# Patient Record
Sex: FEMALE | Race: WHITE | NOT HISPANIC OR LATINO | Employment: FULL TIME | URBAN - METROPOLITAN AREA
[De-identification: names, ages, dates, MRNs, and addresses within clinical notes are randomized per-mention and may not be internally consistent; named-entity substitution may affect disease eponyms.]

---
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
## 2020-06-16 ENCOUNTER — HOSPITAL ENCOUNTER (EMERGENCY)
Facility: HOSPITAL | Age: 20
Discharge: HOME | End: 2020-06-16
Attending: EMERGENCY MEDICINE | Admitting: EMERGENCY MEDICINE
Payer: COMMERCIAL

## 2020-06-16 VITALS
DIASTOLIC BLOOD PRESSURE: 85 MMHG | WEIGHT: 119.27 LBS | HEART RATE: 78 BPM | TEMPERATURE: 98.1 F | SYSTOLIC BLOOD PRESSURE: 131 MMHG | OXYGEN SATURATION: 98 % | RESPIRATION RATE: 18 BRPM

## 2020-06-16 DIAGNOSIS — N39.0 UTI (URINARY TRACT INFECTION): Primary | ICD-10-CM

## 2020-06-16 LAB
BACTERIA UR QL AUTO: ABNORMAL /HPF
BILIRUB UR QL STRIP: NEGATIVE
CLARITY UR: CLEAR
COLOR UR: YELLOW
EXT PREG TEST URINE: NEGATIVE
EXT. CONTROL ED NAV: NORMAL
GLUCOSE UR STRIP-MCNC: NEGATIVE MG/DL
HGB UR QL STRIP.AUTO: ABNORMAL
KETONES UR STRIP-MCNC: NEGATIVE MG/DL
LEUKOCYTE ESTERASE UR QL STRIP: ABNORMAL
NITRITE UR QL STRIP: POSITIVE
NON-SQ EPI CELLS URNS QL MICRO: ABNORMAL /HPF
PH UR STRIP.AUTO: 7 [PH]
PROT UR STRIP-MCNC: ABNORMAL MG/DL
RBC #/AREA URNS AUTO: ABNORMAL /HPF
SP GR UR STRIP.AUTO: <=1.005 (ref 1–1.03)
UROBILINOGEN UR QL STRIP.AUTO: 0.2 E.U./DL
WBC #/AREA URNS AUTO: ABNORMAL /HPF

## 2020-06-16 PROCEDURE — 99284 EMERGENCY DEPT VISIT MOD MDM: CPT | Performed by: EMERGENCY MEDICINE

## 2020-06-16 PROCEDURE — 99283 EMERGENCY DEPT VISIT LOW MDM: CPT

## 2020-06-16 PROCEDURE — 87086 URINE CULTURE/COLONY COUNT: CPT | Performed by: EMERGENCY MEDICINE

## 2020-06-16 PROCEDURE — 81025 URINE PREGNANCY TEST: CPT | Performed by: EMERGENCY MEDICINE

## 2020-06-16 PROCEDURE — 81001 URINALYSIS AUTO W/SCOPE: CPT | Performed by: EMERGENCY MEDICINE

## 2020-06-16 PROCEDURE — 87491 CHLMYD TRACH DNA AMP PROBE: CPT | Performed by: EMERGENCY MEDICINE

## 2020-06-16 PROCEDURE — 87591 N.GONORRHOEAE DNA AMP PROB: CPT | Performed by: EMERGENCY MEDICINE

## 2020-06-16 RX ORDER — CEPHALEXIN 250 MG/1
500 CAPSULE ORAL ONCE
Status: COMPLETED | OUTPATIENT
Start: 2020-06-16 | End: 2020-06-16

## 2020-06-16 RX ORDER — CEPHALEXIN 500 MG/1
500 CAPSULE ORAL EVERY 12 HOURS SCHEDULED
Qty: 14 CAPSULE | Refills: 0 | Status: SHIPPED | OUTPATIENT
Start: 2020-06-16 | End: 2020-06-23

## 2020-06-16 RX ORDER — PHENAZOPYRIDINE HYDROCHLORIDE 200 MG/1
200 TABLET, FILM COATED ORAL 3 TIMES DAILY PRN
Qty: 6 TABLET | Refills: 0 | Status: SHIPPED | OUTPATIENT
Start: 2020-06-16 | End: 2020-06-30 | Stop reason: ALTCHOICE

## 2020-06-16 RX ADMIN — CEPHALEXIN 500 MG: 250 CAPSULE ORAL at 19:57

## 2020-06-17 LAB — BACTERIA UR CULT: NORMAL

## 2020-06-18 LAB
C TRACH DNA SPEC QL NAA+PROBE: NEGATIVE
N GONORRHOEA DNA SPEC QL NAA+PROBE: NEGATIVE

## 2020-06-30 ENCOUNTER — OFFICE VISIT (OUTPATIENT)
Dept: FAMILY MEDICINE CLINIC | Facility: CLINIC | Age: 20
End: 2020-06-30
Payer: COMMERCIAL

## 2020-06-30 VITALS
OXYGEN SATURATION: 98 % | HEIGHT: 62 IN | HEART RATE: 92 BPM | TEMPERATURE: 97.2 F | WEIGHT: 115 LBS | BODY MASS INDEX: 21.16 KG/M2 | DIASTOLIC BLOOD PRESSURE: 74 MMHG | SYSTOLIC BLOOD PRESSURE: 110 MMHG

## 2020-06-30 DIAGNOSIS — Z30.011 ENCOUNTER FOR INITIAL PRESCRIPTION OF CONTRACEPTIVE PILLS: ICD-10-CM

## 2020-06-30 DIAGNOSIS — Z76.89 ENCOUNTER TO ESTABLISH CARE: Primary | ICD-10-CM

## 2020-06-30 DIAGNOSIS — F41.9 ANXIETY: ICD-10-CM

## 2020-06-30 PROCEDURE — 1036F TOBACCO NON-USER: CPT | Performed by: NURSE PRACTITIONER

## 2020-06-30 PROCEDURE — 3008F BODY MASS INDEX DOCD: CPT | Performed by: NURSE PRACTITIONER

## 2020-06-30 PROCEDURE — 99203 OFFICE O/P NEW LOW 30 MIN: CPT | Performed by: NURSE PRACTITIONER

## 2020-06-30 RX ORDER — NORGESTIMATE AND ETHINYL ESTRADIOL 0.25-0.035
1 KIT ORAL DAILY
Qty: 28 TABLET | Refills: 5 | Status: SHIPPED | OUTPATIENT
Start: 2020-06-30 | End: 2020-07-23 | Stop reason: SDUPTHER

## 2020-07-23 DIAGNOSIS — Z30.011 ENCOUNTER FOR INITIAL PRESCRIPTION OF CONTRACEPTIVE PILLS: ICD-10-CM

## 2020-07-24 RX ORDER — NORGESTIMATE AND ETHINYL ESTRADIOL 0.25-0.035
1 KIT ORAL DAILY
Qty: 28 TABLET | Refills: 0 | Status: SHIPPED | OUTPATIENT
Start: 2020-07-24 | End: 2020-08-12 | Stop reason: SDUPTHER

## 2020-08-12 DIAGNOSIS — Z30.011 ENCOUNTER FOR INITIAL PRESCRIPTION OF CONTRACEPTIVE PILLS: ICD-10-CM

## 2020-08-12 RX ORDER — NORGESTIMATE AND ETHINYL ESTRADIOL 0.25-0.035
1 KIT ORAL DAILY
Qty: 28 TABLET | Refills: 4 | Status: SHIPPED | OUTPATIENT
Start: 2020-08-12 | End: 2020-09-03 | Stop reason: SDUPTHER

## 2020-09-03 DIAGNOSIS — Z30.011 ENCOUNTER FOR INITIAL PRESCRIPTION OF CONTRACEPTIVE PILLS: ICD-10-CM

## 2020-09-03 RX ORDER — NORGESTIMATE AND ETHINYL ESTRADIOL 0.25-0.035
1 KIT ORAL DAILY
Qty: 28 TABLET | Refills: 0 | Status: SHIPPED | OUTPATIENT
Start: 2020-09-03 | End: 2020-09-23 | Stop reason: SDUPTHER

## 2020-09-23 DIAGNOSIS — Z30.011 ENCOUNTER FOR INITIAL PRESCRIPTION OF CONTRACEPTIVE PILLS: ICD-10-CM

## 2020-09-24 RX ORDER — NORGESTIMATE AND ETHINYL ESTRADIOL 0.25-0.035
1 KIT ORAL DAILY
Qty: 28 TABLET | Refills: 0 | Status: SHIPPED | OUTPATIENT
Start: 2020-09-24 | End: 2020-10-22 | Stop reason: SDUPTHER

## 2020-10-22 DIAGNOSIS — Z30.011 ENCOUNTER FOR INITIAL PRESCRIPTION OF CONTRACEPTIVE PILLS: ICD-10-CM

## 2020-10-22 RX ORDER — NORGESTIMATE AND ETHINYL ESTRADIOL 0.25-0.035
1 KIT ORAL DAILY
Qty: 28 TABLET | Refills: 0 | Status: SHIPPED | OUTPATIENT
Start: 2020-10-22 | End: 2020-11-16 | Stop reason: SDUPTHER

## 2020-11-16 DIAGNOSIS — Z30.011 ENCOUNTER FOR INITIAL PRESCRIPTION OF CONTRACEPTIVE PILLS: ICD-10-CM

## 2020-11-16 RX ORDER — NORGESTIMATE AND ETHINYL ESTRADIOL 0.25-0.035
1 KIT ORAL DAILY
Qty: 28 TABLET | Refills: 0 | Status: SHIPPED | OUTPATIENT
Start: 2020-11-16 | End: 2020-12-09 | Stop reason: SDUPTHER

## 2020-11-25 ENCOUNTER — APPOINTMENT (EMERGENCY)
Dept: CT IMAGING | Facility: HOSPITAL | Age: 20
End: 2020-11-25
Payer: COMMERCIAL

## 2020-11-25 ENCOUNTER — HOSPITAL ENCOUNTER (EMERGENCY)
Facility: HOSPITAL | Age: 20
Discharge: HOME | End: 2020-11-25
Attending: EMERGENCY MEDICINE | Admitting: EMERGENCY MEDICINE
Payer: COMMERCIAL

## 2020-11-25 VITALS
BODY MASS INDEX: 22.88 KG/M2 | SYSTOLIC BLOOD PRESSURE: 112 MMHG | OXYGEN SATURATION: 100 % | WEIGHT: 124.34 LBS | HEART RATE: 92 BPM | TEMPERATURE: 98.4 F | DIASTOLIC BLOOD PRESSURE: 66 MMHG | RESPIRATION RATE: 20 BRPM | HEIGHT: 62 IN

## 2020-11-25 DIAGNOSIS — R10.9 ABDOMINAL PAIN: Primary | ICD-10-CM

## 2020-11-25 DIAGNOSIS — K27.9 PEPTIC ULCER DISEASE: ICD-10-CM

## 2020-11-25 LAB
ALBUMIN SERPL BCP-MCNC: 3.9 G/DL (ref 3.5–5)
ALP SERPL-CCNC: 63 U/L (ref 46–116)
ALT SERPL W P-5'-P-CCNC: 22 U/L (ref 12–78)
ANION GAP SERPL CALCULATED.3IONS-SCNC: 11 MMOL/L (ref 4–13)
AST SERPL W P-5'-P-CCNC: 18 U/L (ref 5–45)
ATRIAL RATE: 83 BPM
BASOPHILS # BLD AUTO: 0.04 THOUSANDS/ΜL (ref 0–0.1)
BASOPHILS NFR BLD AUTO: 0 % (ref 0–1)
BILIRUB SERPL-MCNC: 0.7 MG/DL (ref 0.2–1)
BUN SERPL-MCNC: 12 MG/DL (ref 5–25)
CALCIUM SERPL-MCNC: 9.1 MG/DL (ref 8.3–10.1)
CHLORIDE SERPL-SCNC: 102 MMOL/L (ref 100–108)
CO2 SERPL-SCNC: 26 MMOL/L (ref 21–32)
CREAT SERPL-MCNC: 0.77 MG/DL (ref 0.6–1.3)
EOSINOPHIL # BLD AUTO: 0.04 THOUSAND/ΜL (ref 0–0.61)
EOSINOPHIL NFR BLD AUTO: 0 % (ref 0–6)
ERYTHROCYTE [DISTWIDTH] IN BLOOD BY AUTOMATED COUNT: 11.3 % (ref 11.6–15.1)
FLUAV RNA RESP QL NAA+PROBE: NEGATIVE
FLUBV RNA RESP QL NAA+PROBE: NEGATIVE
GFR SERPL CREATININE-BSD FRML MDRD: 112 ML/MIN/1.73SQ M
GLUCOSE SERPL-MCNC: 123 MG/DL (ref 65–140)
HCG SERPL QL: NEGATIVE
HCT VFR BLD AUTO: 42.5 % (ref 34.8–46.1)
HGB BLD-MCNC: 15 G/DL (ref 11.5–15.4)
IMM GRANULOCYTES # BLD AUTO: 0.04 THOUSAND/UL (ref 0–0.2)
IMM GRANULOCYTES NFR BLD AUTO: 0 % (ref 0–2)
LYMPHOCYTES # BLD AUTO: 1.91 THOUSANDS/ΜL (ref 0.6–4.47)
LYMPHOCYTES NFR BLD AUTO: 16 % (ref 14–44)
MCH RBC QN AUTO: 31 PG (ref 26.8–34.3)
MCHC RBC AUTO-ENTMCNC: 35.3 G/DL (ref 31.4–37.4)
MCV RBC AUTO: 88 FL (ref 82–98)
MONOCYTES # BLD AUTO: 0.75 THOUSAND/ΜL (ref 0.17–1.22)
MONOCYTES NFR BLD AUTO: 6 % (ref 4–12)
NEUTROPHILS # BLD AUTO: 9.09 THOUSANDS/ΜL (ref 1.85–7.62)
NEUTS SEG NFR BLD AUTO: 78 % (ref 43–75)
NRBC BLD AUTO-RTO: 0 /100 WBCS
P AXIS: 57 DEGREES
PLATELET # BLD AUTO: 266 THOUSANDS/UL (ref 149–390)
PMV BLD AUTO: 10 FL (ref 8.9–12.7)
POTASSIUM SERPL-SCNC: 3.6 MMOL/L (ref 3.5–5.3)
PR INTERVAL: 130 MS
PROT SERPL-MCNC: 7.7 G/DL (ref 6.4–8.2)
QRS AXIS: 82 DEGREES
QRSD INTERVAL: 88 MS
QT INTERVAL: 378 MS
QTC INTERVAL: 444 MS
RBC # BLD AUTO: 4.84 MILLION/UL (ref 3.81–5.12)
RSV RNA RESP QL NAA+PROBE: NEGATIVE
S PYO DNA THROAT QL NAA+PROBE: NORMAL
SARS-COV-2 RNA RESP QL NAA+PROBE: NEGATIVE
SODIUM SERPL-SCNC: 139 MMOL/L (ref 136–145)
T WAVE AXIS: 56 DEGREES
VENTRICULAR RATE: 83 BPM
WBC # BLD AUTO: 11.87 THOUSAND/UL (ref 4.31–10.16)

## 2020-11-25 PROCEDURE — 99284 EMERGENCY DEPT VISIT MOD MDM: CPT

## 2020-11-25 PROCEDURE — 74177 CT ABD & PELVIS W/CONTRAST: CPT

## 2020-11-25 PROCEDURE — 87651 STREP A DNA AMP PROBE: CPT | Performed by: PHYSICIAN ASSISTANT

## 2020-11-25 PROCEDURE — 0241U HB NFCT DS VIR RESP RNA 4 TRGT: CPT | Performed by: PHYSICIAN ASSISTANT

## 2020-11-25 PROCEDURE — G1004 CDSM NDSC: HCPCS

## 2020-11-25 PROCEDURE — 36415 COLL VENOUS BLD VENIPUNCTURE: CPT | Performed by: PHYSICIAN ASSISTANT

## 2020-11-25 PROCEDURE — 93010 ELECTROCARDIOGRAM REPORT: CPT | Performed by: INTERNAL MEDICINE

## 2020-11-25 PROCEDURE — 84703 CHORIONIC GONADOTROPIN ASSAY: CPT | Performed by: PHYSICIAN ASSISTANT

## 2020-11-25 PROCEDURE — 96361 HYDRATE IV INFUSION ADD-ON: CPT

## 2020-11-25 PROCEDURE — 99284 EMERGENCY DEPT VISIT MOD MDM: CPT | Performed by: PHYSICIAN ASSISTANT

## 2020-11-25 PROCEDURE — 85025 COMPLETE CBC W/AUTO DIFF WBC: CPT | Performed by: PHYSICIAN ASSISTANT

## 2020-11-25 PROCEDURE — 80053 COMPREHEN METABOLIC PANEL: CPT | Performed by: PHYSICIAN ASSISTANT

## 2020-11-25 PROCEDURE — 93005 ELECTROCARDIOGRAM TRACING: CPT

## 2020-11-25 PROCEDURE — 96360 HYDRATION IV INFUSION INIT: CPT

## 2020-11-25 RX ADMIN — SODIUM CHLORIDE 1000 ML: 0.9 INJECTION, SOLUTION INTRAVENOUS at 06:23

## 2020-11-25 RX ADMIN — IOHEXOL 85 ML: 350 INJECTION, SOLUTION INTRAVENOUS at 06:43

## 2020-12-09 ENCOUNTER — TELEPHONE (OUTPATIENT)
Dept: FAMILY MEDICINE CLINIC | Facility: CLINIC | Age: 20
End: 2020-12-09

## 2020-12-09 DIAGNOSIS — Z30.011 ENCOUNTER FOR INITIAL PRESCRIPTION OF CONTRACEPTIVE PILLS: ICD-10-CM

## 2020-12-09 RX ORDER — NORGESTIMATE AND ETHINYL ESTRADIOL 0.25-0.035
1 KIT ORAL DAILY
Qty: 28 TABLET | Refills: 6 | Status: SHIPPED | OUTPATIENT
Start: 2020-12-09 | End: 2021-01-09 | Stop reason: SDUPTHER

## 2020-12-09 RX ORDER — NORGESTIMATE AND ETHINYL ESTRADIOL 0.25-0.035
1 KIT ORAL DAILY
Qty: 28 TABLET | Refills: 6 | Status: SHIPPED | OUTPATIENT
Start: 2020-12-09 | End: 2020-12-09 | Stop reason: SDUPTHER

## 2020-12-09 RX ORDER — NORGESTIMATE AND ETHINYL ESTRADIOL 0.25-0.035
1 KIT ORAL DAILY
Qty: 28 TABLET | Refills: 0 | Status: CANCELLED | OUTPATIENT
Start: 2020-12-09

## 2021-01-09 DIAGNOSIS — Z30.011 ENCOUNTER FOR INITIAL PRESCRIPTION OF CONTRACEPTIVE PILLS: ICD-10-CM

## 2021-01-11 DIAGNOSIS — Z30.011 ENCOUNTER FOR INITIAL PRESCRIPTION OF CONTRACEPTIVE PILLS: ICD-10-CM

## 2021-01-12 RX ORDER — NORGESTIMATE AND ETHINYL ESTRADIOL 0.25-0.035
1 KIT ORAL DAILY
Qty: 28 TABLET | Refills: 0 | Status: SHIPPED | OUTPATIENT
Start: 2021-01-12 | End: 2021-03-09 | Stop reason: ALTCHOICE

## 2021-01-12 RX ORDER — NORGESTIMATE AND ETHINYL ESTRADIOL 0.25-0.035
1 KIT ORAL DAILY
Qty: 28 TABLET | Refills: 0 | Status: SHIPPED | OUTPATIENT
Start: 2021-01-12 | End: 2021-03-02 | Stop reason: SDUPTHER

## 2021-03-02 DIAGNOSIS — Z30.011 ENCOUNTER FOR INITIAL PRESCRIPTION OF CONTRACEPTIVE PILLS: ICD-10-CM

## 2021-03-02 RX ORDER — NORGESTIMATE AND ETHINYL ESTRADIOL 0.25-0.035
1 KIT ORAL DAILY
Qty: 28 TABLET | Refills: 0 | Status: SHIPPED | OUTPATIENT
Start: 2021-03-02 | End: 2021-03-31 | Stop reason: SDUPTHER

## 2021-03-03 ENCOUNTER — TELEPHONE (OUTPATIENT)
Dept: OTHER | Facility: OTHER | Age: 21
End: 2021-03-03

## 2021-03-08 ENCOUNTER — TELEPHONE (OUTPATIENT)
Dept: FAMILY MEDICINE CLINIC | Facility: CLINIC | Age: 21
End: 2021-03-08

## 2021-03-08 NOTE — TELEPHONE ENCOUNTER
----- Message from Rosa Spivey sent at 3/8/2021 12:23 PM EST -----  Regarding: FW: Prescription Question  Contact: [phone]  Angela Ramirez can you call patient please and make an apt with her  Thank You   ----- Message -----  From: Juan Davies  Sent: 3/8/2021  11:59 AM EST  To: , #  Subject: Prescription Question                            I had someone call me about setting up an appointment for an evaluation for anxiety medication  They said they'd call me back but no one ever did

## 2021-03-09 ENCOUNTER — TELEMEDICINE (OUTPATIENT)
Dept: FAMILY MEDICINE CLINIC | Facility: CLINIC | Age: 21
End: 2021-03-09
Payer: COMMERCIAL

## 2021-03-09 DIAGNOSIS — F32.A ANXIETY AND DEPRESSION: Primary | ICD-10-CM

## 2021-03-09 DIAGNOSIS — F41.9 ANXIETY AND DEPRESSION: Primary | ICD-10-CM

## 2021-03-09 DIAGNOSIS — R45.88 NON-SUICIDAL SELF HARM: ICD-10-CM

## 2021-03-09 PROBLEM — R10.9 ABDOMINAL PAIN: Status: RESOLVED | Noted: 2020-11-25 | Resolved: 2021-03-09

## 2021-03-09 PROCEDURE — 99214 OFFICE O/P EST MOD 30 MIN: CPT | Performed by: NURSE PRACTITIONER

## 2021-03-09 RX ORDER — SERTRALINE HYDROCHLORIDE 25 MG/1
25 TABLET, FILM COATED ORAL DAILY
Qty: 30 TABLET | Refills: 0 | Status: SHIPPED | OUTPATIENT
Start: 2021-03-09 | End: 2021-03-23 | Stop reason: ALTCHOICE

## 2021-03-09 NOTE — PROGRESS NOTES
Virtual Regular Visit      Assessment/Plan:    Problem List Items Addressed This Visit        Other    Anxiety and depression - Primary       Patient given number for suicide hotline  Patient denies active suicidal thoughts or plan  Advised patient to reach out to hotline with any thoughts  Continue counseling and encouraged to reach out to support system  Will start patient on Zoloft 25 mg daily and follow-up in 4 weeks  Relevant Medications    sertraline (ZOLOFT) 25 mg tablet    Non-suicidal self harm       Patient admits to superficial cuts on her wrist 2 weeks ago  She denies current thoughts  Patient did verbally contract for safety  Reason for visit is   Chief Complaint   Patient presents with    Virtual Regular Visit        Encounter provider ANGELA Fernandez    Provider located at [facility] [address] [facility]  [address] [facility] [address] [phone]      Recent Visits  Date Type Provider Dept   03/08/21 Telephone Seema Wilhelm, [address] recent visits within past 7 days and meeting all other requirements     Today's Visits  Date Type Provider Dept   03/09/21 Telemedicine ANGELA Arthur [address] [facility]   Showing today's visits and meeting all other requirements     Future Appointments  No visits were found meeting these conditions  Showing future appointments within next 150 days and meeting all other requirements        The patient was identified by name and date of birth  Mellissa Weaver was informed that this is a telemedicine visit and that the visit is being conducted through [address] [place] [address] and patient was informed that this is not a secure, HIPAA-compliant platform  She agrees to proceed     My office door was closed  No one else was in the room  She acknowledged consent and understanding of privacy and security of the video platform  The patient has agreed to participate and understands they can discontinue the visit at any time  Patient is aware this is a billable service  Subjective  Ernst Bedolla is a 21 y o  female  Presenting via face time for anxiety and depression  Patient states she is currently going through a break-up for the past 2 weeks  Patient states anxiety has been increasing over the last several months  Patient is currently seeking counseling on campus  She is going weekly to her counselor  Patient states anxiety is 8/10 currently and depression 5/10  She has had passive suicidal ideation and has talk to her counselor about this  Patient does not have a suicidal plan nor active suicidal ideation  Patient did cut herself 2 weeks ago on her wrist, very superficially  She did reach out to her counselor about this as well  Patient does believe it is time for her to start on medications  Patient does live with several roommates who she talks too  Patient states she does have a good support system with her friends  Patient is currently out at school at [employer]     Past Medical History:   Diagnosis Date    Concussion        Past Surgical History:   Procedure Laterality Date    WISDOM TOOTH EXTRACTION         Current Outpatient Medications   Medication Sig Dispense Refill    norgestimate-ethinyl estradiol (ORTHO-CYCLEN) 0 25-35 MG-MCG per tablet Take 1 tablet by mouth daily 28 tablet 0    sertraline (ZOLOFT) 25 mg tablet Take 1 tablet (25 mg total) by mouth daily 30 tablet 0     No current facility-administered medications for this visit  No Known Allergies    Review of Systems   Constitutional: Negative for chills and fever  HENT: Negative for ear pain and sore throat  Eyes: Negative for pain and visual disturbance  Respiratory: Negative for cough and shortness of breath  Cardiovascular: Negative for chest pain and palpitations     Gastrointestinal: Negative for abdominal pain and vomiting  Genitourinary: Negative for dysuria and hematuria  Musculoskeletal: Negative for arthralgias and back pain  Skin: Negative for color change and rash  Neurological: Negative for seizures and syncope  Psychiatric/Behavioral: Positive for dysphoric mood, self-injury (2 weeks ago) and suicidal ideas (no active plan)  The patient is nervous/anxious  All other systems reviewed and are negative  Video Exam    There were no vitals filed for this visit  Physical Exam  Constitutional:       General: She is not in acute distress  Pulmonary:      Effort: Pulmonary effort is normal    Neurological:      Mental Status: She is alert and oriented to person, place, and time  I spent 30 minutes directly with the patient during this visit      VIRTUAL VISIT DISCLAIMER    Andree Kern acknowledges that she has consented to an online visit or consultation  She understands that the online visit is based solely on information provided by her, and that, in the absence of a face-to-face physical evaluation by the physician, the diagnosis she receives is both limited and provisional in terms of accuracy and completeness  This is not intended to replace a full medical face-to-face evaluation by the physician  Andree Kern understands and accepts these terms

## 2021-03-09 NOTE — ASSESSMENT & PLAN NOTE
Patient admits to superficial cuts on her wrist 2 weeks ago  She denies current thoughts  Patient did verbally contract for safety

## 2021-03-09 NOTE — ASSESSMENT & PLAN NOTE
Patient given number for suicide hotline  Patient denies active suicidal thoughts or plan  Advised patient to reach out to hotline with any thoughts  Continue counseling and encouraged to reach out to support system  Will start patient on Zoloft 25 mg daily and follow-up in 4 weeks

## 2021-03-15 ENCOUNTER — TELEMEDICINE (OUTPATIENT)
Dept: FAMILY MEDICINE CLINIC | Facility: CLINIC | Age: 21
End: 2021-03-15
Payer: COMMERCIAL

## 2021-03-15 DIAGNOSIS — F41.9 ANXIETY AND DEPRESSION: Primary | ICD-10-CM

## 2021-03-15 DIAGNOSIS — F32.A ANXIETY AND DEPRESSION: Primary | ICD-10-CM

## 2021-03-15 PROCEDURE — 99213 OFFICE O/P EST LOW 20 MIN: CPT | Performed by: NURSE PRACTITIONER

## 2021-03-15 NOTE — ASSESSMENT & PLAN NOTE
Recommended patient be seen in ER today  Patient agreed to plan  She plans to call her mother then proceed to ER with friend  She did contract for safety and denies active suicidal thoughts  Continue sertraline--- discussed this was just started 5 days ago and we do not know if it is an effective medication for her yet

## 2021-03-15 NOTE — PROGRESS NOTES
Virtual Regular Visit      Assessment/Plan:    Problem List Items Addressed This Visit        Other    Anxiety and depression - Primary     Recommended patient be seen in ER today  Patient agreed to plan  She plans to call her mother then proceed to ER with friend  She did contract for safety and denies active suicidal thoughts  Continue sertraline--- discussed this was just started 5 days ago and we do not know if it is an effective medication for her yet  Reason for visit is   Chief Complaint   Patient presents with    Virtual Regular Visit        Encounter provider ANGELA Baeza    Provider located at [facility] [address]  [address] [facility]  [phone]      Recent Visits  Date Type Provider Dept   03/09/21 Telemedicine [place] ANGELA Gutierrez P[facility] [place] [address]   03/08/21 Telephone ANGELA Arthur [address] recent visits within past 7 days and meeting all other requirements     Today's Visits  Date Type Provider Dept   03/15/21 Telemedicine ANGELA Arthur [address] [facility]   Showing today's visits and meeting all other requirements     Future Appointments  No visits were found meeting these conditions  Showing future appointments within next 150 days and meeting all other requirements        The patient was identified by name and date of birth  Deniajaime Dionisio was informed that this is a telemedicine visit and that the visit is being conducted through [facility] and patient was informed that this is not a secure, HIPAA-compliant platform  She agrees to proceed     My office door was closed  No one else was in the room  She acknowledged consent and understanding of privacy and security of the video platform  The patient has agreed to participate and understands they can discontinue the visit at any time      Patient is aware this is a billable service  Subjective  Juliana Null is a 21 y o  female   Presenting via face for concerns of anxiety and depression  Patient was seen via telemedicine on 03/09 and started on sertraline  Patient states that she has been extremely anxious and depressed over the past few days  Patient does admit to suicidal thoughts over the weekend and did called suicide hotline  Patient does feel that she may need psychiatric admission  We did discuss at length partial hospitalization verses inpatient admission  Patient did agree at end of conversation to go to ER for evaluation  Patient states that she is going to call her mother and then proceed to the ER  Patient states that friend is going to take her to the ER  She did contract for safety and states she does not active suicidal thoughts  Patient is at school currently at [employer]  Patient has been losing weight for last several weeks as she has been unable to eat  HPI     Past Medical History:   Diagnosis Date    Concussion        Past Surgical History:   Procedure Laterality Date    WISDOM TOOTH EXTRACTION         Current Outpatient Medications   Medication Sig Dispense Refill    norgestimate-ethinyl estradiol (ORTHO-CYCLEN) 0 25-35 MG-MCG per tablet Take 1 tablet by mouth daily 28 tablet 0    sertraline (ZOLOFT) 25 mg tablet Take 1 tablet (25 mg total) by mouth daily 30 tablet 0     No current facility-administered medications for this visit  No Known Allergies    Review of Systems   Constitutional: Negative for chills and fever  HENT: Negative for ear pain and sore throat  Eyes: Negative for pain and visual disturbance  Respiratory: Negative for cough and shortness of breath  Cardiovascular: Negative for chest pain and palpitations  Gastrointestinal: Negative for abdominal pain and vomiting  Genitourinary: Negative for dysuria and hematuria  Musculoskeletal: Negative for arthralgias and back pain  Skin: Negative for color change and rash  Neurological: Negative for seizures and syncope  Psychiatric/Behavioral: Positive for dysphoric mood and suicidal ideas (thoughts, no active plan)  Negative for self-injury  The patient is nervous/anxious  All other systems reviewed and are negative  Video Exam    There were no vitals filed for this visit  Physical Exam  Constitutional:       General: She is not in acute distress  Pulmonary:      Effort: Pulmonary effort is normal    Neurological:      Mental Status: She is alert and oriented to person, place, and time  Psychiatric:         Mood and Affect: Mood is depressed  Affect is tearful  I spent 15 minutes directly with the patient during this visit      VIRTUAL VISIT DISCLAIMER    Samantha Manuel acknowledges that she has consented to an online visit or consultation  She understands that the online visit is based solely on information provided by her, and that, in the absence of a face-to-face physical evaluation by the physician, the diagnosis she receives is both limited and provisional in terms of accuracy and completeness  This is not intended to replace a full medical face-to-face evaluation by the physician  Samantha Manuel understands and accepts these terms

## 2021-03-23 ENCOUNTER — TELEMEDICINE (OUTPATIENT)
Dept: FAMILY MEDICINE CLINIC | Facility: CLINIC | Age: 21
End: 2021-03-23
Payer: COMMERCIAL

## 2021-03-23 DIAGNOSIS — F32.A ANXIETY AND DEPRESSION: Primary | ICD-10-CM

## 2021-03-23 DIAGNOSIS — F41.9 ANXIETY AND DEPRESSION: Primary | ICD-10-CM

## 2021-03-23 DIAGNOSIS — F51.01 PRIMARY INSOMNIA: ICD-10-CM

## 2021-03-23 PROCEDURE — 99214 OFFICE O/P EST MOD 30 MIN: CPT | Performed by: NURSE PRACTITIONER

## 2021-03-23 RX ORDER — TRAZODONE HYDROCHLORIDE 50 MG/1
50 TABLET ORAL
Start: 2021-03-23 | End: 2021-04-20 | Stop reason: SDUPTHER

## 2021-03-23 RX ORDER — ESCITALOPRAM OXALATE 10 MG/1
10 TABLET ORAL DAILY
Qty: 30 TABLET | Refills: 0 | Status: SHIPPED | OUTPATIENT
Start: 2021-03-23 | End: 2021-04-05 | Stop reason: SDUPTHER

## 2021-03-23 NOTE — ASSESSMENT & PLAN NOTE
Continue Lexapro daily  Continue therapy  Will check back in in 3 weeks and adjust Lexapro as needed

## 2021-03-23 NOTE — PROGRESS NOTES
Virtual Regular Visit      Assessment/Plan:    Problem List Items Addressed This Visit        Other    Anxiety and depression - Primary       Continue Lexapro daily  Continue therapy  Will check back in in 3 weeks and adjust Lexapro as needed  Relevant Medications    escitalopram (LEXAPRO) 10 mg tablet    traZODone (DESYREL) 50 mg tablet    Primary insomnia       Much improved with trazodone  Relevant Medications    traZODone (DESYREL) 50 mg tablet               Reason for visit is   Chief Complaint   Patient presents with    Virtual Regular Visit        Encounter provider ANGELA Chacko    Provider located at [facility] [address] [facility]  [address] [place] [id number] [phone]      Recent Visits  No visits were found meeting these conditions  Showing recent visits within past 7 days and meeting all other requirements     Today's Visits  Date Type Provider Dept   03/23/21 Telemedicine ANGELA Arthur Pg Iv[address] today's visits and meeting all other requirements     Future Appointments  No visits were found meeting these conditions  Showing future appointments within next 150 days and meeting all other requirements        The patient was identified by name and date of birth  Yareli Mata was informed that this is a telemedicine visit and that the visit is being conducted through [address] [place] [address] and patient was informed that this is not a secure, HIPAA-compliant platform  She agrees to proceed     My office door was closed  No one else was in the room  She acknowledged consent and understanding of privacy and security of the video platform  The patient has agreed to participate and understands they can discontinue the visit at any time  Patient is aware this is a billable service  Subjective  Yareli Mata is a 21 y o  female Presenting via [facility] for hospital follow-up  Patient was admitted to [facility] inpatient psychiatric care for 2 days  For suicidal ideation  Patient was taken off of sertraline mood started on Lexapro  She was also started on trazodone for sleep  Overall, patient is feeling great  She states her anxiety is much improved, she rates it 5/10 currently  She rates her depression a 3/10  She denies suicidal thoughts currently or self-harm  She does a therapist at school who she is talking to regularly  She is reaching out to her family and friends about her depression and anxiety as well  HPI     Past Medical History:   Diagnosis Date    Concussion        Past Surgical History:   Procedure Laterality Date    WISDOM TOOTH EXTRACTION         Current Outpatient Medications   Medication Sig Dispense Refill    escitalopram (LEXAPRO) 10 mg tablet Take 1 tablet (10 mg total) by mouth daily 30 tablet 0    norgestimate-ethinyl estradiol (ORTHO-CYCLEN) 0 25-35 MG-MCG per tablet Take 1 tablet by mouth daily 28 tablet 0    traZODone (DESYREL) 50 mg tablet Take 1 tablet (50 mg total) by mouth daily at bedtime       No current facility-administered medications for this visit  No Known Allergies    Review of Systems   Constitutional: Negative for chills and fever  HENT: Negative for ear pain and sore throat  Eyes: Negative for pain and visual disturbance  Respiratory: Negative for cough and shortness of breath  Cardiovascular: Negative for chest pain and palpitations  Gastrointestinal: Negative for abdominal pain and vomiting  Genitourinary: Negative for dysuria and hematuria  Musculoskeletal: Negative for arthralgias and back pain  Skin: Negative for color change and rash  Neurological: Negative for seizures and syncope  Psychiatric/Behavioral: Positive for dysphoric mood and sleep disturbance  Negative for self-injury and suicidal ideas  The patient is nervous/anxious  All other systems reviewed and are negative        Video Exam    There were no vitals filed for this visit  Physical Exam  Constitutional:       General: She is not in acute distress  Pulmonary:      Effort: Pulmonary effort is normal    Neurological:      Mental Status: She is alert and oriented to person, place, and time  VIRTUAL VISIT DISCLAIMER    Nicki Celiozach acknowledges that she has consented to an online visit or consultation  She understands that the online visit is based solely on information provided by her, and that, in the absence of a face-to-face physical evaluation by the physician, the diagnosis she receives is both limited and provisional in terms of accuracy and completeness  This is not intended to replace a full medical face-to-face evaluation by the physician  Nicki Malave understands and accepts these terms

## 2021-03-29 ENCOUNTER — TELEMEDICINE (OUTPATIENT)
Dept: FAMILY MEDICINE CLINIC | Facility: CLINIC | Age: 21
End: 2021-03-29
Payer: COMMERCIAL

## 2021-03-29 DIAGNOSIS — F41.9 ANXIETY AND DEPRESSION: Primary | ICD-10-CM

## 2021-03-29 DIAGNOSIS — F32.A ANXIETY AND DEPRESSION: Primary | ICD-10-CM

## 2021-03-29 PROCEDURE — 1036F TOBACCO NON-USER: CPT | Performed by: NURSE PRACTITIONER

## 2021-03-29 PROCEDURE — 99214 OFFICE O/P EST MOD 30 MIN: CPT | Performed by: NURSE PRACTITIONER

## 2021-03-29 RX ORDER — HYDROXYZINE PAMOATE 50 MG/1
50 CAPSULE ORAL 3 TIMES DAILY PRN
Qty: 30 CAPSULE | Refills: 0 | Status: SHIPPED | OUTPATIENT
Start: 2021-03-29 | End: 2021-04-21 | Stop reason: SDUPTHER

## 2021-03-29 NOTE — PROGRESS NOTES
Virtual Regular Visit      Assessment/Plan:    Problem List Items Addressed This Visit        Other    Anxiety and depression - Primary       Patient was just started Lexapro and felt she was doing well  Will add on Vistaril to take as needed  Advised to continue therapy  Relevant Medications    hydrOXYzine pamoate (VISTARIL) 50 mg capsule               Reason for visit is   Chief Complaint   Patient presents with    Virtual Regular Visit        Encounter provider ANGELA Chavarria    Provider located at [facility] [address] [place] [id number] [phone]      Recent Visits  Date Type Provider Dept   03/23/21 Telemedicine Seema Wilhelm, [address] recent visits within past 7 days and meeting all other requirements     Today's Visits  Date Type Provider Dept   03/29/21 Telemedicine ANGELA Arthur Pg [facility] [facility]   Showing today's visits and meeting all other requirements     Future Appointments  No visits were found meeting these conditions  Showing future appointments within next 150 days and meeting all other requirements        The patient was identified by name and date of birth  Amy Grimes was informed that this is a telemedicine visit and that the visit is being conducted through [address] [place] [address] and patient was informed that this is not a secure, HIPAA-compliant platform  She agrees to proceed     My office door was closed  No one else was in the room  She acknowledged consent and understanding of privacy and security of the video platform  The patient has agreed to participate and understands they can discontinue the visit at any time  Patient is aware this is a billable service  Subjective  Amy Grimes is a 21 y o  female   Presenting via face time for continued anxiety and depression    Patient states she had a panic attack over the weekend  She continues with therapy  She denies suicidal thoughts currently  She states she is having a difficult time controlling her emotions and is very agitated  Lei Luiskelly BENITEZ     Past Medical History:   Diagnosis Date    Concussion        Past Surgical History:   Procedure Laterality Date    WISDOM TOOTH EXTRACTION         Current Outpatient Medications   Medication Sig Dispense Refill    escitalopram (LEXAPRO) 10 mg tablet Take 1 tablet (10 mg total) by mouth daily 30 tablet 0    hydrOXYzine pamoate (VISTARIL) 50 mg capsule Take 1 capsule (50 mg total) by mouth 3 (three) times a day as needed for itching 30 capsule 0    norgestimate-ethinyl estradiol (ORTHO-CYCLEN) 0 25-35 MG-MCG per tablet Take 1 tablet by mouth daily 28 tablet 0    traZODone (DESYREL) 50 mg tablet Take 1 tablet (50 mg total) by mouth daily at bedtime       No current facility-administered medications for this visit  No Known Allergies    Review of Systems   Constitutional: Negative for chills and fever  HENT: Negative for ear pain and sore throat  Eyes: Negative for pain and visual disturbance  Respiratory: Negative for cough and shortness of breath  Cardiovascular: Negative for chest pain and palpitations  Gastrointestinal: Negative for abdominal pain and vomiting  Genitourinary: Negative for dysuria and hematuria  Musculoskeletal: Negative for arthralgias and back pain  Skin: Negative for color change and rash  Neurological: Negative for seizures and syncope  Psychiatric/Behavioral: Positive for dysphoric mood and sleep disturbance  Negative for self-injury and suicidal ideas  The patient is nervous/anxious  All other systems reviewed and are negative  Video Exam    There were no vitals filed for this visit  Physical Exam  Constitutional:       General: She is not in acute distress    Pulmonary:      Effort: Pulmonary effort is normal    Neurological:      Mental Status: She is alert and oriented to person, place, and time  I spent 15 minutes directly with the patient during this visit      VIRTUAL VISIT DISCLAIMER    Pavithramunira Valero acknowledges that she has consented to an online visit or consultation  She understands that the online visit is based solely on information provided by her, and that, in the absence of a face-to-face physical evaluation by the physician, the diagnosis she receives is both limited and provisional in terms of accuracy and completeness  This is not intended to replace a full medical face-to-face evaluation by the physician  Pavithra Valero understands and accepts these terms

## 2021-03-29 NOTE — ASSESSMENT & PLAN NOTE
Patient was just started Lexapro and felt she was doing well  Will add on Vistaril to take as needed  Advised to continue therapy

## 2021-03-31 DIAGNOSIS — Z30.011 ENCOUNTER FOR INITIAL PRESCRIPTION OF CONTRACEPTIVE PILLS: ICD-10-CM

## 2021-04-01 RX ORDER — NORGESTIMATE AND ETHINYL ESTRADIOL 0.25-0.035
1 KIT ORAL DAILY
Qty: 28 TABLET | Refills: 0 | Status: SHIPPED | OUTPATIENT
Start: 2021-04-01 | End: 2021-05-02 | Stop reason: SDUPTHER

## 2021-04-05 ENCOUNTER — TELEMEDICINE (OUTPATIENT)
Dept: FAMILY MEDICINE CLINIC | Facility: CLINIC | Age: 21
End: 2021-04-05
Payer: COMMERCIAL

## 2021-04-05 DIAGNOSIS — F41.9 ANXIETY AND DEPRESSION: ICD-10-CM

## 2021-04-05 DIAGNOSIS — F32.A ANXIETY AND DEPRESSION: ICD-10-CM

## 2021-04-05 PROCEDURE — 99213 OFFICE O/P EST LOW 20 MIN: CPT | Performed by: NURSE PRACTITIONER

## 2021-04-05 RX ORDER — ESCITALOPRAM OXALATE 20 MG/1
20 TABLET ORAL DAILY
Qty: 30 TABLET | Refills: 0 | Status: SHIPPED | OUTPATIENT
Start: 2021-04-05 | End: 2021-05-02 | Stop reason: SDUPTHER

## 2021-04-05 NOTE — PROGRESS NOTES
Virtual Regular Visit      Assessment/Plan:    Problem List Items Addressed This Visit        Other    Anxiety and depression       Will increase Lexapro to 20 mg daily  Advised to continue therapy  Recommend seeing psychiatrist   Patient agreed to plan  Patient denies any current suicidal thoughts or plan  Relevant Medications    escitalopram (LEXAPRO) 20 mg tablet    Other Relevant Orders    Ambulatory referral to Psychiatry               Reason for visit is   Chief Complaint   Patient presents with    Virtual Regular Visit        Encounter provider ANGELA Hurley    Provider located at [facility] [address] [place] [id number] [phone]      Recent Visits  Date Type Provider Dept   03/29/21 Telemedicine Seema Wilhelm, [address] recent visits within past 7 days and meeting all other requirements     Today's Visits  Date Type Provider Dept   04/05/21 Telemedicine ANGELA Arthur P[facility] [address] [facility]   Showing today's visits and meeting all other requirements     Future Appointments  No visits were found meeting these conditions  Showing future appointments within next 150 days and meeting all other requirements        The patient was identified by name and date of birth  Vania Grewal was informed that this is a telemedicine visit and that the visit is being conducted through [address] [place] [address] and patient was informed that this is not a secure, HIPAA-compliant platform  She agrees to proceed     My office door was closed  No one else was in the room  She acknowledged consent and understanding of privacy and security of the video platform  The patient has agreed to participate and understands they can discontinue the visit at any time  Patient is aware this is a billable service       Subjective  Vania Grewal is a 21 y o  female   Presenting via face time for follow-up on anxiety and depression  Patient was recently admitted to inpatient [address] [place] [address] for suicidal thoughts  Patient has not had any active suicidal thoughts since leaving the hospital, but has had passive suicidal thoughts  She is seeing her therapist regularly and feels her therapist is helping  She has had thoughts about death, but no suicidal plan  No thoughts of self-harm  Overall, depression is 5/10 and anxiety 5/10  Patient is thinking about intensive outpatient therapy once her school is done  She is still out at [employer] for school and seeing therapist out there  She is concerned about bipolar disorder as both her mother and father are bipolar  HPI     Past Medical History:   Diagnosis Date    Concussion        Past Surgical History:   Procedure Laterality Date    WISDOM TOOTH EXTRACTION         Current Outpatient Medications   Medication Sig Dispense Refill    escitalopram (LEXAPRO) 20 mg tablet Take 1 tablet (20 mg total) by mouth daily 30 tablet 0    hydrOXYzine pamoate (VISTARIL) 50 mg capsule Take 1 capsule (50 mg total) by mouth 3 (three) times a day as needed for itching 30 capsule 0    norgestimate-ethinyl estradiol (ORTHO-CYCLEN) 0 25-35 MG-MCG per tablet Take 1 tablet by mouth daily 28 tablet 0    traZODone (DESYREL) 50 mg tablet Take 1 tablet (50 mg total) by mouth daily at bedtime       No current facility-administered medications for this visit  No Known Allergies    Review of Systems   Constitutional: Negative for chills and fever  HENT: Negative for ear pain and sore throat  Eyes: Negative for pain and visual disturbance  Respiratory: Negative for cough and shortness of breath  Cardiovascular: Negative for chest pain and palpitations  Gastrointestinal: Negative for abdominal pain and vomiting  Genitourinary: Negative for dysuria and hematuria  Musculoskeletal: Negative for arthralgias and back pain     Skin: Negative for color change and rash  Neurological: Negative for seizures and syncope  Psychiatric/Behavioral: Positive for dysphoric mood and sleep disturbance  Negative for self-injury and suicidal ideas  The patient is nervous/anxious  All other systems reviewed and are negative  Video Exam    There were no vitals filed for this visit  Physical Exam  Constitutional:       General: She is not in acute distress  Pulmonary:      Effort: Pulmonary effort is normal    Neurological:      Mental Status: She is alert and oriented to person, place, and time  I spent 15 minutes directly with the patient during this visit      VIRTUAL VISIT DISCLAIMER    Fransico Stanford acknowledges that she has consented to an online visit or consultation  She understands that the online visit is based solely on information provided by her, and that, in the absence of a face-to-face physical evaluation by the physician, the diagnosis she receives is both limited and provisional in terms of accuracy and completeness  This is not intended to replace a full medical face-to-face evaluation by the physician  rFansico Stanford understands and accepts these terms

## 2021-04-05 NOTE — ASSESSMENT & PLAN NOTE
Will increase Lexapro to 20 mg daily  Advised to continue therapy  Recommend seeing psychiatrist   Patient agreed to plan  Patient denies any current suicidal thoughts or plan

## 2021-04-20 DIAGNOSIS — F51.01 PRIMARY INSOMNIA: ICD-10-CM

## 2021-04-21 ENCOUNTER — TELEPHONE (OUTPATIENT)
Dept: FAMILY MEDICINE CLINIC | Facility: CLINIC | Age: 21
End: 2021-04-21

## 2021-04-21 DIAGNOSIS — F41.9 ANXIETY AND DEPRESSION: ICD-10-CM

## 2021-04-21 DIAGNOSIS — F32.A ANXIETY AND DEPRESSION: ICD-10-CM

## 2021-04-21 DIAGNOSIS — F51.01 PRIMARY INSOMNIA: ICD-10-CM

## 2021-04-21 RX ORDER — TRAZODONE HYDROCHLORIDE 50 MG/1
50 TABLET ORAL
Qty: 30 TABLET | Refills: 0 | OUTPATIENT
Start: 2021-04-21

## 2021-04-21 RX ORDER — HYDROXYZINE PAMOATE 50 MG/1
50 CAPSULE ORAL 3 TIMES DAILY PRN
Qty: 30 CAPSULE | Refills: 0 | Status: SHIPPED | OUTPATIENT
Start: 2021-04-21 | End: 2021-05-05 | Stop reason: SDUPTHER

## 2021-04-21 RX ORDER — TRAZODONE HYDROCHLORIDE 50 MG/1
50 TABLET ORAL
Qty: 30 TABLET | Refills: 1 | Status: SHIPPED | OUTPATIENT
Start: 2021-04-21 | End: 2021-06-01 | Stop reason: SDUPTHER

## 2021-04-26 ENCOUNTER — TELEMEDICINE (OUTPATIENT)
Dept: FAMILY MEDICINE CLINIC | Facility: CLINIC | Age: 21
End: 2021-04-26
Payer: COMMERCIAL

## 2021-04-26 DIAGNOSIS — F41.9 ANXIETY AND DEPRESSION: ICD-10-CM

## 2021-04-26 DIAGNOSIS — F51.01 PRIMARY INSOMNIA: Primary | ICD-10-CM

## 2021-04-26 DIAGNOSIS — F32.A ANXIETY AND DEPRESSION: ICD-10-CM

## 2021-04-26 PROCEDURE — 99214 OFFICE O/P EST MOD 30 MIN: CPT | Performed by: NURSE PRACTITIONER

## 2021-04-26 PROCEDURE — 1036F TOBACCO NON-USER: CPT | Performed by: NURSE PRACTITIONER

## 2021-04-26 NOTE — ASSESSMENT & PLAN NOTE
Continue Lexapro at 20 mg daily  Continue Vistaril as needed  Advised to follow-up in office in 4 weeks  Continue therapy with school

## 2021-04-26 NOTE — PROGRESS NOTES
Virtual Regular Visit      Assessment/Plan:    Problem List Items Addressed This Visit        Other    Anxiety and depression       Continue Lexapro at 20 mg daily  Continue Vistaril as needed  Advised to follow-up in office in 4 weeks  Continue therapy with school  Primary insomnia - Primary       Continue trazodone at bedtime  Reason for visit is   Chief Complaint   Patient presents with    Virtual Regular Visit        Encounter provider ANGELA Diaz    Provider located at [facility] [address]   [address]  [address]  [phone]      Recent Visits  Date Type Provider Dept   04/21/21 Telephone JONES Keys P[facility] [address] [place] [address] Avenue recent visits within past 7 days and meeting all other requirements     Today's Visits  Date Type Provider Dept   04/26/21 Telemedicine ANGELA Arthur Pg [address] [facility]   Showing today's visits and meeting all other requirements     Future Appointments  No visits were found meeting these conditions  Showing future appointments within next 150 days and meeting all other requirements        The patient was identified by name and date of birth  Ryan Lopez was informed that this is a telemedicine visit and that the visit is being conducted through [address] [place] [address] and patient was informed that this is not a secure, HIPAA-compliant platform  She agrees to proceed     My office door was closed  No one else was in the room  She acknowledged consent and understanding of privacy and security of the video platform  The patient has agreed to participate and understands they can discontinue the visit at any time  Patient is aware this is a billable service  Subjective  Ryan Lopez is a 21 y o  female Presenting via face time for follow-up on medications  Patient recently increased Lexapro to 20 mg daily    She has been feeling slightly shaky with the increase  She rates her depression still 5/10 and anxiety 7/10  She has been using Vistaril which has been helping with her anxiety  I did recommend her seeing psychiatry last time that we spoke  She did receive a call from Psychiatry and will call them back for an appointment tomorrow  She did have some suicidal ideation over the weekend, but no plan  She did reach out to her friends which seemed to help with her thoughts  She has been very tired throughout the day, but seems to be sleeping well with trazodone  Vi BENITEZ     Past Medical History:   Diagnosis Date    Concussion        Past Surgical History:   Procedure Laterality Date    WISDOM TOOTH EXTRACTION         Current Outpatient Medications   Medication Sig Dispense Refill    escitalopram (LEXAPRO) 20 mg tablet Take 1 tablet (20 mg total) by mouth daily 30 tablet 0    hydrOXYzine pamoate (VISTARIL) 50 mg capsule Take 1 capsule (50 mg total) by mouth 3 (three) times a day as needed for itching 30 capsule 0    norgestimate-ethinyl estradiol (ORTHO-CYCLEN) 0 25-35 MG-MCG per tablet Take 1 tablet by mouth daily 28 tablet 0    traZODone (DESYREL) 50 mg tablet Take 1 tablet (50 mg total) by mouth daily at bedtime 30 tablet 1     No current facility-administered medications for this visit  No Known Allergies    Review of Systems   Constitutional: Negative for chills and fever  HENT: Negative for ear pain and sore throat  Eyes: Negative for pain and visual disturbance  Respiratory: Negative for cough and shortness of breath  Cardiovascular: Negative for chest pain and palpitations  Gastrointestinal: Negative for abdominal pain and vomiting  Genitourinary: Negative for dysuria and hematuria  Musculoskeletal: Negative for arthralgias and back pain  Skin: Negative for color change and rash  Neurological: Negative for seizures and syncope     Psychiatric/Behavioral: Positive for dysphoric mood, sleep disturbance and suicidal ideas (without active plan)  Negative for self-injury  The patient is nervous/anxious  All other systems reviewed and are negative  Video Exam    There were no vitals filed for this visit  Physical Exam  Constitutional:       General: She is not in acute distress  Pulmonary:      Effort: Pulmonary effort is normal    Neurological:      Mental Status: She is alert and oriented to person, place, and time  I spent 15 minutes directly with the patient during this visit      VIRTUAL VISIT DISCLAIMER    Dane Hayes acknowledges that she has consented to an online visit or consultation  She understands that the online visit is based solely on information provided by her, and that, in the absence of a face-to-face physical evaluation by the physician, the diagnosis she receives is both limited and provisional in terms of accuracy and completeness  This is not intended to replace a full medical face-to-face evaluation by the physician  Dane Hayes understands and accepts these terms

## 2021-05-02 DIAGNOSIS — Z30.011 ENCOUNTER FOR INITIAL PRESCRIPTION OF CONTRACEPTIVE PILLS: ICD-10-CM

## 2021-05-02 DIAGNOSIS — F41.9 ANXIETY AND DEPRESSION: ICD-10-CM

## 2021-05-02 DIAGNOSIS — F32.A ANXIETY AND DEPRESSION: ICD-10-CM

## 2021-05-03 ENCOUNTER — TELEPHONE (OUTPATIENT)
Dept: PSYCHIATRY | Facility: CLINIC | Age: 21
End: 2021-05-03

## 2021-05-03 RX ORDER — ESCITALOPRAM OXALATE 20 MG/1
20 TABLET ORAL DAILY
Qty: 30 TABLET | Refills: 0 | Status: SHIPPED | OUTPATIENT
Start: 2021-05-03 | End: 2021-05-19 | Stop reason: ALTCHOICE

## 2021-05-03 RX ORDER — NORGESTIMATE AND ETHINYL ESTRADIOL 0.25-0.035
1 KIT ORAL DAILY
Qty: 28 TABLET | Refills: 0 | Status: SHIPPED | OUTPATIENT
Start: 2021-05-03 | End: 2021-06-01

## 2021-05-05 DIAGNOSIS — F32.A ANXIETY AND DEPRESSION: ICD-10-CM

## 2021-05-05 DIAGNOSIS — F41.9 ANXIETY AND DEPRESSION: ICD-10-CM

## 2021-05-06 RX ORDER — HYDROXYZINE PAMOATE 50 MG/1
50 CAPSULE ORAL 3 TIMES DAILY PRN
Qty: 30 CAPSULE | Refills: 0 | Status: SHIPPED | OUTPATIENT
Start: 2021-05-06 | End: 2021-06-01 | Stop reason: SDUPTHER

## 2021-05-13 ENCOUNTER — TELEPHONE (OUTPATIENT)
Dept: PSYCHIATRY | Facility: CLINIC | Age: 21
End: 2021-05-13

## 2021-05-13 NOTE — TELEPHONE ENCOUNTER
Dominique Dalton is looking to schedule an appointment for a Psychiatrist and Therapist   Patient has [employer] and can be reached anytime at [phone]  Patient does have a referral in the system

## 2021-05-14 ENCOUNTER — IMMUNIZATIONS (OUTPATIENT)
Dept: FAMILY MEDICINE CLINIC | Facility: HOSPITAL | Age: 21
End: 2021-05-14

## 2021-05-14 DIAGNOSIS — Z23 ENCOUNTER FOR IMMUNIZATION: Primary | ICD-10-CM

## 2021-05-14 PROCEDURE — 91300 SARS-COV-2 / COVID-19 MRNA VACCINE (PFIZER-BIONTECH) 30 MCG: CPT

## 2021-05-14 PROCEDURE — 0001A SARS-COV-2 / COVID-19 MRNA VACCINE (PFIZER-BIONTECH) 30 MCG: CPT

## 2021-05-19 ENCOUNTER — OFFICE VISIT (OUTPATIENT)
Dept: FAMILY MEDICINE CLINIC | Facility: CLINIC | Age: 21
End: 2021-05-19
Payer: COMMERCIAL

## 2021-05-19 VITALS
WEIGHT: 119.2 LBS | OXYGEN SATURATION: 95 % | HEART RATE: 96 BPM | HEIGHT: 62 IN | DIASTOLIC BLOOD PRESSURE: 80 MMHG | TEMPERATURE: 96 F | BODY MASS INDEX: 21.94 KG/M2 | SYSTOLIC BLOOD PRESSURE: 120 MMHG

## 2021-05-19 DIAGNOSIS — F41.9 ANXIETY AND DEPRESSION: Primary | ICD-10-CM

## 2021-05-19 DIAGNOSIS — F32.A ANXIETY AND DEPRESSION: Primary | ICD-10-CM

## 2021-05-19 PROCEDURE — 3725F SCREEN DEPRESSION PERFORMED: CPT | Performed by: NURSE PRACTITIONER

## 2021-05-19 PROCEDURE — 99213 OFFICE O/P EST LOW 20 MIN: CPT | Performed by: NURSE PRACTITIONER

## 2021-05-19 RX ORDER — CEPHALEXIN 500 MG/1
CAPSULE ORAL
COMMUNITY
Start: 2021-05-11 | End: 2021-06-09

## 2021-05-19 RX ORDER — BUPROPION HYDROCHLORIDE 150 MG/1
150 TABLET ORAL DAILY
Qty: 30 TABLET | Refills: 0 | Status: SHIPPED | OUTPATIENT
Start: 2021-05-19 | End: 2021-06-09 | Stop reason: SDUPTHER

## 2021-05-19 RX ORDER — HYDROXYZINE HYDROCHLORIDE 10 MG/1
10 TABLET, FILM COATED ORAL
COMMUNITY
End: 2021-05-19 | Stop reason: ALTCHOICE

## 2021-05-19 RX ORDER — ESCITALOPRAM OXALATE 10 MG/1
10 TABLET ORAL
COMMUNITY
Start: 2021-03-18 | End: 2021-05-19 | Stop reason: DRUGHIGH

## 2021-05-19 NOTE — PATIENT INSTRUCTIONS
Depression   AMBULATORY CARE:   Depression  is a medical condition that causes feelings of sadness or hopelessness that do not go away  Depression may cause you to lose interest in things you used to enjoy  These feelings may interfere with your daily life  Common symptoms include the following:   · Appetite changes, or weight gain or loss    · Trouble going to sleep or staying asleep, or sleeping too much    · Fatigue or lack of energy    · Feeling restless, irritable, or withdrawn    · Feeling worthless, hopeless, discouraged, or guilty    · Trouble concentrating, remembering things, doing daily tasks, or making decisions    · Thoughts about hurting or killing yourself    Call your local emergency number (911 in the [address] [facility]) if:   · You think about harming yourself or someone else  · You have done something on purpose to hurt yourself  Call your therapist or doctor if:   · Your symptoms do not improve  · You cannot make it to your next appointment  · You have new symptoms  · You have questions or concerns about your condition or care  The following resources are available at any time to help you, if needed:   · [facility]: 8-773.361.3278 (3-491-530-JHVJ)     · Suicide Hotline: 0-415.108.4212 (9-772-IUWIHMQ)     · For a list of international numbers: https://save org/find-help/international-resources/    Treatment for depression  may include medicine to relieve depression  Medicine is often used together with therapy  Therapy is a way for you to talk about your feelings and anything that may be causing depression  Therapy can be done alone or in a group  It may also be done with family members or a significant other  Self-care:   · Get regular physical activity  Try to be active for 30 minutes, 3 to 5 days a week  Physical activity can help relieve depression  Work with your healthcare provider to develop a plan that you enjoy   It may help to ask someone to be active with you     · Create a regular sleep schedule  A routine can help you relax before bed  Listen to music, read, or do yoga  Try to go to bed and wake up at the same time every day  Sleep is important for emotional health  · Eat a variety of healthy foods  Healthy foods include fruits, vegetables, whole-grain breads, low-fat dairy products, lean meats, fish, and cooked beans  A healthy meal plan is low in fat, salt, and added sugar  · Do not drink alcohol or use drugs  Alcohol and drugs can make depression worse  Talk to your therapist or doctor if you need help quitting  Follow up with your healthcare provider as directed: Your healthcare provider will monitor your progress at follow-up visits  He or she will also monitor your medicine if you take antidepressants  Your healthcare provider will ask if the medicine is helping  Tell him or her about any side effects or problems you may have with your medicine  The type or amount of medicine may need to be changed  Write down your questions so you remember to ask them during your visits  © Copyright [address] Information is for End User's use only and may not be sold, redistributed or otherwise used for commercial purposes  All illustrations and images included in CareNotes® are the copyrighted property of A D A M , Inc  or [address] [place], [place] [address]noel   The above information is an  only  It is not intended as medical advice for individual conditions or treatments  Talk to your doctor, nurse or pharmacist before following any medical regimen to see if it is safe and effective for you

## 2021-05-19 NOTE — PROGRESS NOTES
Assessment/Plan:    No problem-specific Assessment & Plan notes found for this encounter  Problem List Items Addressed This Visit        Other    Anxiety and depression - Primary    Relevant Medications    buPROPion (WELLBUTRIN XL) 150 mg 24 hr tablet            Subjective:      Patient ID: Mason Dickey is a 21 y o  female  Patient presents to office for medication management  Patient has been on Lexapro 20 mg for over 4 weeks  Had been on Lexapro 10 mg prior without effect and Zoloft prior to that  Patient states she awakens with anxiety and has had fluctuations in mood  Has had vague thoughts of suicide, but denies active, specific plan  Patient feels that her depression and anxiety are not getting better  Reports increased "shakiness" and episodes of nausea and vomiting during episodes of anxiety  Has increased hydroxyzine to 100 mg prn for anxiety  Trazodone has been effective, denies issues with medication  Has been attempting to get a visit with psychiatry, but has not been able to make contact with office  The following portions of the patient's history were reviewed and updated as appropriate: allergies, current medications, past medical history, past social history and problem list     Review of Systems   Constitutional: Negative for activity change, appetite change and fatigue  HENT: Negative for sore throat and trouble swallowing  Respiratory: Negative for cough, chest tightness and shortness of breath  Cardiovascular: Negative for chest pain and palpitations  Gastrointestinal: Positive for nausea and vomiting  Negative for abdominal pain, constipation and diarrhea  Musculoskeletal: Negative for arthralgias and myalgias  Neurological: Negative for dizziness, syncope, weakness, numbness and headaches  Psychiatric/Behavioral: Positive for dysphoric mood, sleep disturbance and suicidal ideas (vague thoughts, but no active plan)  The patient is nervous/anxious  Objective:    PHQ-9 Depression Screening    PHQ-9:   Frequency of the following problems over the past two weeks:      Little interest or pleasure in doing things: 2 - more than half the days  Feeling down, depressed, or hopeless: 1 - several days  Trouble falling or staying asleep, or sleeping too much: 2 - more than half the days  Feeling tired or having little energy: 2 - more than half the days  Poor appetite or overeatin - several days  Feeling bad about yourself - or that you are a failure or have let yourself or your family down: 3 - nearly every day  Trouble concentrating on things, such as reading the newspaper or watching television: 3 - nearly every day  Moving or speaking so slowly that other people could have noticed  Or the opposite - being so fidgety or restless that you have been moving around a lot more than usual: 3 - nearly every day  Thoughts that you would be better off dead, or of hurting yourself in some way: 1 - several days  PHQ-2 Score: 3  PHQ-9 Score: 18         /80 (BP Location: Left arm, Patient Position: Sitting)   Pulse 96   Temp (!) 96 °F (35 6 °C) (Tympanic)   Ht 5' 2" (1 575 m)   Wt 54 1 kg (119 lb 3 2 oz)   SpO2 95%   BMI 21 80 kg/m²          Physical Exam  Vitals signs reviewed  Constitutional:       General: She is not in acute distress  Appearance: Normal appearance  She is not ill-appearing  HENT:      Head: Normocephalic and atraumatic  Eyes:      General: No scleral icterus  Conjunctiva/sclera: Conjunctivae normal       Pupils: Pupils are equal, round, and reactive to light  Neck:      Musculoskeletal: Normal range of motion  No muscular tenderness  Cardiovascular:      Rate and Rhythm: Normal rate and regular rhythm  Pulses: Normal pulses  Heart sounds: Normal heart sounds  No murmur  Pulmonary:      Effort: Pulmonary effort is normal  No respiratory distress  Breath sounds: Normal breath sounds     Abdominal: General: Abdomen is flat  Bowel sounds are normal  There is no distension  Palpations: Abdomen is soft  There is no mass  Tenderness: There is no abdominal tenderness  Skin:     General: Skin is warm and moist    Neurological:      Mental Status: She is alert     Psychiatric:         Mood and Affect: Mood normal          Behavior: Behavior normal

## 2021-05-26 ENCOUNTER — NURSE TRIAGE (OUTPATIENT)
Dept: OTHER | Facility: OTHER | Age: 21
End: 2021-05-26

## 2021-05-27 ENCOUNTER — APPOINTMENT (EMERGENCY)
Dept: CT IMAGING | Facility: HOSPITAL | Age: 21
End: 2021-05-27
Payer: COMMERCIAL

## 2021-05-27 ENCOUNTER — HOSPITAL ENCOUNTER (EMERGENCY)
Facility: HOSPITAL | Age: 21
Discharge: HOME | End: 2021-05-27
Attending: EMERGENCY MEDICINE
Payer: COMMERCIAL

## 2021-05-27 VITALS
OXYGEN SATURATION: 100 % | SYSTOLIC BLOOD PRESSURE: 96 MMHG | DIASTOLIC BLOOD PRESSURE: 59 MMHG | RESPIRATION RATE: 18 BRPM | HEART RATE: 93 BPM | BODY MASS INDEX: 21.9 KG/M2 | WEIGHT: 119 LBS | TEMPERATURE: 98.3 F | HEIGHT: 62 IN

## 2021-05-27 DIAGNOSIS — R51.9 ACUTE NONINTRACTABLE HEADACHE: Primary | ICD-10-CM

## 2021-05-27 LAB
ALBUMIN SERPL BCP-MCNC: 3.9 G/DL (ref 3.5–5)
ALP SERPL-CCNC: 51 U/L (ref 46–116)
ALT SERPL W P-5'-P-CCNC: 24 U/L (ref 12–78)
ANION GAP SERPL CALCULATED.3IONS-SCNC: 10 MMOL/L (ref 4–13)
AST SERPL W P-5'-P-CCNC: 23 U/L (ref 5–45)
BASOPHILS # BLD AUTO: 0.04 THOUSANDS/ΜL (ref 0–0.1)
BASOPHILS NFR BLD AUTO: 1 % (ref 0–1)
BILIRUB SERPL-MCNC: 0.43 MG/DL (ref 0.2–1)
BILIRUB UR QL STRIP: NEGATIVE
BUN SERPL-MCNC: 14 MG/DL (ref 5–25)
CALCIUM SERPL-MCNC: 9.2 MG/DL (ref 8.3–10.1)
CHLORIDE SERPL-SCNC: 102 MMOL/L (ref 100–108)
CLARITY UR: CLEAR
CO2 SERPL-SCNC: 26 MMOL/L (ref 21–32)
COLOR UR: YELLOW
CREAT SERPL-MCNC: 0.97 MG/DL (ref 0.6–1.3)
CRP SERPL HS-MCNC: 1.63 MG/L
EOSINOPHIL # BLD AUTO: 0.04 THOUSAND/ΜL (ref 0–0.61)
EOSINOPHIL NFR BLD AUTO: 1 % (ref 0–6)
ERYTHROCYTE [DISTWIDTH] IN BLOOD BY AUTOMATED COUNT: 12 % (ref 11.6–15.1)
EXT PREG TEST URINE: NEGATIVE
EXT. CONTROL ED NAV: NORMAL
GFR SERPL CREATININE-BSD FRML MDRD: 84 ML/MIN/1.73SQ M
GLUCOSE SERPL-MCNC: 91 MG/DL (ref 65–140)
GLUCOSE UR STRIP-MCNC: NEGATIVE MG/DL
HCT VFR BLD AUTO: 41.6 % (ref 34.8–46.1)
HGB BLD-MCNC: 14 G/DL (ref 11.5–15.4)
HGB UR QL STRIP.AUTO: NEGATIVE
IMM GRANULOCYTES # BLD AUTO: 0.01 THOUSAND/UL (ref 0–0.2)
IMM GRANULOCYTES NFR BLD AUTO: 0 % (ref 0–2)
KETONES UR STRIP-MCNC: NEGATIVE MG/DL
LEUKOCYTE ESTERASE UR QL STRIP: NEGATIVE
LYMPHOCYTES # BLD AUTO: 2.11 THOUSANDS/ΜL (ref 0.6–4.47)
LYMPHOCYTES NFR BLD AUTO: 29 % (ref 14–44)
MCH RBC QN AUTO: 30.2 PG (ref 26.8–34.3)
MCHC RBC AUTO-ENTMCNC: 33.7 G/DL (ref 31.4–37.4)
MCV RBC AUTO: 90 FL (ref 82–98)
MONOCYTES # BLD AUTO: 0.58 THOUSAND/ΜL (ref 0.17–1.22)
MONOCYTES NFR BLD AUTO: 8 % (ref 4–12)
NEUTROPHILS # BLD AUTO: 4.61 THOUSANDS/ΜL (ref 1.85–7.62)
NEUTS SEG NFR BLD AUTO: 61 % (ref 43–75)
NITRITE UR QL STRIP: NEGATIVE
NRBC BLD AUTO-RTO: 0 /100 WBCS
PH UR STRIP.AUTO: 6 [PH]
PLATELET # BLD AUTO: 303 THOUSANDS/UL (ref 149–390)
PMV BLD AUTO: 10 FL (ref 8.9–12.7)
POTASSIUM SERPL-SCNC: 3.6 MMOL/L (ref 3.5–5.3)
PROT SERPL-MCNC: 7.9 G/DL (ref 6.4–8.2)
PROT UR STRIP-MCNC: NEGATIVE MG/DL
RBC # BLD AUTO: 4.64 MILLION/UL (ref 3.81–5.12)
SARS-COV-2 RNA RESP QL NAA+PROBE: NEGATIVE
SODIUM SERPL-SCNC: 138 MMOL/L (ref 136–145)
SP GR UR STRIP.AUTO: 1.02 (ref 1–1.03)
UROBILINOGEN UR QL STRIP.AUTO: 0.2 E.U./DL
WBC # BLD AUTO: 7.39 THOUSAND/UL (ref 4.31–10.16)

## 2021-05-27 PROCEDURE — 99284 EMERGENCY DEPT VISIT MOD MDM: CPT

## 2021-05-27 PROCEDURE — U0003 INFECTIOUS AGENT DETECTION BY NUCLEIC ACID (DNA OR RNA); SEVERE ACUTE RESPIRATORY SYNDROME CORONAVIRUS 2 (SARS-COV-2) (CORONAVIRUS DISEASE [COVID-19]), AMPLIFIED PROBE TECHNIQUE, MAKING USE OF HIGH THROUGHPUT TECHNOLOGIES AS DESCRIBED BY CMS-2020-01-R: HCPCS | Performed by: PHYSICIAN ASSISTANT

## 2021-05-27 PROCEDURE — U0005 INFEC AGEN DETEC AMPLI PROBE: HCPCS | Performed by: PHYSICIAN ASSISTANT

## 2021-05-27 PROCEDURE — 86141 C-REACTIVE PROTEIN HS: CPT | Performed by: PHYSICIAN ASSISTANT

## 2021-05-27 PROCEDURE — 70450 CT HEAD/BRAIN W/O DYE: CPT

## 2021-05-27 PROCEDURE — 99284 EMERGENCY DEPT VISIT MOD MDM: CPT | Performed by: PHYSICIAN ASSISTANT

## 2021-05-27 PROCEDURE — 36415 COLL VENOUS BLD VENIPUNCTURE: CPT | Performed by: PHYSICIAN ASSISTANT

## 2021-05-27 PROCEDURE — 81003 URINALYSIS AUTO W/O SCOPE: CPT | Performed by: PHYSICIAN ASSISTANT

## 2021-05-27 PROCEDURE — 80053 COMPREHEN METABOLIC PANEL: CPT | Performed by: PHYSICIAN ASSISTANT

## 2021-05-27 PROCEDURE — 96361 HYDRATE IV INFUSION ADD-ON: CPT

## 2021-05-27 PROCEDURE — 96374 THER/PROPH/DIAG INJ IV PUSH: CPT

## 2021-05-27 PROCEDURE — 96375 TX/PRO/DX INJ NEW DRUG ADDON: CPT

## 2021-05-27 PROCEDURE — 81025 URINE PREGNANCY TEST: CPT | Performed by: PHYSICIAN ASSISTANT

## 2021-05-27 PROCEDURE — 85025 COMPLETE CBC W/AUTO DIFF WBC: CPT | Performed by: PHYSICIAN ASSISTANT

## 2021-05-27 RX ORDER — DIPHENHYDRAMINE HYDROCHLORIDE 50 MG/ML
50 INJECTION INTRAMUSCULAR; INTRAVENOUS ONCE
Status: COMPLETED | OUTPATIENT
Start: 2021-05-27 | End: 2021-05-27

## 2021-05-27 RX ORDER — METOCLOPRAMIDE 10 MG/1
10 TABLET ORAL 3 TIMES DAILY PRN
Qty: 15 TABLET | Refills: 0 | Status: SHIPPED | OUTPATIENT
Start: 2021-05-27

## 2021-05-27 RX ORDER — METOCLOPRAMIDE 10 MG/1
10 TABLET ORAL 3 TIMES DAILY PRN
Qty: 15 TABLET | Refills: 0 | Status: SHIPPED | OUTPATIENT
Start: 2021-05-27 | End: 2021-05-27 | Stop reason: SDUPTHER

## 2021-05-27 RX ORDER — METOCLOPRAMIDE HYDROCHLORIDE 5 MG/ML
10 INJECTION INTRAMUSCULAR; INTRAVENOUS ONCE
Status: COMPLETED | OUTPATIENT
Start: 2021-05-27 | End: 2021-05-27

## 2021-05-27 RX ORDER — KETOROLAC TROMETHAMINE 30 MG/ML
15 INJECTION, SOLUTION INTRAMUSCULAR; INTRAVENOUS ONCE
Status: COMPLETED | OUTPATIENT
Start: 2021-05-27 | End: 2021-05-27

## 2021-05-27 RX ADMIN — METOCLOPRAMIDE HYDROCHLORIDE 10 MG: 5 INJECTION INTRAMUSCULAR; INTRAVENOUS at 15:32

## 2021-05-27 RX ADMIN — KETOROLAC TROMETHAMINE 15 MG: 30 INJECTION, SOLUTION INTRAMUSCULAR at 15:32

## 2021-05-27 RX ADMIN — DIPHENHYDRAMINE HYDROCHLORIDE 50 MG: 50 INJECTION, SOLUTION INTRAMUSCULAR; INTRAVENOUS at 15:32

## 2021-05-27 RX ADMIN — SODIUM CHLORIDE 1000 ML: 0.9 INJECTION, SOLUTION INTRAVENOUS at 15:31

## 2021-05-27 NOTE — TELEPHONE ENCOUNTER
Regarding: Medication Reaction  ----- Message from Phoebe Trevino sent at 5/26/2021  8:45 PM EDT -----  "I started taking wellbutrin   I'm having vision problems, nausea, a migraine, and achey in the face from the migraine "

## 2021-05-27 NOTE — ED PROVIDER NOTES
History  Chief Complaint   Patient presents with    Neck Stiffness     Patient c/o stiff neck, headache and blurry vision for the last few days  Patient has had 2-3 episodes of vomiting in the last week   Headache    Blurred Vision     20 yo with headache  Onset 3-4 days ago  Generalized aching headache  Associated with blurred vision and neck stiffness  In last week has had 2-3 episodes of non-bloody and non-bilious vomiting  No fever  No chills  No sick contacts  Recently started wellbutrin  No trauma or injuries to have caused this  otehrwise healthy and UTD on vaccinations  Denies chest pain and sob  History provided by:  Patient   used: No    Headache  Pain location:  Generalized  Quality:  Dull  Radiates to:  Does not radiate  Severity currently:  7/10  Severity at highest:  8/10  Onset quality:  Gradual  Duration:  4 days  Timing:  Intermittent  Progression:  Waxing and waning  Chronicity:  New  Similar to prior headaches: no    Context: not activity, not exposure to bright light, not caffeine, not coughing, not defecating, not eating, not stress, not exposure to cold air, not intercourse, not loud noise and not straining    Relieved by:  Nothing  Worsened by:  Nothing  Ineffective treatments:  None tried  Associated symptoms: myalgias, neck pain and neck stiffness    Associated symptoms: no abdominal pain, no back pain, no cough, no ear pain, no eye pain, no fever, no seizures, no sore throat and no vomiting        Prior to Admission Medications   Prescriptions Last Dose Informant Patient Reported? Taking?    buPROPion (WELLBUTRIN XL) 150 mg 24 hr tablet   No No   Sig: Take 1 tablet (150 mg total) by mouth daily   cephalexin (KEFLEX) 500 mg capsule   Yes No   hydrOXYzine pamoate (VISTARIL) 50 mg capsule   No No   Sig: Take 1 capsule (50 mg total) by mouth 3 (three) times a day as needed for itching   norgestimate-ethinyl estradiol (ORTHO-CYCLEN) 0 25-35 MG-MCG per tablet   No No   Sig: Take 1 tablet by mouth daily   traZODone (DESYREL) 50 mg tablet   No No   Sig: Take 1 tablet (50 mg total) by mouth daily at bedtime      Facility-Administered Medications: None       Past Medical History:   Diagnosis Date    Concussion        Past Surgical History:   Procedure Laterality Date    WISDOM TOOTH EXTRACTION         Family History   Problem Relation Age of Onset    Hypertension Mother     Mental illness Mother     Depression Mother     Mental illness Sister     Coronary artery disease Maternal Grandmother     Throat cancer Paternal Grandfather      I have reviewed and agree with the history as documented  E-Cigarette/Vaping    E-Cigarette Use Never User      E-Cigarette/Vaping Substances    Nicotine No     THC No     CBD No     Flavoring No     Other No     Unknown No      Social History     Tobacco Use    Smoking status: Never Smoker    Smokeless tobacco: Never Used   Substance Use Topics    Alcohol use: Yes     Frequency: Monthly or less     Comment: socailly     Drug use: No       Review of Systems   Constitutional: Negative for chills and fever  HENT: Negative for ear pain and sore throat  Eyes: Positive for visual disturbance  Negative for pain  Respiratory: Negative for cough and shortness of breath  Cardiovascular: Negative for chest pain and palpitations  Gastrointestinal: Negative for abdominal pain and vomiting  Genitourinary: Negative for dysuria and hematuria  Musculoskeletal: Positive for myalgias, neck pain and neck stiffness  Negative for arthralgias, back pain, gait problem and joint swelling  Skin: Negative for color change and rash  Neurological: Positive for headaches  Negative for seizures and syncope  All other systems reviewed and are negative  Physical Exam  Physical Exam  Vitals signs and nursing note reviewed  Constitutional:       General: She is not in acute distress  Appearance: Normal appearance   She is well-developed  She is not ill-appearing, toxic-appearing or diaphoretic  HENT:      Head: Normocephalic and atraumatic  Eyes:      General: Lids are normal       Pupils: Pupils are equal, round, and reactive to light  Neck:      Musculoskeletal: Normal range of motion and neck supple  Comments: FROM  No nuchal rigidity  Cardiovascular:      Rate and Rhythm: Normal rate and regular rhythm  Pulses: Normal pulses  No decreased pulses  Radial pulses are 2+ on the right side and 2+ on the left side  Heart sounds: Normal heart sounds, S1 normal and S2 normal  Heart sounds not distant  No murmur  No friction rub  No gallop  Pulmonary:      Effort: Pulmonary effort is normal  No tachypnea, bradypnea, accessory muscle usage or respiratory distress  Breath sounds: Normal breath sounds  No decreased breath sounds, wheezing, rhonchi or rales  Abdominal:      General: There is no distension  Palpations: Abdomen is soft  Abdomen is not rigid  Tenderness: There is no abdominal tenderness  There is no guarding or rebound  Musculoskeletal: Normal range of motion  General: No tenderness or deformity  Skin:     General: Skin is warm and dry  Coloration: Skin is not pale  Findings: No erythema or rash  Neurological:      Mental Status: She is alert and oriented to person, place, and time  GCS: GCS eye subscore is 4  GCS verbal subscore is 5  GCS motor subscore is 6  Cranial Nerves: No cranial nerve deficit  Comments: GCS 15  AAOx3  No focal neuro deficits  CN II-XII grossly intact  Speech normal, no aphasia or dysarthria  No pronator drift  Cerebellar function normal  Finger to nose normal  PERRL  EOMI  Peripheral vision intact  No nystagmus  Upper and lower extremity strength 5/5 through   strength 5/5 b/l  Gross sensation intact b/l        Psychiatric:         Speech: Speech normal          Vital Signs  ED Triage Vitals   Temperature Pulse Respirations Blood Pressure SpO2   05/27/21 1450 05/27/21 1452 05/27/21 1452 05/27/21 1452 05/27/21 1452   98 3 °F (36 8 °C) 94 16 133/89 100 %      Temp Source Heart Rate Source Patient Position - Orthostatic VS BP Location FiO2 (%)   05/27/21 1450 05/27/21 1452 05/27/21 1452 05/27/21 1452 --   Oral Monitor Sitting Left arm       Pain Score       05/27/21 1532       7           Vitals:    05/27/21 1452 05/27/21 1500 05/27/21 1600 05/27/21 1700   BP: 133/89 142/91 113/70 96/59   Pulse: 94 93 (!) 106 93   Patient Position - Orthostatic VS: Sitting Lying Lying Lying         Visual Acuity  Visual Acuity      Most Recent Value   L Pupil Size (mm)  3   R Pupil Size (mm)  3          ED Medications  Medications   sodium chloride 0 9 % bolus 1,000 mL (0 mL Intravenous Stopped 5/27/21 1610)   metoclopramide (REGLAN) injection 10 mg (10 mg Intravenous Given 5/27/21 1532)   diphenhydrAMINE (BENADRYL) injection 50 mg (50 mg Intravenous Given 5/27/21 1532)   ketorolac (TORADOL) injection 15 mg (15 mg Intravenous Given 5/27/21 1532)       Diagnostic Studies  Results Reviewed     Procedure Component Value Units Date/Time    Novel Coronavirus (Covid-19),PCR [facility] - 2 Hour Stat [[id number]]  (Normal) Collected: 05/27/21 1526    Lab Status: Final result Specimen: Nares from Nose Updated: 05/27/21 1636     SARS-CoV-2 Negative    Narrative: The specimen collection materials, transport medium, and/or testing methodology utilized in the production of these test results have been proven to be reliable in a limited validation with an abbreviated program under the Emergency Utilization Authorization provided by the FDA  Testing reported as "Presumptive positive" will be confirmed with secondary testing to ensure result accuracy  Clinical caution and judgement should be used with the interpretation of these results with consideration of the clinical impression and other laboratory testing    Testing reported as "Positive" or "Negative" has been proven to be accurate according to standard laboratory validation requirements  All testing is performed with control materials showing appropriate reactivity at standard intervals        UA w Reflex to Microscopic w Reflex to Culture [[id number]] Collected: 05/27/21 1624    Lab Status: Final result Specimen: Urine, Other Updated: 05/27/21 1631     Color, UA Yellow     Clarity, UA Clear     Specific Gravity, UA 1 025     pH, UA 6 0     Leukocytes, UA Negative     Nitrite, UA Negative     Protein, UA Negative mg/dl      Glucose, UA Negative mg/dl      Ketones, UA Negative mg/dl      Urobilinogen, UA 0 2 E U /dl      Bilirubin, UA Negative     Blood, UA Negative    POCT pregnancy, urine [[id number]]  (Normal) Resulted: 05/27/21 1629    Lab Status: Final result Updated: 05/27/21 1629     EXT PREG TEST UR (Ref: Negative) Negative     Control Valid    High sensitivity CRP [[id number]] Collected: 05/27/21 1526    Lab Status: Final result Specimen: Blood from Arm, Left Updated: 05/27/21 1557     CRP, High Sensitivity 1 63 mg/L     Narrative:            HsCRP Level       Relative Risk           <1 0 mg/L          Low           1 0 to 3 0 mg/L    Average           >3 0 mg/L          High        Comprehensive metabolic panel [[id number]] Collected: 05/27/21 1526    Lab Status: Final result Specimen: Blood from Arm, Left Updated: 05/27/21 1546     Sodium 138 mmol/L      Potassium 3 6 mmol/L      Chloride 102 mmol/L      CO2 26 mmol/L      ANION GAP 10 mmol/L      BUN 14 mg/dL      Creatinine 0 97 mg/dL      Glucose 91 mg/dL      Calcium 9 2 mg/dL      AST 23 U/L      ALT 24 U/L      Alkaline Phosphatase 51 U/L      Total Protein 7 9 g/dL      Albumin 3 9 g/dL      Total Bilirubin 0 43 mg/dL      eGFR 84 ml/min/1 73sq m     Narrative:      [place] guidelines for Chronic Kidney Disease (CKD):     Stage 1 with normal or high GFR (GFR > 90 mL/min/1 73 square meters)    Stage 2 Mild CKD (GFR = 60-89 mL/min/1 73 square meters)    Stage 3A Moderate CKD (GFR = 45-59 mL/min/1 73 square meters)    Stage 3B Moderate CKD (GFR = 30-44 mL/min/1 73 square meters)    Stage 4 Severe CKD (GFR = 15-29 mL/min/1 73 square meters)    Stage 5 End Stage CKD (GFR <15 mL/min/1 73 square meters)  Note: GFR calculation is accurate only with a steady state creatinine    CBC and differential [[id number]] Collected: 05/27/21 1526    Lab Status: Final result Specimen: Blood from Arm, Left Updated: 05/27/21 1539     WBC 7 39 Thousand/uL      RBC 4 64 Million/uL      Hemoglobin 14 0 g/dL      Hematocrit 41 6 %      MCV 90 fL      MCH 30 2 pg      MCHC 33 7 g/dL      RDW 12 0 %      MPV 10 0 fL      Platelets 968 Thousands/uL      nRBC 0 /100 WBCs      Neutrophils Relative 61 %      Immat GRANS % 0 %      Lymphocytes Relative 29 %      Monocytes Relative 8 %      Eosinophils Relative 1 %      Basophils Relative 1 %      Neutrophils Absolute 4 61 Thousands/µL      Immature Grans Absolute 0 01 Thousand/uL      Lymphocytes Absolute 2 11 Thousands/µL      Monocytes Absolute 0 58 Thousand/µL      Eosinophils Absolute 0 04 Thousand/µL      Basophils Absolute 0 04 Thousands/µL                  CT head without contrast   Final Result by Fernando Lai MD (05/27 1602)      No acute intracranial abnormality  Workstation performed: [id number]                    Procedures  Procedures         ED Course                                           MDM  Number of Diagnoses or Management Options  Acute nonintractable headache: new and requires workup  Diagnosis management comments: DDx including but not limited to: tension headache, cluster headache, migraine, ICH, SAH, tumor, meningitis, temporal arteritis, carbon monoxide poisoning, zoster, sinusitis  Plan: normal neuro exam  No nuchal rigidity  Afebrile  Low suspicion for meningitis  Doubt bacterial meningitis  Will give migraine cocktail  Check labs  CT head  dispo pending   Offered LP  Pt declined       Amount and/or Complexity of Data Reviewed  Clinical lab tests: ordered and reviewed  Tests in the radiology section of CPT®: ordered and reviewed  Independent visualization of images, tracings, or specimens: yes    Risk of Complications, Morbidity, and/or Mortality  Presenting problems: moderate  Management options: low  General comments: 22 yo with headache and neck stiffness  Afebrile  Unremarkable labs  Headache resolved with migraine cocktail  CT head negative  Has normal neuro exam  We discussed overall low suspicion of bacterial meningitis as well as viral meningitis  Discussed risks/benefits/alternatives of performing LP to confirm diagnosis but pt declined  States she feels better and would like to go home  Return parameters provided  Pt understands and agrees with plan  Patient Progress  Patient progress: stable      Disposition  Final diagnoses:   Acute nonintractable headache     Time reflects when diagnosis was documented in both MDM as applicable and the Disposition within this note     Time User Action Codes Description Comment    5/27/2021  4:48 PM Abisai Espinoza Add [R51 9] Acute nonintractable headache       ED Disposition     ED Disposition Condition Date/Time Comment    Discharge Stable Thu May 27, 2021  4:48 PM Kristie Miller discharge to home/self care              Follow-up Information     Follow up With Specialties Details Why [address], [address] [facility] Call   [address] [place] [address]  [phone]            Discharge Medication List as of 5/27/2021  4:49 PM      START taking these medications    Details   metoclopramide (Reglan) 10 mg tablet Take 1 tablet (10 mg total) by mouth 3 (three) times a day as needed (headache), Starting Thu 5/27/2021, Print         CONTINUE these medications which have NOT CHANGED    Details   buPROPion (WELLBUTRIN XL) 150 mg 24 hr tablet Take 1 tablet (150 mg total) by mouth daily, Starting Wed 5/19/2021, Normal      cephalexin (KEFLEX) 500 mg capsule Starting Tue 5/11/2021, Historical Med      hydrOXYzine pamoate (VISTARIL) 50 mg capsule Take 1 capsule (50 mg total) by mouth 3 (three) times a day as needed for itching, Starting Thu 5/6/2021, Normal      norgestimate-ethinyl estradiol (ORTHO-CYCLEN) 0 25-35 MG-MCG per tablet Take 1 tablet by mouth daily, Starting Mon 5/3/2021, Normal      traZODone (DESYREL) 50 mg tablet Take 1 tablet (50 mg total) by mouth daily at bedtime, Starting Wed 4/21/2021, Normal           No discharge procedures on file      PDMP Review     None          ED Provider  Electronically Signed by           Apollo Bang PA-C  05/31/21 8582

## 2021-05-27 NOTE — TELEPHONE ENCOUNTER
In addition to care advice for migraine care, advised patient to call PCP in the morning before taking Wellbutrin dose for guidance  Patient agreeable with plan    Reason for Disposition   Similar to previously diagnosed migraine headaches    Answer Assessment - Initial Assessment Questions  1  LOCATION: "Where does it hurt?"       All over  2  ONSET: "When did the headache start?" (Minutes, hours or days)       Started 5/21  3  PATTERN: "Does the pain come and go, or has it been constant since it started?"      Constant  4  SEVERITY: "How bad is the pain?" and "What does it keep you from doing?"  (e g , Scale 1-10; mild, moderate, or severe)    - MILD (1-3): doesn't interfere with normal activities     - MODERATE (4-7): interferes with normal activities or awakens from sleep     - SEVERE (8-10): excruciating pain, unable to do any normal activities         10  5  RECURRENT SYMPTOM: "Have you ever had headaches before?" If so, ask: "When was the last time?" and "What happened that time?"       In the past but this worse; took tylenol  6  CAUSE: "What do you think is causing the headache?"      Recently started Wellbutrin (5/19)  7  MIGRAINE: "Have you been diagnosed with migraine headaches?" If so, ask: "Is this headache similar?"       Yes, but this is worse  8  HEAD INJURY: "Has there been any recent injury to the head?"       Denies  9  OTHER SYMPTOMS: "Do you have any other symptoms?" (fever, stiff neck, eye pain, sore throat, cold symptoms)      Blurry vision, nausea  10   PREGNANCY: "Is there any chance you are pregnant?" "When was your last menstrual period?"        Denies; LMP: three weeks ago, on birth control    Protocols used: HEADACHE-ADULT-

## 2021-05-27 NOTE — ED NOTES
Pt made aware to this RN that IV was bothering her and giving her pain  Assessed for signs of infiltration, no signs of infiltration or occlusion   Removed IV per pt request  500ml of NS was delivered, pt received IV medications, CT contrast at this time     Layne Reed, RN  05/27/21 9539

## 2021-05-27 NOTE — ED NOTES
Pt reports starting Wellbutrin last week and drinking socially, reports drink was "one vodka drink"     Tamiko Starkey, ERIK  05/27/21 5044

## 2021-05-31 DIAGNOSIS — Z30.011 ENCOUNTER FOR INITIAL PRESCRIPTION OF CONTRACEPTIVE PILLS: ICD-10-CM

## 2021-06-01 DIAGNOSIS — F41.9 ANXIETY AND DEPRESSION: ICD-10-CM

## 2021-06-01 DIAGNOSIS — Z30.011 ENCOUNTER FOR INITIAL PRESCRIPTION OF CONTRACEPTIVE PILLS: ICD-10-CM

## 2021-06-01 DIAGNOSIS — F32.A ANXIETY AND DEPRESSION: ICD-10-CM

## 2021-06-01 DIAGNOSIS — F51.01 PRIMARY INSOMNIA: ICD-10-CM

## 2021-06-01 RX ORDER — NORGESTIMATE AND ETHINYL ESTRADIOL 0.25-0.035
1 KIT ORAL DAILY
Qty: 28 TABLET | Refills: 0 | Status: SHIPPED | OUTPATIENT
Start: 2021-06-01 | End: 2021-06-09 | Stop reason: SDUPTHER

## 2021-06-01 RX ORDER — NORGESTIMATE AND ETHINYL ESTRADIOL 0.25-0.035
KIT ORAL
Qty: 28 TABLET | Refills: 0 | Status: SHIPPED | OUTPATIENT
Start: 2021-06-01 | End: 2021-06-01 | Stop reason: SDUPTHER

## 2021-06-01 RX ORDER — HYDROXYZINE PAMOATE 50 MG/1
50 CAPSULE ORAL 3 TIMES DAILY PRN
Qty: 30 CAPSULE | Refills: 0 | Status: SHIPPED | OUTPATIENT
Start: 2021-06-01 | End: 2021-08-23 | Stop reason: SDUPTHER

## 2021-06-01 RX ORDER — TRAZODONE HYDROCHLORIDE 50 MG/1
50 TABLET ORAL
Qty: 30 TABLET | Refills: 0 | Status: SHIPPED | OUTPATIENT
Start: 2021-06-01 | End: 2022-01-26 | Stop reason: SDUPTHER

## 2021-06-01 NOTE — TELEPHONE ENCOUNTER
Initial Post-HIP gluteus medius tendon repair Home Instruction Sheet                                                                                         Activity:  • Rest today.  • Do not drive a motor vehicle, operate machinery or appliances, make important decisions, sign legal documents, or drink alcohol for at least the next 24 hours.  Continue these restrictions while you are taking pain medications.  • Wear your hip brace (if you have one) as it is set when you are up on your feet outside of a \"safe environment\".  Do not need to wear in your home/protected surroundings  • Weight bearing status:  [x] Toe touch weight bearing for the first 6 weeks post operatively.  This means you can put 20-25 lbs through the affected extremity but should not bear more weight than that.  Can put affect extremity on floor for balance when in standing position.  [x] Crutches or walker when up on feet and also for transfers     • HIP/Leg Exercises:  [x]  Start your exercises the first day after surgery (see attached sheets). Do three times each day as tolerated (stay below tolerable pain threshold)    [] Home CPM unit: Begin tomorrow (if you have it), and work up to using this for two hours of every six while awake (goal 4-6 hours per day) from 10-40 degrees as pre-set.  Do not increase range of motion until further instructed.       [x] You should avoid moving the affected leg across the midline of your body  (adduction).  You should avoid moving the affected leg out to the side of  your body (abduction).  You should also avoid pushing the leg back  behind your body (extension).  Be careful when getting off of the toilet as  people have a tendency to take a side step before they stand up.  [x] Keep a pillow under the affected thigh when lying down to help avoid full extension of the surgical hip.  • Use ice/GameReady as much as possible for the first 48 hours, in general 60-90 minutes every 3-4 hours while awake.   Requested medication(s) are due for refill today: Yes  Patient has already received a courtesy refill: No  Other reason request has been forwarded to provider:Requirements not met Osito Treviño MD             [facility]   Office Address:   [address] [place] [address]   [phone]  or  [phone]           [address] [place] [address]   [phone]          [id number] times, at least 2-3x each day, or as instructed below.    Ankle Pumps  · Point, then flex, both feet.With leg as straight as possible, actively pump foot up and down.  · Doing this 10-30 times each hour helps prevent blood clots in your legs.        Quadriceps Sets  · Lie in bed with your legs straight. Tighten the front thigh muscle of your operated leg while pressing the back of your knee down into the bed.     Perform 3-5 sets per day, 15 repetitions each time.     Hold for 5 seconds, then relax the leg.  **(Avoid Straight Leg Raises (raising your surgical leg up against gravity) as this may aggravate your hip flexor muscle)**        Gluteal Sets  · Squeeze your buttocks together tightly. Your hips will rise slightly off the bed.  · Hold for 5 seconds, then release.  .          Heel Slide:    Grasp lower leg with both hands and pull heel toward buttocks, flexing the knee.  Hold for 5 seconds.  Straighten leg by sliding heel downward.  Use hands to put pressure on leg above the knee cap (patella), trying to make the knee as straight as possible.  Hold 5 seconds.    Perform 2 sets, 20 repetitions.      © 0930-8684 [facility], [address] [place] [address], [place] [address]. All rights reserved. This information is not intended as a substitute for professional medical care. Always follow your healthcare professional's instructions

## 2021-06-07 ENCOUNTER — IMMUNIZATIONS (OUTPATIENT)
Dept: FAMILY MEDICINE CLINIC | Facility: HOSPITAL | Age: 21
End: 2021-06-07

## 2021-06-07 DIAGNOSIS — Z23 ENCOUNTER FOR IMMUNIZATION: Primary | ICD-10-CM

## 2021-06-07 PROCEDURE — 91300 SARS-COV-2 / COVID-19 MRNA VACCINE (PFIZER-BIONTECH) 30 MCG: CPT

## 2021-06-07 PROCEDURE — 0002A SARS-COV-2 / COVID-19 MRNA VACCINE (PFIZER-BIONTECH) 30 MCG: CPT

## 2021-06-09 ENCOUNTER — OFFICE VISIT (OUTPATIENT)
Dept: FAMILY MEDICINE CLINIC | Facility: CLINIC | Age: 21
End: 2021-06-09
Payer: COMMERCIAL

## 2021-06-09 VITALS
HEIGHT: 62 IN | OXYGEN SATURATION: 97 % | WEIGHT: 120 LBS | DIASTOLIC BLOOD PRESSURE: 64 MMHG | TEMPERATURE: 97.1 F | SYSTOLIC BLOOD PRESSURE: 106 MMHG | HEART RATE: 101 BPM | BODY MASS INDEX: 22.08 KG/M2

## 2021-06-09 DIAGNOSIS — F32.A ANXIETY AND DEPRESSION: ICD-10-CM

## 2021-06-09 DIAGNOSIS — Z30.011 ENCOUNTER FOR INITIAL PRESCRIPTION OF CONTRACEPTIVE PILLS: ICD-10-CM

## 2021-06-09 DIAGNOSIS — F41.9 ANXIETY AND DEPRESSION: ICD-10-CM

## 2021-06-09 DIAGNOSIS — Z00.00 ANNUAL PHYSICAL EXAM: Primary | ICD-10-CM

## 2021-06-09 PROCEDURE — 1036F TOBACCO NON-USER: CPT | Performed by: NURSE PRACTITIONER

## 2021-06-09 PROCEDURE — 3008F BODY MASS INDEX DOCD: CPT | Performed by: NURSE PRACTITIONER

## 2021-06-09 PROCEDURE — 99395 PREV VISIT EST AGE 18-39: CPT | Performed by: NURSE PRACTITIONER

## 2021-06-09 RX ORDER — BUPROPION HYDROCHLORIDE 150 MG/1
150 TABLET ORAL DAILY
Qty: 90 TABLET | Refills: 1 | Status: SHIPPED | OUTPATIENT
Start: 2021-06-09 | End: 2021-06-30 | Stop reason: DRUGHIGH

## 2021-06-09 RX ORDER — NORGESTIMATE AND ETHINYL ESTRADIOL 0.25-0.035
1 KIT ORAL DAILY
Qty: 28 TABLET | Refills: 3 | Status: SHIPPED | OUTPATIENT
Start: 2021-06-09 | End: 2021-07-27 | Stop reason: SDUPTHER

## 2021-06-09 NOTE — PATIENT INSTRUCTIONS

## 2021-06-09 NOTE — ASSESSMENT & PLAN NOTE
Patient was removed from Lexapro 20 mg and initiated on Wellbutrin  mg daily  Started on 5/20  Notes improvement in moods and decreased anxiety  Tolerating medication and dose at this time

## 2021-06-30 ENCOUNTER — OFFICE VISIT (OUTPATIENT)
Dept: FAMILY MEDICINE CLINIC | Facility: CLINIC | Age: 21
End: 2021-06-30
Payer: COMMERCIAL

## 2021-06-30 VITALS
OXYGEN SATURATION: 99 % | TEMPERATURE: 97 F | SYSTOLIC BLOOD PRESSURE: 100 MMHG | HEART RATE: 94 BPM | RESPIRATION RATE: 16 BRPM | HEIGHT: 62 IN | BODY MASS INDEX: 21.57 KG/M2 | WEIGHT: 117.2 LBS | DIASTOLIC BLOOD PRESSURE: 70 MMHG

## 2021-06-30 DIAGNOSIS — Z11.3 SCREENING FOR STDS (SEXUALLY TRANSMITTED DISEASES): Primary | ICD-10-CM

## 2021-06-30 DIAGNOSIS — F41.9 ANXIETY AND DEPRESSION: ICD-10-CM

## 2021-06-30 DIAGNOSIS — F32.A ANXIETY AND DEPRESSION: ICD-10-CM

## 2021-06-30 PROCEDURE — 87591 N.GONORRHOEAE DNA AMP PROB: CPT | Performed by: NURSE PRACTITIONER

## 2021-06-30 PROCEDURE — 3008F BODY MASS INDEX DOCD: CPT | Performed by: NURSE PRACTITIONER

## 2021-06-30 PROCEDURE — 99213 OFFICE O/P EST LOW 20 MIN: CPT | Performed by: NURSE PRACTITIONER

## 2021-06-30 PROCEDURE — 87491 CHLMYD TRACH DNA AMP PROBE: CPT | Performed by: NURSE PRACTITIONER

## 2021-06-30 PROCEDURE — 81025 URINE PREGNANCY TEST: CPT | Performed by: NURSE PRACTITIONER

## 2021-06-30 RX ORDER — BUPROPION HYDROCHLORIDE 300 MG/1
300 TABLET ORAL DAILY
Qty: 90 TABLET | Refills: 2 | Status: SHIPPED | OUTPATIENT
Start: 2021-06-30 | End: 2021-11-07 | Stop reason: SDUPTHER

## 2021-06-30 NOTE — PROGRESS NOTES
Assessment/Plan:    No problem-specific Assessment & Plan notes found for this encounter  Diagnoses and all orders for this visit:    Screening for STDs (sexually transmitted diseases)  -     POCT urine HCG (negative in office)  -     Chlamydia/GC amplified DNA by PCR; Future    Anxiety and depression  -     buPROPion (WELLBUTRIN XL) 300 mg 24 hr tablet; Take 1 tablet (300 mg total) by mouth daily  -     Ambulatory referral to Day[facility] Sanchez; Future        Subjective:      Patient ID: Lyndsey De Leon is a 21 y o  female  Patient presents to the office for medication management  Patient has been on Wellbutrin XL for anxiety and depression since May  Has been tolerating medication well, until 2 weeks ago when symptoms of anxiety began to increase  Patient states over the past 2 weeks, has been having episodes of anxiety every other day  Has hyperventilation and SOB with episodes  States she has fluctuations in moods where she is in a normal mood, then begins to cry  Patient denies worsening depression symptoms  Denies SI/HI, auditory or visual hallucinations  Patient is currently on a wait list [facility] at [facility]  Would like referral to behavioral health while she awaits to get into psychiatry  Patient also inquiring about testing for STDs after unprotected sexual encounter  Denies vaginal discharge, pelvic/vaginal pain, urinary symptoms  The following portions of the patient's history were reviewed and updated as appropriate: allergies, current medications, past medical history, past social history and problem list     Review of Systems   Constitutional: Negative for chills, fatigue and fever  Eyes: Negative for pain and visual disturbance  Respiratory: Positive for shortness of breath (during episodes of anxiety)  Negative for cough, choking, chest tightness and wheezing  Cardiovascular: Negative for chest pain, palpitations and leg swelling     Gastrointestinal: Negative for abdominal pain, diarrhea, nausea and vomiting  Genitourinary: Negative for decreased urine volume, difficulty urinating, flank pain, frequency, hematuria, pelvic pain, urgency, vaginal bleeding, vaginal discharge and vaginal pain  Musculoskeletal: Negative for arthralgias, back pain and myalgias  Skin: Negative for color change and rash  Neurological: Negative for dizziness, weakness, light-headedness, numbness and headaches  Psychiatric/Behavioral: Positive for dysphoric mood  Negative for agitation, self-injury, sleep disturbance and suicidal ideas  The patient is nervous/anxious  Objective:      /70   Pulse 94   Temp (!) 97 °F (36 1 °C)   Resp 16   Ht 5' 2" (1 575 m)   Wt 53 2 kg (117 lb 3 2 oz)   LMP 06/08/2021   SpO2 99%   BMI 21 44 kg/m²          Physical Exam  Vitals reviewed  Constitutional:       General: She is not in acute distress  Appearance: Normal appearance  She is not ill-appearing  HENT:      Head: Normocephalic and atraumatic  Right Ear: External ear normal       Left Ear: External ear normal       Mouth/Throat:      Mouth: Mucous membranes are moist       Pharynx: Oropharynx is clear  No oropharyngeal exudate or posterior oropharyngeal erythema  Eyes:      Extraocular Movements: Extraocular movements intact  Conjunctiva/sclera: Conjunctivae normal       Pupils: Pupils are equal, round, and reactive to light  Cardiovascular:      Rate and Rhythm: Normal rate and regular rhythm  Pulses: Normal pulses  Heart sounds: Normal heart sounds  No murmur heard  Pulmonary:      Effort: Pulmonary effort is normal  No respiratory distress  Breath sounds: Normal breath sounds  Abdominal:      General: Abdomen is flat  Bowel sounds are normal       Palpations: Abdomen is soft  Tenderness: There is no abdominal tenderness  There is no right CVA tenderness or left CVA tenderness     Musculoskeletal:         General: Normal range of motion  Right lower leg: No edema  Left lower leg: No edema  Skin:     General: Skin is warm and moist    Neurological:      Mental Status: She is alert and oriented to person, place, and time  Psychiatric:         Attention and Perception: Attention and perception normal          Mood and Affect: Affect is flat  Speech: Speech normal          Behavior: Behavior normal  Behavior is cooperative  Thought Content: Thought content does not include homicidal or suicidal ideation           Judgment: Judgment normal

## 2021-06-30 NOTE — PATIENT INSTRUCTIONS
Anxiety, Ambulatory Care   GENERAL INFORMATION:   Anxiety  is a condition that causes you to feel excessive worry, uneasiness, or fear  Family or work stress, smoking, caffeine, and alcohol can increase your risk for anxiety  Certain medicines or health conditions can also increase your risk  Anxiety may begin gradually and can become a long-term condition if it is not managed or treated  Common symptoms include the following:   · Fatigue or muscle tightness     · Shaking, restlessness, or irritability     · Problems focusing     · Trouble sleeping     · Feeling jumpy, easily startled, or dizzy     · Rapid heartbeat or shortness of breath  Seek immediate care for the following symptoms:   · Chest pain, tightness, or heaviness that may spread to your shoulders, arms, jaw, neck, or back    · Feeling like hurting yourself or someone else    · Dizziness or feeling lightheaded or faint  Treatment for anxiety  may include medicines to help you feel calm and relaxed, and decrease your symptoms  Healthcare providers will treat any medical conditions that may be causing your symptoms  Manage anxiety:   · Go to counseling as directed  Cognitive behavioral therapy can help you understand and change how you react to events that trigger your symptoms  · Find ways to manage your symptoms  Activities such as exercise, meditation, or listening to music can help you relax  · Practice deep breathing  Breathing can change how your body reacts to stress  Focus on taking slow, deep breaths several times a day, or during an anxiety attack  Breathe in through your nose, and out through your mouth  · Avoid caffeine  Caffeine can make your symptoms worse  Avoid foods or drinks that are meant to increase your energy level  · Limit or avoid alcohol  Ask your healthcare provider if alcohol is safe for you  You may not be able to drink alcohol if you take certain anxiety or depression medicines   Limit alcohol to 1 drink per day if you are a woman  Limit alcohol to 2 drinks per day if you are a man  A drink of alcohol is 12 ounces of beer, 5 ounces of wine, or 1½ ounces of liquor  Follow up with your healthcare provider as directed:  Write down your questions so you remember to ask them during your visits  CARE AGREEMENT:   You have the right to help plan your care  Learn about your health condition and how it may be treated  Discuss treatment options with your caregivers to decide what care you want to receive  You always have the right to refuse treatment  The above information is an  only  It is not intended as medical advice for individual conditions or treatments  Talk to your doctor, nurse or pharmacist before following any medical regimen to see if it is safe and effective for you  © 2014 [address] is for End User's use only and may not be sold, redistributed or otherwise used for commercial purposes  All illustrations and images included in CareNotes® are the copyrighted property of [employer]  or Vlad Bundy

## 2021-07-02 LAB
C TRACH DNA SPEC QL NAA+PROBE: POSITIVE
N GONORRHOEA DNA SPEC QL NAA+PROBE: NEGATIVE

## 2021-07-14 ENCOUNTER — OFFICE VISIT (OUTPATIENT)
Dept: FAMILY MEDICINE CLINIC | Facility: CLINIC | Age: 21
End: 2021-07-14
Payer: COMMERCIAL

## 2021-07-14 ENCOUNTER — TELEPHONE (OUTPATIENT)
Dept: PSYCHIATRY | Facility: CLINIC | Age: 21
End: 2021-07-14

## 2021-07-14 VITALS
SYSTOLIC BLOOD PRESSURE: 110 MMHG | BODY MASS INDEX: 21.44 KG/M2 | HEART RATE: 127 BPM | HEIGHT: 62 IN | DIASTOLIC BLOOD PRESSURE: 70 MMHG | OXYGEN SATURATION: 98 %

## 2021-07-14 DIAGNOSIS — F41.9 ANXIETY AND DEPRESSION: Primary | ICD-10-CM

## 2021-07-14 DIAGNOSIS — F32.A ANXIETY AND DEPRESSION: Primary | ICD-10-CM

## 2021-07-14 PROCEDURE — 1036F TOBACCO NON-USER: CPT | Performed by: NURSE PRACTITIONER

## 2021-07-14 PROCEDURE — 99213 OFFICE O/P EST LOW 20 MIN: CPT | Performed by: NURSE PRACTITIONER

## 2021-07-14 NOTE — PROGRESS NOTES
Assessment/Plan:      Diagnoses and all orders for this visit:    Anxiety and depression      Patient's Wellbutrin XL increased to 300 mg during last visit (6/30)  Patient states symptoms of depression have not gotten worse, but recent trigger of seeing ex-boyfriend prompted increase in symptoms  Denies SI/HI, auditory or visual hallucinations  Patient is seeking therapy  Is having difficult time getting an appointment  Resources provided to patient upon discharge    Subjective:     Patient ID: Ruthy Primrose is a 21 y o  female  HPI    Review of Systems   Constitutional: Positive for appetite change  Negative for activity change, chills, fatigue, fever and unexpected weight change  HENT: Negative for ear pain and sore throat  Eyes: Negative for pain and visual disturbance  Respiratory: Negative for cough and shortness of breath  Cardiovascular: Negative for chest pain and palpitations  Gastrointestinal: Negative for abdominal pain, nausea and vomiting  Genitourinary: Negative for dysuria and hematuria  Musculoskeletal: Negative for arthralgias and back pain  Skin: Negative for color change and rash  Neurological: Negative for dizziness, seizures, syncope, weakness, light-headedness and headaches  Psychiatric/Behavioral: Positive for dysphoric mood  Negative for self-injury, sleep disturbance and suicidal ideas  The patient is nervous/anxious  Objective:     Physical Exam  Vitals reviewed  Constitutional:       Appearance: Normal appearance  She is not toxic-appearing or diaphoretic  HENT:      Head: Normocephalic and atraumatic  Right Ear: Tympanic membrane, ear canal and external ear normal  There is no impacted cerumen  Left Ear: Tympanic membrane, ear canal and external ear normal  There is no impacted cerumen  Nose: Nose normal       Mouth/Throat:      Mouth: Mucous membranes are moist       Pharynx: Oropharynx is clear   No oropharyngeal exudate or posterior oropharyngeal erythema  Eyes:      Conjunctiva/sclera: Conjunctivae normal       Pupils: Pupils are equal, round, and reactive to light  Cardiovascular:      Rate and Rhythm: Regular rhythm  Tachycardia present  Pulses: Normal pulses  Heart sounds: Normal heart sounds  No murmur heard  Pulmonary:      Effort: Pulmonary effort is normal  No respiratory distress  Breath sounds: Normal breath sounds  No wheezing  Abdominal:      General: Abdomen is flat  Bowel sounds are normal       Tenderness: There is no abdominal tenderness  There is no right CVA tenderness or left CVA tenderness  Musculoskeletal:         General: No tenderness  Normal range of motion  Cervical back: Normal range of motion  No rigidity or tenderness  Right lower leg: No edema  Left lower leg: No edema  Skin:     General: Skin is warm and moist    Neurological:      Mental Status: She is alert and oriented to person, place, and time  Psychiatric:         Attention and Perception: Attention normal          Mood and Affect: Affect is flat  Speech: Speech normal          Behavior: Behavior normal  Behavior is cooperative  Thought Content: Thought content normal  Thought content is not paranoid or delusional  Thought content does not include homicidal or suicidal ideation  Thought content does not include homicidal or suicidal plan

## 2021-07-14 NOTE — PATIENT INSTRUCTIONS
Depression   AMBULATORY CARE:   Depression  is a medical condition that causes feelings of sadness or hopelessness that do not go away  Depression may cause you to lose interest in things you used to enjoy  These feelings may interfere with your daily life  Common symptoms include the following:   · Appetite changes, or weight gain or loss    · Trouble going to sleep or staying asleep, or sleeping too much    · Fatigue or lack of energy    · Feeling restless, irritable, or withdrawn    · Feeling worthless, hopeless, discouraged, or guilty    · Trouble concentrating, remembering things, doing daily tasks, or making decisions    · Thoughts about hurting or killing yourself    Call your local emergency number (911 in the [address]) if:   · You think about harming yourself or someone else  · You have done something on purpose to hurt yourself  Call your therapist or doctor if:   · Your symptoms do not improve  · You cannot make it to your next appointment  · You have new symptoms  · You have questions or concerns about your condition or care  The following resources are available at any time to help you, if needed:   · [address] [place] [address]: [phone] (5-598-233-JKJR)     · Suicide Hotline: 2-734.421.3408 (7-670-WHCNMON)     · For a list of international numbers: https://save org/find-help/international-resources/    Treatment for depression  may include medicine to relieve depression  Medicine is often used together with therapy  Therapy is a way for you to talk about your feelings and anything that may be causing depression  Therapy can be done alone or in a group  It may also be done with family members or a significant other  Self-care:   · Get regular physical activity  Try to be active for 30 minutes, 3 to 5 days a week  Physical activity can help relieve depression  Work with your healthcare provider to develop a plan that you enjoy   It may help to ask someone to be active with you     · Create a regular sleep schedule  A routine can help you relax before bed  Listen to music, read, or do yoga  Try to go to bed and wake up at the same time every day  Sleep is important for emotional health  · Eat a variety of healthy foods  Healthy foods include fruits, vegetables, whole-grain breads, low-fat dairy products, lean meats, fish, and cooked beans  A healthy meal plan is low in fat, salt, and added sugar  · Do not drink alcohol or use drugs  Alcohol and drugs can make depression worse  Talk to your therapist or doctor if you need help quitting  Follow up with your healthcare provider as directed: Your healthcare provider will monitor your progress at follow-up visits  He or she will also monitor your medicine if you take antidepressants  Your healthcare provider will ask if the medicine is helping  Tell him or her about any side effects or problems you may have with your medicine  The type or amount of medicine may need to be changed  Write down your questions so you remember to ask them during your visits  © Copyright [address] Information is for End User's use only and may not be sold, redistributed or otherwise used for commercial purposes  All illustrations and images included in CareNotes® are the copyrighted property of A D A M , Inc  or [address] [place], [place] [address]noel   The above information is an  only  It is not intended as medical advice for individual conditions or treatments  Talk to your doctor, nurse or pharmacist before following any medical regimen to see if it is safe and effective for you

## 2021-07-14 NOTE — ASSESSMENT & PLAN NOTE
Patient's Wellbutrin XL increased to 300 mg during last visit (6/30)  Patient states symptoms of depression have not gotten worse, but recent trigger of seeing ex-boyfriend prompted increase in symptoms  Denies SI/HI, auditory or visual hallucinations  Patient is seeking therapy  Is having difficult time getting an appointment    Resources provided to patient upon discharge

## 2021-07-16 ENCOUNTER — TELEPHONE (OUTPATIENT)
Dept: FAMILY MEDICINE CLINIC | Facility: CLINIC | Age: 21
End: 2021-07-16

## 2021-07-16 NOTE — TELEPHONE ENCOUNTER
Please advise her it is a one time dose of Azithromycin and that is the recommended treatment for chlamydia

## 2021-07-16 NOTE — TELEPHONE ENCOUNTER
She just wants to know how does she know the zpack is going to work for her  She has heard from some people it didn't work for them

## 2021-07-16 NOTE — TELEPHONE ENCOUNTER
Please verify what "full panel" that patient is referring to  The only blood work she has outstanding is to test for HIV and syphilis which was added when she tested positive for chlamydia  It isn't mandatory, but recommended by CDC

## 2021-07-27 DIAGNOSIS — Z30.011 ENCOUNTER FOR INITIAL PRESCRIPTION OF CONTRACEPTIVE PILLS: ICD-10-CM

## 2021-07-27 RX ORDER — NORGESTIMATE AND ETHINYL ESTRADIOL 0.25-0.035
1 KIT ORAL DAILY
Qty: 28 TABLET | Refills: 0 | Status: SHIPPED | OUTPATIENT
Start: 2021-07-27 | End: 2021-08-23 | Stop reason: SDUPTHER

## 2021-08-13 ENCOUNTER — TELEPHONE (OUTPATIENT)
Dept: FAMILY MEDICINE CLINIC | Facility: CLINIC | Age: 21
End: 2021-08-13

## 2021-08-13 NOTE — TELEPHONE ENCOUNTER
----- Message from Linn Lawson Casia  sent at 8/13/2021  9:06 AM EDT -----  Regarding: FW: Test Results Question  Contact: [phone]  Please let patient know [facility] thank you  ----- Message -----  From: Guadlupe Duane, MA  Sent: 8/11/2021   2:37 PM EDT  To: ANGELA Lawson  Subject: FW: Test Results Question                          ----- Message -----  From: Jean Back  Sent: 8/11/2021   2:30 PM EDT  To: , #  Subject: Test Results Question                            Hi Alyssa Everett put in an order for bloodwork for a full panel  I'm getting a full panel next tuesday, is there anything I need to bring to the office I'm going to? It's the one on [facility]

## 2021-08-23 DIAGNOSIS — Z30.011 ENCOUNTER FOR INITIAL PRESCRIPTION OF CONTRACEPTIVE PILLS: ICD-10-CM

## 2021-08-23 RX ORDER — NORGESTIMATE AND ETHINYL ESTRADIOL 0.25-0.035
1 KIT ORAL DAILY
Qty: 28 TABLET | Refills: 5 | Status: SHIPPED | OUTPATIENT
Start: 2021-08-23 | End: 2021-09-15 | Stop reason: SDUPTHER

## 2021-08-28 DIAGNOSIS — F41.9 ANXIETY AND DEPRESSION: ICD-10-CM

## 2021-08-28 DIAGNOSIS — F32.A ANXIETY AND DEPRESSION: ICD-10-CM

## 2021-08-30 RX ORDER — HYDROXYZINE PAMOATE 50 MG/1
50 CAPSULE ORAL 3 TIMES DAILY PRN
Qty: 30 CAPSULE | Refills: 0 | Status: SHIPPED | OUTPATIENT
Start: 2021-08-30 | End: 2022-01-26 | Stop reason: SDUPTHER

## 2021-09-08 ENCOUNTER — VBI (OUTPATIENT)
Dept: ADMINISTRATIVE | Facility: OTHER | Age: 21
End: 2021-09-08

## 2021-09-13 ENCOUNTER — TELEPHONE (OUTPATIENT)
Dept: FAMILY MEDICINE CLINIC | Facility: CLINIC | Age: 21
End: 2021-09-13

## 2021-09-13 NOTE — TELEPHONE ENCOUNTER
----- Message from ANGELA Knox sent at 9/13/2021 10:21 AM EDT -----  Regarding: FW: Non-Urgent Medical Question  Contact: [phone]  Needs   ----- Message -----  From: Huber Wilson MA  Sent: 9/13/2021   7:10 AM EDT  To: ANGELA Kwong  Subject: FW: Non-Urgent Medical Question                    ----- Message -----  From: Mary Ann Chowdhury  Sent: 9/13/2021   6:58 AM EDT  To: , #  Subject: Non-Urgent Medical Question                      Hi Seema,     I have a UTI and I was wondering if you could prescribe me an antibiotic and send it to the [facility] in [place]?

## 2021-09-15 DIAGNOSIS — Z30.011 ENCOUNTER FOR INITIAL PRESCRIPTION OF CONTRACEPTIVE PILLS: ICD-10-CM

## 2021-09-16 RX ORDER — NORGESTIMATE AND ETHINYL ESTRADIOL 0.25-0.035
1 KIT ORAL DAILY
Qty: 28 TABLET | Refills: 0 | Status: SHIPPED | OUTPATIENT
Start: 2021-09-16 | End: 2021-09-17 | Stop reason: SDUPTHER

## 2021-09-17 DIAGNOSIS — Z30.011 ENCOUNTER FOR INITIAL PRESCRIPTION OF CONTRACEPTIVE PILLS: ICD-10-CM

## 2021-09-17 RX ORDER — NORGESTIMATE AND ETHINYL ESTRADIOL 0.25-0.035
1 KIT ORAL DAILY
Qty: 28 TABLET | Refills: 0 | Status: SHIPPED | OUTPATIENT
Start: 2021-09-17 | End: 2021-10-14 | Stop reason: SDUPTHER

## 2021-09-27 ENCOUNTER — OFFICE VISIT (OUTPATIENT)
Dept: PSYCHIATRY | Facility: CLINIC | Age: 21
End: 2021-09-27
Payer: COMMERCIAL

## 2021-09-27 ENCOUNTER — TELEPHONE (OUTPATIENT)
Dept: PSYCHIATRY | Facility: CLINIC | Age: 21
End: 2021-09-27

## 2021-09-27 DIAGNOSIS — F32.A ANXIETY AND DEPRESSION: ICD-10-CM

## 2021-09-27 DIAGNOSIS — F41.1 GAD (GENERALIZED ANXIETY DISORDER): Primary | ICD-10-CM

## 2021-09-27 DIAGNOSIS — F41.9 ANXIETY AND DEPRESSION: ICD-10-CM

## 2021-09-27 PROCEDURE — 90791 PSYCH DIAGNOSTIC EVALUATION: CPT | Performed by: NURSE PRACTITIONER

## 2021-09-27 RX ORDER — BUSPIRONE HYDROCHLORIDE 5 MG/1
5 TABLET ORAL 2 TIMES DAILY
Qty: 60 TABLET | Refills: 0 | Status: SHIPPED | OUTPATIENT
Start: 2021-09-27 | End: 2021-10-13 | Stop reason: DRUGHIGH
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
## 2021-09-27 NOTE — BH TREATMENT PLAN
Treatment plan not enacted at this time due to the time constraints of the appointment, will complete at next appointment

## 2021-10-13 ENCOUNTER — OFFICE VISIT (OUTPATIENT)
Dept: PSYCHIATRY | Facility: CLINIC | Age: 21
End: 2021-10-13
Payer: COMMERCIAL

## 2021-10-13 DIAGNOSIS — F31.81 BIPOLAR II DISORDER (HCC): ICD-10-CM

## 2021-10-13 DIAGNOSIS — F41.1 GAD (GENERALIZED ANXIETY DISORDER): Primary | ICD-10-CM

## 2021-10-13 PROCEDURE — 99213 OFFICE O/P EST LOW 20 MIN: CPT | Performed by: NURSE PRACTITIONER

## 2021-10-13 RX ORDER — ARIPIPRAZOLE 2 MG/1
2 TABLET ORAL DAILY
Qty: 30 TABLET | Refills: 1 | Status: SHIPPED | OUTPATIENT
Start: 2021-10-13 | End: 2021-10-29 | Stop reason: DRUGHIGH

## 2021-10-13 RX ORDER — BUSPIRONE HYDROCHLORIDE 5 MG/1
10 TABLET ORAL 2 TIMES DAILY
Qty: 60 TABLET | Refills: 1 | Status: SHIPPED | OUTPATIENT
Start: 2021-10-13 | End: 2021-11-17 | Stop reason: SDUPTHER

## 2021-10-14 DIAGNOSIS — Z30.011 ENCOUNTER FOR INITIAL PRESCRIPTION OF CONTRACEPTIVE PILLS: ICD-10-CM

## 2021-10-15 RX ORDER — NORGESTIMATE AND ETHINYL ESTRADIOL 0.25-0.035
1 KIT ORAL DAILY
Qty: 28 TABLET | Refills: 0 | Status: SHIPPED | OUTPATIENT
Start: 2021-10-15 | End: 2021-11-07 | Stop reason: SDUPTHER

## 2021-10-29 ENCOUNTER — TELEMEDICINE (OUTPATIENT)
Dept: PSYCHIATRY | Facility: CLINIC | Age: 21
End: 2021-10-29
Payer: COMMERCIAL

## 2021-10-29 DIAGNOSIS — F31.81 BIPOLAR II DISORDER (HCC): Primary | ICD-10-CM

## 2021-10-29 PROCEDURE — 99213 OFFICE O/P EST LOW 20 MIN: CPT | Performed by: NURSE PRACTITIONER

## 2021-10-29 RX ORDER — ARIPIPRAZOLE 5 MG/1
5 TABLET ORAL DAILY
Qty: 30 TABLET | Refills: 1 | Status: SHIPPED | OUTPATIENT
Start: 2021-10-29 | End: 2021-11-17 | Stop reason: DRUGHIGH

## 2021-11-07 DIAGNOSIS — F32.A ANXIETY AND DEPRESSION: ICD-10-CM

## 2021-11-07 DIAGNOSIS — Z30.011 ENCOUNTER FOR INITIAL PRESCRIPTION OF CONTRACEPTIVE PILLS: ICD-10-CM

## 2021-11-07 DIAGNOSIS — F41.9 ANXIETY AND DEPRESSION: ICD-10-CM

## 2021-11-08 RX ORDER — BUPROPION HYDROCHLORIDE 300 MG/1
300 TABLET ORAL DAILY
Qty: 90 TABLET | Refills: 0 | Status: SHIPPED | OUTPATIENT
Start: 2021-11-08 | End: 2022-01-26 | Stop reason: SDUPTHER

## 2021-11-08 RX ORDER — NORGESTIMATE AND ETHINYL ESTRADIOL 0.25-0.035
1 KIT ORAL DAILY
Qty: 28 TABLET | Refills: 0 | Status: SHIPPED | OUTPATIENT
Start: 2021-11-08 | End: 2021-12-04

## 2021-11-17 ENCOUNTER — TELEMEDICINE (OUTPATIENT)
Dept: PSYCHIATRY | Facility: CLINIC | Age: 21
End: 2021-11-17
Payer: COMMERCIAL

## 2021-11-17 DIAGNOSIS — F41.1 GAD (GENERALIZED ANXIETY DISORDER): ICD-10-CM

## 2021-11-17 DIAGNOSIS — F31.81 BIPOLAR II DISORDER (HCC): Primary | ICD-10-CM

## 2021-11-17 PROCEDURE — 99213 OFFICE O/P EST LOW 20 MIN: CPT | Performed by: NURSE PRACTITIONER

## 2021-11-17 RX ORDER — BUSPIRONE HYDROCHLORIDE 10 MG/1
10 TABLET ORAL 2 TIMES DAILY
Qty: 60 TABLET | Refills: 2 | Status: SHIPPED | OUTPATIENT
Start: 2021-11-17 | End: 2021-12-08 | Stop reason: DRUGHIGH

## 2021-11-17 RX ORDER — ARIPIPRAZOLE 5 MG/1
2.5 TABLET ORAL DAILY
Qty: 15 TABLET | Refills: 2 | Status: SHIPPED | OUTPATIENT
Start: 2021-11-17 | End: 2022-01-26 | Stop reason: ALTCHOICE

## 2021-12-02 ENCOUNTER — TELEPHONE (OUTPATIENT)
Dept: PSYCHIATRY | Facility: CLINIC | Age: 21
End: 2021-12-02

## 2021-12-04 DIAGNOSIS — Z30.011 ENCOUNTER FOR INITIAL PRESCRIPTION OF CONTRACEPTIVE PILLS: ICD-10-CM

## 2021-12-04 RX ORDER — NORGESTIMATE AND ETHINYL ESTRADIOL 0.25-0.035
KIT ORAL
Qty: 28 TABLET | Refills: 0 | Status: SHIPPED | OUTPATIENT
Start: 2021-12-04 | End: 2021-12-07 | Stop reason: SDUPTHER

## 2021-12-07 DIAGNOSIS — Z30.011 ENCOUNTER FOR INITIAL PRESCRIPTION OF CONTRACEPTIVE PILLS: ICD-10-CM

## 2021-12-08 ENCOUNTER — TELEMEDICINE (OUTPATIENT)
Dept: PSYCHIATRY | Facility: CLINIC | Age: 21
End: 2021-12-08
Payer: COMMERCIAL

## 2021-12-08 DIAGNOSIS — F41.1 GAD (GENERALIZED ANXIETY DISORDER): ICD-10-CM

## 2021-12-08 DIAGNOSIS — F31.81 BIPOLAR II DISORDER (HCC): Primary | ICD-10-CM

## 2021-12-08 PROCEDURE — 99213 OFFICE O/P EST LOW 20 MIN: CPT | Performed by: NURSE PRACTITIONER

## 2021-12-08 RX ORDER — NORGESTIMATE AND ETHINYL ESTRADIOL 0.25-0.035
1 KIT ORAL DAILY
Qty: 28 TABLET | Refills: 0 | Status: SHIPPED | OUTPATIENT
Start: 2021-12-08 | End: 2021-12-17 | Stop reason: SDUPTHER

## 2021-12-08 RX ORDER — BUSPIRONE HYDROCHLORIDE 10 MG/1
10 TABLET ORAL 3 TIMES DAILY
Qty: 90 TABLET | Refills: 1 | Status: SHIPPED | OUTPATIENT
Start: 2021-12-08 | End: 2022-01-26 | Stop reason: ALTCHOICE

## 2021-12-17 DIAGNOSIS — Z30.011 ENCOUNTER FOR INITIAL PRESCRIPTION OF CONTRACEPTIVE PILLS: ICD-10-CM

## 2021-12-20 RX ORDER — NORGESTIMATE AND ETHINYL ESTRADIOL 0.25-0.035
1 KIT ORAL DAILY
Qty: 28 TABLET | Refills: 0 | Status: SHIPPED | OUTPATIENT
Start: 2021-12-20 | End: 2022-01-10 | Stop reason: SDUPTHER

## 2021-12-22 ENCOUNTER — TELEMEDICINE (OUTPATIENT)
Dept: PSYCHIATRY | Facility: CLINIC | Age: 21
End: 2021-12-22
Payer: COMMERCIAL

## 2021-12-22 DIAGNOSIS — F31.81 BIPOLAR II DISORDER (HCC): Primary | ICD-10-CM

## 2021-12-22 PROCEDURE — 99213 OFFICE O/P EST LOW 20 MIN: CPT | Performed by: NURSE PRACTITIONER

## 2022-01-07 ENCOUNTER — TELEPHONE (OUTPATIENT)
Dept: FAMILY MEDICINE CLINIC | Facility: CLINIC | Age: 22
End: 2022-01-07

## 2022-01-07 NOTE — TELEPHONE ENCOUNTER
----- Message from Linn Brady sent at 1/3/2022 10:17 AM EST -----  Regarding: FW: Referral   Please call--- she will need to look through insurance to see coverage  I would recommend Domingo Avilez with OB GYN in the front if covered  ----- Message -----  From: Gerardo Vigil MA  Sent: 1/3/2022   9:52 AM EST  To: ANGELA Cadet  Subject: FW: Referral                                       ----- Message -----  From: Kelsey Gonzalez  Sent: 1/3/2022   8:55 AM EST  To: , #  Subject: Referral                                         Jim Stephenson, I was wondering if you could refer me to gynecology that accepts my insurance, I am having a hard time finding one

## 2022-01-10 DIAGNOSIS — Z30.011 ENCOUNTER FOR INITIAL PRESCRIPTION OF CONTRACEPTIVE PILLS: ICD-10-CM

## 2022-01-10 RX ORDER — NORGESTIMATE AND ETHINYL ESTRADIOL 0.25-0.035
1 KIT ORAL DAILY
Qty: 28 TABLET | Refills: 0 | Status: SHIPPED | OUTPATIENT
Start: 2022-01-10 | End: 2022-01-14 | Stop reason: SDUPTHER

## 2022-01-14 DIAGNOSIS — Z30.011 ENCOUNTER FOR INITIAL PRESCRIPTION OF CONTRACEPTIVE PILLS: ICD-10-CM

## 2022-01-17 RX ORDER — NORGESTIMATE AND ETHINYL ESTRADIOL 0.25-0.035
1 KIT ORAL DAILY
Qty: 28 TABLET | Refills: 0 | Status: SHIPPED | OUTPATIENT
Start: 2022-01-17 | End: 2022-02-13 | Stop reason: SDUPTHER

## 2022-01-26 ENCOUNTER — TELEMEDICINE (OUTPATIENT)
Dept: PSYCHIATRY | Facility: CLINIC | Age: 22
End: 2022-01-26
Payer: COMMERCIAL

## 2022-01-26 DIAGNOSIS — F41.9 ANXIETY AND DEPRESSION: ICD-10-CM

## 2022-01-26 DIAGNOSIS — F51.01 PRIMARY INSOMNIA: ICD-10-CM

## 2022-01-26 DIAGNOSIS — F31.81 BIPOLAR II DISORDER (HCC): Primary | ICD-10-CM

## 2022-01-26 DIAGNOSIS — F32.A ANXIETY AND DEPRESSION: ICD-10-CM

## 2022-01-26 PROCEDURE — 99213 OFFICE O/P EST LOW 20 MIN: CPT | Performed by: NURSE PRACTITIONER

## 2022-01-26 RX ORDER — BUPROPION HYDROCHLORIDE 300 MG/1
300 TABLET ORAL DAILY
Qty: 90 TABLET | Refills: 0 | Status: SHIPPED | OUTPATIENT
Start: 2022-01-26 | End: 2022-04-27 | Stop reason: SDUPTHER

## 2022-01-26 RX ORDER — HYDROXYZINE PAMOATE 50 MG/1
50 CAPSULE ORAL 3 TIMES DAILY PRN
Qty: 30 CAPSULE | Refills: 0 | Status: SHIPPED | OUTPATIENT
Start: 2022-01-26 | End: 2022-02-16 | Stop reason: SDUPTHER

## 2022-01-26 RX ORDER — TRAZODONE HYDROCHLORIDE 50 MG/1
50 TABLET ORAL
Qty: 30 TABLET | Refills: 0 | Status: SHIPPED | OUTPATIENT
Start: 2022-01-26

## 2022-01-26 RX ORDER — GABAPENTIN 100 MG/1
100 CAPSULE ORAL 2 TIMES DAILY
Qty: 60 CAPSULE | Refills: 1 | Status: SHIPPED | OUTPATIENT
Start: 2022-01-26 | End: 2022-02-02 | Stop reason: DRUGHIGH
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
## 2022-02-02 ENCOUNTER — TELEMEDICINE (OUTPATIENT)
Dept: PSYCHIATRY | Facility: CLINIC | Age: 22
End: 2022-02-02
Payer: COMMERCIAL

## 2022-02-02 DIAGNOSIS — F31.81 BIPOLAR II DISORDER (HCC): Primary | ICD-10-CM

## 2022-02-02 PROCEDURE — 99214 OFFICE O/P EST MOD 30 MIN: CPT | Performed by: NURSE PRACTITIONER

## 2022-02-02 RX ORDER — GABAPENTIN 100 MG/1
200 CAPSULE ORAL 2 TIMES DAILY
Qty: 120 CAPSULE | Refills: 0 | Status: SHIPPED | OUTPATIENT
Start: 2022-02-02 | End: 2022-02-16 | Stop reason: DRUGHIGH
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
## 2022-02-02 NOTE — BH TREATMENT PLAN
TREATMENT PLAN (Medication Management Only)        [address]    Name and Date of Birth:  Charlotte Trejo 24 y o  2000  Date of Treatment Plan: February 2, 2022  Diagnosis/Diagnoses:    1  Bipolar II disorder [facility])      Strengths/Personal Resources for Self-Care: taking medications as prescribed, motivation for treatment  Area/Areas of need (in own words): anxiety symptoms, depressive symptoms  1  Long Term Goal: continue improvement in acceptable anxiety level  Target Date:6 months - 8/2/2022  Person/Persons responsible for completion of goal: Luis Zambrano  2  Short Term Objective (s) - How will we reach this goal?:   A  Provider new recommended medication/dosage changes and/or continue medication(s): Medication changes: I have discontinued Janell Solorzano's gabapentin  I am also having her start on gabapentin  Additionally, I am having her maintain her metoclopramide, norgestimate-ethinyl estradiol, traZODone, hydrOXYzine pamoate, and buPROPion   B  Attend psychotherapy regularly  C  Eat a healthy diet   Target Date:6 months - 8/2/2022  Person/Persons Responsible for Completion of Goal: Luis Zambrano  Progress Towards Goals: continuing treatment  Treatment Modality: medication management every 1 week, continue psychotherapy with own therapist, referral for individual psychotherapy, medication education at every visit, referral for partial will be made if patient is agreeable  Review due 180 days from date of this plan: 6 months - 8/2/2022  Expected length of service: ongoing treatment     Treatment Plan done but not signed at time of office visit due to:  Plan reviewed by phone or in person  and verbal consent given due to [address] distancing    My Physician/PA/NP and I have developed this plan together and I agree to work on the goals and objectives  I understand the treatment goals that were developed for my treatment

## 2022-02-04 ENCOUNTER — TELEPHONE (OUTPATIENT)
Dept: FAMILY MEDICINE CLINIC | Facility: CLINIC | Age: 22
End: 2022-02-04

## 2022-02-04 NOTE — TELEPHONE ENCOUNTER
----- Message from Palomo Revels, [address] [place] sent at 2/3/2022 10:07 AM EST -----  Regarding: FW: Release Form    ----- Message -----  From: ANGELA Weir  Sent: 2/3/2022   7:15 AM EST  To: Dewey Blanco MA  Subject: FW: Release Form                                   ----- Message -----  From: Azalea Miller MA  Sent: 2/2/2022   1:41 PM EST  To: ANGELA Jordan  Subject: FW: Release Form                                   ----- Message -----  From: Angela Osborn  Sent: 2/2/2022  12:40 PM EST  To: , #  Subject: Release Form                                     Hi Claude Galen sent me a release form and I can't seem to send it back to her  I was wondering if you could sent it to their office for me  Thank you

## 2022-02-09 ENCOUNTER — TELEPHONE (OUTPATIENT)
Dept: PSYCHIATRY | Facility: CLINIC | Age: 22
End: 2022-02-09

## 2022-02-09 ENCOUNTER — TELEMEDICINE (OUTPATIENT)
Dept: PSYCHIATRY | Facility: CLINIC | Age: 22
End: 2022-02-09
Payer: COMMERCIAL

## 2022-02-09 DIAGNOSIS — F31.81 BIPOLAR II DISORDER (HCC): Primary | ICD-10-CM

## 2022-02-09 PROCEDURE — 99213 OFFICE O/P EST LOW 20 MIN: CPT | Performed by: NURSE PRACTITIONER

## 2022-02-09 NOTE — TELEPHONE ENCOUNTER
Spoke with Trey Pak counselor  She will be meeting with [facility] tomorrow and will offer her resources available on campus to assist with the counseling that is available  These resources include group therapy and drug and alcohol programs    She will be able to see her bi-weekly for 16 sessions through the school program
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
## 2022-02-16 ENCOUNTER — TELEMEDICINE (OUTPATIENT)
Dept: PSYCHIATRY | Facility: CLINIC | Age: 22
End: 2022-02-16
Payer: COMMERCIAL

## 2022-02-16 DIAGNOSIS — F41.9 ANXIETY AND DEPRESSION: ICD-10-CM

## 2022-02-16 DIAGNOSIS — F31.81 BIPOLAR II DISORDER (HCC): Primary | ICD-10-CM

## 2022-02-16 DIAGNOSIS — F32.A ANXIETY AND DEPRESSION: ICD-10-CM

## 2022-02-16 PROCEDURE — 99213 OFFICE O/P EST LOW 20 MIN: CPT | Performed by: NURSE PRACTITIONER

## 2022-02-16 RX ORDER — OLANZAPINE 2.5 MG/1
2.5 TABLET ORAL
Qty: 14 TABLET | Refills: 0 | Status: SHIPPED | OUTPATIENT
Start: 2022-02-16 | End: 2022-03-09 | Stop reason: SDUPTHER

## 2022-02-16 RX ORDER — HYDROXYZINE PAMOATE 50 MG/1
50 CAPSULE ORAL 3 TIMES DAILY PRN
Qty: 30 CAPSULE | Refills: 0 | Status: SHIPPED | OUTPATIENT
Start: 2022-02-16

## 2022-02-16 RX ORDER — GABAPENTIN 100 MG/1
100 CAPSULE ORAL 2 TIMES DAILY
COMMUNITY
Start: 2022-02-16 | End: 2022-03-09 | Stop reason: SDUPTHER
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
## 2022-02-17 DIAGNOSIS — Z30.011 ENCOUNTER FOR INITIAL PRESCRIPTION OF CONTRACEPTIVE PILLS: ICD-10-CM

## 2022-02-17 RX ORDER — NORGESTIMATE AND ETHINYL ESTRADIOL 0.25-0.035
1 KIT ORAL DAILY
Qty: 28 TABLET | Refills: 0 | Status: SHIPPED | OUTPATIENT
Start: 2022-02-17 | End: 2022-03-07 | Stop reason: SDUPTHER

## 2022-02-23 ENCOUNTER — TELEMEDICINE (OUTPATIENT)
Dept: PSYCHIATRY | Facility: CLINIC | Age: 22
End: 2022-02-23
Payer: COMMERCIAL

## 2022-02-23 DIAGNOSIS — F31.81 BIPOLAR II DISORDER (HCC): Primary | ICD-10-CM

## 2022-02-23 PROCEDURE — 99213 OFFICE O/P EST LOW 20 MIN: CPT | Performed by: NURSE PRACTITIONER
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
## 2022-03-07 DIAGNOSIS — Z30.011 ENCOUNTER FOR INITIAL PRESCRIPTION OF CONTRACEPTIVE PILLS: ICD-10-CM

## 2022-03-07 RX ORDER — NORGESTIMATE AND ETHINYL ESTRADIOL 0.25-0.035
1 KIT ORAL DAILY
Qty: 28 TABLET | Refills: 0 | Status: SHIPPED | OUTPATIENT
Start: 2022-03-07 | End: 2022-04-10 | Stop reason: SDUPTHER

## 2022-03-09 ENCOUNTER — TELEMEDICINE (OUTPATIENT)
Dept: PSYCHIATRY | Facility: CLINIC | Age: 22
End: 2022-03-09
Payer: COMMERCIAL

## 2022-03-09 DIAGNOSIS — F31.81 BIPOLAR II DISORDER (HCC): ICD-10-CM

## 2022-03-09 PROCEDURE — 99214 OFFICE O/P EST MOD 30 MIN: CPT | Performed by: NURSE PRACTITIONER

## 2022-03-09 RX ORDER — OLANZAPINE 2.5 MG/1
2.5 TABLET ORAL
Qty: 30 TABLET | Refills: 1 | Status: SHIPPED | OUTPATIENT
Start: 2022-03-09 | End: 2022-04-27 | Stop reason: ALTCHOICE

## 2022-03-09 RX ORDER — GABAPENTIN 100 MG/1
100 CAPSULE ORAL 2 TIMES DAILY
Qty: 60 CAPSULE | Refills: 2 | Status: SHIPPED | OUTPATIENT
Start: 2022-03-09 | End: 2022-06-20 | Stop reason: ALTCHOICE

## 2022-03-09 NOTE — PSYCH
Virtual Regular Visit    Verification of patient location:    Patient is located in the following state in which I hold an active license [place]    Problem List Items Addressed This Visit     None             Encounter provider Alvarez Roman, [address]    Provider [address]   [address] [place] [address] [phone]    Recent Visits  No visits were found meeting these conditions  Showing recent visits within past 7 days and meeting all other requirements  Future Appointments  No visits were found meeting these conditions  Showing future appointments within next 150 days and meeting all other requirements         The patient was identified by name and date of birth  Dominique Dalton was informed that this is a telemedicine visit and that the visit is being conducted through[employer] and patient was informed this is a secure, HIPAA-complaint platform  She agrees to proceed     My office door was closed  No one else was in the room  She acknowledged consent and understanding of privacy and security of the video platform  The patient has agreed to participate and understands they can discontinue the visit at any time  Patient is aware this is a billable service       HPI     Current Outpatient Medications   Medication Sig Dispense Refill    buPROPion (WELLBUTRIN XL) 300 mg 24 hr tablet Take 1 tablet (300 mg total) by mouth daily 90 tablet 0    gabapentin (Neurontin) 100 mg capsule Take 1 capsule (100 mg total) by mouth 2 (two) times a day      hydrOXYzine pamoate (VISTARIL) 50 mg capsule Take 1 capsule (50 mg total) by mouth 3 (three) times a day as needed for anxiety 30 capsule 0    metoclopramide (Reglan) 10 mg tablet Take 1 tablet (10 mg total) by mouth 3 (three) times a day as needed (headache) 15 tablet 0    norgestimate-ethinyl estradiol (Alisha) 0 25-35 MG-MCG per tablet Take 1 tablet by mouth daily 28 tablet 0    OLANZapine (ZyPREXA) 2 5 mg tablet Take 1 tablet (2 5 mg total) by mouth daily at bedtime 14 tablet 0    traZODone (DESYREL) 50 mg tablet Take 1 tablet (50 mg total) by mouth daily at bedtime 30 tablet 0     No current facility-administered medications for this visit  Review of Systems  Video Exam    There were no vitals filed for this visit  Physical Exam   As a result of this visit, I have referred the patient for further respiratory evaluation  No    I spent 20 minutes directly with the patient during this visit  VIRTUAL VISIT DISCLAIMER    Beny Saini acknowledges that she has consented to an online visit or consultation  She understands that the online visit is based solely on information provided by her, and that, in the absence of a face-to-face physical evaluation by the physician, the diagnosis she receives is both limited and provisional in terms of accuracy and completeness  This is not intended to replace a full medical face-to-face evaluation by the physician  Beny Ashantiyancy understands and accepts these terms  MEDICATION MANAGEMENT NOTE        [facility]    Name and Date of Birth:  Beny Saini 24 y o  2000 MRN: [id number]    Date of Visit: March 9, 2022    No Known Allergies  SUBJECTIVE:    Coralie Spatz is seen today for a follow up for Bipolar Disorder type II  She continues to improve slowly since the last visit  Coralie Spatz last seen by this provider on 2/23/22  She is seen virtually today  She is calm and appropriate in conversation  She appears more relaxed than last appointment  She reports that things have "gotten better"  She states she has been taking the Zyprexa, which she feels has been helping  She has not been feeling bad" and rates her current depression at a 2/10, anxiety 3/10  She states that school has been going okay, and she is working  She reports that her foot is improving and healing well    She reports that she stop drinking as much and even if she goes out with friends she does not drink  She reports good sleep, good appetite  She states that she is getting along with her mom better  She denies SI, denies any self-harm thoughts  Denies homicidal ideation, denies any auditory or visual hallucinations  She denies any side effects from the Zyprexa  Reports normal appetite and states she eats "when she's hungry"  Energy is good, and concentration and focus are normal   Will continue medications as ordered and follow up in one month  She reports that she has a therapy appointment scheduled with somebody off campus and is looking forward to having someone to talk to  She will call sooner with concerns or issues if they arise prior to scheduled appointment  She denies any side effects from current psychiatric medications  PLAN:  Continue meds as ordered  Wellbutrin  mg p o  daily  Neurontin 100 mg p o  b i d  Zyprexa 2 5 mg p o  hs  Hydroxyzine 50 mg p o  t i d  for anxiety  Trazodone 50 mg p o  hs  Begin therapy with individual therapist  Follow up in 1 month  She will call sooner with concerns or issues if they arise prior to scheduled appointment  Aware of 24 hour and weekend coverage for urgent situations accessed by calling [facility] [address] main practice number  Medication management every 1 month  Will start individual therapy with own therapist  Aware of need to follow up with family physician for medical issues    There are no diagnoses linked to this encounter      Current Outpatient Medications on File Prior to Visit   Medication Sig Dispense Refill    buPROPion (WELLBUTRIN XL) 300 mg 24 hr tablet Take 1 tablet (300 mg total) by mouth daily 90 tablet 0    gabapentin (Neurontin) 100 mg capsule Take 1 capsule (100 mg total) by mouth 2 (two) times a day      hydrOXYzine pamoate (VISTARIL) 50 mg capsule Take 1 capsule (50 mg total) by mouth 3 (three) times a day as needed for anxiety 30 capsule 0    metoclopramide (Reglan) 10 mg tablet Take 1 tablet (10 mg total) by mouth 3 (three) times a day as needed (headache) 15 tablet 0    norgestimate-ethinyl estradiol (Alisha) 0 25-35 MG-MCG per tablet Take 1 tablet by mouth daily 28 tablet 0    OLANZapine (ZyPREXA) 2 5 mg tablet Take 1 tablet (2 5 mg total) by mouth daily at bedtime 14 tablet 0    traZODone (DESYREL) 50 mg tablet Take 1 tablet (50 mg total) by mouth daily at bedtime 30 tablet 0     No current facility-administered medications on file prior to visit  Psychotherapy Provided:     Individual psychotherapy provided: Yes  Counseling was provided during the session today for 16 minutes  Supportive counseling provided  Medication changes discussed with Su Rene  Medication education provided to Su Rene  Recent stressor including school stress and occasional anxiety discussed with Su Rene  Coping strategies reviewed with Su Rene  Importance of medication and treatment compliance reviewed with Su Rene  Importance of follow up with family physician for medical issues reviewed with Su Rene  Reassurance and supportive therapy provided  Crisis/safety plan discussed with Su Rene  Will utilize crisis as needed  HPI ROS Appetite Changes and Sleep:     She reports normal sleep, normal appetite, normal energy level   Denies homicidal ideation, denies suicidal ideation    Review Of Systems:  HPI ROS:               Medication Side Effects:  denies     Depression (10 worst): 2/10 (Was 6/10)   Anxiety (10 worst): 3/10 (Was high)   Safety concerns (SI, HI, etc): denies (Was denies)   Sleep: good (Was good)   Energy: good (Was low)   Appetite: good (Was adequate)     General normal    Personality no change in personality   Constitutional negative   ENT negative   Cardiovascular negative   Respiratory negative   Gastrointestinal negative   Genitourinary negative   Musculoskeletal negative   Integumentary negative   Neurological negative Endocrine negative   Other Symptoms none, all other systems are negative     Mental Status Evaluation:    Appearance Adequate hygiene and grooming and Good eye contact   Behavior calm and cooperative   Mood euthymic  Depression Scale - 2 of 10 (0 = No depression)  Anxiety Scale - 3 of 10 (0 = No anxiety)   Speech Normal rate and volume   Affect appropriate and mood-congruent   Thought Processes Goal directed and coherent   Thought Content Does not verbalize delusional material   Associations Tightly connected   Perceptual Disturbances Denies hallucinations and does not appear to be responding to internal stimuli   Risk Potential Suicidal/Homicidal Ideation - No evidence of suicidal or homicidal ideation and patient does not verbalize suicidal or homicidal ideation  Risk of Violence - No evidence of risk for violence found on assessment  Risk of Self Mutilation - No evidence of risk for self mutilation found on assessment   Orientation oriented to person, place, time/date and situation   Memory recent and remote memory grossly intact   Consciousness alert and awake   Attention/Concentration attention span and concentration are age appropriate   Insight partial   Judgement partial   Muscle Strength and Gait normal muscle strength and normal muscle tone, normal gait/station and normal balance   Motor Activity unable to assess today due to virtual visit   Language no difficulty naming common objects, no difficulty repeating a phrase, no difficulty writing a sentence   Fund of Knowledge adequate knowledge of current events  adequate fund of knowledge regarding past history  adequate fund of knowledge regarding vocabulary      Past Psychiatric History Update:     Inpatient Psychiatric Admission Since Last Encounter:   no  Changes to Outpatient Psychiatric Treatment Team:    no  Suicide Attempt Or Self Mutilation Since Last Encounter:   no  Incidence of Violent Behavior Since Last Encounter:   no    Traumatic History Update:     New Onset of Abuse Since Last Encounter:   no  Traumatic Events Since Last Encounter:   no    Past Medical History:    Past Medical History:   Diagnosis Date    Anxiety     Concussion     Depression     Head injury     concussions in high school and in college    Self-injurious behavior      Past Medical History Pertinent Negatives:   Diagnosis Date Noted    Seizures ([facility] ) 09/27/2021    Suicide attempt ([facility] ) 09/27/2021     Past Surgical History:   Procedure Laterality Date    WISDOM TOOTH EXTRACTION       No Known Allergies  Substance Abuse History:    Social History     Substance and Sexual Activity   Alcohol Use Yes    Alcohol/week: 3 0 - 4 0 standard drinks    Types: 3 - 4 Shots of liquor per week    Comment: 3-4 drinks, 2 times per week     Social History     Substance and Sexual Activity   Drug Use No     Social History:    Social History     Socioeconomic History    Marital status: Single     Spouse name: Not on file    Number of children: 0    Years of education: senior in college currently   [employer] education level: High school graduate   Occupational History    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   Tobacco Use    Smoking status: Never Smoker    Smokeless tobacco: Never Used   Vaping Use    Vaping Use: Never used   Substance and Sexual Activity    Alcohol use:  Yes     Alcohol/week: 3 0 - 4 0 standard drinks     Types: 3 - 4 Shots of liquor per week     Comment: 3-4 drinks, 2 times per week    Drug use: No    Sexual activity: Yes     Partners: Male     Birth control/protection: Condom Male   Other Topics Concern    Not on file   Social History Narrative    Not on file     Social Determinants of Health     Financial Resource Strain: Not on file   Food Insecurity: Not on file   Transportation Needs: Not on file   Physical Activity: Not on file   Stress: Not on file   Social Connections: Not on file   Intimate Partner Violence: Not on file   Housing Stability: Not on file     Family Psychiatric History:     Family History   Problem Relation Age of Onset    Hypertension Mother     Mental illness Mother     Depression Mother     Mental illness Sister     Coronary artery disease Maternal Grandmother     Throat cancer Paternal Grandfather      History Review: The following portions of the patient's history were reviewed and updated as appropriate: allergies, current medications, past family history, past medical history, past social history, past surgical history and problem list     OBJECTIVE:     Vital signs in last 24 hours: There were no vitals filed for this visit  Laboratory Results: I have personally reviewed all pertinent laboratory/tests results  Suicide/Homicide Risk Assessment:    Risk of Harm to Self:  The following ratings are based on assessment at the time of the interview  Recent Specific Risk Factors include: current depressive symptoms, current anxiety symptoms  Demographic risk factors include: , age: young adult (15-24)  Historical Risk Factors include: chronic depression, chronic anxiety symptoms, history of self-abusive behavior, alcohol use, history of traumatic experiences  Protective Factors: no current suicidal ideation, access to mental health treatment, compliant with medications, compliant with mental health treatment, having a desire to be alive, stable living environment, stable job, sense of determination, supportive family  Based on today's assessment, Yamilet Burton presents the following risk of harm to self: low    Risk of Harm to Others:   The following ratings are based on assessment at the time of the interview  Protective Factors: no current homicidal ideation  Based on today's assessment, Yamilet Burton presents the following risk of harm to others: none    The following interventions are recommended: contracts for safety at present - agrees to go to ED if feeling unsafe, referral for psychotherapy, return in 1 month for reassessment, contracts for safety at present - agrees to call Crisis Intervention Service if feeling unsafe    Medications Risks/Benefits:      Risks, Benefits And Possible Side Effects Of Medications:    Discussed risks and benefits of treatment with patient including risk of suicidality, serotonin syndrome, increased QTc interval and SIADH related to treatment with antidepressants; Risk of induction of manic symptoms in certain patient populations, risk of parkinsonian symptoms, metabolic syndrome, tardive dyskinesia and neuroleptic malignant syndrome related to treatment with antipsychotic medications and Risk of sedation, dizziness and CNS depression during treatment with Gabapentin     Controlled Medication Discussion:     Not applicable    Treatment Plan:    Due for update/Updated:   no  Last treatment plan done 2/2/22 by ANGELA Finnegan  Treatment Plan due on 8/2/22  ANGELA Polanco 03/09/22    This note was shared with patient

## 2022-04-10 DIAGNOSIS — Z30.011 ENCOUNTER FOR INITIAL PRESCRIPTION OF CONTRACEPTIVE PILLS: ICD-10-CM

## 2022-04-11 RX ORDER — NORGESTIMATE AND ETHINYL ESTRADIOL 0.25-0.035
1 KIT ORAL DAILY
Qty: 28 TABLET | Refills: 0 | Status: SHIPPED | OUTPATIENT
Start: 2022-04-11 | End: 2022-05-16 | Stop reason: SDUPTHER

## 2022-04-14 DIAGNOSIS — F31.81 BIPOLAR II DISORDER (HCC): ICD-10-CM

## 2022-04-14 RX ORDER — GABAPENTIN 100 MG/1
100 CAPSULE ORAL 2 TIMES DAILY
Qty: 60 CAPSULE | Refills: 2 | OUTPATIENT
Start: 2022-04-14

## 2022-04-14 RX ORDER — OLANZAPINE 2.5 MG/1
2.5 TABLET ORAL
Qty: 30 TABLET | Refills: 1 | OUTPATIENT
Start: 2022-04-14

## 2022-04-27 ENCOUNTER — TELEMEDICINE (OUTPATIENT)
Dept: PSYCHIATRY | Facility: CLINIC | Age: 22
End: 2022-04-27
Payer: COMMERCIAL

## 2022-04-27 DIAGNOSIS — F41.9 ANXIETY AND DEPRESSION: ICD-10-CM

## 2022-04-27 DIAGNOSIS — F31.81 BIPOLAR II DISORDER (HCC): Primary | ICD-10-CM

## 2022-04-27 DIAGNOSIS — F32.A ANXIETY AND DEPRESSION: ICD-10-CM

## 2022-04-27 PROCEDURE — 99213 OFFICE O/P EST LOW 20 MIN: CPT | Performed by: NURSE PRACTITIONER

## 2022-04-27 RX ORDER — BUPROPION HYDROCHLORIDE 300 MG/1
300 TABLET ORAL DAILY
Qty: 90 TABLET | Refills: 0 | Status: SHIPPED | OUTPATIENT
Start: 2022-04-27 | End: 2022-07-20 | Stop reason: SDUPTHER

## 2022-05-02 ENCOUNTER — TELEMEDICINE (OUTPATIENT)
Dept: FAMILY MEDICINE CLINIC | Facility: CLINIC | Age: 22
End: 2022-05-02
Payer: COMMERCIAL

## 2022-05-02 ENCOUNTER — TELEPHONE (OUTPATIENT)
Dept: FAMILY MEDICINE CLINIC | Facility: CLINIC | Age: 22
End: 2022-05-02

## 2022-05-02 DIAGNOSIS — R21 FACIAL RASH: Primary | ICD-10-CM

## 2022-05-02 PROCEDURE — 99213 OFFICE O/P EST LOW 20 MIN: CPT | Performed by: NURSE PRACTITIONER

## 2022-05-02 RX ORDER — CLINDAMYCIN AND BENZOYL PEROXIDE 10; 50 MG/G; MG/G
GEL TOPICAL 2 TIMES DAILY
Qty: 25 G | Refills: 0 | Status: SHIPPED | OUTPATIENT
Start: 2022-05-02 | End: 2022-05-03

## 2022-05-02 NOTE — TELEPHONE ENCOUNTER
----- Message from Drea Pelletier, Linn Jag Castillo sent at 5/2/2022  7:52 AM EDT -----  Regarding: FW: Online Appointment - Skin  Please schedule-- ok for virtual since she is not here  ----- Message -----  From: Gennaro Alvarenga MA  Sent: 5/2/2022   7:43 AM EDT  To: ANGELA Horn  Subject: FW: Online Appointment - Skin                      ----- Message -----  From: Aleksandar Escobar  Sent: 4/30/2022   7:47 AM EDT  To: , #  Subject: Online Appointment - Skin                        Jim Stephenson, is it possible for me to set up a zoom appointment with you? I have been having skin issues and I'm getting these random red itchy bumps on my skin and I don't know what they're from  I'm not home from school until May 15th and I don't think I can wait that long      Tyree Alfred

## 2022-05-02 NOTE — PROGRESS NOTES
Virtual Regular Visit    Verification of patient location:    Patient is located in the following state in which I hold an active license [place]      Assessment/Plan:    Problem List Items Addressed This Visit     None      Visit Diagnoses     Facial rash    -  Primary    Advised to use cerave or cetaphil otc, not to apply make up until better  Use benzaclin to face at night  Relevant Medications    clindamycin-benzoyl peroxide (BENZACLIN) gel               Reason for visit is   Chief Complaint   Patient presents with    Virtual Regular Visit        Encounter provider ANGELA Alba    Provider located at [facility] [address] [place]  [address] [place] [address] [phone]      Recent Visits  No visits were found meeting these conditions  Showing recent visits within past 7 days and meeting all other requirements  Today's Visits  Date Type Provider Dept   05/02/22 Telemedicine Dolores Raza [facility], [address] [place] [address]   05/02/22 Telephone [address] today's visits and meeting all other requirements  Future Appointments  No visits were found meeting these conditions  Showing future appointments within next 150 days and meeting all other requirements       The patient was identified by name and date of birth  Americo Josette was informed that this is a telemedicine visit and that the visit is being conducted through [facility] [employer] and patient was informed this is a secure, HIPAA-complaint platform  She agrees to proceed     My office door was closed  No one else was in the room  She acknowledged consent and understanding of privacy and security of the video platform  The patient has agreed to participate and understands they can discontinue the visit at any time  Patient is aware this is a billable service       Subjective  Americo Josette is a 24 y o  female presenting via virtual visit for concerns of rash  This started a few months ago on her face  She feels the rash worsened  Denies any new hygiene products  She did see an Urgent Care and was told she has ringworm  The rash is itchy, but has not spread  [place] ELI     Past Medical History:   Diagnosis Date    Anxiety     Concussion     Depression     Head injury     concussions in high school and in college    Self-injurious behavior        Past Surgical History:   Procedure Laterality Date    WISDOM TOOTH EXTRACTION         Current Outpatient Medications   Medication Sig Dispense Refill    buPROPion (WELLBUTRIN XL) 300 mg 24 hr tablet Take 1 tablet (300 mg total) by mouth daily 90 tablet 0    clindamycin-benzoyl peroxide (BENZACLIN) gel Apply topically 2 (two) times a day 25 g 0    gabapentin (Neurontin) 100 mg capsule Take 1 capsule (100 mg total) by mouth 2 (two) times a day 60 capsule 2    hydrOXYzine pamoate (VISTARIL) 50 mg capsule Take 1 capsule (50 mg total) by mouth 3 (three) times a day as needed for anxiety 30 capsule 0    metoclopramide (Reglan) 10 mg tablet Take 1 tablet (10 mg total) by mouth 3 (three) times a day as needed (headache) 15 tablet 0    norgestimate-ethinyl estradiol (Alisha) 0 25-35 MG-MCG per tablet Take 1 tablet by mouth daily 28 tablet 0    traZODone (DESYREL) 50 mg tablet Take 1 tablet (50 mg total) by mouth daily at bedtime 30 tablet 0     No current facility-administered medications for this visit  No Known Allergies    Review of Systems   Constitutional: Negative for chills, diaphoresis and fever  Respiratory: Negative  Cardiovascular: Negative  Skin: Positive for rash  Allergic/Immunologic: Negative  Video Exam    There were no vitals filed for this visit  Physical Exam  Constitutional:       Appearance: Normal appearance  Pulmonary:      Effort: Pulmonary effort is normal    Skin:     Findings: Rash present  Rash is urticarial (face)     Neurological: Mental Status: She is alert and oriented to person, place, and time  I spent 15 minutes directly with the patient during this visit    VIRTUAL VISIT DISCLAIMER      Samantha Manuel verbally agrees to participate in [employer]  Pt is aware that Virtual Care Services could be limited without vital signs or the ability to perform a full hands-on physical exam  Janell Solorzano understands she or the provider may request at any time to terminate the video visit and request the patient to seek care or treatment in person

## 2022-05-10 ENCOUNTER — TELEPHONE (OUTPATIENT)
Dept: FAMILY MEDICINE CLINIC | Facility: CLINIC | Age: 22
End: 2022-05-10

## 2022-05-10 NOTE — TELEPHONE ENCOUNTER
Samira Alegria from [facility] called in   Letting us know the prior authorization was denied for   CLINDAMYCIN   They will be faxing over denial letter along with alternatives

## 2022-05-16 DIAGNOSIS — Z30.011 ENCOUNTER FOR INITIAL PRESCRIPTION OF CONTRACEPTIVE PILLS: ICD-10-CM

## 2022-05-16 RX ORDER — NORGESTIMATE AND ETHINYL ESTRADIOL 0.25-0.035
1 KIT ORAL DAILY
Qty: 28 TABLET | Refills: 0 | Status: SHIPPED | OUTPATIENT
Start: 2022-05-16 | End: 2022-06-06 | Stop reason: SDUPTHER

## 2022-06-06 DIAGNOSIS — Z30.011 ENCOUNTER FOR INITIAL PRESCRIPTION OF CONTRACEPTIVE PILLS: ICD-10-CM

## 2022-06-06 RX ORDER — NORGESTIMATE AND ETHINYL ESTRADIOL 0.25-0.035
1 KIT ORAL DAILY
Qty: 28 TABLET | Refills: 0 | Status: SHIPPED | OUTPATIENT
Start: 2022-06-06 | End: 2022-07-02 | Stop reason: SDUPTHER

## 2022-06-20 ENCOUNTER — TELEMEDICINE (OUTPATIENT)
Dept: PSYCHIATRY | Facility: CLINIC | Age: 22
End: 2022-06-20
Payer: COMMERCIAL

## 2022-06-20 DIAGNOSIS — F31.81 BIPOLAR II DISORDER (HCC): Primary | ICD-10-CM

## 2022-06-20 PROCEDURE — 99213 OFFICE O/P EST LOW 20 MIN: CPT | Performed by: NURSE PRACTITIONER

## 2022-06-20 NOTE — PSYCH
Virtual Regular Visit    Verification of patient location:    Patient is located in the following state in which I hold an active license [place]    Problem List Items Addressed This Visit    None            Encounter provider Linn Hood    Provider located at   [address] [address] [phone]    Recent Visits  No visits were found meeting these conditions  Showing recent visits within past 7 days and meeting all other requirements  Today's Visits  Date Type Provider Dept   06/20/22 Telemedicine Daiana Hood today's visits and meeting all other requirements  Future Appointments  No visits were found meeting these conditions  Showing future appointments within next 150 days and meeting all other requirements         The patient was identified by name and date of birth  Amy Grimes was informed that this is a telemedicine visit and that the visit is being conducted throughEpic Embedded and patient was informed this is a secure, HIPAA-complaint platform  She agrees to proceed     My office door was closed  The patient was notified the following individuals were present in the room Alize Arenas, [address]  She acknowledged consent and understanding of privacy and security of the video platform  The patient has agreed to participate and understands they can discontinue the visit at any time  Patient is aware this is a billable service       HPI     Current Outpatient Medications   Medication Sig Dispense Refill    Adapalene-Benzoyl Peroxide 0 1-2 5 % gel Apply 1 application topically daily at bedtime 45 g 0    buPROPion (WELLBUTRIN XL) 300 mg 24 hr tablet Take 1 tablet (300 mg total) by mouth daily 90 tablet 0    clindamycin (CLINDAGEL) 1 % gel Apply topically 2 (two) times a day 30 g 0    hydrOXYzine pamoate (VISTARIL) 50 mg capsule Take 1 capsule (50 mg total) by mouth 3 (three) times a day as needed for anxiety 30 capsule 0    metoclopramide (Reglan) 10 mg tablet Take 1 tablet (10 mg total) by mouth 3 (three) times a day as needed (headache) 15 tablet 0    norgestimate-ethinyl estradiol (Alisha) 0 25-35 MG-MCG per tablet Take 1 tablet by mouth daily 28 tablet 0    traZODone (DESYREL) 50 mg tablet Take 1 tablet (50 mg total) by mouth daily at bedtime 30 tablet 0     No current facility-administered medications for this visit  Review of Systems  Video Exam    There were no vitals filed for this visit  Physical Exam   As a result of this visit, I have referred the patient for further respiratory evaluation  No    I spent 10 minutes directly with the patient during this visit  VIRTUAL VISIT DISCLAIMER    Sophia Berry acknowledges that she has consented to an online visit or consultation  She understands that the online visit is based solely on information provided by her, and that, in the absence of a face-to-face physical evaluation by the physician, the diagnosis she receives is both limited and provisional in terms of accuracy and completeness  This is not intended to replace a full medical face-to-face evaluation by the physician  Sophia Berry understands and accepts these terms  MEDICATION MANAGEMENT NOTE        [address]    Name and Date of Birth:  Sophia Berry 24 y o  2000 MRN: [id number]    Date of Visit: June 20, 2022    No Known Allergies  SUBJECTIVE:    Caterina Hughes is seen today for a follow up for Bipolar Disorder type II  She continues to decompensate gradually since the last visit  Caterina Hughes is a 19-year-old female with a psychiatric history significant for bipolar disorder, type II, who was seen virtually for a medication management follow-up appointment  She was last seen on 04/27/2022 and has been doing relatively well  She reports that not much has been going on lately   She graduated from college and still plans to go back to school in the fall at this time  She reports that she has just been working and sleeping most days  She goes out with work friends once every 2 weeks, which is decreased for her  She sometimes makes plans and cancels them last minute  She is unsure if it is due to feeling lazy or her depression  As far as her depression, she states, I havent thought of anything to make myself depressed and rates it as a 5/10  She rates her anxiety as a 5/10 and feels that she hasnt had much anxiety either  She has upcoming plans to go to [place] with family and go to [place] with friends in August  She denies self-injurious behaviors  She ran out of her gabapentin and forgot to refill it  She states she has been gaining weight from the gabapentin and denies a need to take it right now  We will discontinue it at this time  Sleep has been good, and she has not needed the PRN trazodone  She denies needing the PRN Vistaril recently as well, but has it in case she does need it  Her appetite has been good, and she has been eating healthier  We talked about following up next month to determine if her depression worsens and she was agreeable  Patient agreeable and understanding to the current plan of care  She was encouraged to contact the office sooner if needed  She denies current suicidal thoughts, homicidal thoughts, auditory hallucinations, visual hallucinations, delusions, feelings of saul, and thoughts to self-harm  She denies any side effects from current psychiatric medications      PLAN:  Continue Wellbutrin XL 300mg PO daily  Hydroxyzine 50 mg PO TID PRN for anxiety   Trazodone 50 mg PO HS PRN for sleep   Follow up with this provider in 4 weeks  She will call sooner with concerns or issues if they arise prior to scheduled appointment    Aware of 24 hour and weekend coverage for urgent situations accessed by calling [facility] main practice number  Medication management every 4 weeks  Aware of need to follow up with family physician for medical issues    Diagnoses and all orders for this visit:    Bipolar II disorder ([facility] [address] )      Current Outpatient Medications on File Prior to Visit   Medication Sig Dispense Refill    Adapalene-Benzoyl Peroxide 0 1-2 5 % gel Apply 1 application topically daily at bedtime 45 g 0    buPROPion (WELLBUTRIN XL) 300 mg 24 hr tablet Take 1 tablet (300 mg total) by mouth daily 90 tablet 0    clindamycin (CLINDAGEL) 1 % gel Apply topically 2 (two) times a day 30 g 0    hydrOXYzine pamoate (VISTARIL) 50 mg capsule Take 1 capsule (50 mg total) by mouth 3 (three) times a day as needed for anxiety 30 capsule 0    metoclopramide (Reglan) 10 mg tablet Take 1 tablet (10 mg total) by mouth 3 (three) times a day as needed (headache) 15 tablet 0    norgestimate-ethinyl estradiol (Alisha) 0 25-35 MG-MCG per tablet Take 1 tablet by mouth daily 28 tablet 0    traZODone (DESYREL) 50 mg tablet Take 1 tablet (50 mg total) by mouth daily at bedtime 30 tablet 0    [DISCONTINUED] gabapentin (Neurontin) 100 mg capsule Take 1 capsule (100 mg total) by mouth 2 (two) times a day 60 capsule 2     No current facility-administered medications on file prior to visit  Psychotherapy Provided:     Individual psychotherapy provided: Yes  Counseling was provided during the session today for 16 minutes  Supportive counseling provided  Medication changes discussed with Kg Alexander  Medication education provided to Kg Alexander  Coping strategies reviewed with Kg Alexander  Importance of medication and treatment compliance reviewed with Kg Alexander  Importance of follow up with family physician for medical issues reviewed with Kg Alexander  Reassurance and supportive therapy provided  Crisis/safety plan discussed with Kg Alexander  Will utilize crisis as needed  HPI ROS Appetite Changes and Sleep:     She reports normal sleep, normal appetite, decreased energy   Denies homicidal ideation, denies suicidal ideation    Review Of Systems:  HPI ROS:               Medication Side Effects:  denies     Depression (10 worst): 5/10 (Was low)   Anxiety (10 worst): 5/10 (Was low)   Safety concerns (SI, HI, etc): denies (Was denies)   Sleep: good (Was good)   Energy: decreased (Was good)   Appetite: good (Was good)     General decreased functioning   Personality no change in personality   Constitutional negative   ENT negative   Cardiovascular negative   Respiratory negative   Gastrointestinal negative   Genitourinary negative   Musculoskeletal negative   Integumentary negative   Neurological negative   Endocrine negative   Other Symptoms none, all other systems are negative     Mental Status Evaluation:    Appearance Adequate hygiene and grooming and Good eye contact   Behavior calm and cooperative   Mood anxious and depressed  Depression Scale - 5 of 10 (0 = No depression)  Anxiety Scale - 5 of 10 (0 = No anxiety)   Speech Normal rate and volume   Affect mood-congruent   Thought Processes Goal directed and coherent   Thought Content Does not verbalize delusional material   Associations Tightly connected   Perceptual Disturbances Denies hallucinations and does not appear to be responding to internal stimuli   Risk Potential Suicidal/Homicidal Ideation - No evidence of suicidal or homicidal ideation and patient does not verbalize suicidal or homicidal ideation  Risk of Violence - No evidence of risk for violence found on assessment  Risk of Self Mutilation - No evidence of risk for self mutilation found on assessment   Orientation oriented to person, place, time/date and situation   Memory recent and remote memory grossly intact   Consciousness alert and awake   Attention/Concentration attention span and concentration are age appropriate   Insight fair   Judgement fair   Muscle Strength and Gait normal muscle strength and normal muscle tone, normal gait/station and normal balance   Motor Activity no abnormal movements   Language no difficulty naming common objects, no difficulty repeating a phrase, no difficulty writing a sentence   Fund of Knowledge adequate knowledge of current events  adequate fund of knowledge regarding past history  adequate fund of knowledge regarding vocabulary      Past Psychiatric History  Previous diagnoses include anxiety and depression   Prior outpatient psychiatric treatment: Therapy as a child  [facility] therapy: as a child, unable to remember the place or the person  [facility] inpatient psychiatric treatment: Yes, [facility] in March 2021  [facility] suicide attempts: denies   Prior self harm: yes, cutting in March 2021  [facility] violence or aggression: denies       Past Psychiatric History Update:     Inpatient Psychiatric Admission Since Last Encounter:   no  Changes to Outpatient Psychiatric Treatment Team:    no  Suicide Attempt Or Self Mutilation Since Last Encounter:   no  Incidence of Violent Behavior Since Last Encounter:   no    Traumatic History Update:     New Onset of Abuse Since Last Encounter:   no  Traumatic Events Since Last Encounter:   no    Past Medical History:    Past Medical History:   Diagnosis Date    Anxiety     Concussion     Depression     Head injury     concussions in high school and in college    Self-injurious behavior         Past Surgical History:   Procedure Laterality Date    WISDOM TOOTH EXTRACTION       No Known Allergies  Substance Abuse History:    Social History     Substance and Sexual Activity   Alcohol Use Yes    Alcohol/week: 3 0 - 4 0 standard drinks    Types: 3 - 4 Shots of liquor per week    Comment: 3-4 drinks, 2 times per week     Social History     Substance and Sexual Activity   Drug Use No     Social History:    Social History     Socioeconomic History    Marital status: Single     Spouse name: Not on file    Number of children: 0    Years of education: senior in college currently   IntY level: High school graduate   Occupational History    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   Tobacco Use    Smoking status: Never Smoker    Smokeless tobacco: Never Used   Vaping Use    Vaping Use: Never used   Substance and Sexual Activity    Alcohol use: Yes     Alcohol/week: 3 0 - 4 0 standard drinks     Types: 3 - 4 Shots of liquor per week     Comment: 3-4 drinks, 2 times per week    Drug use: No    Sexual activity: Yes     Partners: Male     Birth control/protection: Condom Male   Other Topics Concern    Not on file   Social History Narrative    Not on file     Social Determinants of Health     Financial Resource Strain: Not on file   Food Insecurity: Not on file   Transportation Needs: Not on file   Physical Activity: Not on file   Stress: Not on file   Social Connections: Not on file   Intimate Partner Violence: Not on file   Housing Stability: Not on file     Family Psychiatric History:     Family History   Problem Relation Age of Onset    Hypertension Mother     Mental illness Mother     Depression Mother     Mental illness Sister     Coronary artery disease Maternal Grandmother     Throat cancer Paternal Grandfather      History Review: The following portions of the patient's history were reviewed and updated as appropriate: allergies, current medications, past family history, past medical history, past social history, past surgical history and problem list     OBJECTIVE:     Vital signs in last 24 hours: There were no vitals filed for this visit  Laboratory Results:   Recent Labs (last 2 months):   No visits with results within 2 Month(s) from this visit  Latest known visit with results is:   Office Visit on 06/30/2021   Component Date Value    URINE HCG 06/30/2021      N gonorrhoeae, DNA Probe 06/30/2021 Negative     Chlamydia trachomatis, D* 06/30/2021 Positive (A)     I have personally reviewed all pertinent laboratory/tests results      Suicide/Homicide Risk Assessment:    Risk of Harm to Self:  The following ratings are based on assessment at the time of the interview  Recent Specific Risk Factors include: current depressive symptoms, current anxiety symptoms, social isolation  Demographic risk factors include: , age: young adult (15-24)  Historical Risk Factors include: chronic depression, chronic anxiety symptoms, history of self-abusive behavior, history of impulsive behaviors, history of traumatic experiences  Protective Factors: no current suicidal ideation, access to mental health treatment, compliant with mental health treatment, having a desire to be alive, stable living environment, stable job, sense of determination, sense of importance of health and wellness, supportive family, supportive friends  Based on today's assessment, Jacque Rolon presents the following risk of harm to self: low    Risk of Harm to Others: The following ratings are based on assessment at the time of the interview  Protective Factors: no current homicidal ideation  Based on today's assessment, Jacque Rolon presents the following risk of harm to others: none    The following interventions are recommended: no intervention changes needed    Medications Risks/Benefits:      Risks, Benefits And Possible Side Effects Of Medications:    Discussed risks and benefits of treatment with patient including risk of suicidality, serotonin syndrome, increased QTc interval and SIADH related to treatment with antidepressants; Risk of induction of manic symptoms in certain patient populations     Controlled Medication Discussion:     Not applicable    Treatment Plan:    Due for update/Updated:   no  Last treatment plan done 2/2/22 by ANGELA Giang  Treatment Plan due on 8/2/22  ANGELA Dooley 06/20/22    This note was shared with patient

## 2022-07-02 DIAGNOSIS — Z30.011 ENCOUNTER FOR INITIAL PRESCRIPTION OF CONTRACEPTIVE PILLS: ICD-10-CM

## 2022-07-05 RX ORDER — NORGESTIMATE AND ETHINYL ESTRADIOL 0.25-0.035
1 KIT ORAL DAILY
Qty: 28 TABLET | Refills: 0 | Status: SHIPPED | OUTPATIENT
Start: 2022-07-05 | End: 2022-07-11 | Stop reason: SDUPTHER

## 2022-07-11 DIAGNOSIS — Z30.011 ENCOUNTER FOR INITIAL PRESCRIPTION OF CONTRACEPTIVE PILLS: ICD-10-CM

## 2022-07-11 RX ORDER — NORGESTIMATE AND ETHINYL ESTRADIOL 0.25-0.035
1 KIT ORAL DAILY
Qty: 28 TABLET | Refills: 5 | Status: SHIPPED | OUTPATIENT
Start: 2022-07-11 | End: 2022-07-11 | Stop reason: SDUPTHER

## 2022-07-11 RX ORDER — NORGESTIMATE AND ETHINYL ESTRADIOL 0.25-0.035
1 KIT ORAL DAILY
Qty: 28 TABLET | Refills: 5 | Status: SHIPPED | OUTPATIENT
Start: 2022-07-11 | End: 2022-07-29 | Stop reason: SDUPTHER

## 2022-07-20 ENCOUNTER — TELEMEDICINE (OUTPATIENT)
Dept: PSYCHIATRY | Facility: CLINIC | Age: 22
End: 2022-07-20
Payer: COMMERCIAL

## 2022-07-20 DIAGNOSIS — F32.A ANXIETY AND DEPRESSION: ICD-10-CM

## 2022-07-20 DIAGNOSIS — F41.9 ANXIETY AND DEPRESSION: ICD-10-CM

## 2022-07-20 DIAGNOSIS — F31.81 BIPOLAR II DISORDER (HCC): Primary | ICD-10-CM

## 2022-07-20 PROCEDURE — 99213 OFFICE O/P EST LOW 20 MIN: CPT | Performed by: NURSE PRACTITIONER

## 2022-07-20 RX ORDER — OLANZAPINE 2.5 MG/1
2.5 TABLET ORAL
Qty: 30 TABLET | Refills: 0 | Status: SHIPPED | OUTPATIENT
Start: 2022-07-20 | End: 2022-08-19 | Stop reason: SDUPTHER

## 2022-07-20 RX ORDER — BUPROPION HYDROCHLORIDE 300 MG/1
300 TABLET ORAL DAILY
Qty: 90 TABLET | Refills: 0 | Status: SHIPPED | OUTPATIENT
Start: 2022-07-20 | End: 2022-10-17 | Stop reason: DRUGHIGH

## 2022-07-20 NOTE — PSYCH
Virtual Regular Visit    Verification of patient location:    Patient is located in the following state in which I hold an active license [place]    Problem List Items Addressed This Visit        Other    Anxiety and depression    Relevant Medications    OLANZapine (ZyPREXA) 2 5 mg tablet    buPROPion (WELLBUTRIN XL) 300 mg 24 hr tablet    Bipolar II disorder (HCC) - Primary    Relevant Medications    OLANZapine (ZyPREXA) 2 5 mg tablet    buPROPion (WELLBUTRIN XL) 300 mg 24 hr tablet             Encounter provider ANGELA Barragan    Provider located at    [address] [place] [address] [phone]    Recent Visits  No visits were found meeting these conditions  Showing recent visits within past 7 days and meeting all other requirements  Today's Visits  Date Type Provider Dept   07/20/22 Telemedicine Ingrid Whyte, [facility] today's visits and meeting all other requirements  Future Appointments  No visits were found meeting these conditions  Showing future appointments within next 150 days and meeting all other requirements         The patient was identified by name and date of birth  Greg Peters was informed that this is a telemedicine visit and that the visit is being conducted throughic Embedded and patient was informed this is a secure, HIPAA-complaint platform  She agrees to proceed     My office door was closed  No one else was in the room  She acknowledged consent and understanding of privacy and security of the video platform  The patient has agreed to participate and understands they can discontinue the visit at any time  Patient is aware this is a billable service       HPI     Current Outpatient Medications   Medication Sig Dispense Refill    buPROPion (WELLBUTRIN XL) 300 mg 24 hr tablet Take 1 tablet (300 mg total) by mouth daily 90 tablet 0    OLANZapine (ZyPREXA) 2 5 mg tablet Take 1 tablet (2 5 mg total) by mouth daily at bedtime 30 tablet 0    Adapalene-Benzoyl Peroxide 0 1-2 5 % gel Apply 1 application topically daily at bedtime 45 g 0    clindamycin (CLINDAGEL) 1 % gel Apply topically 2 (two) times a day 30 g 0    hydrOXYzine pamoate (VISTARIL) 50 mg capsule Take 1 capsule (50 mg total) by mouth 3 (three) times a day as needed for anxiety 30 capsule 0    metoclopramide (Reglan) 10 mg tablet Take 1 tablet (10 mg total) by mouth 3 (three) times a day as needed (headache) 15 tablet 0    norgestimate-ethinyl estradiol (Alisha) 0 25-35 MG-MCG per tablet Take 1 tablet by mouth daily 28 tablet 5    traZODone (DESYREL) 50 mg tablet Take 1 tablet (50 mg total) by mouth daily at bedtime 30 tablet 0     No current facility-administered medications for this visit  Review of Systems  Video Exam    There were no vitals filed for this visit  Physical Exam   As a result of this visit, I have referred the patient for further respiratory evaluation  No    I spent 20 minutes directly with the patient during this visit  VIRTUAL VISIT DISCLAIMER    Luis Marquis acknowledges that she has consented to an online visit or consultation  She understands that the online visit is based solely on information provided by her, and that, in the absence of a face-to-face physical evaluation by the physician, the diagnosis she receives is both limited and provisional in terms of accuracy and completeness  This is not intended to replace a full medical face-to-face evaluation by the physician  Reyesshira Benitez understands and accepts these terms  MEDICATION MANAGEMENT NOTE        [facility]    Name and Date of Birth:  Luis Marquis 24 y o  2000 MRN: [id number]    Date of Visit: July 20, 2022    No Known Allergies  SUBJECTIVE:    Oskar Rober is seen today for a follow up for Bipolar Disorder type II   She continues to decompensate slowly since the last visit  Rock Holland is a 24year old female with a history of Bipolar II disorder  She is seen virtually today for medication management follow up  She was last seen by this provider on 6/20/22  She reports that she has been isolating herself in cancelling plans with friends  She states that she would rather be alone  She continues to work at [employer] and states that she only goes out probably once per week  She has symptoms of anhedonia, sleeps approximately 8-9 hours per night, and only eats when she is at work  Otherwise she spends most of her time in bed at home  She rates her depression a 5/10, and states that her anxiety is more frequent  She rates her anxiety a 5/10  Symptoms are rather anxiety include her chest hurting, the inability to think straight, and fogginess  She states that it will last approximately 20 minutes per day and it will go away if she waits long enough  She states that in the fall she plans to go back to school for grad school and she is looking forward to moving back to campus  She denies any thoughts of cutting has not had any urges, and has no SI or HI  She has no auditory or visual hallucinations  She is not endorsing symptoms of saul or psychosis at this time  She states that she has not needed to take any trazodone because she sleeps fine without it at this time  She has been taking Vistaril, but it makes her sleepy  She agrees that she felt better when she was taking the Zyprexa, which will restart at this time  We will follow up on 8/17  She denies any side effects from current psychiatric medications  PLAN:  Continue medications as ordered  Wellbutrin  mg p o  daily  Trazodone 50 mg p o  HS PRN  Hydroxyzine 50 mg p o  t i d  p r n  Re-initiate Zyprexa 2 5 mg p o   HS PRN  Continue to encourage therapy  Follow up in one month  She will call sooner with concerns or issues if they arise prior to scheduled appointment    Aware of 24 hour and weekend coverage for urgent situations accessed by calling [facility] main practice number  Referral for individual psychotherapy  Medication management every 4 weeks  Aware of need to follow up with family physician for medical issues    Diagnoses and all orders for this visit:    Bipolar II disorder ([address] )  -     OLANZapine (ZyPREXA) 2 5 mg tablet; Take 1 tablet (2 5 mg total) by mouth daily at bedtime    Anxiety and depression  -     buPROPion (WELLBUTRIN XL) 300 mg 24 hr tablet; Take 1 tablet (300 mg total) by mouth daily        Current Outpatient Medications on File Prior to Visit   Medication Sig Dispense Refill    Adapalene-Benzoyl Peroxide 0 1-2 5 % gel Apply 1 application topically daily at bedtime 45 g 0    clindamycin (CLINDAGEL) 1 % gel Apply topically 2 (two) times a day 30 g 0    hydrOXYzine pamoate (VISTARIL) 50 mg capsule Take 1 capsule (50 mg total) by mouth 3 (three) times a day as needed for anxiety 30 capsule 0    metoclopramide (Reglan) 10 mg tablet Take 1 tablet (10 mg total) by mouth 3 (three) times a day as needed (headache) 15 tablet 0    norgestimate-ethinyl estradiol (Alisha) 0 25-35 MG-MCG per tablet Take 1 tablet by mouth daily 28 tablet 5    traZODone (DESYREL) 50 mg tablet Take 1 tablet (50 mg total) by mouth daily at bedtime 30 tablet 0    [DISCONTINUED] buPROPion (WELLBUTRIN XL) 300 mg 24 hr tablet Take 1 tablet (300 mg total) by mouth daily 90 tablet 0     No current facility-administered medications on file prior to visit  Psychotherapy Provided:     Individual psychotherapy provided: Yes  Counseling was provided during the session today for 16 minutes  Supportive counseling provided  Medication changes discussed with Yareli Santos  Medication education provided to Yareli Santos  Coping strategies reviewed with Yareli Santos  Importance of medication and treatment compliance reviewed with Yareli Santos    Importance of follow up with family physician for medical issues reviewed with Griffin Green  Reassurance and supportive therapy provided  Crisis/safety plan discussed with Griffin Green  She will use crisis as needed    HPI ROS Appetite Changes and Sleep:     She reports adequate number of sleep hours (8-9 hours), decreased appetite, low energy   Denies homicidal ideation, denies suicidal ideation    Review Of Systems:  HPI ROS:               Medication Side Effects:  denies     Depression (10 worst): 5/10 (Was 5/10)   Anxiety (10 worst): 5/10 (Was 5/10)   Safety concerns (SI, HI, etc): Denies (Was denies)   Sleep: 8-9 hours per night (Was good)   Energy: Low (Was decreased)   Appetite: Decreased (Was good)     General appetite disturbances   Personality no change in personality   Constitutional negative   ENT negative   Cardiovascular negative   Respiratory negative   Gastrointestinal negative   Genitourinary negative   Musculoskeletal negative   Integumentary negative   Neurological negative   Endocrine negative   Other Symptoms none, all other systems are negative     Mental Status Evaluation:    Appearance Adequate hygiene and grooming and Good eye contact   Behavior calm and cooperative   Mood anxious and depressed  Depression Scale - 5 of 10 (0 = No depression)  Anxiety Scale - 5 of 10 (0 = No anxiety)   Speech Normal rate and volume   Affect mood-congruent   Thought Processes Goal directed and coherent   Thought Content Does not verbalize delusional material   Associations Tightly connected   Perceptual Disturbances Denies hallucinations and does not appear to be responding to internal stimuli   Risk Potential Suicidal/Homicidal Ideation - No evidence of suicidal or homicidal ideation and patient does not verbalize suicidal or homicidal ideation  Risk of Violence - No evidence of risk for violence found on assessment  Risk of Self Mutilation - No evidence of risk for self mutilation found on assessment   Orientation oriented to person, place, time/date and situation   Memory recent and remote memory grossly intact   Consciousness alert and awake   Attention/Concentration attention span and concentration are age appropriate   Insight partial   Judgement partial   Muscle Strength and Gait normal muscle strength and normal muscle tone, normal gait/station and normal balance   Motor Activity no abnormal movements   Language no difficulty naming common objects, no difficulty repeating a phrase, no difficulty writing a sentence   Fund of Knowledge adequate knowledge of current events  adequate fund of knowledge regarding past history  adequate fund of knowledge regarding vocabulary      Past Psychiatric History  Previous diagnoses include anxiety and depression   Prior outpatient psychiatric treatment: Therapy as a child  Will English therapy: as a child, unable to remember the place or the person  Will English inpatient psychiatric treatment: Yes, Timothy Kehr in March 2021  Will English suicide attempts: denies   Prior self harm: yes, cutting in March 2021  Will English violence or aggression: denies    Past Psychiatric History Update:     Inpatient Psychiatric Admission Since Last Encounter:   no  Changes to Outpatient Psychiatric Treatment Team:    no  Suicide Attempt Or Self Mutilation Since Last Encounter:   no  Incidence of Violent Behavior Since Last Encounter:   no    Traumatic History Update:     New Onset of Abuse Since Last Encounter:   no  Traumatic Events Since Last Encounter:   no    Past Medical History:    Past Medical History:   Diagnosis Date    Anxiety     Concussion     Depression     Head injury     concussions in high school and in college    Self-injurious behavior         Past Surgical History:   Procedure Laterality Date    WISDOM TOOTH EXTRACTION       No Known Allergies  Substance Abuse History:    Social History     Substance and Sexual Activity   Alcohol Use Yes    Alcohol/week: 3 0 - 4 0 standard drinks    Types: 3 - 4 Shots of liquor per week    Comment: 3-4 drinks, 2 times per week Social History     Substance and Sexual Activity   Drug Use No     Social History:    Social History     Socioeconomic History    Marital status: Single     Spouse name: Not on file    Number of children: 0    Years of education: senior in college currently   [place] education level: High school graduate   Occupational History    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   Tobacco Use    Smoking status: Never Smoker    Smokeless tobacco: Never Used   Vaping Use    Vaping Use: Never used   Substance and Sexual Activity    Alcohol use: Yes     Alcohol/week: 3 0 - 4 0 standard drinks     Types: 3 - 4 Shots of liquor per week     Comment: 3-4 drinks, 2 times per week    Drug use: No    Sexual activity: Yes     Partners: Male     Birth control/protection: Condom Male   Other Topics Concern    Not on file   Social History Narrative    Not on file     Social Determinants of Health     Financial Resource Strain: Not on file   Food Insecurity: Not on file   Transportation Needs: Not on file   Physical Activity: Not on file   Stress: Not on file   Social Connections: Not on file   Intimate Partner Violence: Not on file   Housing Stability: Not on file     Family Psychiatric History:     Family History   Problem Relation Age of Onset    Hypertension Mother     Mental illness Mother     Depression Mother     Mental illness Sister     Coronary artery disease Maternal Grandmother     Throat cancer Paternal Grandfather      History Review: The following portions of the patient's history were reviewed and updated as appropriate: allergies, current medications, past family history, past medical history, past social history, past surgical history and problem list     OBJECTIVE:     Vital signs in last 24 hours: There were no vitals filed for this visit  Laboratory Results:   Recent Labs (last 2 months):   No visits with results within 2 Month(s) from this visit     Latest known visit with results is: Office Visit on 06/30/2021   Component Date Value    URINE HCG 06/30/2021      N gonorrhoeae, DNA Probe 06/30/2021 Negative     Chlamydia trachomatis, D* 06/30/2021 Positive (A)     I have personally reviewed all pertinent laboratory/tests results  Suicide/Homicide Risk Assessment:    Risk of Harm to Self:  The following ratings are based on assessment at the time of the interview  Recent Specific Risk Factors include: current depressive symptoms, current anxiety symptoms, social isolation  Demographic risk factors include: , age: young adult (15-24)  Historical Risk Factors include: chronic depression, chronic anxiety symptoms, chronic mood disorder, history of self-abusive behavior, alcohol use, history of impulsive behaviors, history of traumatic experiences  Protective Factors: no current suicidal ideation, access to mental health treatment, compliant with medications, compliant with mental health treatment, good self-esteem, having a sense of purpose or meaning in life, stable living environment, sense of determination, supportive family  Based on today's assessment, Hawk Cali presents the following risk of harm to self: low    Risk of Harm to Others: The following ratings are based on assessment at the time of the interview  Protective Factors: no current homicidal ideation  Based on today's assessment, Hawk Cali presents the following risk of harm to others: none    The following interventions are recommended: no intervention changes needed    Medications Risks/Benefits:      Risks, Benefits And Possible Side Effects Of Medications:    Discussed risks and benefits of treatment with patient including risk of suicidality, serotonin syndrome, increased QTc interval and SIADH related to treatment with antidepressants;  Risk of induction of manic symptoms in certain patient populations and risk of parkinsonian symptoms, metabolic syndrome, tardive dyskinesia and neuroleptic malignant syndrome related to treatment with antipsychotic medications     Controlled Medication Discussion:     Not applicable    Treatment Plan:    Due for update/Updated:   yes  Last treatment plan done 7/20/22ANGELA Wilson  Treatment Plan due on 1/20/23  ANGELA Hardy 07/20/22    This note was shared with patient

## 2022-07-22 NOTE — BH TREATMENT PLAN
TREATMENT PLAN (Medication Management Only)        [facility]    Name and Date of Birth:  Ryan Lopez 24 y o  2000  Date of Treatment Plan: July 20, 2022  Diagnosis/Diagnoses:    1  Bipolar II disorder ([address] )    2  Anxiety and depression      Strengths/Personal Resources for Self-Care: ability to communicate needs  Area/Areas of need (in own words): anxiety symptoms, depressive symptoms  1  Long Term Goal: improve depression  Target Date:6 months - 1/22/2023  Person/Persons responsible for completion of goal: Lorraine Cohen  2  Short Term Objective (s) - How will we reach this goal?:   A  Provider new recommended medication/dosage changes and/or continue medication(s): Medication changes: I am having Ryan Lopez start on OLANZapine  I am also having her maintain her traZODone, hydrOXYzine pamoate, and buPROPion   B  Exercise regularly   C  Increase socialization with peers  Target Date:6 months - 1/22/2023  Person/Persons Responsible for Completion of Goal: Lorraine Cohen  Progress Towards Goals: continuing treatment, minimal progress  Treatment Modality: medication management every 4 weeks, referral for individual psychotherapy, medication education at every visit  Review due 180 days from date of this plan: 6 months - 1/22/2023  Expected length of service: ongoing treatment  My Physician/PA/NP and I have developed this plan together and I agree to work on the goals and objectives  I understand the treatment goals that were developed for my treatment  Treatment Plan done but not signed at time of office visit due to:  Plan reviewed by phone or in person  and verbal consent given due to telehealth visit

## 2022-07-29 DIAGNOSIS — Z30.011 ENCOUNTER FOR INITIAL PRESCRIPTION OF CONTRACEPTIVE PILLS: ICD-10-CM

## 2022-07-29 RX ORDER — NORGESTIMATE AND ETHINYL ESTRADIOL 0.25-0.035
1 KIT ORAL DAILY
Qty: 28 TABLET | Refills: 0 | Status: SHIPPED | OUTPATIENT
Start: 2022-07-29 | End: 2022-08-02 | Stop reason: SDUPTHER

## 2022-08-02 DIAGNOSIS — Z30.011 ENCOUNTER FOR INITIAL PRESCRIPTION OF CONTRACEPTIVE PILLS: ICD-10-CM

## 2022-08-02 RX ORDER — NORGESTIMATE AND ETHINYL ESTRADIOL 0.25-0.035
1 KIT ORAL DAILY
Qty: 28 TABLET | Refills: 0 | Status: SHIPPED | OUTPATIENT
Start: 2022-08-02 | End: 2022-08-27 | Stop reason: SDUPTHER

## 2022-08-18 DIAGNOSIS — Z01.00 ENCOUNTER FOR VISION SCREENING: Primary | ICD-10-CM

## 2022-08-19 ENCOUNTER — TELEPHONE (OUTPATIENT)
Dept: PSYCHIATRY | Facility: CLINIC | Age: 22
End: 2022-08-19

## 2022-08-19 ENCOUNTER — TELEMEDICINE (OUTPATIENT)
Dept: PSYCHIATRY | Facility: CLINIC | Age: 22
End: 2022-08-19
Payer: COMMERCIAL

## 2022-08-19 DIAGNOSIS — F41.9 ANXIETY AND DEPRESSION: ICD-10-CM

## 2022-08-19 DIAGNOSIS — F31.81 BIPOLAR II DISORDER (HCC): ICD-10-CM

## 2022-08-19 DIAGNOSIS — F32.A ANXIETY AND DEPRESSION: ICD-10-CM

## 2022-08-19 PROCEDURE — 99214 OFFICE O/P EST MOD 30 MIN: CPT | Performed by: NURSE PRACTITIONER

## 2022-08-19 RX ORDER — HYDROXYZINE PAMOATE 50 MG/1
50 CAPSULE ORAL 3 TIMES DAILY PRN
Qty: 30 CAPSULE | Refills: 0 | Status: SHIPPED | OUTPATIENT
Start: 2022-08-19 | End: 2022-10-17 | Stop reason: SDUPTHER

## 2022-08-19 RX ORDER — OLANZAPINE 2.5 MG/1
2.5 TABLET ORAL
Qty: 30 TABLET | Refills: 0 | Status: SHIPPED | OUTPATIENT
Start: 2022-08-19 | End: 2022-09-16 | Stop reason: SDUPTHER

## 2022-08-19 NOTE — TELEPHONE ENCOUNTER
Behavorial Health Outpatient Intake Questions    Referred by: Magda Appiah   Please advised interviewee that they need to answer all questions truthfully to allow for best care and any misrepresentations of information may affect their ability to be seen at this clinic   => Was this discussed? Yes     Behavorial Health Outpatient Intake History -     Presenting Problem (in patient's words): Anxiety depression     Are there any developmental disabilities? ? If yes, can they speak to you on the phone? If they are too limited to speak to you on phone, refer out No    Are you taking any psychiatric medications? Yes    => If yes, who prescribes? If yes, are they injectable medications? Zprexa, vistaril, wellbutrin  Trazodone + Magda Appiah     Does the patient have a language barrier or hearing impairment? No    Have you been treated at [facility] by a therapist or a doctor in the past? If yes, who? Yes  Magda Appiah   Has the patient been hospitalized for mental health? Yes   If yes, how long ago was last hospitalization and where was it? March 2021    Do you actively use alcohol or marijuana or illegal substances? If yes, what and how much - refer out to Drug and alcohol treatment if use is excessive or daily use of illegal substances No concerns of substance abuse are reported  Do you have a community treatment team or ? No    Legal History-     Does the patient have any history of arrests, retirement/MCC time, or DUIs? No  If Yes-  1) What types of charges? 2) When were they last incarcerated? 3) Are they currently on parole or probation? Minor Child-    Who has custody of the child? Is there a custody agreement? If there is a custody agreement remind parent that they must bring a copy to the first appt or they will not be seen       Intake Team, please check with provider before scheduling if flags come up such as:  - complex case  - legal history (other than DUI)  - communication barrier concerns are present  - if, in your judgment, this needs further review    ACCEPTED as a patient Yes  => Appointment Date: 09/21/2022 @ 10 w/ Michela Mahajan     Referred Elsewhere? No    Name of Insurance Co; [address] [place] [address]#[id number]  Insurance Phone #  If ins is primary or secondary  Ciara Olivas  ID# [id number]  If patient is a minor, parents information such as Name, D  O B of guarantor

## 2022-08-19 NOTE — PSYCH
Virtual Regular Visit    Verification of patient location:    Patient is located in the following state in which I hold an active license [place]    Problem List Items Addressed This Visit        Other    Anxiety and depression    Relevant Medications    hydrOXYzine pamoate (VISTARIL) 50 mg capsule    OLANZapine (ZyPREXA) 2 5 mg tablet    Bipolar II disorder (HCC)    Relevant Medications    hydrOXYzine pamoate (VISTARIL) 50 mg capsule    OLANZapine (ZyPREXA) 2 5 mg tablet             Encounter provider ANGELA Fields    Provider located at    [address] [place] [address] [phone]    Recent Visits  No visits were found meeting these conditions  Showing recent visits within past 7 days and meeting all other requirements  Today's Visits  Date Type Provider Dept   08/19/22 Telemedicine Thom Thompson, [facility] today's visits and meeting all other requirements  Future Appointments  No visits were found meeting these conditions  Showing future appointments within next 150 days and meeting all other requirements         The patient was identified by name and date of birth  Lei Chaudhary was informed that this is a telemedicine visit and that [facility] and patient was informed this is a secure, HIPAA-complaint platform  She agrees to proceed     My office door was closed  No one else was in the room  She acknowledged consent and understanding of privacy and security of the video platform  The patient has agreed to participate and understands they can discontinue the visit at any time  Patient is aware this is a billable service       HPI     Current Outpatient Medications   Medication Sig Dispense Refill    hydrOXYzine pamoate (VISTARIL) 50 mg capsule Take 1 capsule (50 mg total) by mouth 3 (three) times a day as needed for anxiety 30 capsule 0    norgestimate-ethinyl estradiol (Alisha) 0 25-35 MG-MCG per tablet Take 1 tablet by mouth daily 28 tablet 0    OLANZapine (ZyPREXA) 2 5 mg tablet Take 1 tablet (2 5 mg total) by mouth daily at bedtime 30 tablet 0    Adapalene-Benzoyl Peroxide 0 1-2 5 % gel Apply 1 application topically daily at bedtime 45 g 0    buPROPion (WELLBUTRIN XL) 300 mg 24 hr tablet Take 1 tablet (300 mg total) by mouth daily 90 tablet 0    clindamycin (CLINDAGEL) 1 % gel Apply topically 2 (two) times a day 30 g 0    metoclopramide (Reglan) 10 mg tablet Take 1 tablet (10 mg total) by mouth 3 (three) times a day as needed (headache) 15 tablet 0    traZODone (DESYREL) 50 mg tablet Take 1 tablet (50 mg total) by mouth daily at bedtime 30 tablet 0     No current facility-administered medications for this visit  Review of Systems  Video Exam    There were no vitals filed for this visit  Physical Exam   As a result of this visit, I have referred the patient for further respiratory evaluation  No    I spent 25 minutes directly with the patient during this visit  VIRTUAL VISIT DISCLAIMER    Sveta Gold acknowledges that she has consented to an online visit or consultation  She understands that the online visit is based solely on information provided by her, and that, in the absence of a face-to-face physical evaluation by the physician, the diagnosis she receives is both limited and provisional in terms of accuracy and completeness  This is not intended to replace a full medical face-to-face evaluation by the physician  Sveta Gold understands and accepts these terms  MEDICATION MANAGEMENT NOTE        [facility]    Name and Date of Birth:  Sveta Gold 24 y o  2000 MRN: [id number]    Date of Visit: August 19, 2022    No Known Allergies  SUBJECTIVE:    Sabra Barclay is seen today for a follow up for Bipolar Disorder type II   She continues to experience on and off depressive symptoms since the last visit  Thuy Beard is a 68-year-old female with a history of bipolar 2 disorder  She is seen virtually today for medication management follow-up  She was last seen by this provider on 07/20/2022  Today she reports that she feels the same as she did last month"  She states that she went to [place] with her best friend, but did not want to go out  She stayed in the hotel for most of the trip  She reports that she is not drinking, and states that she has been very tired  She does sleep 6-7 hours per night  She is functioning in going to work appropriately  Her appetite fluctuates  Depression is rated 7/10, anxiety is rated 9/10  She states that she is anxious about life  She reports her symptoms are tachycardia, palpitations, and she shakes  She does states that her graduate classes start on Monday and she is moving back to [place] on 08/28 and is looking forward to moving back  She states that her mom is moving back in to the how she is currently in with her grandparents and she is not looking forward to that  She denies any suicidal ideation, denies any urges to cut at this time, denies any HI or auditory or visual hallucinations  Discussed again with Thuy Beard the need to incorporate therapy in to her treatment  She agreed and stated she called her therapist she was seeing at school, but she does not have any openings at this time  We will make a referral at this time  We also discussed the possibility that the situation she is in being at home and working all the time is causing her to have these feelings of anhedonia and depression symptoms  She is in agreement, we will wait to consider any medication changes until after she has moved back to [place], which she is looking forward to  She is in agreement with this treatment plan  She denies any side effects from current psychiatric medications      PLAN:  Continue medication as ordered:  Vistaril 50mg PO TID PRN for anxiety  Zyprexa 2 5mg PO HS  Wellbutrin XL 300mg PO daily  Trazodone 50mg HS PRN  Therapy referral made  She will follow up in one month  She will call sooner with concerns or issues if they arise prior to scheduled appointment  Aware of 24 hour and weekend coverage for urgent situations accessed by calling [facility] main practice number  Referral for individual psychotherapy  Medication management every 1 month  Aware of need to follow up with family physician for medical issues    Diagnoses and all orders for this visit:    Anxiety and depression  -     hydrOXYzine pamoate (VISTARIL) 50 mg capsule; Take 1 capsule (50 mg total) by mouth 3 (three) times a day as needed for anxiety    Bipolar II disorder (HCC)  -     OLANZapine (ZyPREXA) 2 5 mg tablet; Take 1 tablet (2 5 mg total) by mouth daily at bedtime        Current Outpatient Medications on File Prior to Visit   Medication Sig Dispense Refill    norgestimate-ethinyl estradiol (Alisha) 0 25-35 MG-MCG per tablet Take 1 tablet by mouth daily 28 tablet 0    Adapalene-Benzoyl Peroxide 0 1-2 5 % gel Apply 1 application topically daily at bedtime 45 g 0    buPROPion (WELLBUTRIN XL) 300 mg 24 hr tablet Take 1 tablet (300 mg total) by mouth daily 90 tablet 0    clindamycin (CLINDAGEL) 1 % gel Apply topically 2 (two) times a day 30 g 0    metoclopramide (Reglan) 10 mg tablet Take 1 tablet (10 mg total) by mouth 3 (three) times a day as needed (headache) 15 tablet 0    traZODone (DESYREL) 50 mg tablet Take 1 tablet (50 mg total) by mouth daily at bedtime 30 tablet 0    [DISCONTINUED] hydrOXYzine pamoate (VISTARIL) 50 mg capsule Take 1 capsule (50 mg total) by mouth 3 (three) times a day as needed for anxiety 30 capsule 0    [DISCONTINUED] OLANZapine (ZyPREXA) 2 5 mg tablet Take 1 tablet (2 5 mg total) by mouth daily at bedtime 30 tablet 0     No current facility-administered medications on file prior to visit         Psychotherapy Provided: Individual psychotherapy provided: Yes  Counseling was provided during the session today for 16 minutes  Supportive counseling provided  Medication changes discussed with Nell Gosselin  Medication education provided to Nell Gosselin  Coping strategies reviewed with Nell Gosselin  Importance of medication and treatment compliance reviewed with Nell Gosselin  Importance of follow up with family physician for medical issues reviewed with Nell Gosselin  Reassurance and supportive therapy provided  Crisis/safety plan discussed with Nell Gosselin  Will utilize crisis as needed  HPI ROS Appetite Changes and Sleep:     She reports adequate number of sleep hours (6-7 hours), normal appetite, normal energy level   Denies homicidal ideation, denies suicidal ideation    Review Of Systems:    HPI ROS:               Medication Side Effects:  denies     Depression (10 worst): 7/10 (Was 5/10)   Anxiety (10 worst): 9/10 (Was 5/10)   Safety concerns (SI, HI, etc): denies (Was denies)   Sleep: 6-7 hours/night (Was 8-9 hour/night)   Energy: fair (Was low)   Appetite: fluctuates (Was decreased)     General decreased functioning   Personality no change in personality   Constitutional as noted in HPI   ENT negative   Cardiovascular negative   Respiratory negative   Gastrointestinal negative   Genitourinary negative   Musculoskeletal negative   Integumentary negative   Neurological negative   Endocrine negative   Other Symptoms none, all other systems are negative     Mental Status Evaluation:    Appearance Adequate hygiene and grooming and Good eye contact   Behavior calm and cooperative   Mood anxious and depressed  Depression Scale - 7 of 10 (0 = No depression)  Anxiety Scale - 9 of 10 (0 = No anxiety)   Speech Normal rate and volume   Affect appropriate and mood-congruent   Thought Processes Goal directed and coherent   Thought Content Does not verbalize delusional material   Associations Tightly connected   Perceptual Disturbances Denies hallucinations and does not appear to be responding to internal stimuli   Risk Potential Suicidal/Homicidal Ideation - No evidence of suicidal or homicidal ideation and patient does not verbalize suicidal or homicidal ideation  Risk of Violence - No evidence of risk for violence found on assessment  Risk of Self Mutilation - No evidence of risk for self mutilation found on assessment   Orientation oriented to person, place, time/date and situation   Memory recent and remote memory grossly intact   Consciousness alert and awake   Attention/Concentration attention span and concentration are age appropriate   Insight partial   Judgement partial   Muscle Strength and Gait normal muscle strength and normal muscle tone, normal gait/station and normal balance   Motor Activity no abnormal movements   Language no difficulty naming common objects, no difficulty repeating a phrase, no difficulty writing a sentence   Fund of Knowledge adequate knowledge of current events  adequate fund of knowledge regarding past history  adequate fund of knowledge regarding vocabulary      Past Psychiatric History  Previous diagnoses include anxiety and depression   Prior outpatient psychiatric treatment: Therapy as a child  Alpheus Born therapy: as a child, unable to remember the place or the person   Prior inpatient psychiatric treatment: Yes, [facility] in March 2021  Alpheus Born suicide attempts: denies   Prior self harm: yes, cutting in March 2021  Alpheus Born violence or aggression: denies    Past Psychiatric History Update:     Inpatient Psychiatric Admission Since Last Encounter:   no  Changes to Outpatient Psychiatric Treatment Team:    no  Suicide Attempt Or Self Mutilation Since Last Encounter:   no  Incidence of Violent Behavior Since Last Encounter:   no    Traumatic History Update:     New Onset of Abuse Since Last Encounter:   no  Traumatic Events Since Last Encounter:   no    Past Medical History:    Past Medical History:   Diagnosis Date    Anxiety  Concussion     Depression     Head injury     concussions in high school and in college    Self-injurious behavior         Past Surgical History:   Procedure Laterality Date    WISDOM TOOTH EXTRACTION       No Known Allergies  Substance Abuse History:    Social History     Substance and Sexual Activity   Alcohol Use Yes    Alcohol/week: 3 0 - 4 0 standard drinks    Types: 3 - 4 Shots of liquor per week    Comment: 3-4 drinks, 2 times per week     Social History     Substance and Sexual Activity   Drug Use No     Social History:    Social History     Socioeconomic History    Marital status: Single     Spouse name: Not on file    Number of children: 0    Years of education: senior in college currently   [employer] education level: High school graduate   Occupational History    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   Tobacco Use    Smoking status: Never Smoker    Smokeless tobacco: Never Used   Vaping Use    Vaping Use: Never used   Substance and Sexual Activity    Alcohol use: Yes     Alcohol/week: 3 0 - 4 0 standard drinks     Types: 3 - 4 Shots of liquor per week     Comment: 3-4 drinks, 2 times per week    Drug use: No    Sexual activity: Yes     Partners: Male     Birth control/protection: Condom Male   Other Topics Concern    Not on file   Social History Narrative    Not on file     Social Determinants of Health     Financial Resource Strain: Not on file   Food Insecurity: Not on file   Transportation Needs: Not on file   Physical Activity: Not on file   Stress: Not on file   Social Connections: Not on file   Intimate Partner Violence: Not on file   Housing Stability: Not on file     Family Psychiatric History:     Family History   Problem Relation Age of Onset    Hypertension Mother     Mental illness Mother     Depression Mother     Mental illness Sister     Coronary artery disease Maternal Grandmother     Throat cancer Paternal Grandfather      History Review: The following portions of the patient's history were reviewed and updated as appropriate: allergies, current medications, past family history, past medical history, past social history, past surgical history and problem list     OBJECTIVE:     Vital signs in last 24 hours: There were no vitals filed for this visit  Laboratory Results: I have personally reviewed all pertinent laboratory/tests results  Suicide/Homicide Risk Assessment:    Risk of Harm to Self:  The following ratings are based on assessment at the time of the interview  Recent Specific Risk Factors include: current depressive symptoms, current anxiety symptoms, social isolation  Demographic risk factors include: , never , age: young adult (15-24)  Historical Risk Factors include: chronic depression, chronic anxiety symptoms, chronic mood disorder, history of suicidal behaviors, history of self-abusive behavior, history of substance use, history of impulsive behaviors, history of traumatic experiences  Protective Factors: no current suicidal ideation, access to mental health treatment, compliant with medications, compliant with mental health treatment, stable living environment, stable job, sense of determination, supportive family  Based on today's assessment, Mae Luiskarla presents the following risk of harm to self: low    Risk of Harm to Others:   The following ratings are based on assessment at the time of the interview  Protective Factors: no current homicidal ideation  Based on today's assessment, Mae Luiskarla presents the following risk of harm to others: none    The following interventions are recommended: contracts for safety at present - agrees to go to ED if feeling unsafe, referral for psychotherapy, return in 1 month for reassessment, contracts for safety at present - agrees to call Crisis Intervention Service if feeling unsafe    Medications Risks/Benefits:      Risks, Benefits And Possible Side Effects Of Medications:    Discussed risks and benefits of treatment with patient including risk of suicidality, serotonin syndrome, increased QTc interval and SIADH related to treatment with antidepressants; Risk of induction of manic symptoms in certain patient populations, risk of parkinsonian symptoms, metabolic syndrome, tardive dyskinesia and neuroleptic malignant syndrome related to treatment with antipsychotic medications and Reduction in seizure threshold related to treatment with bupropion      Controlled Medication Discussion:     Not applicable    Treatment Plan:    Due for update/Updated:   no  Last treatment plan done 7/20/22 by ANGELA Villalta  Treatment Plan due on 1/20/23  ANGELA Alcaraz 08/19/22    This note was shared with patient

## 2022-09-16 ENCOUNTER — TELEMEDICINE (OUTPATIENT)
Dept: PSYCHIATRY | Facility: CLINIC | Age: 22
End: 2022-09-16
Payer: COMMERCIAL

## 2022-09-16 DIAGNOSIS — F31.81 BIPOLAR II DISORDER (HCC): ICD-10-CM

## 2022-09-16 PROCEDURE — 99214 OFFICE O/P EST MOD 30 MIN: CPT | Performed by: NURSE PRACTITIONER

## 2022-09-16 RX ORDER — OLANZAPINE 2.5 MG/1
2.5 TABLET ORAL
Qty: 30 TABLET | Refills: 0 | Status: SHIPPED | OUTPATIENT
Start: 2022-09-16 | End: 2022-10-17 | Stop reason: SDUPTHER

## 2022-09-16 NOTE — PSYCH
Virtual Regular Visit    Verification of patient location:    Patient is located in the following state in which I hold an active license [place]    Problem List Items Addressed This Visit    None            Encounter provider Linn Pillai    Provider located at    [address] [place] [id number] [phone]    Recent Visits  No visits were found meeting these conditions  Showing recent visits within past 7 days and meeting all other requirements  Future Appointments  No visits were found meeting these conditions  Showing future appointments within next 150 days and meeting all other requirements         The patient was identified by name and date of birth  Hema Stoner was informed that this is a telemedicine visit and that the visit is being conducted through[employer]ape[employer] Embedded and patient was informed this is a secure, HIPAA-complaint platform  She agrees to proceed     My office door was closed  No one else was in the room  She acknowledged consent and understanding of privacy and security of the video platform  The patient has agreed to participate and understands they can discontinue the visit at any time  Patient is aware this is a billable service       HPI     Current Outpatient Medications   Medication Sig Dispense Refill    buPROPion (WELLBUTRIN XL) 300 mg 24 hr tablet Take 1 tablet (300 mg total) by mouth daily 90 tablet 0    hydrOXYzine pamoate (VISTARIL) 50 mg capsule Take 1 capsule (50 mg total) by mouth 3 (three) times a day as needed for anxiety 30 capsule 0    norgestimate-ethinyl estradiol (Alisha) 0 25-35 MG-MCG per tablet Take 1 tablet by mouth daily 28 tablet 5    OLANZapine (ZyPREXA) 2 5 mg tablet Take 1 tablet (2 5 mg total) by mouth daily at bedtime 30 tablet 0    traZODone (DESYREL) 50 mg tablet Take 1 tablet (50 mg total) by mouth daily at bedtime 30 tablet 0    Adapalene-Benzoyl Peroxide 0 1-2 5 % gel Apply 1 application topically daily at bedtime (Patient not taking: Reported on 9/16/2022) 45 g 0    clindamycin (CLINDAGEL) 1 % gel Apply topically 2 (two) times a day (Patient not taking: Reported on 9/16/2022) 30 g 0    metoclopramide (Reglan) 10 mg tablet Take 1 tablet (10 mg total) by mouth 3 (three) times a day as needed (headache) 15 tablet 0     No current facility-administered medications for this visit  Review of Systems  Video Exam    There were no vitals filed for this visit  Physical Exam   As a result of this visit, I have referred the patient for further respiratory evaluation  No    I spent 25 minutes directly with the patient during this visit  VIRTUAL VISIT DISCLAIMER    Padmini Hodge acknowledges that she has consented to an online visit or consultation  She understands that the online visit is based solely on information provided by her, and that, in the absence of a face-to-face physical evaluation by the physician, the diagnosis she receives is both limited and provisional in terms of accuracy and completeness  This is not intended to replace a full medical face-to-face evaluation by the physician  Padmini Hodge understands and accepts these terms  MEDICATION MANAGEMENT NOTE        [facility]    Name and Date of Birth:  Padmini Hodge 25 y o  2000 MRN: [id number]    Date of Visit: September 16, 2022    No Known Allergies  SUBJECTIVE:    Madai Hollins is seen today for a follow up for Bipolar Disorder type II  She continues to experience ongoing symptoms since the last visit  Madai Hollins is a 25year old female with a history of bipolar 2 disorder  Madai Hollins was seen virtually today for medication management follow up, and last seen by this provider on 8/19/22  She reports today that she is back at college  She states that since she has returned, she has been drinking more going to bars more often    She does report that she has been completing her assignments for school  She states that she is depressed  , and some days are more productive than others  She reports some days she just lays in bed until she has to go to work at the [employer]  She does report going out with her roommate and people that she goes to the bar with  She states that she sleeps well but is still exhausted when she wakes up in has no motivation and has a lot of procrastination  She reports lower anxiety and states she has more depression than anxiety at this time  She rates her depression a 9/10, anxiety 5/10  Appetite is "too good" and states she is eating too much at this time  She denies any suicidal or homicidal ideation  She denies any urges to cut at this time  She denies auditory and visual hallucinations and does not endorse symptoms of saul or psychosis  She reports that she has an appointment with a therapist coming up and is looking forward to starting therapy  We discussed medication change options, and she was educated on the importance of compliance of medications  Provided Education on the depressive affects of alcohol  She verbalized understanding  We will continue her medications as ordered at this time and follow-up again after she has met with her therapist   We will follow-up in 1 month  She is agreeable at this time  She denies any side effects from current psychiatric medications      PLAN:  Continue Wellbutrin  mg p o  daily  Continue hydroxyzine 50 mg p o  t i d  p r n  for anxiety  Continue Zyprexa 2 5 mg p o  hs  Continue trazodone 50 mg p o  hs  Follow-up in 1 month  Begin therapy with new therapist  She will call sooner with concerns or issues if they arise prior to scheduled appointment  Aware of 24 hour and weekend coverage for urgent situations accessed by calling [facility] main practice number  Medication management every 1 month  Will start individual therapy with own therapist  Aware of need to follow up with family physician for medical issues    There are no diagnoses linked to this encounter  Current Outpatient Medications on File Prior to Visit   Medication Sig Dispense Refill    buPROPion (WELLBUTRIN XL) 300 mg 24 hr tablet Take 1 tablet (300 mg total) by mouth daily 90 tablet 0    hydrOXYzine pamoate (VISTARIL) 50 mg capsule Take 1 capsule (50 mg total) by mouth 3 (three) times a day as needed for anxiety 30 capsule 0    norgestimate-ethinyl estradiol (Alisha) 0 25-35 MG-MCG per tablet Take 1 tablet by mouth daily 28 tablet 5    OLANZapine (ZyPREXA) 2 5 mg tablet Take 1 tablet (2 5 mg total) by mouth daily at bedtime 30 tablet 0    traZODone (DESYREL) 50 mg tablet Take 1 tablet (50 mg total) by mouth daily at bedtime 30 tablet 0    Adapalene-Benzoyl Peroxide 0 1-2 5 % gel Apply 1 application topically daily at bedtime (Patient not taking: Reported on 9/16/2022) 45 g 0    clindamycin (CLINDAGEL) 1 % gel Apply topically 2 (two) times a day (Patient not taking: Reported on 9/16/2022) 30 g 0    metoclopramide (Reglan) 10 mg tablet Take 1 tablet (10 mg total) by mouth 3 (three) times a day as needed (headache) 15 tablet 0     No current facility-administered medications on file prior to visit  Psychotherapy Provided:     Individual psychotherapy provided: Yes  Counseling was provided during the session today for 16 minutes  Supportive counseling provided  Medication changes discussed with aYsir Ortiz  Medication education provided to Yasir Ortiz  Discussed with Yasir Ortiz coping with chronic mental illness  Coping strategies reviewed with Yasir Ortiz  Importance of medication and treatment compliance reviewed with Yasir Ortiz  Importance of follow up with family physician for medical issues reviewed with Yasir Ortiz  Reassurance and supportive therapy provided  Crisis/safety plan discussed with Yasir Ortiz  Will utilize crisis as needed      HPI ROS Appetite Changes and Sleep:     She reports normal sleep, tired even after she has slept, increased appetite, low energy   Denies homicidal ideation, denies suicidal ideation    Review Of Systems:    HPI ROS:               Medication Side Effects:  denies     Depression (10 worst): 9/10 (Was 7/10)   Anxiety (10 worst): 5/10 (Was 9/10)   Safety concerns (SI, HI, etc): denies (Was denies)   Sleep: Good, but still fatigued after a full night's sleep (Was 6-7 hours/night)   Energy: low (Was fair)   Appetite: good (Was fluctuates)     General decreased functioning   Personality no change in personality   Constitutional as noted in HPI   ENT negative   Cardiovascular negative   Respiratory negative   Gastrointestinal negative   Genitourinary negative   Musculoskeletal negative   Integumentary negative   Neurological negative   Endocrine negative   Other Symptoms none, all other systems are negative     Mental Status Evaluation:    Appearance Adequate hygiene and grooming and Good eye contact   Behavior Superficial   Mood anxious and depressed  Depression Scale - 9 of 10 (0 = No depression)  Anxiety Scale - 5 of 10 (0 = No anxiety)   Speech Normal rate and volume   Affect constricted   Thought Processes Goal directed and coherent   Thought Content Does not verbalize delusional material   Associations Tightly connected   Perceptual Disturbances Denies hallucinations and does not appear to be responding to internal stimuli   Risk Potential Suicidal/Homicidal Ideation - No evidence of suicidal or homicidal ideation and patient does not verbalize suicidal or homicidal ideation  Risk of Violence - No evidence of risk for violence found on assessment  Risk of Self Mutilation - No evidence of risk for self mutilation found on assessment   Orientation oriented to person, place, time/date and situation   Memory recent and remote memory grossly intact   Consciousness alert and awake   Attention/Concentration attention span and concentration are age appropriate   Insight partial   Judgement partial   Muscle Strength and Gait normal muscle strength and normal muscle tone, normal gait/station and normal balance   Motor Activity no abnormal movements   Language no difficulty naming common objects, no difficulty repeating a phrase, no difficulty writing a sentence   Fund of Knowledge adequate knowledge of current events  adequate fund of knowledge regarding past history  adequate fund of knowledge regarding vocabulary      Past Psychiatric History  Previous diagnoses include anxiety and depression   Prior outpatient psychiatric treatment: Therapy as a child  [facility] Cart[facility] therapy: as a child, unable to remember the place or the person  Drea Cartwright inpatient psychiatric treatment: Yes, [facility]ed in March 2021  Drea Cartwright suicide attempts: denies   Prior self harm: yes, cutting in March 2021  Drea Cartwright violence or aggression: denies    Past Psychiatric History Update:     Inpatient Psychiatric Admission Since Last Encounter:   no  Changes to Outpatient Psychiatric Treatment Team:    no  Suicide Attempt Or Self Mutilation Since Last Encounter:   no  Incidence of Violent Behavior Since Last Encounter:   no    Traumatic History Update:     New Onset of Abuse Since Last Encounter:   no  Traumatic Events Since Last Encounter:   no    Past Medical History:    Past Medical History:   Diagnosis Date    Anxiety     Concussion     Depression     Head injury     concussions in high school and in college    Self-injurious behavior         Past Surgical History:   Procedure Laterality Date    WISDOM TOOTH EXTRACTION       No Known Allergies  Substance Abuse History:    Social History     Substance and Sexual Activity   Alcohol Use Yes    Alcohol/week: 3 0 - 4 0 standard drinks    Types: 3 - 4 Shots of liquor per week    Comment: 3-4 drinks, 2 times per week     Social History     Substance and Sexual Activity   Drug Use No     Social History:    Social History     Socioeconomic History    Marital status: Single Spouse name: Not on file    Number of children: 0    Years of education: senior in college currently   [employer] education level: High school graduate   Occupational History    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   Tobacco Use    Smoking status: Never Smoker    Smokeless tobacco: Never Used   Vaping Use    Vaping Use: Never used   Substance and Sexual Activity    Alcohol use: Yes     Alcohol/week: 3 0 - 4 0 standard drinks     Types: 3 - 4 Shots of liquor per week     Comment: 3-4 drinks, 2 times per week    Drug use: No    Sexual activity: Yes     Partners: Male     Birth control/protection: Condom Male   Other Topics Concern    Not on file   Social History Narrative    Not on file     Social Determinants of Health     Financial Resource Strain: Not on file   Food Insecurity: Not on file   Transportation Needs: Not on file   Physical Activity: Not on file   Stress: Not on file   Social Connections: Not on file   Intimate Partner Violence: Not on file   Housing Stability: Not on file     Family Psychiatric History:     Family History   Problem Relation Age of Onset    Hypertension Mother     Mental illness Mother     Depression Mother     Mental illness Sister     Coronary artery disease Maternal Grandmother     Throat cancer Paternal Grandfather      History Review: The following portions of the patient's history were reviewed and updated as appropriate: allergies, current medications, past family history, past medical history, past social history, past surgical history and problem list     OBJECTIVE:     Vital signs in last 24 hours: There were no vitals filed for this visit  Laboratory Results:   Recent Labs (last 2 months):   No visits with results within 2 Month(s) from this visit     Latest known visit with results is:   Office Visit on 06/30/2021   Component Date Value    URINE HCG 06/30/2021      N gonorrhoeae, DNA Probe 06/30/2021 Negative     Chlamydia trachomatis, D* 06/30/2021 Positive (A)     I have personally reviewed all pertinent laboratory/tests results  Suicide/Homicide Risk Assessment:    Risk of Harm to Self:  The following ratings are based on assessment at the time of the interview  Recent Specific Risk Factors include: current depressive symptoms, current anxiety symptoms, unstable mood, lack of support, substance use  Demographic risk factors include: , age: young adult (15-24)  Historical Risk Factors include: chronic depressive symptoms, chronic anxiety symptoms, chronic mood disorder, history of suicide attempt, history of self-abusive behavior, substance use, history of impulsive behaviors, history of traumatic experiences  Protective Factors: no current suicidal ideation, access to mental health treatment, compliant with mental health treatment, having a desire to be alive, sense of determination, supportive family, supportive friends  Based on today's assessment, Chioma Ken presents the following risk of harm to self: low    Risk of Harm to Others: The following ratings are based on assessment at the time of the interview  Protective Factors: no current homicidal ideation  Based on today's assessment, Chioma Ken presents the following risk of harm to others: none    The following interventions are recommended: contracts for safety at present - agrees to go to ED if feeling unsafe, return in 1 month for reassessment, therapy appointment in 5 days, contracts for safety at present - agrees to call Crisis Intervention Service if feeling unsafe    Medications Risks/Benefits:      Risks, Benefits And Possible Side Effects Of Medications:    Discussed risks and benefits of treatment with patient including risk of suicidality, serotonin syndrome, increased QTc interval and SIADH related to treatment with antidepressants;  Risk of induction of manic symptoms in certain patient populations, risk of parkinsonian symptoms, metabolic syndrome, tardive dyskinesia and neuroleptic malignant syndrome related to treatment with antipsychotic medications and Reduction in seizure threshold related to treatment with bupropion      Controlled Medication Discussion:     Not applicable    Treatment Plan:    Due for update/Updated:   no  Last treatment plan done 7/20/22 by ANGELA Hill  Treatment Plan due on 1/20/23  ANGELA Art 09/16/22    This note was shared with patient

## 2022-09-18 DIAGNOSIS — Z30.011 ENCOUNTER FOR INITIAL PRESCRIPTION OF CONTRACEPTIVE PILLS: ICD-10-CM

## 2022-09-19 RX ORDER — NORGESTIMATE AND ETHINYL ESTRADIOL 0.25-0.035
1 KIT ORAL DAILY
Qty: 28 TABLET | Refills: 0 | Status: SHIPPED | OUTPATIENT
Start: 2022-09-19 | End: 2022-10-17 | Stop reason: SDUPTHER

## 2022-09-21 ENCOUNTER — SOCIAL WORK (OUTPATIENT)
Dept: BEHAVIORAL/MENTAL HEALTH CLINIC | Facility: CLINIC | Age: 22
End: 2022-09-21
Payer: COMMERCIAL

## 2022-09-21 DIAGNOSIS — F32.A ANXIETY AND DEPRESSION: Primary | ICD-10-CM

## 2022-09-21 DIAGNOSIS — F31.81 BIPOLAR II DISORDER (HCC): ICD-10-CM

## 2022-09-21 DIAGNOSIS — F41.9 ANXIETY AND DEPRESSION: Primary | ICD-10-CM

## 2022-09-21 PROCEDURE — 90791 PSYCH DIAGNOSTIC EVALUATION: CPT
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
## 2022-09-29 NOTE — PSYCH
Nursing Progress Note



Problem: Per 5150 pt. is here for SA by OD on street drugs after command hallucinations 
telling him to do so. Pt. states that he took "Fentanyl, black china, meth, 8 ball" in 
suicide attempt. Pt. states he feels hopeless. Pt. is also delusional, stating, "they won't 
give me my billion dollars". Pt. states, "I tried killing myself because Im a free renetta. 
They dont love me but I love them. Yes I do."



Interventions: 1:1 assessment, medication administration/education/monitoring, therapeutic 
conversation, active listening, ensured contract for safety, provided distraction, 
redirection, and positive reinforcement.



Response: Patient is pleasant and cooperative; compliant with medication. He denies SI, HI, 
A/VH; explained feeling depressed and continued, "this place is making me crazy." No 
aggressive behaviors observed this shift. He remained in his room the majority of this 
shift; observed sleeping and does not appear to be having difficulty.    



 

Plan: Crisis interruption and stabilization in a safe and therapeutic environment. '[facility] is considering conservatorship. Frequent rounding for safety. Virtual Regular Visit    Verification of patient location:    Patient is located in the following state in which I hold an active license [place]    Problem List Items Addressed This Visit        Other    Anxiety and depression    Relevant Medications    buPROPion (WELLBUTRIN XL) 300 mg 24 hr tablet             Encounter provider ANGELA Baca    Provider located at   [address] [address] [phone]    Recent Visits  No visits were found meeting these conditions  Showing recent visits within past 7 days and meeting all other requirements  Today's Visits  Date Type Provider Dept   04/27/22 Telemedicine Daiana Baca today's visits and meeting all other requirements  Future Appointments  No visits were found meeting these conditions  Showing future appointments within next 150 days and meeting all other requirements         The patient was identified by name and date of birth  Cheryl Wily was informed that this is a telemedicine visit and that the visit is being conducted throughEpic Embedded and patient was informed this is a secure, HIPAA-complaint platform  She agrees to proceed     My office door was closed  The patient was notified the following individuals were present in the room Earlene Guzman, [address]  She acknowledged consent and understanding of privacy and security of the video platform  The patient has agreed to participate and understands they can discontinue the visit at any time  Patient is aware this is a billable service       HPI     Current Outpatient Medications   Medication Sig Dispense Refill    buPROPion (WELLBUTRIN XL) 300 mg 24 hr tablet Take 1 tablet (300 mg total) by mouth daily 90 tablet 0    gabapentin (Neurontin) 100 mg capsule Take 1 capsule (100 mg total) by mouth 2 (two) times a day 60 capsule 2    hydrOXYzine pamoate (VISTARIL) 50 mg capsule Take 1 capsule (50 mg total) by mouth 3 (three) times a day as needed for anxiety 30 capsule 0    metoclopramide (Reglan) 10 mg tablet Take 1 tablet (10 mg total) by mouth 3 (three) times a day as needed (headache) 15 tablet 0    norgestimate-ethinyl estradiol (Alisha) 0 25-35 MG-MCG per tablet Take 1 tablet by mouth daily 28 tablet 0    traZODone (DESYREL) 50 mg tablet Take 1 tablet (50 mg total) by mouth daily at bedtime 30 tablet 0     No current facility-administered medications for this visit  Review of Systems  Video Exam    There were no vitals filed for this visit  Physical Exam   As a result of this visit, I have referred the patient for further respiratory evaluation  No    I spent 20 minutes directly with the patient during this visit  VIRTUAL VISIT DISCLAIMER    Nicki Malave acknowledges that she has consented to an online visit or consultation  She understands that the online visit is based solely on information provided by her, and that, in the absence of a face-to-face physical evaluation by the physician, the diagnosis she receives is both limited and provisional in terms of accuracy and completeness  This is not intended to replace a full medical face-to-face evaluation by the physician  Nicki Malave understands and accepts these terms  MEDICATION MANAGEMENT NOTE        [facility]    Name and Date of Birth:  Nicki Malave 24 y o  2000 MRN: [id number]    Date of Visit: April 27, 2022    No Known Allergies  SUBJECTIVE:    Brittany Cordon is seen today for a follow up for Bipolar Disorder type II  She continues to do fairly well since the last visit  Brittany Cordon was last seen by this provider on 3/9/22  She is seen virtually today for medication management follow-up  She is calm, appropriate in conversation  She states that she feels good today, and has been feeling better    She relates that she believes she has been making better choices  She will be graduating in the next couple months and is planning on returning for rule out of school in the fall  She is going to be moving back home for the summer which will be difficult but she believes will be okay  She states that she has been drinking less, and has been only going out 1-2 times per week  She drinks 3-4 drinks each time she goes out  She reports she has been sleeping well, appetite is good  She has returned to the gym and has been working out  She reports low depression and anxiety at this time  She reports good relationships with her friends, she is currently not in a relationship with any boys at this time  She does state that she stop taking the Zyprexa because her face was breaking out she believes from the medication, she does not endorse any symptoms of saul at this time  She has no indiscretions, no elevated mood, no decreased need for sleep  No self abusive behavior was reported  We agreed to continue medications as ordered, with the exception of the zyprexa, which we will discontinue  She denies any suicidal or homicidal ideation, denies any auditory or visual hallucinations  She states that she has only seen her therapist once, as she does not have any further openings at this time  She will see her as soon as an opening does become available  She denies any side effects from current psychiatric medications  PLAN:  Continue medications as ordered  Wellbutrin  mg p o  daily  Neurontin 100 mg p o  b i d  Hydroxyzine 50 mg p o  t i d  p r n  Trazodone 50 mg p o  HS p r n    Follow up with outside therapist  She will follow up with this provider in 2 months  She will call sooner with concerns or issues if they arise prior to scheduled appointment  Aware of 24 hour and weekend coverage for urgent situations accessed by calling [facility] main practice number  Continue psychotherapy with therapist  Medication management every 2 months  Aware of need to follow up with family physician for medical issues    Diagnoses and all orders for this visit:    Anxiety and depression  -     buPROPion (WELLBUTRIN XL) 300 mg 24 hr tablet; Take 1 tablet (300 mg total) by mouth daily        Current Outpatient Medications on File Prior to Visit   Medication Sig Dispense Refill    gabapentin (Neurontin) 100 mg capsule Take 1 capsule (100 mg total) by mouth 2 (two) times a day 60 capsule 2    hydrOXYzine pamoate (VISTARIL) 50 mg capsule Take 1 capsule (50 mg total) by mouth 3 (three) times a day as needed for anxiety 30 capsule 0    metoclopramide (Reglan) 10 mg tablet Take 1 tablet (10 mg total) by mouth 3 (three) times a day as needed (headache) 15 tablet 0    norgestimate-ethinyl estradiol (Alisha) 0 25-35 MG-MCG per tablet Take 1 tablet by mouth daily 28 tablet 0    traZODone (DESYREL) 50 mg tablet Take 1 tablet (50 mg total) by mouth daily at bedtime 30 tablet 0    [DISCONTINUED] buPROPion (WELLBUTRIN XL) 300 mg 24 hr tablet Take 1 tablet (300 mg total) by mouth daily 90 tablet 0    [DISCONTINUED] OLANZapine (ZyPREXA) 2 5 mg tablet Take 1 tablet (2 5 mg total) by mouth daily at bedtime 30 tablet 1     No current facility-administered medications on file prior to visit  Psychotherapy Provided:     Individual psychotherapy provided: Yes  Counseling was provided during the session today for 16 minutes  Supportive counseling provided  Medication changes discussed with Corrina Salinas  Medication education provided to Corrina Salinas  Recent stressors discussed with Corrina Salinas including family conflict, relationship problems, school stress, everyday stressors and occasional anxiety  Coping skills reviewed with Corrina Salinas  Importance of medication and treatment compliance reviewed with Corrina Salinas  Importance of follow up with family physician for medical issues reviewed with Corrina Salinas  Reassurance and supportive therapy provided     Crisis/safety plan discussed with Corrina Salinas  Will utilize crisis as needed  HPI ROS Appetite Changes and Sleep:     She reports normal sleep, normal appetite, normal energy level   Denies homicidal ideation, denies suicidal ideation    Review Of Systems:    HPI ROS:               Medication Side Effects:  denies     Depression (10 worst): low (Was 2/10)   Anxiety (10 worst): low (Was 3/10)   Safety concerns (SI, HI, etc): denies (Was denies)   Sleep: good (Was good)   Energy: good (Was good)   Appetite: Good (Was good)     General normal    Personality no change in personality   Constitutional negative   ENT negative   Cardiovascular negative   Respiratory negative   Gastrointestinal negative   Genitourinary negative   Musculoskeletal negative   Integumentary negative   Neurological negative   Endocrine negative   Other Symptoms none, all other systems are negative     Mental Status Evaluation:    Appearance Adequate hygiene and grooming   Behavior calm and cooperative   Mood euthymic  Depression Scale - low of 10 (0 = No depression)  Anxiety Scale - low of 10 (0 = No anxiety)   Speech Normal rate and volume   Affect appropriate and mood-congruent   Thought Processes Goal directed and coherent   Thought Content Does not verbalize delusional material   Associations Tightly connected   Perceptual Disturbances Denies hallucinations and does not appear to be responding to internal stimuli   Risk Potential Suicidal/Homicidal Ideation - No evidence of suicidal or homicidal ideation and patient does not verbalize suicidal or homicidal ideation, no self-abusive behavior reported  Risk of Violence - No evidence of risk for violence found on assessment  Risk of Self Mutilation - No evidence of risk for self mutilation found on assessment   Orientation oriented to person, place, time/date and situation   Memory recent and remote memory grossly intact   Consciousness alert and awake   Attention/Concentration attention span and concentration are age appropriate   Insight partial   Judgement intact   Muscle Strength and Gait normal muscle strength and normal muscle tone, normal gait/station and normal balance   Motor Activity no abnormal movements   Language no difficulty naming common objects, no difficulty repeating a phrase, no difficulty writing a sentence   Fund of Knowledge adequate knowledge of current events  adequate fund of knowledge regarding past history  adequate fund of knowledge regarding vocabulary      Past Psychiatric History  Previous diagnoses include anxiety and depression   Prior outpatient psychiatric treatment: Therapy as a child   Prior therapy: as a child, unable to remember the place or the person  [address] inpatient psychiatric treatment: Yes, Arnulfo Kat in March 2021  [address] suicide attempts: denies   Prior self harm: yes, cutting in March 2021  [address] violence or aggression: denies    Past Psychiatric History Update:     Inpatient Psychiatric Admission Since Last Encounter:   no  Changes to Outpatient Psychiatric Treatment Team:    no  Suicide Attempt Or Self Mutilation Since Last Encounter:   no  Incidence of Violent Behavior Since Last Encounter:   no    Traumatic History Update:     New Onset of Abuse Since Last Encounter:   no  Traumatic Events Since Last Encounter:   no    Past Medical History:    Past Medical History:   Diagnosis Date    Anxiety     Concussion     Depression     Head injury     concussions in high school and in college    Self-injurious behavior      Past Medical History Pertinent Negatives:   Diagnosis Date Noted    Seizures ([facility] ) 09/27/2021    [facility]     Past Surgical History:   Procedure Laterality Date    WISDOM TOOTH EXTRACTION       No Known Allergies  Substance Abuse History:    Social History     Substance and Sexual Activity   Alcohol Use Yes    Alcohol/week: 3 0 - 4 0 standard drinks    Types: 3 - 4 Shots of liquor per week    Comment: 3-4 drinks, 2 times per week     Social History     Substance and Sexual Activity   Drug Use No     Social History:    Social History     Socioeconomic History    Marital status: Single     Spouse name: Not on file    Number of children: 0    Years of education: senior in college currently   [employer] education level: High school graduate   Occupational History    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   Tobacco Use    Smoking status: Never Smoker    Smokeless tobacco: Never Used   Vaping Use    Vaping Use: Never used   Substance and Sexual Activity    Alcohol use: Yes     Alcohol/week: 3 0 - 4 0 standard drinks     Types: 3 - 4 Shots of liquor per week     Comment: 3-4 drinks, 2 times per week    Drug use: No    Sexual activity: Yes     Partners: Male     Birth control/protection: Condom Male   Other Topics Concern    Not on file   Social History Narrative    Not on file     Social Determinants of Health     Financial Resource Strain: Not on file   Food Insecurity: Not on file   Transportation Needs: Not on file   Physical Activity: Not on file   Stress: Not on file   Social Connections: Not on file   Intimate Partner Violence: Not on file   Housing Stability: Not on file     Family Psychiatric History:     Family History   Problem Relation Age of Onset    Hypertension Mother     Mental illness Mother     Depression Mother     Mental illness Sister     Coronary artery disease Maternal Grandmother     Throat cancer Paternal Grandfather      History Review: The following portions of the patient's history were reviewed and updated as appropriate: allergies, current medications, past family history, past medical history, past social history, past surgical history and problem list     OBJECTIVE:     Vital signs in last 24 hours: There were no vitals filed for this visit  Laboratory Results:   Recent Labs (last 2 months):   No visits with results within 2 Month(s) from this visit     Latest known visit with results is:   Office Visit on 06/30/2021   Component Date Value    URINE HCG 06/30/2021      N gonorrhoeae, DNA Probe 06/30/2021 Negative     Chlamydia trachomatis, D* 06/30/2021 Positive*     I have personally reviewed all pertinent laboratory/tests results  Suicide/Homicide Risk Assessment:    Risk of Harm to Self:  The following ratings are based on assessment at the time of the interview  Recent Specific Risk Factors include: none  Demographic risk factors include: , age: young adult (15-24)  Historical Risk Factors include: chronic mood disorder, history of self-abusive behavior, history of substance use, history of impulsive behaviors, history of traumatic experiences  Protective Factors: no current suicidal ideation, access to mental health treatment, compliant with mental health treatment, effective coping skills, good health, good self-esteem, having a desire to be alive, having a sense of purpose or meaning in life, stable living environment, stable job, sense of determination, supportive family  Based on today's assessment, Oral Flake presents the following risk of harm to self: low    Risk of Harm to Others: The following ratings are based on assessment at the time of the interview  Protective Factors: no current homicidal ideation  Based on today's assessment, Oral Flake presents the following risk of harm to others: none    The following interventions are recommended: no intervention changes needed    Medications Risks/Benefits:      Risks, Benefits And Possible Side Effects Of Medications:    Discussed risks and benefits of treatment with patient including risk of suicidality, serotonin syndrome, increased QTc interval and SIADH related to treatment with antidepressants;  Risk of induction of manic symptoms in certain patient populations     Controlled Medication Discussion:     Not applicable    Treatment Plan:    Due for update/Updated:   no  Last treatment plan done 2/2/22 by Andre Marks ANGELA   Treatment Plan due on 8/2/22  Wendy Client, ANGELA 04/27/22    This note was shared with patient

## 2022-10-06 ENCOUNTER — TELEPHONE (OUTPATIENT)
Dept: PSYCHIATRY | Facility: CLINIC | Age: 22
End: 2022-10-06

## 2022-10-06 NOTE — LETTER
10/06/22     Payton Zambrano   : 2000   [address] [address]       It is the policy of [address] [place] [address] [place] to monitor and manage appointments that have been no-showed or cancelled with less than 48-hour notice  This is necessary to ensure that we are able to provide timely access for all patients to our providers  Undue numbers of unutilized appointments delays necessary medical care for all patients  Dear Payton Zambrano     We are sorry that you missed your appointment with Lizbeth Arellano LCSW on 10/5/2022  Your health and follow-up care are important to us  We want to make you aware of your next appointment with Lizbeth Arellano LCSW that is scheduled for Friday,  10/28/2022 at 3 pm     Please be aware that our office policy states that if you 'no show' two Therapy appointments in a row without prior notice of cancellation, or three or more in a calendar year, you may be discharged from Therapy treatment  We want to bring this to your attention now to prevent an interruption of your care  If you have any questions about this policy, please call us at the number above  Thank you in advance for your cooperation and assistance         Sincerely,      [address] [place] [address] E Support Staff

## 2022-10-17 ENCOUNTER — TELEMEDICINE (OUTPATIENT)
Dept: PSYCHIATRY | Facility: CLINIC | Age: 22
End: 2022-10-17
Payer: COMMERCIAL

## 2022-10-17 DIAGNOSIS — F41.9 ANXIETY AND DEPRESSION: ICD-10-CM

## 2022-10-17 DIAGNOSIS — F31.81 BIPOLAR II DISORDER (HCC): ICD-10-CM

## 2022-10-17 DIAGNOSIS — Z30.011 ENCOUNTER FOR INITIAL PRESCRIPTION OF CONTRACEPTIVE PILLS: ICD-10-CM

## 2022-10-17 DIAGNOSIS — F32.A ANXIETY AND DEPRESSION: ICD-10-CM

## 2022-10-17 PROCEDURE — 99214 OFFICE O/P EST MOD 30 MIN: CPT | Performed by: NURSE PRACTITIONER

## 2022-10-17 RX ORDER — HYDROXYZINE PAMOATE 50 MG/1
50 CAPSULE ORAL
Qty: 30 CAPSULE | Refills: 1 | Status: SHIPPED | OUTPATIENT
Start: 2022-10-17

## 2022-10-17 RX ORDER — NORGESTIMATE AND ETHINYL ESTRADIOL 0.25-0.035
1 KIT ORAL DAILY
Qty: 28 TABLET | Refills: 0 | Status: SHIPPED | OUTPATIENT
Start: 2022-10-17

## 2022-10-17 RX ORDER — BUPROPION HYDROCHLORIDE 100 MG/1
200 TABLET, EXTENDED RELEASE ORAL DAILY
Qty: 15 TABLET | Refills: 0 | Status: SHIPPED | OUTPATIENT
Start: 2022-10-17

## 2022-10-17 RX ORDER — OLANZAPINE 2.5 MG/1
2.5 TABLET ORAL
Qty: 30 TABLET | Refills: 1 | Status: SHIPPED | OUTPATIENT
Start: 2022-10-17

## 2022-10-17 NOTE — PSYCH
Virtual Regular Visit    Verification of patient location:    Patient is located in the following state in which I hold an active license [place]    Problem List Items Addressed This Visit        Other    Anxiety and depression    Relevant Medications    buPROPion (Wellbutrin SR) 100 mg 12 hr tablet    OLANZapine (ZyPREXA) 2 5 mg tablet    hydrOXYzine pamoate (VISTARIL) 50 mg capsule    Bipolar II disorder (HCC)    Relevant Medications    buPROPion (Wellbutrin SR) 100 mg 12 hr tablet    OLANZapine (ZyPREXA) 2 5 mg tablet    hydrOXYzine pamoate (VISTARIL) 50 mg capsule             Encounter provider ANGELA Sharif    Provider located at    [address] [facility]  [address] [facility] [address] [phone]    Recent Visits  No visits were found meeting these conditions  Showing recent visits within past 7 days and meeting all other requirements  Today's Visits  Date Type Provider Dept   10/17/22 Telemedicine Luna Reaves, [address] [facility] today's visits and meeting all other requirements  Future Appointments  No visits were found meeting these conditions  Showing future appointments within next 150 days and meeting all other requirements         The patient was identified by name and date of birth  Vania Grewal was informed that this is a telemedicine visit and that [facility] and patient was informed this is a secure, HIPAA-complaint platform  She agrees to proceed     My office door was closed  No one else was in the room  She acknowledged consent and understanding of privacy and security of the video platform  The patient has agreed to participate and understands they can discontinue the visit at any time  Patient is aware this is a billable service       HPI     Current Outpatient Medications   Medication Sig Dispense Refill   • buPROPion (Wellbutrin SR) 100 mg 12 hr tablet Take 2 tablets (200 mg total) by mouth in the morning 15 tablet 0   • hydrOXYzine pamoate (VISTARIL) 50 mg capsule Take 1 capsule (50 mg total) by mouth daily at bedtime as needed for anxiety 30 capsule 1   • OLANZapine (ZyPREXA) 2 5 mg tablet Take 1 tablet (2 5 mg total) by mouth daily at bedtime 30 tablet 1   • Adapalene-Benzoyl Peroxide 0 1-2 5 % gel Apply 1 application topically daily at bedtime (Patient not taking: Reported on 9/16/2022) 45 g 0   • clindamycin (CLINDAGEL) 1 % gel Apply topically 2 (two) times a day (Patient not taking: Reported on 9/16/2022) 30 g 0   • metoclopramide (Reglan) 10 mg tablet Take 1 tablet (10 mg total) by mouth 3 (three) times a day as needed (headache) 15 tablet 0   • norgestimate-ethinyl estradiol (Alisha) 0 25-35 MG-MCG per tablet Take 1 tablet by mouth daily 28 tablet 0   • traZODone (DESYREL) 50 mg tablet Take 1 tablet (50 mg total) by mouth daily at bedtime 30 tablet 0     No current facility-administered medications for this visit  Review of Systems  Video Exam    There were no vitals filed for this visit  Physical Exam   As a result of this visit, I have referred the patient for further respiratory evaluation  No    I spent 25 minutes directly with the patient during this visit  VIRTUAL VISIT DISCLAIMER    Leodan Alas acknowledges that she has consented to an online visit or consultation  She understands that the online visit is based solely on information provided by her, and that, in the absence of a face-to-face physical evaluation by the physician, the diagnosis she receives is both limited and provisional in terms of accuracy and completeness  This is not intended to replace a full medical face-to-face evaluation by the physician  Sarahashley Lailakeith understands and accepts these terms      MEDICATION MANAGEMENT NOTE        [facility]    Name and Date of Birth:  Leodan Alas 25 y o  2000 MRN: [id number]    Date of Visit: October 17, 2022    No Known Allergies  SUBJECTIVE:    Rock Holland is seen today for a follow up for depression, Bipolar Disorder type II and anxiety  She continues to experience on and off symptoms since the last visit  Rock Holland is a 51-year-old female with a history of bipolar 2 disorder and anxiety and depression  She was seen virtually today for medication management follow-up  She was last seen by this provider on 09/16/2022  She did attend her 1st therapy session, but no showed to her 2nd 1  She stated that she was sick last week  She did express some disdain, stating that she had to repeat everything that she has already gone through  This provider did explain in go through with Rock Holland that the therapist is just trying to get to know her, and figure out where the therapy is going to lead, and what kind of therapy they should conduct to give Rock Holland the best benefits  She was understanding of this  She stated that she does not like her school program any longer, she states she is taking up school counseling for grad school  She reports that she would rather get her master's in criminal justice  She plans on finishing up this year in them possibly looking for a new grad program   She reports that she has not been drinking excessively  She reports she when out twice last week and only drink 1 of the nights  She had 4 drinks  She reports that she has not been cutting, has no thoughts to cut or hurt herself  She denies any homicidal ideation or auditory or visual hallucinations  She reports she has been sleeping well, eating well  She reports feeling detached, stating that she does not really feel like she is in the moment  She reports depression and anxiety, however both are decreased from last session  She is mood incongruent, smiling and brighter in conversation  Today we will start a medication taper of her Wellbutrin    This provider has never evaluated Rock Holland off of the Wellbutrin, as she has been on it since she started seeing me  We will reduce her 300 mg Wellbutrin XL to 200 mg Wellbutrin SR at this time  We will follow-up in 3 weeks  She was encouraged strongly to attend her next therapy session  She was in agreement at this time  She will call sooner with concerns or issues if they arise prior to scheduled appointment  She denies any side effects from current psychiatric medications  PLAN:  Decrease Wellbutrin XL 300mg PO to Wellbutrin SR 200mg PO daily  Continue all other meds as ordered  Vistaril 50mg PO daily HS PRN  Zyprexa 2 5 mg p o  HS  Trazodone 50 mg p o  HS  Continue therapy  Follow-up with this provider in 3 weeks  She will call sooner with concerns or issues if they arise prior to scheduled appointment  Aware of 24 hour and weekend coverage for urgent situations accessed by calling [facility] main practice number  Continue psychotherapy with therapist  Medication management every 3 weeks  Aware of need to follow up with family physician for medical issues    Diagnoses and all orders for this visit:    Bipolar II disorder ([facility] [address] )  -     buPROPion (Wellbutrin SR) 100 mg 12 hr tablet; Take 2 tablets (200 mg total) by mouth in the morning  -     OLANZapine (ZyPREXA) 2 5 mg tablet; Take 1 tablet (2 5 mg total) by mouth daily at bedtime    Anxiety and depression  -     hydrOXYzine pamoate (VISTARIL) 50 mg capsule;  Take 1 capsule (50 mg total) by mouth daily at bedtime as needed for anxiety      Current Outpatient Medications on File Prior to Visit   Medication Sig Dispense Refill   • Adapalene-Benzoyl Peroxide 0 1-2 5 % gel Apply 1 application topically daily at bedtime (Patient not taking: Reported on 9/16/2022) 45 g 0   • clindamycin (CLINDAGEL) 1 % gel Apply topically 2 (two) times a day (Patient not taking: Reported on 9/16/2022) 30 g 0   • metoclopramide (Reglan) 10 mg tablet Take 1 tablet (10 mg total) by mouth 3 (three) times a day as needed (headache) 15 tablet 0   • traZODone (DESYREL) 50 mg tablet Take 1 tablet (50 mg total) by mouth daily at bedtime 30 tablet 0   • [DISCONTINUED] buPROPion (WELLBUTRIN XL) 300 mg 24 hr tablet Take 1 tablet (300 mg total) by mouth daily 90 tablet 0   • [DISCONTINUED] hydrOXYzine pamoate (VISTARIL) 50 mg capsule Take 1 capsule (50 mg total) by mouth 3 (three) times a day as needed for anxiety 30 capsule 0   • [DISCONTINUED] norgestimate-ethinyl estradiol (Alisha) 0 25-35 MG-MCG per tablet Take 1 tablet by mouth daily 28 tablet 0   • [DISCONTINUED] OLANZapine (ZyPREXA) 2 5 mg tablet Take 1 tablet (2 5 mg total) by mouth daily at bedtime 30 tablet 0     No current facility-administered medications on file prior to visit  Psychotherapy Provided:     Individual psychotherapy provided: Yes  Counseling was provided during the session today for 16 minutes  Supportive counseling provided  Medication changes discussed with Hawk Cali  Medication education provided to Hawk Cali  Recent stressor including school stress and ongoing anxiety discussed with Hawk Cali  Coping strategies reviewed with Hawk Cali  Importance of medication and treatment compliance reviewed with Hawk Cali  Importance of follow up with family physician for medical issues reviewed with Manford Double  Reassurance and supportive therapy provided  Crisis/safety plan discussed with Hawk Cali  Will utilize crisis as needed  HPI ROS Appetite Changes and Sleep:     She reports normal sleep, normal appetite, normal energy level   Denies homicidal ideation, denies suicidal ideation    Review Of Systems:  HPI ROS:               Medication Side Effects:  denies     Depression (10 worst): "Improved a little" (Was 9/10)   Anxiety (10 worst): "improved a little" (Was 5/10)   Safety concerns (SI, HI, etc): denies (Was denies)   Sleep: good (Was good, still fatigued after full night sleep)   Energy: Good (Was low)   Appetite: Good (Was good)     General normal    Personality no change in personality   Constitutional as noted in HPI   ENT negative   Cardiovascular negative   Respiratory negative   Gastrointestinal negative   Genitourinary negative   Musculoskeletal negative   Integumentary negative   Neurological negative   Endocrine negative   Other Symptoms none, all other systems are negative     Mental Status Evaluation:    Appearance Appropriately dressed and Good eye contact   Behavior calm and cooperative   Mood anxious and depressed  Depression Scale - improved a little of 10 (0 = No depression)  Anxiety Scale - improved a little of 10 (0 = No anxiety)   Speech Normal rate and volume   Affect mood-incongruent and brighter and smiling during conversation   Thought Processes Goal directed and coherent   Thought Content Does not verbalize delusional material   Associations Tightly connected   Perceptual Disturbances Denies hallucinations and does not appear to be responding to internal stimuli   Risk Potential Suicidal/Homicidal Ideation - No evidence of suicidal or homicidal ideation and patient does not verbalize suicidal or homicidal ideation  Risk of Violence - No evidence of risk for violence found on assessment  Risk of Self Mutilation - No evidence of risk for self mutilation found on assessment   Orientation oriented to person, place, time/date and situation   Memory recent and remote memory grossly intact   Consciousness alert and awake   Attention/Concentration attention span and concentration are age appropriate   Insight intact   Judgement intact   Muscle Strength and Gait normal muscle strength and normal muscle tone, normal gait/station and normal balance   Motor Activity no abnormal movements   Language no difficulty naming common objects, no difficulty repeating a phrase, no difficulty writing a sentence   Fund of Knowledge adequate knowledge of current events  adequate fund of knowledge regarding past history  adequate fund of knowledge regarding vocabulary      Past Psychiatric History  Previous diagnoses include anxiety and depression   Prior outpatient psychiatric treatment: Therapy as a child  [place] therapy: as a child, unable to remember the place or the person  [place] inpatient psychiatric treatment: Yes, Bernie Schultz in March 2021  [place] suicide attempts: denies   Prior self harm: yes, cutting in March 2021  [place] violence or aggression: denies    Past Psychiatric History Update:     Inpatient Psychiatric Admission Since Last Encounter:   no  Changes to Outpatient Psychiatric Treatment Team:    no  Suicide Attempt Or Self Mutilation Since Last Encounter:   no  Incidence of Violent Behavior Since Last Encounter:   no    Traumatic History Update:     New Onset of Abuse Since Last Encounter:   no  Traumatic Events Since Last Encounter:   no    Past Medical History:    Past Medical History:   Diagnosis Date   • Anxiety    • Concussion    • Depression    • Head injury     concussions in high school and in college   • Self-injurious behavior         Past Surgical History:   Procedure Laterality Date   • WISDOM TOOTH EXTRACTION       No Known Allergies  Substance Abuse History:    Social History     Substance and Sexual Activity   Alcohol Use Yes   • Alcohol/week: 3 0 - 4 0 standard drinks   • Types: 3 - 4 Shots of liquor per week    Comment: 3-4 drinks, 2 times per week     Social History     Substance and Sexual Activity   Drug Use No     Social History:    Social History     Socioeconomic History   • Marital status: Single     Spouse name: Not on file   • Number of children: 0   • Years of education: senior in college currently   • Highest education level: High school graduate   Occupational History   • 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   Tobacco Use   • Smoking status: Never Smoker   • Smokeless tobacco: Never Used   Vaping Use   • Vaping Use: Never used   Substance and Sexual Activity   • Alcohol use:  Yes     Alcohol/week: 3 0 - 4 0 standard drinks     Types: 3 - 4 Shots of liquor per week     Comment: 3-4 drinks, 2 times per week   • Drug use: No   • Sexual activity: Yes     Partners: Male     Birth control/protection: Condom Male   Other Topics Concern   • Not on file   Social History Narrative   • Not on file     Social Determinants of Health     Financial Resource Strain: Not on file   Food Insecurity: Not on file   Transportation Needs: Not on file   Physical Activity: Not on file   Stress: Not on file   Social Connections: Not on file   Intimate Partner Violence: Not on file   Housing Stability: Not on file     Family Psychiatric History:     Family History   Problem Relation Age of Onset   • Hypertension Mother    • Mental illness Mother    • Depression Mother    • Mental illness Sister    • Coronary artery disease Maternal Grandmother    • Throat cancer Paternal Grandfather      History Review: The following portions of the patient's history were reviewed and updated as appropriate: allergies, current medications, past family history, past medical history, past social history, past surgical history and problem list     OBJECTIVE:     Vital signs in last 24 hours: There were no vitals filed for this visit  Laboratory Results:   Recent Labs (last 2 months):   No visits with results within 2 Month(s) from this visit  Latest known visit with results is:   Office Visit on 06/30/2021   Component Date Value   • URINE HCG 06/30/2021     • N gonorrhoeae, DNA Probe 06/30/2021 Negative    • Chlamydia trachomatis, D* 06/30/2021 Positive (A)     I have personally reviewed all pertinent laboratory/tests results      Suicide/Homicide Risk Assessment:    Risk of Harm to Self:  The following ratings are based on assessment at the time of the interview  Recent Specific Risk Factors include: current depressive symptoms, current anxiety symptoms  Demographic risk factors include: , age: young adult (15-24)  Historical Risk Factors include: chronic depression, chronic anxiety symptoms, chronic mood disorder, substance use, history of impulsive behaviors, history of traumatic experiences  Protective Factors: no current suicidal ideation, access to mental health treatment, compliant with medications, compliant with mental health treatment, having a desire to live, stable living environment, stable job, sense of determination, supportive family  Based on today's assessment, Thuy Beard presents the following risk of harm to self: low    Risk of Harm to Others: The following ratings are based on assessment at the time of the interview  Protective Factors: no current homicidal ideation  Based on today's assessment, Shemarjulien Beard presents the following risk of harm to others: none    The following interventions are recommended: no intervention changes needed    Medications Risks/Benefits:      Risks, Benefits And Possible Side Effects Of Medications:    Discussed risks and benefits of treatment with patient including risk of suicidality, serotonin syndrome, increased QTc interval and SIADH related to treatment with antidepressants; Risk of induction of manic symptoms in certain patient populations and risk of parkinsonian symptoms, metabolic syndrome, tardive dyskinesia and neuroleptic malignant syndrome related to treatment with antipsychotic medications     Controlled Medication Discussion:     Not applicable    Treatment Plan:    Due for update/Updated:   no  Last treatment plan done 7/20/22 by ANGELA Smith  Treatment Plan due on 1/20/23      ANGELA Pillai 10/17/22    This note was not shared with the patient due to reasonable likelihood of causing patient harm

## 2022-10-17 NOTE — TELEPHONE ENCOUNTER
Left a detailed vm for pt that meds were sent  Pt aware of -   Pt due for an appt, please call to schedule   Courtesy refill given

## 2022-10-17 NOTE — TELEPHONE ENCOUNTER
Pt called - she sees her gyn on 10/27/2022  Pt aware of she is overdue for PHYS since 06/09/2022  Pt is completely out of meds  Pt will call to schedule once her schedule allows  Please advise               Alen Castle MA Just now (12:12 PM)     TC    Hello! I have an appointment at [facility] for the 27th of october for a gyno appointment

## 2022-10-28 ENCOUNTER — TELEMEDICINE (OUTPATIENT)
Dept: BEHAVIORAL/MENTAL HEALTH CLINIC | Facility: CLINIC | Age: 22
End: 2022-10-28

## 2022-10-28 DIAGNOSIS — F41.9 ANXIETY AND DEPRESSION: ICD-10-CM

## 2022-10-28 DIAGNOSIS — F32.A ANXIETY AND DEPRESSION: ICD-10-CM

## 2022-10-28 DIAGNOSIS — F31.81 BIPOLAR II DISORDER (HCC): Primary | ICD-10-CM

## 2022-10-28 DIAGNOSIS — R45.88 NON-SUICIDAL SELF-HARM (HCC): ICD-10-CM

## 2022-10-28 NOTE — PSYCH
Visit Time    Visit Start Time: 3:00pm  Visit Stop Time: 3:50pm  Total Visit Duration: 50 minutes     Virtual Regular Visit     Verification of patient location:     Patient is located in the following state in which I hold an active license [place]  She is [facility]  Problem List Items Addressed This Visit        Other    Anxiety and depression    Non-suicidal self harm    Bipolar II disorder ([facility] [address] ) - Primary          This virtual visit started at 3 PM and ended at 3:50 PM     Reason for visit is scheduled virtual regular visit  Encounter provider Koffi Arambula LCSW     Provider located at [address] [place] [id number] [phone]     Recent Visits  No visits were found meeting these conditions  Showing recent visits within past 7 days and meeting all other requirements  Today's Visits  Date Type Provider Dept   10/28/22 Telemedicine Michela Seth, [address] [facility] Therapist [place]   Showing today's visits and meeting all other requirements  Future Appointments  No visits were found meeting these conditions    Showing future appointments within next 150 days and meeting all other requirements      HPI     Past Medical History:   Diagnosis Date   • Anxiety    • Concussion    • Depression    • Head injury     concussions in high school and in college   • Self-injurious behavior        Past Surgical History:   Procedure Laterality Date   • WISDOM TOOTH EXTRACTION         Current Outpatient Medications   Medication Sig Dispense Refill   • Adapalene-Benzoyl Peroxide 0 1-2 5 % gel Apply 1 application topically daily at bedtime (Patient not taking: Reported on 9/16/2022) 45 g 0   • buPROPion (Wellbutrin SR) 100 mg 12 hr tablet Take 2 tablets (200 mg total) by mouth in the morning 15 tablet 0   • clindamycin (CLINDAGEL) 1 % gel Apply topically 2 (two) times a day (Patient not taking: Reported on 9/16/2022) 30 g 0   • hydrOXYzine pamoate (VISTARIL) 50 mg capsule Take 1 capsule (50 mg total) by mouth daily at bedtime as needed for anxiety 30 capsule 1   • metoclopramide (Reglan) 10 mg tablet Take 1 tablet (10 mg total) by mouth 3 (three) times a day as needed (headache) 15 tablet 0   • norgestimate-ethinyl estradiol (Alisha) 0 25-35 MG-MCG per tablet Take 1 tablet by mouth daily 28 tablet 0   • OLANZapine (ZyPREXA) 2 5 mg tablet Take 1 tablet (2 5 mg total) by mouth daily at bedtime 30 tablet 1   • traZODone (DESYREL) 50 mg tablet Take 1 tablet (50 mg total) by mouth daily at bedtime 30 tablet 0     No current facility-administered medications for this visit  No Known Allergies    The patient was identified by name and date of birth  Sejal Dinora was informed that this is a telemedicine visit and that the visit is being conducted through a virtual online media the [employer] platform  She agrees to proceed     My office door was closed  No one else was in the room  She acknowledged consent and understanding of privacy and security of the video platform  The patient has agreed to participate and understands they can discontinue the visit at any time  Patient is aware and confirmed that this virtual visit is a billable service  D: This therapist met with Pop Burkett for a(n) Individual Therapy session via virtual context  She was amenable and interactive in creating a treatment plan  She was responsive to the items discussed and goals and objectives created  In the process, Pop Burkett stated not easily finding positive things about herself  She reiterates having trouble in relationships where she says she doesn't have a filter  She says she can be harsh with people, and wants to work on this  She states having difficulty doing so  She says she is 'afraid of everything' and wants to work on this  She states 'everyone' leaves me so this is why she wants to avoid relationships   She acknowledges that how she engages with people probably contributes to some of the difficulties she experiences  When asked how she felt talking about herself today, she stated thinking today was a good session and believing that she needs to begin to talk about herself  We set additional appointments, next one in person and all to be scheduled in person, but she will request virtual (like she did with today) if needed  A: Fna Poon was oriented x3  She was focused and engaged, and responsive to verbalizing areas of need and goals  She accepted the idea of taking on behavioral objectives, and was able to demonstrate ability to do so (with a sample exercise)  Fan Poon did not present with HI SI or SIB  P: Janell's next session is scheduled for 11/9/22    Psychotherapy Provided: Individual Psychotherapy 50 minutes     Length of time in session: 50 minutes, follow up in 2 week    Goals addressed in session: Goal 1 and Goal 2     Pain:              Current suicide risk : Joy Florence states that she wonders about death, but says she doesn't want to die anytime soon and doesn't want to kill herself  She states wishing her life was better  Behavioral Health Treatment Plan [facility]: Diagnosis and Treatment Plan explained to Nimo Burn relates understanding diagnosis and is agreeable to Treatment Plan  Yes     Video Exam     There were no vitals filed for this visit  VIRTUAL VISIT DISCLAIMER     Jenelle Boss verbally agrees to participate in [employer]  Pt is aware that [employer] could be limited without vital signs or the ability to perform a full hands-on physical exam  Jenelle Boss understands she or the provider may request at any time to terminate the video visit and request the patient to seek care or treatment in person      Mike Lowery

## 2022-10-28 NOTE — BH TREATMENT PLAN
Polo Coreas  2000       Date of Initial Treatment Plan: 10/28/22   Date of Current Treatment Plan: 10/28/22    Treatment Plan Number 1     Strengths/Personal Resources for Self Care: good friend, helpful, good caretaker    Diagnosis:   1  Bipolar II disorder ([facility] [address] )     2  Anxiety and depression     3  Non-suicidal self harm         Area of Needs: improve how to talk to people, difficulty accepting help, easily irritated      Long Term Goal 1: Set better boundaries with others    Target Date: 4/27/23  Completion Date: TBD/ongoing         Short Term Objectives for Goal 1: 1) take mindful walks at least 2 x per week or work out at gym 2) identify a negative, judgmental thought and rephrase it to a more neutral/positive thought or statement each day    Long Term Goal 2: Not allow fear to get in the way of things/relationships    Target Date: 4/27/23  Completion Date: TBD/ongoing    Short Term Objectives for Goal 2: 1) identify all or nothing thinking; 2) work on identifying and decreasing cognitive distortions; 3) address in therapy past hurts that contribute to relationship difficulties and fears           GOAL 1: Modality: Individual 2x per month   Completion Date TBD/ongoing    GOAL 2: Modality: Individual 2x per month   Completion Date TBD/ongoing       [address]: Diagnosis and Treatment Plan explained to dAdi Green relates understanding diagnosis and is agreeable to Treatment Plan  We discussed that the treatment plan will be reviewed in 6 months and/or earlier  Client Comments : Please share your thoughts, feelings, need and/or experiences regarding your treatment plan: no additional Polo Speaker, 2000, actively participated in the creation of this treatment plan during a virtual session, using the [employer] platform     Polo Coreas  provided verbal consent on 10/28/2022 at 3:00 PM  The treatment plan was transcribed into the Electronic Health Record at a later time

## 2022-11-07 ENCOUNTER — TELEMEDICINE (OUTPATIENT)
Dept: PSYCHIATRY | Facility: CLINIC | Age: 22
End: 2022-11-07

## 2022-11-07 DIAGNOSIS — F32.A ANXIETY AND DEPRESSION: ICD-10-CM

## 2022-11-07 DIAGNOSIS — F31.81 BIPOLAR II DISORDER (HCC): Primary | ICD-10-CM

## 2022-11-07 DIAGNOSIS — F41.9 ANXIETY AND DEPRESSION: ICD-10-CM

## 2022-11-07 RX ORDER — BUPROPION HYDROCHLORIDE 100 MG/1
100 TABLET, EXTENDED RELEASE ORAL 2 TIMES DAILY
Qty: 30 TABLET | Refills: 0 | Status: SHIPPED | OUTPATIENT
Start: 2022-11-21 | End: 2022-11-09

## 2022-11-07 RX ORDER — BUPROPION HYDROCHLORIDE 150 MG/1
150 TABLET, EXTENDED RELEASE ORAL 2 TIMES DAILY
Qty: 14 TABLET | Refills: 0 | Status: SHIPPED | OUTPATIENT
Start: 2022-11-07 | End: 2022-11-09

## 2022-11-07 NOTE — PSYCH
Virtual Regular Visit    Verification of patient location:    Patient is located in the following state in which I hold an active license [place]    Problem List Items Addressed This Visit        Other    Anxiety and depression    Relevant Medications    buPROPion (Wellbutrin SR) 150 mg 12 hr tablet    buPROPion (Wellbutrin SR) 100 mg 12 hr tablet (Start on 11/21/2022)    Bipolar II disorder ([facility] [address] ) - Primary    Relevant Medications    buPROPion (Wellbutrin SR) 150 mg 12 hr tablet    buPROPion (Wellbutrin SR) 100 mg 12 hr tablet (Start on 11/21/2022)             Encounter provider Francois Castillo, Linn Castillo    Provider located at    [address] [facility] [address]  [address] [facility] [address] [phone]    Recent Visits  Date Type Provider Dept   11/07/22 Lizzy Frausto [address] [facility], [address] [facility] recent visits within past 7 days and meeting all other requirements  Future Appointments  No visits were found meeting these conditions  Showing future appointments within next 150 days and meeting all other requirements         The patient was identified by name and date of birth  Nicki Malave was informed that this is a telemedicine visit and that [facility]  She agrees to proceed     My office door was closed  No one else was in the room  She acknowledged consent and understanding of privacy and security of the video platform  The patient has agreed to participate and understands they can discontinue the visit at any time  Patient is aware this is a billable service       HPI     Current Outpatient Medications   Medication Sig Dispense Refill   • [START ON 11/21/2022] buPROPion (Wellbutrin SR) 100 mg 12 hr tablet Take 1 tablet (100 mg total) by mouth in the morning Do not start before November 21, 2022  30 tablet 0   • buPROPion (Wellbutrin SR) 150 mg 12 hr tablet Take 1 tablet (150 mg total) by mouth in the morning 14 tablet 0   • hydrOXYzine pamoate (VISTARIL) 50 mg capsule Take 1 capsule (50 mg total) by mouth daily at bedtime as needed for anxiety 30 capsule 1   • norgestimate-ethinyl estradiol (Alisha) 0 25-35 MG-MCG per tablet Take 1 tablet by mouth daily 28 tablet 0   • OLANZapine (ZyPREXA) 2 5 mg tablet Take 1 tablet (2 5 mg total) by mouth daily at bedtime 30 tablet 1   • traZODone (DESYREL) 50 mg tablet Take 1 tablet (50 mg total) by mouth daily at bedtime 30 tablet 0   • Adapalene-Benzoyl Peroxide 0 1-2 5 % gel Apply 1 application topically daily at bedtime (Patient not taking: Reported on 9/16/2022) 45 g 0   • clindamycin (CLINDAGEL) 1 % gel Apply topically 2 (two) times a day (Patient not taking: Reported on 9/16/2022) 30 g 0   • metoclopramide (Reglan) 10 mg tablet Take 1 tablet (10 mg total) by mouth 3 (three) times a day as needed (headache) 15 tablet 0     No current facility-administered medications for this visit  Review of Systems  Video Exam    There were no vitals filed for this visit  Physical Exam   As a result of this visit, I have referred the patient for further respiratory evaluation  No    I spent 25 minutes directly with the patient during this visit  VIRTUAL VISIT DISCLAIMER    Ada Bautista acknowledges that she has consented to an online visit or consultation  She understands that the online visit is based solely on information provided by her, and that, in the absence of a face-to-face physical evaluation by the physician, the diagnosis she receives is both limited and provisional in terms of accuracy and completeness  This is not intended to replace a full medical face-to-face evaluation by the physician  Ada Bautista understands and accepts these terms      MEDICATION MANAGEMENT NOTE        [address]    Name and Date of Birth:  Ada Bautista 25 y o  2000 MRN: [id number]    Date of Visit: November 9, 2022    No Known Allergies    Visit Time    Visit Start Time: 9082   Visit Stop Time: 6149   Total Visit Duration: 25 minutes    SUBJECTIVE:    Angela Ramirez is seen today for a follow up for Major Depressive Disorder  She continues to experience ongoing symptoms since the last visit  Angela Ramirez is a 25year old female with a history of bipolar 2 depression vs  Mood disorder  She is seen virtually today for medication management follow-up  She was last seen by this provider on 10/17/2022  She states that she [employer] school “and has been applying to different programs elsewhere  She is [facility] gets [facility] [employer]  She is irritable in conversation today, reports depression and anxiety  She is having conflict with her roommate at this time  She states that she is going to go home and live with her sister a little while  She reports her only friends are [place], which she defines as people who live in [place] but do not attend school there  She states that all they want to do is go out and drink  She is not interested in doing that at this time  She reports that she is not sleeping throughout the night she is always tired  She denies suicidal ideation, denies any thoughts to self-harm at this time  She has no homicidal ideation, denies any auditory or visual hallucinations  We are continuing to taper her off of the Wellbutrin at this time  She reports that she feels no change from the decrease yet  She states that she will be leaving next week to go stay with her sister  She will be able to  a decreased dose of her Wellbutrin and Nelson in then in 2 weeks she will be the  her next dose of the lower formulation in [address]  We will re-evaluate on 12/09  She is continuing to see a therapist which she states is going well  She is agreeable with this treatment plan at this time      She denies any side effects from current psychiatric medications  PLAN:  Continue decrease of Wellbutrin, take Wellbutrin  mg p o  daily times 14 days, then decrease to Wellbutrin  mg p o  daily  Continue Zyprexa 2 5 mg p o  HS  Continue Vistaril 50 mg p o  HS p r n  Continue trazodone 50 mg p o  HS p r n  Continue therapy with individual therapist  Follow-up with this provider on 12/09  She will call sooner with concerns or issues if they arise prior to scheduled appointment  Aware of 24 hour and weekend coverage for urgent situations accessed by calling [facility] main practice number  Continue psychotherapy with therapist  Medication management every 4 weeks  Aware of need to follow up with family physician for medical issues    Diagnoses and all orders for this visit:    Bipolar II disorder ([facility] [address] )    Anxiety and depression  -     Discontinue: buPROPion (Wellbutrin SR) 150 mg 12 hr tablet; Take 1 tablet (150 mg total) by mouth 2 (two) times a day  -     Discontinue: buPROPion (Wellbutrin SR) 100 mg 12 hr tablet; Take 1 tablet (100 mg total) by mouth 2 (two) times a day Do not start before November 21, 2022   -     buPROPion (Wellbutrin SR) 150 mg 12 hr tablet; Take 1 tablet (150 mg total) by mouth in the morning  -     buPROPion (Wellbutrin SR) 100 mg 12 hr tablet; Take 1 tablet (100 mg total) by mouth in the morning Do not start before November 21, 2022      Current Outpatient Medications on File Prior to Visit   Medication Sig Dispense Refill   • buPROPion (Wellbutrin SR) 150 mg 12 hr tablet Take 1 tablet (150 mg total) by mouth in the morning 14 tablet 0   • hydrOXYzine pamoate (VISTARIL) 50 mg capsule Take 1 capsule (50 mg total) by mouth daily at bedtime as needed for anxiety 30 capsule 1   • norgestimate-ethinyl estradiol (Alisha) 0 25-35 MG-MCG per tablet Take 1 tablet by mouth daily 28 tablet 0   • OLANZapine (ZyPREXA) 2 5 mg tablet Take 1 tablet (2 5 mg total) by mouth daily at bedtime 30 tablet 1   • traZODone (DESYREL) 50 mg tablet Take 1 tablet (50 mg total) by mouth daily at bedtime 30 tablet 0   • Adapalene-Benzoyl Peroxide 0 1-2 5 % gel Apply 1 application topically daily at bedtime (Patient not taking: Reported on 9/16/2022) 45 g 0   • clindamycin (CLINDAGEL) 1 % gel Apply topically 2 (two) times a day (Patient not taking: Reported on 9/16/2022) 30 g 0   • metoclopramide (Reglan) 10 mg tablet Take 1 tablet (10 mg total) by mouth 3 (three) times a day as needed (headache) 15 tablet 0     No current facility-administered medications on file prior to visit  Psychotherapy Provided:     Individual psychotherapy provided: Yes  Counseling was provided during the session today for 16 minutes  Supportive counseling provided  Medication changes discussed with Misa Ingram  Medication education provided to Misa Ingram  Recent stressor including relationship problems, school stress and ongoing anxiety discussed with Misa Ingram  Coping strategies reviewed with Misa Ingram  Importance of medication and treatment compliance reviewed with Misa Ingram  Importance of follow up with family physician for medical issues reviewed with Misa Ingram  Reassurance and supportive therapy provided  Crisis/safety plan discussed with Misa Ingram  She will utilize crisis as needed  She will also reach out to family if feeling suicidal or self-harm thoughts occur  She will also call the office for help or go to the ER if needed  HPI ROS Appetite Changes and Sleep:     She reports difficulty falling asleep, frequent awakenings, adequate appetite, low energy   Denies homicidal ideation, denies suicidal ideation    Review Of Systems:  HPI ROS:               Medication Side Effects:  denies     Depression (10 worst): continues (Was improved a little)   Anxiety (10 worst): continues (Was improved a little)   Safety concerns (SI, HI, etc): denies (Was denies)   Sleep: Decreased, trouble falling asleep, and frequent awakenings (Was good)   Energy: low (Was good)   Appetite: good (Was good)     General sleep disturbances   Personality no change in personality   Constitutional as noted in HPI   ENT negative   Cardiovascular negative   Respiratory negative   Gastrointestinal negative   Genitourinary negative   Musculoskeletal negative   Integumentary negative   Neurological negative   Endocrine negative   Other Symptoms none, all other systems are negative     Mental Status Evaluation:    Appearance Adequate hygiene and grooming and Good eye contact   Behavior calm and cooperative   Mood irritable  Depression Scale - continued of 10 (0 = No depression)  Anxiety Scale - continued of 10 (0 = No anxiety)   Speech Normal rate and volume   Affect labile   Thought Processes Goal directed and coherent   Thought Content Does not verbalize delusional material   Associations Tightly connected   Perceptual Disturbances Denies hallucinations and does not appear to be responding to internal stimuli   Risk Potential Suicidal/Homicidal Ideation - No evidence of suicidal or homicidal ideation and patient does not verbalize suicidal or homicidal ideation  Risk of Violence - No evidence of risk for violence found on assessment  Risk of Self Mutilation - No evidence of risk for self mutilation found on assessment   Orientation oriented to person, place, time/date and situation   Memory recent and remote memory grossly intact   Consciousness alert and awake   Attention/Concentration attention span and concentration appear shorter than expected for age   Insight fair   Judgement fair   Muscle Strength and Gait normal muscle strength and normal muscle tone, normal gait/station and normal balance   Motor Activity no abnormal movements   Language no difficulty naming common objects, no difficulty repeating a phrase, no difficulty writing a sentence   Fund of Knowledge adequate knowledge of current events  adequate fund of knowledge regarding past history  adequate fund of knowledge regarding vocabulary      Past Psychiatric History  Previous diagnoses include anxiety and depression   Prior outpatient psychiatric treatment: Therapy as a child  [facility] therapy: as a child, unable to remember the place or the person  [facility] inpatient psychiatric treatment: Yes, [facility] in March 2021  [facility] suicide attempts: denies   Prior self harm: yes, cutting in March 2021  [facility] violence or aggression: denies    Past Psychiatric History Update:     Inpatient Psychiatric Admission Since Last Encounter:   no  Changes to Outpatient Psychiatric Treatment Team:    no  Suicide Attempt Or Self Mutilation Since Last Encounter:   no  Incidence of Violent Behavior Since Last Encounter:   no    Traumatic History Update:     New Onset of Abuse Since Last Encounter:   no  Traumatic Events Since Last Encounter:   no    Past Medical History:    Past Medical History:   Diagnosis Date   • Anxiety    • Concussion    • Depression    • Head injury     concussions in high school and in college   • Self-injurious behavior         Past Surgical History:   Procedure Laterality Date   • WISDOM TOOTH EXTRACTION       No Known Allergies  Substance Abuse History:    Social History     Substance and Sexual Activity   Alcohol Use Yes   • Alcohol/week: 3 0 - 4 0 standard drinks   • Types: 3 - 4 Shots of liquor per week    Comment: 3-4 drinks, 2 times per week     Social History     Substance and Sexual Activity   Drug Use No     Social History:    Social History     Socioeconomic History   • Marital status: Single     Spouse name: Not on file   • Number of children: 0   • Years of education: senior in college currently   • Highest education level: High school graduate   Occupational History   • 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   Tobacco Use   • Smoking status: Never Smoker   • Smokeless tobacco: Never Used   Vaping Use   • Vaping Use: Never used   Substance and Sexual Activity   • Alcohol use:  Yes     Alcohol/week: 3 0 - 4 0 standard drinks Types: 3 - 4 Shots of liquor per week     Comment: 3-4 drinks, 2 times per week   • Drug use: No   • Sexual activity: Yes     Partners: Male     Birth control/protection: Condom Male   Other Topics Concern   • Not on file   Social History Narrative   • Not on file     Social Determinants of Health     Financial Resource Strain: Not on file   Food Insecurity: Not on file   Transportation Needs: Not on file   Physical Activity: Not on file   Stress: Not on file   Social Connections: Not on file   Intimate Partner Violence: Not on file   Housing Stability: Not on file     Family Psychiatric History:     Family History   Problem Relation Age of Onset   • Hypertension Mother    • Mental illness Mother    • Depression Mother    • Mental illness Sister    • Coronary artery disease Maternal Grandmother    • Throat cancer Paternal Grandfather      History Review: The following portions of the patient's history were reviewed and updated as appropriate: allergies, current medications, past family history, past medical history, past social history, past surgical history and problem list     OBJECTIVE:     Vital signs in last 24 hours: There were no vitals filed for this visit  Laboratory Results:   Recent Labs (last 2 months):   No visits with results within 2 Month(s) from this visit  Latest known visit with results is:   Office Visit on 06/30/2021   Component Date Value   • URINE HCG 06/30/2021     • N gonorrhoeae, DNA Probe 06/30/2021 Negative    • Chlamydia trachomatis, D* 06/30/2021 Positive (A)     I have personally reviewed all pertinent laboratory/tests results      Suicide/Homicide Risk Assessment:    Risk of Harm to Self:  The following ratings are based on assessment at the time of the interview  Recent Specific Risk Factors include: current depressive symptoms, current anxiety symptoms, unstable mood  Demographic risk factors include: , age: young adult (15-24)  Historical Risk Factors include: chronic depressive symptoms, chronic anxiety symptoms, chronic mood disorder, history of suicide attempt, history of self-abusive behavior, history of substance use, history of impulsive behaviors, history of traumatic experiences  Protective Factors: no current suicidal ideation, access to mental health treatment, compliant with medications, compliant with mental health treatment, having a desire to be alive, stable living environment, stable job, sense of determination, supportive family  Based on today's assessment, Ivanna Kowalski presents the following risk of harm to self: low    Risk of Harm to Others: The following ratings are based on assessment at the time of the interview  Protective Factors: no current homicidal ideation  Based on today's assessment, Ivanna Kowalski presents the following risk of harm to others: none    The following interventions are recommended: contracts for safety at present - agrees to go to ED if feeling unsafe, return in 4 weeks for reassessment, therapy appointment in 1 day, contracts for safety at present - agrees to call Crisis Intervention Service if feeling unsafe    Medications Risks/Benefits:      Risks, Benefits And Possible Side Effects Of Medications:    Discussed risks and benefits of treatment with patient including risk of suicidality, serotonin syndrome, increased QTc interval and SIADH related to treatment with antidepressants; Risk of induction of manic symptoms in certain patient populations and risk of parkinsonian symptoms, metabolic syndrome, tardive dyskinesia and neuroleptic malignant syndrome related to treatment with antipsychotic medications     Controlled Medication Discussion:     Not applicable    Treatment Plan:    Due for update/Updated:   no  Last treatment plan done 10/28/22 by Logan Yanes LCSW  Treatment Plan due on 4/28/23  ANGELA Hodges 11/09/22    This note was shared with patient

## 2022-11-08 ENCOUNTER — TELEPHONE (OUTPATIENT)
Dept: PSYCHIATRY | Facility: CLINIC | Age: 22
End: 2022-11-08

## 2022-11-08 NOTE — TELEPHONE ENCOUNTER
Writer left a voice message to see if pt will be in the state of [place] for the visit, if not pt will have to be rescheduled, thank you

## 2022-11-08 NOTE — TELEPHONE ENCOUNTER
Pt called to get her appt for 11/9/2022 at 10:00 a m  switched to a virtual visit due to will be out of town  Writer switched appt      Link should be send to phone # [phone]

## 2022-11-09 ENCOUNTER — TELEMEDICINE (OUTPATIENT)
Dept: BEHAVIORAL/MENTAL HEALTH CLINIC | Facility: CLINIC | Age: 22
End: 2022-11-09

## 2022-11-09 DIAGNOSIS — F41.9 ANXIETY AND DEPRESSION: ICD-10-CM

## 2022-11-09 DIAGNOSIS — F31.81 BIPOLAR II DISORDER (HCC): Primary | ICD-10-CM

## 2022-11-09 DIAGNOSIS — F32.A ANXIETY AND DEPRESSION: ICD-10-CM

## 2022-11-09 RX ORDER — BUPROPION HYDROCHLORIDE 100 MG/1
100 TABLET, EXTENDED RELEASE ORAL DAILY
Qty: 30 TABLET | Refills: 0 | Status: SHIPPED | OUTPATIENT
Start: 2022-11-21

## 2022-11-09 RX ORDER — BUPROPION HYDROCHLORIDE 150 MG/1
150 TABLET, EXTENDED RELEASE ORAL DAILY
Qty: 14 TABLET | Refills: 0 | COMMUNITY
Start: 2022-11-09

## 2022-11-09 NOTE — PSYCH
Virtual Regular Visit     Verification of patient location:     Patient is located in the following state in which I hold an active license [place]    Problem List Items Addressed This Visit        Other    Anxiety and depression    Bipolar II disorder ([facility] [address] ) - Primary          This virtual visit started at 10 AM and ended at 10:52 AM     Reason for visit is scheduled virtual regular visit  Encounter provider Alexander Angeles LCSW     Provider located at [address] [place] [address] Kaila Ramos [facility] [address] [id number] [phone]     Recent Visits  No visits were found meeting these conditions  Showing recent visits within past 7 days and meeting all other requirements  Today's Visits  Date Type Provider Dept   11/09/22 Telemedicine [facility] [address] [facility], [address] [place] [address] [facility] The[facility]ist [place]   Showing today's visits and meeting all other requirements  Future Appointments  No visits were found meeting these conditions    Showing future appointments within next 150 days and meeting all other requirements      HPI     Past Medical History:   Diagnosis Date   • Anxiety    • Concussion    • Depression    • Head injury     concussions in high school and in college   • Self-injurious behavior        Past Surgical History:   Procedure Laterality Date   • WISDOM TOOTH EXTRACTION         Current Outpatient Medications   Medication Sig Dispense Refill   • Adapalene-Benzoyl Peroxide 0 1-2 5 % gel Apply 1 application topically daily at bedtime (Patient not taking: Reported on 9/16/2022) 45 g 0   • [START ON 11/21/2022] buPROPion (Wellbutrin SR) 100 mg 12 hr tablet Take 1 tablet (100 mg total) by mouth 2 (two) times a day Do not start before November 21, 2022  30 tablet 0   • buPROPion (Wellbutrin SR) 150 mg 12 hr tablet Take 1 tablet (150 mg total) by mouth 2 (two) times a day 14 tablet 0   • clindamycin (CLINDAGEL) 1 % gel Apply topically 2 (two) times a day (Patient not taking: Reported on 9/16/2022) 30 g 0   • hydrOXYzine pamoate (VISTARIL) 50 mg capsule Take 1 capsule (50 mg total) by mouth daily at bedtime as needed for anxiety 30 capsule 1   • metoclopramide (Reglan) 10 mg tablet Take 1 tablet (10 mg total) by mouth 3 (three) times a day as needed (headache) 15 tablet 0   • norgestimate-ethinyl estradiol (Alisha) 0 25-35 MG-MCG per tablet Take 1 tablet by mouth daily 28 tablet 0   • OLANZapine (ZyPREXA) 2 5 mg tablet Take 1 tablet (2 5 mg total) by mouth daily at bedtime 30 tablet 1   • traZODone (DESYREL) 50 mg tablet Take 1 tablet (50 mg total) by mouth daily at bedtime 30 tablet 0     No current facility-administered medications for this visit  No Known Allergies    The patient was identified by name and date of birth  Baldwin Closs was informed that this is a telemedicine visit and that the visit is being conducted through a virtual online media the [employer] platform  She agrees to proceed     My office door was closed  No one else was in the room  She acknowledged consent and understanding of privacy and security of the video platform  The patient has agreed to participate and understands they can discontinue the visit at any time  Patient is aware and confirmed that this virtual visit is a billable service  D: This therapist met with Marge Streeter for a(n) Individual Therapy session via virtual format  Marge Streeter talked about being tired all the time and her psychiatrist Dr Abhinav Sue believes she his depressed and is tapering her off wellbutrin and they are discussing working in a different rx  When asked, she hesitantly agrees she is indeed depressed  She stated that she gets into phases where she just comes out of it  She said it lasts about a month  She stated that she doesn't like her classes or the focus on school counseling  She stated considering alternative school [employer] [address] or [place] for criminal justice   She stated that  was her original focus and that she chose [employer] to be with her friends  She stated this school doesn't have a cj program  She stated she will finish the year because she paid for it, but that she wants to get back to cj  We discussed and explored, and with explorative questioning, Sina Vázquez affirmed that this is not an impulsive decision, and that she knows she is interested in the criminal justice route  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, and wants to help people, wants to work with kids  She stated considering probation/parole/juvenile system  Jackie Harding also discussed lack of motivation to go to the gym  She stated she also wants to lose weight  We discussed and explored her schedule, obstacles, goals in these areas  Through discussion, Sina Vázquez stated a goal to go to the gym 3 days per week, specifically planning in the mornings T, Th, Sat, and alternative Mondays if she is called to go to work on Saturday shift  She stated a goal to decrease gluten/carbohydrate intake and eat more steady meals, rather than intermittent snacking  She stated goal to lose 2-3 lbs in 4-6 weeks  We summed up that Sina Vázquez has the mental motivation, but has had difficulty in beginning the execution  We discussed this goal in getting started as part of the momentum for achieving these goals  Sina Vázquez discussed overall plan to get through the semester and take classes next semester that she might like better and/or be able to transfer to another school  She stated believing she will be able to do the school work and pass her classes successfully  Sina Vázquez denied suicidal intent or plan, though endorsing occasional, fleeting thoughts of not wanting to live  She said she countered these thoughts knowing what it would do to her family and her cat  She denied thought of self-harm/cutting, and endorsed better thought process in managing her emotional stress when experienced  Due to scheduling, our next appointment is Dec 29    She discussed intermittent visits home over the remainder of the year  Ladonna Motley stated she will be ok, but was agreeable to reach out to school [employer] services if needed/worsening depression, as well as reaching out to this provider if needed; also acknowledging psychiatrist as a resource  We set additional biweekly appointments into February  A: Ladonna Motley was oriented x3  She was focused and engaged  She played with her hair the entire session  She spoke with limited energy, but did remain focused and interactive throughout the session  She presented as stable  Ladonna Motley did not present with HI SI or SIB  P: Daksha Martin next session is scheduled for 12/29/22    follow up in 1 month, then biweekly thereafter    Goals addressed in session: Goal 1         Current suicide risk : Low     Ladonna Motley is futuristic in thinking, goal-oriented, and motivated for treatment; no SI/HI/SIB    Behavioral Health Treatment Plan [facility]: Diagnosis and Treatment Plan explained to Maxime Tim relates understanding diagnosis and is agreeable to Treatment Plan  Yes     Video Exam     There were no vitals filed for this visit  VIRTUAL VISIT DISCLAIMER     Samantha Manuel verbally agrees to participate in [employer]  Pt is aware that [employer] could be limited without vital signs or the ability to perform a full hands-on physical exam  Samantha Manuel understands she or the provider may request at any time to terminate the video visit and request the patient to seek care or treatment in person      Michela Bates  Visit start and stop times:    11/09/22  Start Time: 1100  Stop Time: 1152  Total Visit Time: 52 minutes

## 2022-11-28 DIAGNOSIS — Z30.011 ENCOUNTER FOR INITIAL PRESCRIPTION OF CONTRACEPTIVE PILLS: ICD-10-CM

## 2022-11-29 RX ORDER — NORGESTIMATE AND ETHINYL ESTRADIOL 0.25-0.035
1 KIT ORAL DAILY
Qty: 28 TABLET | Refills: 0 | Status: SHIPPED | OUTPATIENT
Start: 2022-11-29

## 2022-12-09 ENCOUNTER — TELEMEDICINE (OUTPATIENT)
Dept: PSYCHIATRY | Facility: CLINIC | Age: 22
End: 2022-12-09

## 2022-12-09 DIAGNOSIS — F41.9 ANXIETY AND DEPRESSION: ICD-10-CM

## 2022-12-09 DIAGNOSIS — F41.1 GAD (GENERALIZED ANXIETY DISORDER): ICD-10-CM

## 2022-12-09 DIAGNOSIS — F31.81 BIPOLAR II DISORDER (HCC): Primary | ICD-10-CM

## 2022-12-09 DIAGNOSIS — F32.A ANXIETY AND DEPRESSION: ICD-10-CM

## 2022-12-09 RX ORDER — LAMOTRIGINE 25 MG/1
25 TABLET, CHEWABLE ORAL DAILY
Qty: 14 TABLET | Refills: 0 | Status: SHIPPED | OUTPATIENT
Start: 2022-12-09

## 2022-12-09 RX ORDER — BUPROPION HYDROCHLORIDE 75 MG/1
75 TABLET ORAL DAILY
Qty: 7 TABLET | Refills: 0 | Status: SHIPPED | OUTPATIENT
Start: 2022-12-09

## 2022-12-09 NOTE — PSYCH
Virtual Regular Visit    Verification of patient location:    Patient is located in the following state in which I hold an active license [place]    Problem List Items Addressed This Visit        Other    Anxiety and depression    Relevant Medications    buPROPion (WELLBUTRIN) 75 mg tablet    Bipolar II disorder ([address] ) - Primary    Relevant Medications    buPROPion (WELLBUTRIN) 75 mg tablet    lamoTRIgine (LaMICtal) 25 MG CHEW   Other Visit Diagnoses     JOHANA (generalized anxiety disorder)        Relevant Medications    buPROPion (WELLBUTRIN) 75 mg tablet             Encounter provider ANGELA Diaz    Provider located at    [address] [place] [address]  [address] [facility] [address] [phone]    Recent Visits  No visits were found meeting these conditions  Showing recent visits within past 7 days and meeting all other requirements  Today's Visits  Date Type Provider Dept   12/09/22 Telemedicine Ale Nava, [address] [place] [address] today's visits and meeting all other requirements  Future Appointments  No visits were found meeting these conditions  Showing future appointments within next 150 days and meeting all other requirements         The patient was identified by name and date of birth  Ryan Lopez was informed that this is a telemedicine visit and [facility]  She agrees to proceed     My office door was closed  No one else was in the room  She acknowledged consent and understanding of privacy and security of the video platform  The patient has agreed to participate and understands they can discontinue the visit at any time  Patient is aware this is a billable service       HPI     Current Outpatient Medications   Medication Sig Dispense Refill   • buPROPion (WELLBUTRIN) 75 mg tablet Take 1 tablet (75 mg total) by mouth in the morning 7 tablet 0   • lamoTRIgine (LaMICtal) 25 MG CHEW Chew 1 tablet (25 mg total) daily 14 tablet 0   • Adapalene-Benzoyl Peroxide 0 1-2 5 % gel Apply 1 application topically daily at bedtime (Patient not taking: Reported on 9/16/2022) 45 g 0   • clindamycin (CLINDAGEL) 1 % gel Apply topically 2 (two) times a day (Patient not taking: Reported on 9/16/2022) 30 g 0   • hydrOXYzine pamoate (VISTARIL) 50 mg capsule Take 1 capsule (50 mg total) by mouth daily at bedtime as needed for anxiety 30 capsule 1   • metoclopramide (Reglan) 10 mg tablet Take 1 tablet (10 mg total) by mouth 3 (three) times a day as needed (headache) 15 tablet 0   • norgestimate-ethinyl estradiol (Alisha) 0 25-35 MG-MCG per tablet Take 1 tablet by mouth daily 28 tablet 0   • OLANZapine (ZyPREXA) 2 5 mg tablet Take 1 tablet (2 5 mg total) by mouth daily at bedtime 30 tablet 1   • traZODone (DESYREL) 50 mg tablet Take 1 tablet (50 mg total) by mouth daily at bedtime 30 tablet 0     No current facility-administered medications for this visit  Review of Systems  Video Exam    There were no vitals filed for this visit  Physical Exam   As a result of this visit, I have referred the patient for further respiratory evaluation  No    I spent 25 minutes directly with the patient during this visit  VIRTUAL VISIT DISCLAIMER    Greg Peters acknowledges that she has consented to an online visit or consultation  She understands that the online visit is based solely on information provided by her, and that, in the absence of a face-to-face physical evaluation by the physician, the diagnosis she receives is both limited and provisional in terms of accuracy and completeness  This is not intended to replace a full medical face-to-face evaluation by the physician  Greg Peters understands and accepts these terms      MEDICATION MANAGEMENT NOTE        [facility]    Name and Date of Birth:  Greg Peters 25 y o  2000 MRN: [id number]    Date of Visit: December 9, 2022    No Known Allergies    Visit Time    Visit Start Time: 2085  Visit Stop Time: 6323  Total Visit Duration: 25 minutes    SUBJECTIVE:    Ofelia Coleman is seen today for a follow up for depression, Bipolar Disorder type II and anxiety  She continues to experience on and off symptoms since the last visit  Ofelia Coleman is a 70-year-old female with history of bipolar 2, anxiety and depression  She is seen virtually today for medication management follow-up  She was last seen by this provider on 11/7/2022  Today she reports that she dropped out of all of her classes for the semester, left her apartment at school, and ran into an old ex-boyfriend  She is currently living with her sister and her sister's apartment  She states that this is going fine  She is also hanging out with friends, and being social   She reports that she wants to do a different program at a different school that is more related to criminal justice  She is planning on taking 1 class next semester but she is not going back full-time as originally planned  She is continuing to see her therapist and her appointment is scheduled for after [place]  She states her sleep is off and on, her appetite is good  She reports that she is working at [employer] but only on the weekends so she is not doing anything during the week  She denies any suicidal or self abusive behaviors at this time  She denies any homicidal ideation  She denies auditory and visual hallucinations  We will continue to taper her off of the Wellbutrin, decrease it to 75 for 1 week then discontinue  We will initiate Lamictal 25 mg x 14 days  She is agreeable to this treatment plan at this time  She is calm and appropriate during conversation, friendly with good eye contact  We will follow up in 2 weeks to reassess the effectiveness of the Lamictal   She is agreeable with this treatment plan at this time       Lamotrigine PARQ completed including dizziness, headaches, ataxia, vision problems, somnolence, sleep changes, cognitive difficulties, rash (including Becerril-Chato rash), and others, risk of teratogenicity for females  She denies any side effects from current psychiatric medications  PLAN:  Decrease Wellbutrin to 75 mg x 7 days then discontinue  Initiate Lamictal 25 mg p o  daily x14 days  Continue Zyprexa 2 5 mg p o  at bedtime  Continue trazodone 50 mg p o  at bedtime as needed  Follow-up in 2 weeks  Attending therapy with individual therapist  She will call sooner with concerns or issues if they arise prior to scheduled appointment    Aware of 24 hour and weekend coverage for urgent situations accessed by calling [facility] main practice number  Continue psychotherapy with therapist  Medication management every 2 weeks  Aware of need to follow up with family physician for medical issues    Diagnoses and all orders for this visit:    Bipolar II disorder ([address] )  -     lamoTRIgine (LaMICtal) 25 MG CHEW; Chew 1 tablet (25 mg total) daily    Anxiety and depression    JOHANA (generalized anxiety disorder)  -     buPROPion (WELLBUTRIN) 75 mg tablet;  Take 1 tablet (75 mg total) by mouth in the morning      Current Outpatient Medications on File Prior to Visit   Medication Sig Dispense Refill   • Adapalene-Benzoyl Peroxide 0 1-2 5 % gel Apply 1 application topically daily at bedtime (Patient not taking: Reported on 9/16/2022) 45 g 0   • clindamycin (CLINDAGEL) 1 % gel Apply topically 2 (two) times a day (Patient not taking: Reported on 9/16/2022) 30 g 0   • hydrOXYzine pamoate (VISTARIL) 50 mg capsule Take 1 capsule (50 mg total) by mouth daily at bedtime as needed for anxiety 30 capsule 1   • metoclopramide (Reglan) 10 mg tablet Take 1 tablet (10 mg total) by mouth 3 (three) times a day as needed (headache) 15 tablet 0   • norgestimate-ethinyl estradiol (Alisha) 0 25-35 MG-MCG per tablet Take 1 tablet by mouth daily 28 tablet 0   • OLANZapine (ZyPREXA) 2 5 mg tablet Take 1 tablet (2 5 mg total) by mouth daily at bedtime 30 tablet 1   • traZODone (DESYREL) 50 mg tablet Take 1 tablet (50 mg total) by mouth daily at bedtime 30 tablet 0   • [DISCONTINUED] buPROPion (Wellbutrin SR) 100 mg 12 hr tablet Take 1 tablet (100 mg total) by mouth in the morning Do not start before November 21, 2022  30 tablet 0   • [DISCONTINUED] buPROPion (Wellbutrin SR) 150 mg 12 hr tablet Take 1 tablet (150 mg total) by mouth in the morning 14 tablet 0     No current facility-administered medications on file prior to visit  Psychotherapy Provided:     Individual psychotherapy provided: Yes  Counseling was provided during the session today for 16 minutes  Supportive counseling provided  Medication changes discussed with Mikie Bustamante  Medication education provided to Mikie Bustamante  Recent stressor including ongoing anxiety discussed with Mikie Bustamante  Coping strategies reviewed with Mikie Bustamante  Importance of medication and treatment compliance reviewed with Mikie Bustamante  Importance of follow up with family physician for medical issues reviewed with Mikie Bustamante  Reassurance and supportive therapy provided  Crisis/safety plan discussed with Mikie Bustamante  She will utilize crisis as needed    HPI ROS Appetite Changes and Sleep:     She reports normal sleep, normal appetite, adequate energy level   Denies homicidal ideation, denies suicidal ideation    Review Of Systems:    HPI ROS:               Medication Side Effects:  denies     Depression (10 worst): No change (Was continues)   Anxiety (10 worst): No change (Was continues)   Safety concerns (SI, HI, etc): denies (Was denies)   Sleep: normal (Was decreased, trouble falling asleep and frequent awakenings)   Energy: adequate (Was low)   Appetite: good (Was good)     General normal    Personality no change in personality   Constitutional as noted in HPI   ENT negative   Cardiovascular negative   Respiratory negative   Gastrointestinal negative   Genitourinary negative   Musculoskeletal negative   Integumentary negative   Neurological negative   Endocrine negative   Other Symptoms none, all other systems are negative     Mental Status Evaluation:    Appearance Adequate hygiene and grooming   Behavior calm and cooperative   Mood anxious and depressed  Depression Scale - no change of 10 (0 = No depression)  Anxiety Scale - no change of 10 (0 = No anxiety)   Speech Normal rate and volume   Affect appropriate and mood-congruent   Thought Processes Goal directed and coherent   Thought Content Does not verbalize delusional material   Associations Tightly connected   Perceptual Disturbances Denies hallucinations and does not appear to be responding to internal stimuli   Risk Potential Suicidal/Homicidal Ideation - No evidence of suicidal or homicidal ideation and patient does not verbalize suicidal or homicidal ideation  Risk of Violence - No evidence of risk for violence found on assessment  Risk of Self Mutilation - No evidence of risk for self mutilation found on assessment   Orientation oriented to person, place, time/date and situation   Memory recent and remote memory grossly intact   Consciousness alert and awake   Attention/Concentration attention span and concentration are age appropriate   Insight partial   Judgement partial   Muscle Strength and Gait normal muscle strength and normal muscle tone, normal gait/station and normal balance   Motor Activity no abnormal movements   Language no difficulty naming common objects, no difficulty repeating a phrase, no difficulty writing a sentence   Fund of Knowledge adequate knowledge of current events  adequate fund of knowledge regarding past history  adequate fund of knowledge regarding vocabulary      Past Psychiatric History  Previous diagnoses include anxiety and depression   Prior outpatient psychiatric treatment: Therapy as a child  [facility] therapy: as a child, unable to remember the place or the person   Prior inpatient psychiatric treatment: Yes, Stephany Cintron in March 2021  [address] suicide attempts: denies   Prior self harm: yes, cutting in March 2021  [address] violence or aggression: denies    Past Psychiatric History Update:     Inpatient Psychiatric Admission Since Last Encounter:   no  Changes to Outpatient Psychiatric Treatment Team:    no  Suicide Attempt Or Self Mutilation Since Last Encounter:   no  Incidence of Violent Behavior Since Last Encounter:   no    Traumatic History Update:     New Onset of Abuse Since Last Encounter:   no  Traumatic Events Since Last Encounter:   no    Past Medical History:    Past Medical History:   Diagnosis Date   • Anxiety    • Concussion    • Depression    • Head injury     concussions in high school and in college   • Self-injurious behavior         Past Surgical History:   Procedure Laterality Date   • WISDOM TOOTH EXTRACTION       No Known Allergies  Substance Abuse History:    Social History     Substance and Sexual Activity   Alcohol Use Yes   • Alcohol/week: 3 0 - 4 0 standard drinks   • Types: 3 - 4 Shots of liquor per week    Comment: 3-4 drinks, 2 times per week     Social History     Substance and Sexual Activity   Drug Use No     Social History:    Social History     Socioeconomic History   • Marital status: Single     Spouse name: Not on file   • Number of children: 0   • Years of education: senior in college currently   • Highest education level: High school graduate   Occupational History   • 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   Tobacco Use   • Smoking status: Never   • Smokeless tobacco: Never   Vaping Use   • Vaping Use: Never used   Substance and Sexual Activity   • Alcohol use:  Yes     Alcohol/week: 3 0 - 4 0 standard drinks     Types: 3 - 4 Shots of liquor per week     Comment: 3-4 drinks, 2 times per week   • Drug use: No   • Sexual activity: Yes     Partners: Male     Birth control/protection: Condom Male   Other Topics Concern • Not on file   Social History Narrative   • Not on file     Social Determinants of Health     Financial Resource Strain: Not on file   Food Insecurity: Not on file   Transportation Needs: Not on file   Physical Activity: Not on file   Stress: Not on file   Social Connections: Not on file   Intimate Partner Violence: Not on file   Housing Stability: Not on file     Family Psychiatric History:     Family History   Problem Relation Age of Onset   • Hypertension Mother    • Mental illness Mother    • Depression Mother    • Mental illness Sister    • Coronary artery disease Maternal Grandmother    • Throat cancer Paternal Grandfather      History Review: The following portions of the patient's history were reviewed and updated as appropriate: allergies, current medications, past family history, past medical history, past social history, past surgical history and problem list     OBJECTIVE:     Vital signs in last 24 hours: There were no vitals filed for this visit  Laboratory Results:   Recent Labs (last 2 months):   No visits with results within 2 Month(s) from this visit  Latest known visit with results is:   Office Visit on 06/30/2021   Component Date Value   • URINE HCG 06/30/2021     • N gonorrhoeae, DNA Probe 06/30/2021 Negative    • Chlamydia trachomatis, D* 06/30/2021 Positive (A)      I have personally reviewed all pertinent laboratory/tests results      Suicide/Homicide Risk Assessment:    Risk of Harm to Self:  The following ratings are based on assessment at the time of the interview  Recent Specific Risk Factors include: current depressive symptoms, current anxiety symptoms, left school  Demographic risk factors include: , age: young adult (15-24)  Historical Risk Factors include: chronic depression, chronic anxiety symptoms, history of self-abusive behavior, history of substance use, history of impulsive behaviors, history of traumatic experiences  Protective Factors: no current suicidal ideation, access to mental health treatment, compliant with medications, compliant with mental health treatment, stable living environment, sense of determination, strong relationships, supportive family  Based on today's assessment, Chioma Rogers presents the following risk of harm to self: low    Risk of Harm to Others: The following ratings are based on assessment at the time of the interview  Protective Factors: no current homicidal ideation  Based on today's assessment, Chioma Rogers presents the following risk of harm to others: none    The following interventions are recommended: no intervention changes needed    Medications Risks/Benefits:      Risks, Benefits And Possible Side Effects Of Medications:    Discussed risks and benefits of treatment with patient including risk of suicidality, serotonin syndrome, increased QTc interval and SIADH related to treatment with antidepressants; Risk of induction of manic symptoms in certain patient populations and risk of rash related to treatment with Lamictal     Controlled Medication Discussion:     Not applicable    Treatment Plan:    Due for update/Updated:   no  Last treatment plan done 10/28/22 by Maxime Butler LCSW  Treatment Plan due on 4/28/23  ANGELA Da Silva 12/09/22    This note was shared with patient

## 2022-12-21 ENCOUNTER — TELEMEDICINE (OUTPATIENT)
Dept: PSYCHIATRY | Facility: CLINIC | Age: 22
End: 2022-12-21

## 2022-12-21 DIAGNOSIS — F31.81 BIPOLAR II DISORDER (HCC): Primary | ICD-10-CM

## 2022-12-21 RX ORDER — LAMOTRIGINE 25 MG/1
50 TABLET ORAL DAILY
Qty: 60 TABLET | Refills: 0 | Status: SHIPPED | OUTPATIENT
Start: 2022-12-21

## 2022-12-21 NOTE — PSYCH
Virtual Regular Visit    Verification of patient location:    Patient is located in the following state in which I hold an active license [place]    Problem List Items Addressed This Visit        Other    Bipolar II disorder ([facility] [address] ) - Primary    Relevant Medications    lamoTRIgine (LaMICtal) 25 mg tablet    Other Relevant Orders    CBC and differential    Comprehensive metabolic panel    Lipid Panel with Direct LDL reflex    HEMOGLOBIN A1C W/ EAG ESTIMATION    TSH, 3rd generation with Free T4 reflex          Encounter provider ANGELA Espinal    Provider located at    [address] [place] [id number] [phone]    Recent Visits  No visits were found meeting these conditions  Showing recent visits within past 7 days and meeting all other requirements  Today's Visits  Date Type Provider Dept   12/21/22 Telemedicine Melonie Terry, [facility] today's visits and meeting all other requirements  Future Appointments  No visits were found meeting these conditions  Showing future appointments within next 150 days and meeting all other requirements         The patient was identified by name and date of birth  Charley Childers was informed that this is a telemedicine visit and that [facility]  She agrees to proceed     My office door was closed  No one else was in the room  She acknowledged consent and understanding of privacy and security of the video platform  The patient has agreed to participate and understands they can discontinue the visit at any time  Patient is aware this is a billable service       HPI     Current Outpatient Medications   Medication Sig Dispense Refill   • hydrOXYzine pamoate (VISTARIL) 50 mg capsule Take 1 capsule (50 mg total) by mouth daily at bedtime as needed for anxiety 30 capsule 1   • lamoTRIgine (LaMICtal) 25 mg tablet Take 2 tablets (50 mg total) by mouth daily 60 tablet 0   • norgestimate-ethinyl estradiol (Alisha) 0 25-35 MG-MCG per tablet Take 1 tablet by mouth daily 28 tablet 0   • traZODone (DESYREL) 50 mg tablet Take 1 tablet (50 mg total) by mouth daily at bedtime 30 tablet 0   • Adapalene-Benzoyl Peroxide 0 1-2 5 % gel Apply 1 application topically daily at bedtime (Patient not taking: Reported on 9/16/2022) 45 g 0   • clindamycin (CLINDAGEL) 1 % gel Apply topically 2 (two) times a day (Patient not taking: Reported on 9/16/2022) 30 g 0   • metoclopramide (Reglan) 10 mg tablet Take 1 tablet (10 mg total) by mouth 3 (three) times a day as needed (headache) 15 tablet 0     No current facility-administered medications for this visit  Review of Systems  Video Exam    There were no vitals filed for this visit  Physical Exam   As a result of this visit, I have referred the patient for further respiratory evaluation  No    I spent 20 minutes directly with the patient during this visit  VIRTUAL VISIT DISCLAIMER    Cynthia Guzman acknowledges that she has consented to an online visit or consultation  She understands that the online visit is based solely on information provided by her, and that, in the absence of a face-to-face physical evaluation by the physician, the diagnosis she receives is both limited and provisional in terms of accuracy and completeness  This is not intended to replace a full medical face-to-face evaluation by the physician  Cynthia Guzman understands and accepts these terms  MEDICATION MANAGEMENT NOTE        [address]    Name and Date of Birth:  Cynthia Guzman 25 y o  2000 MRN: [id number]    Date of Visit: December 21, 2022    No Known Allergies    Visit Time    Visit Start Time: 5197  Visit Stop Time: 1550  Total Visit Duration: 20 minutes    SUBJECTIVE:    Jade Ca is seen today for a follow up for Bipolar Disorder type II   She continues to experience on and off symptoms since the last visit  Alean Osgood is a 25year old female with a history of bipolar 2 disorder  She is seen today for medication management follow up  She reports that she still has some depression, and states that she prefers to stay in her room  However, she does go to work on the weekends, and she made a trip to [place] with her sister, a friend, and her friend's son  She states that she feels as though she sleeps a lot and when she isn't working, she is at home sleeping  She states that she is "always tired"  She was encouraged to find some source of enjoyment, states she plans on joining a gym in the new year  She is also registered to start a class in the next semester  She states she is looking forward to starting the class  She also states she was accepted into 2 programs for grad school that she applied to, but remains unsure if she wants to actually go to school in the fall  She is still deciding and states she does not need to make a decision until March  She does admit that she is proud of her accomplishments up to this point  She denies SI/HI, denies auditory and visual hallucinations  Denies any self injurious thoughts or behaviors  She denies any side effects to the lamictal and has successfully discontinued the wellbutrin  She states she has stopped the zyprexa, so we will discontinue that medication at this time as well  She has therapy scheduled for 12/29 and is looking forward to her session  We will increase the lamictal to 50mg at this time  She is agreeable to her treatment and will follow up in one month  She denies any side effects from current psychiatric medications  PLAN:  Continue vistaril 50mg PO daily PRN for anxiety  Increase lamictal to 50mg PO daily  Continue trazodone 50mg PO HS PRN  Continue therapy  Follow up with this provider on 1/20/23    Labs ordered: CBC, CMP, HgbA1C, Lipid, and TSH  She will call sooner with concerns or issues if they arise prior to scheduled appointment  Aware of 24 hour and weekend coverage for urgent situations accessed by calling [facility] main practice number  Continue psychotherapy with therapist  Medication management every 1 month  Aware of need to follow up with family physician for medical issues    Diagnoses and all orders for this visit:    Bipolar II disorder ([facility] [address] )  -     lamoTRIgine (LaMICtal) 25 mg tablet; Take 2 tablets (50 mg total) by mouth daily  -     CBC and differential; Future  -     Comprehensive metabolic panel; Future  -     Lipid Panel with Direct LDL reflex; Future  -     HEMOGLOBIN A1C W/ EAG ESTIMATION; Future  -     TSH, 3rd generation with Free T4 reflex; Future    Lamotrigine PARQ completed including dizziness, headaches, ataxia, vision problems, somnolence, sleep changes, cognitive difficulties, rash (including Becerril-Chato rash), and others, risk of teratogenicity for females  Current Rating Scores:     Current PHQ-9   PHQ-2/9 Depression Screening    Little interest or pleasure in doing things: 2 - more than half the days  Feeling down, depressed, or hopeless: 2 - more than half the days  Trouble falling or staying asleep, or sleeping too much: 3 - nearly every day  Feeling tired or having little energy: 3 - nearly every day  Poor appetite or overeatin - more than half the days  Feeling bad about yourself - or that you are a failure or have let yourself or your family down: 1 - several days  Trouble concentrating on things, such as reading the newspaper or watching television: 3 - nearly every day  Moving or speaking so slowly that other people could have noticed   Or the opposite - being so fidgety or restless that you have been moving around a lot more than usual: 2 - more than half the days  Thoughts that you would be better off dead, or of hurting yourself in some way: 1 - several days  PHQ-9 Score: 19   PHQ-9 Interpretation: Moderately severe depression Current JOHANA-7 is   JOHANA-7 Flowsheet Screening    Flowsheet Row Most Recent Value   Over the last 2 weeks, how often have you been bothered by any of the following problems? Feeling nervous, anxious, or on edge 2   Not being able to stop or control worrying 3   Worrying too much about different things 3   Trouble relaxing 3   Being so restless that it is hard to sit still 2   Becoming easily annoyed or irritable 3   Feeling afraid as if something awful might happen 1   JOHANA-7 Total Score 17        Current Outpatient Medications on File Prior to Visit   Medication Sig Dispense Refill   • hydrOXYzine pamoate (VISTARIL) 50 mg capsule Take 1 capsule (50 mg total) by mouth daily at bedtime as needed for anxiety 30 capsule 1   • norgestimate-ethinyl estradiol (Alisha) 0 25-35 MG-MCG per tablet Take 1 tablet by mouth daily 28 tablet 0   • traZODone (DESYREL) 50 mg tablet Take 1 tablet (50 mg total) by mouth daily at bedtime 30 tablet 0   • [DISCONTINUED] lamoTRIgine (LaMICtal) 25 MG CHEW Chew 1 tablet (25 mg total) daily 14 tablet 0   • [DISCONTINUED] OLANZapine (ZyPREXA) 2 5 mg tablet Take 1 tablet (2 5 mg total) by mouth daily at bedtime 30 tablet 1   • Adapalene-Benzoyl Peroxide 0 1-2 5 % gel Apply 1 application topically daily at bedtime (Patient not taking: Reported on 9/16/2022) 45 g 0   • clindamycin (CLINDAGEL) 1 % gel Apply topically 2 (two) times a day (Patient not taking: Reported on 9/16/2022) 30 g 0   • metoclopramide (Reglan) 10 mg tablet Take 1 tablet (10 mg total) by mouth 3 (three) times a day as needed (headache) 15 tablet 0   • [DISCONTINUED] buPROPion (WELLBUTRIN) 75 mg tablet Take 1 tablet (75 mg total) by mouth in the morning (Patient not taking: Reported on 12/21/2022) 7 tablet 0     No current facility-administered medications on file prior to visit         Psychotherapy Provided:     Individual psychotherapy provided: Yes  Counseling was provided during the session today for 16 minutes  Supportive counseling provided  Medication changes discussed with Boy Dhillon  Medication education provided to Boy Dhillon  Recent stressor including ongoing anxiety discussed with Boy Dhillon  Coping strategies reviewed with Boy Dhillon  Importance of medication and treatment compliance reviewed with Boy Dhillon  Importance of follow up with family physician for medical issues reviewed with Boy Dhillon  Reassurance and supportive therapy provided  Crisis/safety plan discussed with Boy Dhillon  She will utilize crisis as needed  HPI ROS Appetite Changes and Sleep:     She reports Increased sleep, normal appetite, adequate energy level   Denies homicidal ideation, denies suicidal ideation    Review Of Systems:  HPI ROS:               Medication Side Effects:  denies     Depression (10 worst): No change (Was no change)   Anxiety (10 worst): No change (Was no change)   Safety concerns (SI, HI, etc): denies (Was denies)   Sleep: increased (Was normal)   Energy: adequate (Was adequate)   Appetite: good (Was good)     General sleep disturbances   Personality no change in personality   Constitutional as noted in HPI   ENT negative   Cardiovascular negative   Respiratory negative   Gastrointestinal negative   Genitourinary negative   Musculoskeletal negative   Integumentary negative   Neurological negative   Endocrine negative   Other Symptoms none, all other systems are negative     Mental Status Evaluation:    Appearance Appropriately dressed and Good eye contact   Behavior calm and cooperative and friendly   Mood depressed  Depression Scale - no change of 10 (0 = No depression)  Anxiety Scale - no change of 10 (0 = No anxiety)   Speech Normal rate and volume   Affect constricted   Thought Processes Goal directed and coherent   Thought Content Does not verbalize delusional material   Associations Tightly connected   Perceptual Disturbances Denies hallucinations and does not appear to be responding to internal stimuli   Risk Potential Suicidal/Homicidal Ideation - No evidence of suicidal or homicidal ideation and patient does not verbalize suicidal or homicidal ideation  Risk of Violence - No evidence of risk for violence found on assessment  Risk of Self Mutilation - No evidence of risk for self mutilation found on assessment   Orientation oriented to person, place, time/date and situation   Memory recent and remote memory grossly intact   Consciousness alert and awake   Attention/Concentration attention span and concentration are age appropriate   Insight partial   Judgement intact   Muscle Strength and Gait normal muscle strength and normal muscle tone, normal gait/station and normal balance   Motor Activity no abnormal movements   Language no difficulty naming common objects, no difficulty repeating a phrase, no difficulty writing a sentence   Fund of Knowledge adequate knowledge of current events  adequate fund of knowledge regarding past history  adequate fund of knowledge regarding vocabulary      Past Psychiatric History  Previous diagnoses include anxiety and depression   Prior outpatient psychiatric treatment: Therapy as a child  [facility] P[facility] therapy: as a child, unable to remember the place or the person   Prior inpatient psychiatric treatment: Yes, [facility] in March 2021  Lazarus Pinta suicide attempts: denies   Prior self harm: yes, cutting in March 2021  Lazarus Pinta violence or aggression: denies    Past Psychiatric History Update:     Inpatient Psychiatric Admission Since Last Encounter:   no  Changes to Outpatient Psychiatric Treatment Team:    no  Suicide Attempt Or Self Mutilation Since Last Encounter:   no  Incidence of Violent Behavior Since Last Encounter:   no    Traumatic History Update:     New Onset of Abuse Since Last Encounter:   no  Traumatic Events Since Last Encounter:   no    Past Medical History:    Past Medical History:   Diagnosis Date   • Anxiety    • Concussion    • Depression    • Head injury concussions in high school and in college   • Self-injurious behavior         Past Surgical History:   Procedure Laterality Date   • WISDOM TOOTH EXTRACTION       No Known Allergies  Substance Abuse History:    Social History     Substance and Sexual Activity   Alcohol Use Yes   • Alcohol/week: 3 0 - 4 0 standard drinks   • Types: 3 - 4 Shots of liquor per week    Comment: 3-4 drinks, 2 times per week     Social History     Substance and Sexual Activity   Drug Use No     Social History:    Social History     Socioeconomic History   • Marital status: Single     Spouse name: Not on file   • Number of children: 0   • Years of education: senior in college currently   • Highest education level: High school graduate   Occupational History   • 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   Tobacco Use   • Smoking status: Never   • Smokeless tobacco: Never   Vaping Use   • Vaping Use: Never used   Substance and Sexual Activity   • Alcohol use: Yes     Alcohol/week: 3 0 - 4 0 standard drinks     Types: 3 - 4 Shots of liquor per week     Comment: 3-4 drinks, 2 times per week   • Drug use: No   • Sexual activity: Yes     Partners: Male     Birth control/protection: Condom Male   Other Topics Concern   • Not on file   Social History Narrative   • Not on file     Social Determinants of Health     Financial Resource Strain: Not on file   Food Insecurity: Not on file   Transportation Needs: Not on file   Physical Activity: Not on file   Stress: Not on file   Social Connections: Not on file   Intimate Partner Violence: Not on file   Housing Stability: Not on file     Family Psychiatric History:     Family History   Problem Relation Age of Onset   • Hypertension Mother    • Mental illness Mother    • Depression Mother    • Mental illness Sister    • Coronary artery disease Maternal Grandmother    • Throat cancer Paternal Grandfather      History Review: The following portions of the patient's history were reviewed and updated as appropriate: allergies, current medications, past family history, past medical history, past social history, past surgical history and problem list     OBJECTIVE:     Vital signs in last 24 hours: There were no vitals filed for this visit  Laboratory Results:   Recent Labs (last 2 months):   No visits with results within 2 Month(s) from this visit  Latest known visit with results is:   Office Visit on 06/30/2021   Component Date Value   • URINE HCG 06/30/2021     • N gonorrhoeae, DNA Probe 06/30/2021 Negative    • Chlamydia trachomatis, D* 06/30/2021 Positive (A)      I have personally reviewed all pertinent laboratory/tests results  Suicide/Homicide Risk Assessment:    Risk of Harm to Self:  The following ratings are based on assessment at the time of the interview  Recent Specific Risk Factors include: current depressive symptoms, current anxiety symptoms, social isolation  Demographic risk factors include: , age: young adult (15-24)  Historical Risk Factors include: chronic depression, chronic anxiety symptoms, history of suicide attempt, history of self-abusive behavior, history of substance use, history of impulsive behaviors, history of traumatic experiences  Protective Factors: no current suicidal ideation, access to mental health treatment, compliant with medications, compliant with mental health treatment, effective coping skills, having a desire to be alive, stable living environment, stable job, sense of determination, supportive family  Based on today's assessment, Jose Maria Hankins presents the following risk of harm to self: low    Risk of Harm to Others:   The following ratings are based on assessment at the time of the interview  Protective Factors: no current homicidal ideation  Based on today's assessment, Jose Maria Hankins presents the following risk of harm to others: none    The following interventions are recommended: contracts for safety at present - agrees to go to ED if feeling unsafe, return in 1 month for reassessment, therapy appointment in 8 days, contracts for safety at present - agrees to call Crisis Intervention Service if feeling unsafe    Medications Risks/Benefits:      Risks, Benefits And Possible Side Effects Of Medications:    Discussed risks and benefits of treatment with patient including risk of suicidality, serotonin syndrome, increased QTc interval and SIADH related to treatment with antidepressants; Risk of induction of manic symptoms in certain patient populations and risk of rash related to treatment with Lamictal     Controlled Medication Discussion:     Not applicable    Treatment Plan:    Due for update/Updated:   no  Last treatment plan done 10/28/22 by ANGELA Dyer  Treatment Plan due on 4/28/22  ANGELA Hager 12/21/22    This note was shared with patient

## 2022-12-27 ENCOUNTER — TELEPHONE (OUTPATIENT)
Dept: PSYCHIATRY | Facility: CLINIC | Age: 22
End: 2022-12-27

## 2022-12-28 DIAGNOSIS — Z30.011 ENCOUNTER FOR INITIAL PRESCRIPTION OF CONTRACEPTIVE PILLS: ICD-10-CM

## 2022-12-28 RX ORDER — NORGESTIMATE AND ETHINYL ESTRADIOL 0.25-0.035
1 KIT ORAL DAILY
Qty: 28 TABLET | Refills: 0 | Status: SHIPPED | OUTPATIENT
Start: 2022-12-28

## 2022-12-29 ENCOUNTER — TELEMEDICINE (OUTPATIENT)
Dept: BEHAVIORAL/MENTAL HEALTH CLINIC | Facility: CLINIC | Age: 22
End: 2022-12-29

## 2022-12-29 DIAGNOSIS — F32.A ANXIETY AND DEPRESSION: Primary | ICD-10-CM

## 2022-12-29 DIAGNOSIS — F31.81 BIPOLAR II DISORDER (HCC): ICD-10-CM

## 2022-12-29 DIAGNOSIS — F41.9 ANXIETY AND DEPRESSION: Primary | ICD-10-CM
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
## 2023-01-17 ENCOUNTER — TELEPHONE (OUTPATIENT)
Dept: OTHER | Facility: OTHER | Age: 23
End: 2023-01-17

## 2023-01-17 NOTE — TELEPHONE ENCOUNTER
Patient is calling regarding cancelling an appointment      Date/Time: 01/18/2023 @ 10:00 am with Huy Braun LCSW    Patient was rescheduled: YES [] NO [x]    Patient requesting call back to reschedule: YES [x] NO []

## 2023-01-18 ENCOUNTER — TELEPHONE (OUTPATIENT)
Dept: PSYCHIATRY | Facility: CLINIC | Age: 23
End: 2023-01-18

## 2023-01-18 NOTE — TELEPHONE ENCOUNTER
Writer contacted pt in regards to a vm we received requesting a call back to reschedule appt  Writer informed pt that she has 3 upcoming appts but a message will be sent to provider to verify if a fourth one is needed to proceed to schedule      Phone # [phone]

## 2023-01-20 ENCOUNTER — TELEMEDICINE (OUTPATIENT)
Dept: PSYCHIATRY | Facility: CLINIC | Age: 23
End: 2023-01-20

## 2023-01-20 DIAGNOSIS — F31.81 BIPOLAR II DISORDER (HCC): Primary | ICD-10-CM

## 2023-01-20 RX ORDER — LAMOTRIGINE 25 MG/1
75 TABLET ORAL DAILY
Qty: 90 TABLET | Refills: 0 | Status: SHIPPED | OUTPATIENT
Start: 2023-01-20

## 2023-01-20 NOTE — PSYCH
Virtual Regular Visit    Verification of patient location:    Patient is located in the following state in which I hold an active license [place]    Problem List Items Addressed This Visit        Other    Bipolar II disorder ([facility] [address] ) - Primary    Relevant Medications    lamoTRIgine (LaMICtal) 25 mg tablet          Encounter provider ANGELA Carranza    Provider located at    [address] [place] [id number] [phone]    Recent Visits  No visits were found meeting these conditions  Showing recent visits within past 7 days and meeting all other requirements  Today's Visits  Date Type Provider Dept   01/20/23 Telemedicine Evelina Juarez, [facility] today's visits and meeting all other requirements  Future Appointments  No visits were found meeting these conditions  Showing future appointments within next 150 days and meeting all other requirements         The patient was identified by name and date of birth  Osito Akins was informed that this is a telemedicine visit and that the [facility]  She agrees to proceed     My office door was closed  No one else was in the room  She acknowledged consent and understanding of privacy and security of the video platform  The patient has agreed to participate and understands they can discontinue the visit at any time  Patient is aware this is a billable service       HPI     Current Outpatient Medications   Medication Sig Dispense Refill   • hydrOXYzine pamoate (VISTARIL) 50 mg capsule Take 1 capsule (50 mg total) by mouth daily at bedtime as needed for anxiety 30 capsule 1   • lamoTRIgine (LaMICtal) 25 mg tablet Take 3 tablets (75 mg total) by mouth daily 90 tablet 0   • norgestimate-ethinyl estradiol (Alisha) 0 25-35 MG-MCG per tablet Take 1 tablet by mouth daily 28 tablet 0   • traZODone (DESYREL) 50 mg tablet Take 1 tablet (50 mg total) by mouth daily at bedtime 30 tablet 0   • Adapalene-Benzoyl Peroxide 0 1-2 5 % gel Apply 1 application topically daily at bedtime (Patient not taking: Reported on 9/16/2022) 45 g 0   • clindamycin (CLINDAGEL) 1 % gel Apply topically 2 (two) times a day (Patient not taking: Reported on 9/16/2022) 30 g 0   • metoclopramide (Reglan) 10 mg tablet Take 1 tablet (10 mg total) by mouth 3 (three) times a day as needed (headache) 15 tablet 0     No current facility-administered medications for this visit  Review of Systems  Video Exam    There were no vitals filed for this visit  Physical Exam   As a result of this visit, I have referred the patient for further respiratory evaluation  No    I spent 25 minutes directly with the patient during this visit  VIRTUAL VISIT DISCLAIMER    Deshaun Mayers acknowledges that she has consented to an online visit or consultation  She understands that the online visit is based solely on information provided by her, and that, in the absence of a face-to-face physical evaluation by the physician, the diagnosis she receives is both limited and provisional in terms of accuracy and completeness  This is not intended to replace a full medical face-to-face evaluation by the physician  Deshaun Freddyjanette understands and accepts these terms  MEDICATION MANAGEMENT NOTE        [address]    Name and Date of Birth:  Deshaun Mayers 25 y o  2000 MRN: [id number]    Date of Visit: January 20, 2023    No Known Allergies    Visit Time    Visit Start Time: 1000  Visit Stop Time: 8756  Total Visit Duration: 25 minutes    SUBJECTIVE:    Osmin Jackson is seen today for a follow up for Bipolar Disorder type II  She continues to improve slowly since the last visit  Osmin Jackson is a 20-year-old female with a history of bipolar 2 disorder  She is seen virtually today for medication management follow-up    She was last seen by this provider on 12/21/2022  We have been titrating her Lamictal   She reports that she feels some improvement since starting the Lamictal   However she continues to have some depression  She appears brighter today, dressed and has make up on, she states she is leaving for work following the appointment  If she is not working, she does not like to get out of bed  She does work 5 days/week at [employer]  She reports okay appetite, poor concentration  She reports that her drinking has decreased and she does not drink as much as she used to  She states that she went out once this week and only had 1 or 2 drinks  She is reporting some passive suicidal ideation, no intent or plan  She states that she will call crisis or the ER if her suicidal thoughts increase, she also knows she can call this office during the week  She continues to go to therapy, and is finding that helpful  She will begin her online class next week  She denies any homicidal ideation, denies any auditory or visual hallucinations  She has no side effects to the Lamictal at this time  We will increase it to 75 mg and follow up on 2/17/23  She has not been using the trazodone, as she has not felt the need for assistance to fall asleep  She is agreeable with this treatment plan  She denies any side effects from current psychiatric medications      PLAN:  Increase Lamictal to 75 mg p o  daily  Continue Vistaril 50 mg p o  daily as needed for anxiety  Continue therapy with individual therapist  Follow up with this provider in 3 weeks  She will call sooner with concerns or issues if they arise prior to scheduled appointment  Aware of 24 hour and weekend coverage for urgent situations accessed by calling [facility] main practice number  Continue psychotherapy with therapist  Medication management every 3 weeks  Aware of need to follow up with family physician for medical issues    Diagnoses and all orders for this visit:    Bipolar II disorder ([facility] )  -     lamoTRIgine (LaMICtal) 25 mg tablet; Take 3 tablets (75 mg total) by mouth daily      Current Rating Scores:     Current PHQ-9   PHQ-2/9 Depression Screening    Little interest or pleasure in doing things: 3 - nearly every day  Feeling down, depressed, or hopeless: 2 - more than half the days  Trouble falling or staying asleep, or sleeping too much: 3 - nearly every day  Feeling tired or having little energy: 3 - nearly every day  Poor appetite or overeatin - several days  Feeling bad about yourself - or that you are a failure or have let yourself or your family down: 2 - more than half the days  Trouble concentrating on things, such as reading the newspaper or watching television: 3 - nearly every day  Moving or speaking so slowly that other people could have noticed  Or the opposite - being so fidgety or restless that you have been moving around a lot more than usual: 1 - several days  Thoughts that you would be better off dead, or of hurting yourself in some way: 1 - several days  PHQ-9 Score: 19   PHQ-9 Interpretation: Moderately severe depression        Current JOHANA-7 is   JOHANA-7 Flowsheet Screening    Flowsheet Row Most Recent Value   Over the last 2 weeks, how often have you been bothered by any of the following problems? Feeling nervous, anxious, or on edge 2   Not being able to stop or control worrying 3   Worrying too much about different things 3   Trouble relaxing 3   Being so restless that it is hard to sit still 2   Becoming easily annoyed or irritable 3   Feeling afraid as if something awful might happen 2   JOHANA-7 Total Score 18            Current Outpatient Medications on File Prior to Visit   Medication Sig Dispense Refill   • hydrOXYzine pamoate (VISTARIL) 50 mg capsule Take 1 capsule (50 mg total) by mouth daily at bedtime as needed for anxiety 30 capsule 1   • norgestimate-ethinyl estradiol (Alisha) 0 25-35 MG-MCG per tablet Take 1 tablet by mouth daily 28 tablet 0   • traZODone (DESYREL) 50 mg tablet Take 1 tablet (50 mg total) by mouth daily at bedtime 30 tablet 0   • [DISCONTINUED] lamoTRIgine (LaMICtal) 25 mg tablet Take 2 tablets (50 mg total) by mouth daily 60 tablet 0   • Adapalene-Benzoyl Peroxide 0 1-2 5 % gel Apply 1 application topically daily at bedtime (Patient not taking: Reported on 9/16/2022) 45 g 0   • clindamycin (CLINDAGEL) 1 % gel Apply topically 2 (two) times a day (Patient not taking: Reported on 9/16/2022) 30 g 0   • metoclopramide (Reglan) 10 mg tablet Take 1 tablet (10 mg total) by mouth 3 (three) times a day as needed (headache) 15 tablet 0     No current facility-administered medications on file prior to visit  Psychotherapy Provided:     Individual psychotherapy provided: Yes  Counseling was provided during the session today for 16 minutes  Supportive counseling provided  Importance of follow up for substance abuse issues discussed with Kai Vides  Medication changes discussed with Kai Vides  Medication education provided to Kai Vides  Recent stressors discussed with Kai Vides including family conflict and ongoing anxiety  Coping skills reviewed with Kai Vides  Educated on importance of medication and treatment compliance  Importance of follow up with family physician for medical issues reviewed with Kai Vides  Reassurance and supportive therapy provided  Crisis/safety plan discussed with Kai Vides  She will call crisis or go to the ER if suicidal thoughts increase  She also knows she can call this office if needed  HPI ROS Appetite Changes and Sleep:     She reports normal sleep, normal appetite, decreased energy   Denies homicidal ideation, Intermittent passive suicidal ideation without intent or plan    Review Of Systems:    HPI ROS:               Medication Side Effects:  denies     Depression (10 worst): decreased (Was no change)   Anxiety (10 worst): decreased (Was no change)   Safety concerns (SI, HI, etc): denies (Was denies)   Sleep: No change (Was increased)   Energy: low (Was adequate)   Appetite: good (Was good)     General sleep disturbances   Personality no change in personality   Constitutional as noted in HPI   ENT negative   Cardiovascular negative   Respiratory negative   Gastrointestinal negative   Genitourinary negative   Musculoskeletal negative   Integumentary negative   Neurological negative   Endocrine negative   Other Symptoms none, all other systems are negative     Mental Status Evaluation:    Appearance Appropriately dressed and Good eye contact   Behavior calm and cooperative   Mood anxious and depressed  Depression Scale - decreased of 10 (0 = No depression)  Anxiety Scale - decreased of 10 (0 = No anxiety)   Speech Normal rate and volume   Affect mood-congruent   Thought Processes Goal directed and coherent   Thought Content Does not verbalize delusional material   Associations Tightly connected   Perceptual Disturbances Denies hallucinations and does not appear to be responding to internal stimuli   Risk Potential Suicidal/Homicidal Ideation - Passive suicidal ideation without intent or plan  Risk of Violence - No evidence of risk for violence found on assessment  Risk of Self Mutilation - No evidence of risk for self mutilation found on assessment   Orientation oriented to person, place, time/date and situation   Memory recent and remote memory grossly intact   Consciousness alert and awake   Attention/Concentration attention span and concentration appear shorter than expected for age   Insight fair   Judgement intact   Muscle Strength and Gait normal muscle strength and normal muscle tone, normal gait/station and normal balance   Motor Activity no abnormal movements   Language no difficulty naming common objects, no difficulty repeating a phrase, no difficulty writing a sentence   Fund of Knowledge adequate knowledge of current events  adequate fund of knowledge regarding past history  adequate fund of knowledge regarding vocabulary      Past Psychiatric History  Previous diagnoses include anxiety and depression   Prior outpatient psychiatric treatment: Therapy as a child  Jae Silveira therapy: as a child, unable to remember the place or the person  Jae Silveira inpatient psychiatric treatment: Yes, [facility] in March 2021  Jae Silveira suicide attempts: denies   Prior self harm: yes, cutting in March 2021  Jae Silveira violence or aggression: denies    Past Psychiatric History Update:     Inpatient Psychiatric Admission Since Last Encounter:   no  Changes to Outpatient Psychiatric Treatment Team:    no  Suicide Attempt Or Self Mutilation Since Last Encounter:   no  Incidence of Violent Behavior Since Last Encounter:   no    Traumatic History Update:     New Onset of Abuse Since Last Encounter:   no  Traumatic Events Since Last Encounter:   no    Past Medical History:    Past Medical History:   Diagnosis Date   • Anxiety    • Concussion    • Depression    • Head injury     concussions in high school and in college   • Self-injurious behavior         Past Surgical History:   Procedure Laterality Date   • WISDOM TOOTH EXTRACTION       No Known Allergies  Substance Abuse History:    Social History     Substance and Sexual Activity   Alcohol Use Yes   • Alcohol/week: 3 0 - 4 0 standard drinks   • Types: 3 - 4 Shots of liquor per week    Comment: 3-4 drinks, 2 times per week     Social History     Substance and Sexual Activity   Drug Use No     Social History:    Social History     Socioeconomic History   • Marital status: Single     Spouse name: Not on file   • Number of children: 0   • Years of education: senior in college currently   • Highest education level: High school graduate   Occupational History   • 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   Tobacco Use   • Smoking status: Never   • Smokeless tobacco: Never   Vaping Use   • Vaping Use: Never used   Substance and Sexual Activity   • Alcohol use:  Yes     Alcohol/week: 3 0 - 4 0 standard drinks     Types: 3 - 4 Shots of liquor per week     Comment: 3-4 drinks, 2 times per week   • Drug use: No   • Sexual activity: Yes     Partners: Male     Birth control/protection: Condom Male   Other Topics Concern   • Not on file   Social History Narrative   • Not on file     Social Determinants of Health     Financial Resource Strain: Not on file   Food Insecurity: Not on file   Transportation Needs: Not on file   Physical Activity: Not on file   Stress: Not on file   Social Connections: Not on file   Intimate Partner Violence: Not on file   Housing Stability: Not on file     Family Psychiatric History:     Family History   Problem Relation Age of Onset   • Hypertension Mother    • Mental illness Mother    • Depression Mother    • Mental illness Sister    • Coronary artery disease Maternal Grandmother    • Throat cancer Paternal Grandfather      History Review: The following portions of the patient's history were reviewed and updated as appropriate: allergies, current medications, past family history, past medical history, past social history, past surgical history and problem list     OBJECTIVE:     Vital signs in last 24 hours: There were no vitals filed for this visit  Laboratory Results:   Recent Labs (last 2 months):   No visits with results within 2 Month(s) from this visit  Latest known visit with results is:   Office Visit on 06/30/2021   Component Date Value   • URINE HCG 06/30/2021     • N gonorrhoeae, DNA Probe 06/30/2021 Negative    • Chlamydia trachomatis, D* 06/30/2021 Positive (A)      I have personally reviewed all pertinent laboratory/tests results      Suicide/Homicide Risk Assessment:    Risk of Harm to Self:  The following ratings are based on assessment at the time of the interview  Recent Specific Risk Factors include: current depressive symptoms, current anxiety symptoms, unstable mood  Demographic risk factors include: , age: young adult (15-24)  Historical Risk Factors include: chronic depression, chronic anxiety symptoms, history of suicide attempt, history of substance use, history of impulsive behaviors, history of traumatic experiences  Protective Factors: no current suicidal ideation, access to mental health treatment, compliant with medications, compliant with mental health treatment, having a desire to be alive, stable living environment, stable job, sense of determination, supportive family  Based on today's assessment, Мария Singh presents the following risk of harm to self: low    Risk of Harm to Others: The following ratings are based on assessment at the time of the interview  Protective Factors: no current homicidal ideation  Based on today's assessment, Мария Singh presents the following risk of harm to others: none    The following interventions are recommended: contracts for safety at present - agrees to go to ED if feeling unsafe, return in 3 weeks for reassessment, therapy appointment in 3 days, contracts for safety at present - agrees to call Crisis Intervention Service if feeling unsafe    Medications Risks/Benefits:      Risks, Benefits And Possible Side Effects Of Medications:    Discussed risks and benefits of treatment with patient including risk of rash related to treatment with Lamictal     Controlled Medication Discussion:     Not applicable    Treatment Plan:    Due for update/Updated:   no  Last treatment plan done 10/28/22 by Min Rajan LCSW  Treatment Plan due on 4/28/23  ANGELA Ledbetter 01/20/23    This note was shared with patient

## 2023-01-22 DIAGNOSIS — Z30.011 ENCOUNTER FOR INITIAL PRESCRIPTION OF CONTRACEPTIVE PILLS: ICD-10-CM

## 2023-01-23 ENCOUNTER — TELEMEDICINE (OUTPATIENT)
Dept: BEHAVIORAL/MENTAL HEALTH CLINIC | Facility: CLINIC | Age: 23
End: 2023-01-23

## 2023-01-23 DIAGNOSIS — F41.9 ANXIETY AND DEPRESSION: Primary | ICD-10-CM

## 2023-01-23 DIAGNOSIS — F32.A ANXIETY AND DEPRESSION: Primary | ICD-10-CM

## 2023-01-23 DIAGNOSIS — F31.81 BIPOLAR II DISORDER (HCC): ICD-10-CM

## 2023-01-23 RX ORDER — NORGESTIMATE AND ETHINYL ESTRADIOL 0.25-0.035
1 KIT ORAL DAILY
Qty: 28 TABLET | Refills: 0 | Status: SHIPPED | OUTPATIENT
Start: 2023-01-23

## 2023-01-23 NOTE — PSYCH
Visit Time    Visit Start Time: 8am  Visit Stop Time: 8:52am  Total Visit Duration: 52 minutes      Virtual Regular Visit    Verification of patient location:    Patient is located in the following state in which I hold an active license [place]      Assessment/Plan:    Problem List Items Addressed This Visit        Other    Anxiety and depression - Primary    Bipolar II disorder ([facility] [address] )            Reason for visit is No chief complaint on file  Encounter provider Rasheed Conley LCSW    Provider located at [address] [place] [id number] [phone]      Recent Visits  Date Type Provider Dept   01/18/23 Telephone Michela Robison, [facility] recent visits within past 7 days and meeting all other requirements  Today's Visits  Date Type Provider Dept   01/23/23 Telemedicine Michela Robison, [address] [address] [facility]   Showing today's visits and meeting all other requirements  Future Appointments  No visits were found meeting these conditions  Showing future appointments within next 150 days and meeting all other requirements       The patient was identified by name and date of birth  Magdaleno Howard was informed that this is a telemedicine visit and that the visit is being conducted through the [employer] [facility]  She agrees to proceed     My office door was closed  No one else was in the room  She acknowledged consent and understanding of privacy and security of the video platform  The patient has agreed to participate and understands they can discontinue the visit at any time  Patient is aware this is a billable service  Subjective  Magdaleno Howard is a 25 y o  female     Goals addressed in session: Goal 1     DATA: Thea Chambers discussed a boy she was interested in  She discussed how he ended the relationship before it really got started   We addressed her feelings about what happened  We addressed her thoughts about how she chooses men who 'have issues'  We discussed her feelings about relationships and people  She discussed her sister and her sister's drinking and how it bothers her and how her sister treats her  We discussed Janell's own drinking behaviors at this time  We agreed to continue to address her individual goals regarding where she is living and her professional goals  We agreed to further consider obstacles for her in identifying and moving toward these goals  During this session, this clinician used the following therapeutic modalities: Cognitive Behavioral Therapy, Motivational Interviewing and Supportive Psychotherapy    Substance Abuse was addressed during this session  If the client is diagnosed with a co-occurring substance use disorder, please indicate any changes in the frequency or amount of use: Shawna Collins reported limited drinking, 1 or 2 drinks on the weekend  Stage of change for addressing substance use diagnoses: Action    ASSESSMENT:  Carol Hartley presents with a usual forced apathetic, underlying depressed mood  She is able to appreciate humor and engage appropriately  her affect is Normal range and intensity, which is congruent, with her mood and the content of the session  The client has made progress on their goals  Shawna Collins is able to acknowledge her struggle and is able to identify goals  We are working on addressing stumbling blocks to identifying and working toward attaining those goals  Carol Hartley presents with a low risk of suicide, low risk of self-harm, and low risk of harm to others  She verbalizes needing time to figure things out and get over her feelings with the potential boyfriend  No SI/HI/SIB  For any risk assessment that surpasses a "low" rating, a safety plan must be developed      A safety plan was indicated: no  If yes, describe in detail n/a    PLAN: Between sessions, Carol Hartley will begin to define goals and obstacles  At the next session, the therapist will use Cognitive Behavioral Therapy and Supportive Psychotherapy to address goal plans  Behavioral Health Treatment Plan and Discharge Planning: Nicky Prado is aware of and agrees to continue to work on their treatment plan  They have identified and are working toward their discharge goals  yes    Visit start and stop times:    01/23/23  Start Time: 0800  Stop Time: 0852  Total Visit Time: 52 minutes        HPI     Past Medical History:   Diagnosis Date   • Anxiety    • Concussion    • Depression    • Head injury     concussions in high school and in college   • Self-injurious behavior        Past Surgical History:   Procedure Laterality Date   • WISDOM TOOTH EXTRACTION         Current Outpatient Medications   Medication Sig Dispense Refill   • Adapalene-Benzoyl Peroxide 0 1-2 5 % gel Apply 1 application topically daily at bedtime (Patient not taking: Reported on 9/16/2022) 45 g 0   • clindamycin (CLINDAGEL) 1 % gel Apply topically 2 (two) times a day (Patient not taking: Reported on 9/16/2022) 30 g 0   • hydrOXYzine pamoate (VISTARIL) 50 mg capsule Take 1 capsule (50 mg total) by mouth daily at bedtime as needed for anxiety 30 capsule 1   • lamoTRIgine (LaMICtal) 25 mg tablet Take 3 tablets (75 mg total) by mouth daily 90 tablet 0   • metoclopramide (Reglan) 10 mg tablet Take 1 tablet (10 mg total) by mouth 3 (three) times a day as needed (headache) 15 tablet 0   • norgestimate-ethinyl estradiol (Alisha) 0 25-35 MG-MCG per tablet Take 1 tablet by mouth daily 28 tablet 0   • traZODone (DESYREL) 50 mg tablet Take 1 tablet (50 mg total) by mouth daily at bedtime 30 tablet 0     No current facility-administered medications for this visit  No Known Allergies    Review of Systems    Video Exam    There were no vitals filed for this visit      Physical Exam     I spent 52 minutes directly with the patient during this visit

## 2023-01-31 ENCOUNTER — TELEPHONE (OUTPATIENT)
Dept: PSYCHIATRY | Facility: CLINIC | Age: 23
End: 2023-01-31

## 2023-01-31 NOTE — TELEPHONE ENCOUNTER
Pt called to get her appt for 2/1/2023 at 10:00 a m  switched to a virtual visit due to not stated  Writer switched appt       Please send link to phone # [phone]

## 2023-02-01 ENCOUNTER — TELEMEDICINE (OUTPATIENT)
Dept: BEHAVIORAL/MENTAL HEALTH CLINIC | Facility: CLINIC | Age: 23
End: 2023-02-01

## 2023-02-01 DIAGNOSIS — F31.81 BIPOLAR II DISORDER (HCC): ICD-10-CM

## 2023-02-01 DIAGNOSIS — F32.A ANXIETY AND DEPRESSION: Primary | ICD-10-CM

## 2023-02-01 DIAGNOSIS — F41.9 ANXIETY AND DEPRESSION: Primary | ICD-10-CM

## 2023-02-01 NOTE — PSYCH
Behavioral Health Psychotherapy Progress Note      Virtual Regular Visit     Verification of patient location:     Patient is located in the following state in which I hold an active license [place]    Problem List Items Addressed This Visit        Other    Anxiety and depression - Primary    Bipolar II disorder ([facility] [address] )       This virtual visit started at 10 AM and ended at 10:52 AM     Reason for visit is scheduled virtual regular visit  Encounter provider Maci Carlos LCSW     Provider located at [address] [place] [id number]  [phone]     Recent Visits  Date Type Provider Dept   01/31/23 Telephone Michela Gr, [facility] recent visits within past 7 days and meeting all other requirements  Today's Visits  Date Type Provider Dept   02/01/23 Telemedicine Michela Gr, [facility] [place]   Showing today's visits and meeting all other requirements  Future Appointments  No visits were found meeting these conditions    Showing future appointments within next 150 days and meeting all other requirements      HPI     Past Medical History:   Diagnosis Date   • Anxiety    • Concussion    • Depression    • Head injury     concussions in high school and in college   • Self-injurious behavior        Past Surgical History:   Procedure Laterality Date   • WISDOM TOOTH EXTRACTION         Current Outpatient Medications   Medication Sig Dispense Refill   • Adapalene-Benzoyl Peroxide 0 1-2 5 % gel Apply 1 application topically daily at bedtime (Patient not taking: Reported on 9/16/2022) 45 g 0   • clindamycin (CLINDAGEL) 1 % gel Apply topically 2 (two) times a day (Patient not taking: Reported on 9/16/2022) 30 g 0   • hydrOXYzine pamoate (VISTARIL) 50 mg capsule Take 1 capsule (50 mg total) by mouth daily at bedtime as needed for anxiety 30 capsule 1   • lamoTRIgine (LaMICtal) 25 mg tablet Take 3 tablets (75 mg total) by mouth daily 90 tablet 0   • metoclopramide (Reglan) 10 mg tablet Take 1 tablet (10 mg total) by mouth 3 (three) times a day as needed (headache) 15 tablet 0   • norgestimate-ethinyl estradiol (Alisha) 0 25-35 MG-MCG per tablet Take 1 tablet by mouth daily 28 tablet 0   • traZODone (DESYREL) 50 mg tablet Take 1 tablet (50 mg total) by mouth daily at bedtime 30 tablet 0     No current facility-administered medications for this visit  No Known Allergies    The patient was identified by name and date of birth  Virgilio Freeman was informed that this is a telemedicine visit and that the visit is being conducted through a virtual online media the [employer] platform  She agrees to proceed     My office door was closed  No one else was in the room  She acknowledged consent and understanding of privacy and security of the video platform  The patient has agreed to participate and understands they can discontinue the visit at any time  Patient is aware and confirmed that this virtual visit is a billable service  D: This therapist met with Cristhian Angeles for a(n) Individual Therapy session  A: Cristhian Angeles was oriented x3  She was focused and engaged  Cristhian Angeles did not present with HI SI or SIB  P: Logan Bates next session is scheduled for 2/13/23 @9am - virtual    Psychotherapy Provided: Individual Psychotherapy 52 minutes     Length of time in session: 52 minutes, follow up in 2 week    Goals addressed in session: Goal 1       Video Exam     There were no vitals filed for this visit  VIRTUAL VISIT DISCLAIMER     Virgilio Freeman verbally agrees to participate in [employer]  Pt is aware that [employer] could be limited without vital signs or the ability to perform a full hands-on physical exam  Virgilio Freeman understands she or the provider may request at any time to terminate the video visit and request the patient to seek care or treatment in person      [facility]      Psychotherapy Provided: Individual Psychotherapy     1  Anxiety and depression        2  Bipolar II disorder ([address] )            Goals addressed in session: Goal 1     DATA: Today was a virtual session  Corina Akhtar discussed the latest with the 'boy' she likes  She discussed her frustrations and disappointment  We explored her response and assumptions  We reviewed the current treatment plan, and Corina Akhtar noted that she hasn't started her gym/exercise but 'wants to', but couldn't come up with plans to get started  She also noted that the treatment plan focused on her relationships, and this is where she wants to continue to work on things  We discuss her history of her relationships and where she believes there are issues  She talks about how she had to tend to her mother's emotional neediness  When broached, she is able to connect to her beliefs with this boy and her past boyfriend; she acknowledges that she also has a neediness in relationships  We address feelings, though Corina Sayshannan is hard-pressed to clearly identify her feelings, and ways to manage through feelings of loneliness, disappointment, rejection, etc  We also discuss Orquidea Maya own recognition that these relationships were not best for her, and how to incorporate that recognition into how she evaluates and gets through the resulting feelings from the ended relationship  We also talk about her time and use of time, in addition to professional goals  We address professional life as a process  We address her current drinking and how drinking is often a part of her problematic interactions/texts with boys  She confirms drinking less, though denies believing that drinking is a part of the problem  She concludes she needs to 'give her phone to someone else'  We address ways she can otherwise deal with moments of distress and reaching out in an unhealthy way       We changed upcoming appointments to virtual, at Orquidea Oh request, and add an additional appointment into March  During this session, this clinician used the following therapeutic modalities: Cognitive Behavioral Therapy, Motivational Interviewing and Supportive Psychotherapy    Substance Abuse was addressed during this session  If the client is diagnosed with a co-occurring substance use disorder, please indicate any changes in the frequency or amount of use: Мария Singh confirmed decreased drinking, though not identifying drinking behaviors as influencing judgment  Stage of change for addressing substance use diagnoses: Contemplation    ASSESSMENT:  Bhavin Brush presents with a Euthymic/ normal and muted dysthymic mood  her affect is Constricted, which is congruent, with her mood and the content of the session  The client has made progress on their goals  Мария Singh continues to attend therapy  Despite her presentation of avoidance, she works through the session, and makes connections  Bhavin Brush presents with a low risk of suicide, low risk of self-harm, and low risk of harm to others  She presents with continued identification of goals and future plans; and accepts the possibility of positive change and goal-attainment  For any risk assessment that surpasses a "low" rating, a safety plan must be developed  A safety plan was indicated: no  If yes, describe in detail n/a    PLAN: Between sessions, Bhavin Brush will identify a positive, healthy activity for herself  At the next session, the therapist will use Cognitive Behavioral Therapy and Supportive Psychotherapy to address Akhil Brooke motivation for change, connection to emotional stressors, and emotional connection  Behavioral Health Treatment Plan and Discharge Planning: Bhavin Brush is aware of and agrees to continue to work on their treatment plan  They have identified and are working toward their discharge goals   yes    Visit start and stop times:    02/01/23  Start Time: 1000  Stop Time: 1052  Total Visit Time: 52 minutes

## 2023-02-13 ENCOUNTER — TELEMEDICINE (OUTPATIENT)
Dept: BEHAVIORAL/MENTAL HEALTH CLINIC | Facility: CLINIC | Age: 23
End: 2023-02-13

## 2023-02-13 DIAGNOSIS — F41.9 ANXIETY AND DEPRESSION: Primary | ICD-10-CM

## 2023-02-13 DIAGNOSIS — F32.A ANXIETY AND DEPRESSION: Primary | ICD-10-CM

## 2023-02-13 NOTE — PSYCH
Telemedicine consent    Patient: Samson Leonard  Provider: Natali Phillips LCSW  Provider located at [address] [place] [id number] [phone]    The patient was identified by name and date of birth  Samson Leonard was informed that this is a telemedicine visit and that the visit is being conducted through the [address] Now platform  She agrees to proceed     My office door was closed  No one else was in the room  She acknowledged consent and understanding of privacy and security of the video platform  The patient has agreed to participate and understands they can discontinue the visit at any time  Patient is aware this is a billable service  I spent 52 minutes with the patient during this visit  Behavioral Health Psychotherapy Progress Note    Psychotherapy Provided: Individual Psychotherapy     1  Anxiety and depression            Goals addressed in session: Goal 1     DATA: Alean Osgood discussed ongoing feelings of depression and lack of motivation and sleeping alot  She reports an upcoming psychiatric f/up with Kate Leos and says she plans to talk about medication regimen  She is able to go to work and manage her necessary activities, but believes change in meds can help with her overall depressive mood and emotional regulation  She was encouraged to address symptoms and concerns  We discussed her feelings about the recent breakup with 'the boy'  She reports some support from her friends, but staying to herself and 'never' planning to date again  We address her regrets at giving this a chance  This provider challenges this thought and suggests Alean Osgood consider the benefit involved in taking the risk with this relationship  We addressed Janell's 'annoyance' with everyone, her belief that she herself is 'crazy' and her mother's emotional instability   She discussed her mother's dx of schizophrenia and likely drug use, having multiple partners  She discussed seeing her father dead on the couch when he overdosed, and how her mother got involved with another man 5 months later  She states going to one group grief counseling, but how her mother never talked about it  Noé Fajardo shared again her consideration of what others told her that her mother 'killed him'  She has no evidence to suggest this did happen, but she has pondered it over the years  We discuss the inability and instability of her mother and her father and how this translates into her difficulty in relationship, without having healthy, stable role models  We address how Noé Fajardo does compare herself to her mother, and we address the notion of them being separate individuals  We discuss the relevance of patience and time in working on herself and her emotional tolerance and increased ability to manage relationships  We discuss beginning to rate 'annoying' situations 0-10 scale to help measure and manage  We set an additional appointment into April  During this session, this clinician used the following therapeutic modalities: Cognitive Behavioral Therapy, Solution-Focused Therapy and Supportive Psychotherapy    Substance Abuse was addressed during this session  If the client is diagnosed with a co-occurring substance use disorder, please indicate any changes in the frequency or amount of use: drug use in her family  Stage of change for addressing substance use diagnoses: Pre-contemplation    ASSESSMENT:  Pauline Lozada presents with a Dysthymic mood  her affect is Normal range and intensity and Constricted, which is congruent, with her mood and the content of the session  The client has made progress on their goals  Noé Fajardo was able to address difficult experiences and use therapy to talk about her feelings; she remains limited in articulating and connecting to different feelings experienced   Pauline Lozada presents with a low risk of suicide, low risk of self-harm, and low risk of harm to others  Hung Alves is futuristic in thinking and goal-oriented  For any risk assessment that surpasses a "low" rating, a safety plan must be developed  A safety plan was indicated: no  If yes, describe in detail n/a    PLAN: Between sessions, Nora De Leon will begin to do a rating system for 'annoying' experiences; she will consider and process past loss as part of the explanation for her difficulty with relationships; consider how she is different from her mother and has insights that can help her make desired changes   At the next session, the therapist will use Cognitive Behavioral Therapy and Supportive Psychotherapy to address progress in emotional connection and management; taking identifiable steps toward self-care goals  Behavioral Health Treatment Plan and Discharge Planning: Nora De Leon is aware of and agrees to continue to work on their treatment plan  They have identified and are working toward their discharge goals   yes    Visit start and stop times:    02/13/23  Start Time: 0900  Stop Time: 0952  Total Visit Time: 52 minutes

## 2023-02-17 ENCOUNTER — TELEMEDICINE (OUTPATIENT)
Dept: PSYCHIATRY | Facility: CLINIC | Age: 23
End: 2023-02-17

## 2023-02-17 DIAGNOSIS — F31.81 BIPOLAR II DISORDER (HCC): Primary | ICD-10-CM

## 2023-02-17 DIAGNOSIS — F51.01 PRIMARY INSOMNIA: ICD-10-CM

## 2023-02-17 RX ORDER — TRAZODONE HYDROCHLORIDE 50 MG/1
50 TABLET ORAL
Qty: 30 TABLET | Refills: 2 | Status: SHIPPED | OUTPATIENT
Start: 2023-02-17

## 2023-02-17 RX ORDER — LAMOTRIGINE 100 MG/1
100 TABLET ORAL DAILY
Qty: 30 TABLET | Refills: 2 | Status: SHIPPED | OUTPATIENT
Start: 2023-02-17

## 2023-02-17 NOTE — PSYCH
Virtual Regular Visit    Verification of patient location:    Patient is located in the following state in which I hold an active license [place]    Problem List Items Addressed This Visit        Other    Primary insomnia    Relevant Medications    traZODone (DESYREL) 50 mg tablet    Bipolar II disorder ([facility] [address] ) - Primary    Relevant Medications    lamoTRIgine (LaMICtal) 100 mg tablet    traZODone (DESYREL) 50 mg tablet          Encounter provider ANGELA Lopes    Provider located at    [id number] [facility]  [address] [id number] [phone]    Recent Visits  No visits were found meeting these conditions  Showing recent visits within past 7 days and meeting all other requirements  Today's Visits  Date Type Provider Dept   02/17/23 Telemedicine Tara Mclean, [address] [place] [address] today's visits and meeting all other requirements  Future Appointments  No visits were found meeting these conditions  Showing future appointments within next 150 days and meeting all other requirements           The patient was identified by name and date of birth  Lesa Bates was informed that this is a telemedicine visit and [facility] the [facility]  She agrees to proceed     My office door was closed  No one else was in the room  She acknowledged consent and understanding of privacy and security of the video platform  The patient has agreed to participate and understands they can discontinue the visit at any time  Patient is aware this is a billable service       HPI     Current Outpatient Medications   Medication Sig Dispense Refill   • hydrOXYzine pamoate (VISTARIL) 50 mg capsule Take 1 capsule (50 mg total) by mouth daily at bedtime as needed for anxiety 30 capsule 1   • lamoTRIgine (LaMICtal) 100 mg tablet Take 1 tablet (100 mg total) by mouth daily 30 tablet 2   • norgestimate-ethinyl estradiol (Alisha) 0 25-35 MG-MCG per tablet Take 1 tablet by mouth daily 28 tablet 0   • traZODone (DESYREL) 50 mg tablet Take 1 tablet (50 mg total) by mouth daily at bedtime as needed for sleep 30 tablet 2   • Adapalene-Benzoyl Peroxide 0 1-2 5 % gel Apply 1 application topically daily at bedtime (Patient not taking: Reported on 9/16/2022) 45 g 0   • clindamycin (CLINDAGEL) 1 % gel Apply topically 2 (two) times a day (Patient not taking: Reported on 9/16/2022) 30 g 0   • metoclopramide (Reglan) 10 mg tablet Take 1 tablet (10 mg total) by mouth 3 (three) times a day as needed (headache) 15 tablet 0     No current facility-administered medications for this visit  Review of Systems  Video Exam    There were no vitals filed for this visit  Physical Exam   As a result of this visit, I have referred the patient for further respiratory evaluation  No    I spent 25 minutes directly with the patient during this visit  VIRTUAL VISIT DISCLAIMER    Yovany Adamson acknowledges that she has consented to an online visit or consultation  She understands that the online visit is based solely on information provided by her, and that, in the absence of a face-to-face physical evaluation by the physician, the diagnosis she receives is both limited and provisional in terms of accuracy and completeness  This is not intended to replace a full medical face-to-face evaluation by the physician  Yovany Adamson understands and accepts these terms  MEDICATION MANAGEMENT NOTE        [facility]    Name and Date of Birth:  Yovany Adamson 25 y o  2000 MRN: [id number]    Date of Visit: February 17, 2023    No Known Allergies    Visit Time    Visit Start Time: 1000  Visit Stop Time: 7566  Total Visit Duration: 25 minutes    SUBJECTIVE:    Dylon Barragan is seen today for a follow up for Bipolar Disorder type II  She continues to improve slowly since the last visit   Dylon Barragan was seen today for medication management follow up  She was last seen by this sonam on 1/20/23  She reports that she continues to have depression rated 8/10, anxiety rated 7/10  She denies active SI, but has passive SI without plan or intent  She states that she is not taking any online classes as she had planned, and states that she doesn't want to waste her time and energy on school if she does not know what she wants to do yet  She has not yet decided where she is going to school in the fall  She reports that she is irritable "all the time" and "finds diomedes in nothing"  However, she is brighter in conversation, states that she goes out with friends approximately 1-2 times a week and has fun when she goes  She hangs out with her sister and her friend Ryan Calzada  She reports she is not drinking a lot  She states that her sleep has decreased because she is working more at [employer], and because her cat wakes her up very early  She gets approximately 5 hours of sleep per night, but states she can sleep "all the time"  She states that in her alone time she watches television  She denies any self-harm thoughts, denies any homicidal ideation, denies any auditory or visual hallucinations  Her protective factor is her cat, states she cannot leave the cat alone  She reports that therapy is going well and is happy to have somebody to talk to  We would increase Lamictal to 100 mg p o  daily and consider adding an adjunct medication if needed at next appointment  She is agreeable to this treatment plan at this time  She denies any side effects from current psychiatric medications      PLAN:  Increase Lamictal to 100 mg p o  daily  Continue trazodone 50 mg p o  at bedtime as needed  Continue therapy with individual therapist  Follow up with this provider in 1 month  She will call sooner with concerns or issues if they arise prior to scheduled appointment    Aware of 24 hour and weekend coverage for urgent situations accessed by calling [facility] main practice number  Continue psychotherapy with therapist  Medication management every 1 month  Aware of need to follow up with family physician for medical issues    Diagnoses and all orders for this visit:    Bipolar II disorder ([facility] [address] )  -     lamoTRIgine (LaMICtal) 100 mg tablet; Take 1 tablet (100 mg total) by mouth daily    Lamotrigine PARQ completed including dizziness, headaches, ataxia, vision problems, somnolence, sleep changes, cognitive difficulties, rash (including Becerril-Chato rash), and others, risk of teratogenicity for females  Current Outpatient Medications on File Prior to Visit   Medication Sig Dispense Refill   • hydrOXYzine pamoate (VISTARIL) 50 mg capsule Take 1 capsule (50 mg total) by mouth daily at bedtime as needed for anxiety 30 capsule 1   • norgestimate-ethinyl estradiol (Alisha) 0 25-35 MG-MCG per tablet Take 1 tablet by mouth daily 28 tablet 0   • [DISCONTINUED] lamoTRIgine (LaMICtal) 25 mg tablet Take 3 tablets (75 mg total) by mouth daily 90 tablet 0   • [DISCONTINUED] traZODone (DESYREL) 50 mg tablet Take 1 tablet (50 mg total) by mouth daily at bedtime (Patient taking differently: Take 50 mg by mouth daily at bedtime as needed) 30 tablet 0   • Adapalene-Benzoyl Peroxide 0 1-2 5 % gel Apply 1 application topically daily at bedtime (Patient not taking: Reported on 9/16/2022) 45 g 0   • clindamycin (CLINDAGEL) 1 % gel Apply topically 2 (two) times a day (Patient not taking: Reported on 9/16/2022) 30 g 0   • metoclopramide (Reglan) 10 mg tablet Take 1 tablet (10 mg total) by mouth 3 (three) times a day as needed (headache) 15 tablet 0     No current facility-administered medications on file prior to visit  Psychotherapy Provided:     Individual psychotherapy provided: Yes  Counseling was provided during the session today for 16 minutes  Supportive counseling provided  Medication changes discussed with Inis Maxcy    Medication education provided to Yvette  Coping strategies reviewed with Yvette  Importance of medication and treatment compliance reviewed with Yvette  Importance of follow up with family physician for medical issues reviewed with Yvette  Reassurance and supportive therapy provided  Crisis/safety plan discussed with Yvette  She will utilize crisis as needed  HPI ROS Appetite Changes and Sleep:     She reports adequate number of sleep hours (5, sometimes more hours), adequate appetite, adequate energy level   Denies homicidal ideation, Intermittent passive suicidal ideation without intent or plan    Review Of Systems:  HPI ROS:               Medication Side Effects:  denies     Depression (10 worst): 8/10 (Was decreased)   Anxiety (10 worst): 7/10 (Was decreased)   Safety concerns (SI, HI, etc): Passive SI, no plan or intent (Was denies)   Sleep: 5 hours per night, but could sleep more (Was no change)   Energy: adequate (Was low)   Appetite: adequate (Was good)     General normal    Personality no change in personality   Constitutional as noted in HPI   ENT negative   Cardiovascular negative   Respiratory negative   Gastrointestinal negative   Genitourinary negative   Musculoskeletal negative   Integumentary negative   Neurological negative   Endocrine negative   Other Symptoms none, all other systems are negative     Mental Status Evaluation:    Appearance Appropriately dressed and Good eye contact   Behavior calm and cooperative   Mood anxious and depressed  Depression Scale - 8 of 10 (0 = No depression)  Anxiety Scale - 7 of 10 (0 = No anxiety)   Speech Normal rate and volume   Affect mood-incongruent, appears brighter, and more happy in conversation than she states she is   Thought Processes Goal directed and coherent   Thought Content Does not verbalize delusional material   Associations Tightly connected   Perceptual Disturbances Denies hallucinations and does not appear to be responding to internal stimuli Risk Potential Suicidal/Homicidal Ideation - Passive suicidal ideation without intent or plan  Risk of Violence - No evidence of risk for violence found on assessment  Risk of Self Mutilation - No evidence of risk for self mutilation found on assessment   Orientation oriented to person, place, time/date and situation   Memory recent and remote memory grossly intact   Consciousness alert and awake   Attention/Concentration attention span and concentration appear shorter than expected for age   Insight fair   Judgement fair   Muscle Strength and Gait normal muscle strength and normal muscle tone, normal gait/station and normal balance   Motor Activity no abnormal movements   Language no difficulty naming common objects, no difficulty repeating a phrase, no difficulty writing a sentence   Fund of Knowledge adequate knowledge of current events  adequate fund of knowledge regarding past history  adequate fund of knowledge regarding vocabulary      Past Psychiatric History  Previous diagnoses include anxiety and depression   Prior outpatient psychiatric treatment: Therapy as a child  Blanche Granda therapy: as a child, unable to remember the place or the person   Prior inpatient psychiatric treatment: Yes, [facility] Ford in March 2021  Blanche Granda suicide attempts: denies   Prior self harm: yes, cutting in March 2021  Blanche Granda violence or aggression: denies    Past Psychiatric History Update:     Inpatient Psychiatric Admission Since Last Encounter:   no  Changes to Outpatient Psychiatric Treatment Team:    no  Suicide Attempt Or Self Mutilation Since Last Encounter:   no  Incidence of Violent Behavior Since Last Encounter:   no    Traumatic History Update:     New Onset of Abuse Since Last Encounter:   no  Traumatic Events Since Last Encounter:   no    Past Medical History:    Past Medical History:   Diagnosis Date   • Anxiety    • Concussion    • Depression    • Head injury     concussions in high school and in college   • Self-injurious behavior         Past Surgical History:   Procedure Laterality Date   • WISDOM TOOTH EXTRACTION       No Known Allergies  Substance Abuse History:    Social History     Substance and Sexual Activity   Alcohol Use Yes   • Alcohol/week: 3 0 - 4 0 standard drinks   • Types: 3 - 4 Shots of liquor per week    Comment: 3-4 drinks, 2 times per week     Social History     Substance and Sexual Activity   Drug Use No     Social History:    Social History     Socioeconomic History   • Marital status: Single     Spouse name: Not on file   • Number of children: 0   • Years of education: senior in college currently   • Highest education level: High school graduate   Occupational History   • 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   Tobacco Use   • Smoking status: Never   • Smokeless tobacco: Never   Vaping Use   • Vaping Use: Never used   Substance and Sexual Activity   • Alcohol use: Yes     Alcohol/week: 3 0 - 4 0 standard drinks     Types: 3 - 4 Shots of liquor per week     Comment: 3-4 drinks, 2 times per week   • Drug use: No   • Sexual activity: Yes     Partners: Male     Birth control/protection: Condom Male   Other Topics Concern   • Not on file   Social History Narrative   • Not on file     Social Determinants of Health     Financial Resource Strain: Not on file   Food Insecurity: Not on file   Transportation Needs: Not on file   Physical Activity: Not on file   Stress: Not on file   Social Connections: Not on file   Intimate Partner Violence: Not on file   Housing Stability: Not on file     Family Psychiatric History:     Family History   Problem Relation Age of Onset   • Hypertension Mother    • Mental illness Mother    • Depression Mother    • Mental illness Sister    • Coronary artery disease Maternal Grandmother    • Throat cancer Paternal Grandfather      History Review: The following portions of the patient's history were reviewed and updated as appropriate: allergies, current medications, past family history, past medical history, past social history, past surgical history and problem list     OBJECTIVE:     Vital signs in last 24 hours: There were no vitals filed for this visit  Laboratory Results:   Recent Labs (last 2 months):   No visits with results within 2 Month(s) from this visit  Latest known visit with results is:   Office Visit on 06/30/2021   Component Date Value   • URINE HCG 06/30/2021     • N gonorrhoeae, DNA Probe 06/30/2021 Negative    • Chlamydia trachomatis, D* 06/30/2021 Positive (A)      I have personally reviewed all pertinent laboratory/tests results  Suicide/Homicide Risk Assessment:    Risk of Harm to Self:  The following ratings are based on assessment at the time of the interview  Recent Specific Risk Factors include: current depressive symptoms, current anxiety symptoms, social isolation  Demographic risk factors include: , age: young adult (15-24)  Historical Risk Factors include: chronic depression, chronic anxiety symptoms, chronic mood disorder, history of suicide attempt, history of self-abusive behavior, history of substance use, history of abuse, history of impulsive behaviors, history of traumatic experiences  Protective Factors: no current suicidal ideation, access to mental health treatment, compliant with medications, compliant with mental health treatment, stable living environment, stable job, supportive family  Based on today's assessment, Reshma Cruz presents the following risk of harm to self: low    Risk of Harm to Others:   The following ratings are based on assessment at the time of the interview  Protective Factors: no current homicidal ideation  Based on today's assessmentReshma presents the following risk of harm to others: none    The following interventions are recommended: contracts for safety at present - agrees to go to ED if feeling unsafe, return in 1 month for reassessment, therapy appointment in 2 weeks, contracts for safety at present - agrees to call Crisis Intervention Service if feeling unsafe    Medications Risks/Benefits:      Risks, Benefits And Possible Side Effects Of Medications:    Discussed risks and benefits of treatment with patient including risk of rash related to treatment with Lamictal     Controlled Medication Discussion:     Not applicable    Treatment Plan:    Due for update/Updated:   no  Last treatment plan done 10/28/22 by Jeny Leon LCSW  Treatment Plan due on 4/28/23      ANGELA Rojas 02/17/23    This note was not shared with the patient due to reasonable likelihood of causing patient harm

## 2023-02-20 NOTE — PROGRESS NOTES
[facility] [address] [facility]    NAME: Lyndsey De Leon  AGE: 21 y o  SEX: female  : 2000     DATE: 2021     Assessment and Plan:     Problem List Items Addressed This Visit        Other    Encounter for initial prescription of contraceptive pills    Relevant Medications    norgestimate-ethinyl estradiol (Alisha) 0 25-35 MG-MCG per tablet    Anxiety and depression     Patient was removed from Lexapro 20 mg and initiated on Wellbutrin  mg daily  Started on   Notes improvement in moods and decreased anxiety  Tolerating medication and dose at this time  Relevant Medications    buPROPion (WELLBUTRIN XL) 150 mg 24 hr tablet      Other Visit Diagnoses     Annual physical exam    -  Primary          Immunizations and preventive care screenings were discussed with patient today  Appropriate education was printed on patient's after visit summary  Counseling:  Alcohol/drug use: discussed moderation in alcohol intake, the recommendations for healthy alcohol use, and avoidance of illicit drug use  Dental Health: discussed importance of regular tooth brushing, flossing, and dental visits  Injury prevention: discussed safety/seat belts, safety helmets, smoke detectors, carbon dioxide detectors, and smoking near bedding or upholstery  Sexual health: discussed sexually transmitted diseases, partner selection, use of condoms, avoidance of unintended pregnancy, and contraceptive alternatives  · Exercise: the importance of regular exercise/physical activity was discussed  Recommend exercise 3-5 times per week for at least 30 minutes            Return in about 1 year (around 2022) for Annual physical      Chief Complaint:     Chief Complaint   Patient presents with    Physical Exam      History of Present Illness:     Adult Annual Physical   Patient here for a comprehensive physical exam  The patient Spoke with Dr. Ismael James. Keep AVRIL drains in, will remove at follow up appt. reports no problems  Diet and Physical Activity  · Diet/Nutrition: limited junk food and consuming 3-5 servings of fruits/vegetables daily  · Exercise: walking, 5-7 times a week on average and more than 2 hours on average  Depression Screening  PHQ-9 Depression Screening    PHQ-9:   Frequency of the following problems over the past two weeks:           General Health  · Sleep: sleeps well and gets 4-6 hours of sleep on average  · Hearing: decreased - right  · Vision: vision problems: far-sighted, most recent eye exam >1 year ago and wears glasses  · Dental: no dental visits for >1 year, brushes teeth twice daily and flosses teeth occasionally  /GYN Health  · Last menstrual period: 6/1/2021  · Contraceptive method: oral contraceptives  · History of STDs?: no      Review of Systems:     Review of Systems   Constitutional: Negative for activity change, appetite change, chills, fatigue, fever and unexpected weight change  HENT: Negative for ear pain, hearing loss, sinus pain, sore throat and trouble swallowing  Eyes: Negative for pain and visual disturbance  Respiratory: Negative for cough, chest tightness and shortness of breath  Cardiovascular: Negative for chest pain, palpitations and leg swelling  Gastrointestinal: Negative for abdominal pain, constipation, diarrhea, nausea and vomiting  Endocrine: Negative for polydipsia, polyphagia and polyuria  Genitourinary: Negative for difficulty urinating, dysuria, flank pain, frequency, pelvic pain and urgency  Musculoskeletal: Negative for arthralgias, back pain and myalgias  Skin: Negative for color change, rash and wound  Allergic/Immunologic: Negative for environmental allergies and food allergies  Neurological: Negative for dizziness, seizures, facial asymmetry, speech difficulty, weakness, light-headedness, numbness and headaches  Psychiatric/Behavioral: Negative for dysphoric mood, sleep disturbance and suicidal ideas  The patient is not nervous/anxious         Past Medical History:     Past Medical History:   Diagnosis Date    Concussion       Past Surgical History:     Past Surgical History:   Procedure Laterality Date    WISDOM TOOTH EXTRACTION        Social History:     E-Cigarette/Vaping    E-Cigarette Use Never User      E-Cigarette/Vaping Substances    Nicotine No     THC No     CBD No     Flavoring No     Other No     Unknown No      Social History     Socioeconomic History    Marital status: Single     Spouse name: None    Number of children: None    Years of education: None    Highest education level: None   Occupational History    None   Social Needs    Financial resource strain: None    Food insecurity     Worry: None     Inability: None    Transportation needs     Medical: None     Non-medical: None   Tobacco Use    Smoking status: Never Smoker    Smokeless tobacco: Never Used   Substance and Sexual Activity    Alcohol use: Yes     Frequency: Monthly or less     Comment: socailly     Drug use: No    Sexual activity: Yes     Birth control/protection: Condom Male   Lifestyle    Physical activity     Days per week: None     Minutes per session: None    Stress: None   Relationships    Social connections     Talks on phone: None     Gets together: None     Attends Je[employer] witness service: None     Active member of club or organization: None     Attends meetings of clubs or organizations: None     Relationship status: None    Intimate partner violence     Fear of current or ex partner: None     Emotionally abused: None     Physically abused: None     Forced sexual activity: None   Other Topics Concern    None   Social History Narrative    None      Family History:     Family History   Problem Relation Age of Onset    Hypertension Mother     Mental illness Mother     Depression Mother     Mental illness Sister     Coronary artery disease Maternal Grandmother     Throat cancer Paternal Grandfather Current Medications:     Current Outpatient Medications   Medication Sig Dispense Refill    buPROPion (WELLBUTRIN XL) 150 mg 24 hr tablet Take 1 tablet (150 mg total) by mouth daily 90 tablet 1    hydrOXYzine pamoate (VISTARIL) 50 mg capsule Take 1 capsule (50 mg total) by mouth 3 (three) times a day as needed for itching 30 capsule 0    metoclopramide (Reglan) 10 mg tablet Take 1 tablet (10 mg total) by mouth 3 (three) times a day as needed (headache) 15 tablet 0    norgestimate-ethinyl estradiol (Alisha) 0 25-35 MG-MCG per tablet Take 1 tablet by mouth daily 28 tablet 3    traZODone (DESYREL) 50 mg tablet Take 1 tablet (50 mg total) by mouth daily at bedtime 30 tablet 0     No current facility-administered medications for this visit         Allergies:     No Known Allergies   Physical Exam:     /64   Pulse 101   Temp (!) 97 1 °F (36 2 °C)   Ht 5' 2" (1 575 m)   Wt 54 4 kg (120 lb)   SpO2 97%   BMI 21 95 kg/m²       CBC:   Results from last 6 Months   Lab Units 05/27/21  1526   WBC Thousand/uL 7 39   RBC Million/uL 4 64   HEMOGLOBIN g/dL 14 0   HEMATOCRIT % 41 6   MCV fL 90   MCH pg 30 2   MCHC g/dL 33 7   RDW % 12 0   MPV fL 10 0   PLATELETS Thousands/uL 303   NRBC AUTO /100 WBCs 0   NEUTROS PCT % 61   LYMPHS PCT % 29   MONOS PCT % 8   EOS PCT % 1   BASOS PCT % 1   NEUTROS ABS Thousands/µL 4 61   LYMPHS ABS Thousands/µL 2 11   MONOS ABS Thousand/µL 0 58   EOS ABS Thousand/µL 0 04     Chemistry Profile:   Results from last 6 Months   Lab Units 05/27/21  1526   POTASSIUM mmol/L 3 6   CHLORIDE mmol/L 102   CO2 mmol/L 26   BUN mg/dL 14   CREATININE mg/dL 0 97   CALCIUM mg/dL 9 2   AST U/L 23   ALT U/L 24   ALK PHOS U/L 51   EGFR ml/min/1 73sq m 84     Coagulation Studies:     Endocrine Studies: @LABRCNTUrinalysis:   Lab Results   Component Value Date    COLORU Yellow 05/27/2021    CLARITYU Clear 05/27/2021    SPECGRAV 1 025 05/27/2021    PHUR 6 0 05/27/2021    LEUKOCYTESUR Negative 05/27/2021 NITRITE Negative 05/27/2021    GLUCOSEU Negative 05/27/2021    KETONESU Negative 05/27/2021    BILIRUBINUR Negative 05/27/2021    BLOODU Negative 05/27/2021       Physical Exam  Vitals signs reviewed  Constitutional:       General: She is not in acute distress  Appearance: Normal appearance  She is not ill-appearing  HENT:      Head: Normocephalic and atraumatic  Right Ear: Tympanic membrane, ear canal and external ear normal  There is no impacted cerumen  Left Ear: Tympanic membrane, ear canal and external ear normal  There is no impacted cerumen  Nose: Nose normal  No congestion or rhinorrhea  Mouth/Throat:      Mouth: Mucous membranes are moist       Pharynx: Oropharynx is clear  No oropharyngeal exudate or posterior oropharyngeal erythema  Eyes:      Extraocular Movements: Extraocular movements intact  Pupils: Pupils are equal, round, and reactive to light  Neck:      Musculoskeletal: Normal range of motion and neck supple  No muscular tenderness  Cardiovascular:      Rate and Rhythm: Regular rhythm  Tachycardia present  Pulses: Normal pulses  Heart sounds: Normal heart sounds  No murmur  Pulmonary:      Effort: Pulmonary effort is normal  No respiratory distress  Breath sounds: Normal breath sounds  Abdominal:      General: Abdomen is flat  Bowel sounds are normal       Palpations: Abdomen is soft  There is no mass  Tenderness: There is no abdominal tenderness  There is no right CVA tenderness or left CVA tenderness  Hernia: No hernia is present  Musculoskeletal: Normal range of motion  General: No swelling or tenderness  Right lower leg: No edema  Left lower leg: No edema  Lymphadenopathy:      Cervical: No cervical adenopathy  Skin:     General: Skin is warm and moist       Capillary Refill: Capillary refill takes less than 2 seconds     Neurological:      Mental Status: She is alert and oriented to person, place, and time       GCS: GCS eye subscore is 4  GCS verbal subscore is 5  GCS motor subscore is 6  Cranial Nerves: Cranial nerves are intact  Motor: Motor function is intact  Gait: Gait is intact     Psychiatric:         Mood and Affect: Mood normal          Behavior: Behavior normal           Sol Garcia, 1959 [facility] [address] [place] [address]

## 2023-02-22 DIAGNOSIS — Z30.011 ENCOUNTER FOR INITIAL PRESCRIPTION OF CONTRACEPTIVE PILLS: ICD-10-CM

## 2023-02-23 RX ORDER — NORGESTIMATE AND ETHINYL ESTRADIOL 0.25-0.035
1 KIT ORAL DAILY
Qty: 28 TABLET | Refills: 0 | Status: SHIPPED | OUTPATIENT
Start: 2023-02-23

## 2023-02-27 ENCOUNTER — TELEMEDICINE (OUTPATIENT)
Dept: BEHAVIORAL/MENTAL HEALTH CLINIC | Facility: CLINIC | Age: 23
End: 2023-02-27

## 2023-02-27 DIAGNOSIS — F32.A ANXIETY AND DEPRESSION: Primary | ICD-10-CM

## 2023-02-27 DIAGNOSIS — F41.9 ANXIETY AND DEPRESSION: Primary | ICD-10-CM

## 2023-02-27 NOTE — PSYCH
Virtual Regular Visit     Verification of patient location:     Patient is located in the following state in which I hold an active license [place]    Problem List Items Addressed This Visit        Other    Anxiety and depression - Primary       Reason for visit is scheduled virtual regular visit  Encounter provider Edinson Cui LCSW     Provider located at [address] [place] [address] [place]  [place] [address] [address] [id number] [phone]     Recent Visits  No visits were found meeting these conditions  Showing recent visits within past 7 days and meeting all other requirements  Today's Visits  Date Type Provider Dept   02/27/23 Telemedicine Michela Mcdonald, [address] [place] [address] [facility] [place]   Showing today's visits and meeting all other requirements  Future Appointments  No visits were found meeting these conditions    Showing future appointments within next 150 days and meeting all other requirements      HPI     Past Medical History:   Diagnosis Date   • Anxiety    • Concussion    • Depression    • Head injury     concussions in high school and in college   • Self-injurious behavior        Past Surgical History:   Procedure Laterality Date   • WISDOM TOOTH EXTRACTION         Current Outpatient Medications   Medication Sig Dispense Refill   • Adapalene-Benzoyl Peroxide 0 1-2 5 % gel Apply 1 application topically daily at bedtime (Patient not taking: Reported on 9/16/2022) 45 g 0   • clindamycin (CLINDAGEL) 1 % gel Apply topically 2 (two) times a day (Patient not taking: Reported on 9/16/2022) 30 g 0   • hydrOXYzine pamoate (VISTARIL) 50 mg capsule Take 1 capsule (50 mg total) by mouth daily at bedtime as needed for anxiety 30 capsule 1   • lamoTRIgine (LaMICtal) 100 mg tablet Take 1 tablet (100 mg total) by mouth daily 30 tablet 2   • metoclopramide (Reglan) 10 mg tablet Take 1 tablet (10 mg total) by mouth 3 (three) times a day as needed (headache) 15 tablet 0   • norgestimate-ethinyl estradiol (Alisha) 0 25-35 MG-MCG per tablet Take 1 tablet by mouth daily 28 tablet 0   • traZODone (DESYREL) 50 mg tablet Take 1 tablet (50 mg total) by mouth daily at bedtime as needed for sleep 30 tablet 2     No current facility-administered medications for this visit  No Known Allergies    The patient was identified by name and date of birth  Francesca Olivo was informed that this is a telemedicine visit and that the visit is being conducted through a virtual online media the [employer] platform  She agrees to proceed     My office door was closed  No one else was in the room  She acknowledged consent and understanding of privacy and security of the video platform  The patient has agreed to participate and understands they can discontinue the visit at any time  Patient is aware and confirmed that this virtual visit is a billable service  D: This therapist met with [place] for a(n) Individual Therapy session  A: [place] was oriented x3  She was focused and engaged  [place] did not present with HI SI or SIB  P: Meme Beal next session is scheduled for 3/16/23    Video Exam     There were no vitals filed for this visit  VIRTUAL VISIT DISCLAIMER     Francesca Olivo verbally agrees to participate in [employer]  Pt is aware that [employer] could be limited without vital signs or the ability to perform a full hands-on physical exam  Francesca Olivo understands she or the provider may request at any time to terminate the video visit and request the patient to seek care or treatment in person  Weston Brand LCSW    Behavioral Health Psychotherapy Progress Note    Psychotherapy Provided: Individual Psychotherapy     1  Anxiety and depression            Goals addressed in session: Goal 1     DATA: [place] stated still being depressed about the boy she broke up with  She discussed her discontent   We addressed the differences between them and ways she knew it wasn't a good fit  She discussed her feelings about herself and ever being able to find someone  We discussed her mother's depressive tendencies and its impact on her mental health and motivation  We discussed ways she may begin to make changes for herself, while recognizing herself as a different person from her mother  We addressed her possible professional/educational goals  We addressed the value in changing one thing in her life  She stated getting promoted in her job  This provider attempted to help John Mendoza recognize that someone sees something in her to make that decision to promote her  We addressed this promotion as a current change in her life  We discussed John Mendoza paying attention to how this change affects her  We discussed ways John Mendoza can make her room her 'sanctuary' and also ways to be away from the family, which is a source of negativity and lack of support  We addressed the philosophical notion that John Mendoza is at the place she is supposed to be  We confirmed appointments and set new one - virtual      During this session, this clinician used the following therapeutic modalities: Cognitive Behavioral Therapy, Mindfulness-based Strategies, Solution-Focused Therapy and Supportive Psychotherapy    Substance Abuse was not addressed during this session  If the client is diagnosed with a co-occurring substance use disorder, please indicate any changes in the frequency or amount of use: n/a  Stage of change for addressing substance use diagnoses: Contemplation    ASSESSMENT:  Evangelista Bhat presents with a Euthymic/ normal and Dysthymic mood  her affect is Normal range and intensity and Constricted, which is congruent, with her mood and the content of the session  The client has made progress on their goals  John Mendoza is actively using the therapeutic process, despite difficulty in talking about herself and verbalizing her feelings    Evangelista Bhat presents with a low risk of suicide, low risk of self-harm, and low risk of harm to others  Mikey Galeazzi is futuristic in thinking and goal-oriented  For any risk assessment that surpasses a "low" rating, a safety plan must be developed  A safety plan was indicated: no  If yes, describe in detail n/a    PLAN: Between sessions, Severa Adas will identify positive things about herself each day and identify goals for change  She will pay attention to her new job position and the impact on herself  At the next session, the therapist will use Cognitive Behavioral Therapy, Mindfulness-based Strategies, Motivational Interviewing and Supportive Psychotherapy to address mood management, sense of self  Behavioral Health Treatment Plan and Discharge Planning: Severa Adas is aware of and agrees to continue to work on their treatment plan  They have identified and are working toward their discharge goals   yes    Visit start and stop times:    02/27/23  Start Time: 1300  Stop Time: 1352  Total Visit Time: 52 minutes

## 2023-03-16 ENCOUNTER — TELEMEDICINE (OUTPATIENT)
Dept: BEHAVIORAL/MENTAL HEALTH CLINIC | Facility: CLINIC | Age: 23
End: 2023-03-16

## 2023-03-16 ENCOUNTER — OFFICE VISIT (OUTPATIENT)
Dept: FAMILY MEDICINE CLINIC | Facility: CLINIC | Age: 23
End: 2023-03-16

## 2023-03-16 VITALS
HEART RATE: 102 BPM | BODY MASS INDEX: 24.56 KG/M2 | OXYGEN SATURATION: 98 % | DIASTOLIC BLOOD PRESSURE: 80 MMHG | HEIGHT: 63 IN | WEIGHT: 138.6 LBS | TEMPERATURE: 97 F | SYSTOLIC BLOOD PRESSURE: 116 MMHG

## 2023-03-16 DIAGNOSIS — Z01.411 ENCOUNTER FOR GYNECOLOGICAL EXAMINATION WITH ABNORMAL FINDING: ICD-10-CM

## 2023-03-16 DIAGNOSIS — F41.9 ANXIETY AND DEPRESSION: Primary | ICD-10-CM

## 2023-03-16 DIAGNOSIS — N89.8 VAGINAL DISCHARGE: Primary | ICD-10-CM

## 2023-03-16 DIAGNOSIS — F32.A ANXIETY AND DEPRESSION: Primary | ICD-10-CM

## 2023-03-16 DIAGNOSIS — N30.01 ACUTE CYSTITIS WITH HEMATURIA: ICD-10-CM

## 2023-03-16 LAB
SL AMB  POCT GLUCOSE, UA: ABNORMAL
SL AMB LEUKOCYTE ESTERASE,UA: 15
SL AMB POCT BILIRUBIN,UA: 17
SL AMB POCT BLOOD,UA: ABNORMAL
SL AMB POCT CLARITY,UA: ABNORMAL
SL AMB POCT COLOR,UA: YELLOW
SL AMB POCT KETONES,UA: 5
SL AMB POCT NITRITE,UA: ABNORMAL
SL AMB POCT PH,UA: 6
SL AMB POCT SPECIFIC GRAVITY,UA: 1.02
SL AMB POCT URINE PROTEIN: 15
SL AMB POCT UROBILINOGEN: 0.2

## 2023-03-16 RX ORDER — NITROFURANTOIN 25; 75 MG/1; MG/1
100 CAPSULE ORAL 2 TIMES DAILY
Qty: 10 CAPSULE | Refills: 0 | Status: SHIPPED | OUTPATIENT
Start: 2023-03-16 | End: 2023-03-21

## 2023-03-16 NOTE — PROGRESS NOTES
Assessment/Plan:     Chronic Problems:  No problem-specific Assessment & Plan notes found for this encounter  Visit Diagnosis:  Diagnoses and all orders for this visit:    Vaginal discharge  Comments:  Send molecular vaginal panel and chlamydia  Orders:  -     Molecular Vaginal Panel; Future  -     Chlamydia/GC amplified DNA by PCR; Future  -     Chlamydia/GC amplified DNA by PCR  -     Molecular Vaginal Panel    Acute cystitis with hematuria  Comments: We will treat with 5 days of Macrobid  Orders:  -     Urine culture; Future  -     POCT urine dip  -     nitrofurantoin (MACROBID) 100 mg capsule; Take 1 capsule (100 mg total) by mouth 2 (two) times a day for 5 days  -     Urine culture    Encounter for gynecological examination with abnormal finding  -     Liquid-based pap, screening          Subjective:    Patient ID: Leatha Desai is a 25 y o  female  Patient presents for concerns of vaginal discharge  Patient states she did take Azo  Patient denies urinary symptoms  She states she does have vaginal itching as well  Patient states she is sexually active and compliant with oral contraceptive pill, but does not use condoms  Patient is concerned with possible STDs and pregnancy  The following portions of the patient's history were reviewed and updated as appropriate: allergies, current medications, past family history, past medical history, past social history, past surgical history and problem list     Review of Systems   Constitutional: Negative for chills and fever  HENT: Negative for ear pain and sore throat  Eyes: Negative for pain and visual disturbance  Respiratory: Negative for cough and shortness of breath  Cardiovascular: Negative for chest pain and palpitations  Gastrointestinal: Negative for abdominal pain and vomiting  Genitourinary: Positive for vaginal discharge and vaginal pain  Negative for dysuria and hematuria  Skin: Negative for color change and rash     All other systems reviewed and are negative  /80 (BP Location: Left arm, Patient Position: Sitting, Cuff Size: Standard)   Pulse 102   Temp (!) 97 °F (36 1 °C) (Tympanic)   Ht 5' 3" (1 6 m)   Wt 62 9 kg (138 lb 9 6 oz)   SpO2 98%   BMI 24 55 kg/m²   Social History     Socioeconomic History   • Marital status: Single     Spouse name: Not on file   • Number of children: 0   • Years of education: senior in college currently   • Highest education level: High school graduate   Occupational History   • 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   Tobacco Use   • Smoking status: Never   • Smokeless tobacco: Never   Vaping Use   • Vaping Use: Never used   Substance and Sexual Activity   • Alcohol use:  Yes     Alcohol/week: 3 0 - 4 0 standard drinks     Types: 3 - 4 Shots of liquor per week     Comment: 3-4 drinks, 2 times per week   • Drug use: No   • Sexual activity: Yes     Partners: Male     Birth control/protection: Condom Male   Other Topics Concern   • Not on file   Social History Narrative   • Not on file     Social Determinants of Health     Financial Resource Strain: Not on file   Food Insecurity: Not on file   Transportation Needs: Not on file   Physical Activity: Not on file   Stress: Not on file   Social Connections: Not on file   Intimate Partner Violence: Not on file   Housing Stability: Not on file     Past Medical History:   Diagnosis Date   • Anxiety    • Concussion    • Depression    • Head injury     concussions in high school and in college   • Self-injurious behavior      Family History   Problem Relation Age of Onset   • Hypertension Mother    • Mental illness Mother    • Depression Mother    • Mental illness Sister    • Coronary artery disease Maternal Grandmother    • Throat cancer Paternal Grandfather      Past Surgical History:   Procedure Laterality Date   • WISDOM TOOTH EXTRACTION         Current Outpatient Medications:   •  hydrOXYzine pamoate (VISTARIL) 50 mg capsule, Take 1 capsule (50 mg total) by mouth daily at bedtime as needed for anxiety, Disp: 30 capsule, Rfl: 1  •  lamoTRIgine (LaMICtal) 100 mg tablet, Take 1 tablet (100 mg total) by mouth daily, Disp: 30 tablet, Rfl: 2  •  nitrofurantoin (MACROBID) 100 mg capsule, Take 1 capsule (100 mg total) by mouth 2 (two) times a day for 5 days, Disp: 10 capsule, Rfl: 0  •  norgestimate-ethinyl estradiol (Alisha) 0 25-35 MG-MCG per tablet, Take 1 tablet by mouth daily, Disp: 28 tablet, Rfl: 0  •  traZODone (DESYREL) 50 mg tablet, Take 1 tablet (50 mg total) by mouth daily at bedtime as needed for sleep, Disp: 30 tablet, Rfl: 2    No Known Allergies       Lab Review   No visits with results within 2 Month(s) from this visit  Latest known visit with results is:   Office Visit on 06/30/2021   Component Date Value   • URINE HCG 06/30/2021     • N gonorrhoeae, DNA Probe 06/30/2021 Negative    • Chlamydia trachomatis, D* 06/30/2021 Positive (A)         Imaging: No results found  Objective:     Physical Exam  Constitutional:       Appearance: She is well-developed  Cardiovascular:      Rate and Rhythm: Normal rate and regular rhythm  Heart sounds: Normal heart sounds  No murmur heard  Pulmonary:      Effort: Pulmonary effort is normal  No respiratory distress  Breath sounds: Normal breath sounds  Genitourinary:     Vagina: Vaginal discharge present  Skin:     General: Skin is warm and dry  Neurological:      Mental Status: She is alert and oriented to person, place, and time  There are no Patient Instructions on file for this visit  ANGELA Arthur    Portions of the record may have been created with voice recognition software  Occasional wrong word or "sound a like" substitutions may have occurred due to the inherent limitations of voice recognition software  Read the chart carefully and recognize, using context, where substitutions have occurred
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
## 2023-03-17 ENCOUNTER — TELEMEDICINE (OUTPATIENT)
Dept: PSYCHIATRY | Facility: CLINIC | Age: 23
End: 2023-03-17

## 2023-03-17 DIAGNOSIS — F32.A ANXIETY AND DEPRESSION: ICD-10-CM

## 2023-03-17 DIAGNOSIS — B96.89 BV (BACTERIAL VAGINOSIS): Primary | ICD-10-CM

## 2023-03-17 DIAGNOSIS — F31.81 BIPOLAR II DISORDER (HCC): Primary | ICD-10-CM

## 2023-03-17 DIAGNOSIS — F41.9 ANXIETY AND DEPRESSION: ICD-10-CM

## 2023-03-17 DIAGNOSIS — B37.31 YEAST VAGINITIS: ICD-10-CM

## 2023-03-17 DIAGNOSIS — N76.0 BV (BACTERIAL VAGINOSIS): Primary | ICD-10-CM

## 2023-03-17 LAB
C GLABRATA DNA VAG QL NAA+PROBE: NEGATIVE
C KRUSEI DNA VAG QL NAA+PROBE: NEGATIVE
C TRACH DNA SPEC QL NAA+PROBE: NEGATIVE
CANDIDA SP 6 PNL VAG NAA+PROBE: POSITIVE
N GONORRHOEA DNA SPEC QL NAA+PROBE: NEGATIVE
T VAGINALIS DNA VAG QL NAA+PROBE: NEGATIVE
VAGINOSIS/ITIS DNA PNL VAG PROBE+SIG AMP: POSITIVE

## 2023-03-17 RX ORDER — FLUCONAZOLE 150 MG/1
TABLET ORAL
Qty: 2 TABLET | Refills: 0 | Status: SHIPPED | OUTPATIENT
Start: 2023-03-17 | End: 2023-03-20

## 2023-03-17 RX ORDER — HYDROXYZINE PAMOATE 50 MG/1
50 CAPSULE ORAL
Qty: 30 CAPSULE | Refills: 1 | Status: SHIPPED | OUTPATIENT
Start: 2023-03-17

## 2023-03-17 RX ORDER — METRONIDAZOLE 7.5 MG/G
1 GEL VAGINAL DAILY
Qty: 70 G | Refills: 0 | Status: SHIPPED | OUTPATIENT
Start: 2023-03-17 | End: 2023-06-23

## 2023-03-17 NOTE — PSYCH
Virtual Regular Visit    Verification of patient location:     Patient is located in the following state in which I hold an active license [place]    Problem List Items Addressed This Visit        Other    Anxiety and depression    Relevant Medications    cariprazine (Vraylar) 1 5 MG capsule    hydrOXYzine pamoate (VISTARIL) 50 mg capsule    Bipolar II disorder (HCC) - Primary    Relevant Medications    cariprazine (Vraylar) 1 5 MG capsule    hydrOXYzine pamoate (VISTARIL) 50 mg capsule          Encounter provider ANGELA Morrissey    Provider located at    [address]  [address] [facility] [address] [phone]    Recent Visits  Date Type Provider Dept   03/16/23 Office Visit [address] [facility] [address] recent visits within past 7 days and meeting all other requirements  Today's Visits  Date Type Provider Dept   03/17/23 Telemedicine Natalie Sarmiento, [address] [facility] [address] today's visits and meeting all other requirements  Future Appointments  No visits were found meeting these conditions  Showing future appointments within next 150 days and meeting all other requirements           The patient was identified by name and date of birth  Patient was informed that this is a telemedicine visit and that [email or web address]  She agrees to proceed     My office door was closed  No one else was in the room  She acknowledged consent and understanding of privacy and security of the video platform  The patient has agreed to participate and understands they can discontinue the visit at any time  Patient is aware this is a billable service       HPI     Current Outpatient Medications   Medication Sig Dispense Refill   • cariprazine (Vraylar) 1 5 MG capsule Take 1 capsule (1 5 mg total) by mouth daily 30 capsule 1   • hydrOXYzine pamoate (VISTARIL) 50 mg capsule Take 1 capsule (50 mg total) by mouth daily at bedtime as needed for anxiety 30 capsule 1   • lamoTRIgine (LaMICtal) 100 mg tablet Take 1 tablet (100 mg total) by mouth daily 30 tablet 2   • nitrofurantoin (MACROBID) 100 mg capsule Take 1 capsule (100 mg total) by mouth 2 (two) times a day for 5 days 10 capsule 0   • norgestimate-ethinyl estradiol (Alisha) 0 25-35 MG-MCG per tablet Take 1 tablet by mouth daily 28 tablet 0     No current facility-administered medications for this visit  Review of Systems  Video Exam    There were no vitals filed for this visit  Physical Exam   As a result of this visit, I have referred the patient for further respiratory evaluation  No    I spent 30 minutes directly with the patient during this visit  VIRTUAL VISIT DISCLAIMER    Yovany Adamson acknowledges that she has consented to an online visit or consultation  She understands that the online visit is based solely on information provided by her, and that, in the absence of a face-to-face physical evaluation by the physician, the diagnosis she receives is both limited and provisional in terms of accuracy and completeness  This is not intended to replace a full medical face-to-face evaluation by the physician  Yovany Adamson understands and accepts these terms  MEDICATION MANAGEMENT NOTE        [address]    Name and Date of Birth:  Yovany Adamson 25 y o  2000 MRN: [id number]    Date of Visit: March 17, 2023    No Known Allergies    Visit Time    Visit Start Time: 0900  Visit Stop Time: 9189  Total Visit Duration: 30 minutes    SUBJECTIVE:    Dylon Barragan is seen today for a follow up for Bipolar Disorder type II  She continues to experience ongoing symptoms since the last visit  Dylon Barragan is seen virtually today for medication management follow up  She was last seen by this provider on 2/17/23  She reports today that she continues to have depression    She states all she does is go to work, come home, go to bed, and get up and do the same thing over and over  She states that she has Mondays and Tuesdays off, and all she does on those days is stay and bed and sleep  By Tuesday evening she cannot sleep any longer and she is unable to sleep that night  She reports she has passive suicidal ideation, but no intent because she needs to stay alive for her cat  Denies HI  Denies auditory and visual hallucinations  She has not considered anything for school at this time  She states she doesn't want to go to school  She does not want to be around people and is annoyed that her sister has a boyfriend  She has a lot of irritability, decreased focus and concentration  Decreased motivation  Decreased appetite, states she only eats once/day  She reports she has gone out with friends a few times, but cannot drink because alcohol gives her headaches  She is "too lazy" to go to the gym  We will work on her depression at this time  PHQ 9 reports moderately severe depression at this time  She states she is always anxious and at times needs the vistaril for sleep  JOHANA-7 is rated severe anxiety  Medication trials in past for Janell's bipolar depression include:  Abilify, which at 5mg dosage made her dissociate; Zyprexa which caused weight gain  Gabapentin was ineffective  She has been on antidepressants for her depression as well, but due to her bipolar diagnosis are contraindicated at this time  We will initiate Vraylar 1 5mg PO daily adjunct to the lamictal for her depression  She is agreeable at this time  She will continue to utilize the vistaril for her anxiety  Trazodone will be d/c  She is agreeable to this treatment plan at this time  She denies any side effects from current psychiatric medications      PLAN:  Continue Lamictal 100mg PO daily  Continue Vistaril 50mg PO daily HS PRN  Initiate Vraylar 1 5mg PO Daily for adjunct depression  Continue therapy with therapist  Will follow up in one month  She will call sooner with concerns or issues if they arise prior to scheduled appointment     Aware of 24 hour and weekend coverage for urgent situations accessed by calling Health system main practice number  Continue psychotherapy with therapist  Medication management every 1 month  Aware of need to follow up with family physician for medical issues    Diagnoses and all orders for this visit:    Bipolar II disorder ([facility] [address] )  -     cariprazine (Vraylar) 1 5 MG capsule; Take 1 capsule (1 5 mg total) by mouth daily    Anxiety and depression  -     hydrOXYzine pamoate (VISTARIL) 50 mg capsule; Take 1 capsule (50 mg total) by mouth daily at bedtime as needed for anxiety    Education provided on Neuroleptic malignant syndrome  Patient told that NMS is a life-threatening, neurological disorder most often caused by an adverse reaction to neuroleptic or antipsychotic drugs  Symptoms include high fever, sweating, unstable blood pressure, stupor, muscular rigidity, and autonomic dysfunction  In most cases, the disorder develops within the first 2 weeks of treatment with the drug; however, the disorder may develop any time during the therapy period  Patient advised to call the office or go to the ED immediately if these symptoms develop  Patient education completed for Juan Lovett including: NMS, EPS, tardive dyskinesia, dystonia, stroke, TIA, syncope, orthostatic hypotension, seizures, diabetes mellitus, Becerril-Chato syndrome, leukopenia, neutropenia, agranulocytosis, akathisia, insomnia, nausea, somnolence, constipation, and BP incr  , etc     Lamotrigine PARQ completed including dizziness, headaches, ataxia, vision problems, somnolence, sleep changes, cognitive difficulties, rash (including Becerril-Chato rash), and others, risk of teratogenicity for females       Current Rating Scores:     Current PHQ-9   PHQ-2/9 Depression Screening    Little interest or pleasure in doing things: 3 - nearly every day  Feeling down, depressed, or hopeless: 3 - nearly every day  Trouble falling or staying asleep, or sleeping too much: 3 - nearly every day  Feeling tired or having little energy: 3 - nearly every day  Poor appetite or overeatin - several days  Feeling bad about yourself - or that you are a failure or have let yourself or your family down: 2 - more than half the days  Trouble concentrating on things, such as reading the newspaper or watching television: 3 - nearly every day  Moving or speaking so slowly that other people could have noticed  Or the opposite - being so fidgety or restless that you have been moving around a lot more than usual: 0 - not at all  Thoughts that you would be better off dead, or of hurting yourself in some way: 1 - several days  PHQ-9 Score: 19   PHQ-9 Interpretation: Moderately severe depression        Current JOHANA-7 is   JOHANA-7 Flowsheet Screening    Flowsheet Row Most Recent Value   Over the last 2 weeks, how often have you been bothered by any of the following problems? Feeling nervous, anxious, or on edge 2   Not being able to stop or control worrying 3   Worrying too much about different things 3   Trouble relaxing 3   Being so restless that it is hard to sit still 1   Becoming easily annoyed or irritable 3   Feeling afraid as if something awful might happen 1   JOHANA-7 Total Score 16            Current Outpatient Medications on File Prior to Visit   Medication Sig Dispense Refill   • lamoTRIgine (LaMICtal) 100 mg tablet Take 1 tablet (100 mg total) by mouth daily 30 tablet 2   • nitrofurantoin (MACROBID) 100 mg capsule Take 1 capsule (100 mg total) by mouth 2 (two) times a day for 5 days 10 capsule 0   • norgestimate-ethinyl estradiol (Alisha) 0 25-35 MG-MCG per tablet Take 1 tablet by mouth daily 28 tablet 0   • [DISCONTINUED] hydrOXYzine pamoate (VISTARIL) 50 mg capsule Take 1 capsule (50 mg total) by mouth daily at bedtime as needed for anxiety 30 capsule 1   • [DISCONTINUED] traZODone (DESYREL) 50 mg tablet Take 1 tablet (50 mg total) by mouth daily at bedtime as needed for sleep (Patient not taking: Reported on 3/17/2023) 30 tablet 2     No current facility-administered medications on file prior to visit  Psychotherapy Provided:     Individual psychotherapy provided: Yes  Counseling was provided during the session today for 16 minutes  Supportive counseling provided  Medication changes discussed with Katiana Hatch  Medication education provided to Katiana Hatch  Recent stressor including ongoing anxiety discussed with Katiana Hatch  Coping strategies reviewed with Katiana Hatch  Educated on importance of medication and treatment compliance  Importance of follow up with family physician for medical issues reviewed with Katiana Hatch  Reassurance and supportive therapy provided  Crisis/safety plan discussed with Katiana Hatch  Patient will call prior to scheduled appointment if they have any issues or concerns  Patient understands they can access the office by calling the main number at any time if they are in crisis  They also understand they can call their Critical access hospital's crisis number or go to their nearest ED if suicidal ideation increases or if they develop a plan or intent  HPI ROS Appetite Changes and Sleep:     She reports difficulty falling asleep, interrupted sleep, decreased appetite, low energy   Denies homicidal ideation, Intermittent passive suicidal ideation without intent or plan    Review Of Systems:    HPI ROS:               Medication Side Effects:  denies     Depression (10 worst): high (Was 8/10)   Anxiety (10 worst): high (Was 7/10)   Safety concerns (SI, HI, etc): Passive SI, no plan or intent (Was Passive SI, no plan or intent)   Sleep: Sleeps a lot, all day Monday and Tuesday, uses vistaril for sleep at night when she works (Was 5 hours/ngiht, but could sleep more)   Energy: Low motivation, adequate energy (Was adequate)   Appetite: Poor, one meal per day (Was adequate)     General sleep disturbances and appetite disturbances   Personality no change in personality   Constitutional as noted in HPI   ENT negative   Cardiovascular negative   Respiratory negative   Gastrointestinal negative   Genitourinary negative   Musculoskeletal negative   Integumentary negative   Neurological negative   Endocrine negative   Other Symptoms none, all other systems are negative     Mental Status Evaluation:    Appearance Adequate hygiene and grooming and Good eye contact   Behavior calm and cooperative   Mood anxious, depressed and irritable  Depression Scale - high of 10 (0 = No depression)  Anxiety Scale - high of 10 (0 = No anxiety)   Speech Normal rate and volume   Affect constricted   Thought Processes Goal directed and coherent   Thought Content Does not verbalize delusional material   Associations Tightly connected   Perceptual Disturbances Denies hallucinations and does not appear to be responding to internal stimuli   Risk Potential Suicidal/Homicidal Ideation - Passive suicidal ideation without intent or plan  Risk of Violence - No evidence of risk for violence found on assessment  Risk of Self Mutilation - No evidence of risk for self mutilation found on assessment   Orientation oriented to person, place, time/date and situation   Memory recent and remote memory grossly intact   Consciousness alert and awake   Attention/Concentration attention span and concentration appear shorter than expected for age   Insight fair   Judgement fair   Muscle Strength and Gait normal muscle strength and normal muscle tone, normal gait/station and normal balance   Motor Activity no abnormal movements   Language no difficulty naming common objects, no difficulty repeating a phrase, no difficulty writing a sentence   Fund of Knowledge adequate knowledge of current events  adequate fund of knowledge regarding past history  adequate fund of knowledge regarding vocabulary      Past Psychiatric History  Previous diagnoses include anxiety and depression   Prior outpatient psychiatric treatment: Therapy as a child  [facility] therapy: as a child, unable to remember the place or the person  [facility] inpatient psychiatric treatment: Yes, Janeen Stahl in March 2021  [facility] suicide attempts: denies   Prior self harm: yes, cutting in March 2021  [facility] violence or aggression: denies     Past Psychiatric History Update:     Inpatient Psychiatric Admission Since Last Encounter:   no  Changes to Outpatient Psychiatric Treatment Team:    no  Suicide Attempt Or Self Mutilation Since Last Encounter:   no  Incidence of Violent Behavior Since Last Encounter:   no    Traumatic History Update:     New Onset of Abuse Since Last Encounter:   no  Traumatic Events Since Last Encounter:   no    Past Medical History:    Past Medical History:   Diagnosis Date   • Anxiety    • Concussion    • Depression    • Head injury     concussions in high school and in college   • Self-injurious behavior         Past Surgical History:   Procedure Laterality Date   • WISDOM TOOTH EXTRACTION       No Known Allergies  Substance Abuse History:    Social History     Substance and Sexual Activity   Alcohol Use Yes   • Alcohol/week: 3 0 - 4 0 standard drinks   • Types: 3 - 4 Shots of liquor per week    Comment: 3-4 drinks, 2 times per week     Social History     Substance and Sexual Activity   Drug Use No     Social History:    Social History     Socioeconomic History   • Marital status: Single     Spouse name: Not on file   • Number of children: 0   • Years of education: senior in college currently   • Highest education level: High school graduate   Occupational History   • 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   Tobacco Use   • Smoking status: Never   • Smokeless tobacco: Never   Vaping Use   • Vaping Use: Never used   Substance and Sexual Activity   • Alcohol use:  Yes     Alcohol/week: 3 0 - 4 0 standard drinks     Types: 3 - 4 Shots of liquor per week     Comment: 3-4 drinks, 2 times per week   • Drug use: No   • Sexual activity: Yes     Partners: Male     Birth control/protection: Condom Male   Other Topics Concern   • Not on file   Social History Narrative   • Not on file     Social Determinants of Health     Financial Resource Strain: Not on file   Food Insecurity: Not on file   Transportation Needs: Not on file   Physical Activity: Not on file   Stress: Not on file   Social Connections: Not on file   Intimate Partner Violence: Not on file   Housing Stability: Not on file     Family Psychiatric History:     Family History   Problem Relation Age of Onset   • Hypertension Mother    • Mental illness Mother    • Depression Mother    • Mental illness Sister    • Coronary artery disease Maternal Grandmother    • Throat cancer Paternal Grandfather      History Review: The following portions of the patient's history were reviewed and updated as appropriate: allergies, current medications, past family history, past medical history, past social history, past surgical history and problem list     OBJECTIVE:     Vital signs in last 24 hours: There were no vitals filed for this visit  Laboratory Results:   Recent Labs (last 2 months):   Office Visit on 03/16/2023   Component Date Value   • LEUKOCYTE ESTERASE,UA 03/16/2023 15    • NITRITE,UA 03/16/2023 +    • SL AMB POCT UROBILINOGEN 03/16/2023 0 2    • POCT URINE PROTEIN 03/16/2023 15    •  PH,UA 03/16/2023 6 0    • BLOOD,UA 03/16/2023 5-10    • SPECIFIC GRAVITY,UA 03/16/2023 1 020    • KETONES,UA 03/16/2023 5    • BILIRUBIN,UA 03/16/2023 17    • GLUCOSE, UA 03/16/2023 -    •  COLOR,UA 03/16/2023 yellow    • CLARITY,UA 03/16/2023 cloudy      I have personally reviewed all pertinent laboratory/tests results      Suicide/Homicide Risk Assessment:    Risk of Harm to Self:  The following ratings are based on assessment at the time of the interview  Recent Specific Risk Factors include: current depressive symptoms, current anxiety symptoms, unstable mood, passive death wishes, has suicidal ideation without a plan  Demographic risk factors include: , age: young adult (15-24)  Historical Risk Factors include: chronic depression, chronic anxiety symptoms, chronic mood disorder, history of suicide attempt, history of self-abusive behavior, history of impulsive behaviors, history of traumatic experiences  Protective Factors: no current suicidal ideation, access to mental health treatment, compliant with medications, compliant with mental health treatment, having a desire to be alive, having pets, stable living environment, stable job, supportive family  Based on today's assessment, Lianne Christopher presents the following risk of harm to self: low    Risk of Harm to Others: The following ratings are based on assessment at the time of the interview  Protective Factors: no current homicidal ideation  Based on today's assessment, Lianne Christopher presents the following risk of harm to others: none    The following interventions are recommended: contracts for safety at present - agrees to go to ED if feeling unsafe, return in 1 month for reassessment, therapy appointment in 2 weeks, contracts for safety at present - agrees to call Crisis Intervention Service if feeling unsafe    Medications Risks/Benefits:      Risks, Benefits And Possible Side Effects Of Medications:    Discussed risks and benefits of treatment with patient including risk of parkinsonian symptoms, metabolic syndrome, tardive dyskinesia and neuroleptic malignant syndrome related to treatment with antipsychotic medications and risk of rash related to treatment with Lamictal     Controlled Medication Discussion:     Not applicable    Treatment Plan:    Due for update/Updated:   no  Last treatment plan done 10/28/22 by Izzy Dang LCSW  Treatment Plan due on 4/28/23  ANGELA Multani 03/17/23    This note was shared with patient

## 2023-03-18 DIAGNOSIS — Z30.011 ENCOUNTER FOR INITIAL PRESCRIPTION OF CONTRACEPTIVE PILLS: ICD-10-CM

## 2023-03-18 LAB — BACTERIA UR CULT: ABNORMAL

## 2023-03-20 ENCOUNTER — TELEPHONE (OUTPATIENT)
Dept: PSYCHIATRY | Facility: CLINIC | Age: 23
End: 2023-03-20

## 2023-03-20 RX ORDER — NORGESTIMATE AND ETHINYL ESTRADIOL 0.25-0.035
1 KIT ORAL DAILY
Qty: 28 TABLET | Refills: 0 | Status: SHIPPED | OUTPATIENT
Start: 2023-03-20

## 2023-03-22 ENCOUNTER — TELEPHONE (OUTPATIENT)
Dept: PSYCHIATRY | Facility: CLINIC | Age: 23
End: 2023-03-22

## 2023-03-22 NOTE — TELEPHONE ENCOUNTER
Called pharmacy and was informed that Lamictal went through insurance with a 0 co-pay and was already picked up  Jesus Burns is the medication that is requiring a Prior Authorization through [employer]  Prior Authorization for Vraylar 1 5 MG capsule faxed to [employer] via [address] [place] [address]  Decision pending

## 2023-03-22 NOTE — TELEPHONE ENCOUNTER
Neal Marcelino and/or [address] [place] [address]  requested a call back to discuss the prior auth needed for the pts Vraylar  They have called several times they stated in the voiemail,    They can be reached at P#[phone]  Thank you

## 2023-03-24 ENCOUNTER — TELEPHONE (OUTPATIENT)
Dept: PSYCHIATRY | Facility: CLINIC | Age: 23
End: 2023-03-24

## 2023-03-27 ENCOUNTER — TELEPHONE (OUTPATIENT)
Dept: PSYCHIATRY | Facility: CLINIC | Age: 23
End: 2023-03-27

## 2023-03-27 DIAGNOSIS — F31.81 BIPOLAR II DISORDER (HCC): Primary | ICD-10-CM

## 2023-03-27 LAB
LAB AP GYN PRIMARY INTERPRETATION: NORMAL
Lab: NORMAL
PATH INTERP SPEC-IMP: NORMAL

## 2023-03-27 RX ORDER — LURASIDONE HYDROCHLORIDE 20 MG/1
20 TABLET, FILM COATED ORAL
Qty: 30 TABLET | Refills: 1 | Status: SHIPPED | OUTPATIENT
Start: 2023-03-27

## 2023-03-27 NOTE — PROGRESS NOTES
Spoke with Marylu Markham  Kalli Robertson was not approved by insurance  Will initiate Latuda, 20mg PO daily with dinner  Patient understands she must take with at least 300 calories  Education provided on Neuroleptic malignant syndrome  Patient told that NMS is a life-threatening, neurological disorder most often caused by an adverse reaction to neuroleptic or antipsychotic drugs  Symptoms include high fever, sweating, unstable blood pressure, stupor, muscular rigidity, and autonomic dysfunction  In most cases, the disorder develops within the first 2 weeks of treatment with the drug; however, the disorder may develop any time during the therapy period  Patient advised to call the office or go to the ED immediately if these symptoms develop  Patient education completed for [place], risks include:  NMS, leukopenia, neutropenia, tardive dyskinesia, angioedema, suicidality, diabetes, orthostatic hypotension, somnolence or insomnia, GI upset, hyperprolactinemia, akathisia, and possible weight gain (less than other SGA's), etc     Follow up is scheduled for 4/18/23  She will call sooner if concerns or issues arise prior to scheduled appointment

## 2023-03-27 NOTE — TELEPHONE ENCOUNTER
HIRO on Janell's VM that Prior Auth was sent out on Wednesday and it could take 3-5 business days for a response  Second request was faxed this a des

## 2023-03-27 NOTE — TELEPHONE ENCOUNTER
Called Nader Meade to let her know about Vraylar denial   Will recommend [employer] as an alternative at this time  Left Nader Meade a message to return my call

## 2023-03-27 NOTE — TELEPHONE ENCOUNTER
"Fax from [facility] with Prior Authorization denial for [employer]  \"Do not see medical record documentation or therapeutic failure, contraindication, or intolerance to one preferred antipsychotic  Formulary alternatives: Clozapine Tablet, Equetro Capsule, Fluphenazine oral concentrate solution or tablet, Haloperidol tablet or solution, Loxapine capsule, Lurasidone tablet, paliperidone ER, Perphenazine tablet, Quetiapine ER tablet, Quetiapine IR tablet, Risperidone solution or tablet, Trifluoperazine Tablet, Ziprasidone greg  Will refer to Cande Waldron for review    "

## 2023-03-28 ENCOUNTER — TELEPHONE (OUTPATIENT)
Dept: FAMILY MEDICINE CLINIC | Facility: CLINIC | Age: 23
End: 2023-03-28

## 2023-03-28 NOTE — TELEPHONE ENCOUNTER
----- Message from [address] [facility] sent at 3/27/2023  5:10 PM EDT -----  Please let patient know that her Pap is negative  It is consistent with bacterial vaginosis and yeast which we already treated

## 2023-03-29 ENCOUNTER — TELEPHONE (OUTPATIENT)
Dept: PSYCHIATRY | Facility: CLINIC | Age: 23
End: 2023-03-29

## 2023-03-29 NOTE — TELEPHONE ENCOUNTER
Pt called to get her appt for 3/30/23 at 8:00a switched to a virtual visit due to having to go into work   Writer switched appt

## 2023-03-30 ENCOUNTER — TELEMEDICINE (OUTPATIENT)
Dept: BEHAVIORAL/MENTAL HEALTH CLINIC | Facility: CLINIC | Age: 23
End: 2023-03-30

## 2023-03-30 DIAGNOSIS — F41.9 ANXIETY AND DEPRESSION: Primary | ICD-10-CM

## 2023-03-30 DIAGNOSIS — F32.A ANXIETY AND DEPRESSION: Primary | ICD-10-CM
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
## 2023-04-04 ENCOUNTER — TELEPHONE (OUTPATIENT)
Dept: FAMILY MEDICINE CLINIC | Facility: CLINIC | Age: 23
End: 2023-04-04

## 2023-04-04 NOTE — TELEPHONE ENCOUNTER
I called the pt I told her she needs an appointment, she said she will just give us a call with her availability

## 2023-04-11 DIAGNOSIS — L60.0 INGROWN TOENAIL: Primary | ICD-10-CM

## 2023-05-04 ENCOUNTER — TELEMEDICINE (OUTPATIENT)
Dept: BEHAVIORAL/MENTAL HEALTH CLINIC | Facility: CLINIC | Age: 23
End: 2023-05-04

## 2023-05-04 DIAGNOSIS — F32.A ANXIETY AND DEPRESSION: ICD-10-CM

## 2023-05-04 DIAGNOSIS — F31.81 BIPOLAR II DISORDER (HCC): Primary | ICD-10-CM

## 2023-05-04 DIAGNOSIS — F41.9 ANXIETY AND DEPRESSION: ICD-10-CM
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
## 2023-05-08 ENCOUNTER — OFFICE VISIT (OUTPATIENT)
Dept: FAMILY MEDICINE CLINIC | Facility: CLINIC | Age: 23
End: 2023-05-08

## 2023-05-08 VITALS
DIASTOLIC BLOOD PRESSURE: 80 MMHG | OXYGEN SATURATION: 98 % | SYSTOLIC BLOOD PRESSURE: 116 MMHG | HEIGHT: 63 IN | HEART RATE: 106 BPM | WEIGHT: 139.6 LBS | TEMPERATURE: 98.1 F | BODY MASS INDEX: 24.73 KG/M2

## 2023-05-08 DIAGNOSIS — Z11.59 NEED FOR HEPATITIS C SCREENING TEST: ICD-10-CM

## 2023-05-08 DIAGNOSIS — Z11.4 ENCOUNTER FOR SCREENING FOR HIV: ICD-10-CM

## 2023-05-08 DIAGNOSIS — Z00.00 HEALTHCARE MAINTENANCE: ICD-10-CM

## 2023-05-08 DIAGNOSIS — G44.011 INTRACTABLE EPISODIC CLUSTER HEADACHE: Primary | ICD-10-CM

## 2023-05-08 RX ORDER — NAPROXEN 500 MG/1
500 TABLET ORAL 2 TIMES DAILY WITH MEALS
Qty: 30 TABLET | Refills: 0 | Status: SHIPPED | OUTPATIENT
Start: 2023-05-08

## 2023-05-08 NOTE — PROGRESS NOTES
Assessment/Plan:     Chronic Problems:  No problem-specific Assessment & Plan notes found for this encounter  Visit Diagnosis:  Diagnoses and all orders for this visit:    Intractable episodic cluster headache  Comments: Will use naproxen as needed  Will obtain work up to rule out cause  advised hydration and follow up if not improving  Orders:  -     naproxen (Naprosyn) 500 mg tablet; Take 1 tablet (500 mg total) by mouth 2 (two) times a day with meals  -     Vitamin D 25 hydroxy; Future  -     Lyme Disease Serology w/Reflex; Future  -     Iron Panel (Includes Ferritin, Iron Sat%, Iron, and TIBC); Future    Encounter for screening for HIV  -     : HIV 1/2 AB/AG w Reflex [facility] for 2 yr old and above; Future    Need for hepatitis C screening test  -     Hepatitis C antibody; Future    Healthcare maintenance  -     CBC and differential; Future  -     Comprehensive metabolic panel; Future  -     Lipid panel; Future  -     TSH, 3rd generation with Free T4 reflex; Future          Subjective:    Patient ID: [place] is a 25 y o  female  Patient presents with concerns of headaches  This started 3 weeks ago  It wasn't constant, but constant past few days  She did have one episode of blurred vision  Denies nausea, vomiting  The following portions of the patient's history were reviewed and updated as appropriate: allergies, current medications, past family history, past medical history, past social history, past surgical history and problem list     Review of Systems   Constitutional: Negative for chills and fever  HENT: Negative for congestion, ear pain, sinus pressure, sinus pain and sore throat  Eyes: Positive for photophobia and visual disturbance  Negative for pain  Respiratory: Negative for cough, chest tightness, shortness of breath and wheezing  Cardiovascular: Negative for chest pain and palpitations  Gastrointestinal: Negative for abdominal pain, diarrhea, nausea and vomiting  "  Genitourinary: Negative for dysuria and hematuria  Musculoskeletal: Negative for arthralgias and back pain  Skin: Negative for color change and rash  Allergic/Immunologic: Positive for environmental allergies  Neurological: Positive for light-headedness and headaches  Negative for seizures and syncope  All other systems reviewed and are negative  /80 (BP Location: Left arm, Patient Position: Sitting, Cuff Size: Standard)   Pulse (!) 106   Temp 98 1 °F (36 7 °C)   Ht 5' 3\" (1 6 m)   Wt 63 3 kg (139 lb 9 6 oz)   SpO2 98%   BMI 24 73 kg/m²   Social History     Socioeconomic History   • Marital status: Single     Spouse name: Not on file   • Number of children: 0   • Years of education: senior in college currently   • Highest education level: High school graduate   Occupational History   • 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   Tobacco Use   • Smoking status: Never   • Smokeless tobacco: Never   Vaping Use   • Vaping Use: Never used   Substance and Sexual Activity   • Alcohol use:  Yes     Alcohol/week: 3 0 - 4 0 standard drinks     Types: 3 - 4 Shots of liquor per week     Comment: 3-4 drinks, 2 times per week   • Drug use: No   • Sexual activity: Yes     Partners: Male     Birth control/protection: Condom Male   Other Topics Concern   • Not on file   Social History Narrative   • Not on file     Social Determinants of Health     Financial Resource Strain: Not on file   Food Insecurity: Not on file   Transportation Needs: Not on file   Physical Activity: Not on file   Stress: Not on file   Social Connections: Not on file   Intimate Partner Violence: Not on file   Housing Stability: Not on file     Past Medical History:   Diagnosis Date   • Anxiety    • Concussion    • Depression    • Head injury     concussions in high school and in college   • Self-injurious behavior      Family History   Problem Relation Age of Onset   • Hypertension Mother    • Mental illness Mother    • " Depression Mother    • Mental illness Sister    • Coronary artery disease Maternal Grandmother    • Throat cancer Paternal Grandfather      Past Surgical History:   Procedure Laterality Date   • WISDOM TOOTH EXTRACTION         Current Outpatient Medications:   •  gabapentin (Neurontin) 100 mg capsule, Take 1 capsule (100 mg total) by mouth 3 (three) times a day, Disp: 90 capsule, Rfl: 2  •  hydrOXYzine pamoate (VISTARIL) 50 mg capsule, Take 1 capsule (50 mg total) by mouth daily at bedtime as needed for anxiety, Disp: 30 capsule, Rfl: 1  •  lamoTRIgine (LaMICtal) 100 mg tablet, Take 1 tablet (100 mg total) by mouth daily, Disp: 30 tablet, Rfl: 2  •  lamoTRIgine (LaMICtal) 25 mg tablet, Take 1 tablet (25 mg total) by mouth daily, Disp: 30 tablet, Rfl: 2  •  metroNIDAZOLE (METROGEL) 0 75 % vaginal gel, Insert 1 application   into the vagina in the morning, Disp: 70 g, Rfl: 0  •  naproxen (Naprosyn) 500 mg tablet, Take 1 tablet (500 mg total) by mouth 2 (two) times a day with meals, Disp: 30 tablet, Rfl: 0  •  norgestimate-ethinyl estradiol (Alisha) 0 25-35 MG-MCG per tablet, Take 1 tablet by mouth daily, Disp: 84 tablet, Rfl: 3    No Known Allergies       Lab Review   Office Visit on 03/16/2023   Component Date Value   • LEUKOCYTE ESTERASE,UA 03/16/2023 15    • NITRITE,UA 03/16/2023 +    • SL AMB POCT UROBILINOGEN 03/16/2023 0 2    • POCT URINE PROTEIN 03/16/2023 15    •  PH,UA 03/16/2023 6 0    • BLOOD,UA 03/16/2023 5-10    • SPECIFIC GRAVITY,UA 03/16/2023 1 020    • KETONES,UA 03/16/2023 5    • BILIRUBIN,UA 03/16/2023 17    • GLUCOSE, UA 03/16/2023 -    •  COLOR,UA 03/16/2023 yellow    • CLARITY,UA 03/16/2023 cloudy    • Case Report 03/16/2023                      Value:Gynecologic Cytology Report                       Case: [id number]                                  Authorizing Provider:  ANGELA Sykes          Collected:           03/16/2023 1240              Ordering Location:     [address] [place] [address] Received:            03/16/2023 1240                                     Practice [address] [facility]                                                                  First Screen:          Josiepcuba Paci                                                                  Specimen:    LIQUID-BASED PAP, SCREENING, Cervix                                                       • Primary Interpretation 03/16/2023 Negative for intraepithelial lesion or malignancy    • Interpretation 03/16/2023 Fungal organisms morphologically consistent with Candida spp  Shift in betty suggestive of bacterial vaginosis    • Specimen Adequacy 03/16/2023 Satisfactory for evaluation  Absence of endocervical/transformation zone component  • Additional Information 03/16/2023                      Value: This result contains rich text formatting which cannot be displayed here  • N gonorrhoeae, DNA Probe 03/16/2023 Negative    • Chlamydia trachomatis, D* 03/16/2023 Negative    • Bacterial Vaginosis 03/16/2023 Positive (A)    • Candida species 03/16/2023 Positive (A)    • Candida glabrata 03/16/2023 Negative    • Jacqui krusei 03/16/2023 Negative    • Trichomonas vaginalis 03/16/2023 Negative    • Urine Culture 03/16/2023 >100,000 cfu/ml Escherichia coli (A)         Imaging: No results found  Objective:     Physical Exam  Constitutional:       Appearance: She is well-developed  HENT:      Right Ear: A middle ear effusion is present  Left Ear: Tympanic membrane normal       Nose:      Right Sinus: No maxillary sinus tenderness or frontal sinus tenderness  Left Sinus: No maxillary sinus tenderness or frontal sinus tenderness  Comments: Neg temporal pain on palpation  Cardiovascular:      Rate and Rhythm: Normal rate and regular rhythm  Heart sounds: Normal heart sounds  No murmur heard    Pulmonary:      Effort: Pulmonary effort is normal  No "respiratory distress  Breath sounds: Normal breath sounds  Skin:     General: Skin is warm and dry  Neurological:      Mental Status: She is alert and oriented to person, place, and time  There are no Patient Instructions on file for this visit  ANGELA Arthur    Portions of the record may have been created with voice recognition software  Occasional wrong word or \"sound a like\" substitutions may have occurred due to the inherent limitations of voice recognition software  Read the chart carefully and recognize, using context, where substitutions have occurred    "

## 2023-05-09 ENCOUNTER — APPOINTMENT (OUTPATIENT)
Dept: LAB | Facility: HOSPITAL | Age: 23
End: 2023-05-09

## 2023-05-09 DIAGNOSIS — Z00.00 HEALTHCARE MAINTENANCE: ICD-10-CM

## 2023-05-09 DIAGNOSIS — Z11.59 NEED FOR HEPATITIS C SCREENING TEST: ICD-10-CM

## 2023-05-09 DIAGNOSIS — G44.011 INTRACTABLE EPISODIC CLUSTER HEADACHE: ICD-10-CM

## 2023-05-09 DIAGNOSIS — Z11.4 ENCOUNTER FOR SCREENING FOR HIV: ICD-10-CM

## 2023-05-09 LAB
25(OH)D3 SERPL-MCNC: 18.3 NG/ML (ref 30–100)
ALBUMIN SERPL BCP-MCNC: 4.1 G/DL (ref 3.5–5)
ALP SERPL-CCNC: 57 U/L (ref 34–104)
ALT SERPL W P-5'-P-CCNC: 19 U/L (ref 7–52)
ANION GAP SERPL CALCULATED.3IONS-SCNC: 6 MMOL/L (ref 4–13)
AST SERPL W P-5'-P-CCNC: 21 U/L (ref 13–39)
B BURGDOR IGG+IGM SER-ACNC: <0.2 AI
BASOPHILS # BLD AUTO: 0.03 THOUSANDS/ÂΜL (ref 0–0.1)
BASOPHILS NFR BLD AUTO: 0 % (ref 0–1)
BILIRUB SERPL-MCNC: 0.45 MG/DL (ref 0.2–1)
BUN SERPL-MCNC: 15 MG/DL (ref 5–25)
CALCIUM SERPL-MCNC: 9.3 MG/DL (ref 8.4–10.2)
CHLORIDE SERPL-SCNC: 107 MMOL/L (ref 96–108)
CHOLEST SERPL-MCNC: 154 MG/DL
CO2 SERPL-SCNC: 24 MMOL/L (ref 21–32)
CREAT SERPL-MCNC: 0.71 MG/DL (ref 0.6–1.3)
EOSINOPHIL # BLD AUTO: 0.06 THOUSAND/ÂΜL (ref 0–0.61)
EOSINOPHIL NFR BLD AUTO: 1 % (ref 0–6)
ERYTHROCYTE [DISTWIDTH] IN BLOOD BY AUTOMATED COUNT: 12.1 % (ref 11.6–15.1)
FERRITIN SERPL-MCNC: 13 NG/ML (ref 8–388)
GFR SERPL CREATININE-BSD FRML MDRD: 121 ML/MIN/1.73SQ M
GLUCOSE P FAST SERPL-MCNC: 90 MG/DL (ref 65–99)
HCT VFR BLD AUTO: 42.1 % (ref 34.8–46.1)
HCV AB SER QL: NORMAL
HDLC SERPL-MCNC: 62 MG/DL
HGB BLD-MCNC: 14.1 G/DL (ref 11.5–15.4)
HIV 1+2 AB+HIV1 P24 AG SERPL QL IA: NORMAL
HIV 2 AB SERPL QL IA: NORMAL
HIV1 AB SERPL QL IA: NORMAL
HIV1 P24 AG SERPL QL IA: NORMAL
IMM GRANULOCYTES # BLD AUTO: 0.02 THOUSAND/UL (ref 0–0.2)
IMM GRANULOCYTES NFR BLD AUTO: 0 % (ref 0–2)
IRON SATN MFR SERPL: 19 % (ref 15–50)
IRON SERPL-MCNC: 87 UG/DL (ref 50–170)
LDLC SERPL CALC-MCNC: 71 MG/DL (ref 0–100)
LYMPHOCYTES # BLD AUTO: 2.25 THOUSANDS/ÂΜL (ref 0.6–4.47)
LYMPHOCYTES NFR BLD AUTO: 28 % (ref 14–44)
MCH RBC QN AUTO: 29.6 PG (ref 26.8–34.3)
MCHC RBC AUTO-ENTMCNC: 33.5 G/DL (ref 31.4–37.4)
MCV RBC AUTO: 88 FL (ref 82–98)
MONOCYTES # BLD AUTO: 0.51 THOUSAND/ÂΜL (ref 0.17–1.22)
MONOCYTES NFR BLD AUTO: 6 % (ref 4–12)
NEUTROPHILS # BLD AUTO: 5.1 THOUSANDS/ÂΜL (ref 1.85–7.62)
NEUTS SEG NFR BLD AUTO: 65 % (ref 43–75)
NONHDLC SERPL-MCNC: 92 MG/DL
NRBC BLD AUTO-RTO: 0 /100 WBCS
PLATELET # BLD AUTO: 307 THOUSANDS/UL (ref 149–390)
PMV BLD AUTO: 9.9 FL (ref 8.9–12.7)
POTASSIUM SERPL-SCNC: 4 MMOL/L (ref 3.5–5.3)
PROT SERPL-MCNC: 7.2 G/DL (ref 6.4–8.4)
RBC # BLD AUTO: 4.76 MILLION/UL (ref 3.81–5.12)
SODIUM SERPL-SCNC: 137 MMOL/L (ref 135–147)
TIBC SERPL-MCNC: 450 UG/DL (ref 250–450)
TRIGL SERPL-MCNC: 106 MG/DL
TSH SERPL DL<=0.05 MIU/L-ACNC: 1.06 UIU/ML (ref 0.45–4.5)
WBC # BLD AUTO: 7.97 THOUSAND/UL (ref 4.31–10.16)

## 2023-05-18 ENCOUNTER — TELEMEDICINE (OUTPATIENT)
Dept: BEHAVIORAL/MENTAL HEALTH CLINIC | Facility: CLINIC | Age: 23
End: 2023-05-18

## 2023-05-18 DIAGNOSIS — F31.81 BIPOLAR II DISORDER (HCC): Primary | ICD-10-CM

## 2023-05-18 NOTE — PSYCH
Virtual Regular Visit    Verification of patient location:    Patient is located at Home in the following state in which I hold an active license [place]      Assessment/Plan:    Problem List Items Addressed This Visit        Other    Bipolar II disorder ([facility] [address] ) - Primary       Goals addressed in session: Goal 1          Reason for visit is   Chief Complaint   Patient presents with   • Virtual Regular Visit        Encounter provider Boris Harris LCSW    Provider located at [address] [place] [id number] [phone]      Recent Visits  No visits were found meeting these conditions  Showing recent visits within past 7 days and meeting all other requirements  Today's Visits  Date Type Provider Dept   05/18/23 Telemedicine Amy Ples Castleman, [address] [facility] Therapist [facility]   Showing today's visits and meeting all other requirements  Future Appointments  No visits were found meeting these conditions  Showing future appointments within next 150 days and meeting all other requirements       The patient was identified by name and date of birth  Robin Edwards was informed that this is a telemedicine visit and that the [facility]  She agrees to proceed     My office door was closed  No one else was in the room  She acknowledged consent and understanding of privacy and security of the video platform  The patient has agreed to participate and understands they can discontinue the visit at any time  Patient is aware this is a billable service  Subjective  Barbarakelly Edwards is a 25 y o  female   Charles BENITEZ     Past Medical History:   Diagnosis Date   • Anxiety    • Concussion    • Depression    • Head injury     concussions in high school and in college   • Self-injurious behavior        Past Surgical History:   Procedure Laterality Date   • WISDOM TOOTH EXTRACTION Current Outpatient Medications   Medication Sig Dispense Refill   • gabapentin (Neurontin) 100 mg capsule Take 1 capsule (100 mg total) by mouth 3 (three) times a day 90 capsule 2   • hydrOXYzine pamoate (VISTARIL) 50 mg capsule Take 1 capsule (50 mg total) by mouth daily at bedtime as needed for anxiety 30 capsule 1   • lamoTRIgine (LaMICtal) 100 mg tablet Take 1 tablet (100 mg total) by mouth daily 30 tablet 2   • lamoTRIgine (LaMICtal) 25 mg tablet Take 1 tablet (25 mg total) by mouth daily 30 tablet 2   • metroNIDAZOLE (METROGEL) 0 75 % vaginal gel Insert 1 application  into the vagina in the morning 70 g 0   • naproxen (Naprosyn) 500 mg tablet Take 1 tablet (500 mg total) by mouth 2 (two) times a day with meals 30 tablet 0   • norgestimate-ethinyl estradiol (Alisha) 0 25-35 MG-MCG per tablet Take 1 tablet by mouth daily 84 tablet 3     No current facility-administered medications for this visit  No Known Allergies    Review of Systems    Video Exam    There were no vitals filed for this visit  Physical Exam     VBehavioral Health Psychotherapy Progress Note    Psychotherapy Provided: Individual Psychotherapy     1  B[facility] disorder ([facility] )            Goals addressed in session: Goal 1     DATA: 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  She discussed boy that likes her and the discomfort in dealing with someone who is nice to her, and because she is 'mean'  Destiny Jacob discusses recent interactions with her mother and how she 'hates' her and how her mother continues to be selfish and make things all about her  She discussed several scenarios  She also stated how her mother continues to talk about the negative things her father did to her and Destiny Jacob feels she should be done with it because he is dead for years  We explore the thoughts and feelings stirred up with her mother and how it in turn affects her ability to connect with others   Provider attempts to help Pooja Clarke how her anger "connects to other feelings  We address ways she may be similar to her mother, but how she is also in the process of individuating from her mother  We address the notion of independence in steps and goals for future, while determining ways to manage her current living situation to help minimize emotionally hurtful situations  We also address this notion in relation to [facility] not having clear professional and living goals at this time  She was encouraged to continue the process of trying/considering alternatives to help her determine what she likes/doesn't like, etc  She was encouraged also that she has begun that process already in deciphering an area of interest in criminal justice  When asked, [facility] reported ongoing stability with medications and rated herself as a '7' today, which is one point higher than last time  We set additional appointments  During this session, this clinician used the following therapeutic modalities: Cognitive Behavioral Therapy, Mindfulness-based Strategies and Supportive Psychotherapy    Substance Abuse was not addressed during this session  If the client is diagnosed with a co-occurring substance use disorder, please indicate any changes in the frequency or amount of use: alcohol use not specifically discussed today  Stage of change for addressing substance use diagnoses: Contemplation    ASSESSMENT:  Rubens Ogden presents with a Dysthymic mood  her affect is Normal range and intensity, which is congruent, with her mood and the content of the session  The client has made progress on their goals  [facility] is able to share uncomfortable, unpleasant situations and tolerate discussion  She continues to struggle with feeling her sadness and will want to continue to address feeling tolerance and containment  Rubens Ogden presents with a low risk of suicide, low risk of self-harm, and low risk of harm to others      For any risk assessment that surpasses a \"low\" rating, a safety " plan must be developed  A safety plan was indicated: no  If yes, describe in detail n/a    PLAN: Between sessions, Dewey Scott will continue to think about job options/goals  At the next session, the therapist will use Cognitive Behavioral Therapy and Supportive Psychotherapy to address mood management, self-care    Behavioral Health Treatment Plan and Discharge Planning: Dewey Scott is aware of and agrees to continue to work on their treatment plan  They have identified and are working toward their discharge goals   yes    Visit start and stop times:    05/18/23  Start Time: 0900  Stop Time: 0952  Total Visit Time: 52 minutes

## 2023-05-23 ENCOUNTER — TELEMEDICINE (OUTPATIENT)
Dept: PSYCHIATRY | Facility: CLINIC | Age: 23
End: 2023-05-23

## 2023-05-23 DIAGNOSIS — F31.81 BIPOLAR II DISORDER (HCC): ICD-10-CM

## 2023-05-23 RX ORDER — LAMOTRIGINE 100 MG/1
100 TABLET ORAL DAILY
Qty: 30 TABLET | Refills: 2 | Status: SHIPPED | OUTPATIENT
Start: 2023-05-23

## 2023-05-23 RX ORDER — LAMOTRIGINE 25 MG/1
25 TABLET ORAL DAILY
Qty: 30 TABLET | Refills: 2 | Status: SHIPPED | OUTPATIENT
Start: 2023-05-23

## 2023-05-23 RX ORDER — GABAPENTIN 100 MG/1
100 CAPSULE ORAL 3 TIMES DAILY
Qty: 90 CAPSULE | Refills: 2 | Status: SHIPPED | OUTPATIENT
Start: 2023-05-23

## 2023-05-23 NOTE — PSYCH
Virtual Regular Visit    Verification of patient location:    Patient is located in the following state in which I hold an active license [place]    Problem List Items Addressed This Visit        Other    Bipolar II disorder ([facility] [address] )    Relevant Medications    lamoTRIgine (LaMICtal) 100 mg tablet    lamoTRIgine (LaMICtal) 25 mg tablet    gabapentin (Neurontin) 100 mg capsule          Encounter provider ANGELA Schaefer    Provider located at    [address] [place] [id number] [phone]    Recent Visits  Date Type Provider Dept   05/23/23 Lizzy Frausto [address] [facility]   Showing recent visits within past 7 days and meeting all other requirements  Future Appointments  No visits were found meeting these conditions  Showing future appointments within next 150 days and meeting all other requirements           The patient was identified by name and date of birth  Patient was informed that this is a telemedicine visit and that [facility]  She agrees to proceed     My office door was closed  No one else was in the room  She acknowledged consent and understanding of privacy and security of the video platform  The patient has agreed to participate and understands they can discontinue the visit at any time  Patient is aware this is a billable service       HPI     Current Outpatient Medications   Medication Sig Dispense Refill   • gabapentin (Neurontin) 100 mg capsule Take 1 capsule (100 mg total) by mouth 3 (three) times a day 90 capsule 2   • hydrOXYzine pamoate (VISTARIL) 50 mg capsule Take 1 capsule (50 mg total) by mouth daily at bedtime as needed for anxiety 30 capsule 1   • lamoTRIgine (LaMICtal) 100 mg tablet Take 1 tablet (100 mg total) by mouth daily 30 tablet 2   • lamoTRIgine (LaMICtal) 25 mg tablet Take 1 tablet (25 mg total) by mouth daily 30 tablet 2   • metroNIDAZOLE (METROGEL) 0 75 % vaginal gel Insert 1 application  into the vagina in the morning 70 g 0   • naproxen (Naprosyn) 500 mg tablet Take 1 tablet (500 mg total) by mouth 2 (two) times a day with meals 30 tablet 0   • norgestimate-ethinyl estradiol (Alisha) 0 25-35 MG-MCG per tablet Take 1 tablet by mouth daily 84 tablet 3     No current facility-administered medications for this visit  Review of Systems  Video Exam    There were no vitals filed for this visit  Physical Exam   As a result of this visit, I have referred the patient for further respiratory evaluation  No    I spent 30 minutes directly with the patient during this visit  VIRTUAL VISIT DISCLAIMER    Prashant Henley acknowledges that she has consented to an online visit or consultation  She understands that the online visit is based solely on information provided by her, and that, in the absence of a face-to-face physical evaluation by the physician, the diagnosis she receives is both limited and provisional in terms of accuracy and completeness  This is not intended to replace a full medical face-to-face evaluation by the physician  Prashant Henley understands and accepts these terms  MEDICATION MANAGEMENT NOTE        [facility]    Name and Date of Birth:  Prashant Henley 25 y o  2000 MRN: [id number]    Date of Visit: May 25, 2023    No Known Allergies    Visit Time    Visit Start Time: 9412  Visit Stop Time: 6377  Total Visit Duration: 30 minutes    SUBJECTIVE:    Modesto Grover is seen today for a follow up for Bipolar Disorder type II  She continues to do well since the last visit  Modesto Grover seen today virtually for medication management follow-up  She was last seen by this provider on 4/18/2023  She reports today that she quit her job yesterday at the [employer]  She states that her boss had no respect for anyone and she did not want to work for somebody who had no respect    She has no particular plan, states she has to put her resume together in order to get an adult job  She does have contacts with people who can help her get jobs  She currently has a stable living situation  She reports she has been sleeping a lot, but then she will stay up for a whole day straight watching videos on her phone  She has been out with her friends only 1 time in the past 2 weeks  She denies any suicidal thoughts, denies any cutting or thoughts to hurt herself  She states she probably will not go back to school at this time  Appetite has been adequate  She rates her depression as 7 out of 10, anxiety as 6 out of 10  She does state that she feels her medications have helped level her mood and is happy with the medications she is currently on  She does not want any changes at this time  We will continue her medications as ordered and follow up in 1 month  She will continue therapy  She denies any side effects from current psychiatric medications  PLAN:  Continue medications as ordered  Gabapentin 100 mg p o  3 times daily  Vistaril 50 mg p o  at bedtime as needed  Lamictal 125 mg p o  daily  She will continue therapy  She will follow up with this provider in 1 month  She will call sooner with concerns or issues if they arise prior to scheduled appointment    Aware of 24 hour and weekend coverage for urgent situations accessed by calling [facility] main practice number  Continue psychotherapy with therapist  Medication management every 1 month  Aware of need to follow up with family physician for medical issues    Diagnoses and all orders for this visit:    Bipolar II disorder ([facility] [address] )  -     lamoTRIgine (LaMICtal) 100 mg tablet; Take 1 tablet (100 mg total) by mouth daily  -     lamoTRIgine (LaMICtal) 25 mg tablet; Take 1 tablet (25 mg total) by mouth daily  -     gabapentin (Neurontin) 100 mg capsule;  Take 1 capsule (100 mg total) by mouth 3 (three) times a day      Current Outpatient Medications on File Prior to Visit   Medication Sig Dispense Refill   • hydrOXYzine pamoate (VISTARIL) 50 mg capsule Take 1 capsule (50 mg total) by mouth daily at bedtime as needed for anxiety 30 capsule 1   • metroNIDAZOLE (METROGEL) 0 75 % vaginal gel Insert 1 application  into the vagina in the morning 70 g 0   • naproxen (Naprosyn) 500 mg tablet Take 1 tablet (500 mg total) by mouth 2 (two) times a day with meals 30 tablet 0   • norgestimate-ethinyl estradiol (Alisha) 0 25-35 MG-MCG per tablet Take 1 tablet by mouth daily 84 tablet 3     No current facility-administered medications on file prior to visit  Psychotherapy Provided:     Individual psychotherapy provided: Yes  Counseling was provided during the session today for 16 minutes  Supportive counseling provided  Medication changes discussed with Silke Mcadams  Medication education provided to Silke Mcadams  Discussed with Silke Mcadams coping with job stress, social difficulties and everyday stressors  Coping strategies reviewed with Silke Mcadams  Importance of medication and treatment compliance reviewed with Silke Mcadams  Importance of follow up with family physician for medical issues reviewed with Silke Mcadams  Reassurance and supportive therapy provided  Crisis/safety plan discussed with Silke Mcadams  Patient will call prior to scheduled appointment if they have any issues or concerns  Patient understands they can access the office by calling the main number at any time if they are in crisis  They also understand they can call their [facility]'s crisis number or go to their nearest ED if suicidal ideation increases or if they develop a plan or intent  HPI ROS Appetite Changes and Sleep:     She reports fluctuating sleep pattern, adequate appetite, adequate energy level   Denies homicidal ideation, denies suicidal ideation    Review Of Systems:      HPI ROS:               Medication Side Effects:  denies     Depression (10 worst): 7/10 (Was 4-8/10) Anxiety (10 worst): 6/10 (Was high)   Safety concerns (SI, HI, etc): denies (Was denies)   Sleep: Fluctuating as noted in HPI (Was good)   Energy: adequate (Was good)   Appetite: adeuqate (Was good)     General normal    Personality no change in personality   Constitutional as noted in HPI   ENT negative   Cardiovascular negative   Respiratory negative   Gastrointestinal negative   Genitourinary negative   Musculoskeletal negative   Integumentary negative   Neurological negative   Endocrine negative   Other Symptoms none, all other systems are negative     Mental Status Evaluation:    Appearance Appropriately dressed and Good eye contact   Behavior calm and cooperative   Mood anxious and depressed  Depression Scale - 7 of 10 (0 = No depression)  Anxiety Scale - 6 of 10 (0 = No anxiety)   Speech Normal rate and volume   Affect appropriate and mood-congruent   Thought Processes Goal directed and coherent   Thought Content Does not verbalize delusional material   Associations Tightly connected   Perceptual Disturbances Denies hallucinations and does not appear to be responding to internal stimuli   Risk Potential Suicidal/Homicidal Ideation - No evidence of suicidal or homicidal ideation and patient does not verbalize suicidal or homicidal ideation  Risk of Violence - No evidence of risk for violence found on assessment  Risk of Self Mutilation - No evidence of risk for self mutilation found on assessment   Orientation oriented to person, place, time/date and situation   Memory recent and remote memory grossly intact   Consciousness alert and awake   Attention/Concentration attention span and concentration are age appropriate   Insight intact   Judgement fair   Muscle Strength and Gait normal muscle strength and normal muscle tone, normal gait/station and normal balance   Motor Activity no abnormal movements   Language no difficulty naming common objects, no difficulty repeating a phrase, no difficulty writing a sentence   Fund of Knowledge adequate knowledge of current events  adequate fund of knowledge regarding past history  adequate fund of knowledge regarding vocabulary      Past Psychiatric History Update:     Inpatient Psychiatric Admission Since Last Encounter:   no  Changes to Outpatient Psychiatric Treatment Team:    no  Suicide Attempt Or Self Mutilation Since Last Encounter:   no  Incidence of Violent Behavior Since Last Encounter:   no    Traumatic History Update:     New Onset of Abuse Since Last Encounter:   no  Traumatic Events Since Last Encounter:   no    Past Medical History:    Past Medical History:   Diagnosis Date   • Anxiety    • Concussion    • Depression    • Head injury     concussions in high school and in college   • Self-injurious behavior         Past Surgical History:   Procedure Laterality Date   • WISDOM TOOTH EXTRACTION       No Known Allergies  Substance Abuse History:    Social History     Substance and Sexual Activity   Alcohol Use Yes   • Alcohol/week: 3 0 - 4 0 standard drinks of alcohol   • Types: 3 - 4 Shots of liquor per week    Comment: 3-4 drinks, 2 times per week     Social History     Substance and Sexual Activity   Drug Use No     Social History:    Social History     Socioeconomic History   • Marital status: Single     Spouse name: Not on file   • Number of children: 0   • Years of education: senior in college currently   • Highest education level: High school graduate   Occupational History   • 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   Tobacco Use   • Smoking status: Never   • Smokeless tobacco: Never   Vaping Use   • Vaping Use: Never used   Substance and Sexual Activity   • Alcohol use:  Yes     Alcohol/week: 3 0 - 4 0 standard drinks of alcohol     Types: 3 - 4 Shots of liquor per week     Comment: 3-4 drinks, 2 times per week   • Drug use: No   • Sexual activity: Yes     Partners: Male     Birth control/protection: Condom Male   Other Topics Concern   • Not on file Social History Narrative   • Not on file     Social Determinants of Health     Financial Resource Strain: Not on file   Food Insecurity: Not on file   Transportation Needs: Not on file   Physical Activity: Not on file   Stress: Not on file   Social Connections: Not on file   Intimate Partner Violence: Not on file   Housing Stability: Not on file     Family Psychiatric History:     Family History   Problem Relation Age of Onset   • Hypertension Mother    • Mental illness Mother    • Depression Mother    • Mental illness Sister    • Coronary artery disease Maternal Grandmother    • Throat cancer Paternal Grandfather      History Review: The following portions of the patient's history were reviewed and updated as appropriate: allergies, current medications, past family history, past medical history, past social history, past surgical history and problem list     OBJECTIVE:     Vital signs in last 24 hours: There were no vitals filed for this visit  Laboratory Results: I have personally reviewed all pertinent laboratory/tests results      Suicide/Homicide Risk Assessment:    Risk of Harm to Self:  The following ratings are based on assessment at the time of the interview  Recent Specific Risk Factors include: current depressive symptoms, current anxiety symptoms, worries about finances or work  Demographic risk factors include: , age: young adult (15-24)  Historical Risk Factors include: chronic depression, chronic anxiety symptoms, chronic mood disorder, history of suicide attempts, history of impulsive behaviors, history of traumatic experiences  Protective Factors: no current suicidal ideation, access to mental health treatment, compliant with medications, compliant with mental health treatment, having a desire to be alive, stable living environment, sense of determination, supportive family  Based on today's assessment, Shawn Swanson presents the following risk of harm to self: low    Risk of Harm to Others: The following ratings are based on assessment at the time of the interview  Protective Factors: no current homicidal ideation  Based on today's assessment, Catarina Goldmann presents the following risk of harm to others: none    The following interventions are recommended: contracts for safety at present - agrees to go to ED if feeling unsafe, return in 1 month for reassessment, therapy appointment in 2 weeks, contracts for safety at present - agrees to call Crisis Intervention Service if feeling unsafe    Medications Risks/Benefits:      Risks, Benefits And Possible Side Effects Of Medications:    Discussed risks and benefits of treatment with patient including risk of rash related to treatment with Lamictal and Risk of sedation, dizziness and CNS depression during treatment with Gabapentin     Controlled Medication Discussion:     Not applicable    Treatment Plan:    Due for update/Updated:   no  Last treatment plan done 10/28/22 by Ismael Dorantes LCSW  Treatment Plan due on 4/28/23  ANGELA Bermudez 05/25/23    This note was shared with patient

## 2023-06-01 ENCOUNTER — TELEMEDICINE (OUTPATIENT)
Dept: BEHAVIORAL/MENTAL HEALTH CLINIC | Facility: CLINIC | Age: 23
End: 2023-06-01

## 2023-06-01 DIAGNOSIS — F31.81 BIPOLAR II DISORDER (HCC): Primary | ICD-10-CM

## 2023-06-01 NOTE — PSYCH
Virtual Regular Visit    Verification of patient location:    Patient is located at Home in the following state in which I hold an active license [place]      Assessment/Plan:    Problem List Items Addressed This Visit        Other    Bipolar II disorder ([facility] [address] ) - Primary       Goals addressed in session: Goal 1          Reason for visit is   Chief Complaint   Patient presents with   • Virtual Regular Visit        Encounter provider Lilia Maher LCSW    Provider located at [address] [place] [address] [place] Kaila Hernandez [place] [address]  [phone]      Recent Visits  No visits were found meeting these conditions  Showing recent visits within past 7 days and meeting all other requirements  Today's Visits  Date Type Provider Dept   06/01/23 Telemedicine Mihcela Cesar, [address] [address] [facility] Therapist [facility]   Showing today's visits and meeting all other requirements  Future Appointments  No visits were found meeting these conditions  Showing future appointments within next 150 days and meeting all other requirements       The patient was identified by name and date of birth  Toshia Bautista was informed that this is a telemedicine visit and that [facility]  She agrees to proceed     My office door was closed  No one else was in the room  She acknowledged consent and understanding of privacy and security of the video platform  The patient has agreed to participate and understands they can discontinue the visit at any time  Patient is aware this is a billable service  Subjective  Toshia Bautista is a 25 y o  female   Carlo BENITEZ     Past Medical History:   Diagnosis Date   • Anxiety    • Concussion    • Depression    • Head injury     concussions in high school and in college   • Self-injurious behavior        Past Surgical History:   Procedure Laterality Date   • WISDOM TOOTH EXTRACTION Current Outpatient Medications   Medication Sig Dispense Refill   • gabapentin (Neurontin) 100 mg capsule Take 1 capsule (100 mg total) by mouth 3 (three) times a day 90 capsule 2   • hydrOXYzine pamoate (VISTARIL) 50 mg capsule Take 1 capsule (50 mg total) by mouth daily at bedtime as needed for anxiety 30 capsule 1   • lamoTRIgine (LaMICtal) 100 mg tablet Take 1 tablet (100 mg total) by mouth daily 30 tablet 2   • lamoTRIgine (LaMICtal) 25 mg tablet Take 1 tablet (25 mg total) by mouth daily 30 tablet 2   • metroNIDAZOLE (METROGEL) 0 75 % vaginal gel Insert 1 application  into the vagina in the morning 70 g 0   • naproxen (Naprosyn) 500 mg tablet Take 1 tablet (500 mg total) by mouth 2 (two) times a day with meals 30 tablet 0   • norgestimate-ethinyl estradiol (Alisha) 0 25-35 MG-MCG per tablet Take 1 tablet by mouth daily 84 tablet 3     No current facility-administered medications for this visit  No Known Allergies    Review of Systems    Video Exam    There were no vitals filed for this visit  Physical Exam   Behavioral Health Psychotherapy Progress Note    Psychotherapy Provided: Individual Psychotherapy     1  Bipolar II disorder ([facility] [address] )            Goals addressed in session: Goal 1     DATA: 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  She discussed boss expecting her to be on time and not be on her phone but that others were doing the same thing and they weren't being pointed out  She stated already getting another  job but she doesn't like it, and lamented about never wanting to have a job  She discussed interactions with friends and leaving events early due to feeling overwhelmed  We addressed reaction to noise, lots of people, and feeling overwhelmed  We discussed the notion of understanding herself and her limits and managing her interactions accordingly; while also recognizing need to manage such situations at times and finding ways to do so   Barber Franco is able to report ability to be a  and handle it well, even with lots going on because it is work and not a personal investment  She discussed the boy she liked at the camp and how they sort-of started dating but then she broke it off because he was too nice and she couldn't deal with that  We addressed Janell's handling of this different person versus what she grew up with  She also concluded not being ready for a relationship  We addressed the unsettled aspects of her life and how this contributes, and the relevance of this recognition  She discussed job searches, interviewing, connections, and mostly in the criminal justice world  She was able to define this area as her professional interest, and this was noted  We addressed the philosophical aspect and definition of independence and working toward this goal, and how it requires effort from Chuck Roger  She discussed diminishing comments from her mother and grandmother and how this makes her feel badly  We discussed the ongoing process for Chuck Roger, acknowledging her discontent, validating the feelings at this point in her life, and continued efforts toward goal identification and attainment, with ways to increase tolerance along the way  During this session, this clinician used the following therapeutic modalities: Cognitive Behavioral Therapy, Mindfulness-based Strategies and Supportive Psychotherapy    Substance Abuse was not addressed during this session  If the client is diagnosed with a co-occurring substance use disorder, please indicate any changes in the frequency or amount of use: substance use not specifically discussed today  Stage of change for addressing substance use diagnoses: No substance use/Not applicable    ASSESSMENT:  Latisha Pelaez presents with a Euthymic/ normal mood  her affect is Normal range and intensity, which is congruent, with her mood and the content of the session  The client has made progress on their goals      Chuck Roger is able to share emotions and verbalize "triggers to stress, frustration, depression Kianna Morin presents with a low risk of suicide, low risk of self-harm, and low risk of harm to others  For any risk assessment that surpasses a \"low\" rating, a safety plan must be developed  A safety plan was indicated: no  If yes, describe in detail n/a     PLAN: Between sessions, Kianna Morin will focus on one day at a time notion within context of broader, longterm goals  At the next session, the therapist will use Cognitive Behavioral Therapy and Supportive Psychotherapy to address mood management; treatment plan update  Behavioral Health Treatment Plan and Discharge Planning: Kianna Morin is aware of and agrees to continue to work on their treatment plan  They have identified and are working toward their discharge goals   yes    Visit start and stop times:    06/01/23  Start Time: 1300  Stop Time: 1352  Total Visit Time: 52 minutes  "

## 2023-06-15 ENCOUNTER — TELEMEDICINE (OUTPATIENT)
Dept: BEHAVIORAL/MENTAL HEALTH CLINIC | Facility: CLINIC | Age: 23
End: 2023-06-15
Payer: COMMERCIAL

## 2023-06-15 ENCOUNTER — TELEPHONE (OUTPATIENT)
Dept: PSYCHIATRY | Facility: CLINIC | Age: 23
End: 2023-06-15

## 2023-06-15 DIAGNOSIS — F41.9 ANXIETY AND DEPRESSION: Primary | ICD-10-CM

## 2023-06-15 DIAGNOSIS — F32.A ANXIETY AND DEPRESSION: Primary | ICD-10-CM

## 2023-06-15 DIAGNOSIS — F31.81 BIPOLAR II DISORDER (HCC): ICD-10-CM

## 2023-06-15 PROCEDURE — 90834 PSYTX W PT 45 MINUTES: CPT

## 2023-06-15 NOTE — BH TREATMENT PLAN
"[address]  [address]     Date of Initial Psychotherapy Assessment: 9/21/22   Date of Current Treatment Plan: 06/15/23  Treatment Plan Target Date: 12/15/23  Treatment Plan Expiration Date: 12/15/23    Diagnosis:   1  Anxiety and depression        2  Bipolar II disorder ([facility] [address] )          Strengths; good friend, helpful, good caretaker  Area(s) of Need: improve how to talk to people, difficulty accepting help, easily irritated    Long Term Goal 1 (in the client's own words):  \"A new life\"    Stage of Change: Preparation in some aspects; action in terms of therapy and psychiatry participation    Target Date for completion: 12/15/23     Anticipated therapeutic modalities: psychodynamic support and cognitive behavioral strategies     People identified to complete this goal: Portillo Reese      Objective 1: (identify the means of measuring success in meeting the objective): 1) \"move far, far away\" (with sarcasm); 2) increase budgeting skills; 3) follow through on tasks; 4) increase patience      Objective 2: (identify the means of measuring success in meeting the objective): continued work with previously established objectives: 1) identify all or nothing thinking; 2) work on identifying and decreasing cognitive distortions; 3) address in therapy past hurts that contribute to relationship difficulties and fears       I am currently under the care of a [place] [address] psychiatric provider: yes    My [facility] psychiatric provider is: ANGELA Waldron   ; Raina Simeon LCSW therapist    I am currently taking psychiatric medications: Yes, as prescribed     I feel that I will be ready for discharge from mental health care when I reach the following (measurable goal/objective): not answered at this time    For children and adults who have a legal guardian:   Has there been any change to custody orders and/or guardianship status? NA   If yes, attach updated " documentation  I have not yet created my Crisis Plan and have not yet been offered a copy of this plan; to be addressed in future sessions; Lotus Verdin confirmed no active suicidal intent or plan     [address]: Diagnosis and Treatment Plan explained to Gregg acknowledges an understanding of their diagnosis  Kajal Hernandesws agrees to this treatment plan      I have been offered a copy of this Treatment Plan  yes

## 2023-06-15 NOTE — PSYCH
Virtual Regular Visit    Verification of patient location:    Patient is located at Home in the following state in which I hold an active license [place]      Assessment/Plan:    Problem List Items Addressed This Visit    None      Goals addressed in session: Goal 1          Reason for visit is   Chief Complaint   Patient presents with   • Virtual Regular Visit        Encounter provider Lina Saini LCSW    Provider located at [address] [place] [address] [phone]      Recent Visits  No visits were found meeting these conditions  Showing recent visits within past 7 days and meeting all other requirements  Today's Visits  Date Type Provider Dept   06/15/23 Telemedicine Michela Smith, [address] [facility] Therapist Patricia Marin   Showing today's visits and meeting all other requirements  Future Appointments  No visits were found meeting these conditions  Showing future appointments within next 150 days and meeting all other requirements       The patient was identified by name and date of birth  Pat Werner was informed that this is a telemedicine visit and that the [facility]  She agrees to proceed     My office door was closed  No one else was in the room  She acknowledged consent and understanding of privacy and security of the video platform  The patient has agreed to participate and understands they can discontinue the visit at any time  Patient is aware this is a billable service  Subjective  Pat Werner is a 25 y o  female   Marcelle BENITEZ     Past Medical History:   Diagnosis Date   • Anxiety    • Concussion    • Depression    • Head injury     concussions in high school and in college   • Self-injurious behavior        Past Surgical History:   Procedure Laterality Date   • WISDOM TOOTH EXTRACTION         Current Outpatient Medications   Medication Sig Dispense Refill   • gabapentin (Neurontin) 100 mg capsule Take 1 capsule (100 mg total) by mouth 3 (three) times a day 90 capsule 2   • hydrOXYzine pamoate (VISTARIL) 50 mg capsule Take 1 capsule (50 mg total) by mouth daily at bedtime as needed for anxiety 30 capsule 1   • lamoTRIgine (LaMICtal) 100 mg tablet Take 1 tablet (100 mg total) by mouth daily 30 tablet 2   • lamoTRIgine (LaMICtal) 25 mg tablet Take 1 tablet (25 mg total) by mouth daily 30 tablet 2   • metroNIDAZOLE (METROGEL) 0 75 % vaginal gel Insert 1 application  into the vagina in the morning 70 g 0   • naproxen (Naprosyn) 500 mg tablet Take 1 tablet (500 mg total) by mouth 2 (two) times a day with meals 30 tablet 0   • norgestimate-ethinyl estradiol (Alisha) 0 25-35 MG-MCG per tablet Take 1 tablet by mouth daily 84 tablet 3     No current facility-administered medications for this visit  No Known Allergies    Review of Systems    Video Exam    There were no vitals filed for this visit  Physical Exam     Behavioral Health Psychotherapy Progress Note    Psychotherapy Provided: Individual Psychotherapy     No diagnosis found  Goals addressed in session: Goal 1     DATA: Bautista Files discussed recent job searches, missing interviews, discouraged about working and life  We addressed her depressed feelings and ongoing interactions with Joshua Shima  She referenced her mother and criticism, as well as from her grandmother, and lately regarding saving money and getting work  She discussed her discontent with waittressing and we continued to address her interest in criminal justice and getting some type of introductory job  She stated not wanting to continue in school at this time due to money  She was able to acknowledge positives in that she hasn't gone to snf (her other siblings all have) and how she is the only one in her family to go to college   We reviewed prior tx plan and Bautista Files stated she doesn't have the same issue with boundary setting at this "time  We addressed the items discussed into a baseline updated treatment plan  We will review tx plan in full next time  We set an additional virtual appointment  During this session, this clinician used the following therapeutic modalities: Cognitive Behavioral Therapy, Mindfulness-based Strategies, Motivational Interviewing and Supportive Psychotherapy    Substance Abuse was addressed during this session  If the client is diagnosed with a co-occurring substance use disorder, please indicate any changes in the frequency or amount of use: addressed drinking and driving; Jose A Cooper confirmed no driving if drinking  Stage of change for addressing substance use diagnoses: Contemplation    ASSESSMENT:  Axel Orta presents with a Euthymic/ normal, Anxious and Dysthymic mood  her affect is Normal range and intensity, which is congruent, with her mood and the content of the session  The client has made progress on their goals  Jose A Cooper was receptive to discussion and alternative ways to approach her choices  Axel Orta presents with a low risk of suicide, low risk of self-harm, and low risk of harm to others  Jose A Cooper acknowledged at times questioning life, but firmly stated no intent or plan to harm or kill herself  For any risk assessment that surpasses a \"low\" rating, a safety plan must be developed  A safety plan was indicated: no  If yes, describe in detail - crisis plan to be addressed in upcoming sessions and in re: protocol    PLAN: Between sessions, Axel Orta will commit to one thing in next 2 weeks  At the next session, the therapist will use Cognitive Behavioral Therapy, Motivational Interviewing and Supportive Psychotherapy to address mood management, self-esteem and motivation  Behavioral Health Treatment Plan and Discharge Planning: Axel Orta is aware of and agrees to continue to work on their treatment plan  They have identified and are working toward their discharge goals   " yes    Visit start and stop times:    06/15/23  Start Time: 1300  Stop Time: 1352  Total Visit Time: 52 minutes

## 2023-06-15 NOTE — TELEPHONE ENCOUNTER
Patient contacted the office in regards to a medication refill request  Patient stated she needed her Lamotrigine 100 mg tablet refilled  Writer advised patient to contact pharmacy they have two refills on file  Patient verbalized understanding and will be contacting pharmacy to refill medication

## 2023-06-22 ENCOUNTER — OFFICE VISIT (OUTPATIENT)
Dept: FAMILY MEDICINE CLINIC | Facility: CLINIC | Age: 23
End: 2023-06-22

## 2023-06-22 VITALS
OXYGEN SATURATION: 99 % | HEIGHT: 63 IN | HEART RATE: 122 BPM | SYSTOLIC BLOOD PRESSURE: 102 MMHG | WEIGHT: 135 LBS | BODY MASS INDEX: 23.92 KG/M2 | DIASTOLIC BLOOD PRESSURE: 80 MMHG

## 2023-06-22 DIAGNOSIS — R30.0 DYSURIA: ICD-10-CM

## 2023-06-22 DIAGNOSIS — Z11.3 SCREEN FOR STD (SEXUALLY TRANSMITTED DISEASE): Primary | ICD-10-CM

## 2023-06-22 DIAGNOSIS — N89.8 VAGINAL DISCHARGE: ICD-10-CM

## 2023-06-22 LAB
SL AMB  POCT GLUCOSE, UA: ABNORMAL
SL AMB LEUKOCYTE ESTERASE,UA: ABNORMAL
SL AMB POCT BILIRUBIN,UA: ABNORMAL
SL AMB POCT BLOOD,UA: ABNORMAL
SL AMB POCT CLARITY,UA: CLEAR
SL AMB POCT COLOR,UA: ABNORMAL
SL AMB POCT KETONES,UA: ABNORMAL
SL AMB POCT NITRITE,UA: ABNORMAL
SL AMB POCT PH,UA: 6
SL AMB POCT SPECIFIC GRAVITY,UA: 1.03
SL AMB POCT URINE HCG: NEGATIVE
SL AMB POCT URINE PROTEIN: ABNORMAL
SL AMB POCT UROBILINOGEN: 3.5

## 2023-06-22 PROCEDURE — 87186 SC STD MICRODIL/AGAR DIL: CPT | Performed by: NURSE PRACTITIONER

## 2023-06-22 PROCEDURE — 81514 NFCT DS BV&VAGINITIS DNA ALG: CPT | Performed by: NURSE PRACTITIONER

## 2023-06-22 PROCEDURE — 87147 CULTURE TYPE IMMUNOLOGIC: CPT | Performed by: NURSE PRACTITIONER

## 2023-06-22 PROCEDURE — 87591 N.GONORRHOEAE DNA AMP PROB: CPT | Performed by: NURSE PRACTITIONER

## 2023-06-22 PROCEDURE — 87491 CHLMYD TRACH DNA AMP PROBE: CPT | Performed by: NURSE PRACTITIONER

## 2023-06-22 PROCEDURE — 87086 URINE CULTURE/COLONY COUNT: CPT | Performed by: NURSE PRACTITIONER

## 2023-06-22 PROCEDURE — 87077 CULTURE AEROBIC IDENTIFY: CPT | Performed by: NURSE PRACTITIONER

## 2023-06-22 NOTE — PROGRESS NOTES
Assessment/Plan:     Chronic Problems:  No problem-specific Assessment & Plan notes found for this encounter  Visit Diagnosis:  Diagnoses and all orders for this visit:    Screen for STD (sexually transmitted disease)  -     Hepatitis C antibody; Future  -     : HIV 1/2 AB/AG w Reflex [facility] for 2 yr old and above; Future  -     RPR-Syphilis Screening (Total Syphilis IGG/IGM); Future  -     Chlamydia/GC amplified DNA by PCR; Future    Dysuria  -     POCT urine dip  -     Urine culture; Future    Vaginal discharge  -     POCT urine HCG  -     Molecular Vaginal Panel; Future  -     Chlamydia/GC amplified DNA by PCR; Future          Subjective:    Patient ID: Benitez Bashir is a 25 y o  female  Patient presents with concerns of vaginal discharge  She had light pink discharge a few days ago  FDLMP: 6/14/23  She is concerned for STD's as she has a recent new partner  She is very tearful on exam as she broke it off with her bf this morning  The following portions of the patient's history were reviewed and updated as appropriate: allergies, current medications, past family history, past medical history, past social history, past surgical history and problem list     Review of Systems   Constitutional: Negative for chills and fever  HENT: Negative for ear pain and sore throat  Eyes: Negative for pain and visual disturbance  Respiratory: Negative for cough and shortness of breath  Cardiovascular: Negative for chest pain and palpitations  Gastrointestinal: Negative for abdominal pain and vomiting  Genitourinary: Positive for dysuria, frequency, urgency and vaginal discharge  Negative for dyspareunia, hematuria and pelvic pain  Musculoskeletal: Negative for arthralgias and back pain  Skin: Negative for color change and rash  Neurological: Negative for dizziness, light-headedness and headaches  All other systems reviewed and are negative          /80 (BP Location: Left arm, Patient "Position: Sitting, Cuff Size: Standard)   Pulse (!) 122   Ht 5' 3\" (1 6 m)   Wt 61 2 kg (135 lb)   SpO2 99%   BMI 23 91 kg/m²   Social History     Socioeconomic History   • Marital status: Single     Spouse name: Not on file   • Number of children: 0   • Years of education: senior in college currently   • Highest education level: High school graduate   Occupational History   • 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   Tobacco Use   • Smoking status: Never   • Smokeless tobacco: Never   Vaping Use   • Vaping Use: Never used   Substance and Sexual Activity   • Alcohol use:  Yes     Alcohol/week: 3 0 - 4 0 standard drinks of alcohol     Types: 3 - 4 Shots of liquor per week     Comment: 3-4 drinks, 2 times per week   • Drug use: No   • Sexual activity: Yes     Partners: Male     Birth control/protection: Condom Male   Other Topics Concern   • Not on file   Social History Narrative   • Not on file     Social Determinants of Health     Financial Resource Strain: Not on file   Food Insecurity: Not on file   Transportation Needs: Not on file   Physical Activity: Not on file   Stress: Not on file   Social Connections: Not on file   Intimate Partner Violence: Not on file   Housing Stability: Not on file     Past Medical History:   Diagnosis Date   • Anxiety    • Concussion    • Depression    • Head injury     concussions in high school and in college   • Self-injurious behavior      Family History   Problem Relation Age of Onset   • Hypertension Mother    • Mental illness Mother    • Depression Mother    • Mental illness Sister    • Coronary artery disease Maternal Grandmother    • Throat cancer Paternal Grandfather      Past Surgical History:   Procedure Laterality Date   • WISDOM TOOTH EXTRACTION         Current Outpatient Medications:   •  gabapentin (Neurontin) 100 mg capsule, Take 1 capsule (100 mg total) by mouth 3 (three) times a day, Disp: 90 capsule, Rfl: 2  •  hydrOXYzine pamoate (VISTARIL) 50 mg " capsule, Take 1 capsule (50 mg total) by mouth daily at bedtime as needed for anxiety, Disp: 30 capsule, Rfl: 1  •  lamoTRIgine (LaMICtal) 100 mg tablet, Take 1 tablet (100 mg total) by mouth daily, Disp: 30 tablet, Rfl: 2  •  lamoTRIgine (LaMICtal) 25 mg tablet, Take 1 tablet (25 mg total) by mouth daily, Disp: 30 tablet, Rfl: 2  •  metroNIDAZOLE (METROGEL) 0 75 % vaginal gel, Insert 1 application   into the vagina in the morning, Disp: 70 g, Rfl: 0  •  naproxen (Naprosyn) 500 mg tablet, Take 1 tablet (500 mg total) by mouth 2 (two) times a day with meals, Disp: 30 tablet, Rfl: 0  •  norgestimate-ethinyl estradiol (Alisha) 0 25-35 MG-MCG per tablet, Take 1 tablet by mouth daily, Disp: 84 tablet, Rfl: 3    No Known Allergies       Lab Review   Appointment on 05/09/2023   Component Date Value   • WBC 05/09/2023 7 97    • RBC 05/09/2023 4 76    • Hemoglobin 05/09/2023 14 1    • Hematocrit 05/09/2023 42 1    • MCV 05/09/2023 88    • MCH 05/09/2023 29 6    • MCHC 05/09/2023 33 5    • RDW 05/09/2023 12 1    • MPV 05/09/2023 9 9    • Platelets 87/28/7461 307    • nRBC 05/09/2023 0    • Neutrophils Relative 05/09/2023 65    • Immat GRANS % 05/09/2023 0    • Lymphocytes Relative 05/09/2023 28    • Monocytes Relative 05/09/2023 6    • Eosinophils Relative 05/09/2023 1    • Basophils Relative 05/09/2023 0    • Neutrophils Absolute 05/09/2023 5 10    • Immature Grans Absolute 05/09/2023 0 02    • Lymphocytes Absolute 05/09/2023 2 25    • Monocytes Absolute 05/09/2023 0 51    • Eosinophils Absolute 05/09/2023 0 06    • Basophils Absolute 05/09/2023 0 03    • Sodium 05/09/2023 137    • Potassium 05/09/2023 4 0    • Chloride 05/09/2023 107    • CO2 05/09/2023 24    • ANION GAP 05/09/2023 6    • BUN 05/09/2023 15    • Creatinine 05/09/2023 0 71    • Glucose, Fasting 05/09/2023 90    • Calcium 05/09/2023 9 3    • AST 05/09/2023 21    • ALT 05/09/2023 19    • Alkaline Phosphatase 05/09/2023 57    • Total Protein 05/09/2023 7 2    • "Albumin 05/09/2023 4 1    • Total Bilirubin 05/09/2023 0 45    • eGFR 05/09/2023 121    • Cholesterol 05/09/2023 154    • Triglycerides 05/09/2023 106    • HDL, Direct 05/09/2023 62    • LDL Calculated 05/09/2023 71    • Non-HDL-Chol (CHOL-HDL) 05/09/2023 92    • TSH 3RD GENERATON 05/09/2023 1 064    • Vit D, 25-Hydroxy 05/09/2023 18 3 (L)    • Hepatitis C Ab 05/09/2023 Non-reactive    • HIV-1 p24 Antigen 05/09/2023 Non-Reactive    • HIV-1 Antibody 05/09/2023 Non-Reactive    • HIV-2 Antibody 05/09/2023 Non-Reactive    • HIV Ag-Ab 5th Gen 05/09/2023 Non-Reactive    • Lyme Total Antibodies 05/09/2023 <0 2    • Iron Saturation 05/09/2023 19    • TIBC 05/09/2023 450    • Iron 05/09/2023 87    • Ferritin 05/09/2023 13         Imaging: No results found  Objective:     Physical Exam  Constitutional:       Appearance: She is well-developed  Cardiovascular:      Rate and Rhythm: Normal rate and regular rhythm  Heart sounds: Normal heart sounds  No murmur heard  Pulmonary:      Effort: Pulmonary effort is normal  No respiratory distress  Breath sounds: Normal breath sounds  Genitourinary:     Vagina: Vaginal discharge (white, minimal) present  Skin:     General: Skin is warm and dry  Neurological:      Mental Status: She is alert and oriented to person, place, and time  Psychiatric:         Mood and Affect: Mood normal            There are no Patient Instructions on file for this visit  ANGELA Arthur    Portions of the record may have been created with voice recognition software  Occasional wrong word or \"sound a like\" substitutions may have occurred due to the inherent limitations of voice recognition software  Read the chart carefully and recognize, using context, where substitutions have occurred    "

## 2023-06-23 DIAGNOSIS — B96.89 BV (BACTERIAL VAGINOSIS): Primary | ICD-10-CM

## 2023-06-23 DIAGNOSIS — N76.0 BV (BACTERIAL VAGINOSIS): Primary | ICD-10-CM

## 2023-06-23 LAB
C GLABRATA DNA VAG QL NAA+PROBE: NEGATIVE
C KRUSEI DNA VAG QL NAA+PROBE: NEGATIVE
C TRACH DNA SPEC QL NAA+PROBE: NEGATIVE
CANDIDA SP 6 PNL VAG NAA+PROBE: NEGATIVE
N GONORRHOEA DNA SPEC QL NAA+PROBE: NEGATIVE
T VAGINALIS DNA VAG QL NAA+PROBE: NEGATIVE
VAGINOSIS/ITIS DNA PNL VAG PROBE+SIG AMP: POSITIVE

## 2023-06-23 RX ORDER — METRONIDAZOLE 7.5 MG/G
1 GEL VAGINAL DAILY
Qty: 70 G | Refills: 0 | Status: SHIPPED | OUTPATIENT
Start: 2023-06-23 | End: 2023-06-26

## 2023-06-24 LAB
BACTERIA UR CULT: ABNORMAL
BACTERIA UR CULT: ABNORMAL

## 2023-06-26 ENCOUNTER — TELEPHONE (OUTPATIENT)
Dept: PSYCHIATRY | Facility: CLINIC | Age: 23
End: 2023-06-26

## 2023-06-26 DIAGNOSIS — B96.89 BV (BACTERIAL VAGINOSIS): ICD-10-CM

## 2023-06-26 DIAGNOSIS — N76.0 BV (BACTERIAL VAGINOSIS): ICD-10-CM

## 2023-06-26 DIAGNOSIS — N30.00 ACUTE CYSTITIS WITHOUT HEMATURIA: Primary | ICD-10-CM

## 2023-06-26 RX ORDER — METRONIDAZOLE 7.5 MG/G
1 GEL VAGINAL DAILY
Qty: 70 G | Refills: 0 | Status: SHIPPED | OUTPATIENT
Start: 2023-06-26

## 2023-06-26 RX ORDER — AMOXICILLIN AND CLAVULANATE POTASSIUM 875; 125 MG/1; MG/1
1 TABLET, FILM COATED ORAL EVERY 12 HOURS SCHEDULED
Qty: 20 TABLET | Refills: 0 | Status: SHIPPED | OUTPATIENT
Start: 2023-06-26 | End: 2023-07-06

## 2023-06-26 NOTE — TELEPHONE ENCOUNTER
Patient called inquiring about r/s options for her appointment on 6/27/23 at 10:30a  Writer transferred the call to the [place] office for further assistance

## 2023-07-03 ENCOUNTER — TELEMEDICINE (OUTPATIENT)
Dept: PSYCHIATRY | Facility: CLINIC | Age: 23
End: 2023-07-03

## 2023-07-03 DIAGNOSIS — F31.81 BIPOLAR II DISORDER (HCC): Primary | ICD-10-CM

## 2023-07-03 RX ORDER — GABAPENTIN 300 MG/1
300 CAPSULE ORAL DAILY
Qty: 30 CAPSULE | Refills: 2 | Status: SHIPPED | OUTPATIENT
Start: 2023-07-03

## 2023-07-03 RX ORDER — LAMOTRIGINE 150 MG/1
150 TABLET ORAL DAILY
Qty: 30 TABLET | Refills: 2 | Status: SHIPPED | OUTPATIENT
Start: 2023-07-03

## 2023-07-03 NOTE — PSYCH
Virtual Regular Visit    Verification of patient location:    Patient is located in the following state in which I hold an active license [place]    Problem List Items Addressed This Visit        Other    Bipolar II disorder ([address]) - Primary    Relevant Medications    lamoTRIgine (LaMICtal) 150 MG tablet    gabapentin (Neurontin) 300 mg capsule          Encounter provider ANGELA Mendes    Provider located at    [address] [facility]  [address] [facility] [id number] [phone]    Recent Visits  Date Type Provider Dept   06/26/23 Telephone Bryon Mao, [id number] [address] recent visits within past 7 days and meeting all other requirements  Today's Visits  Date Type Provider Dept   07/03/23 Telemedicine Bryon Mao, [id number]  today's visits and meeting all other requirements  Future Appointments  No visits were found meeting these conditions. Showing future appointments within next 150 days and meeting all other requirements           The patient was identified by name and date of birth. Patient was informed that this is a telemedicine visit and [facility] She agrees to proceed. .  My office door was closed. No one else was in the room. She acknowledged consent and understanding of privacy and security of the video platform. The patient has agreed to participate and understands they can discontinue the visit at any time. Patient is aware this is a billable service.      HPI     Current Outpatient Medications   Medication Sig Dispense Refill   • amoxicillin-clavulanate (AUGMENTIN) 875-125 mg per tablet Take 1 tablet by mouth every 12 (twelve) hours for 10 days 20 tablet 0   • gabapentin (Neurontin) 300 mg capsule Take 1 capsule (300 mg total) by mouth daily 30 capsule 2   • hydrOXYzine pamoate (VISTARIL) 50 mg capsule Take 1 capsule (50 mg total) by mouth daily at bedtime as needed for anxiety 30 capsule 1   • lamoTRIgine (LaMICtal) 150 MG tablet Take 1 tablet (150 mg total) by mouth daily 30 tablet 2   • metroNIDAZOLE (METROGEL) 0.75 % vaginal gel Insert 1 application into the vagina in the morning 70 g 0   • naproxen (Naprosyn) 500 mg tablet Take 1 tablet (500 mg total) by mouth 2 (two) times a day with meals 30 tablet 0   • norgestimate-ethinyl estradiol (Alisha) 0.25-35 MG-MCG per tablet Take 1 tablet by mouth daily 84 tablet 3     No current facility-administered medications for this visit. Review of Systems  Video Exam    There were no vitals filed for this visit. Physical Exam   As a result of this visit, I have referred the patient for further respiratory evaluation. No    I spent 30 minutes directly with the patient during this visit  VIRTUAL VISIT DISCLAIMER    Oneyda Zapata acknowledges that she has consented to an online visit or consultation. She understands that the online visit is based solely on information provided by her, and that, in the absence of a face-to-face physical evaluation by the physician, the diagnosis she receives is both limited and provisional in terms of accuracy and completeness. This is not intended to replace a full medical face-to-face evaluation by the physician. Oneyda Zapata understands and accepts these terms. MEDICATION MANAGEMENT NOTE        [facility]    Name and Date of Birth:  Oneyda Zapata 25 y.o. 2000 MRN: [id number]    Date of Visit: July 3, 2023    No Known Allergies    Visit Time    Visit Start Time: 2699  Visit Stop Time: 1100  Total Visit Duration: 30 minutes    SUBJECTIVE:    Gordon Drew is seen today for a follow up for Bipolar Disorder type II. She continues to do well since the last visit. Gordon Drew seen today virtually for medication management follow-up. She was last seen by this provider on 5/23/23.   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 She states she does not know when she will start because she has a lot of paperwork to complete before she can start it. She has low motivation. 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 She states she goes out approximately once per week and otherwise is at home watching television or working. She states her sleep fluctuates and sometimes she sleeps too much, sometimes not enough. Appetite is also up and down. She rates her depression as 7 out of 10, anxiety 10 out of 10. She denies suicidal and homicidal ideation, denies auditory and visual hallucinations. She continues to have negative thinking, which she is working on with her therapist.  At this time, she states that most of her anxiety is in the morning. To help with this we will change her gabapentin to 300 mg p.o. daily. Additionally, we will increase her Lamictal to 150 mg p.o. daily. We will follow up at the end of August.  She will call sooner with concerns or issues if they arise prior to scheduled appointment. She was encouraged to finish the paperwork. She is hopeful that she will start her new job within 1 month.     Current Rating Scores:     Current PHQ-9   PHQ-2/9 Depression Screening    Little interest or pleasure in doing things: 2 - more than half the days  Feeling down, depressed, or hopeless: 2 - more than half the days  Trouble falling or staying asleep, or sleeping too much: 3 - nearly every day  Feeling tired or having little energy: 3 - nearly every day  Poor appetite or overeating: 3 - nearly every day  Feeling bad about yourself - or that you are a failure or have let yourself or your family down: 1 - several days  Trouble concentrating on things, such as reading the newspaper or watching television: 2 - more than half the days  Moving or speaking so slowly that other people could have noticed. Or the opposite - being so fidgety or restless that you have been moving around a lot more than usual: 1 - several days  Thoughts that you would be better off dead, or of hurting yourself in some way: 0 - not at all  PHQ-9 Score: 17   PHQ-9 Interpretation: Moderately severe depression        Current JOHANA-7 is   JOHANA-7 Flowsheet Screening    Flowsheet Row Most Recent Value   Over the last 2 weeks, how often have you been bothered by any of the following problems? Feeling nervous, anxious, or on edge 3   Not being able to stop or control worrying 3   Worrying too much about different things 3   Trouble relaxing 2   Being so restless that it is hard to sit still 1   Becoming easily annoyed or irritable 3   Feeling afraid as if something awful might happen 2   JOHANA-7 Total Score 17      . She denies any side effects from current psychiatric medications. PLAN:  Continue medications as ordered  Change Gabapentin to 300 mg p.o. daily  Vistaril 50 mg p.o. at bedtime as needed  Increase Lamictal 150mg p.o. daily  She will continue therapy  She will follow up with this provider in 2 months  She will call sooner with concerns or issues if they arise prior to scheduled appointment    Aware of 24 hour and weekend coverage for urgent situations accessed by calling [facility] main practice number  Continue psychotherapy with therapist  Medication management every 2 months  Aware of need to follow up with family physician for medical issues    Diagnoses and all orders for this visit:    Bipolar II disorder ([address])  -     lamoTRIgine (LaMICtal) 150 MG tablet; Take 1 tablet (150 mg total) by mouth daily  -     gabapentin (Neurontin) 300 mg capsule;  Take 1 capsule (300 mg total) by mouth daily      Lamotrigine PARQ completed including dizziness, headaches, ataxia, vision problems, somnolence, sleep changes, cognitive difficulties, rash (including Becerril-Chato rash), and others, risk of teratogenicity for females. Current Outpatient Medications on File Prior to Visit   Medication Sig Dispense Refill   • amoxicillin-clavulanate (AUGMENTIN) 875-125 mg per tablet Take 1 tablet by mouth every 12 (twelve) hours for 10 days 20 tablet 0   • hydrOXYzine pamoate (VISTARIL) 50 mg capsule Take 1 capsule (50 mg total) by mouth daily at bedtime as needed for anxiety 30 capsule 1   • metroNIDAZOLE (METROGEL) 0.75 % vaginal gel Insert 1 application into the vagina in the morning 70 g 0   • naproxen (Naprosyn) 500 mg tablet Take 1 tablet (500 mg total) by mouth 2 (two) times a day with meals 30 tablet 0   • norgestimate-ethinyl estradiol (Alisha) 0.25-35 MG-MCG per tablet Take 1 tablet by mouth daily 84 tablet 3   • [DISCONTINUED] gabapentin (Neurontin) 100 mg capsule Take 1 capsule (100 mg total) by mouth 3 (three) times a day 90 capsule 2   • [DISCONTINUED] lamoTRIgine (LaMICtal) 100 mg tablet Take 1 tablet (100 mg total) by mouth daily 30 tablet 2   • [DISCONTINUED] lamoTRIgine (LaMICtal) 25 mg tablet Take 1 tablet (25 mg total) by mouth daily 30 tablet 2     No current facility-administered medications on file prior to visit. Psychotherapy Provided:     Individual psychotherapy provided: Yes  Counseling was provided during the session today for 16 minutes. Supportive counseling provided. Medication changes discussed with Caty Gan. Medication education provided to Caty Gan. Discussed with Caty Gan coping with job stress, social difficulties and everyday stressors. Coping strategies reviewed with Caty Gan. Importance of medication and treatment compliance reviewed with Caty Gan. Importance of follow up with family physician for medical issues reviewed with Caty Gan. Reassurance and supportive therapy provided. Crisis/safety plan discussed with Caty Gan. Patient will call prior to scheduled appointment if they have any issues or concerns.   Patient understands they can access the office by calling the main number at any time if they are in crisis. They also understand they can call their Psychiatric hospital's crisis number or go to their nearest ED if suicidal ideation increases or if they develop a plan or intent. HPI ROS Appetite Changes and Sleep:     She reports fluctuating sleep pattern, adequate appetite, adequate energy level.  Denies homicidal ideation, denies suicidal ideation    Review Of Systems:      HPI ROS:               Medication Side Effects:   denies   Depression (10 worst): 7/10 7/10   Anxiety (10 worst): 10/10 6/10   Safety concerns (SI, HI, etc): denies denies   Sleep: Fluctuating as noted in HPI Fluctuating as noted in HPI   Energy: adequate adequate   Appetite: Adequate, fluctuating adeuqate     General normal    Personality no change in personality   Constitutional as noted in HPI   ENT negative   Cardiovascular negative   Respiratory negative   Gastrointestinal negative   Genitourinary has current UTI   Musculoskeletal negative   Integumentary negative   Neurological negative   Endocrine negative   Other Symptoms none, all other systems are negative     Mental Status Evaluation:    Appearance Appropriately dressed and Good eye contact   Behavior calm and cooperative   Mood anxious and depressed  Depression Scale - 7 of 10 (0 = No depression)  Anxiety Scale - 10 of 10 (0 = No anxiety)   Speech Normal rate and volume   Affect appropriate and mood-congruent   Thought Processes Goal directed and coherent   Thought Content Does not verbalize delusional material   Associations Tightly connected   Perceptual Disturbances Denies hallucinations and does not appear to be responding to internal stimuli   Risk Potential Suicidal/Homicidal Ideation - No evidence of suicidal or homicidal ideation and patient does not verbalize suicidal or homicidal ideation  Risk of Violence - No evidence of risk for violence found on assessment  Risk of Self Mutilation - No evidence of risk for self mutilation found on assessment   Orientation oriented to person, place, time/date and situation   Memory recent and remote memory grossly intact   Consciousness alert and awake   Attention/Concentration attention span and concentration are age appropriate   Insight fair   Judgement fair   Muscle Strength and Gait normal muscle strength and normal muscle tone, normal gait/station and normal balance   Motor Activity no abnormal movements   Language no difficulty naming common objects, no difficulty repeating a phrase, no difficulty writing a sentence   Fund of Knowledge adequate knowledge of current events  adequate fund of knowledge regarding past history  adequate fund of knowledge regarding vocabulary      Past Psychiatric History Update:     Inpatient Psychiatric Admission Since Last Encounter:   no  Changes to Outpatient Psychiatric Treatment Team:    no  Suicide Attempt Or Self Mutilation Since Last Encounter:   no  Incidence of Violent Behavior Since Last Encounter:   no    Traumatic History Update:     New Onset of Abuse Since Last Encounter:   no  Traumatic Events Since Last Encounter:   no    Past Medical History:    Past Medical History:   Diagnosis Date   • Anxiety    • Concussion    • Depression    • Head injury     concussions in high school and in college   • Self-injurious behavior         Past Surgical History:   Procedure Laterality Date   • WISDOM TOOTH EXTRACTION       No Known Allergies  Substance Abuse History:    Social History     Substance and Sexual Activity   Alcohol Use Yes   • Alcohol/week: 3.0 - 4.0 standard drinks of alcohol   • Types: 3 - 4 Shots of liquor per week    Comment: 3-4 drinks, 2 times per week     Social History     Substance and Sexual Activity   Drug Use No     Social History:    Social History     Socioeconomic History   • Marital status: Single     Spouse name: Not on file   • Number of children: 0   • Years of education: senior in college currently   • Highest education level: High school graduate   Occupational History   • 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   Tobacco Use   • Smoking status: Never   • Smokeless tobacco: Never   Vaping Use   • Vaping Use: Never used   Substance and Sexual Activity   • Alcohol use: Yes     Alcohol/week: 3.0 - 4.0 standard drinks of alcohol     Types: 3 - 4 Shots of liquor per week     Comment: 3-4 drinks, 2 times per week   • Drug use: No   • Sexual activity: Yes     Partners: Male     Birth control/protection: Condom Male   Other Topics Concern   • Not on file   Social History Narrative   • Not on file     Social Determinants of Health     Financial Resource Strain: Not on file   Food Insecurity: Not on file   Transportation Needs: Not on file   Physical Activity: Not on file   Stress: Not on file   Social Connections: Not on file   Intimate Partner Violence: Not on file   Housing Stability: Not on file     Family Psychiatric History:     Family History   Problem Relation Age of Onset   • Hypertension Mother    • Mental illness Mother    • Depression Mother    • Mental illness Sister    • Coronary artery disease Maternal Grandmother    • Throat cancer Paternal Grandfather      History Review: The following portions of the patient's history were reviewed and updated as appropriate: allergies, current medications, past family history, past medical history, past social history, past surgical history and problem list     OBJECTIVE:     Vital signs in last 24 hours: There were no vitals filed for this visit. Laboratory Results: I have personally reviewed all pertinent laboratory/tests results.     Suicide/Homicide Risk Assessment:    Risk of Harm to Self:  The following ratings are based on assessment at the time of the interview  Recent Specific Risk Factors include: current depressive symptoms, current anxiety symptoms, worries about finances or work  Demographic risk factors include: , age: young adult (15-24)  Historical Risk Factors include: chronic depression, chronic anxiety symptoms, chronic mood disorder, history of suicide attempts, history of impulsive behaviors, history of traumatic experiences  Protective Factors: no current suicidal ideation, access to mental health treatment, compliant with medications, compliant with mental health treatment, having a desire to be alive, stable living environment, sense of determination, supportive family  Based on today's assessment, Merlinda Melbourne presents the following risk of harm to self: low    Risk of Harm to Others: The following ratings are based on assessment at the time of the interview  Protective Factors: no current homicidal ideation  Based on today's assessment, Merlinda Melbourne presents the following risk of harm to others: none    The following interventions are recommended: contracts for safety at present - agrees to go to ED if feeling unsafe, return in 2 months for reassessment, therapy appointment in 3 days, contracts for safety at present - agrees to call Crisis Intervention Service if feeling unsafe    Medications Risks/Benefits:      Risks, Benefits And Possible Side Effects Of Medications:    Discussed risks and benefits of treatment with patient including risk of rash related to treatment with Lamictal and Risk of sedation, dizziness and CNS depression during treatment with Gabapentin     Controlled Medication Discussion:     Not applicable    Treatment Plan:    Due for update/Updated:   no  Last treatment plan done 6/15/23 by Tani Pena LCSW. Treatment Plan due on 12/15/23. ANGELA Greene 07/03/23    This note was shared with patient.

## 2023-07-06 ENCOUNTER — TELEMEDICINE (OUTPATIENT)
Dept: BEHAVIORAL/MENTAL HEALTH CLINIC | Facility: CLINIC | Age: 23
End: 2023-07-06
Payer: COMMERCIAL

## 2023-07-06 ENCOUNTER — TELEPHONE (OUTPATIENT)
Dept: PSYCHIATRY | Facility: CLINIC | Age: 23
End: 2023-07-06

## 2023-07-06 DIAGNOSIS — F32.A ANXIETY AND DEPRESSION: Primary | ICD-10-CM

## 2023-07-06 DIAGNOSIS — F31.81 BIPOLAR II DISORDER (HCC): ICD-10-CM

## 2023-07-06 DIAGNOSIS — B37.31 YEAST VAGINITIS: Primary | ICD-10-CM

## 2023-07-06 DIAGNOSIS — F41.9 ANXIETY AND DEPRESSION: Primary | ICD-10-CM

## 2023-07-06 PROCEDURE — 90834 PSYTX W PT 45 MINUTES: CPT

## 2023-07-06 RX ORDER — FLUCONAZOLE 150 MG/1
TABLET ORAL
Qty: 2 TABLET | Refills: 0 | Status: SHIPPED | OUTPATIENT
Start: 2023-07-06 | End: 2023-07-09

## 2023-07-06 NOTE — PSYCH
Behavioral Health Psychotherapy Progress Note    Psychotherapy Provided: Individual Psychotherapy     1. Anxiety and depression        2. Bipolar II disorder ([address] [facility])            Goals addressed in session: Goal 1     DATA: The session was abbreviated session due to [facility] reporting another appointment; she said she was in the area, and was going to attend in person, but then got belated and we wound up having a shortened virtual session. We agreed to next session on 7/20, and due to the abbreviated session today, provider encouraged [facility] to reach out in the interim if needed and provider will return call when possible. [facility] talked about a 31yo man she was sort-of dating and her feelings about how things are going. 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 She addressed some of the back and forth communications and the confusion about where the relationship stands. She stated how he talked about going to [place] together, but how he is also telling her he needs a break, can still be friends, and still go to [place]. She voices her questioning of these things and their contradictory nature. Provider pointed out the emotional reaction this appears to be causing [facility]. Provider attempted to help [facility] question if this is what she wants in a relationship and to identify what she likes about this man. We attempted to discuss the notion that although she 'has issues', as she says, that the way this relationship is standing does not appear healthy for [facility]. We discussed the importance of Casey Shepard what she wants and when she is not getting the emotional connection she is looking for, that she needs to consider this is not right for her. She acknowledged the concepts discussed, and was able to say that she knows she will be ok without this person, and knows he is not a good match for her.  Provider acknowledged the healthiness in taking such risks in trying a new relationship, especially when difficult to 'try again', while also recognizing the hurt and disappointment she is experiencing. We addressed her feelings as valid while also reminding herself of these other factors. Gianna Garcia indicated being ok and we addressed some measures to help manage her feelings at this time. During this session, this clinician used the following therapeutic modalities: Bereavement Therapy, Cognitive Behavioral Therapy, Mindfulness-based Strategies, Motivational Interviewing, Solution-Focused Therapy and Supportive Psychotherapy    Substance Abuse was not addressed during this session. If the client is diagnosed with a co-occurring substance use disorder, please indicate any changes in the frequency or amount of use: drinking behaviors not specifically discussed today. Stage of change for addressing substance use diagnoses: Contemplation    ASSESSMENT:  Aline Aponte presents with a Anxious and Dysthymic mood. her affect is Normal range and intensity and Tearful, which is congruent, with her mood and the content of the session. The client has made progress on their goals. Gianna Garcia was able to express her thoughts and emotions and was able to verbalize perspective. Aline Aponte presents with a low risk of suicide, low risk of self-harm, and low risk of harm to others. For any risk assessment that surpasses a "low" rating, a safety plan must be developed. A safety plan was indicated: no  If yes, describe in detail n/a    PLAN: Between sessions, Aline Aponte will continue to identify what she wants in a relationship. At the next session, the therapist will use Cognitive Behavioral Therapy and Supportive Psychotherapy to address mood management and regulation, use of coping strategies. Behavioral Health Treatment Plan and Discharge Planning: Aline Aponte is aware of and agrees to continue to work on their treatment plan.  They have identified and are working toward their discharge goals. yes    Visit start and stop times:    07/06/23  Start Time: [address]  Stop Time: [facility]  Total Visit Time: 38 minutes    Virtual Regular Visit    Verification of patient location:    Patient is located at Other in the following state in which I hold an active license [place]      Assessment/Plan:    Problem List Items Addressed This Visit        Other    Anxiety and depression - Primary    Bipolar II disorder ([address])       Goals addressed in session: Goal 1          Reason for visit is   Chief Complaint   Patient presents with   • Virtual Regular Visit        Encounter provider Manjit Burleson LCSW    Provider located at [address] [place] [address] [address] [facility] [address] [id number] [phone]      Recent Visits  No visits were found meeting these conditions. Showing recent visits within past 7 days and meeting all other requirements  Today's Visits  Date Type Provider Dept   07/06/23 Telemedicine Michela Hudson, [address] [place] [address]   07/06/23 [facility]phone [facility] today's visits and meeting all other requirements  Future Appointments  No visits were found meeting these conditions. Showing future appointments within next 150 days and meeting all other requirements       The patient was identified by name and date of birth. John Blount was informed that this is a telemedicine visit and [facility] She agrees to proceed. .  My office door was closed. No one else was in the room. She acknowledged consent and understanding of privacy and security of the video platform. The patient has agreed to participate and understands they can discontinue the visit at any time. Patient is aware this is a billable service. Subjective  John Blount is a 25 y.o. female . Lottie Brooms       HPI     Past Medical History:   Diagnosis Date • Anxiety    • Concussion    • Depression    • Head injury     concussions in high school and in college   • Self-injurious behavior        Past Surgical History:   Procedure Laterality Date   • WISDOM TOOTH EXTRACTION         Current Outpatient Medications   Medication Sig Dispense Refill   • fluconazole (DIFLUCAN) 150 mg tablet Take 1 dose today and repeat in 3 days if still symptomatic. 2 tablet 0   • amoxicillin-clavulanate (AUGMENTIN) 875-125 mg per tablet Take 1 tablet by mouth every 12 (twelve) hours for 10 days 20 tablet 0   • gabapentin (Neurontin) 300 mg capsule Take 1 capsule (300 mg total) by mouth daily 30 capsule 2   • hydrOXYzine pamoate (VISTARIL) 50 mg capsule Take 1 capsule (50 mg total) by mouth daily at bedtime as needed for anxiety 30 capsule 1   • lamoTRIgine (LaMICtal) 150 MG tablet Take 1 tablet (150 mg total) by mouth daily 30 tablet 2   • metroNIDAZOLE (METROGEL) 0.75 % vaginal gel Insert 1 application into the vagina in the morning 70 g 0   • naproxen (Naprosyn) 500 mg tablet Take 1 tablet (500 mg total) by mouth 2 (two) times a day with meals 30 tablet 0   • norgestimate-ethinyl estradiol (Alisha) 0.25-35 MG-MCG per tablet Take 1 tablet by mouth daily 84 tablet 3     No current facility-administered medications for this visit. No Known Allergies    Review of Systems    Video Exam    There were no vitals filed for this visit.     Physical Exam

## 2023-07-06 NOTE — TELEPHONE ENCOUNTER
Writer rec a call from patient regarding cancelling and rescheduling appt due to conflict with appt, writer rescheduled her . Writer notified provider and then provider messaged back that she talked to patient and patient will still keep virtual appt but it will be a short appt. Writer re added orig appt.     Thank you

## 2023-07-11 DIAGNOSIS — Z30.011 ENCOUNTER FOR INITIAL PRESCRIPTION OF CONTRACEPTIVE PILLS: ICD-10-CM

## 2023-07-11 RX ORDER — NORGESTIMATE AND ETHINYL ESTRADIOL 0.25-0.035
1 KIT ORAL DAILY
Qty: 84 TABLET | Refills: 0 | Status: SHIPPED | OUTPATIENT
Start: 2023-07-11

## 2023-07-13 DIAGNOSIS — R30.0 DYSURIA: Primary | ICD-10-CM

## 2023-07-20 ENCOUNTER — TELEPHONE (OUTPATIENT)
Dept: BEHAVIORAL/MENTAL HEALTH CLINIC | Facility: CLINIC | Age: 23
End: 2023-07-20

## 2023-07-20 ENCOUNTER — TELEPHONE (OUTPATIENT)
Dept: PSYCHIATRY | Facility: CLINIC | Age: 23
End: 2023-07-20

## 2023-07-20 NOTE — TELEPHONE ENCOUNTER
Provider received direct call from Katarina Mauro prior to today's appointment. Ms. 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 She requested a later appointment after 4:30pm, but provider did not have that time available, nor were there such openings in the next couple of weeks. Ms. Jeff Troy stated that her eventual full time schedule will be 3pm -11pm, so she can resume earlier day appointments. We reviewed next appointment date, and she stated being able to keep that appointment and we can schedule further sessions at that time. Ms. Jeff Troy stated being ok to wait until next appointment date, and provider noted that she could call provider if needed in the interim.

## 2023-07-20 NOTE — TELEPHONE ENCOUNTER
Patient is calling regarding cancelling an appointment.     Date/Time: 7/20/2023 11    Reason:     Patient was rescheduled: YES [] NO [x]  If yes, when was Patient reschedule for:     Patient requesting call back to reschedule: YES [] NO [x]

## 2023-07-25 ENCOUNTER — DOCUMENTATION (OUTPATIENT)
Dept: BEHAVIORAL/MENTAL HEALTH CLINIC | Facility: CLINIC | Age: 23
End: 2023-07-25

## 2023-07-26 NOTE — PROGRESS NOTES
Juan Francisco Chua requested to talk to provider. She addressed recent happenings with ex-boyfriends. She addressed thoughts and feelings. Juan Francisco Chua voiced sadness and feelings toward self at the negative interactions and results. We discussed the relationship, coping strategies, and self-talk. Juan Francisco Chua denied thought, intent, or plan for self-harm or suicide. She accepted provider's encouragement to go to ER if she should get to that point.  She reiterated not feeling suicidal.   We confirmed next appointment 8/3 @1pm and set an additional appointment for 8/8 @4pm

## 2023-08-01 DIAGNOSIS — Z30.011 ENCOUNTER FOR INITIAL PRESCRIPTION OF CONTRACEPTIVE PILLS: ICD-10-CM

## 2023-08-01 RX ORDER — NORGESTIMATE AND ETHINYL ESTRADIOL 0.25-0.035
1 KIT ORAL DAILY
Qty: 84 TABLET | Refills: 0 | Status: SHIPPED | OUTPATIENT
Start: 2023-08-01

## 2023-08-03 ENCOUNTER — TELEMEDICINE (OUTPATIENT)
Dept: BEHAVIORAL/MENTAL HEALTH CLINIC | Facility: CLINIC | Age: 23
End: 2023-08-03
Payer: COMMERCIAL

## 2023-08-03 DIAGNOSIS — F41.9 ANXIETY AND DEPRESSION: Primary | ICD-10-CM

## 2023-08-03 DIAGNOSIS — F32.A ANXIETY AND DEPRESSION: Primary | ICD-10-CM

## 2023-08-03 DIAGNOSIS — F31.81 BIPOLAR II DISORDER (HCC): ICD-10-CM

## 2023-08-03 PROCEDURE — 90834 PSYTX W PT 45 MINUTES: CPT

## 2023-08-03 NOTE — PSYCH
Virtual Regular Visit    Verification of patient location:    Patient is located at Home in the following state in which I hold an active license [place]      Assessment/Plan:    Problem List Items Addressed This Visit        Other    Anxiety and depression - Primary    Bipolar II disorder ([address])       Goals addressed in session: Goal 1          Reason for visit is   Chief Complaint   Patient presents with   • Virtual Regular Visit        Encounter provider Ravin Wade LCSW    Provider located at [address] [place] [id number] [phone]      Recent Visits  No visits were found meeting these conditions. Showing recent visits within past 7 days and meeting all other requirements  Today's Visits  Date Type Provider Dept   08/03/23 Telemedicine Ravin Wade, [address] [facility] Therapist [facility]   Showing today's visits and meeting all other requirements  Future Appointments  No visits were found meeting these conditions. Showing future appointments within next 150 days and meeting all other requirements       The patient was identified by name and date of birth. Eladio Rodríguez was informed that this is a telemedicine visit and [facility] She agrees to proceed. .  My office door was closed. No one else was in the room. She acknowledged consent and understanding of privacy and security of the video platform. The patient has agreed to participate and understands they can discontinue the visit at any time. Patient is aware this is a billable service. Subjective  Eladio Rodríguez is a 25 y.o. female . Duong Mellissa       HPI     Past Medical History:   Diagnosis Date   • Anxiety    • Concussion    • Depression    • Head injury     concussions in high school and in college   • Self-injurious behavior        Past Surgical History:   Procedure Laterality Date   • WISDOM TOOTH EXTRACTION         Current Outpatient Medications   Medication Sig Dispense Refill   • gabapentin (Neurontin) 300 mg capsule Take 1 capsule (300 mg total) by mouth daily 30 capsule 2   • hydrOXYzine pamoate (VISTARIL) 50 mg capsule Take 1 capsule (50 mg total) by mouth daily at bedtime as needed for anxiety 30 capsule 1   • lamoTRIgine (LaMICtal) 150 MG tablet Take 1 tablet (150 mg total) by mouth daily 30 tablet 2   • metroNIDAZOLE (METROGEL) 0.75 % vaginal gel Insert 1 application into the vagina in the morning 70 g 0   • naproxen (Naprosyn) 500 mg tablet Take 1 tablet (500 mg total) by mouth 2 (two) times a day with meals 30 tablet 0   • norgestimate-ethinyl estradiol (Alisha) 0.25-35 MG-MCG per tablet Take 1 tablet by mouth daily 84 tablet 0     No current facility-administered medications for this visit. No Known Allergies    Review of Systems    Video Exam    There were no vitals filed for this visit. Physical Exam   Behavioral Health Psychotherapy Progress Note    Psychotherapy Provided: Individual Psychotherapy     1. Anxiety and depression        2. Bipolar II disorder ([address] [facility])            Goals addressed in session: Goal 1     DATA: Sanya Mueller discussed ongoing struggle with recent 'relationship' with '27 year old'. She addressed her thoughts and feelings and recent events in their communications. She stated recognizing she 'becomes obsessive' and comes on too strong. She stated she imagines herself with the person and a future is planned out. Provider encouraged that this is an area for further discussion and an important recognition on her part. Provider attempted to help Sayna Mueller recognize his contribution to the problems and his 'issues' in how the 'relationship' wasn't working. Sanya Mueller was able to state being 'ok' with being rejected by caring about 'how I'm treated'. We addressed ways he wasn't treating her well.  Sanya Mueller referenced her job, and buying a car, and provider encouraged discussion about those things. 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 She noted a lot of paperwork, but was able to share a bit about her experience thus far, which is mostly 'training'. As Yvette also talked about using work money to pay for a car, provider encouraged her progress with the job, and the car. Yvette was able to say she is doing something with her life versus the man, who is 28 and not accomplishing things. Yvette was encouraged to consider the many ways she is stating that this relationship wasn't healthy for her. As she struggled to acknowledge, provider encouraged the importance of distraction, maintain her already d/c'ed contact, and continued support from friends. We agreed to continue discussion at confirmed next appointment, virtual next week. Will schedule further at that time, with Janell's updated availability schedule. During this session, this clinician used the following therapeutic modalities: Engagement Strategies, Cognitive Behavioral Therapy, Mindfulness-based Strategies, Motivational Interviewing, Solution-Focused Therapy and Supportive Psychotherapy    Substance Abuse was addressed during this session. If the client is diagnosed with a co-occurring substance use disorder, please indicate any changes in the frequency or amount of use: drinking behaviors and communication were addressed. Stage of change for addressing substance use diagnoses: Contemplation    ASSESSMENT:  Regina Garcia presents with a Euthymic/ normal, Anxious and Dysthymic mood. her affect is Normal range and intensity, which is congruent, with her mood and the content of the session. The client has made progress on their goals. Yvette was perseverative and had difficulty considering alternative ways to manage her thoughts and feelings.  She demonstrates with some insights, and moments of understanding and rational statements, but struggles with leading to alternatives. This is an area for continued work. Cristiane Kay presents with a low risk of suicide, low risk of self-harm, and low risk of harm to others. For any risk assessment that surpasses a "low" rating, a safety plan must be developed. A safety plan was indicated: no  If yes, describe in detail to be done in sessions/tx plan also needed - virtual    PLAN: Between sessions, Cristiane Kay will focus on accomplishments of work, car, efforts of independence. At the next session, the therapist will use Cognitive Behavioral Therapy and Supportive Psychotherapy to address mood management and overall coping. Behavioral Health Treatment Plan and Discharge Planning: Cristiane Kay is aware of and agrees to continue to work on their treatment plan. They have identified and are working toward their discharge goals.  yes    Visit start and stop times:    08/03/23  Start Time: 1300  Stop Time: 1352  Total Visit Time: 52 minutes

## 2023-08-08 ENCOUNTER — TELEMEDICINE (OUTPATIENT)
Dept: BEHAVIORAL/MENTAL HEALTH CLINIC | Facility: CLINIC | Age: 23
End: 2023-08-08
Payer: COMMERCIAL

## 2023-08-08 DIAGNOSIS — F31.81 BIPOLAR II DISORDER (HCC): ICD-10-CM

## 2023-08-08 DIAGNOSIS — F41.9 ANXIETY AND DEPRESSION: Primary | ICD-10-CM

## 2023-08-08 DIAGNOSIS — F32.A ANXIETY AND DEPRESSION: Primary | ICD-10-CM

## 2023-08-08 PROCEDURE — 90834 PSYTX W PT 45 MINUTES: CPT

## 2023-08-10 ENCOUNTER — HOSPITAL ENCOUNTER (EMERGENCY)
Facility: HOSPITAL | Age: 23
Discharge: HOME | End: 2023-08-11
Attending: EMERGENCY MEDICINE
Payer: COMMERCIAL

## 2023-08-10 DIAGNOSIS — L03.031 PARONYCHIA OF GREAT TOE OF RIGHT FOOT: Primary | ICD-10-CM

## 2023-08-10 PROCEDURE — 99283 EMERGENCY DEPT VISIT LOW MDM: CPT

## 2023-08-10 NOTE — Clinical Note
Regina Garcia was seen and treated in our emergency department on 8/10/2023. Diagnosis:     Derwin Prader  . She may return on this date: 08/14/2023         If you have any questions or concerns, please don't hesitate to call.       Gisel Pimentel, DO    ______________________________           _______________          _______________  Hospital Representative                              Date                                Time

## 2023-08-11 VITALS
DIASTOLIC BLOOD PRESSURE: 85 MMHG | OXYGEN SATURATION: 98 % | WEIGHT: 132.28 LBS | HEIGHT: 63 IN | RESPIRATION RATE: 16 BRPM | TEMPERATURE: 98.2 F | SYSTOLIC BLOOD PRESSURE: 132 MMHG | HEART RATE: 88 BPM | BODY MASS INDEX: 23.44 KG/M2

## 2023-08-11 PROCEDURE — NC001 PR NO CHARGE: Performed by: PHYSICIAN ASSISTANT

## 2023-08-11 PROCEDURE — 99284 EMERGENCY DEPT VISIT MOD MDM: CPT | Performed by: EMERGENCY MEDICINE

## 2023-08-11 PROCEDURE — 10060 I&D ABSCESS SIMPLE/SINGLE: CPT | Performed by: EMERGENCY MEDICINE

## 2023-08-11 RX ORDER — IBUPROFEN 600 MG/1
600 TABLET ORAL EVERY 8 HOURS PRN
Qty: 30 TABLET | Refills: 0 | Status: SHIPPED | OUTPATIENT
Start: 2023-08-11

## 2023-08-11 RX ORDER — CEPHALEXIN 250 MG/1
500 CAPSULE ORAL ONCE
Status: COMPLETED | OUTPATIENT
Start: 2023-08-11 | End: 2023-08-11

## 2023-08-11 RX ORDER — LIDOCAINE HYDROCHLORIDE 10 MG/ML
10 INJECTION, SOLUTION EPIDURAL; INFILTRATION; INTRACAUDAL; PERINEURAL ONCE
Status: COMPLETED | OUTPATIENT
Start: 2023-08-11 | End: 2023-08-11

## 2023-08-11 RX ORDER — CEPHALEXIN 500 MG/1
500 CAPSULE ORAL EVERY 6 HOURS SCHEDULED
Qty: 28 CAPSULE | Refills: 0 | Status: SHIPPED | OUTPATIENT
Start: 2023-08-11 | End: 2023-08-18

## 2023-08-11 RX ORDER — IBUPROFEN 600 MG/1
600 TABLET ORAL ONCE
Status: COMPLETED | OUTPATIENT
Start: 2023-08-11 | End: 2023-08-11

## 2023-08-11 RX ADMIN — CEPHALEXIN 500 MG: 250 CAPSULE ORAL at 00:43

## 2023-08-11 RX ADMIN — LIDOCAINE HYDROCHLORIDE 10 ML: 10 INJECTION, SOLUTION EPIDURAL; INFILTRATION; INTRACAUDAL at 00:43

## 2023-08-11 RX ADMIN — IBUPROFEN 600 MG: 600 TABLET ORAL at 00:44

## 2023-08-11 NOTE — DISCHARGE INSTRUCTIONS
Please keep wound site clean and dry as instructed. Take prescribed antibiotic as instructed. Call for follow-up with the podiatrist within the next available appointment. Return for any new or worsening of symptoms.

## 2023-08-11 NOTE — ED PROVIDER NOTES
History  Chief Complaint   Patient presents with   • Toe Pain     Patient c/o right big toe pain and swelling x 3-4 days. Patient is a 25years old female with a past medical history of anxiety, and depression who presented to the ED for evaluation of a right big toe pain and swelling for the past 4 days. Patient admits history of ingrown nail and admits has been cutting her nail but over the last 4 days, noticed swelling, pain and drainage around the lateral aspect of his right big toe nailbed. Denies any other complaint on review of systems. Prior to Admission Medications   Prescriptions Last Dose Informant Patient Reported?  Taking?   gabapentin (Neurontin) 300 mg capsule   No No   Sig: Take 1 capsule (300 mg total) by mouth daily   hydrOXYzine pamoate (VISTARIL) 50 mg capsule   No No   Sig: Take 1 capsule (50 mg total) by mouth daily at bedtime as needed for anxiety   lamoTRIgine (LaMICtal) 150 MG tablet   No No   Sig: Take 1 tablet (150 mg total) by mouth daily   metroNIDAZOLE (METROGEL) 0.75 % vaginal gel   No No   Sig: Insert 1 application into the vagina in the morning   naproxen (Naprosyn) 500 mg tablet   No No   Sig: Take 1 tablet (500 mg total) by mouth 2 (two) times a day with meals   norgestimate-ethinyl estradiol (Alisha) 0.25-35 MG-MCG per tablet   No No   Sig: Take 1 tablet by mouth daily      Facility-Administered Medications: None       Past Medical History:   Diagnosis Date   • Anxiety    • Concussion    • Depression    • Head injury     concussions in high school and in college   • Self-injurious behavior        Past Surgical History:   Procedure Laterality Date   • WISDOM TOOTH EXTRACTION         Family History   Problem Relation Age of Onset   • Hypertension Mother    • Mental illness Mother    • Depression Mother    • Mental illness Sister    • Coronary artery disease Maternal Grandmother    • Throat cancer Paternal Grandfather      I have reviewed and agree with the history as documented. E-Cigarette/Vaping   • E-Cigarette Use Never User      E-Cigarette/Vaping Substances   • Nicotine No    • THC No    • CBD No    • Flavoring No    • Other No    • Unknown No      Social History     Tobacco Use   • Smoking status: Never   • Smokeless tobacco: Never   Vaping Use   • Vaping Use: Never used   Substance Use Topics   • Alcohol use: Yes     Alcohol/week: 3.0 - 4.0 standard drinks of alcohol     Types: 3 - 4 Shots of liquor per week     Comment: 3-4 drinks, 2 times per week   • Drug use: No       Review of Systems   Constitutional: Negative for chills and fever. HENT: Negative for ear pain and sore throat. Eyes: Negative for pain and visual disturbance. Respiratory: Negative for cough and shortness of breath. Cardiovascular: Negative for chest pain and palpitations. Gastrointestinal: Negative for abdominal pain and vomiting. Genitourinary: Negative for dysuria and hematuria. Musculoskeletal: Negative for arthralgias and back pain. Skin: Positive for wound ( Infected right big toenail bed). Negative for color change and rash. Neurological: Negative for seizures and syncope. All other systems reviewed and are negative. Physical Exam  Physical Exam  Vitals and nursing note reviewed. Constitutional:       General: She is not in acute distress. Appearance: She is well-developed. HENT:      Head: Normocephalic and atraumatic. Eyes:      Conjunctiva/sclera: Conjunctivae normal.   Cardiovascular:      Rate and Rhythm: Normal rate and regular rhythm. Heart sounds: No murmur heard. Pulmonary:      Effort: Pulmonary effort is normal. No respiratory distress. Breath sounds: Normal breath sounds. Abdominal:      Palpations: Abdomen is soft. Tenderness: There is no abdominal tenderness. Musculoskeletal:         General: No swelling. Cervical back: Neck supple.       Comments: Exam positive for swelling, drainage and tenderness on palpation along the lateral cuticle of the right big toe consistent with paronychia. Skin:     General: Skin is warm and dry. Capillary Refill: Capillary refill takes less than 2 seconds. Neurological:      Mental Status: She is alert. Psychiatric:         Mood and Affect: Mood normal.         Vital Signs  ED Triage Vitals [08/11/23 0000]   Temperature Pulse Respirations Blood Pressure SpO2   98.2 °F (36.8 °C) 88 16 137/87 97 %      Temp src Heart Rate Source Patient Position - Orthostatic VS BP Location FiO2 (%)   -- -- -- -- --      Pain Score       --           Vitals:    08/11/23 0000   BP: 137/87   Pulse: 88         Visual Acuity      ED Medications  Medications - No data to display    Diagnostic Studies  Results Reviewed     None                 No orders to display              Procedures  Procedures         ED Course         SBIRT 20yo+    Flowsheet Row Most Recent Value   Initial Alcohol Screen: US AUDIT-C     1. How often do you have a drink containing alcohol? 0 Filed at: 08/11/2023 0001   2. How many drinks containing alcohol do you have on a typical day you are drinking? 0 Filed at: 08/11/2023 0001   3a. Male UNDER 65: How often do you have five or more drinks on one occasion? 0 Filed at: 08/11/2023 0001   3b. FEMALE Any Age, or MALE 65+: How often do you have 4 or more drinks on one occassion? 0 Filed at: 08/11/2023 0001   Audit-C Score 0 Filed at: 08/11/2023 0001   RODRIGO: How many times in the past year have you. .. Used an illegal drug or used a prescription medication for non-medical reasons? Never Filed at: 08/11/2023 0001              Medical Decision Making  Patient's history and exam finding concerning for a right big toe paronychia that is currently draining. After obtaining consent from patient, area was anesthetized with 1% lidocaine without epi and cleaned with copious amount of saline and Betadine. A small incision was made to allow for increased drainage.   Clean dressing was applied and patient was discharged home with a prescription for Keflex with instructions to follow-up with the podiatrist and to return to the ED for any new or worsening of symptoms. Patient stable upon discharge. Risk  Prescription drug management. Disposition  Final diagnoses:   None     ED Disposition     None      Follow-up Information    None         Patient's Medications   Discharge Prescriptions    No medications on file       No discharge procedures on file.     PDMP Review       Value Time User    PDMP Reviewed  Yes 1/26/2022 10:52 AM ANGELA Benitez          ED Provider  Electronically Signed by           Jesse Mitchell PA-C  08/11/23 9550

## 2023-08-22 ENCOUNTER — OFFICE VISIT (OUTPATIENT)
Dept: FAMILY MEDICINE CLINIC | Facility: CLINIC | Age: 23
End: 2023-08-22
Payer: COMMERCIAL

## 2023-08-22 VITALS
HEIGHT: 63 IN | BODY MASS INDEX: 22.04 KG/M2 | WEIGHT: 124.4 LBS | DIASTOLIC BLOOD PRESSURE: 78 MMHG | TEMPERATURE: 96.8 F | SYSTOLIC BLOOD PRESSURE: 110 MMHG | OXYGEN SATURATION: 99 % | HEART RATE: 100 BPM

## 2023-08-22 DIAGNOSIS — L60.0 INGROWN TOENAIL: ICD-10-CM

## 2023-08-22 DIAGNOSIS — E55.9 VITAMIN D DEFICIENCY: ICD-10-CM

## 2023-08-22 DIAGNOSIS — J02.9 SORE THROAT: Primary | ICD-10-CM

## 2023-08-22 LAB — S PYO AG THROAT QL: NEGATIVE

## 2023-08-22 PROCEDURE — 11765 WEDGE EXCISION SKN NAIL FOLD: CPT

## 2023-08-22 PROCEDURE — 11719 TRIM NAIL(S) ANY NUMBER: CPT

## 2023-08-22 PROCEDURE — 87880 STREP A ASSAY W/OPTIC: CPT

## 2023-08-22 PROCEDURE — 99214 OFFICE O/P EST MOD 30 MIN: CPT

## 2023-08-22 RX ORDER — FLUTICASONE PROPIONATE 50 MCG
1 SPRAY, SUSPENSION (ML) NASAL DAILY
Qty: 18.2 ML | Refills: 1 | Status: SHIPPED | OUTPATIENT
Start: 2023-08-22

## 2023-08-22 RX ORDER — DOXYCYCLINE HYCLATE 100 MG/1
100 CAPSULE ORAL EVERY 12 HOURS SCHEDULED
Qty: 20 CAPSULE | Refills: 0 | Status: SHIPPED | OUTPATIENT
Start: 2023-08-22 | End: 2023-09-01

## 2023-08-22 RX ORDER — ERGOCALCIFEROL 1.25 MG/1
50000 CAPSULE ORAL WEEKLY
Qty: 16 CAPSULE | Refills: 0 | Status: SHIPPED | OUTPATIENT
Start: 2023-08-22

## 2023-08-22 NOTE — PROGRESS NOTES
Assessment/Plan:         Problem List Items Addressed This Visit    None  Visit Diagnoses     Sore throat    -  Primary    flonase and salt water gargles, abx as prescribed, follow up if not improved    Relevant Medications    fluticasone (FLONASE) 50 mcg/act nasal spray    Other Relevant Orders    POCT rapid strepA (Completed)    Vitamin D deficiency        Vitamin D ordered, suggest daily vitamin D3 2000 u nightly, will continue to monitor. Relevant Medications    ergocalciferol (VITAMIN D2) 50,000 units    Ingrown toenail        removed today, referal to podiatry, abx as prescribed. Relevant Medications    doxycycline hyclate (VIBRAMYCIN) 100 mg capsule    mupirocin (BACTROBAN) 2 % ointment    Other Relevant Orders    Ambulatory Referral to Podiatry    Nail removal (Completed)            Subjective:      Patient ID: Huber Hsu is a 21 y.o. female. Karyle Smoke is here for a sore throat and an ingrown toe nail. The throat has been bothering her for about 1 week. She is on cephalexin through the ER for the toenail. The toenail is getting worse. The following portions of the patient's history were reviewed and updated as appropriate:   Past Medical History:  She has a past medical history of Anxiety, Concussion, Depression, Head injury, and Self-injurious behavior. ,  _______________________________________________________________________  Medical Problems:  does not have any pertinent problems on file.,  _______________________________________________________________________  Past Surgical History:   has a past surgical history that includes Danese tooth extraction. ,  _______________________________________________________________________  Family History:  family history includes Coronary artery disease in her maternal grandmother; Depression in her mother; Hypertension in her mother; Mental illness in her mother and sister;  Throat cancer in her paternal grandfather.,  _______________________________________________________________________  Social History:   reports that she has never smoked. She has never used smokeless tobacco. She reports current alcohol use of about 3.0 - 4.0 standard drinks of alcohol per week. She reports that she does not use drugs. ,  _______________________________________________________________________  Allergies:  has No Known Allergies. .  _______________________________________________________________________  Current Outpatient Medications   Medication Sig Dispense Refill   • doxycycline hyclate (VIBRAMYCIN) 100 mg capsule Take 1 capsule (100 mg total) by mouth every 12 (twelve) hours for 10 days 20 capsule 0   • ergocalciferol (VITAMIN D2) 50,000 units Take 1 capsule (50,000 Units total) by mouth once a week 16 capsule 0   • fluticasone (FLONASE) 50 mcg/act nasal spray 1 spray into each nostril daily 18.2 mL 1   • gabapentin (Neurontin) 300 mg capsule Take 1 capsule (300 mg total) by mouth daily 30 capsule 2   • hydrOXYzine pamoate (VISTARIL) 50 mg capsule Take 1 capsule (50 mg total) by mouth daily at bedtime as needed for anxiety 30 capsule 1   • ibuprofen (MOTRIN) 600 mg tablet Take 1 tablet (600 mg total) by mouth every 8 (eight) hours as needed for mild pain or moderate pain 30 tablet 0   • lamoTRIgine (LaMICtal) 150 MG tablet Take 1 tablet (150 mg total) by mouth daily 30 tablet 2   • metroNIDAZOLE (METROGEL) 0.75 % vaginal gel Insert 1 application into the vagina in the morning 70 g 0   • mupirocin (BACTROBAN) 2 % ointment Apply topically 3 (three) times a day 30 g 1   • naproxen (Naprosyn) 500 mg tablet Take 1 tablet (500 mg total) by mouth 2 (two) times a day with meals 30 tablet 0   • norgestimate-ethinyl estradiol (Alisha) 0.25-35 MG-MCG per tablet Take 1 tablet by mouth daily (Patient not taking: Reported on 8/22/2023) 84 tablet 0     No current facility-administered medications for this visit. _______________________________________________________________________  Review of Systems   Constitutional: Positive for fatigue. Negative for chills, diaphoresis and fever. HENT: Positive for sore throat. Negative for congestion, ear pain, rhinorrhea, sinus pressure and sinus pain. Eyes: Negative for pain and visual disturbance. Respiratory: Positive for cough. Negative for chest tightness and shortness of breath. Cardiovascular: Negative for chest pain and palpitations. Gastrointestinal: Negative for abdominal pain, constipation, diarrhea, nausea and vomiting. Genitourinary: Negative for dysuria, frequency and hematuria. Musculoskeletal: Positive for arthralgias. Negative for back pain and myalgias. Skin: Negative for color change and rash. Neurological: Negative for dizziness, seizures, syncope, light-headedness and headaches. Psychiatric/Behavioral: Positive for dysphoric mood. Negative for sleep disturbance. The patient is nervous/anxious. All other systems reviewed and are negative. Objective:  Vitals:    08/22/23 0843   BP: 110/78   BP Location: Left arm   Patient Position: Sitting   Cuff Size: Standard   Pulse: 100   Temp: (!) 96.8 °F (36 °C)   SpO2: 99%   Weight: 56.4 kg (124 lb 6.4 oz)   Height: 5' 3" (1.6 m)     Body mass index is 22.04 kg/m². Physical Exam  Vitals and nursing note reviewed. Constitutional:       Appearance: Normal appearance. She is not ill-appearing. HENT:      Head: Normocephalic. Right Ear: Tympanic membrane, ear canal and external ear normal. There is no impacted cerumen. Left Ear: Tympanic membrane, ear canal and external ear normal. There is no impacted cerumen. Nose: Nose normal. No congestion. Mouth/Throat:      Mouth: Mucous membranes are moist.      Pharynx: No posterior oropharyngeal erythema. Eyes:      Extraocular Movements: Extraocular movements intact.       Conjunctiva/sclera: Conjunctivae normal. Pupils: Pupils are equal, round, and reactive to light. Cardiovascular:      Rate and Rhythm: Normal rate and regular rhythm. Heart sounds: Normal heart sounds. No murmur heard. Pulmonary:      Effort: Pulmonary effort is normal.      Breath sounds: Normal breath sounds. No wheezing. Abdominal:      Palpations: Abdomen is soft. Tenderness: There is no abdominal tenderness. Musculoskeletal:         General: Normal range of motion. Cervical back: Normal range of motion. Right lower leg: No edema. Left lower leg: No edema. Comments: Ingrown toenail on the right   Skin:     General: Skin is warm and dry. Neurological:      General: No focal deficit present. Mental Status: She is alert. Psychiatric:         Mood and Affect: Mood normal.         Behavior: Behavior normal.         Nail removal    Date/Time: 8/22/2023 8:40 AM    Performed by: ANGELA Ruiz  Authorized by: ANGELA Ruiz    Patient location:  ClinicUniversal Protocol:  Consent: Verbal consent obtained. Risks and benefits: risks, benefits and alternatives were discussed  Consent given by: patient  Time out: Immediately prior to procedure a "time out" was called to verify the correct patient, procedure, equipment, support staff and site/side marked as required. Timeout called at: 8/22/2023 9:43 AM.  Patient understanding: patient states understanding of the procedure being performed  Patient consent: the patient's understanding of the procedure matches consent given  Required items: required blood products, implants, devices, and special equipment available  Patient identity confirmed: verbally with patient      Location:     Foot:  R big toe  Pre-procedure details:     Skin preparation:  Alcohol and Betadine  Anesthesia (see MAR for exact dosages):      Anesthesia method:  Local infiltration    Local anesthetic:  Lidocaine 1% w/o epi  Nail Removal:     Nail removed:  Partial    Nail side: Lateral    Nail bed sutured: no      Removed nail replaced and anchored: no    Trephination:     Subungual hematoma drained: no    Ingrown nail:     Wedge excision of skin: yes      Nail matrix removed or ablated:  Partial  Nails trimmed:     Number of nails trimmed:  1  Post-procedure details:     Dressing:  Antibiotic ointment, 4x4 sterile gauze and gauze roll    Patient tolerance of procedure:   Tolerated well, no immediate complications

## 2023-08-22 NOTE — LETTER
August 22, 2023     Patient: Sloan Lesches  YOB: 2000  Date of Visit: 8/22/2023      To Whom it May Concern:    Sloan Lesches is under my professional care. Britton Weaver was seen in my office on 8/22/2023. Britton Weaver may return to work on 8/24/23 . If you have any questions or concerns, please don't hesitate to call.          Sincerely,          ANGELA Price        CC: Dena Frankel, DO Sloan Lesches

## 2023-08-24 ENCOUNTER — TELEMEDICINE (OUTPATIENT)
Dept: BEHAVIORAL/MENTAL HEALTH CLINIC | Facility: CLINIC | Age: 23
End: 2023-08-24
Payer: COMMERCIAL

## 2023-08-24 DIAGNOSIS — F41.9 ANXIETY AND DEPRESSION: Primary | ICD-10-CM

## 2023-08-24 DIAGNOSIS — F31.81 BIPOLAR II DISORDER (HCC): ICD-10-CM

## 2023-08-24 DIAGNOSIS — F32.A ANXIETY AND DEPRESSION: Primary | ICD-10-CM

## 2023-08-24 PROCEDURE — 90834 PSYTX W PT 45 MINUTES: CPT

## 2023-08-24 NOTE — PSYCH
Virtual Regular Visit    Verification of patient location:    Patient is located at Home in the following state in which I hold an active license [place]      Assessment/Plan:    Problem List Items Addressed This Visit        Other    Anxiety and depression - Primary    Bipolar II disorder ([address])       Goals addressed in session: Goal 1          Reason for visit is   Chief Complaint   Patient presents with   • Virtual Regular Visit        Encounter provider Brynn Camarillo LCSW    Provider located at [address] [place] [id number] [phone]      Recent Visits  No visits were found meeting these conditions. Showing recent visits within past 7 days and meeting all other requirements  Today's Visits  Date Type Provider Dept   08/24/23 Telemedicine Michela [address], [address] [facility] Therapist [facility]   Showing today's visits and meeting all other requirements  Future Appointments  No visits were found meeting these conditions. Showing future appointments within next 150 days and meeting all other requirements       The patient was identified by name and date of birth. Abhilash Nelson was informed that this is a telemedicine visit and [facility] She agrees to proceed. .  My office door was closed. No one else was in the room. She acknowledged consent and understanding of privacy and security of the video platform. The patient has agreed to participate and understands they can discontinue the visit at any time. Patient is aware this is a billable service. Subjective  Abhilash Nelson is a 21 y.o. female . Amadou BENITEZ     Past Medical History:   Diagnosis Date   • Anxiety    • Concussion    • Depression    • Head injury     concussions in high school and in college   • Self-injurious behavior        Past Surgical History:   Procedure Laterality Date   • WISDOM TOOTH EXTRACTION         Current Outpatient Medications   Medication Sig Dispense Refill   • doxycycline hyclate (VIBRAMYCIN) 100 mg capsule Take 1 capsule (100 mg total) by mouth every 12 (twelve) hours for 10 days 20 capsule 0   • ergocalciferol (VITAMIN D2) 50,000 units Take 1 capsule (50,000 Units total) by mouth once a week 16 capsule 0   • fluticasone (FLONASE) 50 mcg/act nasal spray 1 spray into each nostril daily 18.2 mL 1   • gabapentin (Neurontin) 300 mg capsule Take 1 capsule (300 mg total) by mouth daily 30 capsule 2   • hydrOXYzine pamoate (VISTARIL) 50 mg capsule Take 1 capsule (50 mg total) by mouth daily at bedtime as needed for anxiety 30 capsule 1   • ibuprofen (MOTRIN) 600 mg tablet Take 1 tablet (600 mg total) by mouth every 8 (eight) hours as needed for mild pain or moderate pain 30 tablet 0   • lamoTRIgine (LaMICtal) 150 MG tablet Take 1 tablet (150 mg total) by mouth daily 30 tablet 2   • metroNIDAZOLE (METROGEL) 0.75 % vaginal gel Insert 1 application into the vagina in the morning 70 g 0   • mupirocin (BACTROBAN) 2 % ointment Apply topically 3 (three) times a day 30 g 1   • naproxen (Naprosyn) 500 mg tablet Take 1 tablet (500 mg total) by mouth 2 (two) times a day with meals 30 tablet 0   • norgestimate-ethinyl estradiol (Alisha) 0.25-35 MG-MCG per tablet Take 1 tablet by mouth daily (Patient not taking: Reported on 8/22/2023) 84 tablet 0     No current facility-administered medications for this visit. No Known Allergies    Review of Systems    Video Exam    There were no vitals filed for this visit. Physical Exam     Behavioral Health Psychotherapy Progress Note    Psychotherapy Provided: Individual Psychotherapy     1. Anxiety and depression        2. Bipolar II disorder ([address])            Goals addressed in session: Goal 1     DATA: Gabbie Lagos stated not wanting to complete a treatment plan today and opted for next time.  We addressed the frustration for her in having to complete an updated plan. She nonetheless stated that she has been writing her own thoughts and journaling. She stated it doesn't really help (sarcastically), but provider noted the step in identifying a way to help release stressful feelings, and doing so. Provider also noted her efforts in having the insight to recognize areas of need. She was encouraged to continue these efforts, and that we would encorporate her expressed issues into the updated treatment plan. Luz Marina Armenta wanted provider to securely dx with Borderline PD; we agreed to review the symptoms further (we didn't go through entire dx in a previous session), and we could assess if she wishes; we addressed her feelings if she did meet the criteria to have the BPD dx. We addressed how symptoms of abandonment are particularly difficult for her and we discussed how this plays out in her behaviors in relationships. We addressed how her past traumas connect and influence her feelings about relationships, difficulty trusting, and tendency to self-sabotage to avoid rejection. We discussed how her past was not her fault but how she can take on ways to learn to change behaviors in her relationships to more healthy responses. We addressed dx in general and using it as a tool to help address areas of need, which, it was pointed out, Luz Marina Armenta is already doing via her journaling. The areas of need Luz Marina Armenta discussed are: 1) having difficulty getting over things / relationships mostly; 2) starting arguments to see if they still care; 3) accepting hugs and affections; 4) "Men"; 5) loud noises; 6) constructive criticism/takes things personally. Luz Marina Armenta was acknowledged for her self-awareness and efforts and encouraged at how she is already demonstrating work on self and the difficulty in doing so, but with recognition of the process. We reviewed plan to include self-identified areas in updated tx plan; possible further review of BPD symptoms/criteria.      During this session, this clinician used the following therapeutic modalities: Engagement Strategies, Cognitive Behavioral Therapy, Mindfulness-based Strategies, Motivational Interviewing, Solution-Focused Therapy and Supportive Psychotherapy    Substance Abuse was addressed during this session. If the client is diagnosed with a co-occurring substance use disorder, please indicate any changes in the frequency or amount of use: Luz Marina Armenta reported decreased alcohol use. Stage of change for addressing substance use diagnoses: Contemplation    ASSESSMENT:  Luis Quiñones presents with a Euthymic/ normal and Dysthymic mood. her affect is Normal range and intensity, which is congruent, with her mood and the content of the session. The client has made progress on their goals. Luz Marina Armenta was able to address areas of need and concern. Luis Quiñones presents with a low risk of suicide, low risk of self-harm, and low risk of harm to others. For any risk assessment that surpasses a "low" rating, a safety plan must be developed. A safety plan was indicated: no  If yes, describe in detail n/a to be completed in future sessions    PLAN: Between sessions, Luis Quiñones will continue work and using journaling to help when feeling stressed. At the next session, the therapist will use Engagement Strategies, Cognitive Behavioral Therapy, Mindfulness-based Strategies, Motivational Interviewing, Solution-Focused Therapy and Supportive Psychotherapy to address mood management, discussion of Zain Anderson described areas of need in terms of goals/updated goal plan. Behavioral Health Treatment Plan and Discharge Planning: Luis Quiñones is aware of and agrees to continue to work on their treatment plan. They have identified and are working toward their discharge goals.  yes    Visit start and stop times:    08/24/23  Start Time: 0900  Stop Time: 0952  Total Visit Time: 52 minutes

## 2023-08-28 ENCOUNTER — OFFICE VISIT (OUTPATIENT)
Dept: FAMILY MEDICINE CLINIC | Facility: CLINIC | Age: 23
End: 2023-08-28
Payer: COMMERCIAL

## 2023-08-28 VITALS
TEMPERATURE: 97.5 F | BODY MASS INDEX: 22.04 KG/M2 | DIASTOLIC BLOOD PRESSURE: 80 MMHG | HEART RATE: 84 BPM | HEIGHT: 63 IN | WEIGHT: 124.4 LBS | OXYGEN SATURATION: 99 % | SYSTOLIC BLOOD PRESSURE: 120 MMHG

## 2023-08-28 DIAGNOSIS — F31.81 BIPOLAR II DISORDER (HCC): ICD-10-CM

## 2023-08-28 DIAGNOSIS — Z00.00 ANNUAL PHYSICAL EXAM: Primary | ICD-10-CM

## 2023-08-28 DIAGNOSIS — L60.0 INGROWN TOENAIL: ICD-10-CM

## 2023-08-28 DIAGNOSIS — F51.01 PRIMARY INSOMNIA: ICD-10-CM

## 2023-08-28 DIAGNOSIS — F32.A ANXIETY AND DEPRESSION: ICD-10-CM

## 2023-08-28 DIAGNOSIS — F41.9 ANXIETY AND DEPRESSION: ICD-10-CM

## 2023-08-28 DIAGNOSIS — R61 HYPERHIDROSIS: ICD-10-CM

## 2023-08-28 PROCEDURE — 99395 PREV VISIT EST AGE 18-39: CPT | Performed by: NURSE PRACTITIONER

## 2023-08-28 RX ORDER — DOXYCYCLINE HYCLATE 100 MG/1
100 CAPSULE ORAL EVERY 12 HOURS
Qty: 20 CAPSULE | Refills: 0 | OUTPATIENT
Start: 2023-08-28

## 2023-08-28 NOTE — PROGRESS NOTES
[facility] [address] [place] [address]    NAME: Blanka Knowles  AGE: 21 y.o. SEX: female  : 2000     DATE: 2023     Assessment and Plan:     Problem List Items Addressed This Visit        Other    Anxiety and depression    Primary insomnia    Bipolar II disorder [facility])     Patient does see psychiatry and therapy. Other Visit Diagnoses     Annual physical exam    -  Primary    Hyperhidrosis        Relevant Medications    aluminum chloride (DRYSOL) 20 % external solution    Ingrown toenail        Area is much improved. Patient is finishing up Keflex. Redressed for patient. Relevant Medications    aluminum chloride (DRYSOL) 20 % external solution          Immunizations and preventive care screenings were discussed with patient today. Appropriate education was printed on patient's after visit summary. Counseling:  · Exercise: the importance of regular exercise/physical activity was discussed. Recommend exercise 3-5 times per week for at least 30 minutes. No follow-ups on file. Chief Complaint:     Chief Complaint   Patient presents with   • Physical Exam      History of Present Illness:     Adult Annual Physical   Patient here for a comprehensive physical exam. The patient reports problems - concerned with body odor. Diet and Physical Activity  · Diet/Nutrition: poor diet. · Exercise: no formal exercise.       Depression Screening  PHQ-2/9 Depression Screening    Little interest or pleasure in doing things: 2 - more than half the days  Feeling down, depressed, or hopeless: 1 - several days  Trouble falling or staying asleep, or sleeping too much: 3 - nearly every day  Feeling tired or having little energy: 2 - more than half the days  Poor appetite or overeating: 3 - nearly every day  Feeling bad about yourself - or that you are a failure or have let yourself or your family down: 1 - several days  Trouble concentrating on things, such as reading the newspaper or watching television: 3 - nearly every day  Moving or speaking so slowly that other people could have noticed. Or the opposite - being so fidgety or restless that you have been moving around a lot more than usual: 3 - nearly every day  Thoughts that you would be better off dead, or of hurting yourself in some way: 1 - several days  PHQ-9 Score: 19   PHQ-9 Interpretation: Moderately severe depression        General Health  · Sleep: sleeps poorly. · Hearing: normal - none . · Vision: goes for regular eye exams and wears glasses. · Dental: no dental visits for >1 year. /GYN Health  · Last menstrual period: last week ago  · Contraceptive method: none. · History of STDs?: yes. Review of Systems:     Review of Systems   Constitutional: Positive for fatigue. Negative for chills and fever. HENT: Negative for ear pain and sore throat. Eyes: Negative for pain and visual disturbance. Respiratory: Negative for cough and shortness of breath. Cardiovascular: Negative for chest pain and palpitations. Gastrointestinal: Negative for abdominal pain and vomiting. Genitourinary: Negative for dysuria and hematuria. Musculoskeletal: Negative for arthralgias and back pain. Skin: Negative for color change and rash. Neurological: Negative for dizziness, light-headedness and headaches. Psychiatric/Behavioral: Positive for dysphoric mood and sleep disturbance. The patient is nervous/anxious. All other systems reviewed and are negative.      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   Tobacco Use   • Smoking status: Never   • Smokeless tobacco: Never   Vaping Use   • Vaping Use: Never used   Substance and Sexual Activity   • Alcohol use:  Yes     Alcohol/week: 3.0 - 4.0 standard drinks of alcohol     Types: 3 - 4 Shots of liquor per week     Comment: 3-4 drinks, 2 times per week   • Drug use: No   • Sexual activity: Yes     Partners: Male     Birth control/protection: Condom Male   Other Topics Concern   • None   Social History Narrative   • None     Social Determinants of Health     Financial Resource Strain: Not on file   Food Insecurity: Not on file   Transportation Needs: Not on file   Physical Activity: Not on file   Stress: Not on file   Social Connections: Not on file   Intimate Partner Violence: Not on file   Housing Stability: Not on file      Family History:     Family History   Problem Relation Age of Onset   • Hypertension Mother    • Mental illness Mother    • Depression Mother    • Mental illness Sister    • Coronary artery disease Maternal Grandmother    • Throat cancer Paternal Grandfather       Current Medications:     Current Outpatient Medications   Medication Sig Dispense Refill   • aluminum chloride (DRYSOL) 20 % external solution Apply topically daily at bedtime 35 mL 0   • doxycycline hyclate (VIBRAMYCIN) 100 mg capsule Take 1 capsule (100 mg total) by mouth every 12 (twelve) hours for 10 days 20 capsule 0   • ergocalciferol (VITAMIN D2) 50,000 units Take 1 capsule (50,000 Units total) by mouth once a week 16 capsule 0   • fluticasone (FLONASE) 50 mcg/act nasal spray 1 spray into each nostril daily 18.2 mL 1   • gabapentin (Neurontin) 300 mg capsule Take 1 capsule (300 mg total) by mouth daily 30 capsule 2   • hydrOXYzine pamoate (VISTARIL) 50 mg capsule Take 1 capsule (50 mg total) by mouth daily at bedtime as needed for anxiety 30 capsule 1   • ibuprofen (MOTRIN) 600 mg tablet Take 1 tablet (600 mg total) by mouth every 8 (eight) hours as needed for mild pain or moderate pain 30 tablet 0   • lamoTRIgine (LaMICtal) 150 MG tablet Take 1 tablet (150 mg total) by mouth daily 30 tablet 2   • naproxen (Naprosyn) 500 mg tablet Take 1 tablet (500 mg total) by mouth 2 (two) times a day with meals 30 tablet 0   • norgestimate-ethinyl estradiol (Alisha) 0.25-35 MG-MCG per tablet Take 1 tablet by mouth daily (Patient not taking: Reported on 8/22/2023) 84 tablet 0     No current facility-administered medications for this visit. Allergies:     No Known Allergies   Physical Exam:     /80 (BP Location: Left arm, Patient Position: Sitting, Cuff Size: Standard)   Pulse 84   Temp 97.5 °F (36.4 °C)   Ht 5' 3" (1.6 m)   Wt 56.4 kg (124 lb 6.4 oz)   SpO2 99%   BMI 22.04 kg/m²     Physical Exam  Vitals and nursing note reviewed. Constitutional:       General: She is not in acute distress. Appearance: She is well-developed. HENT:      Head: Normocephalic and atraumatic. Right Ear: Tympanic membrane normal.      Left Ear: Tympanic membrane normal.      Nose: Nose normal.      Mouth/Throat:      Mouth: Mucous membranes are moist.   Eyes:      Conjunctiva/sclera: Conjunctivae normal.   Cardiovascular:      Rate and Rhythm: Normal rate and regular rhythm. Heart sounds: No murmur heard. Pulmonary:      Effort: Pulmonary effort is normal. No respiratory distress. Breath sounds: Normal breath sounds. Abdominal:      Palpations: Abdomen is soft. Tenderness: There is no abdominal tenderness. Musculoskeletal:         General: No swelling. Cervical back: Neck supple. Skin:     General: Skin is warm and dry. Capillary Refill: Capillary refill takes less than 2 seconds.    Neurological:      Mental Status: She is alert and oriented to person, place, and time.    Psychiatric:         Mood and Affect: Mood normal.          Seema Wilhelm, [address] [place] [id number] [facility]

## 2023-08-29 ENCOUNTER — TELEMEDICINE (OUTPATIENT)
Dept: PSYCHIATRY | Facility: CLINIC | Age: 23
End: 2023-08-29
Payer: COMMERCIAL

## 2023-08-29 DIAGNOSIS — F31.81 BIPOLAR II DISORDER (HCC): Primary | ICD-10-CM

## 2023-08-29 PROCEDURE — 99214 OFFICE O/P EST MOD 30 MIN: CPT | Performed by: NURSE PRACTITIONER

## 2023-08-29 RX ORDER — LAMOTRIGINE 25 MG/1
25 TABLET ORAL DAILY
Qty: 30 TABLET | Refills: 2 | Status: SHIPPED | OUTPATIENT
Start: 2023-08-29

## 2023-08-29 NOTE — PSYCH
Virtual Regular Visit    Verification of patient location:    Patient is located in the following state in which I hold an active license [place]    Problem List Items Addressed This Visit        Other    Bipolar II disorder ([address]) - Primary    Relevant Medications    lamoTRIgine (LaMICtal) 25 mg tablet          Encounter provider ANGELA Domínguez    Provider located at    [address] [facility]  [address] [facility] [place] [id number] [phone]    Recent Visits  Date Type Provider Dept   08/31/23 Telephone [facility] [address] [facility]   08/29/23 [facility], [facility]   08/28/23 Office Visit Seema Wilhelm, [address] recent visits within past 7 days and meeting all other requirements  Future Appointments  No visits were found meeting these conditions. Showing future appointments within next 150 days and meeting all other requirements           The patient was identified by name and date of birth. Patient was informed that this is a telemedicine visit and that [facility]. She agrees to proceed. .  My office door was closed. No one else was in the room. She acknowledged consent and understanding of privacy and security of the video platform. The patient has agreed to participate and understands they can discontinue the visit at any time. Patient is aware this is a billable service.      HPI     Current Outpatient Medications   Medication Sig Dispense Refill   • doxycycline hyclate (VIBRAMYCIN) 100 mg capsule Take 1 capsule (100 mg total) by mouth every 12 (twelve) hours for 10 days 20 capsule 0   • ergocalciferol (VITAMIN D2) 50,000 units Take 1 capsule (50,000 Units total) by mouth once a week 16 capsule 0   • fluticasone (FLONASE) 50 mcg/act nasal spray 1 spray into each nostril daily 18.2 mL 1   • hydrOXYzine pamoate (VISTARIL) 50 mg capsule Take 1 capsule (50 mg total) by mouth daily at bedtime as needed for anxiety 30 capsule 1   • lamoTRIgine (LaMICtal) 150 MG tablet Take 1 tablet (150 mg total) by mouth daily 30 tablet 2   • lamoTRIgine (LaMICtal) 25 mg tablet Take 1 tablet (25 mg total) by mouth daily 30 tablet 2   • naproxen (Naprosyn) 500 mg tablet Take 1 tablet (500 mg total) by mouth 2 (two) times a day with meals 30 tablet 0   • norgestimate-ethinyl estradiol (Alisha) 0.25-35 MG-MCG per tablet Take 1 tablet by mouth daily 84 tablet 0   • aluminum chloride (DRYSOL) 20 % external solution Apply topically daily at bedtime 60 mL 0   • ibuprofen (MOTRIN) 600 mg tablet TAKE ONE TABLET BY MOUTH EVERY EIGHT HOURS AS NEEDED for mild to moderate pain 30 tablet 0     No current facility-administered medications for this visit. Review of Systems  Video Exam    There were no vitals filed for this visit. Physical Exam   As a result of this visit, I have referred the patient for further respiratory evaluation. No    I spent 30 minutes directly with the patient during this visit  VIRTUAL VISIT DISCLAIMER    Mandy Slade acknowledges that she has consented to an online visit or consultation. She understands that the online visit is based solely on information provided by her, and that, in the absence of a face-to-face physical evaluation by the physician, the diagnosis she receives is both limited and provisional in terms of accuracy and completeness. This is not intended to replace a full medical face-to-face evaluation by the physician. Mandy Slade understands and accepts these terms.     MEDICATION MANAGEMENT NOTE        [facility]    Name and Date of Birth:  Mandy Slade 21 y.o. 2000 MRN: [id number]    Date of Visit: August 29, 2023    No Known Allergies    Visit Time    Visit Start Time: 6138  Visit Stop Time: 0375  Total Visit Duration: 30 minutes    SUBJECTIVE:    Ruddy Liu is seen today for a follow up for Bipolar Disorder type II. She continues to do well since the last visit. Ruddy Liu seen today virtually for medication management follow-up. She was last seen by this provider on 7/3/23. [place] [employer] [place] the [employer] and reports not being very happy with this job. She states that her hours are long and she has not been sleeping much as a result. She spoke to this provider about a relationship she had with an older man who was a  at a [employer] she was working at and states that because of this relationship she lost friends and he caused her to be suicidal.  She was pressured in speech, elevated in mood, and tangential in conversation. She voiced anger with her friends for not "standing by her through the relationship issues" even though she states that they tried to warn her about him, and told her not to get involved with him. She states that because her friends are no longer speaking to her, she has been going to [employer] to see her friends there and "met the love of her life". She states that she will likely "mess it up". She is self-sabotaging and states that she cannot control her impulsivities. She has been unable to tolerate any of the antipsychotics that were prescribed to her and is refusing to try lithium or depakote at this time. We will increase the lamictal to 175mg PO daily at this time. Max dose for bipolar disorder is 200mg po daily. She will continue the vistaril 50mg PO hs prn for anxiety / insomnia. She did not feel any change with the gabapentin so that was discontinued at this time. She denies any current suicidal ideation, denies homicidal ideation. She denies auditory and visual hallucinations. We will follow up in 1 month. She denies any side effects from current psychiatric medications.     PLAN:  Continue medications as ordered  Vistaril 50 mg p.o. at bedtime as needed  Increase Lamictal 175mg p.o. daily  She will continue therapy  She will follow up with this provider in 1 month  She will call sooner with concerns or issues if they arise prior to scheduled appointment    Aware of 24 hour and weekend coverage for urgent situations accessed by calling [facility] main practice number  Continue psychotherapy with therapist  Medication management every 2 months  Aware of need to follow up with family physician for medical issues    Diagnoses and all orders for this visit:    Bipolar II disorder ([address])  -     lamoTRIgine (LaMICtal) 25 mg tablet; Take 1 tablet (25 mg total) by mouth daily      Lamotrigine PARQ completed including dizziness, headaches, ataxia, vision problems, somnolence, sleep changes, cognitive difficulties, rash (including Becerril-Chato rash), and others, risk of teratogenicity for females. Current Outpatient Medications on File Prior to Visit   Medication Sig Dispense Refill   • doxycycline hyclate (VIBRAMYCIN) 100 mg capsule Take 1 capsule (100 mg total) by mouth every 12 (twelve) hours for 10 days 20 capsule 0   • ergocalciferol (VITAMIN D2) 50,000 units Take 1 capsule (50,000 Units total) by mouth once a week 16 capsule 0   • fluticasone (FLONASE) 50 mcg/act nasal spray 1 spray into each nostril daily 18.2 mL 1   • hydrOXYzine pamoate (VISTARIL) 50 mg capsule Take 1 capsule (50 mg total) by mouth daily at bedtime as needed for anxiety 30 capsule 1   • lamoTRIgine (LaMICtal) 150 MG tablet Take 1 tablet (150 mg total) by mouth daily 30 tablet 2   • naproxen (Naprosyn) 500 mg tablet Take 1 tablet (500 mg total) by mouth 2 (two) times a day with meals 30 tablet 0   • norgestimate-ethinyl estradiol (Alisha) 0.25-35 MG-MCG per tablet Take 1 tablet by mouth daily 84 tablet 0     No current facility-administered medications on file prior to visit.        Psychotherapy Provided:     Individual psychotherapy provided: Yes  Counseling was provided during the session today for 16 minutes. Supportive counseling provided. Medication changes discussed with Rowena Beatty. Medication education provided to Rowena Beatty. Discussed with Rowena Beatty coping with job stress, social difficulties and everyday stressors. Coping strategies reviewed with Rowena Beatty. Importance of medication and treatment compliance reviewed with Rowena Beatty. Importance of follow up with family physician for medical issues reviewed with Rowena Beatty. Reassurance and supportive therapy provided. Crisis/safety plan discussed with Rowena Beatty. Patient will call prior to scheduled appointment if they have any issues or concerns. Patient understands they can access the office by calling the main number at any time if they are in crisis. They also understand they can call their Count includes the [facility]'s crisis number or go to their nearest ED if suicidal ideation increases or if they develop a plan or intent. HPI ROS Appetite Changes and Sleep:     She reports fluctuating sleep pattern, adequate appetite, increased energy.  Denies homicidal ideation, denies suicidal ideation    Review Of Systems:      HPI ROS:               Medication Side Effects: denies    Depression (10 worst): decreased 7/10   Anxiety (10 worst): High 10/10   Safety concerns (SI, HI, etc): denies denies   Sleep: Fluctuating sleep pattern Fluctuating as noted in HPI   Energy: increased adequate   Appetite: adequate Adequate, fluctuating     General normal    Personality no change in personality   Constitutional as noted in HPI   ENT negative   Cardiovascular negative   Respiratory negative   Gastrointestinal negative   Genitourinary negative   Musculoskeletal negative   Integumentary negative   Neurological negative   Endocrine negative   Other Symptoms none, all other systems are negative     Mental Status Evaluation:    Appearance Appropriately dressed and Good eye contact   Behavior cooperative and restless and fidgety   Mood anxious and depressed  Depression Scale - decreased of 10 (0 = No depression)  Anxiety Scale - high of 10 (0 = No anxiety)   Speech Rapid and Pressured   Affect labile   Thought Processes Tangential   Thought Content Does not verbalize delusional material   Associations Loosely connected   Perceptual Disturbances Denies hallucinations and does not appear to be responding to internal stimuli   Risk Potential Suicidal/Homicidal Ideation - No evidence of suicidal or homicidal ideation and patient does not verbalize suicidal or homicidal ideation  Risk of Violence - No evidence of risk for violence found on assessment  Risk of Self Mutilation - No evidence of risk for self mutilation found on assessment   Orientation oriented to person, place, time/date and situation   Memory recent and remote memory grossly intact   Consciousness alert and awake   Attention/Concentration attention span and concentration appear shorter than expected for age   Insight fair   Judgement fair   Muscle Strength and Gait normal muscle strength and normal muscle tone, normal gait/station and normal balance   Motor Activity no abnormal movements   Language no difficulty naming common objects, no difficulty repeating a phrase, no difficulty writing a sentence   Fund of Knowledge adequate knowledge of current events  adequate fund of knowledge regarding past history  adequate fund of knowledge regarding vocabulary      Past Psychiatric History Update:     Inpatient Psychiatric Admission Since Last Encounter:   no  Changes to Outpatient Psychiatric Treatment Team:    no  Suicide Attempt Or Self Mutilation Since Last Encounter:   no  Incidence of Violent Behavior Since Last Encounter:   no    Traumatic History Update:     New Onset of Abuse Since Last Encounter:   no  Traumatic Events Since Last Encounter:   no    Past Medical History:    Past Medical History:   Diagnosis Date   • Anxiety    • Concussion    • Depression    • Head injury     concussions in high school and in college   • Self-injurious behavior         Past Surgical History:   Procedure Laterality Date   • WISDOM TOOTH EXTRACTION       No Known Allergies  Substance Abuse History:    Social History     Substance and Sexual Activity   Alcohol Use Yes   • Alcohol/week: 3.0 - 4.0 standard drinks of alcohol   • Types: 3 - 4 Shots of liquor per week    Comment: 3-4 drinks, 2 times per week     Social History     Substance and Sexual Activity   Drug Use No     Social History:    Social History     Socioeconomic History   • Marital status: Single     Spouse name: Not on file   • Number of children: 0   • Years of education: senior in college currently   • Highest education level: High school graduate   Occupational History   • 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   Tobacco Use   • Smoking status: Never   • Smokeless tobacco: Never   Vaping Use   • Vaping Use: Never used   Substance and Sexual Activity   • Alcohol use: Yes     Alcohol/week: 3.0 - 4.0 standard drinks of alcohol     Types: 3 - 4 Shots of liquor per week     Comment: 3-4 drinks, 2 times per week   • Drug use: No   • Sexual activity: Yes     Partners: Male     Birth control/protection: Condom Male   Other Topics Concern   • Not on file   Social History Narrative   • Not on file     Social Determinants of Health     Financial Resource Strain: Not on file   Food Insecurity: Not on file   Transportation Needs: Not on file   Physical Activity: Not on file   Stress: Not on file   Social Connections: Not on file   Intimate Partner Violence: Not on file   Housing Stability: Not on file     Family Psychiatric History:     Family History   Problem Relation Age of Onset   • Hypertension Mother    • Mental illness Mother    • Depression Mother    • Mental illness Sister    • Coronary artery disease Maternal Grandmother    • Throat cancer Paternal Grandfather      History Review: The following portions of the patient's history were reviewed and updated as appropriate: allergies, current medications, past family history, past medical history, past social history, past surgical history and problem list     OBJECTIVE:     Vital signs in last 24 hours: There were no vitals filed for this visit. Laboratory Results: I have personally reviewed all pertinent laboratory/tests results. Suicide/Homicide Risk Assessment:    Risk of Harm to Self:  The following ratings are based on assessment at the time of the interview  Recent Specific Risk Factors include: current depressive symptoms, current anxiety symptoms, worries about finances or work, recent rejection  Demographic risk factors include: , age: young adult (15-24)  Historical Risk Factors include: chronic depression, chronic anxiety symptoms, chronic mood disorder, history of suicide attempts, history of impulsive behaviors, history of traumatic experiences  Protective Factors: no current suicidal ideation, access to mental health treatment, compliant with medications, compliant with mental health treatment, having a desire to be alive, stable living environment, sense of determination, supportive family  Based on today's assessment, Mychal Ruiz presents the following risk of harm to self: low    Risk of Harm to Others:   The following ratings are based on assessment at the time of the interview  Protective Factors: no current homicidal ideation  Based on today's assessment, Mychal Ruiz presents the following risk of harm to others: none    The following interventions are recommended: contracts for safety at present - agrees to go to ED if feeling unsafe, return in 1 month for reassessment, therapy appointment in 1 week, contracts for safety at present - agrees to call Crisis Intervention Service if feeling unsafe    Medications Risks/Benefits:      Risks, Benefits And Possible Side Effects Of Medications:    Discussed risks and benefits of treatment with patient including risk of rash related to treatment with Lamictal     Controlled Medication Discussion:     Not applicable    Treatment Plan:    Due for update/Updated:   no  Last treatment plan done 6/15/23 by Laura Silva LCSW. Treatment Plan due on 12/15/23.     ANGELA Mccoy 09/01/23    This note was not shared with the patient due to reasonable likelihood of causing patient harm

## 2023-08-30 ENCOUNTER — TELEMEDICINE (OUTPATIENT)
Dept: BEHAVIORAL/MENTAL HEALTH CLINIC | Facility: CLINIC | Age: 23
End: 2023-08-30
Payer: COMMERCIAL

## 2023-08-30 DIAGNOSIS — F31.81 BIPOLAR II DISORDER (HCC): Primary | ICD-10-CM

## 2023-08-30 DIAGNOSIS — L03.031 PARONYCHIA OF GREAT TOE OF RIGHT FOOT: ICD-10-CM

## 2023-08-30 PROCEDURE — 90834 PSYTX W PT 45 MINUTES: CPT

## 2023-08-30 NOTE — PSYCH
Start Time: 0900  Stop Time: 2544  Total Visit Time: 46 minutesVirtual Regular Visit    Verification of patient location:    Patient is located at Home in the following state in which I hold an active license [place]      Assessment/Plan:    Problem List Items Addressed This Visit        Other    Bipolar II disorder ([address]) - Primary       Goals addressed in session: Goal 1          Reason for visit is   Chief Complaint   Patient presents with   • Virtual Regular Visit        Encounter provider Susan Hernandez LCSW    Provider located at [address] [place] [id number] [phone]      Recent Visits  Date Type Provider Dept   08[facility]23 [facility] [place]   08/24/23 Telemedicine Michela Oconnor, [facility] Therapist [facility]   Showing recent visits within past 7 days and meeting all other requirements  Today's Visits  Date Type Provider Dept   08/30/23 Telemedicine Michela Oconnor, [address]   Showing today's visits and meeting all other requirements  Future Appointments  No visits were found meeting these conditions. Showing future appointments within next 150 days and meeting all other requirements       The patient was identified by name and date of birth. Oz Silver was informed that this is a telemedicine visit and [facility] She agrees to proceed. .  My office door was closed. No one else was in the room. She acknowledged consent and understanding of privacy and security of the video platform. The patient has agreed to participate and understands they can discontinue the visit at any time. Patient is aware this is a billable service. Subjective  Oz Silver is a 21 y.o. female . [facility]     Past Medical History:   Diagnosis Date   • Anxiety    • Concussion • Depression    • Head injury     concussions in high school and in college   • Self-injurious behavior        Past Surgical History:   Procedure Laterality Date   • WISDOM TOOTH EXTRACTION         Current Outpatient Medications   Medication Sig Dispense Refill   • aluminum chloride (DRYSOL) 20 % external solution Apply topically daily at bedtime 35 mL 0   • doxycycline hyclate (VIBRAMYCIN) 100 mg capsule Take 1 capsule (100 mg total) by mouth every 12 (twelve) hours for 10 days 20 capsule 0   • ergocalciferol (VITAMIN D2) 50,000 units Take 1 capsule (50,000 Units total) by mouth once a week 16 capsule 0   • fluticasone (FLONASE) 50 mcg/act nasal spray 1 spray into each nostril daily 18.2 mL 1   • hydrOXYzine pamoate (VISTARIL) 50 mg capsule Take 1 capsule (50 mg total) by mouth daily at bedtime as needed for anxiety 30 capsule 1   • ibuprofen (MOTRIN) 600 mg tablet Take 1 tablet (600 mg total) by mouth every 8 (eight) hours as needed for mild pain or moderate pain 30 tablet 0   • lamoTRIgine (LaMICtal) 150 MG tablet Take 1 tablet (150 mg total) by mouth daily 30 tablet 2   • lamoTRIgine (LaMICtal) 25 mg tablet Take 1 tablet (25 mg total) by mouth daily 30 tablet 2   • naproxen (Naprosyn) 500 mg tablet Take 1 tablet (500 mg total) by mouth 2 (two) times a day with meals 30 tablet 0   • norgestimate-ethinyl estradiol (Alisha) 0.25-35 MG-MCG per tablet Take 1 tablet by mouth daily 84 tablet 0     No current facility-administered medications for this visit. No Known Allergies    Review of Systems    Video Exam    There were no vitals filed for this visit. Physical Exam     Behavioral Health Psychotherapy Progress Note    Psychotherapy Provided: Individual Psychotherapy     1. Bipolar II disorder ([address])            Goals addressed in session: Goal 1       DATA: Ruddy Grace stated having a new boyfriend but not wanting to talk about that today.  She stated seeing her psychiatrist yesterday and doing some changes in meds. She stated having stressful counting behaviors, needing things to be even (eg, putting the car volume up to an even number), and doing a lot of checking behaviors (eg, check stove, doors locked), and that she has been doing this for years. We addressed this as an area for continued discussion. She was encouraged to also bring up with psychiatry. Bryon West stated wanting to talk about whether or not she has BPD. As previously discussed, we went through DSM-5 criteria for BPD. Provider listed each item and to each one, Bryon West stated 'yes'. We discussed that this would technically determine she is BPD. Provider attempted to help process what this means for her, while also addressing in terms of having things to work on, notably managing her emotions. We also addressed her 'paranoia' in relation to past trauma and lack of safety in relationships versus psychosis. Provider encouraged Bryon West at her self-awareness, to which she acknowledged, and how this is part of why she feels so f-ed up, because she knows all her f-ed up behaviors. Provider again encouraged that one way Bryon West can begin to work on changes is her all or nothing thinking. We addressed things as gradations and starting to recognize things as such. We addressed different examples of stress and how she responds. We discussed her reaction and jumping to the catastrophic emotional response. We talked through her thoughts, challenging them and reframing them. She stated that provider previously suggested identifying a scale and evaluating situations on that scale, but admits she 'didn't do it'. Provider encouraged that it is her choice if she decides to try it, and if she does, to see how it goes. Provider encouraged that she consider jotting things down in her journal, as this might also help. We also addressed her job and frustrations there. She addressed the work schedule and affecting her sleep. She stated some shifts are optional, but others mandated. 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 She also addressed the concerns about having similar situations and conflict about talking about them. Provider addressed professional strategies in terms of managing such situations, seeking supervisory guidance, recognizing them, managing boundaries and tempering self-reveal as all part of the professional learning process. We addressed her thoughts about wanting to 'impulsively' (her word)  quit. We discussed the importance of seeing options - while she can impulsively quit, to recognize that there are other choices, and giving herself a chance to consider them. 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 We discussed related options she is considering. She stated already looking for another job. Provider encouraged these efforts all as thoughtful approach to deal with her job concerns. We discussed next appointments, to which Ivy Mcneal confirmed wanting weekly visits for now. During this session, this clinician used the following therapeutic modalities: Engagement Strategies, Cognitive Behavioral Therapy, Mindfulness-based Strategies, Motivational Interviewing, Solution-Focused Therapy and Supportive Psychotherapy    Substance Abuse was addressed during this session. If the client is diagnosed with a co-occurring substance use disorder, please indicate any changes in the frequency or amount of use: in context of review of BPD symptoms. Stage of change for addressing substance use diagnoses: Contemplation    ASSESSMENT:  Nicole Martines presents with a Euthymic/ normal, Dysthymic and usual irritable undertone, with sarcasm mood. We addressed the awareness of the sarcasm, and acceptability of it if we are in clear understanding; and the need to agree to address serious issues seriously at times.       her affect is Normal range and intensity, which is congruent, with her mood and the content of the session. The client has made progress on their goals. Elle Gama continues to use therapy to attempt to sort through feelings. She was responsive to considering strategies Major Medel presents with a low risk of suicide, low risk of self-harm, and low risk of harm to others. Denied recent self-harm; denied current or recent suicidality. For any risk assessment that surpasses a "low" rating, a safety plan must be developed. A safety plan was indicated: no  If yes, describe in detail crisis plan needed in future sessions    PLAN: Between sessions, Major Medel will identify ranges of emotional response. At the next session, the therapist will use Cognitive Behavioral Therapy and Supportive Psychotherapy to address ongoing mood management efforts. Behavioral Health Treatment Plan and Discharge Planning: Major Medel is aware of and agrees to continue to work on their treatment plan. They have identified and are working toward their discharge goals.  yes    Visit start and stop times:    08/30/23  Start Time: 0900  Stop Time: 0952  Total Visit Time: 52 minutes

## 2023-08-30 NOTE — BH TREATMENT PLAN
journaling.    The areas of need Raffi Braga discussed are: 1) having difficulty getting over things / relationships mostly; 2) starting arguments to see if they still care; 3) accepting hugs and affections; 4) "Men"; 5) loud noises; 6) constructive criticism/takes things personally./trouble focusing

## 2023-08-31 ENCOUNTER — TELEPHONE (OUTPATIENT)
Dept: FAMILY MEDICINE CLINIC | Facility: CLINIC | Age: 23
End: 2023-08-31

## 2023-08-31 DIAGNOSIS — R61 HYPERHIDROSIS: ICD-10-CM

## 2023-08-31 RX ORDER — IBUPROFEN 600 MG/1
TABLET ORAL
Qty: 30 TABLET | Refills: 0 | Status: SHIPPED | OUTPATIENT
Start: 2023-08-31

## 2023-08-31 NOTE — TELEPHONE ENCOUNTER
Pharmacy called regarding Aluminum chloride 20%. The quantity sent over was for 35 ml but that  Size is on backorder. They can get a 60 ml bottle.    Please advise

## 2023-09-05 ENCOUNTER — OFFICE VISIT (OUTPATIENT)
Dept: FAMILY MEDICINE CLINIC | Facility: CLINIC | Age: 23
End: 2023-09-05
Payer: COMMERCIAL

## 2023-09-05 VITALS
OXYGEN SATURATION: 98 % | DIASTOLIC BLOOD PRESSURE: 80 MMHG | WEIGHT: 124.2 LBS | HEART RATE: 82 BPM | SYSTOLIC BLOOD PRESSURE: 104 MMHG | BODY MASS INDEX: 22.01 KG/M2 | HEIGHT: 63 IN

## 2023-09-05 DIAGNOSIS — R30.0 DYSURIA: ICD-10-CM

## 2023-09-05 DIAGNOSIS — Z20.2 EXPOSURE TO CHLAMYDIA: ICD-10-CM

## 2023-09-05 DIAGNOSIS — N92.6 MISSED MENSES: ICD-10-CM

## 2023-09-05 DIAGNOSIS — L03.031 PARONYCHIA OF GREAT TOE OF RIGHT FOOT: ICD-10-CM

## 2023-09-05 DIAGNOSIS — J02.0 STREP PHARYNGITIS: Primary | ICD-10-CM

## 2023-09-05 DIAGNOSIS — B37.31 YEAST VAGINITIS: ICD-10-CM

## 2023-09-05 LAB
S PYO AG THROAT QL: POSITIVE
SARS-COV-2 AG UPPER RESP QL IA: NEGATIVE
SL AMB  POCT GLUCOSE, UA: ABNORMAL
SL AMB LEUKOCYTE ESTERASE,UA: ABNORMAL
SL AMB POCT BILIRUBIN,UA: ABNORMAL
SL AMB POCT BLOOD,UA: ABNORMAL
SL AMB POCT CLARITY,UA: CLEAR
SL AMB POCT COLOR,UA: YELLOW
SL AMB POCT KETONES,UA: ABNORMAL
SL AMB POCT NITRITE,UA: ABNORMAL
SL AMB POCT PH,UA: 6
SL AMB POCT SPECIFIC GRAVITY,UA: 1.03
SL AMB POCT URINE HCG: NEGATIVE
SL AMB POCT URINE PROTEIN: ABNORMAL
SL AMB POCT UROBILINOGEN: ABNORMAL
VALID CONTROL: NORMAL

## 2023-09-05 PROCEDURE — 87491 CHLMYD TRACH DNA AMP PROBE: CPT | Performed by: NURSE PRACTITIONER

## 2023-09-05 PROCEDURE — 87811 SARS-COV-2 COVID19 W/OPTIC: CPT | Performed by: NURSE PRACTITIONER

## 2023-09-05 PROCEDURE — 99214 OFFICE O/P EST MOD 30 MIN: CPT | Performed by: NURSE PRACTITIONER

## 2023-09-05 PROCEDURE — 87880 STREP A ASSAY W/OPTIC: CPT | Performed by: NURSE PRACTITIONER

## 2023-09-05 PROCEDURE — 87086 URINE CULTURE/COLONY COUNT: CPT | Performed by: NURSE PRACTITIONER

## 2023-09-05 PROCEDURE — 81002 URINALYSIS NONAUTO W/O SCOPE: CPT | Performed by: NURSE PRACTITIONER

## 2023-09-05 PROCEDURE — 81025 URINE PREGNANCY TEST: CPT | Performed by: NURSE PRACTITIONER

## 2023-09-05 PROCEDURE — 87591 N.GONORRHOEAE DNA AMP PROB: CPT | Performed by: NURSE PRACTITIONER

## 2023-09-05 RX ORDER — AMOXICILLIN 875 MG/1
875 TABLET, COATED ORAL 2 TIMES DAILY
Qty: 20 TABLET | Refills: 0 | Status: SHIPPED | OUTPATIENT
Start: 2023-09-05 | End: 2023-09-15

## 2023-09-05 RX ORDER — FLUCONAZOLE 150 MG/1
TABLET ORAL
Qty: 2 TABLET | Refills: 0 | Status: SHIPPED | OUTPATIENT
Start: 2023-09-05 | End: 2023-09-08

## 2023-09-05 RX ORDER — IBUPROFEN 600 MG/1
TABLET ORAL
Qty: 30 TABLET | Refills: 0 | OUTPATIENT
Start: 2023-09-05

## 2023-09-05 NOTE — LETTER
September 5, 2023     Patient: Reji Acuña  YOB: 2000  Date of Visit: 9/5/2023      To Whom it May Concern:    Reji Acuña is under my professional care. Gabbie Lagos was seen in my office on 9/5/2023. Gabbie Lagos may return to work on 9/7/2023 . If you have any questions or concerns, please don't hesitate to call.          Sincerely,          ANGELA Arthur        CC: No Recipients

## 2023-09-05 NOTE — PROGRESS NOTES
Assessment/Plan:     Chronic Problems:  No problem-specific Assessment & Plan notes found for this encounter. Visit Diagnosis:  Diagnoses and all orders for this visit:    Strep pharyngitis  -     POCT Rapid Covid Ag  -     POCT rapid strepA  -     amoxicillin (AMOXIL) 875 mg tablet; Take 1 tablet (875 mg total) by mouth 2 (two) times a day for 10 days    Yeast vaginitis  -     fluconazole (DIFLUCAN) 150 mg tablet; Take 1 dose today and repeat in 3 days if still symptomatic. Exposure to chlamydia  -     Chlamydia/GC amplified DNA by PCR; Future  -     Chlamydia/GC amplified DNA by PCR    Missed menses  -     Cancel: hCG, quantitative; Future  -     POCT urine HCG    Dysuria  -     POCT urine dip  -     Cancel: Urine culture; Future  -     Urine culture          Subjective:    Patient ID: Chepe Kapadia is a 21 y.o. female. Patient presents for concerns of chlamydia exposure. She states she is having body aches, fatigue, cough that started yesterday. The following portions of the patient's history were reviewed and updated as appropriate: allergies, current medications, past family history, past medical history, past social history, past surgical history and problem list.    Review of Systems   Constitutional: Positive for fatigue and fever. Negative for chills and diaphoresis. HENT: Positive for congestion and sore throat. Negative for ear pain, postnasal drip, rhinorrhea, sinus pressure and sinus pain. Eyes: Negative for pain and visual disturbance. Respiratory: Positive for cough. Negative for shortness of breath. Cardiovascular: Negative for chest pain and palpitations. Gastrointestinal: Negative for abdominal pain, diarrhea, nausea and vomiting. Genitourinary: Positive for dysuria, frequency, vaginal discharge and vaginal pain. Negative for difficulty urinating, hematuria, urgency and vaginal bleeding. Fdlmp- 1 month ago     Musculoskeletal: Positive for myalgias. Negative for arthralgias and back pain. Skin: Negative for color change and rash. Neurological: Negative for dizziness, light-headedness and headaches. Psychiatric/Behavioral: Positive for dysphoric mood and sleep disturbance. The patient is nervous/anxious. All other systems reviewed and are negative. 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   Tobacco Use   • Smoking status: Never   • Smokeless tobacco: Never   Vaping Use   • Vaping Use: Never used   Substance and Sexual Activity   • Alcohol use:  Yes     Alcohol/week: 3.0 - 4.0 standard drinks of alcohol     Types: 3 - 4 Shots of liquor per week     Comment: 3-4 drinks, 2 times per week   • Drug use: No   • Sexual activity: Yes     Partners: Male     Birth control/protection: Condom Male   Other Topics Concern   • Not on file   Social History Narrative   • Not on file     Social Determinants of Health     Financial Resource Strain: Not on file   Food Insecurity: Not on file   Transportation Needs: Not on file   Physical Activity: Not on file   Stress: Not on file   Social Connections: Not on file   Intimate Partner Violence: Not on file   Housing Stability: Not on file     Past Medical History:   Diagnosis Date   • Anxiety    • Concussion    • Depression    • Head injury     concussions in high school and in college   • Self-injurious behavior      Family History   Problem Relation Age of Onset   • Hypertension Mother    • Mental illness Mother    • Depression Mother    • Mental illness Sister    • Coronary artery disease Maternal Grandmother    • Throat cancer Paternal Grandfather      Past Surgical History:   Procedure Laterality Date   • WISDOM TOOTH EXTRACTION         Current Outpatient Medications:   •  aluminum chloride (DRYSOL) 20 % external solution, Apply topically daily at bedtime, Disp: 60 mL, Rfl: 0  •  amoxicillin (AMOXIL) 875 mg tablet, Take 1 tablet (875 mg total) by mouth 2 (two) times a day for 10 days, Disp: 20 tablet, Rfl: 0  •  ergocalciferol (VITAMIN D2) 50,000 units, Take 1 capsule (50,000 Units total) by mouth once a week, Disp: 16 capsule, Rfl: 0  •  fluconazole (DIFLUCAN) 150 mg tablet, Take 1 dose today and repeat in 3 days if still symptomatic., Disp: 2 tablet, Rfl: 0  •  fluticasone (FLONASE) 50 mcg/act nasal spray, 1 spray into each nostril daily, Disp: 18.2 mL, Rfl: 1  •  hydrOXYzine pamoate (VISTARIL) 50 mg capsule, Take 1 capsule (50 mg total) by mouth daily at bedtime as needed for anxiety, Disp: 30 capsule, Rfl: 1  •  ibuprofen (MOTRIN) 600 mg tablet, TAKE ONE TABLET BY MOUTH EVERY EIGHT HOURS AS NEEDED for mild to moderate pain, Disp: 30 tablet, Rfl: 0  •  lamoTRIgine (LaMICtal) 150 MG tablet, Take 1 tablet (150 mg total) by mouth daily, Disp: 30 tablet, Rfl: 2  •  lamoTRIgine (LaMICtal) 25 mg tablet, Take 1 tablet (25 mg total) by mouth daily, Disp: 30 tablet, Rfl: 2  •  naproxen (Naprosyn) 500 mg tablet, Take 1 tablet (500 mg total) by mouth 2 (two) times a day with meals, Disp: 30 tablet, Rfl: 0  •  norgestimate-ethinyl estradiol (Alisha) 0.25-35 MG-MCG per tablet, Take 1 tablet by mouth daily, Disp: 84 tablet, Rfl: 0    No Known Allergies       Lab Review   Office Visit on 08/22/2023   Component Date Value   •  RAPID STREP A 08/22/2023 Negative         Imaging: No results found. Objective:     Physical Exam  Constitutional:       Appearance: She is well-developed. HENT:      Right Ear: A middle ear effusion is present. Left Ear: Tympanic membrane normal.      Mouth/Throat:      Pharynx: Posterior oropharyngeal erythema present.       Tonsils: 2+ on the right. 2+ on the left. Cardiovascular:      Rate and Rhythm: Normal rate and regular rhythm. Heart sounds: Normal heart sounds. No murmur heard. Pulmonary:      Effort: Pulmonary effort is normal. No respiratory distress. Breath sounds: Normal breath sounds. Lymphadenopathy:      Cervical: Cervical adenopathy present. Skin:     General: Skin is warm and dry. Neurological:      Mental Status: She is alert and oriented to person, place, and time. There are no Patient Instructions on file for this visit. ANGELA Arthur    Portions of the record may have been created with voice recognition software. Occasional wrong word or "sound a like" substitutions may have occurred due to the inherent limitations of voice recognition software. Read the chart carefully and recognize, using context, where substitutions have occurred.

## 2023-09-06 LAB
BACTERIA UR CULT: NORMAL
C TRACH DNA SPEC QL NAA+PROBE: NEGATIVE
N GONORRHOEA DNA SPEC QL NAA+PROBE: NEGATIVE

## 2023-09-08 ENCOUNTER — TELEMEDICINE (OUTPATIENT)
Dept: BEHAVIORAL/MENTAL HEALTH CLINIC | Facility: CLINIC | Age: 23
End: 2023-09-08
Payer: COMMERCIAL

## 2023-09-08 DIAGNOSIS — F31.81 BIPOLAR II DISORDER (HCC): Primary | ICD-10-CM

## 2023-09-08 PROCEDURE — 90834 PSYTX W PT 45 MINUTES: CPT

## 2023-09-08 NOTE — PSYCH
Virtual Regular Visit    Verification of patient location:    Patient is located at Home in the following state in which I hold an active license [place]      Assessment/Plan:    Problem List Items Addressed This Visit        Other    Bipolar II disorder ([address]) - Primary       Goals addressed in session: Goal 1          Reason for visit is   Chief Complaint   Patient presents with   • Virtual Regular Visit        Encounter provider Gennaro Rodriguez LCSW    Provider located at [address] [place] [id number] [phone]      Recent Visits  Date Type Provider Dept   09/05/23 Office Visit [id number] [facility], [address] recent visits within past 7 days and meeting all other requirements  Today's Visits  Date Type Provider Dept   09/08/23 Telemedicine Michela [address], [address] [facility] [address]   Showing today's visits and meeting all other requirements  Future Appointments  No visits were found meeting these conditions. Showing future appointments within next 150 days and meeting all other requirements       The patient was identified by name and date of birth. Nicole Martines was informed that this is a telemedicine visit and that [facility] [employer]. She agrees to proceed. .  My office door was closed. No one else was in the room. She acknowledged consent and understanding of privacy and security of the video platform. The patient has agreed to participate and understands they can discontinue the visit at any time. Patient is aware this is a billable service. Subjective  Nicole Martines is a 21 y.o. female . [facility]     Past Medical History:   Diagnosis Date   • Anxiety    • Concussion    • Depression    • Head injury     concussions in high school and in college   • Self-injurious behavior        Past Surgical History: Procedure Laterality Date   • WISDOM TOOTH EXTRACTION         Current Outpatient Medications   Medication Sig Dispense Refill   • aluminum chloride (DRYSOL) 20 % external solution Apply topically daily at bedtime 60 mL 0   • amoxicillin (AMOXIL) 875 mg tablet Take 1 tablet (875 mg total) by mouth 2 (two) times a day for 10 days 20 tablet 0   • ergocalciferol (VITAMIN D2) 50,000 units Take 1 capsule (50,000 Units total) by mouth once a week 16 capsule 0   • fluconazole (DIFLUCAN) 150 mg tablet Take 1 dose today and repeat in 3 days if still symptomatic. 2 tablet 0   • fluticasone (FLONASE) 50 mcg/act nasal spray 1 spray into each nostril daily 18.2 mL 1   • hydrOXYzine pamoate (VISTARIL) 50 mg capsule Take 1 capsule (50 mg total) by mouth daily at bedtime as needed for anxiety 30 capsule 1   • ibuprofen (MOTRIN) 600 mg tablet TAKE ONE TABLET BY MOUTH EVERY EIGHT HOURS AS NEEDED for mild to moderate pain 30 tablet 0   • lamoTRIgine (LaMICtal) 150 MG tablet Take 1 tablet (150 mg total) by mouth daily 30 tablet 2   • lamoTRIgine (LaMICtal) 25 mg tablet Take 1 tablet (25 mg total) by mouth daily 30 tablet 2   • naproxen (Naprosyn) 500 mg tablet Take 1 tablet (500 mg total) by mouth 2 (two) times a day with meals 30 tablet 0   • norgestimate-ethinyl estradiol (Alisha) 0.25-35 MG-MCG per tablet Take 1 tablet by mouth daily 84 tablet 0     No current facility-administered medications for this visit. No Known Allergies    Review of Systems    Video Exam    There were no vitals filed for this visit. Physical Exam     Behavioral Health Psychotherapy Progress Note    Psychotherapy Provided: Individual Psychotherapy     1. Bipolar II disorder ([address])            Goals addressed in session: Goal 1     DATA: Kenya Fernandez stated being tired due to taking on multiple shifts, which she stated realizing serves to be a distraction.  We did address how she has options in taking shifts, and to consider need for sleep as she further decides whether or not to take on shifts. Kyra Tran addressed the issues she is trying to distract from. We spent the session addressing her thoughts and feelings surrounding her friendships, friends' responses to her behaviors, her feelings about their responses, and her struggles with getting over the 28year old. We addressed her difficulty with emotional tolerance, mood swings, angry thoughts. She always denied intent to act on any angry thoughts, and we discussed their connection to other feelings. We addressed connection with past coping and 'comfort' in dealing with things this way, abandonment and rejection, and needing validation. She addressed 'paranoid' thoughts, and concerns about her friends' behaviors. She was able to recognize her thoughts are without evidence. She addressed ways she creates happenings in her head. She stated not knowing if they ever 'come true'. She indicated recognition of how this is unhelpful thinking. We addressed ways she recognizes that she contributes with some of her behaviors, and how her apologies go unrecognized, but also ways her friends are selfish. We addressed evaluating relationships, but with discussion of limits and ways her friends have seemed to demonstrate some degree of caring and having Janell's best interest at heart. Kyra Tran noted recognition that her own self-talk gets in her way, and ways she does try to challenge it. She stated believing that the 28year old was a replacement for feelings about her ex-boyfriend; she addressed ways he hurt her. We discussed ways she appropriately uses challenging self-talk and to continue doing so. We addressed ways she does manage her emotions and ways to remind herself of such. We also addressed ongoing emotional regulation struggles and considering further discussion with psychiatrist to help determine medication options.  She was encouraged to consider asking questions and expressing concerns about medication options, to help her and psychiatrist determine viable choices. We confirmed next appointment next week. During this session, this clinician used the following therapeutic modalities: Cognitive Behavioral Therapy, Mindfulness-based Strategies, Motivational Interviewing, Solution-Focused Therapy and Supportive Psychotherapy    Substance Abuse was addressed during this session. If the client is diagnosed with a co-occurring substance use disorder, please indicate any changes in the frequency or amount of use: affects of drinking/drunk behaviors. Stage of change for addressing substance use diagnoses: Contemplation    ASSESSMENT:  Reji Acuña presents with a Euthymic/ normal, Dysthymic and irritable mood. her affect is Normal range and intensity, which is congruent, with her mood and the content of the session. The client has made progress on their goals. Gabbie Lagos shows insight into her behavior and can demonstrate some coping strategies. She struggles with accepting her capabilities and also has some understanding into her 'stuckness'. Reji Acuña presents with a low risk of suicide, low risk of self-harm, and low risk of harm to others. For any risk assessment that surpasses a "low" rating, a safety plan must be developed. A safety plan was indicated: no  If yes, describe in detail n/a    PLAN: Between sessions, Reji Acuña will continue challenging self-talk (what evidence do I have? Kristen Inch .. what can I do about it?) and work on eliminating judgmental self-talk (I'm crazy) and use reframing tools as discussed. At the next session, the therapist will use Cognitive Behavioral Therapy, Mindfulness-based Strategies, Motivational Interviewing and Supportive Psychotherapy to address mood management, continued coping strategies, emotional regulation awareness and tools development.     Behavioral Health Treatment Plan and Discharge Planning: Reji Acuña is aware of and agrees to continue to work on their treatment plan. They have identified and are working toward their discharge goals.  yes    Visit start and stop times:    09/08/23  Start Time: 0830  Stop Time: 7022  Total Visit Time: 52 minutes

## 2023-09-13 ENCOUNTER — VBI (OUTPATIENT)
Dept: ADMINISTRATIVE | Facility: OTHER | Age: 23
End: 2023-09-13

## 2023-09-15 ENCOUNTER — TELEMEDICINE (OUTPATIENT)
Dept: BEHAVIORAL/MENTAL HEALTH CLINIC | Facility: CLINIC | Age: 23
End: 2023-09-15
Payer: COMMERCIAL

## 2023-09-15 DIAGNOSIS — F32.A ANXIETY AND DEPRESSION: Primary | ICD-10-CM

## 2023-09-15 DIAGNOSIS — F41.9 ANXIETY AND DEPRESSION: Primary | ICD-10-CM

## 2023-09-15 DIAGNOSIS — F31.81 BIPOLAR II DISORDER (HCC): ICD-10-CM

## 2023-09-15 PROCEDURE — 90834 PSYTX W PT 45 MINUTES: CPT

## 2023-09-15 NOTE — PSYCH
Virtual Regular Visit    Verification of patient location:    Patient is located at Home in the following state in which I hold an active license [place]      Assessment/Plan:    Problem List Items Addressed This Visit        Other    Anxiety and depression - Primary    Bipolar II disorder ([address])       Goals addressed in session: Goal 1          Reason for visit is   Chief Complaint   Patient presents with   • Virtual Regular Visit        Encounter provider Mekhi Monroy LCSW    Provider located at [address] [place] [id number] [phone]      Recent Visits  Date Type Provider Dept   09/08/23 Telemedicine Mekhi Monroy, [address]   Showing recent visits within past 7 days and meeting all other requirements  Today's Visits  Date Type Provider Dept   09/15/23 Telemedicine Michela Zamora, [address] [place] [address]   Showing today's visits and meeting all other requirements  Future Appointments  No visits were found meeting these conditions. Showing future appointments within next 150 days and meeting all other requirements       The patient was identified by name and date of birth. Radha Almanza was informed that this is a telemedicine visit and [facility]. She agrees to proceed. .  My office door was closed. No one else was in the room. She acknowledged consent and understanding of privacy and security of the video platform. The patient has agreed to participate and understands they can discontinue the visit at any time. Patient is aware this is a billable service. Subjective  Radha Almanza is a 21 y.o. female . [facility]     Past Medical History:   Diagnosis Date   • Anxiety    • Concussion    • Depression    • Head injury     concussions in high school and in college   • Self-injurious behavior Past Surgical History:   Procedure Laterality Date   • WISDOM TOOTH EXTRACTION         Current Outpatient Medications   Medication Sig Dispense Refill   • aluminum chloride (DRYSOL) 20 % external solution Apply topically daily at bedtime 60 mL 0   • amoxicillin (AMOXIL) 875 mg tablet Take 1 tablet (875 mg total) by mouth 2 (two) times a day for 10 days 20 tablet 0   • ergocalciferol (VITAMIN D2) 50,000 units Take 1 capsule (50,000 Units total) by mouth once a week 16 capsule 0   • fluticasone (FLONASE) 50 mcg/act nasal spray 1 spray into each nostril daily 18.2 mL 1   • hydrOXYzine pamoate (VISTARIL) 50 mg capsule Take 1 capsule (50 mg total) by mouth daily at bedtime as needed for anxiety 30 capsule 1   • ibuprofen (MOTRIN) 600 mg tablet TAKE ONE TABLET BY MOUTH EVERY EIGHT HOURS AS NEEDED for mild to moderate pain 30 tablet 0   • lamoTRIgine (LaMICtal) 150 MG tablet Take 1 tablet (150 mg total) by mouth daily 30 tablet 2   • lamoTRIgine (LaMICtal) 25 mg tablet Take 1 tablet (25 mg total) by mouth daily 30 tablet 2   • naproxen (Naprosyn) 500 mg tablet Take 1 tablet (500 mg total) by mouth 2 (two) times a day with meals 30 tablet 0   • norgestimate-ethinyl estradiol (Alisha) 0.25-35 MG-MCG per tablet Take 1 tablet by mouth daily 84 tablet 0     No current facility-administered medications for this visit. No Known Allergies    Review of Systems    Video Exam    There were no vitals filed for this visit. Physical Exam      Behavioral Health Psychotherapy Progress Note    Psychotherapy Provided: Individual Psychotherapy     1. Anxiety and depression        2. Bipolar II disorder ([address] [facility])            Goals addressed in session: Goal 1     DATA: Today we discussed and created a crisis plan. Garima Cabrera stated awareness of its purpose. Garima Cabrera was able to acknowledge ability to use journaling as a coping strategy, and to identify 2 friends that she can call.  She stated understanding of her difficulty dealing with people and situations. She reported starting up a gym membership again and wanting to resume exercising to help manage. 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 We addressed ways Elle Gama does care about herself and value herself through her behaviors. We addressed ongoing struggles with 'people' and beginning ways to interrupt going from 0 to 100 in her feelings. We also discussed the relevance of Elle Gama continuing to discuss with psychiatry regarding needs, symptoms, concerns; we addressed Janell's thoughts and other possible internal blocks to feeling safe to communicate openly. We agreed to ongoing sessions. Elle Gama stated wanting weekly; we scheduled several sessions in October. Next session is next week. During this session, this clinician used the following therapeutic modalities: Cognitive Behavioral Therapy, Mindfulness-based Strategies, Motivational Interviewing, Solution-Focused Therapy and Supportive Psychotherapy    Substance Abuse was addressed during this session. If the client is diagnosed with a co-occurring substance use disorder, please indicate any changes in the frequency or amount of use: not specified at this time. Stage of change for addressing substance use diagnoses: Contemplation    ASSESSMENT:  Major Medel presents with a Euthymic/ normal, Dysthymic and irritable, usual mood. her affect is Normal range and intensity, which is congruent, with her mood and the content of the session. The client has made progress on their goals.     Elle Gama is able to acknowledge times when she can deal with her emotions in a more 'reasonable' way and notes her biggest struggle with 'people' Major Medel presents with a low risk of suicide, low risk of self-harm, and low risk of harm to others. For any risk assessment that surpasses a "low" rating, a safety plan must be developed. A safety plan was indicated: no  If yes, describe in detail but completed as protocol and in general indicated due to emotional regulation issues    PLAN: Between sessions, Warden Walker will continue journaling and starting going to the gym. At the next session, the therapist will use Cognitive Behavioral Therapy and Supportive Psychotherapy to address mood regulation skills. Behavioral Health Treatment Plan and Discharge Planning: Warden Walker is aware of and agrees to continue to work on their treatment plan. They have identified and are working toward their discharge goals.  yes    Visit start and stop times:    09/15/23  Start Time: 0800  Stop Time: 0852  Total Visit Time: 52 minutes

## 2023-09-15 NOTE — BH CRISIS PLAN
Client Name: Marcos Nickerson       Client YOB: 2000  : 2000    Treatment Team (include name and contact information):     Psychotherapist: Donna Mora LCSW    Psychiatrist: Mor Guidry   Release of information completed: no    " n/a   Release of information completed: no    Other (Specify Role): n/a    Release of information completed: no    Other (Specify Role): n/a   Release of information completed: no    Healthcare Provider  David Lane, [address] [facility] [address]   [address] [id number] [phone]    Type of Plan   * Child plans (children 15 yo and younger) must be completed and signed by the child's legal guardian   * Plans for all individuals 15 yo and above must be signed by the client. Plan Type: adolescent/adult (15 and over) Initial      My Personal Strengths are (in the client's own words):  "physical self-control, resilient"  The stressors and triggers that may put me at risk are: "people" and noises (in varied situations)  n/a    Coping skills I can use to keep myself calm and safe: I journal  Take a shower, Listen to music, Journal and Increased contact with professional supports    Coping skills/supports I can use to maintain abstinence from substance use:   keeping myself busy at work    The people that provide me with help and support: (Include name, contact, and how they can help)   Support person #1: Homer Cedillo, friend    * Phone number: in cell phone    * How can they help me? She is reasonable   Support person #2:Kaitlynn, my old roommate    * Phone number: in cell phone    * How can they help me?  Has her own experiences to relay back to me, insight   Support person #3: n/a    * Phone number: n/a    * How can they help me? n/a    In the past, the following has helped me in times of crisis:    Being in a quiet space, Taking medications, Talking to a professional on the telephone, Calling a friend, Taking a walk or exercising, Breathing exercises (or other mindfulness-based activities), Using de-escalation tools that I have learned and Listening to music      If it is an emergency and you need immediate help, call 9-1-1    If there is a possibility of danger to yourself or others, call the following crisis hotline resources:     Adult Crisis Numbers  Suicide Prevention Hotline - Dial 9-8-8  [facility]: [address]: [address]: [address] [facility] [address]: [phone]  [address] [place] [address] Street: [phone]  [facility]: [address]: [address]: [phone] (daytime). [phone] (after hours, weekends, holidays)     Child/Adolescent Crisis Numbers   [facility]: [address]: [phone]   [place] Late: [phone]   [address] [place] [address] Street: [phone]    Please note: Some [facility] do not have a separate number for Child/Adolescent specific crisis. If your county is not listed under Child/Adolescent, please call the adult number for your county     National Talk to Text Line   [employer] - [phone]    In the event your feelings become unmanageable, and you cannot reach your support system, you will call 911 immediately or go to the nearest hospital emergency room.

## 2023-09-19 ENCOUNTER — TELEMEDICINE (OUTPATIENT)
Dept: BEHAVIORAL/MENTAL HEALTH CLINIC | Facility: CLINIC | Age: 23
End: 2023-09-19
Payer: COMMERCIAL

## 2023-09-19 DIAGNOSIS — F31.81 BIPOLAR II DISORDER (HCC): Primary | ICD-10-CM

## 2023-09-19 PROCEDURE — 90834 PSYTX W PT 45 MINUTES: CPT

## 2023-09-19 NOTE — PSYCH
Virtual Regular Visit    Verification of patient location:    Patient is located at Home in the following state in which I hold an active license [place]      Assessment/Plan:    Problem List Items Addressed This Visit        Other    Bipolar II disorder ([facility]) - Primary       Goals addressed in session: Goal 1          Reason for visit is   Chief Complaint   Patient presents with   • Virtual Regular Visit        Encounter provider Rosa Carroll    Provider located at [address] [place] [id number] [phone]      Recent Visits  Date Type Provider Dept   09/15/23 Telemedicine Margarita Alcaraz, [address] [place] [address]   Showing recent visits within past 7 days and meeting all other requirements  Today's Visits  Date Type Provider Dept   09/19/23 Telemedicine Michela [address], [address] [place] [address]   Showing today's visits and meeting all other requirements  Future Appointments  No visits were found meeting these conditions. Showing future appointments within next 150 days and meeting all other requirements       The patient was identified by name and date of birth. Mandy Slade was informed that this is a telemedicine visit and [facility] She agrees to proceed. .  My office door was closed. No one else was in the room. She acknowledged consent and understanding of privacy and security of the video platform. The patient has agreed to participate and understands they can discontinue the visit at any time. Patient is aware this is a billable service. Subjective  Mandy Slade is a 21 y.o. female . Karen BENITEZ     Past Medical History:   Diagnosis Date   • Anxiety    • Concussion    • Depression    • Head injury     concussions in high school and in college   • Self-injurious behavior        Past Surgical History: Procedure Laterality Date   • WISDOM TOOTH EXTRACTION         Current Outpatient Medications   Medication Sig Dispense Refill   • aluminum chloride (DRYSOL) 20 % external solution Apply topically daily at bedtime 60 mL 0   • ergocalciferol (VITAMIN D2) 50,000 units Take 1 capsule (50,000 Units total) by mouth once a week 16 capsule 0   • fluticasone (FLONASE) 50 mcg/act nasal spray 1 spray into each nostril daily 18.2 mL 1   • hydrOXYzine pamoate (VISTARIL) 50 mg capsule Take 1 capsule (50 mg total) by mouth daily at bedtime as needed for anxiety 30 capsule 1   • ibuprofen (MOTRIN) 600 mg tablet TAKE ONE TABLET BY MOUTH EVERY EIGHT HOURS AS NEEDED for mild to moderate pain 30 tablet 0   • lamoTRIgine (LaMICtal) 150 MG tablet Take 1 tablet (150 mg total) by mouth daily 30 tablet 2   • lamoTRIgine (LaMICtal) 25 mg tablet Take 1 tablet (25 mg total) by mouth daily 30 tablet 2   • naproxen (Naprosyn) 500 mg tablet Take 1 tablet (500 mg total) by mouth 2 (two) times a day with meals 30 tablet 0   • norgestimate-ethinyl estradiol (Alisha) 0.25-35 MG-MCG per tablet Take 1 tablet by mouth daily 84 tablet 0     No current facility-administered medications for this visit. No Known Allergies    Review of Systems    Video Exam    There were no vitals filed for this visit. Physical Exam     Behavioral Health Psychotherapy Progress Note    Psychotherapy Provided: Individual Psychotherapy     1. Bipolar II disorder ([address])            Goals addressed in session: Goal 1     DATA: We addressed Janell's experience at work yesterday. In a requested phone call yesterday to provider, she addressed concerns of quitting, and especially secondary to refusing a mandated shift, due to tiredness, and getting reprimanded. We spoke for a few minutes addressing her thoughts and questions prior to her work shift. Today we addressed that she did manage her shift yesterday and it was a lighter day. She stated how her boss was 'nicer' to her. We addressed Janell's phone call and how this was a way she is trying to think through her issues versus making impulsive decisions. Provider encouraged Darryl Boss at this progress. Darryl Boss addressed ongoing questions and thoughts regarding her career, making these decisions, and not being sure what she wants. We addressed the positives from this job, as well as the negatives that she will ensure to avoid in choosing a future job. We addressed her interest in going back to school, and seeking financial aid. We addressed her current living situations and the lack of support or interest from her mother. She stated better with her sister. She addressed relationships, recent interaction with her ex-boyfriend. We addressed a new boy she is interested in. We addressed the differences in personalities from different men, and Janell's fears and doubts in being in a relationship. We addressed alternative ways to consider entering relationships, pacing, and allowing for time not being in a relationship. We addressed broader picture and goals at this time, and focusing on self, career, health. Darryl Boss stated plans to go to the gym. Provider reiterated progress identified for Darryl Boss, and ongoing recognition of how goals are a process. We confirmed next appointment. During this session, this clinician used the following therapeutic modalities: Engagement Strategies, Cognitive Behavioral Therapy, Mindfulness-based Strategies, Motivational Interviewing, Solution-Focused Therapy and Supportive Psychotherapy    Substance Abuse was not addressed during this session. If the client is diagnosed with a co-occurring substance use disorder, please indicate any changes in the frequency or amount of use: not directly discussed today. Stage of change for addressing substance use diagnoses: Contemplation    ASSESSMENT:  Noel Conley presents with a Euthymic/ normal and Dysthymic mood.      her affect is Normal range and intensity, which is congruent, with her mood and the content of the session. The client has made progress on their goals. aGrima Cabrera is able to process difficult discussions with provider. She demonstrated greater patience and thoughtfulness, and her mood was less stressed. Altaf Palafox presents with a low risk of suicide, low risk of self-harm, and low risk of harm to others. For any risk assessment that surpasses a "low" rating, a safety plan must be developed. A safety plan was indicated: no  If yes, describe in detail n/a    PLAN: Between sessions, Altaf Palafox will continue addressing goals for self. At the next session, the therapist will use Engagement Strategies, Cognitive Behavioral Therapy, Mindfulness-based Strategies, Motivational Interviewing, Solution-Focused Therapy and Supportive Psychotherapy to address mood management, continued stability and use of coping strategies. Behavioral Health Treatment Plan and Discharge Planning: Altaf Palafox is aware of and agrees to continue to work on their treatment plan. They have identified and are working toward their discharge goals.  yes    Visit start and stop times:    09/19/23  Start Time: 1100  Stop Time: 1152  Total Visit Time: 52 minutes

## 2023-09-22 ENCOUNTER — HOSPITAL ENCOUNTER (EMERGENCY)
Facility: HOSPITAL | Age: 23
Discharge: HOME | End: 2023-09-22
Attending: EMERGENCY MEDICINE
Payer: COMMERCIAL

## 2023-09-22 VITALS
OXYGEN SATURATION: 98 % | HEART RATE: 90 BPM | RESPIRATION RATE: 20 BRPM | DIASTOLIC BLOOD PRESSURE: 70 MMHG | SYSTOLIC BLOOD PRESSURE: 120 MMHG | TEMPERATURE: 98.3 F

## 2023-09-22 DIAGNOSIS — L08.9 TOE INFECTION: Primary | ICD-10-CM

## 2023-09-22 PROCEDURE — 99284 EMERGENCY DEPT VISIT MOD MDM: CPT | Performed by: EMERGENCY MEDICINE

## 2023-09-22 PROCEDURE — 99282 EMERGENCY DEPT VISIT SF MDM: CPT

## 2023-09-22 RX ORDER — CEPHALEXIN 250 MG/1
500 CAPSULE ORAL EVERY 8 HOURS SCHEDULED
Qty: 42 CAPSULE | Refills: 0 | Status: SHIPPED | OUTPATIENT
Start: 2023-09-22 | End: 2023-09-29

## 2023-09-22 RX ORDER — CEPHALEXIN 250 MG/1
500 CAPSULE ORAL ONCE
Status: COMPLETED | OUTPATIENT
Start: 2023-09-22 | End: 2023-09-22

## 2023-09-22 RX ADMIN — CEPHALEXIN 500 MG: 250 CAPSULE ORAL at 14:17

## 2023-09-22 NOTE — Clinical Note
Margarita Acevedo was seen and treated in our emergency department on 9/22/2023. Diagnosis: Infected toe    Mendy Ordejah  may return to work on return date. She may return on this date: 09/25/2023         If you have any questions or concerns, please don't hesitate to call.       Kings Casey MD    ______________________________           _______________          _______________  Hospital Representative                              Date                                Time

## 2023-09-22 NOTE — ED PROVIDER NOTES
Pt Name: Luis Quiñones  MRN: [id number]  [address]  Age/Sex: 21 y.o. female  Date of evaluation: 9/22/2023  PCP: Erika Werner, [address] [facility]    Chief Complaint   Patient presents with   • Toe Pain     Pt has ingrown toenail on left foot          HPI    21 y.o. female presenting with pain to the left great toe. Patient notes history of prior ingrown toenails, is concerned that she is developing same. The pain is dull, burning, mild to moderate, nonradiating, worse with walking and better at rest.  She denies fever,trauma, other symptoms. HPI      Past Medical and Surgical History    Past Medical History:   Diagnosis Date   • Anxiety    • Concussion    • Depression    • Head injury     concussions in high school and in college   • Self-injurious behavior        Past Surgical History:   Procedure Laterality Date   • WISDOM TOOTH EXTRACTION         Family History   Problem Relation Age of Onset   • Hypertension Mother    • Mental illness Mother    • Depression Mother    • Mental illness Sister    • Coronary artery disease Maternal Grandmother    • Throat cancer Paternal Grandfather        Social History     Tobacco Use   • Smoking status: Never   • Smokeless tobacco: Never   Vaping Use   • Vaping Use: Never used   Substance Use Topics   • Alcohol use: Yes     Alcohol/week: 3.0 - 4.0 standard drinks of alcohol     Types: 3 - 4 Shots of liquor per week     Comment: 3-4 drinks, 2 times per week   • Drug use: No           Allergies    No Known Allergies    Home Medications    Prior to Admission medications    Medication Sig Start Date End Date Taking?  Authorizing Provider   aluminum chloride (DRYSOL) 20 % external solution Apply topically daily at bedtime 8/31/23   ANGELA Arthur   ergocalciferol (VITAMIN D2) 50,000 units Take 1 capsule (50,000 Units total) by mouth once a week 8/22/23   ANGELA Spivey   fluticasone (FLONASE) 50 mcg/act nasal spray 1 spray into each nostril daily 8/22/23   ANGELA Silveira   hydrOXYzine pamoate (VISTARIL) 50 mg capsule Take 1 capsule (50 mg total) by mouth daily at bedtime as needed for anxiety 3/17/23   ANGELA Timmons   ibuprofen (MOTRIN) 600 mg tablet TAKE ONE TABLET BY MOUTH EVERY EIGHT HOURS AS NEEDED for mild to moderate pain 8/31/23   ANGELA Arthur   lamoTRIgine (LaMICtal) 150 MG tablet Take 1 tablet (150 mg total) by mouth daily 7/3/23   ANGELA Timmons   lamoTRIgine (LaMICtal) 25 mg tablet Take 1 tablet (25 mg total) by mouth daily 8/29/23   ANGELA Timmons   naproxen (Naprosyn) 500 mg tablet Take 1 tablet (500 mg total) by mouth 2 (two) times a day with meals 5/8/23   ANGELA Arthur   norgestimate-ethinyl estradiol ([facility]) 0.25-35 MG-MCG per tablet Take 1 tablet by mouth daily 8/1/23   ANGELA Arthur           Review of Systems    Review of Systems   Constitutional: Negative for activity change, chills and fever. HENT: Negative for drooling and facial swelling. Eyes: Negative for pain, discharge and visual disturbance. Respiratory: Negative for apnea, cough, chest tightness, shortness of breath and wheezing. Cardiovascular: Negative for chest pain and leg swelling. Gastrointestinal: Negative for abdominal pain, constipation, diarrhea, nausea and vomiting. Genitourinary: Negative for difficulty urinating, dysuria and urgency. Musculoskeletal: Positive for joint swelling. Negative for arthralgias, back pain and gait problem. Skin: Negative for color change and rash. Neurological: Negative for dizziness, speech difficulty, weakness and headaches. Psychiatric/Behavioral: Negative for agitation, behavioral problems and confusion. All other systems reviewed and negative.     Physical Exam      ED Triage Vitals [09/22/23 1357]   Temperature Pulse Respirations Blood Pressure SpO2   98.3 °F (36.8 °C) 91 20 119/68 98 %      Temp Source Heart Rate Source Patient Position - Orthostatic VS BP Location FiO2 (%)   Temporal Monitor Sitting Left arm --      Pain Score       --               Physical Exam  Vitals and nursing note reviewed. Constitutional:       General: She is not in acute distress. Appearance: She is well-developed. She is not ill-appearing, toxic-appearing or diaphoretic. HENT:      Head: Normocephalic and atraumatic. Right Ear: External ear normal.      Left Ear: External ear normal.      Nose: Nose normal. No congestion or rhinorrhea. Mouth/Throat:      Mouth: Mucous membranes are moist.      Pharynx: Oropharynx is clear. Eyes:      Conjunctiva/sclera: Conjunctivae normal.      Pupils: Pupils are equal, round, and reactive to light. Cardiovascular:      Rate and Rhythm: Normal rate and regular rhythm. Heart sounds: Normal heart sounds. Pulmonary:      Effort: Pulmonary effort is normal. No respiratory distress. Abdominal:      General: There is no distension. Palpations: Abdomen is soft. Musculoskeletal:         General: Tenderness present. No swelling or deformity. Normal range of motion. Cervical back: Normal range of motion and neck supple. Comments: Left great toe with mild erythema and tenderness. No fluctuance, minimal swelling. Nail not notably buried under skin but is trimmed relatively far back. Picture attached   Skin:     General: Skin is warm and dry. Capillary Refill: Capillary refill takes less than 2 seconds. Findings: No erythema or rash. Neurological:      Mental Status: She is alert and oriented to person, place, and time. Psychiatric:         Behavior: Behavior normal.         Thought Content:  Thought content normal.         Judgment: Judgment normal.                  Diagnostic Results      Labs:    Results Reviewed     None          All labs reviewed and utilized in the medical decision making process    Radiology:    No orders to display       All radiology studies independently viewed by me and interpreted by the radiologist.    Procedure    Procedures        ED Course of Care and Re-Assessments      Offered surgical treatment, after discussion of risks and benefits patient opted for treatment with warm soaks and antibiotics, declined surgical treatment at this time in favor of trial of therapy, which seems reasonable based on overall clinical picture. Medications   cephalexin (KEFLEX) capsule 500 mg (has no administration in time range)           FINAL IMPRESSION    Final diagnoses: Toe infection         DISPOSITION/PLAN    Presentation as above with minor infection of toe. No e/o sepsis, NSTI, septic arthritis, unstable fx or dislocation. Mild ingrown toenail vs more likely mild cellulitis 2/2 cutting toenail too far back with infection of wound. Started on abx, declined surgical treatment at this time. D/c w/ srp, f/u podiatry. Time reflects when diagnosis was documented in both MDM as applicable and the Disposition within this note     Time User Action Codes Description Comment    9/22/2023  2:06 PM Deandre  T Add [L08.9] Toe infection       ED Disposition     ED Disposition   Discharge    Condition   Stable    Date/Time   Fri Sep 22, 2023  2:06 PM    [address] discharge to home/self care.                Follow-up Information     Follow up With Specialties Details Why Contact Info Additional [facility] Emergency Department Emergency Medicine Go to  If symptoms worsen [address] [id number] [address] [facility] Emergency Department, [place], [place], [address], [address] [facility] Family Medicine Call  As needed [address]  [phone]       [facility]  Call today To schedule followup for recheck of your toe [address]  [facility] [address] [phone]             PATIENT REFERRED TO:    [facility] Emergency Department  [address] [facility] [address] [phone]  Go to   If symptoms worsen    Austyn Kirby, [address] [facility] [id number]    Call   As needed    [facility]  [address] [place] [address]  [phone]  Call today  To schedule followup for recheck of your toe      DISCHARGE MEDICATIONS:    Patient's Medications   Discharge Prescriptions    CEPHALEXIN (KEFLEX) 250 MG CAPSULE    Take 2 capsules (500 mg total) by mouth every 8 (eight) hours for 7 days       Start Date: 9/22/2023 End Date: 9/29/2023       Order Dose: 500 mg       Quantity: 42 capsule    Refills: 0       No discharge procedures on file. Donnie Pavon MD    Portions of the record may have been created with voice recognition software. Occasional wrong word or "sound alike" substitutions may have occurred due to the inherent limitations of voice recognition software.   Please read the chart carefully and recognize, using context, where substitutions have occurred     Donnie Pavon MD  09/22/23 1016

## 2023-09-30 DIAGNOSIS — F31.81 BIPOLAR II DISORDER (HCC): ICD-10-CM

## 2023-09-30 DIAGNOSIS — R61 HYPERHIDROSIS: ICD-10-CM

## 2023-10-02 RX ORDER — ALUMINUM CHLORIDE 20 %
SOLUTION, NON-ORAL TOPICAL
Qty: 60 ML | Refills: 0 | Status: SHIPPED | OUTPATIENT
Start: 2023-10-02

## 2023-10-02 RX ORDER — LAMOTRIGINE 150 MG/1
150 TABLET ORAL DAILY
Qty: 30 TABLET | Refills: 0 | Status: SHIPPED | OUTPATIENT
Start: 2023-10-02

## 2023-10-03 ENCOUNTER — TELEMEDICINE (OUTPATIENT)
Dept: PSYCHIATRY | Facility: CLINIC | Age: 23
End: 2023-10-03
Payer: COMMERCIAL

## 2023-10-03 DIAGNOSIS — F31.81 BIPOLAR II DISORDER (HCC): Primary | ICD-10-CM

## 2023-10-03 PROCEDURE — 99214 OFFICE O/P EST MOD 30 MIN: CPT | Performed by: NURSE PRACTITIONER

## 2023-10-03 RX ORDER — RISPERIDONE 0.25 MG/1
0.25 TABLET ORAL
Qty: 30 TABLET | Refills: 1 | Status: SHIPPED | OUTPATIENT
Start: 2023-10-03

## 2023-10-05 ENCOUNTER — TELEMEDICINE (OUTPATIENT)
Dept: BEHAVIORAL/MENTAL HEALTH CLINIC | Facility: CLINIC | Age: 23
End: 2023-10-05

## 2023-10-05 ENCOUNTER — DOCUMENTATION (OUTPATIENT)
Dept: PSYCHIATRY | Facility: CLINIC | Age: 23
End: 2023-10-05

## 2023-10-05 DIAGNOSIS — F31.81 BIPOLAR II DISORDER (HCC): Primary | ICD-10-CM

## 2023-10-05 NOTE — PSYCH
No Call. No Show. No Charge    Nisreen Dumas canceled/no showed 10/05/23 10am virtual appointment. Provider had received a call from Ms. Solorzano about 1 1/2 hour prior to session, but she did not leave a message. Provider was in another virtual session at the time and was unable to accept the call. Provider returned the call when able prior to the session, and left a message on the voice mail, advising to call back and/or confirming virtual session time. Provider signed on to virtual session and waited. Provider invited Ms. Solorzano 2 times and called her via TEAMs in the interim. Provider left a message on  regarding the missed appointment, likely to get a no show letter, and confirming next appointment scheduled 10/13 at 11am virtual format. Treatment Plan not due at this session.

## 2023-10-06 ENCOUNTER — TELEPHONE (OUTPATIENT)
Dept: PSYCHIATRY | Facility: CLINIC | Age: 23
End: 2023-10-06

## 2023-10-06 NOTE — TELEPHONE ENCOUNTER
NO-SHOW LETTER MAILED TO Nadira Locke.   ADDRESS: [address] [place] [phone]
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
## 2023-10-06 NOTE — TELEPHONE ENCOUNTER
Spoke with Ms. Solorzano, who indicated working added shift at work, which affected her sleep and being able to attend appointment. Provider alerted that she may get a no-show letter and not certain if there was billing issue regarding appointment. Ms. Obie Henley stated having a lot to tell provider but ok to wait until next session 10/13 to discuss. She denied distress or emergency.

## 2023-10-13 ENCOUNTER — TELEMEDICINE (OUTPATIENT)
Dept: BEHAVIORAL/MENTAL HEALTH CLINIC | Facility: CLINIC | Age: 23
End: 2023-10-13
Payer: COMMERCIAL

## 2023-10-13 DIAGNOSIS — F31.81 BIPOLAR II DISORDER (HCC): ICD-10-CM

## 2023-10-13 DIAGNOSIS — F41.9 ANXIETY AND DEPRESSION: Primary | ICD-10-CM

## 2023-10-13 DIAGNOSIS — F32.A ANXIETY AND DEPRESSION: Primary | ICD-10-CM

## 2023-10-13 PROCEDURE — 90834 PSYTX W PT 45 MINUTES: CPT

## 2023-10-13 NOTE — PSYCH
Virtual Regular Visit    Verification of patient location:    Patient is located at Home in the following state in which I hold an active license [place]      Assessment/Plan:    Problem List Items Addressed This Visit          Other    Anxiety and depression - Primary    Bipolar II disorder ([address])       Goals addressed in session: Goal 1          Reason for visit is   Chief Complaint   Patient presents with    Virtual Regular Visit          Encounter provider Cindy Blue LCSW    Provider located at [address] [place] [id number]  [phone]      Recent Visits  Date Type Provider Dept   10/06/23 Telephone Jeremie Hoffmann recent visits within past 7 days and meeting all other requirements  Today's Visits  Date Type Provider Dept   10/13/23 Telemedicine Michela Marcelino, [address] [place] [address] today's visits and meeting all other requirements  Future Appointments  No visits were found meeting these conditions. Showing future appointments within next 150 days and meeting all other requirements       The patient was identified by name and date of birth. Edward Parmar was informed that this is a telemedicine visit and that the [facility] being [facility]. She agrees to proceed. .  My office door was closed. No one else was in the room. She acknowledged consent and understanding of privacy and security of the video platform. The patient has agreed to participate and understands they can discontinue the visit at any time. Patient is aware this is a billable service. Subjective  Edward Parmar is a 21 y.o. female . [facility]     Past Medical History:   Diagnosis Date    Anxiety     Concussion     Depression     Head injury     concussions in high school and in college    Self-injurious behavior        Past Surgical History:   Procedure Laterality Date    WISDOM TOOTH EXTRACTION         Current Outpatient Medications   Medication Sig Dispense Refill    Drysol 20 % external solution Apply topically to affected area  at bedtime 60 mL 0    ergocalciferol (VITAMIN D2) 50,000 units Take 1 capsule (50,000 Units total) by mouth once a week 16 capsule 0    fluticasone (FLONASE) 50 mcg/act nasal spray 1 spray into each nostril daily 18.2 mL 1    hydrOXYzine pamoate (VISTARIL) 50 mg capsule Take 1 capsule (50 mg total) by mouth daily at bedtime as needed for anxiety 30 capsule 1    ibuprofen (MOTRIN) 600 mg tablet TAKE ONE TABLET BY MOUTH EVERY EIGHT HOURS AS NEEDED for mild to moderate pain 30 tablet 0    lamoTRIgine (LaMICtal) 150 MG tablet TAKE ONE TABLET BY MOUTH EVERY DAY 30 tablet 0    lamoTRIgine (LaMICtal) 25 mg tablet Take 1 tablet (25 mg total) by mouth daily 30 tablet 2    naproxen (Naprosyn) 500 mg tablet Take 1 tablet (500 mg total) by mouth 2 (two) times a day with meals 30 tablet 0    norgestimate-ethinyl estradiol (Alisha) 0.25-35 MG-MCG per tablet Take 1 tablet by mouth daily 84 tablet 0    risperiDONE (RisperDAL) 0.25 mg tablet Take 1 tablet (0.25 mg total) by mouth daily at bedtime 30 tablet 1     No current facility-administered medications for this visit. No Known Allergies    Review of Systems    Video Exam    There were no vitals filed for this visit. Physical Exam     Behavioral Health Psychotherapy Progress Note    Psychotherapy Provided: Individual Psychotherapy     1. Anxiety and depression        2. Bipolar II disorder ([facility])            Goals addressed in session: Goal 1     DATA: Nevin Tripp addressed re-igniting a relationship with 'the 36yr old'. She addressed their interactions and how she was 'validated' in that he reached out to her, telling her that she was 'not the problem'.  She stated also discovering that he is 'still the same', and how she is able to feel more detached and not as anxious about not having contact. With questioning, Mychal Ruiz also addressed how work is going, and ways she is doing better in her day to day, and further how she is better managing several OT mandated shifts. She stated sleep has been ok. She also addressed a negative interaction with her sister's ex-boyfriend. We addressed the dynamics of what happened, how sister's ex made a hurtful statement to her sister (regarding sister's  boyfriend who Od'ed). We addressed Janell's belief that she needs to work on her anger (she stated this). We pieced apart the incident, how she felt, things she thought, and how she handled. We addressed potential alternatives and working toward being able to choose these alternatives at times. 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 We discussed the possibility of Mychal Ruiz to address future situations of anger in a similar fashion, where she asks herself questions and considers options. She was able to state ability to do so, and provided an example of how she does do this at times. At end of session, Mychal Ruiz also pointed out that she cleaned her room, and she started going to the gym again. Provider encouraged Mychal Ruiz in her overall steps and management, and broaching difficult topics today. We set several virtual appointments into November. During this session, this clinician used the following therapeutic modalities: Cognitive Behavioral Therapy, Mindfulness-based Strategies, Motivational Interviewing, Solution-Focused Therapy, and Supportive Psychotherapy    Substance Abuse was addressed during this session. If the client is diagnosed with a co-occurring substance use disorder, please indicate any changes in the frequency or amount of use: discussed drinking in connection with other behaviors, anger.  Stage of change for addressing substance use diagnoses: Contemplation    ASSESSMENT:  Mandy Slade presents with a Euthymic/ normal mood. her affect is Normal range and intensity, which is congruent, with her mood and the content of the session. The client has made progress on their goals. [facility] presented with somewhat improved mood, and with continued ability to process and consider options. She again was able to 'take a risk' in sharing about her behaviors for the purpose of improvement. Major Medel presents with a low risk of suicide, low risk of self-harm, and low risk of harm to others. For any risk assessment that surpasses a "low" rating, a safety plan must be developed. A safety plan was indicated: no  If yes, describe in detail n/a    PLAN: Between sessions, Major Medel will use strategies to better understand her feelings and reactions to anger in specific moments. At the next session, the therapist will use Cognitive Behavioral Therapy, Mindfulness-based Strategies, Motivational Interviewing, Solution-Focused Therapy, and Supportive Psychotherapy to address ongoing mood management and coping strategies. Behavioral Health Treatment Plan and Discharge Planning: Major Medel is aware of and agrees to continue to work on their treatment plan. They have identified and are working toward their discharge goals.  yes    Visit start and stop times:    10/13/23  Start Time: 1100  Stop Time: 1152  Total Visit Time: 52 minutes

## 2023-10-17 ENCOUNTER — OFFICE VISIT (OUTPATIENT)
Dept: FAMILY MEDICINE CLINIC | Facility: CLINIC | Age: 23
End: 2023-10-17
Payer: COMMERCIAL

## 2023-10-17 VITALS
HEIGHT: 63 IN | TEMPERATURE: 97 F | DIASTOLIC BLOOD PRESSURE: 80 MMHG | HEART RATE: 102 BPM | RESPIRATION RATE: 14 BRPM | OXYGEN SATURATION: 98 % | BODY MASS INDEX: 22.08 KG/M2 | WEIGHT: 124.6 LBS | SYSTOLIC BLOOD PRESSURE: 110 MMHG

## 2023-10-17 DIAGNOSIS — J01.00 ACUTE NON-RECURRENT MAXILLARY SINUSITIS: ICD-10-CM

## 2023-10-17 PROCEDURE — 99213 OFFICE O/P EST LOW 20 MIN: CPT | Performed by: NURSE PRACTITIONER

## 2023-10-17 RX ORDER — AMOXICILLIN AND CLAVULANATE POTASSIUM 875; 125 MG/1; MG/1
1 TABLET, FILM COATED ORAL EVERY 12 HOURS SCHEDULED
Qty: 14 TABLET | Refills: 0 | Status: SHIPPED | OUTPATIENT
Start: 2023-10-17 | End: 2023-10-24

## 2023-10-17 NOTE — PROGRESS NOTES
Assessment/Plan:     Chronic Problems:  No problem-specific Assessment & Plan notes found for this encounter. Visit Diagnosis:  Diagnoses and all orders for this visit:    Acute non-recurrent maxillary sinusitis  -     amoxicillin-clavulanate (AUGMENTIN) 875-125 mg per tablet; Take 1 tablet by mouth every 12 (twelve) hours for 7 days          Subjective:    Patient ID: Annette Meadows is a 21 y.o. female. Patient presents for concerns of headache for 1 month and neck pain. She has also had sinus pain and pressure. She continues with an ingrown toenail pain. She states she did contact [facility] and they have not scheduled her yet. The following portions of the patient's history were reviewed and updated as appropriate: allergies, current medications, past family history, past medical history, past social history, past surgical history and problem list.    Review of Systems   Constitutional:  Negative for chills and fever. HENT:  Positive for sinus pressure and sinus pain. Negative for ear pain, postnasal drip, rhinorrhea and sore throat. Eyes:  Negative for pain and visual disturbance. Respiratory:  Negative for cough and shortness of breath. Cardiovascular:  Negative for chest pain and palpitations. Gastrointestinal:  Negative for abdominal pain, constipation, diarrhea and vomiting. Genitourinary:  Negative for dysuria, frequency and hematuria. Musculoskeletal:  Positive for neck pain. Negative for neck stiffness. Neurological:  Positive for dizziness, light-headedness and headaches. Negative for seizures and syncope. All other systems reviewed and are negative.         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   Tobacco Use    Smoking status: Never    Smokeless tobacco: Never   Vaping Use    Vaping Use: Never used   Substance and Sexual Activity    Alcohol use:  Yes     Alcohol/week: 3.0 - 4.0 standard drinks of alcohol     Types: 3 - 4 Shots of liquor per week     Comment: 3-4 drinks, 2 times per week    Drug use: No    Sexual activity: Yes     Partners: Male     Birth control/protection: Condom Male   Other Topics Concern    Not on file   Social History Narrative    Not on file     Social Determinants of Health     Financial Resource Strain: Not on file   Food Insecurity: Not on file   Transportation Needs: Not on file   Physical Activity: Not on file   Stress: Not on file   Social Connections: Not on file   Intimate Partner Violence: Not on file   Housing Stability: Not on file     Past Medical History:   Diagnosis Date    Anxiety     Concussion     Depression     Head injury     concussions in high school and in college    Self-injurious behavior      Family History   Problem Relation Age of Onset    Hypertension Mother     Mental illness Mother     Depression Mother     Mental illness Sister     Coronary artery disease Maternal Grandmother     Throat cancer Paternal Grandfather      Past Surgical History:   Procedure Laterality Date    WISDOM TOOTH EXTRACTION         Current Outpatient Medications:     amoxicillin-clavulanate (AUGMENTIN) 875-125 mg per tablet, Take 1 tablet by mouth every 12 (twelve) hours for 7 days, Disp: 14 tablet, Rfl: 0    Drysol 20 % external solution, Apply topically to affected area  at bedtime, Disp: 60 mL, Rfl: 0    ergocalciferol (VITAMIN D2) 50,000 units, Take 1 capsule (50,000 Units total) by mouth once a week, Disp: 16 capsule, Rfl: 0    fluticasone (FLONASE) 50 mcg/act nasal spray, 1 spray into each nostril daily, Disp: 18.2 mL, Rfl: 1    hydrOXYzine pamoate (VISTARIL) 50 mg capsule, Take 1 capsule (50 mg total) by mouth daily at bedtime as needed for anxiety, Disp: 30 capsule, Rfl: 1    lamoTRIgine (LaMICtal) 150 MG tablet, TAKE ONE TABLET BY MOUTH EVERY DAY, Disp: 30 tablet, Rfl: 0    lamoTRIgine (LaMICtal) 25 mg tablet, Take 1 tablet (25 mg total) by mouth daily, Disp: 30 tablet, Rfl: 2    naproxen (Naprosyn) 500 mg tablet, Take 1 tablet (500 mg total) by mouth 2 (two) times a day with meals, Disp: 30 tablet, Rfl: 0    norgestimate-ethinyl estradiol (Alisha) 0.25-35 MG-MCG per tablet, Take 1 tablet by mouth daily, Disp: 84 tablet, Rfl: 0    risperiDONE (RisperDAL) 0.25 mg tablet, Take 1 tablet (0.25 mg total) by mouth daily at bedtime, Disp: 30 tablet, Rfl: 1    ibuprofen (MOTRIN) 600 mg tablet, TAKE ONE TABLET BY MOUTH EVERY EIGHT HOURS AS NEEDED for mild to moderate pain, Disp: 30 tablet, Rfl: 0    No Known Allergies       Lab Review   Office Visit on 09/05/2023   Component Date Value    LEUKOCYTE ESTERASE,UA 09/05/2023 -     Alfrieda Bias 09/05/2023 -     SL AMB POCT UROBILINOGEN 09/05/2023 -     POCT URINE PROTEIN 09/05/2023 +-      PH,UA 09/05/2023 6.0     BLOOD,UA 09/05/2023 -     SPECIFIC GRAVITY,UA 09/05/2023 1.030     KETONES,UA 09/05/2023 -     BILIRUBIN,UA 09/05/2023 -     GLUCOSE, UA 09/05/2023 -      Minoo Cuevas 09/05/2023 yellow     CLARITY,UA 09/05/2023 clear     POCT SARS-CoV-2 Ag 09/05/2023 Negative     VALID CONTROL 09/05/2023 Valid      RAPID STREP A 09/05/2023 Positive (A)     URINE HCG 09/05/2023 negative     Urine Culture 09/05/2023 No Growth <1000 cfu/mL     N gonorrhoeae, DNA Probe 09/05/2023 Negative     Chlamydia trachomatis, D* 09/05/2023 Negative    Office Visit on 08/22/2023   Component Date Value     RAPID STREP A 08/22/2023 Negative         Imaging: No results found. Objective:     Physical Exam  Constitutional:       Appearance: She is well-developed. HENT:      Right Ear: A middle ear effusion is present.       Left Ear: A middle ear effusion is present. Nose:      Right Sinus: Frontal sinus tenderness present. Left Sinus: Frontal sinus tenderness present. Cardiovascular:      Rate and Rhythm: Normal rate and regular rhythm. Heart sounds: Normal heart sounds. No murmur heard. Pulmonary:      Effort: Pulmonary effort is normal. No respiratory distress. Breath sounds: Normal breath sounds. Skin:     General: Skin is warm and dry. Neurological:      Mental Status: She is alert and oriented to person, place, and time. There are no Patient Instructions on file for this visit. ANGELA Arthur    Portions of the record may have been created with voice recognition software. Occasional wrong word or "sound a like" substitutions may have occurred due to the inherent limitations of voice recognition software. Read the chart carefully and recognize, using context, where substitutions have occurred.

## 2023-10-20 ENCOUNTER — TELEMEDICINE (OUTPATIENT)
Dept: BEHAVIORAL/MENTAL HEALTH CLINIC | Facility: CLINIC | Age: 23
End: 2023-10-20

## 2023-10-20 DIAGNOSIS — F31.81 BIPOLAR II DISORDER (HCC): Primary | ICD-10-CM

## 2023-10-20 NOTE — PSYCH
Virtual Regular Visit    Verification of patient location:    Patient is located at Home in the following state in which I hold an active license [place]      Assessment/Plan:    Problem List Items Addressed This Visit          Other    Bipolar II disorder ([address]) - Primary       Goals addressed in session: Goal 1          Reason for visit is   Chief Complaint   Patient presents with    Virtual Regular Visit          Encounter provider See Menjivar LCSW    Provider located at [address] [place] [id number] [phone]      Recent Visits  Date Type Provider Dept   10/17/23 Office Visit [address] [facility], [facility] [place]   10/13/23 Telemedicine Michela [address], [address] [facility] Therapist [facility]   Showing recent visits within past 7 days and meeting all other requirements  Today's Visits  Date Type Provider Dept   10/20/23 Telemedicine Michela [address], [id number] [place] [address] [place] today's visits and meeting all other requirements  Future Appointments  No visits were found meeting these conditions. Showing future appointments within next 150 days and meeting all other requirements       The patient was identified by name and date of birth. Indio Navarro was informed that this is a telemedicine visit and [facility]. She agrees to proceed. .  My office door was closed. No one else was in the room. She acknowledged consent and understanding of privacy and security of the video platform. The patient has agreed to participate and understands they can discontinue the visit at any time. Patient is aware this is a billable service. Subjective  Indio Navarro is a 21 y.o. female . [facility]     Past Medical History:   Diagnosis Date    Anxiety     Concussion     Depression     Head injury     concussions in high school and in college    Self-injurious behavior        Past Surgical History:   Procedure Laterality Date    WISDOM TOOTH EXTRACTION         Current Outpatient Medications   Medication Sig Dispense Refill    amoxicillin-clavulanate (AUGMENTIN) 875-125 mg per tablet Take 1 tablet by mouth every 12 (twelve) hours for 7 days 14 tablet 0    Drysol 20 % external solution Apply topically to affected area  at bedtime 60 mL 0    ergocalciferol (VITAMIN D2) 50,000 units Take 1 capsule (50,000 Units total) by mouth once a week 16 capsule 0    fluticasone (FLONASE) 50 mcg/act nasal spray 1 spray into each nostril daily 18.2 mL 1    hydrOXYzine pamoate (VISTARIL) 50 mg capsule Take 1 capsule (50 mg total) by mouth daily at bedtime as needed for anxiety 30 capsule 1    ibuprofen (MOTRIN) 600 mg tablet TAKE ONE TABLET BY MOUTH EVERY EIGHT HOURS AS NEEDED for mild to moderate pain 30 tablet 0    lamoTRIgine (LaMICtal) 150 MG tablet TAKE ONE TABLET BY MOUTH EVERY DAY 30 tablet 0    lamoTRIgine (LaMICtal) 25 mg tablet Take 1 tablet (25 mg total) by mouth daily 30 tablet 2    naproxen (Naprosyn) 500 mg tablet Take 1 tablet (500 mg total) by mouth 2 (two) times a day with meals 30 tablet 0    norgestimate-ethinyl estradiol (Alisha) 0.25-35 MG-MCG per tablet Take 1 tablet by mouth daily 84 tablet 0    risperiDONE (RisperDAL) 0.25 mg tablet Take 1 tablet (0.25 mg total) by mouth daily at bedtime 30 tablet 1     No current facility-administered medications for this visit. No Known Allergies    Review of Systems    Video Exam    There were no vitals filed for this visit. Physical Exam   Behavioral Health Psychotherapy Progress Note    Psychotherapy Provided: Individual Psychotherapy     1. Bipolar II disorder ([address])            Goals addressed in session: Goal 1     DATA: Rowena Beatty discussed happenings at work and managing the various situations. She noted being able to talk with coworkers and diffuse together.  She noted overall feeling better about the job and her shiftwork. She noted managing the schedule and working in going to the gym when possible. She did note various other areas of 'annoyance' including her mother, 36yr old jer she is seeing, and a recent event when she was with her sister at a bar. We addressed and provider attempted to help Mallory Forbes identify feelings and how her responses may be impacting relationships. We addressed feelings of anger, disconnection, and 'I don't care'. We discussed ways Mallory Forebs is managing her feelings appropriately, while also considering alternatives at times. Provider encouraged the possibility of looking moreso at the inner turmoil Mallory Forbes feels and ways to work on alleviating that through different responses and management. She acknowledged the possibility. We confirmed and set additional appointments. During this session, this clinician used the following therapeutic modalities: Cognitive Behavioral Therapy, Mindfulness-based Strategies, Motivational Interviewing, Solution-Focused Therapy, and Supportive Psychotherapy    Substance Abuse was addressed during this session. If the client is diagnosed with a co-occurring substance use disorder, please indicate any changes in the frequency or amount of use: Mallory Forbes denied that alcohol affects her feelings or interactions with others. Stage of change for addressing substance use diagnoses: Pre-contemplation    ASSESSMENT:  Edward Parmar presents with a Euthymic/ normal and    mood. her affect is Normal range and intensity, which is congruent, with her mood and the content of the session. The client has made progress on their goals. Mallory Forbes was able to share experiences, and tolerate gentle confrontation. Although she balks in some ways, she also seems to be processing and considering the discussion. She presented as stable and overall managing her daily routine.  Edward Parmar presents with a low risk of suicide, low risk of self-harm, and low risk of harm to others. Kenya Fernandez denied feeling out of control in managing anger, denied intent to harm others. She denied thought or action of self-harm behaviors. For any risk assessment that surpasses a "low" rating, a safety plan must be developed. A safety plan was indicated: no  If yes, describe in detail n/a    PLAN: Between sessions, Sharon Muñoz will continue recognizing options in behaviors. At the next session, the therapist will use Cognitive Behavioral Therapy, Mindfulness-based Strategies, Motivational Interviewing, Solution-Focused Therapy, and Supportive Psychotherapy to address mood management, daily coping strategies. Behavioral Health Treatment Plan and Discharge Planning: Sharon Muñoz is aware of and agrees to continue to work on their treatment plan. They have identified and are working toward their discharge goals.  yes    Visit start and stop times:    10/20/23  Start Time: 1106  Stop Time: 1158  Total Visit Time: 52 minutes

## 2023-10-26 DIAGNOSIS — Z30.011 ENCOUNTER FOR INITIAL PRESCRIPTION OF CONTRACEPTIVE PILLS: ICD-10-CM

## 2023-10-30 ENCOUNTER — OFFICE VISIT (OUTPATIENT)
Dept: FAMILY MEDICINE CLINIC | Facility: CLINIC | Age: 23
End: 2023-10-30
Payer: COMMERCIAL

## 2023-10-30 VITALS
DIASTOLIC BLOOD PRESSURE: 80 MMHG | HEART RATE: 86 BPM | OXYGEN SATURATION: 97 % | HEIGHT: 63 IN | WEIGHT: 127.8 LBS | BODY MASS INDEX: 22.64 KG/M2 | SYSTOLIC BLOOD PRESSURE: 134 MMHG

## 2023-10-30 DIAGNOSIS — N89.8 VAGINAL DISCHARGE: ICD-10-CM

## 2023-10-30 DIAGNOSIS — R30.0 DYSURIA: Primary | ICD-10-CM

## 2023-10-30 DIAGNOSIS — J02.9 SORE THROAT: ICD-10-CM

## 2023-10-30 LAB
S PYO AG THROAT QL: NEGATIVE
SL AMB  POCT GLUCOSE, UA: NORMAL
SL AMB LEUKOCYTE ESTERASE,UA: NORMAL
SL AMB POCT BILIRUBIN,UA: NORMAL
SL AMB POCT BLOOD,UA: NORMAL
SL AMB POCT CLARITY,UA: CLEAR
SL AMB POCT COLOR,UA: YELLOW
SL AMB POCT KETONES,UA: NORMAL
SL AMB POCT NITRITE,UA: NORMAL
SL AMB POCT PH,UA: 6
SL AMB POCT SPECIFIC GRAVITY,UA: 1.01
SL AMB POCT URINE PROTEIN: NORMAL
SL AMB POCT UROBILINOGEN: NORMAL

## 2023-10-30 PROCEDURE — 99214 OFFICE O/P EST MOD 30 MIN: CPT | Performed by: NURSE PRACTITIONER

## 2023-10-30 PROCEDURE — 87880 STREP A ASSAY W/OPTIC: CPT | Performed by: NURSE PRACTITIONER

## 2023-10-30 PROCEDURE — 81002 URINALYSIS NONAUTO W/O SCOPE: CPT | Performed by: NURSE PRACTITIONER

## 2023-10-30 PROCEDURE — 87086 URINE CULTURE/COLONY COUNT: CPT | Performed by: NURSE PRACTITIONER

## 2023-10-30 PROCEDURE — 81514 NFCT DS BV&VAGINITIS DNA ALG: CPT | Performed by: NURSE PRACTITIONER

## 2023-10-30 NOTE — PROGRESS NOTES
Name: Mercy Garcia      : 2000      MRN: [id number]  Encounter Provider: ANGELA Mosley  Encounter Date: 10/30/2023   Encounter department: [address] [place] [address]     1. Vaginal discharge  -     Molecular Vaginal Panel; Future    2. Sore throat  -     POCT rapid strepA    3. Dysuria  -     POCT urine dip           Subjective      Patient presents with concerns of vaginal discharge. She is also concerned with sore throat, congestion and urinary symptoms. Review of Systems   Constitutional:  Negative for chills, diaphoresis and fever. HENT:  Positive for congestion, ear pain and postnasal drip. Negative for sinus pressure and sinus pain. Respiratory: Negative. Cardiovascular: Negative. Gastrointestinal: Negative. Genitourinary:  Positive for dysuria, frequency, urgency and vaginal discharge. Neurological:  Negative for dizziness, light-headedness and headaches.        Current Outpatient Medications on File Prior to Visit   Medication Sig    Drysol 20 % external solution Apply topically to affected area  at bedtime    ergocalciferol (VITAMIN D2) 50,000 units Take 1 capsule (50,000 Units total) by mouth once a week    fluticasone (FLONASE) 50 mcg/act nasal spray 1 spray into each nostril daily    hydrOXYzine pamoate (VISTARIL) 50 mg capsule Take 1 capsule (50 mg total) by mouth daily at bedtime as needed for anxiety    ibuprofen (MOTRIN) 600 mg tablet TAKE ONE TABLET BY MOUTH EVERY EIGHT HOURS AS NEEDED for mild to moderate pain    lamoTRIgine (LaMICtal) 150 MG tablet TAKE ONE TABLET BY MOUTH EVERY DAY    lamoTRIgine (LaMICtal) 25 mg tablet Take 1 tablet (25 mg total) by mouth daily    naproxen (Naprosyn) 500 mg tablet Take 1 tablet (500 mg total) by mouth 2 (two) times a day with meals    risperiDONE (RisperDAL) 0.25 mg tablet Take 1 tablet (0.25 mg total) by mouth daily at bedtime    Sprintec 28 0.25-35 MG-MCG per tablet TAKE ONE TABLET BY MOUTH EVERY DAY       Objective     /80   Pulse 86   Ht 5' 3" (1.6 m)   Wt 58 kg (127 lb 12.8 oz)   SpO2 97%   BMI 22.64 kg/m²     Physical Exam  Constitutional:       Appearance: She is well-developed. HENT:      Right Ear: A middle ear effusion is present. Nose: Congestion present. Mouth/Throat: Tonsils: 2+ on the right. 2+ on the left. Cardiovascular:      Rate and Rhythm: Normal rate and regular rhythm. Heart sounds: Normal heart sounds. No murmur heard. Pulmonary:      Effort: Pulmonary effort is normal. No respiratory distress. Breath sounds: Normal breath sounds. Genitourinary:     Vagina: Vaginal discharge present. Lymphadenopathy:      Cervical: Cervical adenopathy present. Skin:     General: Skin is warm and dry. Neurological:      Mental Status: She is alert and oriented to person, place, and time.        ANGELA Constantino

## 2023-10-31 ENCOUNTER — TELEPHONE (OUTPATIENT)
Dept: PSYCHIATRY | Facility: CLINIC | Age: 23
End: 2023-10-31

## 2023-10-31 DIAGNOSIS — F31.81 BIPOLAR II DISORDER (HCC): ICD-10-CM

## 2023-10-31 LAB
BACTERIA UR CULT: NORMAL
C GLABRATA DNA VAG QL NAA+PROBE: NEGATIVE
C KRUSEI DNA VAG QL NAA+PROBE: NEGATIVE
CANDIDA SP 6 PNL VAG NAA+PROBE: NEGATIVE
T VAGINALIS DNA VAG QL NAA+PROBE: NEGATIVE
VAGINOSIS/ITIS DNA PNL VAG PROBE+SIG AMP: NEGATIVE

## 2023-10-31 RX ORDER — LAMOTRIGINE 150 MG/1
150 TABLET ORAL DAILY
Qty: 30 TABLET | Refills: 0 | Status: SHIPPED | OUTPATIENT
Start: 2023-10-31

## 2023-10-31 NOTE — TELEPHONE ENCOUNTER
Writer JASIEL to inform patient their 10/31 appointment at 11am has been cancelled due to provider being out of office. Office number was given to call back if needing to cancel or reschedule. Please assist upon return call. Thank you.   [email or web address]

## 2023-11-02 ENCOUNTER — TELEPHONE (OUTPATIENT)
Dept: PSYCHIATRY | Facility: CLINIC | Age: 23
End: 2023-11-02

## 2023-11-02 NOTE — TELEPHONE ENCOUNTER
Writer contacted patient and scheduled patients follow up appointments until end of your.  Patient is scheduled for:  12/07@ 11 am  Alanna@Seeder pm  12/22@ 9 am  12/26@ 11 am

## 2023-11-07 ENCOUNTER — TELEMEDICINE (OUTPATIENT)
Dept: PSYCHIATRY | Facility: CLINIC | Age: 23
End: 2023-11-07
Payer: COMMERCIAL

## 2023-11-07 DIAGNOSIS — F31.81 BIPOLAR II DISORDER (HCC): Primary | ICD-10-CM

## 2023-11-07 PROCEDURE — 99214 OFFICE O/P EST MOD 30 MIN: CPT | Performed by: NURSE PRACTITIONER

## 2023-11-07 RX ORDER — LAMOTRIGINE 25 MG/1
25 TABLET ORAL DAILY
Qty: 30 TABLET | Refills: 2 | Status: SHIPPED | OUTPATIENT
Start: 2023-11-07

## 2023-11-07 RX ORDER — RISPERIDONE 0.25 MG/1
0.25 TABLET ORAL
Qty: 30 TABLET | Refills: 2 | Status: SHIPPED | OUTPATIENT
Start: 2023-11-07

## 2023-11-07 RX ORDER — LAMOTRIGINE 150 MG/1
150 TABLET ORAL DAILY
Qty: 30 TABLET | Refills: 2 | Status: SHIPPED | OUTPATIENT
Start: 2023-11-07

## 2023-11-08 NOTE — PSYCH
Virtual Regular Visit    Verification of patient location:    Patient is located in the following state in which I hold an active license [place]    Problem List Items Addressed This Visit     Bipolar II disorder ([address]) - Primary    Relevant Medications    lamoTRIgine (LaMICtal) 25 mg tablet    lamoTRIgine (LaMICtal) 150 MG tablet    risperiDONE (RisperDAL) 0.25 mg tablet          Encounter provider ANGELA Steinberg    Provider located at    [address] [facility]  [address] [facility] [address] [phone]    Recent Visits  Date Type Provider Dept   11/07/23 2[address] recent visits within past 7 days and meeting all other requirements  Future Appointments  No visits were found meeting these conditions. Showing future appointments within next 150 days and meeting all other requirements           The patient was identified by name and date of birth. Patient was informed that this is a telemedicine visit and that the visit is being conducted throughthe [email or web address]. She agrees to proceed. .  My office door was closed. No one else was in the room. She acknowledged consent and understanding of privacy and security of the video platform. The patient has agreed to participate and understands they can discontinue the visit at any time. Patient is aware this is a billable service.      HPI     Current Outpatient Medications   Medication Sig Dispense Refill   • Drysol 20 % external solution Apply topically to affected area  at bedtime 60 mL 0   • ergocalciferol (VITAMIN D2) 50,000 units Take 1 capsule (50,000 Units total) by mouth once a week 16 capsule 0   • fluticasone (FLONASE) 50 mcg/act nasal spray 1 spray into each nostril daily 18.2 mL 1   • hydrOXYzine pamoate (VISTARIL) 50 mg capsule Take 1 capsule (50 mg total) by mouth daily at bedtime as needed for anxiety 30 capsule 1   • ibuprofen (MOTRIN) 600 mg tablet TAKE ONE TABLET BY MOUTH EVERY EIGHT HOURS AS NEEDED for mild to moderate pain 30 tablet 0   • lamoTRIgine (LaMICtal) 150 MG tablet Take 1 tablet (150 mg total) by mouth daily 30 tablet 2   • lamoTRIgine (LaMICtal) 25 mg tablet Take 1 tablet (25 mg total) by mouth daily 30 tablet 2   • naproxen (Naprosyn) 500 mg tablet Take 1 tablet (500 mg total) by mouth 2 (two) times a day with meals 30 tablet 0   • risperiDONE (RisperDAL) 0.25 mg tablet Take 1 tablet (0.25 mg total) by mouth daily at bedtime 30 tablet 2   • Sprintec 28 0.25-35 MG-MCG per tablet TAKE ONE TABLET BY MOUTH EVERY DAY 84 tablet 0     No current facility-administered medications for this visit. Review of Systems  Video Exam    There were no vitals filed for this visit. Physical Exam   As a result of this visit, I have referred the patient for further respiratory evaluation. No    I spent 30 minutes directly with the patient during this visit  VIRTUAL VISIT DISCLAIMER    Claudene Council acknowledges that she has consented to an online visit or consultation. She understands that the online visit is based solely on information provided by her, and that, in the absence of a face-to-face physical evaluation by the physician, the diagnosis she receives is both limited and provisional in terms of accuracy and completeness. This is not intended to replace a full medical face-to-face evaluation by the physician. Claudene Council understands and accepts these terms. MEDICATION MANAGEMENT NOTE        [facility]    Name and Date of Birth:  Claudene Council 21 y.o. 2000 MRN: [id number]    Date of Visit: November 7, 2023    No Known Allergies    Visit Time    Visit Start Time: 3832  Visit Stop Time: 1130  Total Visit Duration:  30 minutes    SUBJECTIVE:    Karon Rondon is seen today for a follow up for Bipolar Disorder type II. She continues to do well since the last visit. Raffi Braga seen today virtually for medication management follow-up. She was last seen by this provider on 10/3/23. Raffi Braga is calm and level during conversation today. She states that work is going better. Reports that the risperdal is making her more calm and that she takes it prior to going to sleep, which is typically in the am because she works night shift at work. She states that occasionally she finds herself zoning out but because she is more calm, the benefits outweigh the risks at this time. She has been social with friends, and has been making sound judgment decisions regarding relationships. She is considering going back to school again. She has been working a lot. She denies any SI/HI. Denies auditory or visual hallucinations. She rates her depression 6/10, anxiety 9/10. We will continue her medications as ordered and follow up in one month. She was provided with the education on risperdal.  PARQ completed including sedation, agitation, akathisia, TD, dystonic reactions, NMS, EPS, GI distress, dizziness, risk of metabolic syndrome, orthostatic hypotension and cardiovascular risks such as QT prolongation, increased prolactin    Lamotrigine PARQ completed including dizziness, headaches, ataxia, vision problems, somnolence, sleep changes, cognitive difficulties, rash (including Becerril-Chato rash), and others, risk of teratogenicity for females. Education provided on Neuroleptic malignant syndrome. Patient told that NMS is a life-threatening, neurological disorder most often caused by an adverse reaction to neuroleptic or antipsychotic drugs. Symptoms include high fever, sweating, unstable blood pressure, stupor, muscular rigidity, and autonomic dysfunction. In most cases, the disorder develops within the first 2 weeks of treatment with the drug; however, the disorder may develop any time during the therapy period.  Patient advised to call the office or go to the ED immediately if these symptoms develop. She denies any side effects from current psychiatric medications. PLAN:  Continue medications as ordered  Initiate risperdal 0.25mg PO prior to sleep  Vistaril 50 mg p.o. at bedtime as needed  Continue Lamictal 175mg p.o. daily  She will continue therapy  She will follow up with this provider in 1 month  She will call sooner with concerns or issues if they arise prior to scheduled appointment    Aware of 24 hour and weekend coverage for urgent situations accessed by calling [facility] main practice number  Continue psychotherapy with therapist  Medication management every 1 month  Aware of need to follow up with family physician for medical issues    Diagnoses and all orders for this visit:    Bipolar II disorder ([address])  -     lamoTRIgine (LaMICtal) 25 mg tablet; Take 1 tablet (25 mg total) by mouth daily  -     lamoTRIgine (LaMICtal) 150 MG tablet; Take 1 tablet (150 mg total) by mouth daily  -     risperiDONE (RisperDAL) 0.25 mg tablet;  Take 1 tablet (0.25 mg total) by mouth daily at bedtime     Other atypical antipsychotics trialed and found to be ineffective:  Zyprexa, Abilify, Vraylar, and Latuda     Current Outpatient Medications on File Prior to Visit   Medication Sig Dispense Refill   • Drysol 20 % external solution Apply topically to affected area  at bedtime 60 mL 0   • ergocalciferol (VITAMIN D2) 50,000 units Take 1 capsule (50,000 Units total) by mouth once a week 16 capsule 0   • fluticasone (FLONASE) 50 mcg/act nasal spray 1 spray into each nostril daily 18.2 mL 1   • hydrOXYzine pamoate (VISTARIL) 50 mg capsule Take 1 capsule (50 mg total) by mouth daily at bedtime as needed for anxiety 30 capsule 1   • ibuprofen (MOTRIN) 600 mg tablet TAKE ONE TABLET BY MOUTH EVERY EIGHT HOURS AS NEEDED for mild to moderate pain 30 tablet 0   • naproxen (Naprosyn) 500 mg tablet Take 1 tablet (500 mg total) by mouth 2 (two) times a day with meals 30 tablet 0   • Sprintec 28 0.25-35 MG-MCG per tablet TAKE ONE TABLET BY MOUTH EVERY DAY 84 tablet 0     No current facility-administered medications on file prior to visit. Psychotherapy Provided:     Individual psychotherapy provided: Yes  Counseling was provided during the session today for 16 minutes. Supportive counseling provided. Medication changes discussed with Kinza Ruth. Medication education provided to Kinza Ruth. Discussed with Kinza Ruth coping with job stress, social difficulties and everyday stressors. Coping strategies reviewed with Kinza Ruth. Importance of medication and treatment compliance reviewed with Kinza Ruth. Importance of follow up with family physician for medical issues reviewed with Kinza Ruth. Reassurance and supportive therapy provided. Crisis/safety plan discussed with Kinza Ruth. Patient will call prior to scheduled appointment if they have any issues or concerns. Patient understands they can access the office by calling the main number at any time if they are in crisis. They also understand they can call their [facility]'s crisis number or go to their nearest ED if suicidal ideation increases or if they develop a plan or intent. HPI ROS Appetite Changes and Sleep:     She reports normal sleep, adequate appetite, normal energy level.  Denies homicidal ideation, denies suicidal ideation    Review Of Systems:      HPI ROS:               Medication Side Effects: denies    Depression (10 worst): 6/10 6/10   Anxiety (10 worst): 9/10 9/10   Safety concerns (SI, HI, etc): denies denies   Sleep: good Fluctuating, sometimes has a hard time falling asleep due to racing thoughts   Energy: adequate increased   Appetite: good good     General normal    Personality no change in personality   Constitutional as noted in HPI   ENT negative   Cardiovascular negative   Respiratory negative   Gastrointestinal negative   Genitourinary negative   Musculoskeletal negative   Integumentary negative   Neurological negative   Endocrine negative   Other Symptoms none, all other systems are negative     Mental Status Evaluation:    Appearance Appropriately dressed and Good eye contact   Behavior calm and cooperative   Mood anxious and depressed  Depression Scale - 6 of 10 (0 = No depression)  Anxiety Scale - 9 of 10 (0 = No anxiety)   Speech Normal rate and volume   Affect appropriate and mood-congruent   Thought Processes Goal directed and coherent   Thought Content Does not verbalize delusional material   Associations Tightly connected   Perceptual Disturbances Denies hallucinations and does not appear to be responding to internal stimuli   Risk Potential Suicidal/Homicidal Ideation - No evidence of suicidal or homicidal ideation and patient does not verbalize suicidal or homicidal ideation  Risk of Violence - No evidence of risk for violence found on assessment  Risk of Self Mutilation - No evidence of risk for self mutilation found on assessment   Orientation oriented to person, place, time/date and situation   Memory recent and remote memory grossly intact   Consciousness alert and awake   Attention/Concentration attention span and concentration appear shorter than expected for age   Insight fair   Judgement fair   Muscle Strength and Gait normal muscle strength and normal muscle tone, normal gait/station and normal balance   Motor Activity no abnormal movements   Language no difficulty naming common objects, no difficulty repeating a phrase, no difficulty writing a sentence   Fund of Knowledge adequate knowledge of current events  adequate fund of knowledge regarding past history  adequate fund of knowledge regarding vocabulary      Past Psychiatric History Update:     Inpatient Psychiatric Admission Since Last Encounter:   no  Changes to Outpatient Psychiatric Treatment Team:    no  Suicide Attempt Or Self Mutilation Since Last Encounter:   no  Incidence of Violent Behavior Since Last Encounter:   no    Traumatic History Update:     New Onset of Abuse Since Last Encounter:   no  Traumatic Events Since Last Encounter:   no    Past Medical History:    Past Medical History:   Diagnosis Date   • Anxiety    • Concussion    • Depression    • Head injury     concussions in high school and in college   • Self-injurious behavior         Past Surgical History:   Procedure Laterality Date   • WISDOM TOOTH EXTRACTION       No Known Allergies  Substance Abuse History:    Social History     Substance and Sexual Activity   Alcohol Use Yes   • Alcohol/week: 3.0 - 4.0 standard drinks of alcohol   • Types: 3 - 4 Shots of liquor per week    Comment: 3-4 drinks, 2 times per week     Social History     Substance and Sexual Activity   Drug Use No     Social History:    Social History     Socioeconomic History   • Marital status: Single     Spouse name: Not on file   • Number of children: 0   • Years of education: senior in college currently   • Highest education level: High school graduate   Occupational History   • 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   Tobacco Use   • Smoking status: Never   • Smokeless tobacco: Never   Vaping Use   • Vaping Use: Never used   Substance and Sexual Activity   • Alcohol use:  Yes     Alcohol/week: 3.0 - 4.0 standard drinks of alcohol     Types: 3 - 4 Shots of liquor per week     Comment: 3-4 drinks, 2 times per week   • Drug use: No   • Sexual activity: Yes     Partners: Male     Birth control/protection: Condom Male   Other Topics Concern   • Not on file   Social History Narrative   • Not on file     Social Determinants of Health     Financial Resource Strain: Not on file   Food Insecurity: Not on file   Transportation Needs: Not on file   Physical Activity: Not on file   Stress: Not on file   Social Connections: Not on file   Intimate Partner Violence: Not on file   Housing Stability: Not on file     Family Psychiatric History:     Family History   Problem Relation Age of Onset   • Hypertension Mother    • Mental illness Mother    • Depression Mother    • Mental illness Sister    • Coronary artery disease Maternal Grandmother    • Throat cancer Paternal Grandfather      History Review: The following portions of the patient's history were reviewed and updated as appropriate: allergies, current medications, past family history, past medical history, past social history, past surgical history and problem list     OBJECTIVE:     Vital signs in last 24 hours: There were no vitals filed for this visit. Laboratory Results: I have personally reviewed all pertinent laboratory/tests results. Suicide/Homicide Risk Assessment:    Risk of Harm to Self:  The following ratings are based on assessment at the time of the interview  Recent Specific Risk Factors include: current depressive symptoms, current anxiety symptoms, unstable mood  Demographic risk factors include: , age: young adult (15-24)  Historical Risk Factors include: chronic depression, chronic anxiety symptoms, chronic mood disorder, history of suicide attempts, history of impulsive behaviors, history of traumatic experiences  Protective Factors: no current suicidal ideation, access to mental health treatment, compliant with medications, compliant with mental health treatment, having a desire to be alive, stable living environment, sense of determination, supportive family  Based on today's assessment, Raemsh Austin presents the following risk of harm to self: low    Risk of Harm to Others:   The following ratings are based on assessment at the time of the interview  Protective Factors: no current homicidal ideation  Based on today's assessmentRamesh presents the following risk of harm to others: none    The following interventions are recommended: contracts for safety at present - agrees to go to ED if feeling unsafe, return in 1 month for reassessment, therapy appointment in 1 month, contracts for safety at present - agrees to call Crisis Intervention Service if feeling unsafe    Medications Risks/Benefits:      Risks, Benefits And Possible Side Effects Of Medications:    Discussed risks and benefits of treatment with patient including risk of parkinsonian symptoms, metabolic syndrome, tardive dyskinesia and neuroleptic malignant syndrome related to treatment with antipsychotic medications and risk of rash related to treatment with Lamictal     Controlled Medication Discussion:     Not applicable    Treatment Plan:    Due for update/Updated:   no  Last treatment plan done 6/15/23 by Serenity Son LCSW. Treatment Plan due on 12/15/23.     ANGELA Umanzor 11/08/23    This note was not shared with the patient due to reasonable likelihood of causing patient harm

## 2023-11-14 ENCOUNTER — TELEMEDICINE (OUTPATIENT)
Dept: BEHAVIORAL/MENTAL HEALTH CLINIC | Facility: CLINIC | Age: 23
End: 2023-11-14
Payer: COMMERCIAL

## 2023-11-14 DIAGNOSIS — F31.81 BIPOLAR II DISORDER (HCC): Primary | ICD-10-CM

## 2023-11-14 DIAGNOSIS — F32.A ANXIETY AND DEPRESSION: ICD-10-CM

## 2023-11-14 DIAGNOSIS — F41.9 ANXIETY AND DEPRESSION: ICD-10-CM

## 2023-11-14 PROCEDURE — 90834 PSYTX W PT 45 MINUTES: CPT

## 2023-11-14 NOTE — PSYCH
Virtual Regular Visit    Verification of patient location:    Patient is located at Home in the following state in which I hold an active license [place]      Assessment/Plan:    Problem List Items Addressed This Visit          Other    Anxiety and depression    Bipolar II disorder ([address]) - Primary       Goals addressed in session: Goal 1          Reason for visit is   Chief Complaint   Patient presents with    Virtual Regular Visit          Encounter provider Balbina Whitmore LCSW    Provider located at [address] [place] [id number] [phone]      Recent Visits  No visits were found meeting these conditions. Showing recent visits within past 7 days and meeting all other requirements  Today's Visits  Date Type Provider Dept   11/14/23 Telemedicine Michela [address], [address] [facility] Therapist [facility]   Showing today's visits and meeting all other requirements  Future Appointments  No visits were found meeting these conditions. Showing future appointments within next 150 days and meeting all other requirements       The patient was identified by name and date of birth. Jaron Crawford was informed that this is a telemedicine visit and that the [facility] is [facility]. She agrees to proceed. .  My office door was closed. No one else was in the room. She acknowledged consent and understanding of privacy and security of the video platform. The patient has agreed to participate and understands they can discontinue the visit at any time. Patient is aware this is a billable service. Subjective  Jaron Crawford is a 21 y.o. female . Say BENITEZ     Past Medical History:   Diagnosis Date    Anxiety     Concussion     Depression     Head injury     concussions in high school and in college    Self-injurious behavior        Past Surgical History:   Procedure Laterality Date    WISDOM TOOTH EXTRACTION         Current Outpatient Medications   Medication Sig Dispense Refill    Drysol 20 % external solution Apply topically to affected area  at bedtime 60 mL 0    ergocalciferol (VITAMIN D2) 50,000 units Take 1 capsule (50,000 Units total) by mouth once a week 16 capsule 0    fluticasone (FLONASE) 50 mcg/act nasal spray 1 spray into each nostril daily 18.2 mL 1    hydrOXYzine pamoate (VISTARIL) 50 mg capsule Take 1 capsule (50 mg total) by mouth daily at bedtime as needed for anxiety 30 capsule 1    ibuprofen (MOTRIN) 600 mg tablet TAKE ONE TABLET BY MOUTH EVERY EIGHT HOURS AS NEEDED for mild to moderate pain 30 tablet 0    lamoTRIgine (LaMICtal) 150 MG tablet Take 1 tablet (150 mg total) by mouth daily 30 tablet 2    lamoTRIgine (LaMICtal) 25 mg tablet Take 1 tablet (25 mg total) by mouth daily 30 tablet 2    naproxen (Naprosyn) 500 mg tablet Take 1 tablet (500 mg total) by mouth 2 (two) times a day with meals 30 tablet 0    risperiDONE (RisperDAL) 0.25 mg tablet Take 1 tablet (0.25 mg total) by mouth daily at bedtime 30 tablet 2    Sprintec 28 0.25-35 MG-MCG per tablet TAKE ONE TABLET BY MOUTH EVERY DAY 84 tablet 0     No current facility-administered medications for this visit. No Known Allergies    Review of Systems    Video Exam    There were no vitals filed for this visit. Physical Exam     Behavioral Health Psychotherapy Progress Note    Psychotherapy Provided: Individual Psychotherapy     1. Bipolar II disorder ([address])        2. Anxiety and depression            Goals addressed in session: Goal 1     DATA: Allan Dickinson provided update on how work is going, and we reviewed her progress and having a more steady management. She discussed still considering going back to school. We addressed her ambivalence and the process of decision-making. She stated going to the gym and overall dealing with things and people more calmly.  Nonetheless, she addressed anger at her mother and identifying having 'anger issues'. We addressed the significance of her mother and parents in her life and the effects of her 'selfishness' and their mutual chaos and how that affected Janell's ability to identify and feel her own emotions. Provider noted how her mother is a trigger and we discussed this in relation to her reactions to other people. Mendy Rosa stated believing she has ADHD due to difficulty remembering and focusing. We addressed how meds are helpful for overall mood management and encouraged ongoing discussion with psychiatrist for continued strategies. Provider offered supplemental behavioral efforts, which Mendy Rosa seemed to dismiss and feel frustrated. We discussed the potential for further exploration in these areas. Mendy Rosa noted overall feeling ok, and feeling like she needs to start up a problem due to being bored and not used to calmness. Provider encouraged her insights, and further encouraged the importance of dealing with this discomfort to help her better manage periods of calm. Again, encouraged that we further address how she manages through. We confirmed and set appointments, next one in a week. During this session, this clinician used the following therapeutic modalities: Cognitive Behavioral Therapy, Mindfulness-based Strategies, Motivational Interviewing, Solution-Focused Therapy, and Supportive Psychotherapy    Substance Abuse was not addressed during this session. If the client is diagnosed with a co-occurring substance use disorder, please indicate any changes in the frequency or amount of use: not discussed today. Stage of change for addressing substance use diagnoses: Pre-contemplation    ASSESSMENT:  Margarita Acevedo presents with a Euthymic/ normal, Anxious, and Dysthymic mood. her affect is Normal range and intensity, which is congruent, with her mood and the content of the session. The client has made progress on their goals.     Mendy Rosa was able to discuss her thoughts and feelings Margarita Acevedo presents with a low risk of suicide, low risk of self-harm, and low risk of harm to others. For any risk assessment that surpasses a "low" rating, a safety plan must be developed. A safety plan was indicated: no  If yes, describe in detail n/a    PLAN: Between sessions, Mercy Garcia will assess how she manages during 'calm' period. At the next session, the therapist will use Cognitive Behavioral Therapy, Mindfulness-based Strategies, Motivational Interviewing, Solution-Focused Therapy, and Supportive Psychotherapy to address mood management and regulation. Behavioral Health Treatment Plan and Discharge Planning: Mercy Garcia is aware of and agrees to continue to work on their treatment plan. They have identified and are working toward their discharge goals.  yes    Visit start and stop times:    11/14/23  Start Time: 1100  Stop Time: 1152  Total Visit Time: 52 minutes

## 2023-11-17 DIAGNOSIS — J02.9 SORE THROAT: ICD-10-CM

## 2023-11-17 RX ORDER — FLUTICASONE PROPIONATE 50 MCG
1 SPRAY, SUSPENSION (ML) NASAL DAILY
Qty: 18.2 ML | Refills: 0 | Status: SHIPPED | OUTPATIENT
Start: 2023-11-17

## 2023-11-21 ENCOUNTER — TELEMEDICINE (OUTPATIENT)
Dept: BEHAVIORAL/MENTAL HEALTH CLINIC | Facility: CLINIC | Age: 23
End: 2023-11-21
Payer: COMMERCIAL

## 2023-11-21 DIAGNOSIS — F31.81 BIPOLAR II DISORDER (HCC): Primary | ICD-10-CM

## 2023-11-21 PROCEDURE — 90834 PSYTX W PT 45 MINUTES: CPT

## 2023-11-21 NOTE — PSYCH
Virtual Regular Visit    Verification of patient location:    Patient is located at Home in the following state in which I hold an active license [place]      Assessment/Plan:    Problem List Items Addressed This Visit          Other    Bipolar II disorder ([address]) - Primary       Goals addressed in session: Goal 1          Reason for visit is   Chief Complaint   Patient presents with    Virtual Regular Visit          Encounter provider Rosa Pearson    Provider located at [address] [place] [address] [id number] [phone]      Recent Visits  Date Type Provider Dept   11/14/23 Telemedicine Presley Pelt, [address] [place] [address] Street   Showing recent visits within past 7 days and meeting all other requirements  Today's Visits  Date Type Provider Dept   11/21/23 Telemedicine Michela [address], [address] [place] [address] Street   Showing today's visits and meeting all other requirements  Future Appointments  No visits were found meeting these conditions. Showing future appointments within next 150 days and meeting all other requirements       The patient was identified by name and date of birth. Sharon Muñoz was informed that this is a telemedicine visit and [facility] She agrees to proceed. .  My office door was closed. No one else was in the room. She acknowledged consent and understanding of privacy and security of the video platform. The patient has agreed to participate and understands they can discontinue the visit at any time. Patient is aware this is a billable service. Subjective  Sharon Muñoz is a 21 y.o. female . Naseem BENITEZ     Past Medical History:   Diagnosis Date    Anxiety     Concussion     Depression     Head injury     concussions in high school and in college    Self-injurious behavior        Past Surgical History: Procedure Laterality Date    WISDOM TOOTH EXTRACTION         Current Outpatient Medications   Medication Sig Dispense Refill    Drysol 20 % external solution Apply topically to affected area  at bedtime 60 mL 0    ergocalciferol (VITAMIN D2) 50,000 units Take 1 capsule (50,000 Units total) by mouth once a week 16 capsule 0    fluticasone (FLONASE) 50 mcg/act nasal spray 1 spray into each nostril daily 18.2 mL 0    hydrOXYzine pamoate (VISTARIL) 50 mg capsule Take 1 capsule (50 mg total) by mouth daily at bedtime as needed for anxiety 30 capsule 1    ibuprofen (MOTRIN) 600 mg tablet TAKE ONE TABLET BY MOUTH EVERY EIGHT HOURS AS NEEDED for mild to moderate pain 30 tablet 0    lamoTRIgine (LaMICtal) 150 MG tablet Take 1 tablet (150 mg total) by mouth daily 30 tablet 2    lamoTRIgine (LaMICtal) 25 mg tablet Take 1 tablet (25 mg total) by mouth daily 30 tablet 2    naproxen (Naprosyn) 500 mg tablet Take 1 tablet (500 mg total) by mouth 2 (two) times a day with meals 30 tablet 0    risperiDONE (RisperDAL) 0.25 mg tablet Take 1 tablet (0.25 mg total) by mouth daily at bedtime 30 tablet 2    Sprintec 28 0.25-35 MG-MCG per tablet TAKE ONE TABLET BY MOUTH EVERY DAY 84 tablet 0     No current facility-administered medications for this visit. No Known Allergies    Review of Systems    Video Exam    There were no vitals filed for this visit. Physical Exam     Behavioral Health Psychotherapy Progress Note    Psychotherapy Provided: Individual Psychotherapy     1. Bipolar II disorder ([address] [facility])            Goals addressed in session: Goal 1     DATA: Britton Weaver discussed ongoing relationship thoughts with 'the 38 yo'. She shared changing views on how she feels about him and how she is looking at past behaviors and realizing that is not the way she wants to act and needs to do things differently. We discussed the progress with this recognition in herself.  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 Michelet Kurtz loosely mentioned that the anniversary of her father's death is approaching. We discussed her thoughts and feelings. Providre attempted to normalize her feelings. Michelet Kurtz showed provider a myesha bear she keeps in her closet that has some of her father's ashes. We acknowledged the bear, and she did not address much further. Provider pointed out how the bear is there whenever she wishes to get it. Michelet Kurtz stating working Thanksgiving holiday and being ok with that. She addressed how her mother moved out and how she is glad about that, but not knowing the details on why, or caring why. We addressed the effects of both of her parents on herself and her siblings. We addressed holiday time in general. Michelet Kurtz discussed again believing she is ADHD. She stated that when she brings it up with psychiatrist Manny Chaudhary, she tends to joke about it and Manny Chaudhary doesn't know when she is serious. Provider encouraged that Michelet Kurtz focus on the symptoms she experiences versus talking about diagnosis per se. When asked, she stated having trouble focusing (I start to read and then can't do it after a few seconds). We addressed irritability and cutting off people (provider pointed out that she does it in session, to which she smiled). As Michelet Kurtz asked provider to tell Manny Chaudhary, provider encouraged the need for Michelet Kurtz to do so, and again, to review the areas she wants help with. We addressed the potential for ongoing work on these areas in therapy. We confirmed next appointment. During this session, this clinician used the following therapeutic modalities: Cognitive Behavioral Therapy and Supportive Psychotherapy    Substance Abuse was addressed during this session. If the client is diagnosed with a co-occurring substance use disorder, please indicate any changes in the frequency or amount of use: discussed how drinking affected Janell's decision-making.  Stage of change for addressing substance use diagnoses: Pre-contemplation    ASSESSMENT:  Major Medel presents with a Euthymic/ normal mood. her affect is Normal range and intensity, which is congruent, with her mood and the content of the session. The client has made progress on their goals. Elle Gama was able to identify behavioral change needed, and recognize areas of improvement and stability Major Medel presents with a low risk of suicide, low risk of self-harm, and low risk of harm to others. For any risk assessment that surpasses a "low" rating, a safety plan must be developed. A safety plan was indicated: no  If yes, describe in detail n/a    PLAN: Between sessions, Major Medel will ongoing mood management and coping. At the next session, the therapist will use Cognitive Behavioral Therapy, Mindfulness-based Strategies, Motivational Interviewing, Solution-Focused Therapy, and Supportive Psychotherapy to address emotional regulation and self-care. Behavioral Health Treatment Plan and Discharge Planning: Major Medel is aware of and agrees to continue to work on their treatment plan. They have identified and are working toward their discharge goals.  yes    Visit start and stop times:    11/21/23  Start Time: 1100  Stop Time: 1152  Total Visit Time: 52 minutes

## 2023-11-28 ENCOUNTER — OFFICE VISIT (OUTPATIENT)
Dept: FAMILY MEDICINE CLINIC | Facility: CLINIC | Age: 23
End: 2023-11-28
Payer: COMMERCIAL

## 2023-11-28 VITALS
OXYGEN SATURATION: 97 % | DIASTOLIC BLOOD PRESSURE: 80 MMHG | SYSTOLIC BLOOD PRESSURE: 120 MMHG | BODY MASS INDEX: 23.18 KG/M2 | HEIGHT: 63 IN | WEIGHT: 130.8 LBS | HEART RATE: 113 BPM

## 2023-11-28 DIAGNOSIS — F41.9 ANXIETY AND DEPRESSION: ICD-10-CM

## 2023-11-28 DIAGNOSIS — F32.A ANXIETY AND DEPRESSION: ICD-10-CM

## 2023-11-28 DIAGNOSIS — R41.840 ATTENTION DEFICIT: Primary | ICD-10-CM

## 2023-11-28 PROCEDURE — 99214 OFFICE O/P EST MOD 30 MIN: CPT | Performed by: NURSE PRACTITIONER

## 2023-11-28 RX ORDER — AMOXICILLIN AND CLAVULANATE POTASSIUM 875; 125 MG/1; MG/1
1 TABLET, FILM COATED ORAL 2 TIMES DAILY
COMMUNITY
Start: 2023-11-27 | End: 2023-11-28

## 2023-11-28 NOTE — PROGRESS NOTES
Name: Sharon Muñoz      : 2000      MRN: [id number]  Encounter Provider: ANGELA Kaur  Encounter Date: 2023   Encounter department: [address] [place] [address]     1. Attention deficit  Comments:  ADHD screening positive. Advised to discuss with Psychiatrist at her next appointment. 2. Anxiety and depression           Subjective      Patient presents with concerns of ADHD. Patient does see a psychiatrist.  She states she has talked to her psychiatrist about this, but she states her psychiatrist does not take her seriously. Patient states she has been previously diagnosed with bipolar disorder, but she does not agree with this assessment. She does have decreased concentration, difficulty finishing tasks, very agitated, hyperactive. Review of Systems   Constitutional:  Negative for chills, diaphoresis and fever. Respiratory: Negative. Cardiovascular: Negative. Psychiatric/Behavioral:  Positive for agitation, decreased concentration, dysphoric mood and sleep disturbance. The patient is nervous/anxious and is hyperactive.         Current Outpatient Medications on File Prior to Visit   Medication Sig    Drysol 20 % external solution Apply topically to affected area  at bedtime    ergocalciferol (VITAMIN D2) 50,000 units Take 1 capsule (50,000 Units total) by mouth once a week    fluticasone (FLONASE) 50 mcg/act nasal spray 1 spray into each nostril daily    hydrOXYzine pamoate (VISTARIL) 50 mg capsule Take 1 capsule (50 mg total) by mouth daily at bedtime as needed for anxiety    ibuprofen (MOTRIN) 600 mg tablet TAKE ONE TABLET BY MOUTH EVERY EIGHT HOURS AS NEEDED for mild to moderate pain    lamoTRIgine (LaMICtal) 150 MG tablet Take 1 tablet (150 mg total) by mouth daily    lamoTRIgine (LaMICtal) 25 mg tablet Take 1 tablet (25 mg total) by mouth daily    naproxen (Naprosyn) 500 mg tablet Take 1 tablet (500 mg total) by mouth 2 (two) times a day with meals    risperiDONE (RisperDAL) 0.25 mg tablet Take 1 tablet (0.25 mg total) by mouth daily at bedtime    Sprintec 28 0.25-35 MG-MCG per tablet TAKE ONE TABLET BY MOUTH EVERY DAY    [DISCONTINUED] amoxicillin-clavulanate (AUGMENTIN) 875-125 mg per tablet Take 1 tablet by mouth 2 (two) times a day (Patient not taking: Reported on 11/28/2023)       Objective     /80   Pulse (!) 113   Ht 5' 3" (1.6 m)   Wt 59.3 kg (130 lb 12.8 oz)   SpO2 97%   BMI 23.17 kg/m²     Physical Exam  Constitutional:       Appearance: She is well-developed. Cardiovascular:      Rate and Rhythm: Normal rate and regular rhythm. Heart sounds: Normal heart sounds. No murmur heard. Pulmonary:      Effort: Pulmonary effort is normal. No respiratory distress. Breath sounds: Normal breath sounds. Skin:     General: Skin is warm and dry. Neurological:      Mental Status: She is alert and oriented to person, place, and time.        ANGELA Constantino

## 2023-12-05 DIAGNOSIS — G44.011 INTRACTABLE EPISODIC CLUSTER HEADACHE: ICD-10-CM

## 2023-12-05 RX ORDER — NAPROXEN 500 MG/1
500 TABLET ORAL 2 TIMES DAILY WITH MEALS
Qty: 30 TABLET | Refills: 0 | Status: SHIPPED | OUTPATIENT
Start: 2023-12-05

## 2023-12-07 ENCOUNTER — OFFICE VISIT (OUTPATIENT)
Dept: FAMILY MEDICINE CLINIC | Facility: CLINIC | Age: 23
End: 2023-12-07
Payer: COMMERCIAL

## 2023-12-07 ENCOUNTER — TELEMEDICINE (OUTPATIENT)
Dept: BEHAVIORAL/MENTAL HEALTH CLINIC | Facility: CLINIC | Age: 23
End: 2023-12-07
Payer: COMMERCIAL

## 2023-12-07 VITALS
DIASTOLIC BLOOD PRESSURE: 80 MMHG | HEART RATE: 106 BPM | WEIGHT: 131.8 LBS | BODY MASS INDEX: 23.35 KG/M2 | HEIGHT: 63 IN | OXYGEN SATURATION: 97 % | SYSTOLIC BLOOD PRESSURE: 110 MMHG

## 2023-12-07 DIAGNOSIS — F31.81 BIPOLAR II DISORDER (HCC): Primary | ICD-10-CM

## 2023-12-07 DIAGNOSIS — F41.9 ANXIETY AND DEPRESSION: ICD-10-CM

## 2023-12-07 DIAGNOSIS — F32.A ANXIETY AND DEPRESSION: ICD-10-CM

## 2023-12-07 DIAGNOSIS — J02.9 SORE THROAT: Primary | ICD-10-CM

## 2023-12-07 LAB — S PYO AG THROAT QL: NEGATIVE

## 2023-12-07 PROCEDURE — 87880 STREP A ASSAY W/OPTIC: CPT | Performed by: NURSE PRACTITIONER

## 2023-12-07 PROCEDURE — 90834 PSYTX W PT 45 MINUTES: CPT

## 2023-12-07 PROCEDURE — 99214 OFFICE O/P EST MOD 30 MIN: CPT | Performed by: NURSE PRACTITIONER

## 2023-12-07 NOTE — PROGRESS NOTES
Name: Sharon Muñoz      : 2000      MRN: [id number]  Encounter Provider: ANGELA Kaur  Encounter Date: 2023   Encounter department: [address] [place] [address]     1. Sore throat  Comments:  strep negative--- advised to switch to xyzal and flonase. 2. Anxiety and depression  Assessment & Plan:  Follows with psych. Subjective      Patient presents for concerns of sore throat. Patient believes that she swallowed a piece of her retainer. She does have some mild congestion, fevers, sweats, chills. Review of Systems   Constitutional:  Positive for chills, diaphoresis, fatigue and fever. HENT:  Positive for congestion, postnasal drip, sinus pressure, sinus pain and sore throat. Respiratory:  Negative for cough and shortness of breath. Cardiovascular:  Negative for chest pain and palpitations. Gastrointestinal: Negative. Psychiatric/Behavioral:  Positive for decreased concentration and dysphoric mood. The patient is nervous/anxious and is hyperactive.         Current Outpatient Medications on File Prior to Visit   Medication Sig    Drysol 20 % external solution Apply topically to affected area  at bedtime    ergocalciferol (VITAMIN D2) 50,000 units Take 1 capsule (50,000 Units total) by mouth once a week    fluticasone (FLONASE) 50 mcg/act nasal spray 1 spray into each nostril daily    hydrOXYzine pamoate (VISTARIL) 50 mg capsule Take 1 capsule (50 mg total) by mouth daily at bedtime as needed for anxiety    ibuprofen (MOTRIN) 600 mg tablet TAKE ONE TABLET BY MOUTH EVERY EIGHT HOURS AS NEEDED for mild to moderate pain    lamoTRIgine (LaMICtal) 150 MG tablet Take 1 tablet (150 mg total) by mouth daily    lamoTRIgine (LaMICtal) 25 mg tablet Take 1 tablet (25 mg total) by mouth daily    naproxen (Naprosyn) 500 mg tablet Take 1 tablet (500 mg total) by mouth 2 (two) times a day with meals    risperiDONE (RisperDAL) 0.25 mg tablet Take 1 tablet (0.25 mg total) by mouth daily at bedtime    Sprintec 28 0.25-35 MG-MCG per tablet TAKE ONE TABLET BY MOUTH EVERY DAY       Objective     /80   Pulse (!) 106   Ht 5' 3" (1.6 m)   Wt 59.8 kg (131 lb 12.8 oz)   SpO2 97%   BMI 23.35 kg/m²     Physical Exam  Constitutional:       Appearance: She is well-developed. HENT:      Right Ear: A middle ear effusion is present. Left Ear: A middle ear effusion is present. Nose: Congestion present. Mouth/Throat:      Pharynx: Pharyngeal swelling and posterior oropharyngeal erythema present. Tonsils: Tonsillar exudate present. 2+ on the right. 2+ on the left. Cardiovascular:      Rate and Rhythm: Normal rate and regular rhythm. Heart sounds: Normal heart sounds. No murmur heard. Pulmonary:      Effort: Pulmonary effort is normal. No respiratory distress. Breath sounds: Normal breath sounds. Lymphadenopathy:      Cervical: Cervical adenopathy present. Skin:     General: Skin is warm and dry. Neurological:      Mental Status: She is alert and oriented to person, place, and time.        [id number] [facility]

## 2023-12-07 NOTE — PSYCH
Virtual Regular Visit    Verification of patient location:    Patient is located at Home in the following state in which I hold an active license [place]      Assessment/Plan:    Problem List Items Addressed This Visit          Other    Anxiety and depression    Bipolar II disorder ([address]) - Primary       Goals addressed in session: Goal 1          Reason for visit is   Chief Complaint   Patient presents with    Virtual Regular Visit          Encounter provider Tara Farnsworth LCSW    Provider located at [address] [place] [address] Road [id number] [phone]      Recent Visits  No visits were found meeting these conditions. Showing recent visits within past 7 days and meeting all other requirements  Today's Visits  Date Type Provider Dept   12/07/23 Telemedicine Tara Farnsworth, [facility] Therapist [facility]   Showing today's visits and meeting all other requirements  Future Appointments  No visits were found meeting these conditions. Showing future appointments within next 150 days and meeting all other requirements       The patient was identified by name and date of birth. Nuvia Haro was informed that this is a telemedicine visit and [facility] She agrees to proceed. .  My office door was closed. No one else was in the room. She acknowledged consent and understanding of privacy and security of the video platform. The patient has agreed to participate and understands they can discontinue the visit at any time. Patient is aware this is a billable service. Subjective  Nuvia Haro is a 21 y.o. female . Zimmerman Redhead       HPI     Past Medical History:   Diagnosis Date    Anxiety     Concussion     Depression     Head injury     concussions in high school and in college    Self-injurious behavior        Past Surgical History:   Procedure Laterality Date    WISDOM TOOTH EXTRACTION         Current Outpatient Medications   Medication Sig Dispense Refill    Drysol 20 % external solution Apply topically to affected area  at bedtime 60 mL 0    ergocalciferol (VITAMIN D2) 50,000 units Take 1 capsule (50,000 Units total) by mouth once a week 16 capsule 0    fluticasone (FLONASE) 50 mcg/act nasal spray 1 spray into each nostril daily 18.2 mL 0    hydrOXYzine pamoate (VISTARIL) 50 mg capsule Take 1 capsule (50 mg total) by mouth daily at bedtime as needed for anxiety 30 capsule 1    ibuprofen (MOTRIN) 600 mg tablet TAKE ONE TABLET BY MOUTH EVERY EIGHT HOURS AS NEEDED for mild to moderate pain 30 tablet 0    lamoTRIgine (LaMICtal) 150 MG tablet Take 1 tablet (150 mg total) by mouth daily 30 tablet 2    lamoTRIgine (LaMICtal) 25 mg tablet Take 1 tablet (25 mg total) by mouth daily 30 tablet 2    naproxen (Naprosyn) 500 mg tablet Take 1 tablet (500 mg total) by mouth 2 (two) times a day with meals 30 tablet 0    risperiDONE (RisperDAL) 0.25 mg tablet Take 1 tablet (0.25 mg total) by mouth daily at bedtime 30 tablet 2    Sprintec 28 0.25-35 MG-MCG per tablet TAKE ONE TABLET BY MOUTH EVERY DAY 84 tablet 0     No current facility-administered medications for this visit. No Known Allergies    Review of Systems    Video Exam    There were no vitals filed for this visit. Physical Exam     Behavioral Health Psychotherapy Progress Note    Psychotherapy Provided: Individual Psychotherapy     1. Bipolar II disorder ([address])        2. Anxiety and depression            Goals addressed in session: Goal 1     DATA: Allan Dickinson addressed ongoing questions of diagnosis and frustration with psychiatric provider regarding her bipolar dx (not ADD, not borderline). Provider encouraged again ways to identify and ask questions. Provider attempted to understand and assess the deeper rationale for her questioning, but with not much gained insight.  Allan Dickinson stated she believes her prescribed medications are helping her irritability and anger. Susan Frazier addressed areas of focus and irritability still a main issue in her day to day. 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 We began to address possible behavioral strategies. Susan Frazier addressed wanting to stay in her job, but wanting to move out and maybe go to [employer] where her friends live. We addressed the potential longer term goal. She switched gears to discuss the '39year old'. Upon her indirectly questioning for answers, and provider asking if she wants to know, provider addressed potential dynamics at work for why she stays with him, and things to potentially consider. We discussed fears of true intimacy, familiarity (and some level of comfort) with chaotic relationships, questions of worthiness for more fulfilling relationship, fending off loneliness. Susan Frzaier was able to acknowledge these possibilities, and was smiling, but quickly tried to get off these topics. Provider encouraged ongoing discussion, while also encouraging refocus onto self, self-care, self-needs. We reviewed and encouraged that she think about questions she wishes to address with psychiatric provider (appointment next week), with consideration of benefits in so doing, while attempting to validate hesitancies. We confirmed and set appointments. During this session, this clinician used the following therapeutic modalities: Cognitive Behavioral Therapy and Supportive Psychotherapy    Substance Abuse was not addressed during this session. If the client is diagnosed with a co-occurring substance use disorder, please indicate any changes in the frequency or amount of use: drinking behaviors not discussed today. Stage of change for addressing substance use diagnoses: Pre-contemplation    ASSESSMENT:  Vannesa Canales presents with a Euthymic/ normal and Anxious mood, changing topics.      her affect is Normal range and intensity, which is congruent, with her mood and the content of the session. The client has made progress on their goals. Mallory Forbes was able to discuss potential goals for job and [employer] presents with a low risk of suicide, low risk of self-harm, and low risk of harm to others. For any risk assessment that surpasses a "low" rating, a safety plan must be developed. A safety plan was indicated: no  If yes, describe in detail n/a    PLAN: Between sessions, Edward Villanuevakelly will address questions with psychiatrist; continue broad goal planning and self-focus. At the next session, the therapist will use Cognitive Behavioral Therapy and Supportive Psychotherapy to address mood stability, use of coping skills. Behavioral Health Treatment Plan and Discharge Planning: Edward Parmar is aware of and agrees to continue to work on their treatment plan. They have identified and are working toward their discharge goals.  yes    Visit start and stop times:    12/07/23  Start Time: 1100  Stop Time: 1152  Total Visit Time: 52 minutes

## 2023-12-09 DIAGNOSIS — E55.9 VITAMIN D DEFICIENCY: ICD-10-CM

## 2023-12-09 RX ORDER — ERGOCALCIFEROL 1.25 MG/1
50000 CAPSULE ORAL WEEKLY
Qty: 16 CAPSULE | Refills: 0 | Status: SHIPPED | OUTPATIENT
Start: 2023-12-09

## 2023-12-12 ENCOUNTER — TELEMEDICINE (OUTPATIENT)
Dept: PSYCHIATRY | Facility: CLINIC | Age: 23
End: 2023-12-12
Payer: COMMERCIAL

## 2023-12-12 DIAGNOSIS — F31.81 BIPOLAR II DISORDER (HCC): Primary | ICD-10-CM

## 2023-12-12 DIAGNOSIS — F90.2 ADHD (ATTENTION DEFICIT HYPERACTIVITY DISORDER), COMBINED TYPE: ICD-10-CM

## 2023-12-12 PROCEDURE — 99214 OFFICE O/P EST MOD 30 MIN: CPT | Performed by: NURSE PRACTITIONER

## 2023-12-12 RX ORDER — RISPERIDONE 0.25 MG/1
0.25 TABLET ORAL 2 TIMES DAILY
Qty: 60 TABLET | Refills: 2 | Status: SHIPPED | OUTPATIENT
Start: 2023-12-12

## 2023-12-12 RX ORDER — ATOMOXETINE 10 MG/1
10 CAPSULE ORAL DAILY
Qty: 30 CAPSULE | Refills: 0 | Status: SHIPPED | OUTPATIENT
Start: 2023-12-12

## 2023-12-13 NOTE — PSYCH
Virtual Regular Visit    Verification of patient location:    Patient is located in the following state in which I hold an active license [place]    Problem List Items Addressed This Visit     Bipolar II disorder ([address]) - Primary    Relevant Medications    atomoxetine (STRATTERA) 10 MG capsule    risperiDONE (RisperDAL) 0.25 mg tablet   Other Visit Diagnoses     ADHD (attention deficit hyperactivity disorder), combined type        Relevant Medications    atomoxetine (STRATTERA) 10 MG capsule    risperiDONE (RisperDAL) 0.25 mg tablet    Other Relevant Orders    ECG 12 lead             Encounter provider ANGELA Steinberg    Provider located at    [address] [facility]  [address] [facility] [address] [phone]    Recent Visits  Date Type Provider Dept   12/12/23 [facility], [address] [place] [address]   12/07/23 Office Visit ANGELA Arthur [address] recent visits within past 7 days and meeting all other requirements  Future Appointments  No visits were found meeting these conditions. Showing future appointments within next 150 days and meeting all other requirements           The patient was identified by name and date of birth. Patient was informed that this is a telemedicine visit and [facility] She agrees to proceed. .  My office door was closed. No one else was in the room. She acknowledged consent and understanding of privacy and security of the video platform. The patient has agreed to participate and understands they can discontinue the visit at any time. Patient is aware this is a billable service.      HPI     Current Outpatient Medications   Medication Sig Dispense Refill   • atomoxetine (STRATTERA) 10 MG capsule Take 1 capsule (10 mg total) by mouth daily 30 capsule 0   • Drysol 20 % external solution Apply topically to affected area  at bedtime 60 mL 0   • ergocalciferol (VITAMIN D2) 50,000 units TAKE ONE CAPSULE BY MOUTH WEEKLY 16 capsule 0   • fluticasone (FLONASE) 50 mcg/act nasal spray 1 spray into each nostril daily 18.2 mL 0   • hydrOXYzine pamoate (VISTARIL) 50 mg capsule Take 1 capsule (50 mg total) by mouth daily at bedtime as needed for anxiety 30 capsule 1   • ibuprofen (MOTRIN) 600 mg tablet TAKE ONE TABLET BY MOUTH EVERY EIGHT HOURS AS NEEDED for mild to moderate pain 30 tablet 0   • lamoTRIgine (LaMICtal) 150 MG tablet Take 1 tablet (150 mg total) by mouth daily 30 tablet 2   • lamoTRIgine (LaMICtal) 25 mg tablet Take 1 tablet (25 mg total) by mouth daily 30 tablet 2   • naproxen (Naprosyn) 500 mg tablet Take 1 tablet (500 mg total) by mouth 2 (two) times a day with meals 30 tablet 0   • risperiDONE (RisperDAL) 0.25 mg tablet Take 1 tablet (0.25 mg total) by mouth 2 (two) times a day 60 tablet 2   • Sprintec 28 0.25-35 MG-MCG per tablet TAKE ONE TABLET BY MOUTH EVERY DAY 84 tablet 0     No current facility-administered medications for this visit. Review of Systems  Video Exam    There were no vitals filed for this visit. Physical Exam   As a result of this visit, I have referred the patient for further respiratory evaluation. No    I spent 30 minutes directly with the patient during this visit  VIRTUAL VISIT DISCLAIMER    Hermelindo Morgan acknowledges that she has consented to an online visit or consultation. She understands that the online visit is based solely on information provided by her, and that, in the absence of a face-to-face physical evaluation by the physician, the diagnosis she receives is both limited and provisional in terms of accuracy and completeness. This is not intended to replace a full medical face-to-face evaluation by the physician. Hermelindo Morgan understands and accepts these terms.     MEDICATION MANAGEMENT NOTE        [facility]    Name and Date of Birth:  Mandy Slade 21 y.o. 2000 MRN: [id number]    Date of Visit: December 12, 2023    No Known Allergies    Visit Time    Visit Start Time: 7475  Visit Stop Time: 1100  Total Visit Duration:  30 minutes    SUBJECTIVE:    Mychal Ruiz is seen today for a follow up for Bipolar Disorder type II. She continues to decompensate since the last visit. Mychal Ruiz seen today virtually for medication management follow-up. She was last seen by this provider on 11/7/23. Mychal Ruiz is irritable and short during conversation with this provider today. She states that she "just woke up" and "feels great" but mood is incongruent with this statement. When asked about her mood, she states "I haven't killed anyone yet". This provider did bring up her recent request for ADHD testing and Mychal Ruiz stated reports that she did not make an appointment because [facility] and the [facility] both do not take her insurance. This provider will put in a referral for our testing at this time. This provider did attempt to discuss symptoms  of ADHD when Mychal Ruiz was young and she becomes irritable with this provider again, stating "I don't fucking remember". She does state that she remembers that her mom wanted to have her tested for ADHD at one time, but never did. She states that her mom is going to start taking Lithium, and we did discuss the benefits of lithium on mood. She states that she does not like needles, and we discussed in detail how often she would need to have testing performed if she did initiate [place]. PLAN:    Janell's symptoms of irritability, impulsivity, indiscretions with men, and her mood swings are borderline in nature, and has been diagnosed previously by this provider with Bipolar 2. She has not responded well to Tameka Abreu. She was on Wellbutrin for a long time with no benefit. However, she did score positive on the ADHD screening tool.  This provider will not prescribe a controlled stimulant to [facility] until a full ADHD evaluation has been completed, but we can trial a low dose of strattera at this time. She was provided education on Strattera and was warned of the possibility of saul exacerbation with the usage of this medication. She will have an EKG completed prior to her taking the medication. She is agreeable to the treatment plan at this time. She will continue to consider the usage of Lithium. We will also increase her risperdal to 0.25mg PO BID at this time to help manage her irritability and anxiety that she has been experiencing. She will follow up in 2 weeks. Continue medications as ordered  Increase risperdal 0.25mg PO BID  Vistaril 50 mg p.o. at bedtime as needed  Continue Lamictal 175mg p.o. daily  Initiate strattera 10mg PO daily  EKG will be completed prior to starting Strattera  She will continue therapy  She will follow up with this provider in 2 weeks  She will call sooner with concerns or issues if they arise prior to scheduled appointment    She was provided with the education on risperdal.  PARQ completed including sedation, agitation, akathisia, TD, dystonic reactions, NMS, EPS, GI distress, dizziness, risk of metabolic syndrome, orthostatic hypotension and cardiovascular risks such as QT prolongation, increased prolactin    Lamotrigine PARQ completed including dizziness, headaches, ataxia, vision problems, somnolence, sleep changes, cognitive difficulties, rash (including Becerril-Chato rash), and others, risk of teratogenicity for females. Education provided on Neuroleptic malignant syndrome. Patient told that NMS is a life-threatening, neurological disorder most often caused by an adverse reaction to neuroleptic or antipsychotic drugs. Symptoms include high fever, sweating, unstable blood pressure, stupor, muscular rigidity, and autonomic dysfunction.  In most cases, the disorder develops within the first 2 weeks of treatment with the drug; however, the disorder may develop any time during the therapy period. Patient advised to call the office or go to the ED immediately if these symptoms develop. She denies any side effects from current psychiatric medications. Aware of 24 hour and weekend coverage for urgent situations accessed by calling [facility] main practice number  Continue psychotherapy with therapist  Medication management every 2 weeks  Aware of need to follow up with family physician for medical issues    Diagnoses and all orders for this visit:    Bipolar II disorder ([address])  -     risperiDONE (RisperDAL) 0.25 mg tablet; Take 1 tablet (0.25 mg total) by mouth 2 (two) times a day    ADHD (attention deficit hyperactivity disorder), combined type  -     atomoxetine (STRATTERA) 10 MG capsule; Take 1 capsule (10 mg total) by mouth daily  -     ECG 12 lead;  Future     Other atypical antipsychotics trialed and found to be ineffective:  Zyprexa, Abilify, Vraylar, and Latuda     Current Outpatient Medications on File Prior to Visit   Medication Sig Dispense Refill   • Drysol 20 % external solution Apply topically to affected area  at bedtime 60 mL 0   • ergocalciferol (VITAMIN D2) 50,000 units TAKE ONE CAPSULE BY MOUTH WEEKLY 16 capsule 0   • fluticasone (FLONASE) 50 mcg/act nasal spray 1 spray into each nostril daily 18.2 mL 0   • hydrOXYzine pamoate (VISTARIL) 50 mg capsule Take 1 capsule (50 mg total) by mouth daily at bedtime as needed for anxiety 30 capsule 1   • ibuprofen (MOTRIN) 600 mg tablet TAKE ONE TABLET BY MOUTH EVERY EIGHT HOURS AS NEEDED for mild to moderate pain 30 tablet 0   • lamoTRIgine (LaMICtal) 150 MG tablet Take 1 tablet (150 mg total) by mouth daily 30 tablet 2   • lamoTRIgine (LaMICtal) 25 mg tablet Take 1 tablet (25 mg total) by mouth daily 30 tablet 2   • naproxen (Naprosyn) 500 mg tablet Take 1 tablet (500 mg total) by mouth 2 (two) times a day with meals 30 tablet 0   • Sprintec 28 0.25-35 MG-MCG per tablet TAKE ONE TABLET BY MOUTH EVERY DAY 84 tablet 0     No current facility-administered medications on file prior to visit. Psychotherapy Provided:     Individual psychotherapy provided: Yes  Counseling was provided during the session today for 16 minutes. Supportive counseling provided. Medication changes discussed with Rani Bazzi. Medication education provided to Rani Bazzi. Discussed with Rani Bazzi coping with job stress, social difficulties and everyday stressors. Coping strategies reviewed with Rani Bazzi. Importance of medication and treatment compliance reviewed with Rani Bazzi. Importance of follow up with family physician for medical issues reviewed with Rani Bazzi. Reassurance and supportive therapy provided. Crisis/safety plan discussed with Rani Bazzi. Patient will call prior to scheduled appointment if they have any issues or concerns. Patient understands they can access the office by calling the main number at any time if they are in crisis. They also understand they can call their [facility]'s crisis number or go to their nearest ED if suicidal ideation increases or if they develop a plan or intent. HPI ROS Appetite Changes and Sleep:     She reports normal sleep, adequate appetite, normal energy level.  Denies homicidal ideation, denies suicidal ideation    Review Of Systems:      HPI ROS:               Medication Side Effects:  denies   Depression (10 worst): 6.5/10 6/10   Anxiety (10 worst): 9/10 9/10   Safety concerns (SI, HI, etc): denies denies   Sleep: good good   Energy: adequate adequate   Appetite: good good     General normal    Personality no change in personality   Constitutional as noted in HPI   ENT negative   Cardiovascular negative   Respiratory negative   Gastrointestinal negative   Genitourinary negative   Musculoskeletal negative   Integumentary negative   Neurological negative   Endocrine negative   Other Symptoms none, all other systems are negative     Mental Status Evaluation:    Appearance Appropriately dressed and Good eye contact   Behavior restless and fidgety and guarded   Mood anxious, irritable, and angry  Depression Scale -  6.5  of 10 (0 = No depression)  Anxiety Scale - 9 of 10 (0 = No anxiety)   Speech Normal rate and volume   Affect labile and mood-incongruent   Thought Processes Goal directed and coherent   Thought Content Does not verbalize delusional material   Associations Tightly connected   Perceptual Disturbances Denies hallucinations and does not appear to be responding to internal stimuli   Risk Potential Suicidal/Homicidal Ideation - No evidence of suicidal or homicidal ideation and patient does not verbalize suicidal or homicidal ideation  Risk of Violence - No evidence of risk for violence found on assessment  Risk of Self Mutilation - No evidence of risk for self mutilation found on assessment   Orientation oriented to person, place, time/date and situation   Memory recent and remote memory grossly intact   Consciousness alert and awake   Attention/Concentration attention span and concentration appear shorter than expected for age   Insight fair   Judgement fair   Muscle Strength and Gait normal muscle strength and normal muscle tone, normal gait/station and normal balance   Motor Activity no abnormal movements   Language no difficulty naming common objects, no difficulty repeating a phrase, no difficulty writing a sentence   Fund of Knowledge adequate knowledge of current events  adequate fund of knowledge regarding past history  adequate fund of knowledge regarding vocabulary      Past Psychiatric History Update:     Inpatient Psychiatric Admission Since Last Encounter:   no  Changes to Outpatient Psychiatric Treatment Team:    no  Suicide Attempt Or Self Mutilation Since Last Encounter:   no  Incidence of Violent Behavior Since Last Encounter:   no    Traumatic History Update:     New Onset of Abuse Since Last Encounter:   no  Traumatic Events Since Last Encounter:   no    Past Medical History:    Past Medical History:   Diagnosis Date   • Anxiety    • Concussion    • Depression    • Head injury     concussions in high school and in college   • Self-injurious behavior         Past Surgical History:   Procedure Laterality Date   • WISDOM TOOTH EXTRACTION       No Known Allergies  Substance Abuse History:    Social History     Substance and Sexual Activity   Alcohol Use Yes   • Alcohol/week: 3.0 - 4.0 standard drinks of alcohol   • Types: 3 - 4 Shots of liquor per week    Comment: 3-4 drinks, 2 times per week     Social History     Substance and Sexual Activity   Drug Use No     Social History:    Social History     Socioeconomic History   • Marital status: Single     Spouse name: Not on file   • Number of children: 0   • Years of education: senior in college currently   • Highest education level: High school graduate   Occupational History   • 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   Tobacco Use   • Smoking status: Never   • Smokeless tobacco: Never   Vaping Use   • Vaping status: Never Used   Substance and Sexual Activity   • Alcohol use:  Yes     Alcohol/week: 3.0 - 4.0 standard drinks of alcohol     Types: 3 - 4 Shots of liquor per week     Comment: 3-4 drinks, 2 times per week   • Drug use: No   • Sexual activity: Yes     Partners: Male     Birth control/protection: Condom Male   Other Topics Concern   • Not on file   Social History Narrative   • Not on file     Social Determinants of Health     Financial Resource Strain: Not on file   Food Insecurity: Not on file   Transportation Needs: Not on file   Physical Activity: Not on file   Stress: Not on file   Social Connections: Not on file   Intimate Partner Violence: Not on file   Housing Stability: Not on file     Family Psychiatric History:     Family History   Problem Relation Age of Onset   • Hypertension Mother    • Mental illness Mother    • Depression Mother    • Mental illness Sister    • Coronary artery disease Maternal Grandmother    • Throat cancer Paternal Grandfather      History Review: The following portions of the patient's history were reviewed and updated as appropriate: allergies, current medications, past family history, past medical history, past social history, past surgical history and problem list     OBJECTIVE:     Vital signs in last 24 hours: There were no vitals filed for this visit. Laboratory Results: I have personally reviewed all pertinent laboratory/tests results. Suicide/Homicide Risk Assessment:    Risk of Harm to Self:  The following ratings are based on assessment at the time of the interview  Recent Specific Risk Factors include: current depressive symptoms, current anxiety symptoms, unstable mood  Demographic risk factors include: , age: young adult (15-24)  Historical Risk Factors include: chronic depression, chronic anxiety symptoms, chronic mood disorder, history of suicide attempts, history of impulsive behaviors, history of traumatic experiences  Protective Factors: no current suicidal ideation, access to mental health treatment, compliant with medications, compliant with mental health treatment, having a desire to be alive, stable living environment, sense of determination, supportive family  Based on today's assessment, Kenya Fernandez presents the following risk of harm to self: low    Risk of Harm to Others:   The following ratings are based on assessment at the time of the interview  Protective Factors: no current homicidal ideation  Based on today's assessment, Kenya Fernandez presents the following risk of harm to others: none    The following interventions are recommended: contracts for safety at present - agrees to go to ED if feeling unsafe, return in 2 weeks for reassessment, therapy appointment in 1 week, contracts for safety at present - agrees to call Crisis Intervention Service if feeling unsafe    Medications Risks/Benefits:      Risks, Benefits And Possible Side Effects Of Medications:    Discussed risks and benefits of treatment with patient including risk of parkinsonian symptoms, metabolic syndrome, tardive dyskinesia and neuroleptic malignant syndrome related to treatment with antipsychotic medications and risk of rash related to treatment with Lamictal     Controlled Medication Discussion:     Not applicable    Treatment Plan:    Due for update/Updated:   no  Last treatment plan done 9/15/23 by Edelmira Hua LCSW. Treatment Plan due on 3/15/24.     ANGELA Cooper 12/13/23    This note was not shared with the patient due to reasonable likelihood of causing patient harm

## 2023-12-14 ENCOUNTER — TELEPHONE (OUTPATIENT)
Dept: PSYCHIATRY | Facility: CLINIC | Age: 23
End: 2023-12-14

## 2023-12-14 ENCOUNTER — CLINICAL SUPPORT (OUTPATIENT)
Dept: FAMILY MEDICINE CLINIC | Facility: CLINIC | Age: 23
End: 2023-12-14
Payer: COMMERCIAL

## 2023-12-14 DIAGNOSIS — Z13.6 ENCOUNTER FOR SCREENING FOR CARDIOVASCULAR DISORDERS: Primary | ICD-10-CM

## 2023-12-14 PROCEDURE — 93000 ELECTROCARDIOGRAM COMPLETE: CPT

## 2023-12-15 NOTE — TELEPHONE ENCOUNTER
Prior Authorization for Atomoxetine 10 MG capsules faxed to [facility] [address]. Decision pending.

## 2023-12-18 NOTE — TELEPHONE ENCOUNTER
Fax from [employer] with [place] approval for Atomoxetine 10 MG capsule effective 12/15/23.    Called pharmacy and updated them with approval.    LM on Janell's VM informing her that medication was approved by her insurance.

## 2023-12-21 ENCOUNTER — APPOINTMENT (OUTPATIENT)
Dept: LAB | Facility: HOSPITAL | Age: 23
End: 2023-12-21
Payer: COMMERCIAL

## 2023-12-21 ENCOUNTER — OFFICE VISIT (OUTPATIENT)
Dept: FAMILY MEDICINE CLINIC | Facility: CLINIC | Age: 23
End: 2023-12-21
Payer: COMMERCIAL

## 2023-12-21 VITALS
DIASTOLIC BLOOD PRESSURE: 76 MMHG | HEART RATE: 105 BPM | HEIGHT: 63 IN | BODY MASS INDEX: 23.53 KG/M2 | OXYGEN SATURATION: 98 % | WEIGHT: 132.8 LBS | SYSTOLIC BLOOD PRESSURE: 112 MMHG | TEMPERATURE: 97.8 F

## 2023-12-21 DIAGNOSIS — Z00.00 HEALTHCARE MAINTENANCE: ICD-10-CM

## 2023-12-21 DIAGNOSIS — R53.83 OTHER FATIGUE: ICD-10-CM

## 2023-12-21 DIAGNOSIS — F32.A ANXIETY AND DEPRESSION: ICD-10-CM

## 2023-12-21 DIAGNOSIS — F41.9 ANXIETY AND DEPRESSION: ICD-10-CM

## 2023-12-21 DIAGNOSIS — R42 LIGHTHEADEDNESS: Primary | ICD-10-CM

## 2023-12-21 LAB
25(OH)D3 SERPL-MCNC: 104.2 NG/ML (ref 30–100)
ALBUMIN SERPL BCP-MCNC: 4.5 G/DL (ref 3.5–5)
ALP SERPL-CCNC: 50 U/L (ref 34–104)
ALT SERPL W P-5'-P-CCNC: 14 U/L (ref 7–52)
ANION GAP SERPL CALCULATED.3IONS-SCNC: 7 MMOL/L
AST SERPL W P-5'-P-CCNC: 18 U/L (ref 13–39)
BASOPHILS # BLD AUTO: 0.03 THOUSANDS/ÂΜL (ref 0–0.1)
BASOPHILS NFR BLD AUTO: 0 % (ref 0–1)
BILIRUB SERPL-MCNC: 0.52 MG/DL (ref 0.2–1)
BUN SERPL-MCNC: 10 MG/DL (ref 5–25)
CALCIUM SERPL-MCNC: 9.5 MG/DL (ref 8.4–10.2)
CHLORIDE SERPL-SCNC: 104 MMOL/L (ref 96–108)
CHOLEST SERPL-MCNC: 153 MG/DL
CO2 SERPL-SCNC: 28 MMOL/L (ref 21–32)
CREAT SERPL-MCNC: 0.89 MG/DL (ref 0.6–1.3)
EOSINOPHIL # BLD AUTO: 0.08 THOUSAND/ÂΜL (ref 0–0.61)
EOSINOPHIL NFR BLD AUTO: 1 % (ref 0–6)
ERYTHROCYTE [DISTWIDTH] IN BLOOD BY AUTOMATED COUNT: 11.9 % (ref 11.6–15.1)
EST. AVERAGE GLUCOSE BLD GHB EST-MCNC: 97 MG/DL
GFR SERPL CREATININE-BSD FRML MDRD: 91 ML/MIN/1.73SQ M
GLUCOSE P FAST SERPL-MCNC: 87 MG/DL (ref 65–99)
HBA1C MFR BLD: 5 %
HCT VFR BLD AUTO: 45 % (ref 34.8–46.1)
HDLC SERPL-MCNC: 75 MG/DL
HGB BLD-MCNC: 15.7 G/DL (ref 11.5–15.4)
IMM GRANULOCYTES # BLD AUTO: 0.02 THOUSAND/UL (ref 0–0.2)
IMM GRANULOCYTES NFR BLD AUTO: 0 % (ref 0–2)
LDLC SERPL CALC-MCNC: 57 MG/DL (ref 0–100)
LYMPHOCYTES # BLD AUTO: 2.87 THOUSANDS/ÂΜL (ref 0.6–4.47)
LYMPHOCYTES NFR BLD AUTO: 42 % (ref 14–44)
MCH RBC QN AUTO: 30.6 PG (ref 26.8–34.3)
MCHC RBC AUTO-ENTMCNC: 34.9 G/DL (ref 31.4–37.4)
MCV RBC AUTO: 88 FL (ref 82–98)
MONOCYTES # BLD AUTO: 0.6 THOUSAND/ÂΜL (ref 0.17–1.22)
MONOCYTES NFR BLD AUTO: 9 % (ref 4–12)
NEUTROPHILS # BLD AUTO: 3.32 THOUSANDS/ÂΜL (ref 1.85–7.62)
NEUTS SEG NFR BLD AUTO: 48 % (ref 43–75)
NONHDLC SERPL-MCNC: 78 MG/DL
NRBC BLD AUTO-RTO: 0 /100 WBCS
PLATELET # BLD AUTO: 287 THOUSANDS/UL (ref 149–390)
PMV BLD AUTO: 10 FL (ref 8.9–12.7)
POTASSIUM SERPL-SCNC: 3.9 MMOL/L (ref 3.5–5.3)
PROT SERPL-MCNC: 7.6 G/DL (ref 6.4–8.4)
RBC # BLD AUTO: 5.13 MILLION/UL (ref 3.81–5.12)
SODIUM SERPL-SCNC: 139 MMOL/L (ref 135–147)
TRIGL SERPL-MCNC: 103 MG/DL
TSH SERPL DL<=0.05 MIU/L-ACNC: 1.98 UIU/ML (ref 0.45–4.5)
VIT B12 SERPL-MCNC: 429 PG/ML (ref 180–914)
WBC # BLD AUTO: 6.92 THOUSAND/UL (ref 4.31–10.16)

## 2023-12-21 PROCEDURE — 80061 LIPID PANEL: CPT

## 2023-12-21 PROCEDURE — 83036 HEMOGLOBIN GLYCOSYLATED A1C: CPT

## 2023-12-21 PROCEDURE — 86618 LYME DISEASE ANTIBODY: CPT

## 2023-12-21 PROCEDURE — 80053 COMPREHEN METABOLIC PANEL: CPT

## 2023-12-21 PROCEDURE — 99214 OFFICE O/P EST MOD 30 MIN: CPT | Performed by: NURSE PRACTITIONER

## 2023-12-21 PROCEDURE — 82306 VITAMIN D 25 HYDROXY: CPT

## 2023-12-21 PROCEDURE — 82607 VITAMIN B-12: CPT

## 2023-12-21 PROCEDURE — 84443 ASSAY THYROID STIM HORMONE: CPT

## 2023-12-21 PROCEDURE — 85025 COMPLETE CBC W/AUTO DIFF WBC: CPT

## 2023-12-21 PROCEDURE — 36415 COLL VENOUS BLD VENIPUNCTURE: CPT

## 2023-12-21 NOTE — PROGRESS NOTES
Name: Janell Solorzano      : 2000      MRN: [id number]  Encounter Provider: ANGELA Arthur  Encounter Date: 2023   Encounter department: [facility] [address] [place] [address]    Assessment & Plan     1. Lightheadedness  Comments:  Will obtain fasting labs.  Advised to increase hydration and better nutrition.    2. Healthcare maintenance  -     CBC and differential; Future  -     Comprehensive metabolic panel; Future  -     Lipid panel; Future  -     TSH, 3rd generation with Free T4 reflex; Future  -     Hemoglobin A1C; Future  -     Vitamin B12; Future  -     Vitamin D 25 hydroxy; Future  -     Lyme Total AB W Reflex to IGM/IGG; Future    3. Other fatigue  -     Vitamin B12; Future  -     Vitamin D 25 hydroxy; Future  -     Lyme Total AB W Reflex to IGM/IGG; Future    4. Anxiety and depression  Assessment & Plan:  Feels she is not doing well currently with her anxiety and depression.  She is following with psychiatry.             Subjective      Patient presents for concerns of lightheadedness and dizziness.  She is concerned for her blood sugar.  She is also having headaches. Grandfather has diabetes.       Review of Systems   Constitutional:  Positive for fatigue. Negative for chills, diaphoresis and fever.   HENT:  Negative for ear pain and sore throat.    Respiratory:  Negative for cough and shortness of breath.    Cardiovascular:  Negative for chest pain and palpitations.   Gastrointestinal:  Positive for nausea. Negative for abdominal pain, diarrhea and vomiting.   Genitourinary:  Negative for dysuria, frequency and hematuria.   Neurological:  Positive for dizziness, light-headedness and headaches.   Psychiatric/Behavioral:  Positive for dysphoric mood. Negative for sleep disturbance. The patient is nervous/anxious.    All other systems reviewed and are negative.      Current Outpatient Medications on File Prior to Visit   Medication Sig    atomoxetine (STRATTERA) 10 MG  "capsule Take 1 capsule (10 mg total) by mouth daily    Drysol 20 % external solution Apply topically to affected area  at bedtime    ergocalciferol (VITAMIN D2) 50,000 units TAKE ONE CAPSULE BY MOUTH WEEKLY    fluticasone (FLONASE) 50 mcg/act nasal spray 1 spray into each nostril daily    hydrOXYzine pamoate (VISTARIL) 50 mg capsule Take 1 capsule (50 mg total) by mouth daily at bedtime as needed for anxiety    ibuprofen (MOTRIN) 600 mg tablet TAKE ONE TABLET BY MOUTH EVERY EIGHT HOURS AS NEEDED for mild to moderate pain    lamoTRIgine (LaMICtal) 150 MG tablet Take 1 tablet (150 mg total) by mouth daily    lamoTRIgine (LaMICtal) 25 mg tablet Take 1 tablet (25 mg total) by mouth daily    naproxen (Naprosyn) 500 mg tablet Take 1 tablet (500 mg total) by mouth 2 (two) times a day with meals    risperiDONE (RisperDAL) 0.25 mg tablet Take 1 tablet (0.25 mg total) by mouth 2 (two) times a day    Sprintec 28 0.25-35 MG-MCG per tablet TAKE ONE TABLET BY MOUTH EVERY DAY       Objective     /76 (BP Location: Left arm, Patient Position: Sitting)   Pulse 105   Temp 97.8 °F (36.6 °C)   Ht 5' 3\" (1.6 m)   Wt 60.2 kg (132 lb 12.8 oz)   SpO2 98%   BMI 23.52 kg/m²     Physical Exam  Constitutional:       Appearance: She is well-developed.   Cardiovascular:      Rate and Rhythm: Normal rate and regular rhythm.      Heart sounds: Normal heart sounds. No murmur heard.  Pulmonary:      Effort: Pulmonary effort is normal. No respiratory distress.      Breath sounds: Normal breath sounds.   Skin:     General: Skin is warm and dry.   Neurological:      Mental Status: She is alert and oriented to person, place, and time.       ANGELA Arthur    "

## 2023-12-21 NOTE — ASSESSMENT & PLAN NOTE
Feels she is not doing well currently with her anxiety and depression.  She is following with psychiatry.

## 2023-12-22 ENCOUNTER — TELEPHONE (OUTPATIENT)
Dept: FAMILY MEDICINE CLINIC | Facility: CLINIC | Age: 23
End: 2023-12-22

## 2023-12-22 ENCOUNTER — TELEMEDICINE (OUTPATIENT)
Dept: BEHAVIORAL/MENTAL HEALTH CLINIC | Facility: CLINIC | Age: 23
End: 2023-12-22
Payer: COMMERCIAL

## 2023-12-22 DIAGNOSIS — F41.9 ANXIETY AND DEPRESSION: ICD-10-CM

## 2023-12-22 DIAGNOSIS — F31.81 BIPOLAR II DISORDER (HCC): Primary | ICD-10-CM

## 2023-12-22 DIAGNOSIS — F32.A ANXIETY AND DEPRESSION: ICD-10-CM

## 2023-12-22 DIAGNOSIS — G44.011 INTRACTABLE EPISODIC CLUSTER HEADACHE: ICD-10-CM

## 2023-12-22 LAB — B BURGDOR IGG+IGM SER QL IA: NEGATIVE

## 2023-12-22 PROCEDURE — 90834 PSYTX W PT 45 MINUTES: CPT

## 2023-12-22 RX ORDER — NAPROXEN 500 MG/1
500 TABLET ORAL 2 TIMES DAILY WITH MEALS
Qty: 30 TABLET | Refills: 0 | Status: SHIPPED | OUTPATIENT
Start: 2023-12-22

## 2023-12-22 NOTE — PSYCH
Virtual Regular Visit    Verification of patient location:    Patient is located at Home in the following state in which I hold an active license [place]      Assessment/Plan:    Problem List Items Addressed This Visit          Other    Anxiety and depression    Bipolar II disorder (HCC) - Primary       Goals addressed in session: Goal 1          Reason for visit is   Chief Complaint   Patient presents with    Virtual Regular Visit          Encounter provider Michela Bates LCSW    [facility]  [facility] [place]  [address] [place] [address] [phone]      Recent Visits  Date Type Provider Dept   12/21/23 Office Visit ANGELA [facility] [address] [facility]   Showing recent visits within past 7 days and meeting all other requirements  Today's Visits  Date Type Provider Dept   12/22/23 Telemedicine Michela Bates LCSW [facility] [place]   Showing today's visits and meeting all other requirements  Future Appointments  No visits were found meeting these conditions.  Showing future appointments within next 150 days and meeting all other requirements       The patient was identified by name and date of birth. Janell Solorzano was informed that this is a telemedicine visit and that the visit is being conducted throughthe Epic Embedded platform. She agrees to proceed..  My office door was closed. The patient was notified the following individuals were present in the room RAUL Flores II was approved by Ms. Solorzano to sit in the session for training and observation purposes.  She acknowledged consent and understanding of privacy and security of the video platform. The patient has agreed to participate and understands they can discontinue the visit at any time.    Patient is aware this is a billable service.     Subjective  aJnell Solorzano is a 23 y.o. female . .      HPI     Past Medical History:    Diagnosis Date    Anxiety     Concussion     Depression     Head injury     concussions in high school and in college    Self-injurious behavior        Past Surgical History:   Procedure Laterality Date    WISDOM TOOTH EXTRACTION         Current Outpatient Medications   Medication Sig Dispense Refill    atomoxetine (STRATTERA) 10 MG capsule Take 1 capsule (10 mg total) by mouth daily 30 capsule 0    Drysol 20 % external solution Apply topically to affected area  at bedtime 60 mL 0    ergocalciferol (VITAMIN D2) 50,000 units TAKE ONE CAPSULE BY MOUTH WEEKLY 16 capsule 0    fluticasone (FLONASE) 50 mcg/act nasal spray 1 spray into each nostril daily 18.2 mL 0    hydrOXYzine pamoate (VISTARIL) 50 mg capsule Take 1 capsule (50 mg total) by mouth daily at bedtime as needed for anxiety 30 capsule 1    ibuprofen (MOTRIN) 600 mg tablet TAKE ONE TABLET BY MOUTH EVERY EIGHT HOURS AS NEEDED for mild to moderate pain 30 tablet 0    lamoTRIgine (LaMICtal) 150 MG tablet Take 1 tablet (150 mg total) by mouth daily 30 tablet 2    lamoTRIgine (LaMICtal) 25 mg tablet Take 1 tablet (25 mg total) by mouth daily 30 tablet 2    naproxen (NAPROSYN) 500 mg tablet Take 1 tablet (500 mg total) by mouth 2 (two) times a day with meals 30 tablet 0    risperiDONE (RisperDAL) 0.25 mg tablet Take 1 tablet (0.25 mg total) by mouth 2 (two) times a day 60 tablet 2    Sprintec 28 0.25-35 MG-MCG per tablet TAKE ONE TABLET BY MOUTH EVERY DAY 84 tablet 0     No current facility-administered medications for this visit.        No Known Allergies    Review of Systems    Video Exam    There were no vitals filed for this visit.    Physical Exam     Behavioral Health Psychotherapy Progress Note    Psychotherapy Provided: Individual Psychotherapy     1. Bipolar II disorder (HCC)        2. Anxiety and depression            Goals addressed in session: Goal 1     DATA: Janell discussed ongoing feelings about the 36-year-old she has been seeing.  She noted that  "he recently blocked her.  We discussed her thoughts and feelings.  We addressed black and white versus gray, and managing a mix of feelings.  We addressed positives in that she may be getting a promotion to a  position though this has not been assured.  She stated plans to go out with her friends during the holidays.  She stated working [employer] day and feeling okay about that as her family is out of town.  We addressed Martha overall management with feelings of rejection, sadness, and abandonment.  Janell stated that she was given another medication through psychiatry, and due to time limits, provider encouraged that we would discuss further at next session.  We confirmed next appointment next week.   During this session, this clinician used the following therapeutic modalities: Cognitive Behavioral Therapy, Mindfulness-based Strategies, Motivational Interviewing, Solution-Focused Therapy, and Supportive Psychotherapy    Substance Abuse was not addressed during this session. If the client is diagnosed with a co-occurring substance use disorder, please indicate any changes in the frequency or amount of use: Janell did not discuss her drinking, though did reference her boyfriend. Stage of change for addressing substance use diagnoses: Pre-contemplation    ASSESSMENT:  Janell Solorzano presents with a Euthymic/ normal and Dysthymic mood.     her affect is Normal range and intensity, which is congruent, with her mood and the content of the session. The client has made progress on their goals.    Janell was able to acknowledge thoughts and feelings; she continues to struggle with moderating. Janell Solorzano presents with a low risk of suicide, low risk of self-harm, and low risk of harm to others.  Janell is futuristic in her thoughts.     For any risk assessment that surpasses a \"low\" rating, a safety plan must be developed.    A safety plan was indicated: no  If yes, describe in detail n/a    PLAN: Between " sessions, Janell Solorzano will continue positive and balancing activities while accepting and recognizing feelings of sadness. At the next session, the therapist will use Cognitive Behavioral Therapy, Mindfulness-based Strategies, Motivational Interviewing, Solution-Focused Therapy, and Supportive Psychotherapy to address ongoing mood management.     Behavioral Health Treatment Plan and Discharge Planning: Janell Solorzano is aware of and agrees to continue to work on their treatment plan. They have identified and are working toward their discharge goals. yes    Visit start and stop times:    12/22/23  Start Time: 0900  Stop Time: 0952  Total Visit Time: 52 minutes

## 2023-12-26 ENCOUNTER — TELEMEDICINE (OUTPATIENT)
Dept: BEHAVIORAL/MENTAL HEALTH CLINIC | Facility: CLINIC | Age: 23
End: 2023-12-26
Payer: COMMERCIAL

## 2023-12-26 ENCOUNTER — TELEMEDICINE (OUTPATIENT)
Dept: PSYCHIATRY | Facility: CLINIC | Age: 23
End: 2023-12-26
Payer: COMMERCIAL

## 2023-12-26 DIAGNOSIS — F31.81 BIPOLAR II DISORDER (HCC): Primary | ICD-10-CM

## 2023-12-26 DIAGNOSIS — F90.2 ADHD (ATTENTION DEFICIT HYPERACTIVITY DISORDER), COMBINED TYPE: ICD-10-CM

## 2023-12-26 PROCEDURE — 99214 OFFICE O/P EST MOD 30 MIN: CPT | Performed by: NURSE PRACTITIONER

## 2023-12-26 PROCEDURE — 90834 PSYTX W PT 45 MINUTES: CPT

## 2023-12-26 RX ORDER — RISPERIDONE 0.5 MG/1
0.5 TABLET ORAL 2 TIMES DAILY
Qty: 60 TABLET | Refills: 1 | Status: SHIPPED | OUTPATIENT
Start: 2023-12-26

## 2023-12-26 RX ORDER — ATOMOXETINE 10 MG/1
10 CAPSULE ORAL DAILY
Qty: 30 CAPSULE | Refills: 0 | Status: SHIPPED | OUTPATIENT
Start: 2023-12-26

## 2023-12-26 NOTE — PSYCH
Virtual Regular Visit    Verification of patient location:    Patient is located at Home in the following state in which I hold an active license [place]      Assessment/Plan:    Problem List Items Addressed This Visit          Other    Bipolar II disorder (HCC) - Primary       Goals addressed in session: Goal 1          Reason for visit is   Chief Complaint   Patient presents with    Virtual Regular Visit          Encounter provider Michela Bates LCSW    Provider located at [facility] [place]  [facility] [place]  [address]  [place] [address] [phone]      Recent Visits  Date Type Provider Dept   12/22/23 Telephone SeemaANGELA [facility] [address] [place] [address]   12/22/23 Telemedicine Michela Bates LCSW [facility] [place]   12/21/23 Office Visit [facility]ANGELA [facility] [address] [place] [address]   Showing recent visits within past 7 days and meeting all other requirements  Today's Visits  Date Type Provider Dept   12/26/23 Telemedicine Michela Bates LCSW [facility] [place]   Showing today's visits and meeting all other requirements  Future Appointments  No visits were found meeting these conditions.  Showing future appointments within next 150 days and meeting all other requirements       The patient was identified by name and date of birth. Janell Solorzano was informed that this is a telemedicine visit and that the visit is being conducted throughthe Epic Embedded platform. She agrees to proceed..  My office door was closed. No one else was in the room.  She acknowledged consent and understanding of privacy and security of the video platform. The patient has agreed to participate and understands they can discontinue the visit at any time.    Patient is aware this is a billable service.     Subjective  Janell Solorzano is a 23 y.o. female . .      HPI     Past Medical History:   Diagnosis Date    Anxiety      Concussion     Depression     Head injury     concussions in high school and in college    Self-injurious behavior        Past Surgical History:   Procedure Laterality Date    WISDOM TOOTH EXTRACTION         Current Outpatient Medications   Medication Sig Dispense Refill    atomoxetine (STRATTERA) 10 MG capsule Take 1 capsule (10 mg total) by mouth daily 30 capsule 0    Drysol 20 % external solution Apply topically to affected area  at bedtime 60 mL 0    ergocalciferol (VITAMIN D2) 50,000 units TAKE ONE CAPSULE BY MOUTH WEEKLY 16 capsule 0    fluticasone (FLONASE) 50 mcg/act nasal spray 1 spray into each nostril daily 18.2 mL 0    ibuprofen (MOTRIN) 600 mg tablet TAKE ONE TABLET BY MOUTH EVERY EIGHT HOURS AS NEEDED for mild to moderate pain 30 tablet 0    lamoTRIgine (LaMICtal) 150 MG tablet Take 1 tablet (150 mg total) by mouth daily 30 tablet 2    lamoTRIgine (LaMICtal) 25 mg tablet Take 1 tablet (25 mg total) by mouth daily 30 tablet 2    naproxen (NAPROSYN) 500 mg tablet Take 1 tablet (500 mg total) by mouth 2 (two) times a day with meals 30 tablet 0    risperiDONE (RisperDAL) 0.5 mg tablet Take 1 tablet (0.5 mg total) by mouth 2 (two) times a day 60 tablet 1    Sprintec 28 0.25-35 MG-MCG per tablet TAKE ONE TABLET BY MOUTH EVERY DAY 84 tablet 0     No current facility-administered medications for this visit.        No Known Allergies    Review of Systems    Video Exam    There were no vitals filed for this visit.    Physical Exam   Behavioral Health Psychotherapy Progress Note    Psychotherapy Provided: Individual Psychotherapy     1. Bipolar II disorder (HCC)            Goals addressed in session: Goal 1     DATA: Janell discussed attending [place] night with her family and after her work that day.  She indicated it was ok to have much to say about it.  We discussed thoughts and feelings secondary to the end of the relationship with the 36-year-old/him blocking her.   We addressed the unhealthiness of the  "relationship, how she determined it was not what she wanted, and ways to look at the relationship that it was therefore not a good fit for her.  We addressed self talk to help manage her feelings at this time.  We addressed the relevance of looking further at behaviors she believes were dysfunctional and unhealthy in her relationship, and that she says she tends to do in relationship, such as pushing the boundaries to see what they will do.  We addressed underlying or connected feelings related to these behavioral choices.  We discussed the virtual encounter verification form.  We confirmed and set appointments.     During this session, this clinician used the following therapeutic modalities: Cognitive Behavioral Therapy and Supportive Psychotherapy    Substance Abuse was not addressed during this session. If the client is diagnosed with a co-occurring substance use disorder, please indicate any changes in the frequency or amount of use: not discussed today. Stage of change for addressing substance use diagnoses: Pre-contemplation    ASSESSMENT:  Janell Solorzano presents with a Euthymic/ normal and Dysthymic mood.     her affect is Normal range and intensity and Constricted, which is congruent, with her mood and the content of the session. The client has made progress on their goals.    Janell was responsive to discussing behaviors that she believes are unhealthy and ways to develop alternatives.  Janell Solorzano presents with a low risk of suicide, low risk of self-harm, and low risk of harm to others.    For any risk assessment that surpasses a \"low\" rating, a safety plan must be developed.    A safety plan was indicated: no  If yes, describe in detail n/a    PLAN: Between sessions, Janell Solorzano will employ healthy self talk (as discussed) regarding the recent end of a relationship. At the next session, the therapist will use Cognitive Behavioral Therapy and Supportive Psychotherapy to address mood stability and use " of coping strategies.    Behavioral Health Treatment Plan and Discharge Planning: Janell Solorzano is aware of and agrees to continue to work on their treatment plan. They have identified and are working toward their discharge goals. yes    Visit start and stop times:    12/26/23  Start Time: 1100  Stop Time: 1152  Total Visit Time: 52 minutes

## 2023-12-27 NOTE — PSYCH
Virtual Regular Visit    Verification of patient location:    Patient is located in the following state in which I hold an active license [place]    Problem List Items Addressed This Visit     Bipolar II disorder (HCC) - Primary    Relevant Medications    risperiDONE (RisperDAL) 0.5 mg tablet    atomoxetine (STRATTERA) 10 MG capsule   Other Visit Diagnoses     ADHD (attention deficit hyperactivity disorder), combined type        Relevant Medications    risperiDONE (RisperDAL) 0.5 mg tablet    atomoxetine (STRATTERA) 10 MG capsule             Encounter provider ANGELA Vaughn    Provider located at    [facility]  [address] [facility] [place] [address] [phone]    Recent Visits  Date Type Provider Dept   12/26/23 Telemedicine ANGELA Vaughn [facility]   12/22/23 Telephone ANGELA Arthur Pg [address] [place] [address]   12/21/23 Office Visit ANGELA Arthur Pg [address] [place] [address]   Showing recent visits within past 7 days and meeting all other requirements  Future Appointments  No visits were found meeting these conditions.  Showing future appointments within next 150 days and meeting all other requirements           The patient was identified by name and date of birth. Patient was informed that this is a telemedicine visit and that the visit is being conducted throughthe Epic Embedded platform. She agrees to proceed..  My office door was closed. No one else was in the room.  She acknowledged consent and understanding of privacy and security of the video platform. The patient has agreed to participate and understands they can discontinue the visit at any time.    Patient is aware this is a billable service.     HPI     Current Outpatient Medications   Medication Sig Dispense Refill   • atomoxetine (STRATTERA) 10 MG capsule Take 1 capsule (10 mg total) by mouth daily 30 capsule 0   • Drysol 20 %  external solution Apply topically to affected area  at bedtime 60 mL 0   • ergocalciferol (VITAMIN D2) 50,000 units TAKE ONE CAPSULE BY MOUTH WEEKLY 16 capsule 0   • fluticasone (FLONASE) 50 mcg/act nasal spray 1 spray into each nostril daily 18.2 mL 0   • ibuprofen (MOTRIN) 600 mg tablet TAKE ONE TABLET BY MOUTH EVERY EIGHT HOURS AS NEEDED for mild to moderate pain 30 tablet 0   • lamoTRIgine (LaMICtal) 150 MG tablet Take 1 tablet (150 mg total) by mouth daily 30 tablet 2   • lamoTRIgine (LaMICtal) 25 mg tablet Take 1 tablet (25 mg total) by mouth daily 30 tablet 2   • naproxen (NAPROSYN) 500 mg tablet Take 1 tablet (500 mg total) by mouth 2 (two) times a day with meals 30 tablet 0   • risperiDONE (RisperDAL) 0.5 mg tablet Take 1 tablet (0.5 mg total) by mouth 2 (two) times a day 60 tablet 1   • Sprintec 28 0.25-35 MG-MCG per tablet TAKE ONE TABLET BY MOUTH EVERY DAY 84 tablet 0     No current facility-administered medications for this visit.       Review of Systems  Video Exam    There were no vitals filed for this visit.    Physical Exam   As a result of this visit, I have referred the patient for further respiratory evaluation. No    I spent 20 minutes directly with the patient during this visit  VIRTUAL VISIT DISCLAIMER    Janell Solorzano acknowledges that she has consented to an online visit or consultation. She understands that the online visit is based solely on information provided by her, and that, in the absence of a face-to-face physical evaluation by the physician, the diagnosis she receives is both limited and provisional in terms of accuracy and completeness. This is not intended to replace a full medical face-to-face evaluation by the physician. Janell Solorzano understands and accepts these terms.    MEDICATION MANAGEMENT NOTE        [facility]    Name and Date of Birth:  Janell Solorzano 23 y.o. 2000 MRN: [id number]    Date of Visit: December 26,  "2023    No Known Allergies    Visit Time    Visit Start Time: 0930  Visit Stop Time: 0950  Total Visit Duration:  20 minutes    SUBJECTIVE:    Janell is seen today for a follow up for Bipolar Disorder type II. She continues to experience ongoing symptoms since the last visit.  Janell seen today virtually for medication management follow-up.  She was last seen by this provider on 12/12/23.  Janell is flat and dysphoric in conversation. She reports that she is sad because the \"36 year old blocked her\".  She states she knows it is for the best, but she is still upset about it.  She has continued to work and is remaining social with her friends.  She states that her concentration and focus seem to be \"better\" with the strattera.  However, she rates her anxiety a 9/10, depression 8/10.  She has had suicidal thoughts, without plan or intent.  Denies HI, denies auditory or visual hallucinations.  Appears flat and dysphoric in affect.  Agrees to call the office or go to the ED if her SI increases.      Continue medications as ordered  Increase risperdal 0.5mg PO BID  D/C vistaril due to ineffectiveness  Continue Lamictal 175mg p.o. daily  Continue strattera 10mg PO daily  EKG will be completed prior to starting Strattera  She will continue therapy  She will follow up with this provider in 2 weeks  She will call sooner with concerns or issues if they arise prior to scheduled appointment    She was provided with the education on risperdal.  PARQ completed including sedation, agitation, akathisia, TD, dystonic reactions, NMS, EPS, GI distress, dizziness, risk of metabolic syndrome, orthostatic hypotension and cardiovascular risks such as QT prolongation, increased prolactin    Lamotrigine PARQ completed including dizziness, headaches, ataxia, vision problems, somnolence, sleep changes, cognitive difficulties, rash (including Becerril-Chato rash), and others, risk of teratogenicity for females.     Education provided on " Neuroleptic malignant syndrome.  Patient told that NMS is a life-threatening, neurological disorder most often caused by an adverse reaction to neuroleptic or antipsychotic drugs. Symptoms include high fever, sweating, unstable blood pressure, stupor, muscular rigidity, and autonomic dysfunction. In most cases, the disorder develops within the first 2 weeks of treatment with the drug; however, the disorder may develop any time during the therapy period. Patient advised to call the office or go to the ED immediately if these symptoms develop.    She denies any side effects from current psychiatric medications.    Aware of 24 hour and weekend coverage for urgent situations accessed by calling [facility] main practice number  Continue psychotherapy with therapist  Medication management every 2 weeks  Aware of need to follow up with family physician for medical issues    Diagnoses and all orders for this visit:    Bipolar II disorder (HCC)  -     risperiDONE (RisperDAL) 0.5 mg tablet; Take 1 tablet (0.5 mg total) by mouth 2 (two) times a day    ADHD (attention deficit hyperactivity disorder), combined type  -     atomoxetine (STRATTERA) 10 MG capsule; Take 1 capsule (10 mg total) by mouth daily     Other atypical antipsychotics trialed and found to be ineffective:  Zyprexa, Abilify, Vraylar, and Latuda     Current Outpatient Medications on File Prior to Visit   Medication Sig Dispense Refill   • Drysol 20 % external solution Apply topically to affected area  at bedtime 60 mL 0   • ergocalciferol (VITAMIN D2) 50,000 units TAKE ONE CAPSULE BY MOUTH WEEKLY 16 capsule 0   • fluticasone (FLONASE) 50 mcg/act nasal spray 1 spray into each nostril daily 18.2 mL 0   • ibuprofen (MOTRIN) 600 mg tablet TAKE ONE TABLET BY MOUTH EVERY EIGHT HOURS AS NEEDED for mild to moderate pain 30 tablet 0   • lamoTRIgine (LaMICtal) 150 MG tablet Take 1 tablet (150 mg total) by mouth daily 30 tablet 2   • lamoTRIgine  (LaMICtal) 25 mg tablet Take 1 tablet (25 mg total) by mouth daily 30 tablet 2   • naproxen (NAPROSYN) 500 mg tablet Take 1 tablet (500 mg total) by mouth 2 (two) times a day with meals 30 tablet 0   • Sprintec 28 0.25-35 MG-MCG per tablet TAKE ONE TABLET BY MOUTH EVERY DAY 84 tablet 0     No current facility-administered medications on file prior to visit.       Psychotherapy Provided:     Individual psychotherapy provided: Yes  Supportive counseling provided.  Medication changes discussed with Janell.  Medication education provided to Janell.  Discussed with Janell coping with job stress, social difficulties and everyday stressors.   Coping strategies reviewed with Janell.   Importance of medication and treatment compliance reviewed with Janell.  Importance of follow up with family physician for medical issues reviewed with Janell.  Reassurance and supportive therapy provided.   Crisis/safety plan discussed with Janell. Patient will call prior to scheduled appointment if they have any issues or concerns.  Patient understands they can access the office by calling the main number at any time if they are in crisis.  They also understand they can call their [facility]'s crisis number or go to their nearest ED if suicidal ideation increases or if they develop a plan or intent.       HPI ROS Appetite Changes and Sleep:     She reports normal sleep, adequate appetite, low energy. Denies homicidal ideation, Intermittent passive suicidal ideation without intent or plan    Review Of Systems:      HPI ROS:               Medication Side Effects: denies    Depression (10 worst): 8/10 6.5/10   Anxiety (10 worst): 9/10 9/10   Safety concerns (SI, HI, etc): Passive SI no plan or intent denies   Sleep: good good   Energy: low adequate   Appetite: good good     General normal    Personality no change in personality   Constitutional as noted in HPI   ENT negative   Cardiovascular negative   Respiratory negative   Gastrointestinal  negative   Genitourinary negative   Musculoskeletal negative   Integumentary negative   Neurological negative   Endocrine negative   Other Symptoms none, all other systems are negative     Mental Status Evaluation:    Appearance Appropriately dressed and Good eye contact   Behavior calm and cooperative   Mood anxious, depressed, and dysphoric  Depression Scale -  6.5  of 10 (0 = No depression)  Anxiety Scale - 9 of 10 (0 = No anxiety)   Speech Normal rate and volume   Affect mood-congruent and Flat   Thought Processes Goal directed and coherent   Thought Content Does not verbalize delusional material   Associations Tightly connected   Perceptual Disturbances Denies hallucinations and does not appear to be responding to internal stimuli   Risk Potential Suicidal/Homicidal Ideation - Passive suicidal ideation without intent or plan  Risk of Violence - No evidence of risk for violence found on assessment  Risk of Self Mutilation - No evidence of risk for self mutilation found on assessment   Orientation oriented to person, place, time/date and situation   Memory recent and remote memory grossly intact   Consciousness alert and awake   Attention/Concentration attention span and concentration appear shorter than expected for age   Insight fair   Judgement fair   Muscle Strength and Gait normal muscle strength and normal muscle tone, normal gait/station and normal balance   Motor Activity no abnormal movements   Language no difficulty naming common objects, no difficulty repeating a phrase, no difficulty writing a sentence   Fund of Knowledge adequate knowledge of current events  adequate fund of knowledge regarding past history  adequate fund of knowledge regarding vocabulary      Past Psychiatric History Update:     Inpatient Psychiatric Admission Since Last Encounter:   no  Changes to Outpatient Psychiatric Treatment Team:    no  Suicide Attempt Or Self Mutilation Since Last Encounter:   no  Incidence of Violent  Behavior Since Last Encounter:   no    Traumatic History Update:     New Onset of Abuse Since Last Encounter:   no  Traumatic Events Since Last Encounter:   no    Past Medical History:    Past Medical History:   Diagnosis Date   • Anxiety    • Concussion    • Depression    • Head injury     concussions in high school and in college   • Self-injurious behavior         Past Surgical History:   Procedure Laterality Date   • WISDOM TOOTH EXTRACTION       No Known Allergies  Substance Abuse History:    Social History     Substance and Sexual Activity   Alcohol Use Yes   • Alcohol/week: 3.0 - 4.0 standard drinks of alcohol   • Types: 3 - 4 Shots of liquor per week    Comment: 3-4 drinks, 2 times per week     Social History     Substance and Sexual Activity   Drug Use No     Social History:    Social History     Socioeconomic History   • Marital status: Single     Spouse name: Not on file   • Number of children: 0   • Years of education: senior in college currently   • Highest education level: High school graduate   Occupational History   • 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   Tobacco Use   • Smoking status: Never   • Smokeless tobacco: Never   Vaping Use   • Vaping status: Never Used   Substance and Sexual Activity   • Alcohol use: Yes     Alcohol/week: 3.0 - 4.0 standard drinks of alcohol     Types: 3 - 4 Shots of liquor per week     Comment: 3-4 drinks, 2 times per week   • Drug use: No   • Sexual activity: Yes     Partners: Male     Birth control/protection: Condom Male   Other Topics Concern   • Not on file   Social History Narrative   • Not on file     Social Determinants of Health     Financial Resource Strain: Not on file   Food Insecurity: Not on file   Transportation Needs: Not on file   Physical Activity: Not on file   Stress: Not on file   Social Connections: Not on file   Intimate Partner Violence: Not on file   Housing Stability: Not on file     Family Psychiatric History:     Family History    Problem Relation Age of Onset   • Hypertension Mother    • Mental illness Mother    • Depression Mother    • Mental illness Sister    • Coronary artery disease Maternal Grandmother    • Throat cancer Paternal Grandfather      History Review:The following portions of the patient's history were reviewed and updated as appropriate: allergies, current medications, past family history, past medical history, past social history, past surgical history and problem list     OBJECTIVE:     Vital signs in last 24 hours:    There were no vitals filed for this visit.  Laboratory Results: I have personally reviewed all pertinent laboratory/tests results.    Suicide/Homicide Risk Assessment:    Risk of Harm to Self:  The following ratings are based on assessment at the time of the interview  Recent Specific Risk Factors include: current depressive symptoms, current anxiety symptoms, unstable mood, passive death wishes  Demographic risk factors include: , age: young adult (15-24)  Historical Risk Factors include: chronic depression, chronic anxiety symptoms, chronic mood disorder, history of suicide attempts, history of impulsive behaviors, history of traumatic experiences  Protective Factors: access to mental health treatment, compliant with medications, compliant with mental health treatment, having a desire to be alive, stable living environment, stable job, sense of determination, supportive family  Based on today's assessment, Janell presents the following risk of harm to self: low    Risk of Harm to Others:  The following ratings are based on assessment at the time of the interview  Protective Factors: no current homicidal ideation  Based on today's assessment, Janell presents the following risk of harm to others: none    The following interventions are recommended: contracts for safety at present - agrees to go to ED if feeling unsafe, return in 2 weeks for reassessment, therapy appointment in 1 day, contracts for  safety at present - agrees to call Crisis Intervention Service if feeling unsafe    Medications Risks/Benefits:      Risks, Benefits And Possible Side Effects Of Medications:    Discussed risks and benefits of treatment with patient including risk of parkinsonian symptoms, metabolic syndrome, tardive dyskinesia and neuroleptic malignant syndrome related to treatment with antipsychotic medications and risk of rash related to treatment with Lamictal     Controlled Medication Discussion:     Not applicable    Treatment Plan:    Due for update/Updated:   no  Last treatment plan done 8/30/23 by Michela Bates LCSW.  Treatment Plan due on 3/1/24.    ANGELA Vaughn 12/27/23    This note was not shared with the patient due to reasonable likelihood of causing patient harm

## 2023-12-30 DIAGNOSIS — R61 HYPERHIDROSIS: ICD-10-CM

## 2024-01-02 RX ORDER — ALUMINUM CHLORIDE 20 %
SOLUTION, NON-ORAL TOPICAL
Qty: 60 ML | Refills: 0 | Status: SHIPPED | OUTPATIENT
Start: 2024-01-02

## 2024-01-04 ENCOUNTER — OFFICE VISIT (OUTPATIENT)
Dept: FAMILY MEDICINE CLINIC | Facility: CLINIC | Age: 24
End: 2024-01-04
Payer: COMMERCIAL

## 2024-01-04 VITALS
HEART RATE: 77 BPM | WEIGHT: 131 LBS | SYSTOLIC BLOOD PRESSURE: 100 MMHG | HEIGHT: 63 IN | OXYGEN SATURATION: 99 % | BODY MASS INDEX: 23.21 KG/M2 | DIASTOLIC BLOOD PRESSURE: 70 MMHG

## 2024-01-04 DIAGNOSIS — R30.0 DYSURIA: ICD-10-CM

## 2024-01-04 DIAGNOSIS — N92.6 MISSED MENSES: ICD-10-CM

## 2024-01-04 DIAGNOSIS — N30.00 ACUTE CYSTITIS WITHOUT HEMATURIA: Primary | ICD-10-CM

## 2024-01-04 LAB
SL AMB  POCT GLUCOSE, UA: NORMAL
SL AMB LEUKOCYTE ESTERASE,UA: NORMAL
SL AMB POCT BILIRUBIN,UA: NORMAL
SL AMB POCT BLOOD,UA: NORMAL
SL AMB POCT CLARITY,UA: CLEAR
SL AMB POCT COLOR,UA: YELLOW
SL AMB POCT KETONES,UA: NORMAL
SL AMB POCT NITRITE,UA: NORMAL
SL AMB POCT PH,UA: 6
SL AMB POCT SPECIFIC GRAVITY,UA: 1.02
SL AMB POCT URINE HCG: NEGATIVE
SL AMB POCT URINE PROTEIN: 15
SL AMB POCT UROBILINOGEN: NORMAL

## 2024-01-04 PROCEDURE — 99214 OFFICE O/P EST MOD 30 MIN: CPT | Performed by: NURSE PRACTITIONER

## 2024-01-04 PROCEDURE — 87086 URINE CULTURE/COLONY COUNT: CPT | Performed by: NURSE PRACTITIONER

## 2024-01-04 PROCEDURE — 81002 URINALYSIS NONAUTO W/O SCOPE: CPT | Performed by: NURSE PRACTITIONER

## 2024-01-04 PROCEDURE — 81025 URINE PREGNANCY TEST: CPT | Performed by: NURSE PRACTITIONER

## 2024-01-04 PROCEDURE — 87491 CHLMYD TRACH DNA AMP PROBE: CPT | Performed by: NURSE PRACTITIONER

## 2024-01-04 PROCEDURE — 87591 N.GONORRHOEAE DNA AMP PROB: CPT | Performed by: NURSE PRACTITIONER

## 2024-01-04 RX ORDER — SULFAMETHOXAZOLE AND TRIMETHOPRIM 800; 160 MG/1; MG/1
1 TABLET ORAL 2 TIMES DAILY
Qty: 6 TABLET | Refills: 0 | Status: SHIPPED | OUTPATIENT
Start: 2024-01-04 | End: 2024-01-07

## 2024-01-04 NOTE — PROGRESS NOTES
Name: Janell Solorzano      : 2000      MRN: [id number]  Encounter Provider: ANGELA Arthur  Encounter Date: 2024   Encounter department: [facility] [address]    Assessment & Plan     1. Acute cystitis without hematuria  Comments:  Will treat with Bactrim for 3 days.  Send urine culture.  Discussed hygiene.  Orders:  -     sulfamethoxazole-trimethoprim (BACTRIM DS) 800-160 mg per tablet; Take 1 tablet by mouth 2 (two) times a day for 3 days    2. Dysuria  -     POCT urine dip  -     Chlamydia/GC amplified DNA by PCR  -     Urine culture    3. Missed menses  -     POCT urine HCG           Subjective      Patient presents for concerns of dysuria.  Patient did have a new sexual partner.  Denies hematuria, flank pain.  States she was due for her period this week, but did not get it yet.      Review of Systems   Constitutional:  Negative for chills and fever.   HENT:  Negative for ear pain and sore throat.    Respiratory:  Negative for cough and shortness of breath.    Cardiovascular:  Negative for chest pain and palpitations.   Gastrointestinal:  Positive for nausea. Negative for abdominal pain and vomiting.   Genitourinary:  Positive for dysuria, frequency, menstrual problem and urgency. Negative for hematuria, vaginal discharge and vaginal pain.   Musculoskeletal:  Negative for arthralgias and back pain.   Skin:  Negative for color change and rash.   Neurological:  Negative for seizures and syncope.   All other systems reviewed and are negative.      Current Outpatient Medications on File Prior to Visit   Medication Sig    atomoxetine (STRATTERA) 10 MG capsule Take 1 capsule (10 mg total) by mouth daily    Drysol 20 % external solution Apply topically to affected area at bedtime.    ergocalciferol (VITAMIN D2) 50,000 units TAKE ONE CAPSULE BY MOUTH WEEKLY    fluticasone (FLONASE) 50 mcg/act nasal spray 1 spray into each nostril daily    ibuprofen (MOTRIN) 600 mg  "tablet TAKE ONE TABLET BY MOUTH EVERY EIGHT HOURS AS NEEDED for mild to moderate pain    lamoTRIgine (LaMICtal) 150 MG tablet Take 1 tablet (150 mg total) by mouth daily    lamoTRIgine (LaMICtal) 25 mg tablet Take 1 tablet (25 mg total) by mouth daily    naproxen (NAPROSYN) 500 mg tablet Take 1 tablet (500 mg total) by mouth 2 (two) times a day with meals    risperiDONE (RisperDAL) 0.5 mg tablet Take 1 tablet (0.5 mg total) by mouth 2 (two) times a day    Sprintec 28 0.25-35 MG-MCG per tablet TAKE ONE TABLET BY MOUTH EVERY DAY       Objective     /70   Pulse 77   Ht 5' 3\" (1.6 m)   Wt 59.4 kg (131 lb)   LMP 12/13/2023 (Approximate)   SpO2 99%   BMI 23.21 kg/m²     Physical Exam  Constitutional:       Appearance: She is well-developed.   Cardiovascular:      Rate and Rhythm: Normal rate and regular rhythm.      Heart sounds: Normal heart sounds. No murmur heard.  Pulmonary:      Effort: Pulmonary effort is normal. No respiratory distress.      Breath sounds: Normal breath sounds.   Abdominal:      Tenderness: There is no right CVA tenderness or left CVA tenderness.   Skin:     General: Skin is warm and dry.   Neurological:      Mental Status: She is alert and oriented to person, place, and time.       ANGELA Arthur    "

## 2024-01-05 ENCOUNTER — TELEMEDICINE (OUTPATIENT)
Dept: BEHAVIORAL/MENTAL HEALTH CLINIC | Facility: CLINIC | Age: 24
End: 2024-01-05
Payer: COMMERCIAL

## 2024-01-05 DIAGNOSIS — F31.81 BIPOLAR II DISORDER (HCC): Primary | ICD-10-CM

## 2024-01-05 DIAGNOSIS — F32.A ANXIETY AND DEPRESSION: ICD-10-CM

## 2024-01-05 DIAGNOSIS — A54.9 GONORRHEA: Primary | ICD-10-CM

## 2024-01-05 DIAGNOSIS — F41.9 ANXIETY AND DEPRESSION: ICD-10-CM

## 2024-01-05 LAB
BACTERIA UR CULT: NORMAL
C TRACH DNA SPEC QL NAA+PROBE: NEGATIVE
N GONORRHOEA DNA SPEC QL NAA+PROBE: POSITIVE

## 2024-01-05 PROCEDURE — 90834 PSYTX W PT 45 MINUTES: CPT

## 2024-01-05 RX ORDER — CEFTRIAXONE SODIUM 250 MG/1
250 INJECTION, POWDER, FOR SOLUTION INTRAMUSCULAR; INTRAVENOUS ONCE
Qty: 1 EACH | Refills: 0 | Status: SHIPPED | OUTPATIENT
Start: 2024-01-05 | End: 2024-01-05

## 2024-01-05 RX ORDER — CEFTRIAXONE 1 G/1
1000 INJECTION, POWDER, FOR SOLUTION INTRAMUSCULAR; INTRAVENOUS ONCE
Qty: 1 EACH | Refills: 0 | Status: SHIPPED | OUTPATIENT
Start: 2024-01-05 | End: 2024-01-05

## 2024-01-05 RX ORDER — AZITHROMYCIN 250 MG/1
TABLET, FILM COATED ORAL
Qty: 4 TABLET | Refills: 0 | Status: SHIPPED | OUTPATIENT
Start: 2024-01-05 | End: 2024-01-08

## 2024-01-05 NOTE — PSYCH
Virtual Regular Visit    Verification of patient location:    Patient is located at Home in the following state in which I hold an active license [place]      Assessment/Plan:    Problem List Items Addressed This Visit          Other    Anxiety and depression    Bipolar II disorder (HCC) - Primary       Goals addressed in session: Goal 1          Reason for visit is   Chief Complaint   Patient presents with    Virtual Regular Visit          Encounter provider Michela Bates LCSW    Provider located at [facility] [place]  [facility] [place]  [address]  [place] [address] [phone]      Recent Visits  Date Type Provider Dept   01/04/24 Office Visit ANGELA [facility] [address] [facility]   Showing recent visits within past 7 days and meeting all other requirements  Today's Visits  Date Type Provider Dept   01/05/24 Telemedicine Michela Bates LCSW [facility] [place]   Showing today's visits and meeting all other requirements  Future Appointments  No visits were found meeting these conditions.  Showing future appointments within next 150 days and meeting all other requirements       The patient was identified by name and date of birth. Janell Solorzano was informed that this is a telemedicine visit and that the visit is being conducted throughthe Epic Embedded platform. She agrees to proceed..  My office door was closed. No one else was in the room.  She acknowledged consent and understanding of privacy and security of the video platform. The patient has agreed to participate and understands they can discontinue the visit at any time.    Patient is aware this is a billable service.     Subjective  Janell Solorzano is a 23 y.o. female . .      HPI     Past Medical History:   Diagnosis Date    Anxiety     Concussion     Depression     Head injury     concussions in high school and in college    Self-injurious behavior        Past  Surgical History:   Procedure Laterality Date    WISDOM TOOTH EXTRACTION         Current Outpatient Medications   Medication Sig Dispense Refill    atomoxetine (STRATTERA) 10 MG capsule Take 1 capsule (10 mg total) by mouth daily 30 capsule 0    azithromycin (ZITHROMAX) 250 mg tablet Take 1g PO once with Ceftriaxone IM. 4 tablet 0    cefTRIAXone (ROCEPHIN) 250 mg injection Inject 250 mg into a muscle once for 1 dose 1 each 0    Drysol 20 % external solution Apply topically to affected area at bedtime. 60 mL 0    ergocalciferol (VITAMIN D2) 50,000 units TAKE ONE CAPSULE BY MOUTH WEEKLY 16 capsule 0    fluticasone (FLONASE) 50 mcg/act nasal spray 1 spray into each nostril daily 18.2 mL 0    ibuprofen (MOTRIN) 600 mg tablet TAKE ONE TABLET BY MOUTH EVERY EIGHT HOURS AS NEEDED for mild to moderate pain 30 tablet 0    lamoTRIgine (LaMICtal) 150 MG tablet Take 1 tablet (150 mg total) by mouth daily 30 tablet 2    lamoTRIgine (LaMICtal) 25 mg tablet Take 1 tablet (25 mg total) by mouth daily 30 tablet 2    naproxen (NAPROSYN) 500 mg tablet Take 1 tablet (500 mg total) by mouth 2 (two) times a day with meals 30 tablet 0    risperiDONE (RisperDAL) 0.5 mg tablet Take 1 tablet (0.5 mg total) by mouth 2 (two) times a day 60 tablet 1    Sprintec 28 0.25-35 MG-MCG per tablet TAKE ONE TABLET BY MOUTH EVERY DAY 84 tablet 0    sulfamethoxazole-trimethoprim (BACTRIM DS) 800-160 mg per tablet Take 1 tablet by mouth 2 (two) times a day for 3 days 6 tablet 0     No current facility-administered medications for this visit.        No Known Allergies    Review of Systems    Video Exam    There were no vitals filed for this visit.    Physical Exam     Behavioral Health Psychotherapy Progress Note    Psychotherapy Provided: Individual Psychotherapy     1. Bipolar II disorder (HCC)        2. Anxiety and depression            Goals addressed in session: Goal 1     DATA: Janell discussed continued feelings about recent break-up with boyfriend.   "We addressed ways she believes she contributed to the relationship problems.  She noted choosing 'bad boys', her tendency to 'pick fights', and repetitive texts and outreach.  We addressed potential underlying feelings for Janell in continuing to choose men that are not right for her.  We explored feelings of abandonment fears of getting close and triggering to past losses and relationship; and noting parents tumultuous relationship.  We discussed healthy ways to get out of her comfort zone and interacting with different types of people.  We addressed how Janell displayed her feelings is still able to work, go out with friends,  and remain functional in other ways.   During this session, this clinician used the following therapeutic modalities: Cognitive Behavioral Therapy, Mindfulness-based Strategies, Motivational Interviewing, Solution-Focused Therapy, and Supportive Psychotherapy    Substance Abuse was not addressed during this session. If the client is diagnosed with a co-occurring substance use disorder, please indicate any changes in the frequency or amount of use: not discussed about self drinking behaviors. Stage of change for addressing substance use diagnoses: Pre-contemplation    ASSESSMENT:  Janell Solorzano presents with a Euthymic/ normal and Dysthymic mood.     her affect is Normal range and intensity and Tearful, lightly/appropriately at times which is congruent, with her mood and the content of the session. The client has made progress on their goals.    Janell is able to recognize areas of need for behavior change Janell Solorzano presents with a low risk of suicide, low risk of self-harm, and low risk of harm to others.    For any risk assessment that surpasses a \"low\" rating, a safety plan must be developed.    A safety plan was indicated: no  If yes, describe in detail n/a    PLAN: Between sessions, Janell Solorzano will continue work and positive activities. At the next session, the therapist will use " Cognitive Behavioral Therapy and Supportive Psychotherapy to address mood management, coping.    Behavioral Health Treatment Plan and Discharge Planning: Janell Solorzano is aware of and agrees to continue to work on their treatment plan. They have identified and are working toward their discharge goals. yes    Visit start and stop times:    01/05/24  Start Time: 1400  Stop Time: 1452  Total Visit Time: 52 minutes

## 2024-01-06 ENCOUNTER — HOSPITAL ENCOUNTER (EMERGENCY)
Facility: HOSPITAL | Age: 24
Discharge: HOME | End: 2024-01-06
Attending: EMERGENCY MEDICINE | Admitting: EMERGENCY MEDICINE
Payer: COMMERCIAL

## 2024-01-06 VITALS
OXYGEN SATURATION: 99 % | TEMPERATURE: 98.8 F | RESPIRATION RATE: 18 BRPM | HEART RATE: 114 BPM | DIASTOLIC BLOOD PRESSURE: 87 MMHG | SYSTOLIC BLOOD PRESSURE: 160 MMHG

## 2024-01-06 DIAGNOSIS — A64 STI (SEXUALLY TRANSMITTED INFECTION): Primary | ICD-10-CM

## 2024-01-06 PROCEDURE — 99284 EMERGENCY DEPT VISIT MOD MDM: CPT | Performed by: EMERGENCY MEDICINE

## 2024-01-06 PROCEDURE — 96372 THER/PROPH/DIAG INJ SC/IM: CPT

## 2024-01-06 PROCEDURE — 99282 EMERGENCY DEPT VISIT SF MDM: CPT

## 2024-01-06 RX ORDER — DOXYCYCLINE HYCLATE 100 MG/1
100 CAPSULE ORAL ONCE
Status: COMPLETED | OUTPATIENT
Start: 2024-01-06 | End: 2024-01-06

## 2024-01-06 RX ORDER — DOXYCYCLINE HYCLATE 100 MG/1
100 CAPSULE ORAL 2 TIMES DAILY
Qty: 14 CAPSULE | Refills: 0 | Status: SHIPPED | OUTPATIENT
Start: 2024-01-06 | End: 2024-01-13

## 2024-01-06 RX ADMIN — DOXYCYCLINE HYCLATE 100 MG: 100 CAPSULE ORAL at 12:39

## 2024-01-06 RX ADMIN — LIDOCAINE HYDROCHLORIDE 500 MG: 10 INJECTION, SOLUTION EPIDURAL; INFILTRATION; INTRACAUDAL; PERINEURAL at 12:39

## 2024-01-06 NOTE — Clinical Note
Janell Solorzano was seen and treated in our emergency department on 1/6/2024.                Diagnosis:     Janell  .    She may return on this date: 01/07/2024         If you have any questions or concerns, please don't hesitate to call.      Corina Landaverde MD    ______________________________           _______________          _______________  Hospital Representative                              Date                                Time

## 2024-01-06 NOTE — ED PROVIDER NOTES
History  Chief Complaint   Patient presents with    Medical Problem     Pt was prescribed medication for possible STD and when she went to the pharmacy to get it they told her they don't have it in stock      HPI  23-year-old female presents for request for treatment for STI.  She tested positive for gonorrhea and could not get medication for pharmacy.  No pelvic pain.  Prior to Admission Medications   Prescriptions Last Dose Informant Patient Reported? Taking?   Drysol 20 % external solution   No No   Sig: Apply topically to affected area at bedtime.   Sprintec 28 0.25-35 MG-MCG per tablet   No No   Sig: TAKE ONE TABLET BY MOUTH EVERY DAY   atomoxetine (STRATTERA) 10 MG capsule   No No   Sig: Take 1 capsule (10 mg total) by mouth daily   azithromycin (ZITHROMAX) 250 mg tablet   No No   Sig: Take 1g PO once with Ceftriaxone IM.   cefTRIAXone (ROCEPHIN) 250 mg injection   No No   Sig: Inject 250 mg into a muscle once for 1 dose   ergocalciferol (VITAMIN D2) 50,000 units   No No   Sig: TAKE ONE CAPSULE BY MOUTH WEEKLY   fluticasone (FLONASE) 50 mcg/act nasal spray   No No   Si spray into each nostril daily   ibuprofen (MOTRIN) 600 mg tablet   No No   Sig: TAKE ONE TABLET BY MOUTH EVERY EIGHT HOURS AS NEEDED for mild to moderate pain   lamoTRIgine (LaMICtal) 150 MG tablet   No No   Sig: Take 1 tablet (150 mg total) by mouth daily   lamoTRIgine (LaMICtal) 25 mg tablet   No No   Sig: Take 1 tablet (25 mg total) by mouth daily   naproxen (NAPROSYN) 500 mg tablet   No No   Sig: Take 1 tablet (500 mg total) by mouth 2 (two) times a day with meals   risperiDONE (RisperDAL) 0.5 mg tablet   No No   Sig: Take 1 tablet (0.5 mg total) by mouth 2 (two) times a day   sulfamethoxazole-trimethoprim (BACTRIM DS) 800-160 mg per tablet   No No   Sig: Take 1 tablet by mouth 2 (two) times a day for 3 days      Facility-Administered Medications: None       Past Medical History:   Diagnosis Date    Anxiety     Concussion     Depression      Head injury     concussions in high school and in college    Self-injurious behavior        Past Surgical History:   Procedure Laterality Date    WISDOM TOOTH EXTRACTION         Family History   Problem Relation Age of Onset    Hypertension Mother     Mental illness Mother     Depression Mother     Mental illness Sister     Coronary artery disease Maternal Grandmother     Throat cancer Paternal Grandfather      I have reviewed and agree with the history as documented.    E-Cigarette/Vaping    E-Cigarette Use Never User      E-Cigarette/Vaping Substances    Nicotine No     THC No     CBD No     Flavoring No     Other No     Unknown No      Social History     Tobacco Use    Smoking status: Never    Smokeless tobacco: Never   Vaping Use    Vaping status: Never Used   Substance Use Topics    Alcohol use: Yes     Alcohol/week: 3.0 - 4.0 standard drinks of alcohol     Types: 3 - 4 Shots of liquor per week     Comment: 3-4 drinks, 2 times per week    Drug use: No       Review of Systems   Constitutional:  Negative for chills and fever.   HENT:  Negative for dental problem and ear pain.    Eyes:  Negative for pain and redness.   Respiratory:  Negative for cough and shortness of breath.    Cardiovascular:  Negative for chest pain and palpitations.   Gastrointestinal:  Negative for abdominal pain and nausea.   Endocrine: Negative for polydipsia and polyphagia.   Genitourinary:  Negative for dysuria and frequency.   Musculoskeletal:  Negative for arthralgias and joint swelling.   Skin:  Negative for color change and rash.   Neurological:  Negative for dizziness and headaches.   Psychiatric/Behavioral:  Negative for behavioral problems and confusion.    All other systems reviewed and are negative.      Physical Exam  Physical Exam  Vitals and nursing note reviewed.   Constitutional:       General: She is not in acute distress.     Appearance: She is well-developed. She is not diaphoretic.   HENT:      Head: Atraumatic.       Right Ear: External ear normal.      Left Ear: External ear normal.      Nose: Nose normal.   Eyes:      Conjunctiva/sclera: Conjunctivae normal.      Pupils: Pupils are equal, round, and reactive to light.   Neck:      Vascular: No JVD.   Cardiovascular:      Rate and Rhythm: Normal rate and regular rhythm.      Heart sounds: Normal heart sounds. No murmur heard.  Pulmonary:      Effort: Pulmonary effort is normal. No respiratory distress.      Breath sounds: Normal breath sounds. No wheezing.   Abdominal:      General: Bowel sounds are normal. There is no distension.      Palpations: Abdomen is soft.      Tenderness: There is no abdominal tenderness.   Musculoskeletal:         General: Normal range of motion.      Cervical back: Normal range of motion and neck supple.   Skin:     General: Skin is warm and dry.      Capillary Refill: Capillary refill takes less than 2 seconds.   Neurological:      Mental Status: She is alert and oriented to person, place, and time.      Cranial Nerves: No cranial nerve deficit.   Psychiatric:         Behavior: Behavior normal.         Vital Signs  ED Triage Vitals [01/06/24 1211]   Temperature Pulse Respirations Blood Pressure SpO2   98.8 °F (37.1 °C) (!) 120 17 (!) 178/105 98 %      Temp Source Heart Rate Source Patient Position - Orthostatic VS BP Location FiO2 (%)   Temporal Monitor Sitting Left arm --      Pain Score       --           Vitals:    01/06/24 1211 01/06/24 1249   BP: (!) 178/105 160/87   Pulse: (!) 120 (!) 114   Patient Position - Orthostatic VS: Sitting          Visual Acuity      ED Medications  Medications   cefTRIAXone (ROCEPHIN) 500 mg in lidocaine (PF) (XYLOCAINE-MPF) 1 % IM only syringe (500 mg Intramuscular Given 1/6/24 1239)   doxycycline hyclate (VIBRAMYCIN) capsule 100 mg (100 mg Oral Given 1/6/24 1239)       Diagnostic Studies  Results Reviewed       None                   No orders to display              Procedures  Procedures         ED Course                                SBIRT 20yo+      Flowsheet Row Most Recent Value   Initial Alcohol Screen: US AUDIT-C     1. How often do you have a drink containing alcohol? 1 Filed at: 01/06/2024 1211   2. How many drinks containing alcohol do you have on a typical day you are drinking?  0 Filed at: 01/06/2024 1211   3b. FEMALE Any Age, or MALE 65+: How often do you have 4 or more drinks on one occassion? 0 Filed at: 01/06/2024 1211   Audit-C Score 1 Filed at: 01/06/2024 1211   RODRIGO: How many times in the past year have you...    Used an illegal drug or used a prescription medication for non-medical reasons? Never Filed at: 01/06/2024 1211                      Medical Decision Making  Risk  Prescription drug management.           Will treat for gonorrhea  Disposition  Final diagnoses:   STI (sexually transmitted infection)     Time reflects when diagnosis was documented in both MDM as applicable and the Disposition within this note       Time User Action Codes Description Comment    1/6/2024 12:31 PM Corina Landaverde Add [A64] STI (sexually transmitted infection)           ED Disposition       ED Disposition   Discharge    Condition   Stable    Date/Time   Sat Jan 6, 2024 1231    Comment   Janell Solorzano discharge to home/self care.                   Follow-up Information    None         Discharge Medication List as of 1/6/2024 12:32 PM        START taking these medications    Details   doxycycline hyclate (VIBRAMYCIN) 100 mg capsule Take 1 capsule (100 mg total) by mouth 2 (two) times a day for 7 days, Starting Sat 1/6/2024, Until Sat 1/13/2024, Normal           CONTINUE these medications which have NOT CHANGED    Details   atomoxetine (STRATTERA) 10 MG capsule Take 1 capsule (10 mg total) by mouth daily, Starting Tue 12/26/2023, Normal      azithromycin (ZITHROMAX) 250 mg tablet Take 1g PO once with Ceftriaxone IM., Normal      Drysol 20 % external solution Apply topically to affected area at bedtime., Normal       ergocalciferol (VITAMIN D2) 50,000 units TAKE ONE CAPSULE BY MOUTH WEEKLY, Starting Sat 12/9/2023, Normal      fluticasone (FLONASE) 50 mcg/act nasal spray 1 spray into each nostril daily, Starting Fri 11/17/2023, Normal      ibuprofen (MOTRIN) 600 mg tablet TAKE ONE TABLET BY MOUTH EVERY EIGHT HOURS AS NEEDED for mild to moderate pain, Normal      !! lamoTRIgine (LaMICtal) 150 MG tablet Take 1 tablet (150 mg total) by mouth daily, Starting Tue 11/7/2023, Normal      !! lamoTRIgine (LaMICtal) 25 mg tablet Take 1 tablet (25 mg total) by mouth daily, Starting Tue 11/7/2023, Normal      naproxen (NAPROSYN) 500 mg tablet Take 1 tablet (500 mg total) by mouth 2 (two) times a day with meals, Starting Fri 12/22/2023, Normal      risperiDONE (RisperDAL) 0.5 mg tablet Take 1 tablet (0.5 mg total) by mouth 2 (two) times a day, Starting Tue 12/26/2023, Normal      Sprintec 28 0.25-35 MG-MCG per tablet TAKE ONE TABLET BY MOUTH EVERY DAY, Starting Thu 10/26/2023, Normal      sulfamethoxazole-trimethoprim (BACTRIM DS) 800-160 mg per tablet Take 1 tablet by mouth 2 (two) times a day for 3 days, Starting Thu 1/4/2024, Until Sun 1/7/2024, Normal       !! - Potential duplicate medications found. Please discuss with provider.        STOP taking these medications       cefTRIAXone (ROCEPHIN) 250 mg injection Comments:   Reason for Stopping:               No discharge procedures on file.    PDMP Review         Value Time User    PDMP Reviewed  Yes 1/26/2022 10:52 AM ANGELA Vaughn            ED Provider  Electronically Signed by             Corina Landaverde MD  01/06/24 [id number]

## 2024-01-08 DIAGNOSIS — G44.011 INTRACTABLE EPISODIC CLUSTER HEADACHE: ICD-10-CM

## 2024-01-08 RX ORDER — NAPROXEN 500 MG/1
500 TABLET ORAL 2 TIMES DAILY WITH MEALS
Qty: 30 TABLET | Refills: 0 | Status: SHIPPED | OUTPATIENT
Start: 2024-01-08

## 2024-01-09 ENCOUNTER — TELEMEDICINE (OUTPATIENT)
Dept: PSYCHIATRY | Facility: CLINIC | Age: 24
End: 2024-01-09
Payer: COMMERCIAL

## 2024-01-09 DIAGNOSIS — F31.81 BIPOLAR II DISORDER (HCC): Primary | ICD-10-CM

## 2024-01-09 PROCEDURE — 99214 OFFICE O/P EST MOD 30 MIN: CPT | Performed by: NURSE PRACTITIONER

## 2024-01-10 NOTE — PSYCH
Virtual Regular Visit    Verification of patient location:    Patient is located in the following state in which I hold an active license [place]    Problem List Items Addressed This Visit     Bipolar II disorder (HCC) - Primary              Encounter provider ANGELA Vaughn    Provider located at    [facility]  [address] [facility] [place] [address] [phone]    Recent Visits  Date Type Provider Dept   01/09/24 Telemedicine ANGELA Vaughn [facility]   01/04/24 Office Visit ANGELA Arthur [facility] [address] [facility]   Showing recent visits within past 7 days and meeting all other requirements  Future Appointments  No visits were found meeting these conditions.  Showing future appointments within next 150 days and meeting all other requirements           The patient was identified by name and date of birth. Patient was informed that this is a telemedicine visit and that the visit is being conducted throughthe Epic Embedded platform. She agrees to proceed..  My office door was closed. No one else was in the room.  She acknowledged consent and understanding of privacy and security of the video platform. The patient has agreed to participate and understands they can discontinue the visit at any time.    Patient is aware this is a billable service.     HPI     Current Outpatient Medications   Medication Sig Dispense Refill   • atomoxetine (STRATTERA) 10 MG capsule Take 1 capsule (10 mg total) by mouth daily 30 capsule 0   • doxycycline hyclate (VIBRAMYCIN) 100 mg capsule Take 1 capsule (100 mg total) by mouth 2 (two) times a day for 7 days 14 capsule 0   • Drysol 20 % external solution Apply topically to affected area at bedtime. 60 mL 0   • ergocalciferol (VITAMIN D2) 50,000 units TAKE ONE CAPSULE BY MOUTH WEEKLY 16 capsule 0   • fluticasone (FLONASE) 50 mcg/act nasal spray 1 spray into each nostril daily 18.2 mL 0    • ibuprofen (MOTRIN) 600 mg tablet TAKE ONE TABLET BY MOUTH EVERY EIGHT HOURS AS NEEDED for mild to moderate pain 30 tablet 0   • lamoTRIgine (LaMICtal) 150 MG tablet Take 1 tablet (150 mg total) by mouth daily 30 tablet 2   • lamoTRIgine (LaMICtal) 25 mg tablet Take 1 tablet (25 mg total) by mouth daily 30 tablet 2   • naproxen (NAPROSYN) 500 mg tablet Take 1 tablet (500 mg total) by mouth 2 (two) times a day with meals 30 tablet 0   • risperiDONE (RisperDAL) 0.5 mg tablet Take 1 tablet (0.5 mg total) by mouth 2 (two) times a day 60 tablet 1   • Sprintec 28 0.25-35 MG-MCG per tablet TAKE ONE TABLET BY MOUTH EVERY DAY 84 tablet 0     No current facility-administered medications for this visit.       Review of Systems  Video Exam    There were no vitals filed for this visit.    Physical Exam   As a result of this visit, I have referred the patient for further respiratory evaluation. No    I spent 30 minutes directly with the patient during this visit  VIRTUAL VISIT DISCLAIMER    Janell Solorzano acknowledges that she has consented to an online visit or consultation. She understands that the online visit is based solely on information provided by her, and that, in the absence of a face-to-face physical evaluation by the physician, the diagnosis she receives is both limited and provisional in terms of accuracy and completeness. This is not intended to replace a full medical face-to-face evaluation by the physician. Janell Solorzano understands and accepts these terms.    MEDICATION MANAGEMENT NOTE        [facility]    Name and Date of Birth:  Janell Solorzano 23 y.o. 2000 MRN: [id number]    Date of Visit: January 9, 2024    No Known Allergies    Visit Time    Visit Start Time: 0930  Visit Stop Time: 1000  Total Visit Duration:  30 minutes    SUBJECTIVE:    Janell is seen today for a follow up for Bipolar Disorder type II. She continues to experience ongoing symptoms  since the last visit.  Janell seen today virtually for medication management follow-up.  She was last seen by this provider on 12/26/23.  Janell is irritable and labile during conversation.  She is upset and states that the 36 year old has changed his phone number and she is mad about this.  She also admits that he is not the right person for her, but still has feelings for him and does not like that he is no longer speaking to her.  She states she is not sleeping well.  She is not happy with her job, and states she is looking for something else but has not been able to find anything.  She rates her anxiety 9/10, depression 100/10.  She has passive SI, no plan or intent.  We did discuss her medications and she has not been taking her risperdal as prescribed. She has only been taking 0.5mg once at night.  We discussed increasing this at this time.  She was agreeable.  She will increase to 1 1/2 tabs (0.75mg) at HS x 1 week, then increase to 2 tabs (1mg) at HS. This will help with her irritability and lability, depression and anxiety.  It will also help with her sleep.  She will follow up with this provider in 2 weeks, and will call sooner with concerns or issues if they arise prior to scheduled appointment.    If the Risperdal is not helpful with her mood, we will discuss Lithium again.    Continue medications as ordered  Increase to 1 1/2 tabs (0.75mg) at HS x 1 week, then increase to 2 tabs (1mg) at HS.   Continue Lamictal 175mg p.o. daily  Continue strattera 10mg PO daily  She will continue therapy  She will follow up with this provider in 2 weeks  She will call sooner with concerns or issues if they arise prior to scheduled appointment    She was provided with the education on risperdal.  PARQ completed including sedation, agitation, akathisia, TD, dystonic reactions, NMS, EPS, GI distress, dizziness, risk of metabolic syndrome, orthostatic hypotension and cardiovascular risks such as QT prolongation, increased  prolactin    Lamotrigine PARQ completed including dizziness, headaches, ataxia, vision problems, somnolence, sleep changes, cognitive difficulties, rash (including Becerril-Chato rash), and others, risk of teratogenicity for females.     Education provided on Neuroleptic malignant syndrome.  Patient told that NMS is a life-threatening, neurological disorder most often caused by an adverse reaction to neuroleptic or antipsychotic drugs. Symptoms include high fever, sweating, unstable blood pressure, stupor, muscular rigidity, and autonomic dysfunction. In most cases, the disorder develops within the first 2 weeks of treatment with the drug; however, the disorder may develop any time during the therapy period. Patient advised to call the office or go to the ED immediately if these symptoms develop.    She denies any side effects from current psychiatric medications.    Aware of 24 hour and weekend coverage for urgent situations accessed by calling [facility] main practice number  Continue psychotherapy with therapist  Medication management every 2 weeks  Aware of need to follow up with family physician for medical issues    Diagnoses and all orders for this visit:    Bipolar II disorder (HCC)     Other atypical antipsychotics trialed and found to be ineffective:  Zyprexa, Abilify, Vraylar, and Latuda     Current Outpatient Medications on File Prior to Visit   Medication Sig Dispense Refill   • atomoxetine (STRATTERA) 10 MG capsule Take 1 capsule (10 mg total) by mouth daily 30 capsule 0   • doxycycline hyclate (VIBRAMYCIN) 100 mg capsule Take 1 capsule (100 mg total) by mouth 2 (two) times a day for 7 days 14 capsule 0   • Drysol 20 % external solution Apply topically to affected area at bedtime. 60 mL 0   • ergocalciferol (VITAMIN D2) 50,000 units TAKE ONE CAPSULE BY MOUTH WEEKLY 16 capsule 0   • fluticasone (FLONASE) 50 mcg/act nasal spray 1 spray into each nostril daily 18.2 mL 0   •  ibuprofen (MOTRIN) 600 mg tablet TAKE ONE TABLET BY MOUTH EVERY EIGHT HOURS AS NEEDED for mild to moderate pain 30 tablet 0   • lamoTRIgine (LaMICtal) 150 MG tablet Take 1 tablet (150 mg total) by mouth daily 30 tablet 2   • lamoTRIgine (LaMICtal) 25 mg tablet Take 1 tablet (25 mg total) by mouth daily 30 tablet 2   • naproxen (NAPROSYN) 500 mg tablet Take 1 tablet (500 mg total) by mouth 2 (two) times a day with meals 30 tablet 0   • risperiDONE (RisperDAL) 0.5 mg tablet Take 1 tablet (0.5 mg total) by mouth 2 (two) times a day 60 tablet 1   • Sprintec 28 0.25-35 MG-MCG per tablet TAKE ONE TABLET BY MOUTH EVERY DAY 84 tablet 0     No current facility-administered medications on file prior to visit.       Psychotherapy Provided:     Individual psychotherapy provided: Yes  Supportive counseling provided.  Medication changes discussed with Janell.  Medication education provided to Janell.  Discussed with Janell coping with job stress, social difficulties and everyday stressors.   Coping strategies reviewed with Janell.   Importance of medication and treatment compliance reviewed with Janell.  Importance of follow up with family physician for medical issues reviewed with Janell.  Reassurance and supportive therapy provided.   Crisis/safety plan discussed with Janell. Patient will call prior to scheduled appointment if they have any issues or concerns.  Patient understands they can access the office by calling the main number at any time if they are in crisis.  They also understand they can call their [facility]'s crisis number or go to their nearest ED if suicidal ideation increases or if they develop a plan or intent.       HPI ROS Appetite Changes and Sleep:     She reports normal sleep, adequate appetite, low energy. Denies homicidal ideation, Intermittent passive suicidal ideation without intent or plan    Review Of Systems:      HPI ROS:               Medication Side Effects:  denies   Depression (10 worst): 100/10  8/10   Anxiety (10 worst): 9/10 9/10   Safety concerns (SI, HI, etc): Passive SI, no plan or intent Passive SI no plan or intent   Sleep: Poor, not sleeping well good   Energy: low low   Appetite: adequate good     General normal    Personality no change in personality   Constitutional as noted in HPI   ENT negative   Cardiovascular negative   Respiratory negative   Gastrointestinal negative   Genitourinary negative   Musculoskeletal negative   Integumentary negative   Neurological negative   Endocrine negative   Other Symptoms none, all other systems are negative     Mental Status Evaluation:    Appearance Appropriately dressed and Good eye contact   Behavior calm and cooperative   Mood anxious, depressed, irritable, and dysphoric  Depression Scale -  100  of 10 (0 = No depression)  Anxiety Scale - 9 of 10 (0 = No anxiety)   Speech Loud and Rapid   Affect mood-congruent and labile   Thought Processes Goal directed and coherent   Thought Content Does not verbalize delusional material   Associations Tightly connected   Perceptual Disturbances Denies hallucinations and does not appear to be responding to internal stimuli   Risk Potential Suicidal/Homicidal Ideation - Passive suicidal ideation without intent or plan  Risk of Violence - No evidence of risk for violence found on assessment  Risk of Self Mutilation - No evidence of risk for self mutilation found on assessment   Orientation oriented to person, place, time/date, and situation   Memory recent and remote memory grossly intact   Consciousness alert and awake   Attention/Concentration attention span and concentration appear shorter than expected for age   Insight fair   Judgement limited   Muscle Strength and Gait normal muscle strength and normal muscle tone, normal gait/station and normal balance   Motor Activity no abnormal movements   Language no difficulty naming common objects, no difficulty repeating a phrase, no difficulty writing a sentence   Fund of  Knowledge adequate knowledge of current events  adequate fund of knowledge regarding past history  adequate fund of knowledge regarding vocabulary      Past Psychiatric History Update:     Inpatient Psychiatric Admission Since Last Encounter:   no  Changes to Outpatient Psychiatric Treatment Team:    no  Suicide Attempt Or Self Mutilation Since Last Encounter:   no  Incidence of Violent Behavior Since Last Encounter:   no    Traumatic History Update:     New Onset of Abuse Since Last Encounter:   no  Traumatic Events Since Last Encounter:   no    Past Medical History:    Past Medical History:   Diagnosis Date   • Anxiety    • Concussion    • Depression    • Head injury     concussions in high school and in college   • Self-injurious behavior         Past Surgical History:   Procedure Laterality Date   • WISDOM TOOTH EXTRACTION       No Known Allergies  Substance Abuse History:    Social History     Substance and Sexual Activity   Alcohol Use Yes   • Alcohol/week: 3.0 - 4.0 standard drinks of alcohol   • Types: 3 - 4 Shots of liquor per week    Comment: 3-4 drinks, 2 times per week     Social History     Substance and Sexual Activity   Drug Use No     Social History:    Social History     Socioeconomic History   • Marital status: Single     Spouse name: Not on file   • Number of children: 0   • Years of education: senior in college currently   • Highest education level: High school graduate   Occupational History   • 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   Tobacco Use   • Smoking status: Never   • Smokeless tobacco: Never   Vaping Use   • Vaping status: Never Used   Substance and Sexual Activity   • Alcohol use: Yes     Alcohol/week: 3.0 - 4.0 standard drinks of alcohol     Types: 3 - 4 Shots of liquor per week     Comment: 3-4 drinks, 2 times per week   • Drug use: No   • Sexual activity: Yes     Partners: Male     Birth control/protection: Condom Male   Other Topics Concern   • Not on file   Social History  Narrative   • Not on file     Social Determinants of Health     Financial Resource Strain: Not on file   Food Insecurity: Not on file   Transportation Needs: Not on file   Physical Activity: Not on file   Stress: Not on file   Social Connections: Not on file   Intimate Partner Violence: Not on file   Housing Stability: Not on file     Family Psychiatric History:     Family History   Problem Relation Age of Onset   • Hypertension Mother    • Mental illness Mother    • Depression Mother    • Mental illness Sister    • Coronary artery disease Maternal Grandmother    • Throat cancer Paternal Grandfather      History Review:The following portions of the patient's history were reviewed and updated as appropriate: allergies, current medications, past family history, past medical history, past social history, past surgical history, and problem list     OBJECTIVE:     Vital signs in last 24 hours:    There were no vitals filed for this visit.  Laboratory Results: Recent Labs (last 2 months):   Office Visit on 01/04/2024   Component Date Value   • URINE HCG 01/04/2024 negative    • LEUKOCYTE ESTERASE,UA 01/04/2024 -    • NITRITE,UA 01/04/2024 -    • SL AMB POCT UROBILINOGEN 01/04/2024 -    • POCT URINE PROTEIN 01/04/2024 15    •  PH,UA 01/04/2024 6.0    • BLOOD,UA 01/04/2024 -    • SPECIFIC GRAVITY,UA 01/04/2024 1.025    • KETONES,UA 01/04/2024 -    • BILIRUBIN,UA 01/04/2024 -    • GLUCOSE, UA 01/04/2024 -    •  COLOR,UA 01/04/2024 yellow    • CLARITY,UA 01/04/2024 clear    • N gonorrhoeae, DNA Probe 01/04/2024 Positive (A)    • Chlamydia trachomatis, D* 01/04/2024 Negative    • Urine Culture 01/04/2024 No Growth <1000 cfu/mL    Appointment on 12/21/2023   Component Date Value   • WBC 12/21/2023 6.92    • RBC 12/21/2023 5.13 (H)    • Hemoglobin 12/21/2023 15.7 (H)    • Hematocrit 12/21/2023 45.0    • MCV 12/21/2023 88    • MCH 12/21/2023 30.6    • MCHC 12/21/2023 34.9    • RDW 12/21/2023 11.9    • MPV 12/21/2023 10.0    •  Platelets 12/21/2023 287    • nRBC 12/21/2023 0    • Neutrophils Relative 12/21/2023 48    • Immat GRANS % 12/21/2023 0    • Lymphocytes Relative 12/21/2023 42    • Monocytes Relative 12/21/2023 9    • Eosinophils Relative 12/21/2023 1    • Basophils Relative 12/21/2023 0    • Neutrophils Absolute 12/21/2023 3.32    • Immature Grans Absolute 12/21/2023 0.02    • Lymphocytes Absolute 12/21/2023 2.87    • Monocytes Absolute 12/21/2023 0.60    • Eosinophils Absolute 12/21/2023 0.08    • Basophils Absolute 12/21/2023 0.03    • Sodium 12/21/2023 139    • Potassium 12/21/2023 3.9    • Chloride 12/21/2023 104    • CO2 12/21/2023 28    • ANION GAP 12/21/2023 7    • BUN 12/21/2023 10    • Creatinine 12/21/2023 0.89    • Glucose, Fasting 12/21/2023 87    • Calcium 12/21/2023 9.5    • AST 12/21/2023 18    • ALT 12/21/2023 14    • Alkaline Phosphatase 12/21/2023 50    • Total Protein 12/21/2023 7.6    • Albumin 12/21/2023 4.5    • Total Bilirubin 12/21/2023 0.52    • eGFR 12/21/2023 91    • Cholesterol 12/21/2023 153    • Triglycerides 12/21/2023 103    • HDL, Direct 12/21/2023 75    • LDL Calculated 12/21/2023 57    • Non-HDL-Chol (CHOL-HDL) 12/21/2023 78    • TSH 3RD GENERATON 12/21/2023 1.975    • Hemoglobin A1C 12/21/2023 5.0    • EAG 12/21/2023 97    • Vitamin B-12 12/21/2023 429    • Vit D, 25-Hydroxy 12/21/2023 104.2 (H)    • Lyme Total Antibodies 12/21/2023 Negative    Office Visit on 12/07/2023   Component Date Value   •  RAPID STREP A 12/07/2023 Negative      I have personally reviewed all pertinent laboratory/tests results.    Suicide/Homicide Risk Assessment:    Risk of Harm to Self:  The following ratings are based on assessment at the time of the interview  Recent Specific Risk Factors include: current depressive symptoms, current anxiety symptoms, unstable mood, passive death wishes, worries about finances or work  Demographic risk factors include: , age: young adult (15-24)  Historical Risk Factors  include: chronic depression, chronic anxiety symptoms, chronic mood disorder, history of suicide attempts, history of impulsive behaviors, history of traumatic experiences  Protective Factors: access to mental health treatment, compliant with medications, compliant with mental health treatment, having a desire to be alive, stable living environment, stable job, sense of determination, supportive family  Based on today's assessment, Janell presents the following risk of harm to self: low    The following interventions are recommended: no intervention changes needed. Although patient's acute lethality risk is LOW, long-term/chronic lethality risk is mildly elevated given chronic mental health symptoms.  Janell Solorzano is future-oriented, forward-thinking, and demonstrates ability to act in a self-preserving manner as evidenced by volitionally presenting to the clinic today, seeking treatment.  At this time, inpatient hospitalization is not currently warranted. To mitigate future risk, patient should adhere to treatment recommendations, avoid alcohol/illicit substance use, utilize community-based resources and familiar support, and prioritize mental health treatment.      Based on today's assessment and clinical criteria, Janell Solorzano contracts for safety and is not an imminent risk of harm to self or others. Outpatient level of care is deemed appropriate at this present time.  Janell Solorzano understands that if they are no longer able to contract for safety, they need to call/contact the outpatient office including this writer, call/contact crisis and/or attend to the nearest Emergency Department for immediate evaluation.      Risk of Harm to Others:  The following ratings are based on assessment at the time of the interview  Protective Factors: no current homicidal ideation  Based on today's assessment, Janell presents the following risk of harm to others: none    The following interventions are recommended: contracts  for safety at present - agrees to go to ED if feeling unsafe, return in 2 weeks for reassessment, therapy appointment in 1 day, contracts for safety at present - agrees to call Crisis Intervention Service if feeling unsafe    Medications Risks/Benefits:      Risks, Benefits And Possible Side Effects Of Medications:    Discussed risks and benefits of treatment with patient including risk of parkinsonian symptoms, metabolic syndrome, tardive dyskinesia and neuroleptic malignant syndrome related to treatment with antipsychotic medications and risk of rash related to treatment with Lamictal     Controlled Medication Discussion:     Not applicable    Treatment Plan:    Due for update/Updated:   no  Last treatment plan done 8/30/23 by Michela Bates LCSW.  Treatment Plan due on 3/1/24.    ANGELA Vaughn 01/10/24    This note was not shared with the patient due to reasonable likelihood of causing patient harm

## 2024-01-11 ENCOUNTER — TELEMEDICINE (OUTPATIENT)
Dept: BEHAVIORAL/MENTAL HEALTH CLINIC | Facility: CLINIC | Age: 24
End: 2024-01-11
Payer: COMMERCIAL

## 2024-01-11 DIAGNOSIS — F31.81 BIPOLAR II DISORDER (HCC): Primary | ICD-10-CM

## 2024-01-11 DIAGNOSIS — F32.A ANXIETY AND DEPRESSION: ICD-10-CM

## 2024-01-11 DIAGNOSIS — F41.9 ANXIETY AND DEPRESSION: ICD-10-CM

## 2024-01-11 PROCEDURE — 90834 PSYTX W PT 45 MINUTES: CPT

## 2024-01-11 NOTE — BH TREATMENT PLAN
Outpatient Behavioral Health Psychotherapy Treatment Plan    Janell Solorzano  2000     Date of Initial Psychotherapy Assessment: 9/21/22   Date of Current Treatment Plan: 01/11/24  Treatment Plan Target Date: 7/11/24    Treatment Plan Expiration Date: 7/11/24    Diagnosis:   1. Bipolar II disorder (HCC)        2. Anxiety and depression          Strengths: good friend, helpful, good caretaker     Area(s) of Need: anger    Long Term Goal 1 (in the client's own words): getting through emotions    Stage of Change: Action    Target Date for completion: 7/11/24     Anticipated therapeutic modalities: psychodynamic support and cognitive behavioral strategies      People identified to complete this goal: Evans       Objective 1: (identify the means of measuring success in meeting the objective): 1) identify feelings contributing to anger; 2) validate feelings without judgement; 3) not text when angry; 4) use breathing when angry; 5) allow for pausing when angry; 6) use challenging/alternative self-talk to help get through moments of anger.           I am currently under the care of a [facility] psychiatric provider: yes    My [facility] psychiatric provider is: Jacque Alejandro MD    I am currently taking psychiatric medications: Yes, as prescribed    I feel that I will be ready for discharge from mental health care when I reach the following (measurable goal/objective): I don't know    For children and adults who have a legal guardian:   Has there been any change to custody orders and/or guardianship status? NA. If yes, attach updated documentation.    I have previously created my Crisis Plan and have been offered a copy of this plan    Behavioral Health Treatment Plan  Luke: Diagnosis and Treatment Plan explained to Janell Solorzano acknowledges an understanding of their diagnosis. Janell Solorzano agrees to this treatment plan.    I have been offered a copy of this Treatment Plan. yes

## 2024-01-11 NOTE — PSYCH
Virtual Regular Visit    Verification of patient location:    Patient is located at Home in the following state in which I hold an active license [place]      Assessment/Plan:    Problem List Items Addressed This Visit          Other    Anxiety and depression    Bipolar II disorder (HCC) - Primary       Goals addressed in session: Goal 1          Reason for visit is   Chief Complaint   Patient presents with    Virtual Regular Visit          Encounter provider Michela Bates LCSW    Provider located at [facility] [place]  [facility] [place]  [address]  [place] [address] [phone]      Recent Visits  Date Type Provider Dept   01/05/24 Telemedicine Michela Bates LCSW [facility] [place]   01/04/24 Office Visit ANGELA [facility] [address] [facility]   Showing recent visits within past 7 days and meeting all other requirements  Today's Visits  Date Type Provider Dept   01/11/24 Telemedicine Michela Bates LCSW [facility] [place]   Showing today's visits and meeting all other requirements  Future Appointments  No visits were found meeting these conditions.  Showing future appointments within next 150 days and meeting all other requirements       The patient was identified by name and date of birth. Janell Solorzano was informed that this is a telemedicine visit and that the visit is being conducted throughthe Epic Embedded platform. She agrees to proceed..  My office door was closed. No one else was in the room.  She acknowledged consent and understanding of privacy and security of the video platform. The patient has agreed to participate and understands they can discontinue the visit at any time.    Patient is aware this is a billable service.     Subjective  Janell Solorzano is a 23 y.o. female . .      HPI     Past Medical History:   Diagnosis Date    Anxiety     Concussion     Depression     Head injury      concussions in high school and in college    Self-injurious behavior        Past Surgical History:   Procedure Laterality Date    WISDOM TOOTH EXTRACTION         Current Outpatient Medications   Medication Sig Dispense Refill    atomoxetine (STRATTERA) 10 MG capsule Take 1 capsule (10 mg total) by mouth daily 30 capsule 0    doxycycline hyclate (VIBRAMYCIN) 100 mg capsule Take 1 capsule (100 mg total) by mouth 2 (two) times a day for 7 days 14 capsule 0    Drysol 20 % external solution Apply topically to affected area at bedtime. 60 mL 0    ergocalciferol (VITAMIN D2) 50,000 units TAKE ONE CAPSULE BY MOUTH WEEKLY 16 capsule 0    fluticasone (FLONASE) 50 mcg/act nasal spray 1 spray into each nostril daily 18.2 mL 0    ibuprofen (MOTRIN) 600 mg tablet TAKE ONE TABLET BY MOUTH EVERY EIGHT HOURS AS NEEDED for mild to moderate pain 30 tablet 0    lamoTRIgine (LaMICtal) 150 MG tablet Take 1 tablet (150 mg total) by mouth daily 30 tablet 2    lamoTRIgine (LaMICtal) 25 mg tablet Take 1 tablet (25 mg total) by mouth daily 30 tablet 2    naproxen (NAPROSYN) 500 mg tablet Take 1 tablet (500 mg total) by mouth 2 (two) times a day with meals 30 tablet 0    risperiDONE (RisperDAL) 0.5 mg tablet Take 1 tablet (0.5 mg total) by mouth 2 (two) times a day 60 tablet 1    Sprintec 28 0.25-35 MG-MCG per tablet TAKE ONE TABLET BY MOUTH EVERY DAY 84 tablet 0     No current facility-administered medications for this visit.        No Known Allergies    Review of Systems    Video Exam    There were no vitals filed for this visit.    Physical Exam     Behavioral Health Psychotherapy Progress Note    Psychotherapy Provided: Individual Psychotherapy     1. Bipolar II disorder (HCC)        2. Anxiety and depression            Goals addressed in session: Goal 1     DATA: Today we updated treatment plan.  We reviewed prior treatment plans and discussed the process.  Provider encouraged the possibility of honing in on goals and more specific  objectives.  Janell was able to acknowledge anger, and stated wanting to control her anger.  We addressed this as a broad goal, and in line with what we were talking about, began to piece apart slow steps. We discussed this as redefining how to approach the overall goal of controlling her anger.  We addressed breathing and pausing, and a smaller but important goal of being able to sit in the moment with her emotions. Provider commented on [place] tolerance of our discussion and demonstrated the act of being able to be in the moment with her emotions.  We agreed that Janell will try the steps we discussed should an appropriate situation come up over the next week.  We also addressed that she saw her psychiatric provider Jacque and that they changed a medication to the evening.   We confirmed next appointment in one week and agreed to schedule additional ones at the next appointment.     During this session, this clinician used the following therapeutic modalities: Cognitive Behavioral Therapy, Mindfulness-based Strategies, Motivational Interviewing, Solution-Focused Therapy, and Supportive Psychotherapy    Substance Abuse was not addressed during this session. If the client is diagnosed with a co-occurring substance use disorder, please indicate any changes in the frequency or amount of use: Not specifically addressed today. Stage of change for addressing substance use diagnoses: Pre-contemplation    ASSESSMENT:  Janell Solorzano presents with a Euthymic/ normal and Dysthymic mood.     her affect is Normal range and intensity, which is congruent, with her mood and the content of the session. The client has made progress on their goals.    Janell presented with patience and tolerance in discussing her thoughts, emotions and behaviors and was invested in today's session.  Janell Solorzano presents with a low risk of suicide, low risk of self-harm, and low risk of harm to others.    For any risk assessment that surpasses a  "\"low\" rating, a safety plan must be developed.    A safety plan was indicated: no  If yes, describe in detail n/a    PLAN: Between sessions, Janell Solorzano will practice techniques as discussed when angry. At the next session, the therapist will use Cognitive Behavioral Therapy, Mindfulness-based Strategies, Motivational Interviewing, Solution-Focused Therapy, and Supportive Psychotherapy to address mood stability and use of coping strategies.    Behavioral Health Treatment Plan and Discharge Planning: Janell Solorzano is aware of and agrees to continue to work on their treatment plan. They have identified and are working toward their discharge goals. yes    Visit start and stop times:    01/11/24  Start Time: 1102  Stop Time: 1154  Total Visit Time: 52 minutes      "

## 2024-01-17 DIAGNOSIS — Z30.011 ENCOUNTER FOR INITIAL PRESCRIPTION OF CONTRACEPTIVE PILLS: ICD-10-CM

## 2024-01-19 ENCOUNTER — TELEMEDICINE (OUTPATIENT)
Dept: BEHAVIORAL/MENTAL HEALTH CLINIC | Facility: CLINIC | Age: 24
End: 2024-01-19
Payer: COMMERCIAL

## 2024-01-19 DIAGNOSIS — F31.81 BIPOLAR II DISORDER (HCC): Primary | ICD-10-CM

## 2024-01-19 PROCEDURE — 90834 PSYTX W PT 45 MINUTES: CPT

## 2024-01-19 NOTE — PSYCH
Virtual Regular Visit    Verification of patient location:    Patient is located at Home in the following state in which I hold an active license [place]      Assessment/Plan:    Problem List Items Addressed This Visit          Other    Bipolar II disorder (HCC) - Primary       Goals addressed in session: Goal 1          Reason for visit is   Chief Complaint   Patient presents with    Virtual Regular Visit          Encounter provider Michela Bates LCSW    Provider located at [facility] [place]  [facility] [place]  [address]  [place] [address] [phone]      Recent Visits  No visits were found meeting these conditions.  Showing recent visits within past 7 days and meeting all other requirements  Today's Visits  Date Type Provider Dept   01/19/24 Telemedicine Michela Bates LCSW [facility] [place]   Showing today's visits and meeting all other requirements  Future Appointments  No visits were found meeting these conditions.  Showing future appointments within next 150 days and meeting all other requirements       The patient was identified by name and date of birth. Janell Solorzano was informed that this is a telemedicine visit and that the visit is being conducted throughthe Epic Embedded platform. She agrees to proceed..  My office door was closed. No one else was in the room.  She acknowledged consent and understanding of privacy and security of the video platform. The patient has agreed to participate and understands they can discontinue the visit at any time.    Patient is aware this is a billable service.     Subjective  Janell Solorzano is a 23 y.o. female . .      HPI     Past Medical History:   Diagnosis Date    Anxiety     Concussion     Depression     Head injury     concussions in high school and in college    Self-injurious behavior        Past Surgical History:   Procedure Laterality Date    WISDOM TOOTH EXTRACTION          Current Outpatient Medications   Medication Sig Dispense Refill    atomoxetine (STRATTERA) 10 MG capsule Take 1 capsule (10 mg total) by mouth daily 30 capsule 0    Drysol 20 % external solution Apply topically to affected area at bedtime. 60 mL 0    ergocalciferol (VITAMIN D2) 50,000 units TAKE ONE CAPSULE BY MOUTH WEEKLY 16 capsule 0    fluticasone (FLONASE) 50 mcg/act nasal spray 1 spray into each nostril daily 18.2 mL 0    ibuprofen (MOTRIN) 600 mg tablet TAKE ONE TABLET BY MOUTH EVERY EIGHT HOURS AS NEEDED for mild to moderate pain 30 tablet 0    lamoTRIgine (LaMICtal) 150 MG tablet Take 1 tablet (150 mg total) by mouth daily 30 tablet 2    lamoTRIgine (LaMICtal) 25 mg tablet Take 1 tablet (25 mg total) by mouth daily 30 tablet 2    naproxen (NAPROSYN) 500 mg tablet Take 1 tablet (500 mg total) by mouth 2 (two) times a day with meals 30 tablet 0    risperiDONE (RisperDAL) 0.5 mg tablet Take 1 tablet (0.5 mg total) by mouth 2 (two) times a day 60 tablet 1    Sprintec 28 0.25-35 MG-MCG per tablet TAKE ONE TABLET BY MOUTH EVERY DAY 84 tablet 0     No current facility-administered medications for this visit.        No Known Allergies    Review of Systems    Video Exam    There were no vitals filed for this visit.    Physical Exam     Behavioral Health Psychotherapy Progress Note    Psychotherapy Provided: Individual Psychotherapy     1. Bipolar II disorder (HCC)            Goals addressed in session: Goal 1     DATA: Janell discussed continued feelings about the ending of her most recent relationship.  We addressed her thoughts and feelings; we addressed specific conversations and the dynamics at work, we addressed [place] beliefs about herself and relationships and explored.  We addressed the multiple factors that contributed to the ending of the relationship, and explored aspects that presented as unhealthy.  We discussed getting through her feelings and the value of so doing.  We addressed potential  "specific coping strategies that she is using and that she can use.  Provider encouraged that she identify one positive healthy coping strategy that she can begin to employ to help distract while she continues to go through her feelings.  Within the conversation provider challenged Janell in some of her assumptions and mindset and ways to consider the broader picture.  We addressed job-related and professional goals that Janell is pondering at this time.  We confirmed next appointment next week and set an additional one.     During this session, this clinician used the following therapeutic modalities: Cognitive Behavioral Therapy and Supportive Psychotherapy    Substance Abuse was not addressed during this session. If the client is diagnosed with a co-occurring substance use disorder, please indicate any changes in the frequency or amount of use: Janell's drinking behaviors not discussed today. Stage of change for addressing substance use diagnoses: Pre-contemplation    ASSESSMENT:  Janell Solorzano presents with a Euthymic/ normal and Depressed mood.     her affect is Normal range and intensity, which is congruent, with her mood and the content of the session. The client has made progress on their goals.    Janell was able to engage in the therapeutic process; she was calm and tolerant of questioning and explorative in processing the discussions Janell Solorzano presents with a low risk of suicide, low risk of self-harm, and low risk of harm to others.    For any risk assessment that surpasses a \"low\" rating, a safety plan must be developed.    A safety plan was indicated: no  If yes, describe in detail n/a    PLAN: Between sessions, Janell Solorzano will continue attention to self-care, healthy distractions, use of coping strategies. At the next session, the therapist will use Cognitive Behavioral Therapy and Supportive Psychotherapy to address mood management, mood stability and overall use of coping " strategies.    Behavioral Health Treatment Plan and Discharge Planning: Janell Solorzano is aware of and agrees to continue to work on their treatment plan. They have identified and are working toward their discharge goals. yes    Visit start and stop times:    01/19/24  Start Time: 1100  Stop Time: 1152  Total Visit Time: 52 minutes

## 2024-01-20 DIAGNOSIS — J02.9 SORE THROAT: ICD-10-CM

## 2024-01-22 RX ORDER — FLUTICASONE PROPIONATE 50 MCG
SPRAY, SUSPENSION (ML) NASAL
Qty: 16 G | Refills: 0 | Status: SHIPPED | OUTPATIENT
Start: 2024-01-22

## 2024-01-23 ENCOUNTER — TELEMEDICINE (OUTPATIENT)
Dept: PSYCHIATRY | Facility: CLINIC | Age: 24
End: 2024-01-23

## 2024-01-23 DIAGNOSIS — F31.81 BIPOLAR II DISORDER (HCC): Primary | ICD-10-CM

## 2024-01-23 DIAGNOSIS — F90.2 ADHD (ATTENTION DEFICIT HYPERACTIVITY DISORDER), COMBINED TYPE: ICD-10-CM

## 2024-01-23 RX ORDER — ATOMOXETINE 18 MG/1
18 CAPSULE ORAL DAILY
Qty: 30 CAPSULE | Refills: 2 | Status: SHIPPED | OUTPATIENT
Start: 2024-01-23

## 2024-01-23 RX ORDER — LAMOTRIGINE 200 MG/1
200 TABLET ORAL DAILY
Qty: 30 TABLET | Refills: 2 | Status: SHIPPED | OUTPATIENT
Start: 2024-01-23

## 2024-01-23 NOTE — PSYCH
Virtual Regular Visit    Verification of patient location:    Patient is located in the following state in which I hold an active license [place]    Problem List Items Addressed This Visit     Bipolar II disorder (HCC) - Primary    Relevant Medications    atoMOXetine (STRATTERA) 18 mg capsule    lamoTRIgine (LaMICtal) 200 MG tablet   Other Visit Diagnoses     ADHD (attention deficit hyperactivity disorder), combined type        Relevant Medications    atoMOXetine (STRATTERA) 18 mg capsule                 Encounter provider ANGELA Vaughn    Provider located at    [facility]  [address] [facility] [place] [address] [phone]    Recent Visits  No visits were found meeting these conditions.  Showing recent visits within past 7 days and meeting all other requirements  Today's Visits  Date Type Provider Dept   01/23/24 Telemedicine ANGELA Vaughn  [facility]   Showing today's visits and meeting all other requirements  Future Appointments  No visits were found meeting these conditions.  Showing future appointments within next 150 days and meeting all other requirements           The patient was identified by name and date of birth. Patient was informed that this is a telemedicine visit and that the visit is being conducted throughthe Epic Embedded platform. She agrees to proceed..  My office door was closed. No one else was in the room.  She acknowledged consent and understanding of privacy and security of the video platform. The patient has agreed to participate and understands they can discontinue the visit at any time.    Patient is aware this is a billable service.     HPI     Current Outpatient Medications   Medication Sig Dispense Refill   • atoMOXetine (STRATTERA) 18 mg capsule Take 1 capsule (18 mg total) by mouth daily 30 capsule 2   • Drysol 20 % external solution Apply topically to affected area at bedtime. 60 mL 0   •  ergocalciferol (VITAMIN D2) 50,000 units TAKE ONE CAPSULE BY MOUTH WEEKLY 16 capsule 0   • fluticasone (FLONASE) 50 mcg/act nasal spray USE ONE SPRAY IN EACH NOSTRIL EVERY DAY 16 g 0   • ibuprofen (MOTRIN) 600 mg tablet TAKE ONE TABLET BY MOUTH EVERY EIGHT HOURS AS NEEDED for mild to moderate pain 30 tablet 0   • lamoTRIgine (LaMICtal) 200 MG tablet Take 1 tablet (200 mg total) by mouth daily 30 tablet 2   • naproxen (NAPROSYN) 500 mg tablet Take 1 tablet (500 mg total) by mouth 2 (two) times a day with meals 30 tablet 0   • risperiDONE (RisperDAL) 0.5 mg tablet Take 1 tablet (0.5 mg total) by mouth 2 (two) times a day 60 tablet 1   • Sprintec 28 0.25-35 MG-MCG per tablet TAKE ONE TABLET BY MOUTH EVERY DAY 84 tablet 0     No current facility-administered medications for this visit.       Review of Systems  Video Exam    There were no vitals filed for this visit.    Physical Exam   As a result of this visit, I have referred the patient for further respiratory evaluation. No    I spent 20 minutes directly with the patient during this visit  VIRTUAL VISIT DISCLAIMER    Janell Solorzano acknowledges that she has consented to an online visit or consultation. She understands that the online visit is based solely on information provided by her, and that, in the absence of a face-to-face physical evaluation by the physician, the diagnosis she receives is both limited and provisional in terms of accuracy and completeness. This is not intended to replace a full medical face-to-face evaluation by the physician. Janell Solorzano understands and accepts these terms.    MEDICATION MANAGEMENT NOTE        [facility]    Name and Date of Birth:  Janell Solorzano 23 y.o. 2000 MRN: [id number]    Date of Visit: January 23, 2024    No Known Allergies    Visit Time    Visit Start Time: 1030  Visit Stop Time: 1020  Total Visit Duration:  20 minutes    SUBJECTIVE:    Janell is seen today for a  "follow up for Bipolar Disorder type II. She continues to experience ongoing symptoms since the last visit.  Janell seen today virtually for medication management follow-up.  She was last seen by this provider on 1/9/24.  Janell continues to be irritable during conversation, stating that \"my life is falling apart\" and is not able to identify anything good that has occurred in the first month of 2024.  She states that she plans on resigning from work on 2/5/24 because she does not like the hours.  We discussed the option of speaking to her boss about changing her hours, but she does not like any of the shift options and does not want to continue to work there. 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  She continues to speak about the \"36 year old\" that continues to have her blocked.  States her depression is 9/10 and anxiety is 6/10.  Denies SI/HI at this time. She did state that she has been social with friends and has gone out a few times.  She states that she is not sleeping, but reports the risperdal is helping with her sleep.  We spoke about Lithium and she is not willing to try it at this time. She states that her focus and concentration have improved since starting the strattera. She does not endorse symptoms of saul at this time.  We will increase her lamictal to 200mg PO Daily to help with her depression and anxiety, mood stabilization.  We will slightly increase her strattera to 18mg PO daily to continue to help with her concentration and focus.  She will alert this provider if she starts to feel any symptoms of saul.  No other medication changes will be made. We discussed the need to make situational changes in her life, such as her job.  She did state she joined a gym, but has not yet gone. We will follow up in 2 weeks to reassess.         Continue medications as ordered  Risperdal 2 tabs (1mg) at HS.   Increase Lamictal to 200p.o. daily  Increase strattera to 18mg " PO daily  She will continue therapy  She will follow up with this provider in 2 weeks  She will call sooner with concerns or issues if they arise prior to scheduled appointment    She was provided with the education on risperdal.  PARQ completed including sedation, agitation, akathisia, TD, dystonic reactions, NMS, EPS, GI distress, dizziness, risk of metabolic syndrome, orthostatic hypotension and cardiovascular risks such as QT prolongation, increased prolactin    Lamotrigine PARQ completed including dizziness, headaches, ataxia, vision problems, somnolence, sleep changes, cognitive difficulties, rash (including Becerril-Chato rash), and others, risk of teratogenicity for females.     Education provided on Neuroleptic malignant syndrome.  Patient told that NMS is a life-threatening, neurological disorder most often caused by an adverse reaction to neuroleptic or antipsychotic drugs. Symptoms include high fever, sweating, unstable blood pressure, stupor, muscular rigidity, and autonomic dysfunction. In most cases, the disorder develops within the first 2 weeks of treatment with the drug; however, the disorder may develop any time during the therapy period. Patient advised to call the office or go to the ED immediately if these symptoms develop.    She denies any side effects from current psychiatric medications.    Aware of 24 hour and weekend coverage for urgent situations accessed by calling [facility] main practice number  Continue psychotherapy with therapist  Medication management every 2 weeks  Aware of need to follow up with family physician for medical issues    Diagnoses and all orders for this visit:    Bipolar II disorder (HCC)  -     lamoTRIgine (LaMICtal) 200 MG tablet; Take 1 tablet (200 mg total) by mouth daily    ADHD (attention deficit hyperactivity disorder), combined type  -     atoMOXetine (STRATTERA) 18 mg capsule; Take 1 capsule (18 mg total) by mouth daily     Other  atypical antipsychotics trialed and found to be ineffective:  Zyprexa, Abilify, Vraylar, and Latuda     Current Outpatient Medications on File Prior to Visit   Medication Sig Dispense Refill   • Drysol 20 % external solution Apply topically to affected area at bedtime. 60 mL 0   • ergocalciferol (VITAMIN D2) 50,000 units TAKE ONE CAPSULE BY MOUTH WEEKLY 16 capsule 0   • fluticasone (FLONASE) 50 mcg/act nasal spray USE ONE SPRAY IN EACH NOSTRIL EVERY DAY 16 g 0   • ibuprofen (MOTRIN) 600 mg tablet TAKE ONE TABLET BY MOUTH EVERY EIGHT HOURS AS NEEDED for mild to moderate pain 30 tablet 0   • naproxen (NAPROSYN) 500 mg tablet Take 1 tablet (500 mg total) by mouth 2 (two) times a day with meals 30 tablet 0   • risperiDONE (RisperDAL) 0.5 mg tablet Take 1 tablet (0.5 mg total) by mouth 2 (two) times a day 60 tablet 1   • Sprintec 28 0.25-35 MG-MCG per tablet TAKE ONE TABLET BY MOUTH EVERY DAY 84 tablet 0   • [DISCONTINUED] atomoxetine (STRATTERA) 10 MG capsule Take 1 capsule (10 mg total) by mouth daily 30 capsule 0   • [DISCONTINUED] lamoTRIgine (LaMICtal) 150 MG tablet Take 1 tablet (150 mg total) by mouth daily 30 tablet 2   • [DISCONTINUED] lamoTRIgine (LaMICtal) 25 mg tablet Take 1 tablet (25 mg total) by mouth daily 30 tablet 2     No current facility-administered medications on file prior to visit.       Psychotherapy Provided:     Individual psychotherapy provided: Yes  Supportive counseling provided.  Medication changes discussed with Janell.  Medication education provided to Janell.  Discussed with Janell coping with job stress, social difficulties and everyday stressors.   Coping strategies reviewed with Janell.   Importance of medication and treatment compliance reviewed with Janell.  Importance of follow up with family physician for medical issues reviewed with Janell.  Reassurance and supportive therapy provided.   Crisis/safety plan discussed with Janell. Patient will call prior to scheduled appointment if  they have any issues or concerns.  Patient understands they can access the office by calling the main number at any time if they are in crisis.  They also understand they can call their county's crisis number or go to their nearest ED if suicidal ideation increases or if they develop a plan or intent.       HPI ROS Appetite Changes and Sleep:     She reports difficulty falling asleep, adequate appetite, low energy. Denies homicidal ideation, denies suicidal ideation    Review Of Systems:      HPI ROS:               Medication Side Effects: denies    Depression (10 worst): 9/10 100/10   Anxiety (10 worst): 6/10 9/10   Safety concerns (SI, HI, etc): denies Passive SI, no plan or intent   Sleep: poor Poor, not sleeping well   Energy: low low   Appetite: adequate adequate     General normal    Personality no change in personality   Constitutional as noted in HPI   ENT negative   Cardiovascular negative   Respiratory negative   Gastrointestinal negative   Genitourinary negative   Musculoskeletal negative   Integumentary negative   Neurological negative   Endocrine negative   Other Symptoms none, all other systems are negative     Mental Status Evaluation:    Appearance Appropriately dressed and Good eye contact   Behavior calm and cooperative   Mood anxious, depressed, and irritable  Depression Scale - 9 of 10 (0 = No depression)  Anxiety Scale - 6 of 10 (0 = No anxiety)   Speech Normal rate and volume   Affect mood-congruent and blunted   Thought Processes Goal directed and coherent   Thought Content Does not verbalize delusional material   Associations Tightly connected   Perceptual Disturbances Denies hallucinations and does not appear to be responding to internal stimuli   Risk Potential Suicidal/Homicidal Ideation - Passive suicidal ideation without intent or plan  Risk of Violence - No evidence of risk for violence found on assessment  Risk of Self Mutilation - No evidence of risk for self mutilation found on  assessment   Orientation oriented to person, place, time/date, and situation   Memory recent and remote memory grossly intact   Consciousness alert and awake   Attention/Concentration attention span and concentration appear shorter than expected for age   Insight fair   Judgement limited   Muscle Strength and Gait normal muscle strength and normal muscle tone, normal gait/station and normal balance   Motor Activity no abnormal movements   Language no difficulty naming common objects, no difficulty repeating a phrase, no difficulty writing a sentence   Fund of Knowledge adequate knowledge of current events  adequate fund of knowledge regarding past history  adequate fund of knowledge regarding vocabulary      Past Psychiatric History Update:     Inpatient Psychiatric Admission Since Last Encounter:   no  Changes to Outpatient Psychiatric Treatment Team:    no  Suicide Attempt Or Self Mutilation Since Last Encounter:   no  Incidence of Violent Behavior Since Last Encounter:   no    Traumatic History Update:     New Onset of Abuse Since Last Encounter:   no  Traumatic Events Since Last Encounter:   no    Past Medical History:    Past Medical History:   Diagnosis Date   • Anxiety    • Concussion    • Depression    • Head injury     concussions in high school and in college   • Self-injurious behavior         Past Surgical History:   Procedure Laterality Date   • WISDOM TOOTH EXTRACTION       No Known Allergies  Substance Abuse History:    Social History     Substance and Sexual Activity   Alcohol Use Yes   • Alcohol/week: 3.0 - 4.0 standard drinks of alcohol   • Types: 3 - 4 Shots of liquor per week    Comment: 3-4 drinks, 2 times per week     Social History     Substance and Sexual Activity   Drug Use No     Social History:    Social History     Socioeconomic History   • Marital status: Single     Spouse name: Not on file   • Number of children: 0   • Years of education: senior in college currently   • Highest  education level: High school graduate   Occupational History   • 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   Tobacco Use   • Smoking status: Never   • Smokeless tobacco: Never   Vaping Use   • Vaping status: Never Used   Substance and Sexual Activity   • Alcohol use: Yes     Alcohol/week: 3.0 - 4.0 standard drinks of alcohol     Types: 3 - 4 Shots of liquor per week     Comment: 3-4 drinks, 2 times per week   • Drug use: No   • Sexual activity: Yes     Partners: Male     Birth control/protection: Condom Male   Other Topics Concern   • Not on file   Social History Narrative   • Not on file     Social Determinants of Health     Financial Resource Strain: Not on file   Food Insecurity: Not on file   Transportation Needs: Not on file   Physical Activity: Not on file   Stress: Not on file   Social Connections: Not on file   Intimate Partner Violence: Not on file   Housing Stability: Not on file     Family Psychiatric History:     Family History   Problem Relation Age of Onset   • Hypertension Mother    • Mental illness Mother    • Depression Mother    • Mental illness Sister    • Coronary artery disease Maternal Grandmother    • Throat cancer Paternal Grandfather      History Review:The following portions of the patient's history were reviewed and updated as appropriate: allergies, current medications, past family history, past medical history, past social history, past surgical history, and problem list     OBJECTIVE:     Vital signs in last 24 hours:    There were no vitals filed for this visit.  Laboratory Results: Recent Labs (last 2 months):   Office Visit on 01/04/2024   Component Date Value   • URINE HCG 01/04/2024 negative    • LEUKOCYTE ESTERASE,UA 01/04/2024 -    • NITRITE,UA 01/04/2024 -    • SL AMB POCT UROBILINOGEN 01/04/2024 -    • POCT URINE PROTEIN 01/04/2024 15    •  PH,UA 01/04/2024 6.0    • BLOOD,UA 01/04/2024 -    • SPECIFIC GRAVITY,UA 01/04/2024 1.025    • KETONES,UA 01/04/2024 -    •  BILIRUBIN,UA 01/04/2024 -    • GLUCOSE, UA 01/04/2024 -    •  COLOR,UA 01/04/2024 yellow    • CLARITY,UA 01/04/2024 clear    • N gonorrhoeae, DNA Probe 01/04/2024 Positive (A)    • Chlamydia trachomatis, D* 01/04/2024 Negative    • Urine Culture 01/04/2024 No Growth <1000 cfu/mL    Appointment on 12/21/2023   Component Date Value   • WBC 12/21/2023 6.92    • RBC 12/21/2023 5.13 (H)    • Hemoglobin 12/21/2023 15.7 (H)    • Hematocrit 12/21/2023 45.0    • MCV 12/21/2023 88    • MCH 12/21/2023 30.6    • MCHC 12/21/2023 34.9    • RDW 12/21/2023 11.9    • MPV 12/21/2023 10.0    • Platelets 12/21/2023 287    • nRBC 12/21/2023 0    • Neutrophils Relative 12/21/2023 48    • Immat GRANS % 12/21/2023 0    • Lymphocytes Relative 12/21/2023 42    • Monocytes Relative 12/21/2023 9    • Eosinophils Relative 12/21/2023 1    • Basophils Relative 12/21/2023 0    • Neutrophils Absolute 12/21/2023 3.32    • Immature Grans Absolute 12/21/2023 0.02    • Lymphocytes Absolute 12/21/2023 2.87    • Monocytes Absolute 12/21/2023 0.60    • Eosinophils Absolute 12/21/2023 0.08    • Basophils Absolute 12/21/2023 0.03    • Sodium 12/21/2023 139    • Potassium 12/21/2023 3.9    • Chloride 12/21/2023 104    • CO2 12/21/2023 28    • ANION GAP 12/21/2023 7    • BUN 12/21/2023 10    • Creatinine 12/21/2023 0.89    • Glucose, Fasting 12/21/2023 87    • Calcium 12/21/2023 9.5    • AST 12/21/2023 18    • ALT 12/21/2023 14    • Alkaline Phosphatase 12/21/2023 50    • Total Protein 12/21/2023 7.6    • Albumin 12/21/2023 4.5    • Total Bilirubin 12/21/2023 0.52    • eGFR 12/21/2023 91    • Cholesterol 12/21/2023 153    • Triglycerides 12/21/2023 103    • HDL, Direct 12/21/2023 75    • LDL Calculated 12/21/2023 57    • Non-HDL-Chol (CHOL-HDL) 12/21/2023 78    • TSH 3RD GENERATON 12/21/2023 1.975    • Hemoglobin A1C 12/21/2023 5.0    • EAG 12/21/2023 97    • Vitamin B-12 12/21/2023 429    • Vit D, 25-Hydroxy 12/21/2023 104.2 (H)    • Lyme Total Antibodies  12/21/2023 Negative    Office Visit on 12/07/2023   Component Date Value   •  RAPID STREP A 12/07/2023 Negative      I have personally reviewed all pertinent laboratory/tests results.    Suicide/Homicide Risk Assessment:    Risk of Harm to Self:  The following ratings are based on assessment at the time of the interview  Recent Specific Risk Factors include: current depressive symptoms, current anxiety symptoms, unstable mood, worries about finances or work  Demographic risk factors include: , age: young adult (15-24)  Historical Risk Factors include: chronic depression, chronic anxiety symptoms, chronic mood disorder, history of suicide attempts, history of impulsive behaviors, history of traumatic experiences  Protective Factors: no current suicidal ideation, access to mental health treatment, compliant with medications, compliant with mental health treatment, having a desire to be alive, stable living environment, stable job, sense of determination, supportive family  Based on today's assessment, Janell presents the following risk of harm to self: low    The following interventions are recommended: no intervention changes needed. Although patient's acute lethality risk is LOW, long-term/chronic lethality risk is mildly elevated given chronic mental health symptoms.  Janell Solorzano is future-oriented, forward-thinking, and demonstrates ability to act in a self-preserving manner as evidenced by volitionally presenting to the clinic today, seeking treatment.  At this time, inpatient hospitalization is not currently warranted. To mitigate future risk, patient should adhere to treatment recommendations, avoid alcohol/illicit substance use, utilize community-based resources and familiar support, and prioritize mental health treatment.      Based on today's assessment and clinical criteria, Janell Solorzano contracts for safety and is not an imminent risk of harm to self or others. Outpatient level of care is  deemed appropriate at this present time.  Janell Solorzano understands that if they are no longer able to contract for safety, they need to call/contact the outpatient office including this writer, call/contact crisis and/or attend to the nearest Emergency Department for immediate evaluation.      Risk of Harm to Others:  The following ratings are based on assessment at the time of the interview  Protective Factors: no current homicidal ideation  Based on today's assessment, Janell presents the following risk of harm to others: none    The following interventions are recommended: contracts for safety at present - agrees to go to ED if feeling unsafe, return in 2 weeks for reassessment, therapy appointment in 2 days, contracts for safety at present - agrees to call Crisis Intervention Service if feeling unsafe    Medications Risks/Benefits:      Risks, Benefits And Possible Side Effects Of Medications:    Discussed risks and benefits of treatment with patient including risk of parkinsonian symptoms, metabolic syndrome, tardive dyskinesia and neuroleptic malignant syndrome related to treatment with antipsychotic medications and risk of rash related to treatment with Lamictal     Controlled Medication Discussion:     Not applicable    Treatment Plan:    Due for update/Updated:   no  Last treatment plan done 1/11/24 by Michela Bates LCSW.  Treatment Plan due on 7/11/24.    ANGELA Vaughn 01/23/24    This note was not shared with the patient due to reasonable likelihood of causing patient harm

## 2024-01-23 NOTE — BH CRISIS PLAN
"Client Name: Janell Solorzano       Client YOB: 2000    Ivon Safety Plan    Creation Date: 1/23/24 Update Date: 1/23/24   Created By: ANGELA Vaughn Last Updated By: ANGELA Vaughn      Step 1: Warning Signs:   Warning Signs   Isolate, stop talking to people            Step 2: Internal Coping Strategies:   Internal Coping Strategies   The gym            Step 3: People and social settings that provide distraction:   Name Contact Information   grandparents' house in phone          Step 4: People whom I can ask for help during a crisis:    Name Contact Information    Yudith, friend in phone      Step 5: Professionals or agencies I can contact during a crisis:    Clinican/Agency Name Phone Emergency Contact    [facility]  ANGELA Yin LCSW in phone     YOLIS Camp in mychart       Crisis Phone Numbers:   Suicide Prevention Lifeline: Call or Text  988 Crisis Text Line: Text HOME to 937-358   Please note: Some [facility] do not have a separate number for Child/Adolescent specific crisis. If your county is not listed under Child/Adolescent, please call the adult number for your county      Adult Crisis Numbers: Child/Adolescent Crisis Numbers   [facility]: [phone] [facility]: [phone]   [facility]: [phone] [facility]: [phone]   [facility]: [phone] [place]: [phone]   [facility]: [phone] [facility]: [phone]   [facility]: [phone]   [facility]: [phone]   [facility]: [phone]   [facility]: [phone] (daytime) [phone] (after hours, weekends, holidays)      Step 6: Making the environment safer (plan for lethal means safety):   Patient did not identify any lethal methods: Yes     Optional: What is most important to me and worth living for?   \"My cat\"     Ivon Safety Plan. Luzma SAHU" Edmund. Used with permission of the authors.

## 2024-01-25 ENCOUNTER — OFFICE VISIT (OUTPATIENT)
Dept: FAMILY MEDICINE CLINIC | Facility: CLINIC | Age: 24
End: 2024-01-25
Payer: COMMERCIAL

## 2024-01-25 ENCOUNTER — TELEMEDICINE (OUTPATIENT)
Dept: BEHAVIORAL/MENTAL HEALTH CLINIC | Facility: CLINIC | Age: 24
End: 2024-01-25
Payer: COMMERCIAL

## 2024-01-25 VITALS
WEIGHT: 131.6 LBS | DIASTOLIC BLOOD PRESSURE: 70 MMHG | HEART RATE: 97 BPM | HEIGHT: 63 IN | SYSTOLIC BLOOD PRESSURE: 110 MMHG | BODY MASS INDEX: 23.32 KG/M2 | OXYGEN SATURATION: 99 %

## 2024-01-25 DIAGNOSIS — A54.9 GONORRHEA: Primary | ICD-10-CM

## 2024-01-25 DIAGNOSIS — F31.81 BIPOLAR II DISORDER (HCC): Primary | ICD-10-CM

## 2024-01-25 DIAGNOSIS — F41.9 ANXIETY AND DEPRESSION: ICD-10-CM

## 2024-01-25 DIAGNOSIS — F32.A ANXIETY AND DEPRESSION: ICD-10-CM

## 2024-01-25 PROBLEM — Z30.011 ENCOUNTER FOR INITIAL PRESCRIPTION OF CONTRACEPTIVE PILLS: Status: RESOLVED | Noted: 2020-06-30 | Resolved: 2024-01-25

## 2024-01-25 PROCEDURE — 87491 CHLMYD TRACH DNA AMP PROBE: CPT | Performed by: NURSE PRACTITIONER

## 2024-01-25 PROCEDURE — 87591 N.GONORRHOEAE DNA AMP PROB: CPT | Performed by: NURSE PRACTITIONER

## 2024-01-25 PROCEDURE — 90834 PSYTX W PT 45 MINUTES: CPT

## 2024-01-25 PROCEDURE — 99214 OFFICE O/P EST MOD 30 MIN: CPT | Performed by: NURSE PRACTITIONER

## 2024-01-25 NOTE — PROGRESS NOTES
Name: Janell Solorzano      : 2000      MRN: [id number]  Encounter Provider: ANGELA Arthur  Encounter Date: 2024   Encounter department: [facility] [address]    Assessment & Plan     1. Gonorrhea  Comments:  Discussed safe sex practices.  Patient was treated previously.  Will check urine.  Orders:  -     Chlamydia/GC amplified DNA by PCR           Subjective      Patient presents for follow-up on gonorrhea infection.  Patient was treated in the ER on .  She states she has not been sexually active since.  She would like to be rechecked.  She states she is asymptomatic.      Review of Systems   Constitutional:  Positive for fatigue. Negative for chills, diaphoresis and fever.   HENT:  Negative for ear pain and sore throat.    Respiratory:  Negative for cough and shortness of breath.    Cardiovascular:  Negative for chest pain and palpitations.   Gastrointestinal:  Negative for abdominal pain and vomiting.   Genitourinary:  Negative for dysuria, frequency, hematuria and urgency.        Fdlmp-- 3 weeks ago   Musculoskeletal:  Negative for arthralgias and back pain.   Skin:  Negative for color change and rash.   Neurological:  Negative for seizures and syncope.   Psychiatric/Behavioral:  Positive for dysphoric mood. Negative for sleep disturbance. The patient is nervous/anxious.    All other systems reviewed and are negative.      Current Outpatient Medications on File Prior to Visit   Medication Sig    atoMOXetine (STRATTERA) 18 mg capsule Take 1 capsule (18 mg total) by mouth daily    Drysol 20 % external solution Apply topically to affected area at bedtime.    ergocalciferol (VITAMIN D2) 50,000 units TAKE ONE CAPSULE BY MOUTH WEEKLY    fluticasone (FLONASE) 50 mcg/act nasal spray USE ONE SPRAY IN EACH NOSTRIL EVERY DAY    ibuprofen (MOTRIN) 600 mg tablet TAKE ONE TABLET BY MOUTH EVERY EIGHT HOURS AS NEEDED for mild to moderate pain    lamoTRIgine  "(LaMICtal) 200 MG tablet Take 1 tablet (200 mg total) by mouth daily    naproxen (NAPROSYN) 500 mg tablet Take 1 tablet (500 mg total) by mouth 2 (two) times a day with meals    risperiDONE (RisperDAL) 0.5 mg tablet Take 1 tablet (0.5 mg total) by mouth 2 (two) times a day    Sprintec 28 0.25-35 MG-MCG per tablet TAKE ONE TABLET BY MOUTH EVERY DAY       Objective     /70   Pulse 97   Ht 5' 3\" (1.6 m)   Wt 59.7 kg (131 lb 9.6 oz)   LMP 12/13/2023 (Approximate)   SpO2 99%   BMI 23.31 kg/m²     Physical Exam  Constitutional:       Appearance: She is well-developed.   Cardiovascular:      Rate and Rhythm: Normal rate and regular rhythm.      Heart sounds: Normal heart sounds. No murmur heard.  Pulmonary:      Effort: Pulmonary effort is normal. No respiratory distress.      Breath sounds: Normal breath sounds.   Skin:     General: Skin is warm and dry.   Neurological:      Mental Status: She is alert and oriented to person, place, and time.       ANGELA Arthur    "

## 2024-01-25 NOTE — PSYCH
Virtual Regular Visit    Verification of patient location:    Patient is located at Home in the following state in which I hold an active license [place]      Assessment/Plan:    Problem List Items Addressed This Visit          Other    Anxiety and depression    Bipolar II disorder (HCC) - Primary       Goals addressed in session: Goal 1          Reason for visit is   Chief Complaint   Patient presents with    Virtual Regular Visit          Encounter provider Michela Bates LCSW    Provider located at [facility] [place]  [facility] [place]  [address] [place] [address] [phone]      Recent Visits  Date Type Provider Dept   01/19/24 Telemedicine Mihcela Bates LCSW [facility] [place]   Showing recent visits within past 7 days and meeting all other requirements  Today's Visits  Date Type Provider Dept   01/25/24 Office Visit ANGELA Arthur [facility] [address] [facility]   01/25/24 Telemedicine Michela Bates LCSW [facility] [place]   Showing today's visits and meeting all other requirements  Future Appointments  No visits were found meeting these conditions.  Showing future appointments within next 150 days and meeting all other requirements       The patient was identified by name and date of birth. Janell Solorzano was informed that this is a telemedicine visit and that the visit is being conducted throughthe Epic Embedded platform. She agrees to proceed..  My office door was closed. No one else was in the room.  She acknowledged consent and understanding of privacy and security of the video platform. The patient has agreed to participate and understands they can discontinue the visit at any time.    Patient is aware this is a billable service.     Subjective  Janell Solorzano is a 23 y.o. female . .      HPI     Past Medical History:   Diagnosis Date    Anxiety     Concussion     Depression     Head injury      concussions in high school and in college    Self-injurious behavior        Past Surgical History:   Procedure Laterality Date    WISDOM TOOTH EXTRACTION         Current Outpatient Medications   Medication Sig Dispense Refill    atoMOXetine (STRATTERA) 18 mg capsule Take 1 capsule (18 mg total) by mouth daily 30 capsule 2    Drysol 20 % external solution Apply topically to affected area at bedtime. 60 mL 0    ergocalciferol (VITAMIN D2) 50,000 units TAKE ONE CAPSULE BY MOUTH WEEKLY 16 capsule 0    fluticasone (FLONASE) 50 mcg/act nasal spray USE ONE SPRAY IN EACH NOSTRIL EVERY DAY 16 g 0    ibuprofen (MOTRIN) 600 mg tablet TAKE ONE TABLET BY MOUTH EVERY EIGHT HOURS AS NEEDED for mild to moderate pain 30 tablet 0    lamoTRIgine (LaMICtal) 200 MG tablet Take 1 tablet (200 mg total) by mouth daily 30 tablet 2    naproxen (NAPROSYN) 500 mg tablet Take 1 tablet (500 mg total) by mouth 2 (two) times a day with meals 30 tablet 0    risperiDONE (RisperDAL) 0.5 mg tablet Take 1 tablet (0.5 mg total) by mouth 2 (two) times a day 60 tablet 1    Sprintec 28 0.25-35 MG-MCG per tablet TAKE ONE TABLET BY MOUTH EVERY DAY 84 tablet 0     No current facility-administered medications for this visit.        No Known Allergies    Review of Systems    Video Exam    There were no vitals filed for this visit.    Physical Exam     Behavioral Health Psychotherapy Progress Note    Psychotherapy Provided: Individual Psychotherapy     1. Bipolar II disorder (HCC)        2. Anxiety and depression            Goals addressed in session: Goal 1     DATA: Janell discussed getting accepted into [employer].  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  We addressed the positive feelings in being accepted and not believing that she was smart enough.  We addressed her thoughts and feelings.  She discussed the overall picture for school over the next couple of years.  We addressed  preliminary strategies to help address anticipated barriers to doing well.  She acknowledged intent to use tutoring and office hours if needed, noting that she did not do that in previous schooling.  We addressed differences at this time and how this may also influence her potential.  We addressed the possibility of making a vision board discussing what it is and the purpose.  We addressed progress over time and how she can use this in managing concerns and fears in embarking in this field.  She addressed ongoing feelings about the 36-year-old she was dating.  We were able to acknowledge she is in somewhat of a better place, though still with some rumination about the relationship.  We discussed the significance of having to focus in other places, including her career path.  We noted the nature of our discussion today and how most of the time she focused on her career and schooling, not as much about 36 year old. We agreed to continue discussion accordingly.  We confirmed next appointment and set an additional appointment.  Virtual continues.       During this session, this clinician used the following therapeutic modalities: Cognitive Behavioral Therapy, Mindfulness-based Strategies, Motivational Interviewing, Solution-Focused Therapy, and Supportive Psychotherapy    Substance Abuse was addressed during this session. If the client is diagnosed with a co-occurring substance use disorder, please indicate any changes in the frequency or amount of use: Janell acknowledged that in the past her drinking affected her success at school. Stage of change for addressing substance use diagnoses: Contemplation    ASSESSMENT:  Janell Solorzano presents with a Euthymic/ normal mood.     her affect is Normal range and intensity, which is congruent, with her mood and the content of the session. The client has made progress on their goals.    Janell presented in a more positive spirit and was able to acknowledge it.  She was  "explorative in session.  Janell Solorzano presents with a low risk of suicide, low risk of self-harm, and low risk of harm to others.    For any risk assessment that surpasses a \"low\" rating, a safety plan must be developed.    A safety plan was indicated: no  If yes, describe in detail n/a    PLAN: Between sessions, Janell Solorzano will continue attention to self-care. At the next session, the therapist will use Cognitive Behavioral Therapy, Mindfulness-based Strategies, Motivational Interviewing, Solution-Focused Therapy, and Supportive Psychotherapy to address mood stability, use of coping strategies, attention to self-care.    Behavioral Health Treatment Plan and Discharge Planning: Janell Solorzano is aware of and agrees to continue to work on their treatment plan. They have identified and are working toward their discharge goals. yes    Visit start and stop times:    01/25/24  Start Time: 1110  Stop Time: 1159  Total Visit Time: 49 minutes      "

## 2024-01-26 LAB
C TRACH DNA SPEC QL NAA+PROBE: NEGATIVE
N GONORRHOEA DNA SPEC QL NAA+PROBE: NEGATIVE

## 2024-01-28 DIAGNOSIS — G44.011 INTRACTABLE EPISODIC CLUSTER HEADACHE: ICD-10-CM

## 2024-01-29 RX ORDER — NAPROXEN 500 MG/1
500 TABLET ORAL 2 TIMES DAILY WITH MEALS
Qty: 30 TABLET | Refills: 0 | Status: SHIPPED | OUTPATIENT
Start: 2024-01-29

## 2024-01-31 DIAGNOSIS — R61 HYPERHIDROSIS: ICD-10-CM

## 2024-02-01 RX ORDER — ALUMINUM CHLORIDE 20 %
SOLUTION, NON-ORAL TOPICAL
Qty: 60 ML | Refills: 0 | Status: SHIPPED | OUTPATIENT
Start: 2024-02-01

## 2024-02-02 ENCOUNTER — TELEMEDICINE (OUTPATIENT)
Dept: BEHAVIORAL/MENTAL HEALTH CLINIC | Facility: CLINIC | Age: 24
End: 2024-02-02
Payer: COMMERCIAL

## 2024-02-02 DIAGNOSIS — F31.81 BIPOLAR II DISORDER (HCC): Primary | ICD-10-CM

## 2024-02-02 PROCEDURE — 90834 PSYTX W PT 45 MINUTES: CPT

## 2024-02-02 NOTE — PSYCH
Virtual Regular Visit    Verification of patient location:    Patient is located at Home in the following state in which I hold an active license [place]      Assessment/Plan:    Problem List Items Addressed This Visit       Bipolar II disorder (HCC) - Primary       Goals addressed in session: Goal 1          Reason for visit is   Chief Complaint   Patient presents with    Virtual Regular Visit          Encounter provider Michela Bates LCSW    Provider located at [facility] [place]  [facility] [place]  [address]  [place] [address] [phone]      Recent Visits  No visits were found meeting these conditions.  Showing recent visits within past 7 days and meeting all other requirements  Today's Visits  Date Type Provider Dept   02/02/24 Telemedicine Michela Bates LCSW [facility] [place]   Showing today's visits and meeting all other requirements  Future Appointments  No visits were found meeting these conditions.  Showing future appointments within next 150 days and meeting all other requirements       The patient was identified by name and date of birth. Janell Solorzano was informed that this is a telemedicine visit and that the visit is being conducted throughthe Epic Embedded platform. She agrees to proceed..  My office door was closed. No one else was in the room.  She acknowledged consent and understanding of privacy and security of the video platform. The patient has agreed to participate and understands they can discontinue the visit at any time.    Patient is aware this is a billable service.     Subjective  Janell Solorzano is a 23 y.o. female . .      HPI     Past Medical History:   Diagnosis Date    Anxiety     Concussion     Depression     Head injury     concussions in high school and in college    Self-injurious behavior        Past Surgical History:   Procedure Laterality Date    WISDOM TOOTH EXTRACTION         Current Outpatient  Medications   Medication Sig Dispense Refill    atoMOXetine (STRATTERA) 18 mg capsule Take 1 capsule (18 mg total) by mouth daily 30 capsule 2    Drysol 20 % external solution Apply topically to affected area at bedtime. 60 mL 0    ergocalciferol (VITAMIN D2) 50,000 units TAKE ONE CAPSULE BY MOUTH WEEKLY 16 capsule 0    fluticasone (FLONASE) 50 mcg/act nasal spray USE ONE SPRAY IN EACH NOSTRIL EVERY DAY 16 g 0    ibuprofen (MOTRIN) 600 mg tablet TAKE ONE TABLET BY MOUTH EVERY EIGHT HOURS AS NEEDED for mild to moderate pain 30 tablet 0    lamoTRIgine (LaMICtal) 200 MG tablet Take 1 tablet (200 mg total) by mouth daily 30 tablet 2    naproxen (NAPROSYN) 500 mg tablet Take 1 tablet (500 mg total) by mouth 2 (two) times a day with meals 30 tablet 0    risperiDONE (RisperDAL) 0.5 mg tablet Take 1 tablet (0.5 mg total) by mouth 2 (two) times a day 60 tablet 1    Sprintec 28 0.25-35 MG-MCG per tablet TAKE ONE TABLET BY MOUTH EVERY DAY 84 tablet 0     No current facility-administered medications for this visit.        No Known Allergies    Review of Systems    Video Exam    There were no vitals filed for this visit.    Physical Exam     Behavioral Health Psychotherapy Progress Note    Psychotherapy Provided: Individual Psychotherapy     1. Bipolar II disorder (HCC)            Goals addressed in session: Goal 1     DATA: Discussed ways Janell is unclear on her goals, and her recognition of such.  Addressed the importance of not judging this but being aware of it, exploring it, and working toward better understanding of it.  We addressed ways she is learning skills in her job that can help her toward other school or career decisions.  She addressed her questioning her own intelligence or being able to accomplish school; she discussed past schooling and 'not caring'.  We addressed these feelings; we addressed the dynamics of not caring or trying as a protection from fear of failure.  Janell addressed her ongoing feelings about  "the latest relationship break-up; Janell identified, and we explored how it relates to her feelings of abandonment.  We discussed ongoing daily efforts towards self-care; we addressed feeling 'blah' and unmotivated versus moving toward depression.  Janell denied worsening in overall symptoms.  We discussed enjoyable projects she has been avoiding; we explored, and provider encouraged doing one thing in this area of enjoyment.  We addressed the role of healthy distraction and how this can help in managing through perseverative thoughts.  We reviewed scheduling, Janell stated believing that she can go back to biweekly appointments, keeping the ones that are already set.  We confirmed and scheduled accordingly.    During this session, this clinician used the following therapeutic modalities: Cognitive Behavioral Therapy, Mindfulness-based Strategies, Motivational Interviewing, Solution-Focused Therapy, and Supportive Psychotherapy    Substance Abuse was not addressed during this session. If the client is diagnosed with a co-occurring substance use disorder, please indicate any changes in the frequency or amount of use: substance use was referenced in terms of ex-boyfriend. Stage of change for addressing substance use diagnoses: Pre-contemplation    ASSESSMENT:  Janell Solorzano presents with a Euthymic/ normal and Dysthymic mood.     her affect is Normal range and intensity, which is congruent, with her mood and the content of the session. The client has made progress on their goals.    Janell continues to demonstrate tolerance and explorative process and insight in session. Janell Solorzano presents with a low risk of suicide, low risk of self-harm, and low risk of harm to others.    For any risk assessment that surpasses a \"low\" rating, a safety plan must be developed.    A safety plan was indicated: no  If yes, describe in detail n/a    PLAN: Between sessions, Janell Solorzano will continue attention to self-care. At the " next session, the therapist will use Cognitive Behavioral Therapy and Supportive Psychotherapy to address .    Behavioral Health Treatment Plan and Discharge Planning: Janell Solorzano is aware of and agrees to continue to work on their treatment plan. They have identified and are working toward their discharge goals. yes    Visit start and stop times:    02/02/24  Start Time: 1102  Stop Time: 1154  Total Visit Time: 52 minutes

## 2024-02-05 ENCOUNTER — TELEPHONE (OUTPATIENT)
Dept: PSYCHIATRY | Facility: CLINIC | Age: 24
End: 2024-02-05

## 2024-02-05 NOTE — TELEPHONE ENCOUNTER
Patient called in to change her earlier appointment 2/6 @3:30 virtual to later in the day. Writer stated that a message would be sent to provider and transferred her to the correct office.

## 2024-02-06 ENCOUNTER — TELEMEDICINE (OUTPATIENT)
Dept: PSYCHIATRY | Facility: CLINIC | Age: 24
End: 2024-02-06

## 2024-02-06 DIAGNOSIS — F31.81 BIPOLAR II DISORDER (HCC): Primary | ICD-10-CM

## 2024-02-06 DIAGNOSIS — F51.01 PRIMARY INSOMNIA: ICD-10-CM

## 2024-02-06 RX ORDER — RISPERIDONE 1 MG/1
1 TABLET ORAL
Qty: 30 TABLET | Refills: 2 | Status: SHIPPED | OUTPATIENT
Start: 2024-02-06

## 2024-02-06 RX ORDER — MIRTAZAPINE 7.5 MG/1
7.5 TABLET, FILM COATED ORAL
Qty: 14 TABLET | Refills: 0 | Status: SHIPPED | OUTPATIENT
Start: 2024-02-06

## 2024-02-07 NOTE — PSYCH
Virtual Regular Visit    Verification of patient location:    Patient is located in the following state in which I hold an active license [place]    Problem List Items Addressed This Visit     Primary insomnia    Bipolar II disorder (HCC) - Primary    Relevant Medications    mirtazapine (REMERON) 7.5 MG tablet    risperiDONE (RisperDAL) 1 mg tablet              Encounter provider ANGELA Vaughn    Provider located at    [facility] [place]  [address] [facility] [address] [phone]    Recent Visits  Date Type Provider Dept   02/06/24 Telemedicine ANGELA Vaughn [facility]   02/05/24 Telephone ANGELA Vaughn [facility]   Showing recent visits within past 7 days and meeting all other requirements  Future Appointments  No visits were found meeting these conditions.  Showing future appointments within next 150 days and meeting all other requirements           The patient was identified by name and date of birth. Patient was informed that this is a telemedicine visit and that the visit is being conducted throughthe Epic Embedded platform. She agrees to proceed..  My office door was closed. No one else was in the room.  She acknowledged consent and understanding of privacy and security of the video platform. The patient has agreed to participate and understands they can discontinue the visit at any time.    Patient is aware this is a billable service.     HPI     Current Outpatient Medications   Medication Sig Dispense Refill   • atoMOXetine (STRATTERA) 18 mg capsule Take 1 capsule (18 mg total) by mouth daily 30 capsule 2   • Drysol 20 % external solution Apply topically to affected area at bedtime. 60 mL 0   • ergocalciferol (VITAMIN D2) 50,000 units TAKE ONE CAPSULE BY MOUTH WEEKLY 16 capsule 0   • fluticasone (FLONASE) 50 mcg/act nasal spray USE ONE SPRAY IN EACH NOSTRIL EVERY DAY 16 g 0   • ibuprofen  (MOTRIN) 600 mg tablet TAKE ONE TABLET BY MOUTH EVERY EIGHT HOURS AS NEEDED for mild to moderate pain 30 tablet 0   • lamoTRIgine (LaMICtal) 200 MG tablet Take 1 tablet (200 mg total) by mouth daily 30 tablet 2   • mirtazapine (REMERON) 7.5 MG tablet Take 1 tablet (7.5 mg total) by mouth daily at bedtime 14 tablet 0   • naproxen (NAPROSYN) 500 mg tablet Take 1 tablet (500 mg total) by mouth 2 (two) times a day with meals 30 tablet 0   • risperiDONE (RisperDAL) 1 mg tablet Take 1 tablet (1 mg total) by mouth daily at bedtime 30 tablet 2   • Sprintec 28 0.25-35 MG-MCG per tablet TAKE ONE TABLET BY MOUTH EVERY DAY 84 tablet 0     No current facility-administered medications for this visit.       Review of Systems  Video Exam    There were no vitals filed for this visit.    Physical Exam   As a result of this visit, I have referred the patient for further respiratory evaluation. No    I spent 20 minutes directly with the patient during this visit  VIRTUAL VISIT DISCLAIMER    Janell Solorzano acknowledges that she has consented to an online visit or consultation. She understands that the online visit is based solely on information provided by her, and that, in the absence of a face-to-face physical evaluation by the physician, the diagnosis she receives is both limited and provisional in terms of accuracy and completeness. This is not intended to replace a full medical face-to-face evaluation by the physician. Janell Solorzano understands and accepts these terms.    MEDICATION MANAGEMENT NOTE        [facility]    Name and Date of Birth:  Janell Solorzano 23 y.o. 2000 MRN: [id number]    Date of Visit: February 6, 2024    No Known Allergies    Visit Time    Visit Start Time: 1400  Visit Stop Time: 1420  Total Visit Duration:  20 minutes    SUBJECTIVE:    Janell is seen today for a follow up for Bipolar Disorder type II. She continues to experience ongoing symptoms since the  last visit.  Janell seen today virtually for medication management follow-up.  She was last seen by this provider on 1/23/24.  Janell is less irritable and more quiet and calm during conversation today. 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  She states that she has also started working out at the gym.  She states that she is no longer considering resigning from her job and is going to continue to work on making money.  She states that she does have a very hard time falling asleep.  She is able to stay asleep once she is asleep.  She has only been getting a few hours per night however, because when she lies down to go to bed, she has racing thoughts and cannot stop the thoughts long enough to fall to sleep.  She rates her depression 8/10, anxiety 7/10. She denies SI and HI. Denies auditory and visual hallucinations. She states that her concentration and focus have been better with the strattera, and feels her mood has started to level with the risperdal at night.  However, she needs help falling asleep. She is unwilling to switch to a mood stabilizer such as depakote or lithium at this time. Med trials for sleep include: trazodone, gabapentin, and hydroxyzine, all of which have been ineffective.  At this time, this provider does not feel it is appropriate to use controled medications for sleep for Janell due to her past alcohol use issues.  However, we have not tried mirtazapine.  Because she is on lamictal and risperdal, this provider feels it will be safe to prescribe mirtazapine to Janell without the risk of saul.  She was educated on potential signs of saul at this time.  We will initiate mirtazapine 7.5mg PO HS for sleep.    Continue medications as ordered  Risperdal 2 tabs (1mg) at HS.   Continue Lamictal to 200p.o. daily  Continue Strattera to 18mg PO daily  Initiate Remeron 7.5mg PO HS  She will  continue therapy  She will follow up with this provider in 2 weeks  She will call sooner with concerns or issues if they arise prior to scheduled appointment    She was provided with the education on risperdal.  PARQ completed including sedation, agitation, akathisia, TD, dystonic reactions, NMS, EPS, GI distress, dizziness, risk of metabolic syndrome, orthostatic hypotension and cardiovascular risks such as QT prolongation, increased prolactin    Lamotrigine PARQ completed including dizziness, headaches, ataxia, vision problems, somnolence, sleep changes, cognitive difficulties, rash (including Becerril-Chato rash), and others, risk of teratogenicity for females.     Education provided on Neuroleptic malignant syndrome.  Patient told that NMS is a life-threatening, neurological disorder most often caused by an adverse reaction to neuroleptic or antipsychotic drugs. Symptoms include high fever, sweating, unstable blood pressure, stupor, muscular rigidity, and autonomic dysfunction. In most cases, the disorder develops within the first 2 weeks of treatment with the drug; however, the disorder may develop any time during the therapy period. Patient advised to call the office or go to the ED immediately if these symptoms develop.    She denies any side effects from current psychiatric medications.    Aware of 24 hour and weekend coverage for urgent situations accessed by calling [facility] main practice number  Continue psychotherapy with therapist  Medication management every 2 weeks  Aware of need to follow up with family physician for medical issues    Diagnoses and all orders for this visit:    Bipolar II disorder (HCC)  -     mirtazapine (REMERON) 7.5 MG tablet; Take 1 tablet (7.5 mg total) by mouth daily at bedtime  -     risperiDONE (RisperDAL) 1 mg tablet; Take 1 tablet (1 mg total) by mouth daily at bedtime    Primary insomnia     Other atypical antipsychotics trialed and found to be  ineffective:  Zyprexa, Abilify, Vraylar, and Latuda    Sleep aids that have been ineffective:  Trazodone, Gabapentin, Hydroxyzine    Current Outpatient Medications on File Prior to Visit   Medication Sig Dispense Refill   • atoMOXetine (STRATTERA) 18 mg capsule Take 1 capsule (18 mg total) by mouth daily 30 capsule 2   • Drysol 20 % external solution Apply topically to affected area at bedtime. 60 mL 0   • ergocalciferol (VITAMIN D2) 50,000 units TAKE ONE CAPSULE BY MOUTH WEEKLY 16 capsule 0   • fluticasone (FLONASE) 50 mcg/act nasal spray USE ONE SPRAY IN EACH NOSTRIL EVERY DAY 16 g 0   • ibuprofen (MOTRIN) 600 mg tablet TAKE ONE TABLET BY MOUTH EVERY EIGHT HOURS AS NEEDED for mild to moderate pain 30 tablet 0   • lamoTRIgine (LaMICtal) 200 MG tablet Take 1 tablet (200 mg total) by mouth daily 30 tablet 2   • naproxen (NAPROSYN) 500 mg tablet Take 1 tablet (500 mg total) by mouth 2 (two) times a day with meals 30 tablet 0   • Sprintec 28 0.25-35 MG-MCG per tablet TAKE ONE TABLET BY MOUTH EVERY DAY 84 tablet 0     No current facility-administered medications on file prior to visit.       Psychotherapy Provided:     Individual psychotherapy provided: Yes  Supportive counseling provided.  Medication changes discussed with Janell.  Medication education provided to Janell.  Discussed with Janell coping with job stress, social difficulties and everyday stressors.   Coping strategies reviewed with Janell.   Importance of medication and treatment compliance reviewed with Janell.  Importance of follow up with family physician for medical issues reviewed with Janell.  Reassurance and supportive therapy provided.   Crisis/safety plan discussed with Janell. Patient will call prior to scheduled appointment if they have any issues or concerns.  Patient understands they can access the office by calling the main number at any time if they are in crisis.  They also understand they can call their [facility]'s crisis number or go to  their nearest ED if suicidal ideation increases or if they develop a plan or intent.       HPI ROS Appetite Changes and Sleep:     She reports difficulty falling asleep, adequate appetite, low energy. Denies homicidal ideation, denies suicidal ideation    Review Of Systems:      HPI ROS:               Medication Side Effects:  denies   Depression (10 worst): 8/10 9/10   Anxiety (10 worst): 7/10 6/10   Safety concerns (SI, HI, etc): denies denies   Sleep: Poor, difficulty falling asleep poor   Energy: low low   Appetite: adequate adequate     General normal    Personality no change in personality   Constitutional as noted in HPI   ENT negative   Cardiovascular negative   Respiratory negative   Gastrointestinal negative   Genitourinary negative   Musculoskeletal negative   Integumentary negative   Neurological negative   Endocrine negative   Other Symptoms none, all other systems are negative     Mental Status Evaluation:    Appearance Appropriately dressed and Good eye contact   Behavior calm and cooperative   Mood anxious, depressed, and improving  Depression Scale - 8 of 10 (0 = No depression)  Anxiety Scale - 7 of 10 (0 = No anxiety)   Speech Normal rate and volume   Affect mood-congruent and Flat   Thought Processes Goal directed and coherent   Thought Content Does not verbalize delusional material   Associations Tightly connected   Perceptual Disturbances Denies hallucinations and does not appear to be responding to internal stimuli   Risk Potential Suicidal/Homicidal Ideation - No evidence of suicidal or homicidal ideation and patient does not verbalize suicidal or homicidal ideation  Risk of Violence - No evidence of risk for violence found on assessment  Risk of Self Mutilation - No evidence of risk for self mutilation found on assessment   Orientation oriented to person, place, time/date, and situation   Memory recent and remote memory grossly intact   Consciousness alert and awake   Attention/Concentration  attention span and concentration appear shorter than expected for age   Insight fair   Judgement fair   Muscle Strength and Gait normal muscle strength and normal muscle tone, normal gait/station and normal balance   Motor Activity no abnormal movements   Language no difficulty naming common objects, no difficulty repeating a phrase, no difficulty writing a sentence   Fund of Knowledge adequate knowledge of current events  adequate fund of knowledge regarding past history  adequate fund of knowledge regarding vocabulary      Past Psychiatric History Update:     Inpatient Psychiatric Admission Since Last Encounter:   no  Changes to Outpatient Psychiatric Treatment Team:    no  Suicide Attempt Or Self Mutilation Since Last Encounter:   no  Incidence of Violent Behavior Since Last Encounter:   no    Traumatic History Update:     New Onset of Abuse Since Last Encounter:   no  Traumatic Events Since Last Encounter:   no    Past Medical History:    Past Medical History:   Diagnosis Date   • Anxiety    • Concussion    • Depression    • Head injury     concussions in high school and in college   • Self-injurious behavior         Past Surgical History:   Procedure Laterality Date   • WISDOM TOOTH EXTRACTION       No Known Allergies  Substance Abuse History:    Social History     Substance and Sexual Activity   Alcohol Use Yes   • Alcohol/week: 3.0 - 4.0 standard drinks of alcohol   • Types: 3 - 4 Shots of liquor per week    Comment: 3-4 drinks, 2 times per week     Social History     Substance and Sexual Activity   Drug Use No     Social History:    Social History     Socioeconomic History   • Marital status: Single     Spouse name: Not on file   • Number of children: 0   • Years of education: senior in college currently   • Highest education level: High school graduate   Occupational History   • 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   Tobacco Use   • Smoking status: Never   • Smokeless tobacco: Never   Vaping Use    • Vaping status: Never Used   Substance and Sexual Activity   • Alcohol use: Yes     Alcohol/week: 3.0 - 4.0 standard drinks of alcohol     Types: 3 - 4 Shots of liquor per week     Comment: 3-4 drinks, 2 times per week   • Drug use: No   • Sexual activity: Yes     Partners: Male     Birth control/protection: Condom Male   Other Topics Concern   • Not on file   Social History Narrative   • Not on file     Social Determinants of Health     Financial Resource Strain: Not on file   Food Insecurity: Not on file   Transportation Needs: Not on file   Physical Activity: Not on file   Stress: Not on file   Social Connections: Not on file   Intimate Partner Violence: Not on file   Housing Stability: Not on file     Family Psychiatric History:     Family History   Problem Relation Age of Onset   • Hypertension Mother    • Mental illness Mother    • Depression Mother    • Mental illness Sister    • Coronary artery disease Maternal Grandmother    • Throat cancer Paternal Grandfather      History Review:The following portions of the patient's history were reviewed and updated as appropriate: allergies, current medications, past family history, past medical history, past social history, past surgical history, and problem list     OBJECTIVE:     Vital signs in last 24 hours:    There were no vitals filed for this visit.  Laboratory Results: Recent Labs (last 2 months):   Office Visit on 01/25/2024   Component Date Value   • N gonorrhoeae, DNA Probe 01/25/2024 Negative    • Chlamydia trachomatis, D* 01/25/2024 Negative    Office Visit on 01/04/2024   Component Date Value   • URINE HCG 01/04/2024 negative    • LEUKOCYTE ESTERASE,UA 01/04/2024 -    • NITRITE,UA 01/04/2024 -    • SL AMB POCT UROBILINOGEN 01/04/2024 -    • POCT URINE PROTEIN 01/04/2024 15    •  PH,UA 01/04/2024 6.0    • BLOOD,UA 01/04/2024 -    • SPECIFIC GRAVITY,UA 01/04/2024 1.025    • KETONES,UA 01/04/2024 -    • BILIRUBIN,UA 01/04/2024 -    • GLUCOSE, UA  01/04/2024 -    •  COLOR,UA 01/04/2024 yellow    • CLARITY,UA 01/04/2024 clear    • N gonorrhoeae, DNA Probe 01/04/2024 Positive (A)    • Chlamydia trachomatis, D* 01/04/2024 Negative    • Urine Culture 01/04/2024 No Growth <1000 cfu/mL    Appointment on 12/21/2023   Component Date Value   • WBC 12/21/2023 6.92    • RBC 12/21/2023 5.13 (H)    • Hemoglobin 12/21/2023 15.7 (H)    • Hematocrit 12/21/2023 45.0    • MCV 12/21/2023 88    • MCH 12/21/2023 30.6    • MCHC 12/21/2023 34.9    • RDW 12/21/2023 11.9    • MPV 12/21/2023 10.0    • Platelets 12/21/2023 287    • nRBC 12/21/2023 0    • Neutrophils Relative 12/21/2023 48    • Immat GRANS % 12/21/2023 0    • Lymphocytes Relative 12/21/2023 42    • Monocytes Relative 12/21/2023 9    • Eosinophils Relative 12/21/2023 1    • Basophils Relative 12/21/2023 0    • Neutrophils Absolute 12/21/2023 3.32    • Immature Grans Absolute 12/21/2023 0.02    • Lymphocytes Absolute 12/21/2023 2.87    • Monocytes Absolute 12/21/2023 0.60    • Eosinophils Absolute 12/21/2023 0.08    • Basophils Absolute 12/21/2023 0.03    • Sodium 12/21/2023 139    • Potassium 12/21/2023 3.9    • Chloride 12/21/2023 104    • CO2 12/21/2023 28    • ANION GAP 12/21/2023 7    • BUN 12/21/2023 10    • Creatinine 12/21/2023 0.89    • Glucose, Fasting 12/21/2023 87    • Calcium 12/21/2023 9.5    • AST 12/21/2023 18    • ALT 12/21/2023 14    • Alkaline Phosphatase 12/21/2023 50    • Total Protein 12/21/2023 7.6    • Albumin 12/21/2023 4.5    • Total Bilirubin 12/21/2023 0.52    • eGFR 12/21/2023 91    • Cholesterol 12/21/2023 153    • Triglycerides 12/21/2023 103    • HDL, Direct 12/21/2023 75    • LDL Calculated 12/21/2023 57    • Non-HDL-Chol (CHOL-HDL) 12/21/2023 78    • TSH 3RD GENERATON 12/21/2023 1.975    • Hemoglobin A1C 12/21/2023 5.0    • EAG 12/21/2023 97    • Vitamin B-12 12/21/2023 429    • Vit D, 25-Hydroxy 12/21/2023 104.2 (H)    • Lyme Total Antibodies 12/21/2023 Negative    Office Visit on  12/07/2023   Component Date Value   •  RAPID STREP A 12/07/2023 Negative      I have personally reviewed all pertinent laboratory/tests results.    Suicide/Homicide Risk Assessment:    Risk of Harm to Self:  The following ratings are based on assessment at the time of the interview  Recent Specific Risk Factors include: current depressive symptoms, current anxiety symptoms, unstable mood, worries about finances or work  Demographic risk factors include: , age: young adult (15-24)  Historical Risk Factors include: chronic depression, chronic anxiety symptoms, chronic mood disorder, history of suicide attempts, history of impulsive behaviors, history of traumatic experiences  Protective Factors: no current suicidal ideation, access to mental health treatment, compliant with medications, compliant with mental health treatment, having a desire to be alive, stable living environment, stable job, sense of determination, supportive family  Based on today's assessment, Janell presents the following risk of harm to self: low    The following interventions are recommended: no intervention changes needed. Although patient's acute lethality risk is LOW, long-term/chronic lethality risk is mildly elevated given chronic mental health symptoms.  Janell Solorzano is future-oriented, forward-thinking, and demonstrates ability to act in a self-preserving manner as evidenced by volitionally presenting to the clinic today, seeking treatment.  At this time, inpatient hospitalization is not currently warranted. To mitigate future risk, patient should adhere to treatment recommendations, avoid alcohol/illicit substance use, utilize community-based resources and familiar support, and prioritize mental health treatment.      Based on today's assessment and clinical criteria, Janell Solorzano contracts for safety and is not an imminent risk of harm to self or others. Outpatient level of care is deemed appropriate at this present time.   Janell Solorzano understands that if they are no longer able to contract for safety, they need to call/contact the outpatient office including this writer, call/contact crisis and/or attend to the nearest Emergency Department for immediate evaluation.      Risk of Harm to Others:  The following ratings are based on assessment at the time of the interview  Protective Factors: no current homicidal ideation  Based on today's assessment, Janell presents the following risk of harm to others: none    The following interventions are recommended: contracts for safety at present - agrees to go to ED if feeling unsafe, return in 2 weeks for reassessment, therapy appointment in 2 weeks, contracts for safety at present - agrees to call Crisis Intervention Service if feeling unsafe    Medications Risks/Benefits:      Risks, Benefits And Possible Side Effects Of Medications:    Discussed risks and benefits of treatment with patient including risk of suicidality, serotonin syndrome, increased QTc interval and SIADH related to treatment with antidepressants; Risk of induction of manic symptoms in certain patient populations, risk of parkinsonian symptoms, metabolic syndrome, tardive dyskinesia and neuroleptic malignant syndrome related to treatment with antipsychotic medications, and risk of rash related to treatment with Lamictal     Controlled Medication Discussion:     Not applicable    Treatment Plan:    Due for update/Updated:   no  Last treatment plan done 1/11/24 by Michela Bates LCSW.  Treatment Plan due on 7/11/24.    ANGELA Vaughn 02/07/24    This note was not shared with the patient due to reasonable likelihood of causing patient harm

## 2024-02-12 ENCOUNTER — TELEPHONE (OUTPATIENT)
Dept: PSYCHIATRY | Facility: CLINIC | Age: 24
End: 2024-02-12

## 2024-02-12 NOTE — TELEPHONE ENCOUNTER
Patient called asking for a note out of work, 2/12-2/16. She had a bad day yesterday and her supervisor told her to get a note from her therapist but she hasn't been able to reach them.

## 2024-02-12 NOTE — TELEPHONE ENCOUNTER
This provider spoke to Janell.  She states she had an incident at work that involved a patient in which caused her to have anxiety and feels overwhelmed.  Supervisors at her job let her know she could have a few days off but needed to have a note from her provider. She states that she is seeing her therapist tomorrow, for a sooner appointment due to her increased anxiety and depression.  She will call this provider sooner if she needs anything prior to her appointment on 2/20/24.  She denies SI and states that she will be ok, and is looking forward to speaking to her therapist tomorrow.

## 2024-02-12 NOTE — LETTER
February 12, 2024     Patient: Janell Solorzano  YOB: 2000  Date of Visit: 2/12/2024      To Whom it May Concern:    Janell Solorzano is under my professional care. Please excuse Janell from work from 2/12/24-2/16/24 for mental health reasons.  She is able to return to work on 2/17/24.      If you have any questions or concerns, please don't hesitate to call.          Sincerely,          ANGELA Vaughn        CC: No Recipients

## 2024-02-13 ENCOUNTER — TELEMEDICINE (OUTPATIENT)
Dept: BEHAVIORAL/MENTAL HEALTH CLINIC | Facility: CLINIC | Age: 24
End: 2024-02-13
Payer: COMMERCIAL

## 2024-02-13 DIAGNOSIS — F32.A ANXIETY AND DEPRESSION: ICD-10-CM

## 2024-02-13 DIAGNOSIS — F31.81 BIPOLAR II DISORDER (HCC): Primary | ICD-10-CM

## 2024-02-13 DIAGNOSIS — F41.9 ANXIETY AND DEPRESSION: ICD-10-CM

## 2024-02-13 PROCEDURE — 90834 PSYTX W PT 45 MINUTES: CPT
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
## 2024-02-15 DIAGNOSIS — B37.9 YEAST INFECTION: Primary | ICD-10-CM

## 2024-02-15 RX ORDER — FLUCONAZOLE 150 MG/1
150 TABLET ORAL ONCE
Qty: 1 TABLET | Refills: 0 | Status: SHIPPED | OUTPATIENT
Start: 2024-02-15 | End: 2024-02-15

## 2024-02-16 DIAGNOSIS — G44.011 INTRACTABLE EPISODIC CLUSTER HEADACHE: ICD-10-CM

## 2024-02-16 RX ORDER — NAPROXEN 500 MG/1
500 TABLET ORAL 2 TIMES DAILY WITH MEALS
Qty: 30 TABLET | Refills: 0 | Status: SHIPPED | OUTPATIENT
Start: 2024-02-16

## 2024-02-18 DIAGNOSIS — J02.9 SORE THROAT: ICD-10-CM

## 2024-02-19 ENCOUNTER — HOSPITAL ENCOUNTER (EMERGENCY)
Facility: HOSPITAL | Age: 24
Discharge: HOME | End: 2024-02-20
Attending: EMERGENCY MEDICINE
Payer: COMMERCIAL

## 2024-02-19 VITALS
OXYGEN SATURATION: 99 % | HEART RATE: 81 BPM | TEMPERATURE: 97.8 F | RESPIRATION RATE: 16 BRPM | DIASTOLIC BLOOD PRESSURE: 74 MMHG | SYSTOLIC BLOOD PRESSURE: 121 MMHG

## 2024-02-19 DIAGNOSIS — N39.0 UTI (URINARY TRACT INFECTION): Primary | ICD-10-CM

## 2024-02-19 PROCEDURE — 99284 EMERGENCY DEPT VISIT MOD MDM: CPT | Performed by: EMERGENCY MEDICINE

## 2024-02-19 PROCEDURE — 99283 EMERGENCY DEPT VISIT LOW MDM: CPT

## 2024-02-19 RX ORDER — DOXYCYCLINE HYCLATE 100 MG/1
100 CAPSULE ORAL ONCE
Status: COMPLETED | OUTPATIENT
Start: 2024-02-20 | End: 2024-02-20

## 2024-02-19 RX ORDER — FLUTICASONE PROPIONATE 50 MCG
SPRAY, SUSPENSION (ML) NASAL
Qty: 16 G | Refills: 0 | Status: SHIPPED | OUTPATIENT
Start: 2024-02-19

## 2024-02-20 ENCOUNTER — TELEMEDICINE (OUTPATIENT)
Dept: PSYCHIATRY | Facility: CLINIC | Age: 24
End: 2024-02-20

## 2024-02-20 DIAGNOSIS — F31.81 BIPOLAR II DISORDER (HCC): ICD-10-CM

## 2024-02-20 LAB
BACTERIA UR QL AUTO: ABNORMAL /HPF
BILIRUB UR QL STRIP: NEGATIVE
C TRACH DNA SPEC QL NAA+PROBE: NEGATIVE
CLARITY UR: CLEAR
COLOR UR: YELLOW
GLUCOSE UR STRIP-MCNC: NEGATIVE MG/DL
HGB UR QL STRIP.AUTO: NEGATIVE
KETONES UR STRIP-MCNC: NEGATIVE MG/DL
LEUKOCYTE ESTERASE UR QL STRIP: ABNORMAL
N GONORRHOEA DNA SPEC QL NAA+PROBE: NEGATIVE
NITRITE UR QL STRIP: POSITIVE
NON-SQ EPI CELLS URNS QL MICRO: ABNORMAL /HPF
PH UR STRIP.AUTO: 6.5 [PH]
PROT UR STRIP-MCNC: ABNORMAL MG/DL
RBC #/AREA URNS AUTO: ABNORMAL /HPF
SP GR UR STRIP.AUTO: 1.03 (ref 1–1.03)
UROBILINOGEN UR STRIP-ACNC: <2 MG/DL
WBC #/AREA URNS AUTO: ABNORMAL /HPF

## 2024-02-20 PROCEDURE — 87591 N.GONORRHOEAE DNA AMP PROB: CPT | Performed by: EMERGENCY MEDICINE

## 2024-02-20 PROCEDURE — 87077 CULTURE AEROBIC IDENTIFY: CPT | Performed by: EMERGENCY MEDICINE

## 2024-02-20 PROCEDURE — 81001 URINALYSIS AUTO W/SCOPE: CPT | Performed by: EMERGENCY MEDICINE

## 2024-02-20 PROCEDURE — 96372 THER/PROPH/DIAG INJ SC/IM: CPT

## 2024-02-20 PROCEDURE — 87086 URINE CULTURE/COLONY COUNT: CPT | Performed by: EMERGENCY MEDICINE

## 2024-02-20 PROCEDURE — 87491 CHLMYD TRACH DNA AMP PROBE: CPT | Performed by: EMERGENCY MEDICINE

## 2024-02-20 PROCEDURE — 87186 SC STD MICRODIL/AGAR DIL: CPT | Performed by: EMERGENCY MEDICINE

## 2024-02-20 RX ORDER — FLUCONAZOLE 150 MG/1
150 TABLET ORAL DAILY PRN
COMMUNITY
Start: 2024-02-15

## 2024-02-20 RX ORDER — MIRTAZAPINE 7.5 MG/1
7.5 TABLET, FILM COATED ORAL
Qty: 30 TABLET | Refills: 2 | Status: SHIPPED | OUTPATIENT
Start: 2024-02-20

## 2024-02-20 RX ORDER — DOXYCYCLINE HYCLATE 100 MG/1
100 CAPSULE ORAL 2 TIMES DAILY
Qty: 14 CAPSULE | Refills: 0 | Status: SHIPPED | OUTPATIENT
Start: 2024-02-20 | End: 2024-02-27

## 2024-02-20 RX ADMIN — DOXYCYCLINE HYCLATE 100 MG: 100 CAPSULE ORAL at 00:02

## 2024-02-20 RX ADMIN — LIDOCAINE HYDROCHLORIDE 500 MG: 10 INJECTION, SOLUTION EPIDURAL; INFILTRATION; INTRACAUDAL; PERINEURAL at 00:02

## 2024-02-20 NOTE — ED PROVIDER NOTES
History  Chief Complaint   Patient presents with    Possible UTI     C/O burning and discomfort with urination that began 1 wk ago      HPI  22 yo F presents with dysuria for the past week. Reports mild suprapubic tenderness. No vaginal discharge. Would like to be treated for STI.   Prior to Admission Medications   Prescriptions Last Dose Informant Patient Reported? Taking?   Drysol 20 % external solution   No No   Sig: Apply topically to affected area at bedtime.   Sprintec 28 0.25-35 MG-MCG per tablet   No No   Sig: TAKE ONE TABLET BY MOUTH EVERY DAY   atoMOXetine (STRATTERA) 18 mg capsule   No No   Sig: Take 1 capsule (18 mg total) by mouth daily   ergocalciferol (VITAMIN D2) 50,000 units   No No   Sig: TAKE ONE CAPSULE BY MOUTH WEEKLY   fluticasone (FLONASE) 50 mcg/act nasal spray   No No   Sig: USE ONE SPRAY IN EACH NOSTRIL EVERY DAY   ibuprofen (MOTRIN) 600 mg tablet   No No   Sig: TAKE ONE TABLET BY MOUTH EVERY EIGHT HOURS AS NEEDED for mild to moderate pain   lamoTRIgine (LaMICtal) 200 MG tablet   No No   Sig: Take 1 tablet (200 mg total) by mouth daily   mirtazapine (REMERON) 7.5 MG tablet   No No   Sig: Take 1 tablet (7.5 mg total) by mouth daily at bedtime   naproxen (NAPROSYN) 500 mg tablet   No No   Sig: Take 1 tablet (500 mg total) by mouth 2 (two) times a day with meals   risperiDONE (RisperDAL) 1 mg tablet   No No   Sig: Take 1 tablet (1 mg total) by mouth daily at bedtime      Facility-Administered Medications: None       Past Medical History:   Diagnosis Date    Anxiety     Concussion     Depression     Head injury     concussions in high school and in college    Self-injurious behavior        Past Surgical History:   Procedure Laterality Date    WISDOM TOOTH EXTRACTION         Family History   Problem Relation Age of Onset    Hypertension Mother     Mental illness Mother     Depression Mother     Mental illness Sister     Coronary artery disease Maternal Grandmother     Throat cancer Paternal  Grandfather      I have reviewed and agree with the history as documented.    E-Cigarette/Vaping    E-Cigarette Use Never User      E-Cigarette/Vaping Substances    Nicotine No     THC No     CBD No     Flavoring No     Other No     Unknown No      Social History     Tobacco Use    Smoking status: Never    Smokeless tobacco: Never   Vaping Use    Vaping status: Never Used   Substance Use Topics    Alcohol use: Yes     Alcohol/week: 3.0 - 4.0 standard drinks of alcohol     Types: 3 - 4 Shots of liquor per week     Comment: 3-4 drinks, 2 times per week    Drug use: No       Review of Systems   Constitutional:  Negative for chills and fever.   HENT:  Negative for dental problem and ear pain.    Eyes:  Negative for pain and redness.   Respiratory:  Negative for cough and shortness of breath.    Cardiovascular:  Negative for chest pain and palpitations.   Gastrointestinal:  Negative for abdominal pain and nausea.   Endocrine: Negative for polydipsia and polyphagia.   Genitourinary:  Positive for dysuria. Negative for frequency.   Musculoskeletal:  Negative for arthralgias and joint swelling.   Skin:  Negative for color change and rash.   Neurological:  Negative for dizziness and headaches.   Psychiatric/Behavioral:  Negative for behavioral problems and confusion.    All other systems reviewed and are negative.      Physical Exam  Physical Exam  Vitals and nursing note reviewed.   Constitutional:       General: She is not in acute distress.     Appearance: She is well-developed. She is not diaphoretic.   HENT:      Head: Atraumatic.      Right Ear: External ear normal.      Left Ear: External ear normal.      Nose: Nose normal.   Eyes:      Conjunctiva/sclera: Conjunctivae normal.      Pupils: Pupils are equal, round, and reactive to light.   Neck:      Vascular: No JVD.   Cardiovascular:      Rate and Rhythm: Normal rate and regular rhythm.      Heart sounds: Normal heart sounds. No murmur heard.  Pulmonary:      Effort:  Pulmonary effort is normal. No respiratory distress.      Breath sounds: Normal breath sounds. No wheezing.   Abdominal:      General: Bowel sounds are normal. There is no distension.      Palpations: Abdomen is soft.      Tenderness: There is no abdominal tenderness.   Musculoskeletal:         General: Normal range of motion.      Cervical back: Normal range of motion and neck supple.   Skin:     General: Skin is warm and dry.      Capillary Refill: Capillary refill takes less than 2 seconds.   Neurological:      Mental Status: She is alert and oriented to person, place, and time.      Cranial Nerves: No cranial nerve deficit.   Psychiatric:         Behavior: Behavior normal.         Vital Signs  ED Triage Vitals [02/19/24 2341]   Temperature Pulse Respirations Blood Pressure SpO2   97.8 °F (36.6 °C) 81 16 121/74 99 %      Temp Source Heart Rate Source Patient Position - Orthostatic VS BP Location FiO2 (%)   Oral -- Sitting Right arm --      Pain Score       --           Vitals:    02/19/24 2341   BP: 121/74   Pulse: 81   Patient Position - Orthostatic VS: Sitting         Visual Acuity      ED Medications  Medications   doxycycline hyclate (VIBRAMYCIN) capsule 100 mg (100 mg Oral Given 2/20/24 0002)   cefTRIAXone (ROCEPHIN) 500 mg in lidocaine (PF) (XYLOCAINE-MPF) 1 % IM only syringe (500 mg Intramuscular Given 2/20/24 0002)       Diagnostic Studies  Results Reviewed       Procedure Component Value Units Date/Time    Urine Microscopic [[id number]]  (Abnormal) Collected: 02/20/24 0001    Lab Status: Final result Specimen: Urine, Clean Catch Updated: 02/20/24 0017     RBC, UA 4-10 /hpf      WBC, UA 20-30 /hpf      Epithelial Cells Occasional /hpf      Bacteria, UA Innumerable /hpf     Urine culture [[id number]] Collected: 02/20/24 0001    Lab Status: In process Specimen: Urine, Clean Catch Updated: 02/20/24 0017    UA w Reflex to Microscopic w Reflex to Culture [[id number]]  (Abnormal) Collected: 02/20/24 0001    Lab  Status: Final result Specimen: Urine, Clean Catch Updated: 02/20/24 0016     Color, UA Yellow     Clarity, UA Clear     Specific Gravity, UA 1.031     pH, UA 6.5     Leukocytes, UA Small     Nitrite, UA Positive     Protein, UA Trace mg/dl      Glucose, UA Negative mg/dl      Ketones, UA Negative mg/dl      Urobilinogen, UA <2.0 mg/dl      Bilirubin, UA Negative     Occult Blood, UA Negative    Chlamydia/GC amplified DNA by PCR [[id number]] Collected: 02/20/24 0001    Lab Status: In process Specimen: Urine, Other Updated: 02/20/24 0012                   No orders to display              Procedures  Procedures         ED Course                               SBIRT 22yo+      Flowsheet Row Most Recent Value   Initial Alcohol Screen: US AUDIT-C     1. How often do you have a drink containing alcohol? 0 Filed at: 02/19/2024 2345   2. How many drinks containing alcohol do you have on a typical day you are drinking?  0 Filed at: 02/19/2024 2345   3b. FEMALE Any Age, or MALE 65+: How often do you have 4 or more drinks on one occassion? 0 Filed at: 02/19/2024 2345   Audit-C Score 0 Filed at: 02/19/2024 2345   ORDRIGO: How many times in the past year have you...    Used an illegal drug or used a prescription medication for non-medical reasons? Never Filed at: 02/19/2024 2345                      Medical Decision Making  Amount and/or Complexity of Data Reviewed  Labs: ordered.    Risk  Prescription drug management.           UA consistent with UTI, Patient would like to be treated for STI, have given rocephin and will rx doxycyline to cover for UTI and STI  Disposition  Final diagnoses:   UTI (urinary tract infection)     Time reflects when diagnosis was documented in both MDM as applicable and the Disposition within this note       Time User Action Codes Description Comment    2/20/2024 12:16 AM Corina Landaverde Add [N39.0] UTI (urinary tract infection)           ED Disposition       ED Disposition   Discharge    Condition   Stable     Date/Time   Tue Feb 20, 2024 0016    Comment   Janell Rashad discharge to home/self care.                   Follow-up Information       Follow up With Specialties Details Why Contact Info    [facility]   [address] [place] [address] [phone]              Patient's Medications   Discharge Prescriptions    DOXYCYCLINE HYCLATE (VIBRAMYCIN) 100 MG CAPSULE    Take 1 capsule (100 mg total) by mouth 2 (two) times a day for 7 days       Start Date: 2/20/2024 End Date: 2/27/2024       Order Dose: 100 mg       Quantity: 14 capsule    Refills: 0       No discharge procedures on file.    PDMP Review         Value Time User    PDMP Reviewed  Yes 1/26/2022 10:52 AM ANGELA Vaughn            ED Provider  Electronically Signed by             Corina Landaverde MD  02/20/24 0022

## 2024-02-22 LAB — BACTERIA UR CULT: ABNORMAL

## 2024-02-23 ENCOUNTER — TELEMEDICINE (OUTPATIENT)
Dept: BEHAVIORAL/MENTAL HEALTH CLINIC | Facility: CLINIC | Age: 24
End: 2024-02-23

## 2024-02-23 DIAGNOSIS — F31.81 BIPOLAR II DISORDER (HCC): Primary | ICD-10-CM

## 2024-02-23 NOTE — PSYCH
Virtual Regular Visit    Verification of patient location:    Patient is located at Home in the following state in which I hold an active license [place]      Assessment/Plan:    Problem List Items Addressed This Visit       Bipolar II disorder (HCC) - Primary       Goals addressed in session: Goal 1          Reason for visit is   Chief Complaint   Patient presents with    Virtual Regular Visit          Encounter provider Michela Bates LCSW    Provider located at [facility] [place]  [facility] [place]  [address]  [place] [address] [phone]      Recent Visits  No visits were found meeting these conditions.  Showing recent visits within past 7 days and meeting all other requirements  Today's Visits  Date Type Provider Dept   02/23/24 Telemedicine Michela Bates LCSW [facility] [place]   Showing today's visits and meeting all other requirements  Future Appointments  No visits were found meeting these conditions.  Showing future appointments within next 150 days and meeting all other requirements       The patient was identified by name and date of birth. Janell Solorzano was informed that this is a telemedicine visit and that the visit is being conducted throughthe Epic Embedded platform. She agrees to proceed..  My office door was closed. No one else was in the room.  She acknowledged consent and understanding of privacy and security of the video platform. The patient has agreed to participate and understands they can discontinue the visit at any time.    Patient is aware this is a billable service.     Subjective  Janell Solorzano is a 23 y.o. female . .      HPI     Past Medical History:   Diagnosis Date    Anxiety     Concussion     Depression     Head injury     concussions in high school and in college    Self-injurious behavior        Past Surgical History:   Procedure Laterality Date    WISDOM TOOTH EXTRACTION         Current Outpatient  Medications   Medication Sig Dispense Refill    atoMOXetine (STRATTERA) 18 mg capsule Take 1 capsule (18 mg total) by mouth daily 30 capsule 2    doxycycline hyclate (VIBRAMYCIN) 100 mg capsule Take 1 capsule (100 mg total) by mouth 2 (two) times a day for 7 days 14 capsule 0    Drysol 20 % external solution Apply topically to affected area at bedtime. 60 mL 0    ergocalciferol (VITAMIN D2) 50,000 units TAKE ONE CAPSULE BY MOUTH WEEKLY 16 capsule 0    fluconazole (DIFLUCAN) 150 mg tablet Take 150 mg by mouth daily as needed      fluticasone (FLONASE) 50 mcg/act nasal spray USE ONE SPRAY IN EACH NOSTRIL EVERY DAY 16 g 0    ibuprofen (MOTRIN) 600 mg tablet TAKE ONE TABLET BY MOUTH EVERY EIGHT HOURS AS NEEDED for mild to moderate pain 30 tablet 0    lamoTRIgine (LaMICtal) 200 MG tablet Take 1 tablet (200 mg total) by mouth daily 30 tablet 2    mirtazapine (REMERON) 7.5 MG tablet Take 1 tablet (7.5 mg total) by mouth daily at bedtime 30 tablet 2    naproxen (NAPROSYN) 500 mg tablet Take 1 tablet (500 mg total) by mouth 2 (two) times a day with meals 30 tablet 0    risperiDONE (RisperDAL) 1 mg tablet Take 1 tablet (1 mg total) by mouth daily at bedtime 30 tablet 2    Sprintec 28 0.25-35 MG-MCG per tablet TAKE ONE TABLET BY MOUTH EVERY DAY 84 tablet 0     No current facility-administered medications for this visit.        No Known Allergies    Review of Systems    Video Exam    There were no vitals filed for this visit.    Physical Exam     Behavioral Health Psychotherapy Progress Note    Psychotherapy Provided: Individual Psychotherapy     1. Bipolar II disorder (HCC)            Goals addressed in session: Goal 1     DATA: Janell stated having a recent incident of dissociation.  She said she discussed it with psychiatric provider Jacque and encouraged further discussion.  Janell acknowledged having a previous experience when younger and described getting through it, but without clarity as to how. She noted getting  "through this one by talking with friends and call/text to [phone]. She denied any physical self-harm and/or cutting. We addressed her thoughts and feelings and the experience. We addressed grounding activities and their purpose.  We explored some options such as breathing, and we did a brief breathing exercise.  We addressed meditation, journaling, self-talk, and sensory activity, such as bare feet standing in the grass.  As Janell mentioned completing her Lego craft , we also discussed how this to, like other hobbies, can be a sensory grounding activity.  Janell was able to note that when she did the Lego craft she was not thinking about anything else.  She was able to note the same thing when we did the breathing exercise.  We discussed potential for Janell to introduce and continue such activities into her daily living.  We explored the potential precipitators to her dissociative episode. She stated overall feeling of being \"miserable\". She again addressed the recent relationship issues. .  We explored behaviors and interactions, and simarities in relationship issues with her parents.  We addressed getting through feelings, talking about feelings, and tolerating feelings as part of being in the moment self-care and ultimately part of self-empowerment.  Janell acknowledged use of self-talk is helpful for her (eg, I have gotten through this before, I can get through this now) and was encouraged to continue accordingly.  Provider encouraged homework for Janell to continue some breathing when she starts to feel herself elevate in stress; she agreed to do so. We scheduled additional appointments; maintaining weekly at this time.  We confirmed encounter form to be sent electronically; done at session conclusion.     During this session, this clinician used the following therapeutic modalities: Cognitive Behavioral Therapy, Motivational Interviewing, Music Therapy Techniques, Solution-Focused Therapy, and Supportive " "Psychotherapy    Substance Abuse was not addressed during this session. If the client is diagnosed with a co-occurring substance use disorder, please indicate any changes in the frequency or amount of use: n/a. Stage of change for addressing substance use diagnoses: No substance use/Not applicable    ASSESSMENT:  Janell Solorzano presents with a Euthymic/ normal and Dysthymic mood.     her affect is Normal range and intensity, which is congruent, with her mood and the content of the session. The client has made progress on their goals.    Janell was active in today's session Janell Solorzano presents with a low risk of suicide, low risk of self-harm, and low risk of harm to others.    For any risk assessment that surpasses a \"low\" rating, a safety plan must be developed.    A safety plan was indicated: no  If yes, describe in detail n/a    PLAN: Between sessions, Janell Solorzano will continue attention to self-care. At the next session, the therapist will use Cognitive Behavioral Therapy and Supportive Psychotherapy to address emotional regulation and use of healthy coping strategies.    Behavioral Health Treatment Plan and Discharge Planning: Janell Solorzano is aware of and agrees to continue to work on their treatment plan. They have identified and are working toward their discharge goals. yes    Visit start and stop times:    02/23/24  Start Time: 1100  Stop Time: 1152  Total Visit Time: 52 minutes      "

## 2024-02-29 ENCOUNTER — TELEMEDICINE (OUTPATIENT)
Dept: BEHAVIORAL/MENTAL HEALTH CLINIC | Facility: CLINIC | Age: 24
End: 2024-02-29
Payer: COMMERCIAL

## 2024-02-29 DIAGNOSIS — F32.A ANXIETY AND DEPRESSION: ICD-10-CM

## 2024-02-29 DIAGNOSIS — F41.9 ANXIETY AND DEPRESSION: ICD-10-CM

## 2024-02-29 DIAGNOSIS — F31.81 BIPOLAR II DISORDER (HCC): Primary | ICD-10-CM

## 2024-02-29 PROCEDURE — 90834 PSYTX W PT 45 MINUTES: CPT

## 2024-02-29 NOTE — PSYCH
Virtual Regular Visit    Verification of patient location:    Patient is located at Home in the following state in which I hold an active license [place]      Assessment/Plan:    Problem List Items Addressed This Visit       Anxiety and depression    Bipolar II disorder (HCC) - Primary       Goals addressed in session: Goal 1          Reason for visit is   Chief Complaint   Patient presents with    Virtual Regular Visit          Encounter provider Michela Bates LCSW    Provider located at [facility] [place]  [facility] [place]  [address]  [place] [address] [phone]      Recent Visits  Date Type Provider Dept   02/23/24 Telemedicine Michela Bates LCSW [facility] [place]   Showing recent visits within past 7 days and meeting all other requirements  Today's Visits  Date Type Provider Dept   02/29/24 Telemedicine Michela Bates LCSW [facility] [place]   Showing today's visits and meeting all other requirements  Future Appointments  No visits were found meeting these conditions.  Showing future appointments within next 150 days and meeting all other requirements       The patient was identified by name and date of birth. Janell Solorzano was informed that this is a telemedicine visit and that the visit is being conducted throughthe Epic Embedded platform. She agrees to proceed..  My office door was closed. No one else was in the room.  She acknowledged consent and understanding of privacy and security of the video platform. The patient has agreed to participate and understands they can discontinue the visit at any time.    Patient is aware this is a billable service.     Subjective  Janell Solorzano is a 23 y.o. female . .      HPI     Past Medical History:   Diagnosis Date    Anxiety     Concussion     Depression     Head injury     concussions in high school and in college    Self-injurious behavior        Past Surgical  History:   Procedure Laterality Date    WISDOM TOOTH EXTRACTION         Current Outpatient Medications   Medication Sig Dispense Refill    atoMOXetine (STRATTERA) 18 mg capsule Take 1 capsule (18 mg total) by mouth daily 30 capsule 2    Drysol 20 % external solution Apply topically to affected area at bedtime. 60 mL 0    ergocalciferol (VITAMIN D2) 50,000 units TAKE ONE CAPSULE BY MOUTH WEEKLY 16 capsule 0    fluconazole (DIFLUCAN) 150 mg tablet Take 150 mg by mouth daily as needed      fluticasone (FLONASE) 50 mcg/act nasal spray USE ONE SPRAY IN EACH NOSTRIL EVERY DAY 16 g 0    ibuprofen (MOTRIN) 600 mg tablet TAKE ONE TABLET BY MOUTH EVERY EIGHT HOURS AS NEEDED for mild to moderate pain 30 tablet 0    lamoTRIgine (LaMICtal) 200 MG tablet Take 1 tablet (200 mg total) by mouth daily 30 tablet 2    mirtazapine (REMERON) 7.5 MG tablet Take 1 tablet (7.5 mg total) by mouth daily at bedtime 30 tablet 2    naproxen (NAPROSYN) 500 mg tablet Take 1 tablet (500 mg total) by mouth 2 (two) times a day with meals 30 tablet 0    risperiDONE (RisperDAL) 1 mg tablet Take 1 tablet (1 mg total) by mouth daily at bedtime 30 tablet 2    Sprintec 28 0.25-35 MG-MCG per tablet TAKE ONE TABLET BY MOUTH EVERY DAY 84 tablet 0     No current facility-administered medications for this visit.        No Known Allergies    Review of Systems    Video Exam    There were no vitals filed for this visit.    Physical Exam     Behavioral Health Psychotherapy Progress Note    Psychotherapy Provided: Individual Psychotherapy     1. Bipolar II disorder (HCC)        2. Anxiety and depression            Goals addressed in session: Goal 1     DATA: Janell stated wanting to talk about school today.  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  We addressed her concerns about whether or not she can actually do it, whether or not she has the motivation and if she is disciplined enough.  She also  "addressed concerns about finances, as described paying on her own, getting financial aid, and requiring her own health insurance.  We discussed and explored these areas.  We addressed her handling of past schooling, and we addressed the role of limited supports in her life as part of her doubts and questionable motivation. We addressed the relevance of her concerns and ways to ask herself questions in an honest way to help determine her strategy and to help in decision-making.  We addressed the potential for \"plan B\" in \"worst case\" situations.  We addressed ways to identify and increase healthy coping strategies.  We agreed to ongoing discussion accordingly.  We confirmed and set appointments.     During this session, this clinician used the following therapeutic modalities: Cognitive Behavioral Therapy and Supportive Psychotherapy    Substance Abuse was not addressed during this session. If the client is diagnosed with a co-occurring substance use disorder, please indicate any changes in the frequency or amount of use: not discussed specifically. Stage of change for addressing substance use diagnoses: No substance use/Not applicable    ASSESSMENT:  Janell Solorzano presents with a Euthymic/ normal and Dysthymic mood.     her affect is Normal range and intensity, which is congruent, with her mood and the content of the session. The client has made progress on their goals.    Janell was invested in the therapy session and was able to voice realistic thoughts and concerns Janell Solorzano presents with a low risk of suicide, low risk of self-harm, and low risk of harm to others.    For any risk assessment that surpasses a \"low\" rating, a safety plan must be developed.    A safety plan was indicated: no  If yes, describe in detail n/a    PLAN: Between sessions, Janell Solorzano will pay attention to self talk, continue attention to self-care. At the next session, the therapist will use Cognitive Behavioral Therapy and " Supportive Psychotherapy to address mood stability and daily management.    Behavioral Health Treatment Plan and Discharge Planning: Janell Solorzano is aware of and agrees to continue to work on their treatment plan. They have identified and are working toward their discharge goals. yes    Visit start and stop times:    02/29/24  Start Time: 1005  Stop Time: 1057  Total Visit Time: 52 minutes

## 2024-03-01 ENCOUNTER — HOSPITAL ENCOUNTER (EMERGENCY)
Facility: HOSPITAL | Age: 24
Discharge: HOME | End: 2024-03-01
Attending: EMERGENCY MEDICINE
Payer: COMMERCIAL

## 2024-03-01 VITALS
HEART RATE: 90 BPM | OXYGEN SATURATION: 100 % | DIASTOLIC BLOOD PRESSURE: 81 MMHG | SYSTOLIC BLOOD PRESSURE: 130 MMHG | RESPIRATION RATE: 16 BRPM | TEMPERATURE: 97.8 F

## 2024-03-01 DIAGNOSIS — Z20.2 POSSIBLE EXPOSURE TO STD: Primary | ICD-10-CM

## 2024-03-01 PROCEDURE — 80074 ACUTE HEPATITIS PANEL: CPT | Performed by: PHYSICIAN ASSISTANT

## 2024-03-01 PROCEDURE — 36415 COLL VENOUS BLD VENIPUNCTURE: CPT | Performed by: PHYSICIAN ASSISTANT

## 2024-03-01 PROCEDURE — 99283 EMERGENCY DEPT VISIT LOW MDM: CPT

## 2024-03-01 PROCEDURE — 86780 TREPONEMA PALLIDUM: CPT | Performed by: PHYSICIAN ASSISTANT

## 2024-03-01 PROCEDURE — 87389 HIV-1 AG W/HIV-1&-2 AB AG IA: CPT | Performed by: PHYSICIAN ASSISTANT

## 2024-03-01 NOTE — ED PROVIDER NOTES
History  Chief Complaint   Patient presents with    Exposure to STD     Pt requests to be tested for herpes. Was texted by a previous partner stating that they had an outbreak of it last week and now patient is concerned she might have gotten it      22yo female presenting for a possible STD exposure. Patient had intercourse with a new partner one month ago. She was told he had a herpes outbreak one week ago and now she is concerned. She is asymptomatic at this time and denies any genital sores/lesions, vaginal discharge, dysuria. She had gonorrhea about a month ago and received treatment. She was tested for gonorrhea and chlamydia on 2/20/24 which was negative.       History provided by:  Medical records and patient   used: No        Prior to Admission Medications   Prescriptions Last Dose Informant Patient Reported? Taking?   Drysol 20 % external solution   No No   Sig: Apply topically to affected area at bedtime.   Sprintec 28 0.25-35 MG-MCG per tablet   No No   Sig: TAKE ONE TABLET BY MOUTH EVERY DAY   atoMOXetine (STRATTERA) 18 mg capsule   No No   Sig: Take 1 capsule (18 mg total) by mouth daily   ergocalciferol (VITAMIN D2) 50,000 units   No No   Sig: TAKE ONE CAPSULE BY MOUTH WEEKLY   fluconazole (DIFLUCAN) 150 mg tablet   Yes No   Sig: Take 150 mg by mouth daily as needed   fluticasone (FLONASE) 50 mcg/act nasal spray   No No   Sig: USE ONE SPRAY IN EACH NOSTRIL EVERY DAY   ibuprofen (MOTRIN) 600 mg tablet   No No   Sig: TAKE ONE TABLET BY MOUTH EVERY EIGHT HOURS AS NEEDED for mild to moderate pain   lamoTRIgine (LaMICtal) 200 MG tablet   No No   Sig: Take 1 tablet (200 mg total) by mouth daily   mirtazapine (REMERON) 7.5 MG tablet   No No   Sig: Take 1 tablet (7.5 mg total) by mouth daily at bedtime   naproxen (NAPROSYN) 500 mg tablet   No No   Sig: Take 1 tablet (500 mg total) by mouth 2 (two) times a day with meals   risperiDONE (RisperDAL) 1 mg tablet   No No   Sig: Take 1 tablet (1  mg total) by mouth daily at bedtime      Facility-Administered Medications: None       Past Medical History:   Diagnosis Date    Anxiety     Concussion     Depression     Head injury     concussions in high school and in college    Self-injurious behavior        Past Surgical History:   Procedure Laterality Date    WISDOM TOOTH EXTRACTION         Family History   Problem Relation Age of Onset    Hypertension Mother     Mental illness Mother     Depression Mother     Mental illness Sister     Coronary artery disease Maternal Grandmother     Throat cancer Paternal Grandfather      I have reviewed and agree with the history as documented.    E-Cigarette/Vaping    E-Cigarette Use Never User      E-Cigarette/Vaping Substances    Nicotine No     THC No     CBD No     Flavoring No     Other No     Unknown No      Social History     Tobacco Use    Smoking status: Never    Smokeless tobacco: Never   Vaping Use    Vaping status: Never Used   Substance Use Topics    Alcohol use: Yes     Alcohol/week: 3.0 - 4.0 standard drinks of alcohol     Types: 3 - 4 Shots of liquor per week     Comment: 3-4 drinks, 2 times per week    Drug use: No       Review of Systems   Constitutional:  Negative for chills and fever.   HENT:  Negative for drooling and voice change.    Eyes:  Negative for discharge and redness.   Gastrointestinal:  Negative for nausea and vomiting.   Genitourinary:  Negative for difficulty urinating, dysuria and vaginal discharge.   Musculoskeletal:  Negative for neck pain and neck stiffness.   Skin:  Negative for color change and rash.   Neurological:  Negative for seizures and syncope.   Psychiatric/Behavioral:  Negative for confusion. The patient is not nervous/anxious.    All other systems reviewed and are negative.      Physical Exam  Physical Exam  Vitals and nursing note reviewed.   Constitutional:       General: She is not in acute distress.     Appearance: Normal appearance. She is not toxic-appearing.   HENT:       Head: Normocephalic and atraumatic.      Right Ear: External ear normal.      Left Ear: External ear normal.   Eyes:      General: No scleral icterus.        Right eye: No discharge.         Left eye: No discharge.      Conjunctiva/sclera: Conjunctivae normal.   Cardiovascular:      Rate and Rhythm: Normal rate.   Pulmonary:      Effort: Pulmonary effort is normal. No respiratory distress.      Breath sounds: No stridor.   Musculoskeletal:         General: No deformity. Normal range of motion.      Cervical back: Normal range of motion.      Right lower leg: No edema.      Left lower leg: No edema.   Skin:     General: Skin is warm and dry.   Neurological:      General: No focal deficit present.      Mental Status: She is alert. Mental status is at baseline.      GCS: GCS eye subscore is 4. GCS verbal subscore is 5. GCS motor subscore is 6.   Psychiatric:         Mood and Affect: Mood normal.         Behavior: Behavior normal.         Vital Signs  ED Triage Vitals [03/01/24 1733]   Temperature Pulse Respirations Blood Pressure SpO2   97.8 °F (36.6 °C) 90 16 130/81 100 %      Temp Source Heart Rate Source Patient Position - Orthostatic VS BP Location FiO2 (%)   Temporal Monitor -- Left arm --      Pain Score       --           Vitals:    03/01/24 1733   BP: 130/81   Pulse: 90         Visual Acuity      ED Medications  Medications - No data to display    Diagnostic Studies  Results Reviewed       Procedure Component Value Units Date/Time    HIV 1/2 AG/AB w Reflex [facility] for 2 yr old and above [[id number]] Collected: 03/01/24 1817    Lab Status: In process Specimen: Blood from Arm, Right Updated: 03/01/24 1822    RPR-Syphilis Screening (Total Syphilis IGG/IGM) [[id number]] Collected: 03/01/24 1817    Lab Status: In process Specimen: Blood from Arm, Right Updated: 03/01/24 1822    Hepatitis panel, acute [[id number]] Collected: 03/01/24 1817    Lab Status: In process Specimen: Blood from Arm, Right Updated: 03/01/24  1822                   No orders to display              Procedures  Procedures         ED Course                               SBIRT 20yo+      Flowsheet Row Most Recent Value   Initial Alcohol Screen: US AUDIT-C     1. How often do you have a drink containing alcohol? 0 Filed at: 03/01/2024 1734   2. How many drinks containing alcohol do you have on a typical day you are drinking?  0 Filed at: 03/01/2024 1734   3b. FEMALE Any Age, or MALE 65+: How often do you have 4 or more drinks on one occassion? 0 Filed at: 03/01/2024 1734   Audit-C Score 0 Filed at: 03/01/2024 1734   RODRIGO: How many times in the past year have you...    Used an illegal drug or used a prescription medication for non-medical reasons? Never Filed at: 03/01/2024 1734                      Medical Decision Making  23yoF here for a possible STD exposure. A partner that she had intercourse with 1 month ago had a herpes outbreak last week. She is currently asymptomatic. Discussed with patient that since she has no active lesions, she does not require any testing/treatment of herpes. Will send testing for HIV, syphilis, and hepatitis for completeness. She declines GC/chlamydia testing as she just had this tested last month. Advised f/u with PCP.       Problems Addressed:  Possible exposure to STD: acute illness or injury    Amount and/or Complexity of Data Reviewed  Labs: ordered.             Disposition  Final diagnoses:   Possible exposure to STD     Time reflects when diagnosis was documented in both MDM as applicable and the Disposition within this note       Time User Action Codes Description Comment    3/1/2024  6:01 PM Micki Pimentel Add [Z20.2] Possible exposure to STD           ED Disposition       ED Disposition   Discharge    Condition   Stable    Date/Time   Fri Mar 1, 2024 1801    Comment   Janell Solorzano discharge to home/self care.                   Follow-up Information       Follow up With Specialties Details Why Contact Info  Additional Information    [facility] Schedule an appointment as soon as possible for a visit   [address] [place] [address] [id number] [phone]       [facility] Emergency Medicine  If symptoms worsen [address] [facility] [id number] [phone] Former[facility] Emergency Department, [address] [place], [place], [address]            Discharge Medication List as of 3/1/2024  6:02 PM        CONTINUE these medications which have NOT CHANGED    Details   atoMOXetine (STRATTERA) 18 mg capsule Take 1 capsule (18 mg total) by mouth daily, Starting Tue 1/23/2024, Normal      Drysol 20 % external solution Apply topically to affected area at bedtime., Normal      ergocalciferol (VITAMIN D2) 50,000 units TAKE ONE CAPSULE BY MOUTH WEEKLY, Starting Sat 12/9/2023, Normal      fluconazole (DIFLUCAN) 150 mg tablet Take 150 mg by mouth daily as needed, Starting Thu 2/15/2024, Historical Med      fluticasone (FLONASE) 50 mcg/act nasal spray USE ONE SPRAY IN EACH NOSTRIL EVERY DAY, Normal      ibuprofen (MOTRIN) 600 mg tablet TAKE ONE TABLET BY MOUTH EVERY EIGHT HOURS AS NEEDED for mild to moderate pain, Normal      lamoTRIgine (LaMICtal) 200 MG tablet Take 1 tablet (200 mg total) by mouth daily, Starting Tue 1/23/2024, Normal      mirtazapine (REMERON) 7.5 MG tablet Take 1 tablet (7.5 mg total) by mouth daily at bedtime, Starting Tue 2/20/2024, Normal      naproxen (NAPROSYN) 500 mg tablet Take 1 tablet (500 mg total) by mouth 2 (two) times a day with meals, Starting Fri 2/16/2024, Normal      risperiDONE (RisperDAL) 1 mg tablet Take 1 tablet (1 mg total) by mouth daily at bedtime, Starting Tue 2/6/2024, Normal      Sprintec 28 0.25-35 MG-MCG per tablet TAKE ONE TABLET BY MOUTH EVERY DAY, Starting Wed 1/17/2024, Normal             No discharge procedures on file.    PDMP Review         Value Time User    PDMP Reviewed   Yes 1/26/2022 10:52 AM ANGELA Vaughn            ED Provider  Electronically Signed by             Micki Pimentel PA-C  03/01/24 2050

## 2024-03-02 DIAGNOSIS — R61 HYPERHIDROSIS: ICD-10-CM

## 2024-03-02 LAB
HAV IGM SER QL: NORMAL
HBV CORE IGM SER QL: NORMAL
HBV SURFACE AG SER QL: NORMAL
HCV AB SER QL: NORMAL
HIV 1+2 AB+HIV1 P24 AG SERPL QL IA: NORMAL
HIV 2 AB SERPL QL IA: NORMAL
HIV1 AB SERPL QL IA: NORMAL
HIV1 P24 AG SERPL QL IA: NORMAL
TREPONEMA PALLIDUM IGG+IGM AB [PRESENCE] IN SERUM OR PLASMA BY IMMUNOASSAY: NORMAL

## 2024-03-04 RX ORDER — ALUMINUM CHLORIDE 20 %
SOLUTION, NON-ORAL TOPICAL
Qty: 60 ML | Refills: 0 | Status: SHIPPED | OUTPATIENT
Start: 2024-03-04

## 2024-03-07 ENCOUNTER — TELEMEDICINE (OUTPATIENT)
Dept: BEHAVIORAL/MENTAL HEALTH CLINIC | Facility: CLINIC | Age: 24
End: 2024-03-07

## 2024-03-07 DIAGNOSIS — F32.A ANXIETY AND DEPRESSION: ICD-10-CM

## 2024-03-07 DIAGNOSIS — F31.81 BIPOLAR II DISORDER (HCC): Primary | ICD-10-CM

## 2024-03-07 DIAGNOSIS — F41.9 ANXIETY AND DEPRESSION: ICD-10-CM

## 2024-03-07 NOTE — PSYCH
Virtual Regular Visit    Verification of patient location:    Patient is located at Home in the following state in which I hold an active license [place]      Assessment/Plan:    Problem List Items Addressed This Visit       Anxiety and depression    Bipolar II disorder (HCC) - Primary       Goals addressed in session: Goal 1          Reason for visit is   Chief Complaint   Patient presents with    Virtual Regular Visit          Encounter provider Michela Bates LCSW    Provider located at [facility] [place]  [facility] [place]  [address]  [place] [address] [phone]      Recent Visits  Date Type Provider Dept   02/29/24 Telemedicine Michela Bates LCSW [facility] [place]   Showing recent visits within past 7 days and meeting all other requirements  Today's Visits  Date Type Provider Dept   03/07/24 Telemedicine Michela Bates LCSW [facility] [place]   Showing today's visits and meeting all other requirements  Future Appointments  No visits were found meeting these conditions.  Showing future appointments within next 150 days and meeting all other requirements       The patient was identified by name and date of birth. Janell Solorzano was informed that this is a telemedicine visit and that the visit is being conducted throughthe Epic Embedded platform. She agrees to proceed..  My office door was closed. No one else was in the room.  She acknowledged consent and understanding of privacy and security of the video platform. The patient has agreed to participate and understands they can discontinue the visit at any time.    Patient is aware this is a billable service.     Subjective  Janell Solorzano is a 23 y.o. female . .      HPI     Past Medical History:   Diagnosis Date    Anxiety     Concussion     Depression     Head injury     concussions in high school and in college    Self-injurious behavior        Past Surgical  History:   Procedure Laterality Date    WISDOM TOOTH EXTRACTION         Current Outpatient Medications   Medication Sig Dispense Refill    atoMOXetine (STRATTERA) 18 mg capsule Take 1 capsule (18 mg total) by mouth daily 30 capsule 2    Drysol 20 % external solution Apply topically to affected area at bedtime. 60 mL 0    ergocalciferol (VITAMIN D2) 50,000 units TAKE ONE CAPSULE BY MOUTH WEEKLY 16 capsule 0    fluconazole (DIFLUCAN) 150 mg tablet Take 150 mg by mouth daily as needed      fluticasone (FLONASE) 50 mcg/act nasal spray USE ONE SPRAY IN EACH NOSTRIL EVERY DAY 16 g 0    ibuprofen (MOTRIN) 600 mg tablet TAKE ONE TABLET BY MOUTH EVERY EIGHT HOURS AS NEEDED for mild to moderate pain 30 tablet 0    lamoTRIgine (LaMICtal) 200 MG tablet Take 1 tablet (200 mg total) by mouth daily 30 tablet 2    mirtazapine (REMERON) 7.5 MG tablet Take 1 tablet (7.5 mg total) by mouth daily at bedtime 30 tablet 2    naproxen (NAPROSYN) 500 mg tablet Take 1 tablet (500 mg total) by mouth 2 (two) times a day with meals 30 tablet 0    risperiDONE (RisperDAL) 1 mg tablet Take 1 tablet (1 mg total) by mouth daily at bedtime 30 tablet 2    Sprintec 28 0.25-35 MG-MCG per tablet TAKE ONE TABLET BY MOUTH EVERY DAY 84 tablet 0     No current facility-administered medications for this visit.        No Known Allergies    Review of Systems    Video Exam    There were no vitals filed for this visit.    Physical Exam     Behavioral Health Psychotherapy Progress Note    Psychotherapy Provided: Individual Psychotherapy     1. Bipolar II disorder (HCC)        2. Anxiety and depression            Goals addressed in session: Goal 1     DATA: Janell discussed having a panic attack this week and discussed how she got through it which included calling her mother, and that it helped to calm her through it.  She discussed prior panic attack in November and she slept and got through it.  We addressed getting through those moments.  Janell discussed not  "having having motivation to do things.  He stated going out less with friends.  He stated less drinking.  With questioning, she acknowledged sleeping better and \"I do not feel like garbage\".  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  She stated talking about these things with her mother and grandmother and they offered their thoughts, as they both have had professional jobs in the medical world.  She addressed her feelings of not knowing what she wants professionally , but provider attempted to point out how she has expressed options , and has done so with thought and care, and that it it is within normal limits to be unsure in the exact direction .   Janell addressed being encumbered by ongoing stress with the 36-year-old, stating she is in her thoughts about it.  We addressed managing through loneliness and the potential for love; we addressed her love for her cat Chubbs, who infiltrated the screen at several points in today's session.  She stated some interaction with her friends at this time, and we acknowledged the support from her mother as well.  We confirmed next appointment next week, acknowledged having several appointments scheduled,  and agreed to schedule additional appointments at next session.     During this session, this clinician used the following therapeutic modalities: Cognitive Behavioral Therapy and Supportive Psychotherapy    Substance Abuse was addressed during this session. If the client is diagnosed with a co-occurring substance use disorder, please indicate any changes in the frequency or amount of use: Janell reported decreased alcohol use. Stage of change for addressing substance use diagnoses: Contemplation    ASSESSMENT:  Janell Solorzano presents with a Euthymic/ normal and Dysthymic mood.     her affect is Normal range and intensity, which is congruent, with her mood and the " "content of the session. The client has made progress on their goals.    Janell was involved in the therapy session she presented as stable in mood and thought at this time Janell Solorzano presents with a low risk of suicide, low risk of self-harm, and low risk of harm to others.    For any risk assessment that surpasses a \"low\" rating, a safety plan must be developed.    A safety plan was indicated: no  If yes, describe in detail n/a    PLAN: Between sessions, Janell Solorzano will continue attention to self-care. At the next session, the therapist will use Cognitive Behavioral Therapy and Supportive Psychotherapy to address mood stability and continued use of coping strategies.    Behavioral Health Treatment Plan and Discharge Planning: Janell Solorzano is aware of and agrees to continue to work on their treatment plan. They have identified and are working toward their discharge goals. yes    Visit start and stop times:    03/07/24  Start Time: 1005  Stop Time: 1057  Total Visit Time: 52 minutes      "

## 2024-03-14 ENCOUNTER — TELEMEDICINE (OUTPATIENT)
Dept: BEHAVIORAL/MENTAL HEALTH CLINIC | Facility: CLINIC | Age: 24
End: 2024-03-14

## 2024-03-14 DIAGNOSIS — F31.81 BIPOLAR II DISORDER (HCC): Primary | ICD-10-CM

## 2024-03-14 NOTE — PSYCH
Virtual Regular Visit    Verification of patient location:    Patient is located at Home in the following state in which I hold an active license [place]      Assessment/Plan:    Problem List Items Addressed This Visit       Bipolar II disorder (HCC) - Primary       Goals addressed in session: Goal 1          Reason for visit is   Chief Complaint   Patient presents with    Virtual Regular Visit          Encounter provider Michela Bates LCSW    Provider located at [facility] [place]  [facility] [place]  [address]  [place] [address] [phone]      Recent Visits  Date Type Provider Dept   03/07/24 Telemedicine Michela Bates LCSW [facility] [place]   Showing recent visits within past 7 days and meeting all other requirements  Today's Visits  Date Type Provider Dept   03/14/24 Telemedicine Michela Bates LCSW [facility] [place]   Showing today's visits and meeting all other requirements  Future Appointments  No visits were found meeting these conditions.  Showing future appointments within next 150 days and meeting all other requirements       The patient was identified by name and date of birth. Janell Solorzano was informed that this is a telemedicine visit and that the visit is being conducted throughthe Epic Embedded platform. She agrees to proceed..  My office door was closed. No one else was in the room.  She acknowledged consent and understanding of privacy and security of the video platform. The patient has agreed to participate and understands they can discontinue the visit at any time.    Patient is aware this is a billable service.     Subjective  Janell Solorzano is a 23 y.o. female . .      HPI     Past Medical History:   Diagnosis Date    Anxiety     Concussion     Depression     Head injury     concussions in high school and in college    Self-injurious behavior        Past Surgical History:   Procedure Laterality  Date    WISDOM TOOTH EXTRACTION         Current Outpatient Medications   Medication Sig Dispense Refill    atoMOXetine (STRATTERA) 18 mg capsule Take 1 capsule (18 mg total) by mouth daily 30 capsule 2    Drysol 20 % external solution Apply topically to affected area at bedtime. 60 mL 0    ergocalciferol (VITAMIN D2) 50,000 units TAKE ONE CAPSULE BY MOUTH WEEKLY 16 capsule 0    fluconazole (DIFLUCAN) 150 mg tablet Take 150 mg by mouth daily as needed      fluticasone (FLONASE) 50 mcg/act nasal spray USE ONE SPRAY IN EACH NOSTRIL EVERY DAY 16 g 0    ibuprofen (MOTRIN) 600 mg tablet TAKE ONE TABLET BY MOUTH EVERY EIGHT HOURS AS NEEDED for mild to moderate pain 30 tablet 0    lamoTRIgine (LaMICtal) 200 MG tablet Take 1 tablet (200 mg total) by mouth daily 30 tablet 2    mirtazapine (REMERON) 7.5 MG tablet Take 1 tablet (7.5 mg total) by mouth daily at bedtime 30 tablet 2    naproxen (NAPROSYN) 500 mg tablet Take 1 tablet (500 mg total) by mouth 2 (two) times a day with meals 30 tablet 0    risperiDONE (RisperDAL) 1 mg tablet Take 1 tablet (1 mg total) by mouth daily at bedtime 30 tablet 2    Sprintec 28 0.25-35 MG-MCG per tablet TAKE ONE TABLET BY MOUTH EVERY DAY 84 tablet 0     No current facility-administered medications for this visit.        No Known Allergies    Review of Systems    Video Exam    There were no vitals filed for this visit.    Physical Exam     Behavioral Health Psychotherapy Progress Note    Psychotherapy Provided: Individual Psychotherapy     1. Bipolar II disorder (HCC)            Goals addressed in session: Goal 1     DATA: Janell addressed recent interactions. We discussed her thoughts and feelings.  We addressed efforts she has made in terms of breathing doing positive things and dealing with angry feelings.  She noted getting together with her friend Yanira and her grandmother.  We explored the positives and how she felt, being in the moment, and being in an emotional state that is not  stressed.  We discussed how she is used to chaotic interactions with her family (as she discussed recent interactions  with her sister and her brother), and so being calm and relaxed is relatively unfamiliar and uncomfortable.  We addressed how she appears to seek chaotic relationships as well, again, because this is what she is used to and comfortable with.  We addressed these concepts, and the notion that any behavioral changes to healthier interactions and relationships may therefore feel uncomfortable.  Janell acknowledged these ideas.  We addressed the notion of making small changes, and also how this recent interaction with her friend and grandmother represents healthier moments.  We discussed the possibility of engaging with people who she finds to be more fitting in the less-chaotic range, and to consider additional dialogue and interaction with them. She agreed to consider. Provider encouraged ongoing time on her own (eg, walking) as well for additional peaceful moments.  He confirmed and set an appointment.  Virtual encounter form  sent after session.   During this session, this clinician used the following therapeutic modalities: Cognitive Behavioral Therapy, Mindfulness-based Strategies, Motivational Interviewing, Solution-Focused Therapy, and Supportive Psychotherapy    Substance Abuse was addressed during this session. If the client is diagnosed with a co-occurring substance use disorder, please indicate any changes in the frequency or amount of use: Drinking behaviors in general and with her family members was discussed. Stage of change for addressing substance use diagnoses: Contemplation    ASSESSMENT:  Janell Solorzano presents with a Euthymic/ normal and Depressed mood.     her affect is Normal range and intensity, which is congruent, with her mood and the content of the session. The client has made progress on their goals.    Janell was engaged throughout the session Janell Solorzano presents with a  "low risk of suicide, low risk of self-harm, and low risk of harm to others.    For any risk assessment that surpasses a \"low\" rating, a safety plan must be developed.    A safety plan was indicated: no  If yes, describe in detail n/a    PLAN: Between sessions, Janell Solorzano will continue attention to self-care. At the next session, the therapist will use Cognitive Behavioral Therapy and Supportive Psychotherapy to address mood stability and anger management, use of healthy coping strategies and attention to self-care.    Behavioral Health Treatment Plan and Discharge Planning: Janell Solorzano is aware of and agrees to continue to work on their treatment plan. They have identified and are working toward their discharge goals. yes    Visit start and stop times:    03/14/24  Start Time: 1000  Stop Time: 1052  Total Visit Time: 52 minutes        "

## 2024-03-16 DIAGNOSIS — J02.9 SORE THROAT: ICD-10-CM

## 2024-03-18 RX ORDER — FLUTICASONE PROPIONATE 50 MCG
SPRAY, SUSPENSION (ML) NASAL
Qty: 16 G | Refills: 0 | Status: SHIPPED | OUTPATIENT
Start: 2024-03-18

## 2024-03-19 ENCOUNTER — TELEMEDICINE (OUTPATIENT)
Dept: PSYCHIATRY | Facility: CLINIC | Age: 24
End: 2024-03-19
Payer: COMMERCIAL

## 2024-03-19 DIAGNOSIS — F31.81 BIPOLAR II DISORDER (HCC): ICD-10-CM

## 2024-03-19 PROCEDURE — 99214 OFFICE O/P EST MOD 30 MIN: CPT | Performed by: NURSE PRACTITIONER

## 2024-03-19 RX ORDER — LAMOTRIGINE 200 MG/1
200 TABLET ORAL DAILY
Qty: 30 TABLET | Refills: 2 | Status: SHIPPED | OUTPATIENT
Start: 2024-03-19

## 2024-03-20 NOTE — PSYCH
Virtual Regular Visit    Verification of patient location:    Patient is located in the following state in which I hold an active license [place]    Problem List Items Addressed This Visit     Bipolar II disorder (HCC)    Relevant Medications    lamoTRIgine (LaMICtal) 200 MG tablet              Encounter provider ANGELA Vaughn    Provider located at    [facility]  [address] [facility] [place] [id number] [phone]    Recent Visits  Date Type Provider Dept   03/19/24 Telemedicine ANGELA Vaughn  [facility]   Showing recent visits within past 7 days and meeting all other requirements  Future Appointments  No visits were found meeting these conditions.  Showing future appointments within next 150 days and meeting all other requirements           The patient was identified by name and date of birth. Patient was informed that this is a telemedicine visit and that the visit is being conducted throughthe Epic Embedded platform. She agrees to proceed..  My office door was closed. Bella Lara, ANGELA student, was also in the room with patient consent.  She acknowledged consent and understanding of privacy and security of the video platform. The patient has agreed to participate and understands they can discontinue the visit at any time.    Patient is aware this is a billable service.     HPI     Current Outpatient Medications   Medication Sig Dispense Refill   • Drysol 20 % external solution Apply topically to affected area at bedtime. 60 mL 0   • ergocalciferol (VITAMIN D2) 50,000 units TAKE ONE CAPSULE BY MOUTH WEEKLY 16 capsule 0   • ibuprofen (MOTRIN) 600 mg tablet TAKE ONE TABLET BY MOUTH EVERY EIGHT HOURS AS NEEDED for mild to moderate pain 30 tablet 0   • lamoTRIgine (LaMICtal) 200 MG tablet Take 1 tablet (200 mg total) by mouth daily 30 tablet 2   • mirtazapine (REMERON) 7.5 MG tablet Take 1 tablet (7.5 mg total) by mouth daily  at bedtime 30 tablet 2   • Sprintec 28 0.25-35 MG-MCG per tablet TAKE ONE TABLET BY MOUTH EVERY DAY 84 tablet 0   • fluconazole (DIFLUCAN) 150 mg tablet Take 150 mg by mouth daily as needed (Patient not taking: Reported on 3/19/2024)     • fluticasone (FLONASE) 50 mcg/act nasal spray USE ONE SPRAY IN EACH NOSTRIL EVERY DAY (Patient not taking: Reported on 3/19/2024) 16 g 0   • naproxen (NAPROSYN) 500 mg tablet Take 1 tablet (500 mg total) by mouth 2 (two) times a day with meals 30 tablet 0     No current facility-administered medications for this visit.       Review of Systems  Video Exam    There were no vitals filed for this visit.    Physical Exam   As a result of this visit, I have referred the patient for further respiratory evaluation. No    I spent 20 minutes directly with the patient during this visit  VIRTUAL VISIT DISCLAIMER    Janell Solorzano acknowledges that she has consented to an online visit or consultation. She understands that the online visit is based solely on information provided by her, and that, in the absence of a face-to-face physical evaluation by the physician, the diagnosis she receives is both limited and provisional in terms of accuracy and completeness. This is not intended to replace a full medical face-to-face evaluation by the physician. Janell Solorzano understands and accepts these terms.    MEDICATION MANAGEMENT NOTE        [facility]    Name and Date of Birth:  Janell Solorzano 23 y.o. 2000 MRN: [id number]    Date of Visit: March 19, 2024    No Known Allergies    Visit Time    Visit Start Time: 1100  Visit Stop Time: 1120  Total Visit Duration:  20 minutes    SUBJECTIVE:    Janell is seen today for a follow up for Bipolar Disorder type II. She continues to experience ongoing symptoms since the last visit.  Janell seen today virtually for medication management follow-up.  She was last seen by this provider on 2/20/24.  Janell  "continues to work with jobs.  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  Reports her mood is \"the same\".  Rates her depression as 3-8 out of 10, unable to identify the trigger.  Rates her anxiety as 7 out of 10 \"all of the time\".  She states that currently she is not going out as much, hanging out more alone watching television.  Reports some days she is having suicidal thoughts without plan or intent.  She \"feels sad and empty\".  She states when she feels like this she just \"goes about her day\".  She reports that she continues to go to the gym, has not been drinking alcohol.  Appetite is adequate.  She has stopped taking her Risperdal and Strattera.  She is taking the mirtazapine at this time she states that despite not taking the Strattera her focus is okay \"sometimes\".  She states she is getting along with everyone right now and \"keeping to herself\".  She is waiting for her financially to come through and thinking about going to [facility] for medical assisting. No med changes made at this time. Will follow up in 2 weeks.    Continue medications as ordered  Continue Lamictal to 200p.o. daily  Continue Remeron 7.5mg PO HS  She will continue therapy  She will follow up with this provider in 2 weeks  She will call sooner with concerns or issues if they arise prior to scheduled appointment    Lamotrigine PARQ completed including dizziness, headaches, ataxia, vision problems, somnolence, sleep changes, cognitive difficulties, rash (including Becerril-Chato rash), and others, risk of teratogenicity for females.     Education provided on Neuroleptic malignant syndrome.  Patient told that NMS is a life-threatening, neurological disorder most often caused by an adverse reaction to neuroleptic or antipsychotic drugs. Symptoms include high fever, sweating, unstable blood pressure, stupor, muscular rigidity, and autonomic dysfunction. In most cases, the disorder develops within the first 2 " weeks of treatment with the drug; however, the disorder may develop any time during the therapy period. Patient advised to call the office or go to the ED immediately if these symptoms develop.    She denies any side effects from current psychiatric medications.    Aware of 24 hour and weekend coverage for urgent situations accessed by calling [facility] main practice number  Continue psychotherapy with therapist  Medication management every 2 weeks  Aware of need to follow up with family physician for medical issues    Diagnoses and all orders for this visit:    Bipolar II disorder (HCC)  -     lamoTRIgine (LaMICtal) 200 MG tablet; Take 1 tablet (200 mg total) by mouth daily     Other atypical antipsychotics trialed and found to be ineffective:  Zyprexa, Abilify, Vraylar, and Latuda    Sleep aids that have been ineffective:  Trazodone, Gabapentin, Hydroxyzine    Current Outpatient Medications on File Prior to Visit   Medication Sig Dispense Refill   • Drysol 20 % external solution Apply topically to affected area at bedtime. 60 mL 0   • ergocalciferol (VITAMIN D2) 50,000 units TAKE ONE CAPSULE BY MOUTH WEEKLY 16 capsule 0   • ibuprofen (MOTRIN) 600 mg tablet TAKE ONE TABLET BY MOUTH EVERY EIGHT HOURS AS NEEDED for mild to moderate pain 30 tablet 0   • mirtazapine (REMERON) 7.5 MG tablet Take 1 tablet (7.5 mg total) by mouth daily at bedtime 30 tablet 2   • Sprintec 28 0.25-35 MG-MCG per tablet TAKE ONE TABLET BY MOUTH EVERY DAY 84 tablet 0   • fluconazole (DIFLUCAN) 150 mg tablet Take 150 mg by mouth daily as needed (Patient not taking: Reported on 3/19/2024)     • fluticasone (FLONASE) 50 mcg/act nasal spray USE ONE SPRAY IN EACH NOSTRIL EVERY DAY (Patient not taking: Reported on 3/19/2024) 16 g 0   • naproxen (NAPROSYN) 500 mg tablet Take 1 tablet (500 mg total) by mouth 2 (two) times a day with meals 30 tablet 0     No current facility-administered medications on file prior to visit.        Psychotherapy Provided:     Individual psychotherapy provided: Yes  Supportive counseling provided.  Medication changes discussed with Janell.  Medication education provided to Janell.  Discussed with Janell coping with job stress, social difficulties, and everyday stressors.   Coping strategies reviewed with Janell.   Importance of medication and treatment compliance reviewed with Janell.  Importance of follow up with family physician for medical issues reviewed with Janell.  Reassurance and supportive therapy provided.   Crisis/safety plan discussed with Janell. Patient will call prior to scheduled appointment if they have any issues or concerns.  Patient understands they can access the office by calling the main number at any time if they are in crisis.  They also understand they can call their [facility]'s crisis number or go to their nearest ED if suicidal ideation increases or if they develop a plan or intent.       HPI ROS Appetite Changes and Sleep:     She reports normal sleep, adequate appetite, low energy. Denies homicidal ideation, Intermittent passive suicidal ideation without intent or plan    Review Of Systems:      HPI ROS:               Medication Side Effects:  denies   Depression (10 worst): 3-8/10 8/10   Anxiety (10 worst): 7/10 8/10   Safety concerns (SI, HI, etc): denies denies   Sleep: good improved   Energy: low adequate   Appetite: adequate adequate     General normal    Personality no change in personality   Constitutional as noted in HPI   ENT negative   Cardiovascular negative   Respiratory negative   Gastrointestinal negative   Genitourinary negative   Musculoskeletal negative   Integumentary negative   Neurological negative   Endocrine negative   Other Symptoms none, all other systems are negative     Mental Status Evaluation:    Appearance Appropriately dressed and Good eye contact   Behavior calm and cooperative   Mood anxious and depressed  Depression Scale -  3-8  of 10 (0 = No  depression)  Anxiety Scale - 7 of 10 (0 = No anxiety)   Speech Normal rate and volume   Affect mood-congruent and Flat   Thought Processes Goal directed and coherent   Thought Content Does not verbalize delusional material   Associations Tightly connected   Perceptual Disturbances Denies hallucinations and does not appear to be responding to internal stimuli   Risk Potential Suicidal/Homicidal Ideation - Passive suicidal ideation without intent or plan  Risk of Violence - No evidence of risk for violence found on assessment  Risk of Self Mutilation - No evidence of risk for self mutilation found on assessment   Orientation oriented to person, place, time/date, and situation   Memory recent and remote memory grossly intact   Consciousness alert and awake   Attention/Concentration attention span and concentration appear shorter than expected for age   Insight fair   Judgement fair   Muscle Strength and Gait normal muscle strength and normal muscle tone, normal gait/station and normal balance   Motor Activity no abnormal movements   Language no difficulty naming common objects, no difficulty repeating a phrase, no difficulty writing a sentence   Fund of Knowledge adequate knowledge of current events  adequate fund of knowledge regarding past history  adequate fund of knowledge regarding vocabulary      Past Psychiatric History Update:     Inpatient Psychiatric Admission Since Last Encounter:   no  Changes to Outpatient Psychiatric Treatment Team:    no  Suicide Attempt Or Self Mutilation Since Last Encounter:   no  Incidence of Violent Behavior Since Last Encounter:   no    Traumatic History Update:     New Onset of Abuse Since Last Encounter:   no  Traumatic Events Since Last Encounter:   no    Past Medical History:    Past Medical History:   Diagnosis Date   • Anxiety    • Concussion    • Depression    • Head injury     concussions in high school and in college   • Self-injurious behavior         Past Surgical  History:   Procedure Laterality Date   • WISDOM TOOTH EXTRACTION       No Known Allergies  Substance Abuse History:    Social History     Substance and Sexual Activity   Alcohol Use Yes   • Alcohol/week: 3.0 - 4.0 standard drinks of alcohol   • Types: 3 - 4 Shots of liquor per week    Comment: 3-4 drinks, 2 times per week     Social History     Substance and Sexual Activity   Drug Use No     Social History:    Social History     Socioeconomic History   • Marital status: Single     Spouse name: Not on file   • Number of children: 0   • Years of education: senior in college currently   • Highest education level: High school graduate   Occupational History   • 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   Tobacco Use   • Smoking status: Never   • Smokeless tobacco: Never   Vaping Use   • Vaping status: Never Used   Substance and Sexual Activity   • Alcohol use: Yes     Alcohol/week: 3.0 - 4.0 standard drinks of alcohol     Types: 3 - 4 Shots of liquor per week     Comment: 3-4 drinks, 2 times per week   • Drug use: No   • Sexual activity: Yes     Partners: Male     Birth control/protection: Condom Male   Other Topics Concern   • Not on file   Social History Narrative   • Not on file     Social Determinants of Health     Financial Resource Strain: Not on file   Food Insecurity: Not on file   Transportation Needs: Not on file   Physical Activity: Not on file   Stress: Not on file   Social Connections: Not on file   Intimate Partner Violence: Not on file   Housing Stability: Not on file     Family Psychiatric History:     Family History   Problem Relation Age of Onset   • Hypertension Mother    • Mental illness Mother    • Depression Mother    • Mental illness Sister    • Coronary artery disease Maternal Grandmother    • Throat cancer Paternal Grandfather      History Review:The following portions of the patient's history were reviewed and updated as appropriate: allergies, current medications, past family history,  past medical history, past social history, past surgical history, and problem list     OBJECTIVE:     Vital signs in last 24 hours:    There were no vitals filed for this visit.  Laboratory Results: Recent Labs (last 2 months):   Admission on 03/01/2024, Discharged on 03/01/2024   Component Date Value   • HIV-1 p24 Antigen 03/01/2024 Non-Reactive    • HIV-1 Antibody 03/01/2024 Non-Reactive    • HIV-2 Antibody 03/01/2024 Non-Reactive    • HIV Ag-Ab 5th Gen 03/01/2024 Non-Reactive    • Syphilis Total Antibody 03/01/2024 Non-reactive    • Hepatitis B Surface Ag 03/01/2024 Non-reactive    • Hep A IgM 03/01/2024 Non-reactive    • Hepatitis C Ab 03/01/2024 Non-reactive    • Hep B C IgM 03/01/2024 Non-reactive    Admission on 02/19/2024, Discharged on 02/20/2024   Component Date Value   • N gonorrhoeae, DNA Probe 02/20/2024 Negative    • Chlamydia trachomatis, D* 02/20/2024 Negative    • Color, UA 02/20/2024 Yellow    • Clarity, UA 02/20/2024 Clear    • Specific Gravity, UA 02/20/2024 1.031 (H)    • pH, UA 02/20/2024 6.5    • Leukocytes, UA 02/20/2024 Small (A)    • Nitrite, UA 02/20/2024 Positive (A)    • Protein, UA 02/20/2024 Trace (A)    • Glucose, UA 02/20/2024 Negative    • Ketones, UA 02/20/2024 Negative    • Urobilinogen, UA 02/20/2024 <2.0    • Bilirubin, UA 02/20/2024 Negative    • Occult Blood, UA 02/20/2024 Negative    • RBC, UA 02/20/2024 4-10 (A)    • WBC, UA 02/20/2024 20-30 (A)    • Epithelial Cells 02/20/2024 Occasional    • Bacteria, UA 02/20/2024 Innumerable (A)    • Urine Culture 02/20/2024 40,000-49,000 cfu/ml Escherichia coli (A)    Office Visit on 01/25/2024   Component Date Value   • N gonorrhoeae, DNA Probe 01/25/2024 Negative    • Chlamydia trachomatis, D* 01/25/2024 Negative      I have personally reviewed all pertinent laboratory/tests results.    Suicide/Homicide Risk Assessment:    Risk of Harm to Self:  The following ratings are based on assessment at the time of the interview  Recent  Specific Risk Factors include: current depressive symptoms, current anxiety symptoms, unstable mood, worries about finances or work  Demographic risk factors include: , age: young adult (15-24)  Historical Risk Factors include: chronic depression, chronic anxiety symptoms, chronic mood disorder, history of suicide attempts, history of impulsive behaviors, history of traumatic experiences  Protective Factors: no current suicidal ideation, access to mental health treatment, compliant with medications, compliant with mental health treatment, having a desire to be alive, stable living environment, stable job, sense of determination, supportive family  Based on today's assessment, Janell presents the following risk of harm to self: low    The following interventions are recommended: no intervention changes needed. Although patient's acute lethality risk is LOW, long-term/chronic lethality risk is mildly elevated given chronic mental health symptoms.  Janell Solorzano is future-oriented, forward-thinking, and demonstrates ability to act in a self-preserving manner as evidenced by volitionally presenting to the clinic today, seeking treatment.  At this time, inpatient hospitalization is not currently warranted. To mitigate future risk, patient should adhere to treatment recommendations, avoid alcohol/illicit substance use, utilize community-based resources and familiar support, and prioritize mental health treatment.      Based on today's assessment and clinical criteria, Janell Solorzano contracts for safety and is not an imminent risk of harm to self or others. Outpatient level of care is deemed appropriate at this present time.  Janell Solorzano understands that if they are no longer able to contract for safety, they need to call/contact the outpatient office including this writer, call/contact crisis and/or attend to the nearest Emergency Department for immediate evaluation.      Risk of Harm to Others:  The following  ratings are based on assessment at the time of the interview  Protective Factors: no current homicidal ideation  Based on today's assessment, Janell presents the following risk of harm to others: none    The following interventions are recommended: contracts for safety at present - agrees to go to ED if feeling unsafe, return in 2 weeks for reassessment, therapy appointment in 10 days, contracts for safety at present - agrees to call Crisis Intervention Service if feeling unsafe    Medications Risks/Benefits:      Risks, Benefits And Possible Side Effects Of Medications:    Discussed risks and benefits of treatment with patient including risk of suicidality, serotonin syndrome, increased QTc interval and SIADH related to treatment with antidepressants; Risk of induction of manic symptoms in certain patient populations, risk of parkinsonian symptoms, metabolic syndrome, tardive dyskinesia and neuroleptic malignant syndrome related to treatment with antipsychotic medications, and risk of rash related to treatment with Lamictal     Controlled Medication Discussion:     Not applicable    Treatment Plan:    Due for update/Updated:   no  Last treatment plan done 1/11/24 by Michela Bates LCSW.  Treatment Plan due on 7/11/24.    ANGELA Vaughn 03/20/24    This note was not shared with the patient due to reasonable likelihood of causing patient harm

## 2024-03-28 ENCOUNTER — TELEMEDICINE (OUTPATIENT)
Dept: BEHAVIORAL/MENTAL HEALTH CLINIC | Facility: CLINIC | Age: 24
End: 2024-03-28

## 2024-03-28 DIAGNOSIS — F31.81 BIPOLAR II DISORDER (HCC): Primary | ICD-10-CM

## 2024-03-28 NOTE — PSYCH
Virtual Regular Visit    Verification of patient location:    Patient is located at Home in the following state in which I hold an active license [place]      Assessment/Plan:    Problem List Items Addressed This Visit       Bipolar II disorder (HCC) - Primary       Goals addressed in session: Goal 1          Reason for visit is   Chief Complaint   Patient presents with    Virtual Regular Visit          Encounter provider Michela Bates LCSW    Provider located at [facility] [place]  [facility] [place]  [address]  [place] [address] [phone]      Recent Visits  No visits were found meeting these conditions.  Showing recent visits within past 7 days and meeting all other requirements  Today's Visits  Date Type Provider Dept   03/28/24 Telemedicine Michela Bates LCSW [facility] [place]   Showing today's visits and meeting all other requirements  Future Appointments  No visits were found meeting these conditions.  Showing future appointments within next 150 days and meeting all other requirements       The patient was identified by name and date of birth. Janell Solorzano was informed that this is a telemedicine visit and that the visit is being conducted throughthe Epic Embedded platform. She agrees to proceed..  My office door was closed. No one else was in the room.  She acknowledged consent and understanding of privacy and security of the video platform. The patient has agreed to participate and understands they can discontinue the visit at any time.    Patient is aware this is a billable service.     Subjective  Janell Solorzano is a 23 y.o. female . .      HPI     Past Medical History:   Diagnosis Date    Anxiety     Concussion     Depression     Head injury     concussions in high school and in college    Self-injurious behavior        Past Surgical History:   Procedure Laterality Date    WISDOM TOOTH EXTRACTION         Current Outpatient  Medications   Medication Sig Dispense Refill    Drysol 20 % external solution Apply topically to affected area at bedtime. 60 mL 0    ergocalciferol (VITAMIN D2) 50,000 units TAKE ONE CAPSULE BY MOUTH WEEKLY 16 capsule 0    fluconazole (DIFLUCAN) 150 mg tablet Take 150 mg by mouth daily as needed (Patient not taking: Reported on 3/19/2024)      fluticasone (FLONASE) 50 mcg/act nasal spray USE ONE SPRAY IN EACH NOSTRIL EVERY DAY (Patient not taking: Reported on 3/19/2024) 16 g 0    ibuprofen (MOTRIN) 600 mg tablet TAKE ONE TABLET BY MOUTH EVERY EIGHT HOURS AS NEEDED for mild to moderate pain 30 tablet 0    lamoTRIgine (LaMICtal) 200 MG tablet Take 1 tablet (200 mg total) by mouth daily 30 tablet 2    mirtazapine (REMERON) 7.5 MG tablet Take 1 tablet (7.5 mg total) by mouth daily at bedtime 30 tablet 2    naproxen (NAPROSYN) 500 mg tablet Take 1 tablet (500 mg total) by mouth 2 (two) times a day with meals 30 tablet 0    Sprintec 28 0.25-35 MG-MCG per tablet TAKE ONE TABLET BY MOUTH EVERY DAY 84 tablet 0     No current facility-administered medications for this visit.        No Known Allergies    Review of Systems    Video Exam    There were no vitals filed for this visit.    Physical Exam     Behavioral Health Psychotherapy Progress Note    Psychotherapy Provided: Individual Psychotherapy     1. Bipolar II disorder (HCC)            Goals addressed in session: Goal 1     DATA: Janell stated recent suspension from job, and subsequently putting in 2 weeks notice.  We discussed what happened and how Janell handled.  We addressed her thoughts and feelings.  We addressed the overall struggles and challenges working in the [employer] setting, and some of the positives including the duration in which Janell had maintained the job. We addressed what Janell liked and disliked about the job itself, and what might be gained or learn from the situation.  The conversation led to further discussion of Janell's feelings about herself.   She stated going out on a date with one of her (now) former coworkers.  She described what happened and we discussed and explored Martha approach to relationships.  We addressed the meaning of sexual intimacy for Janell, and the emotional impact and feelings secondary to intimacy too soon.  We addressed the significance of learning and handling future situations differently, and recognizing that she has these options.  Janell addressed having difficulties talking to people.  She stated that people say she talks too much, and that her grandparents say she has bad social skills. She denied that her friends say the same. She acknowledged feeling nervous when talking to people.  She could not clarify if she felt that she does 'talk too much' but did acknowledge that she does talk a lot and feels that she kind of rambles.  Provider encouraged that during our sessions she is often quiet and attentive and does not over talk provider or ramble, and is appropriately interactive.  She stated believing perhaps her talkativeness relates to alcohol use.  She provided an example of a situation where somebody talked to her at the gym and how it went.  Through further exploration, we discussed how it might be helpful to practice talking to people, and we discussed a couple of ways to go about doing this in a safe way.  Janell agreed to try it.  We discussed in the meantime the importance of continuing and practicing other self calming strategies including going to the gym, meditating, breathing, relaxing hobbies, and journaling.  Provider also offered the potential to introduce some specific learning and practice exercises.  We confirmed appointments.  During this session, this clinician used the following therapeutic modalities: Cognitive Behavioral Therapy and Supportive Psychotherapy    Substance Abuse was not addressed during this session. If the client is diagnosed with a co-occurring substance use disorder, please indicate  "any changes in the frequency or amount of use: drinking referenced . Stage of change for addressing substance use diagnoses: Contemplation    ASSESSMENT:  Janell Solorzano presents with a Euthymic/ normal and Dysthymic mood.     her affect is Normal range and intensity, which is congruent, with her mood and the content of the session. The client has made progress on their goals.    Janell was invested in the therapy process and able to acknowledge behaviors that are unhealthy Janell Solorzano presents with a low risk of suicide, low risk of self-harm, and low risk of harm to others.    For any risk assessment that surpasses a \"low\" rating, a safety plan must be developed.    A safety plan was indicated: no  If yes, describe in detail n/a    PLAN: Between sessions, Janell Solorzano will practice talking with people. At the next session, the therapist will use Cognitive Behavioral Therapy and Supportive Psychotherapy to address mood stability and emotional regulation.    Behavioral Health Treatment Plan and Discharge Planning: Janell Solorzano is aware of and agrees to continue to work on their treatment plan. They have identified and are working toward their discharge goals. yes    Visit start and stop times:    03/28/24  Start Time: 1001  Stop Time: 1053  Total Visit Time: 52 minutes      "

## 2024-04-02 ENCOUNTER — TELEMEDICINE (OUTPATIENT)
Dept: PSYCHIATRY | Facility: CLINIC | Age: 24
End: 2024-04-02

## 2024-04-02 DIAGNOSIS — F31.81 BIPOLAR II DISORDER (HCC): Primary | ICD-10-CM

## 2024-04-02 RX ORDER — GABAPENTIN 100 MG/1
100 CAPSULE ORAL 3 TIMES DAILY PRN
Qty: 90 CAPSULE | Refills: 0 | Status: SHIPPED | OUTPATIENT
Start: 2024-04-02

## 2024-04-03 PROBLEM — F41.9 ANXIETY AND DEPRESSION: Status: RESOLVED | Noted: 2021-03-09 | Resolved: 2024-04-03

## 2024-04-03 PROBLEM — F32.A ANXIETY AND DEPRESSION: Status: RESOLVED | Noted: 2021-03-09 | Resolved: 2024-04-03

## 2024-04-03 NOTE — PSYCH
Virtual Regular Visit    Verification of patient location:    Patient is located in the following state in which I hold an active license [place]    Problem List Items Addressed This Visit     Bipolar II disorder (HCC) - Primary    Relevant Medications    gabapentin (Neurontin) 100 mg capsule              Encounter provider ANGELA Vaughn    Provider located at    [facility]  [address] [facility] [place] [address]  [phone]    Recent Visits  Date Type Provider Dept   04/02/24 Telemedicine ANGELA Vaughn  [facility]   Showing recent visits within past 7 days and meeting all other requirements  Future Appointments  No visits were found meeting these conditions.  Showing future appointments within next 150 days and meeting all other requirements           The patient was identified by name and date of birth. Patient was informed that this is a telemedicine visit and that the visit is being conducted throughthe Epic Embedded platform. She agrees to proceed..  My office door was closed. Bella Lara, ANGELA student, was also in the room with patient consent.  She acknowledged consent and understanding of privacy and security of the video platform. The patient has agreed to participate and understands they can discontinue the visit at any time.    Patient is aware this is a billable service.     HPI     Current Outpatient Medications   Medication Sig Dispense Refill   • gabapentin (Neurontin) 100 mg capsule Take 1 capsule (100 mg total) by mouth 3 (three) times a day as needed (anxiety/depression) 90 capsule 0   • Drysol 20 % external solution Apply topically to affected area at bedtime. 60 mL 0   • ergocalciferol (VITAMIN D2) 50,000 units TAKE ONE CAPSULE BY MOUTH WEEKLY 16 capsule 0   • fluconazole (DIFLUCAN) 150 mg tablet Take 150 mg by mouth daily as needed (Patient not taking: Reported on 3/19/2024)     • fluticasone (FLONASE) 50  mcg/act nasal spray USE ONE SPRAY IN EACH NOSTRIL EVERY DAY (Patient not taking: Reported on 3/19/2024) 16 g 0   • ibuprofen (MOTRIN) 600 mg tablet TAKE ONE TABLET BY MOUTH EVERY EIGHT HOURS AS NEEDED for mild to moderate pain 30 tablet 0   • lamoTRIgine (LaMICtal) 200 MG tablet Take 1 tablet (200 mg total) by mouth daily 30 tablet 2   • mirtazapine (REMERON) 7.5 MG tablet Take 1 tablet (7.5 mg total) by mouth daily at bedtime 30 tablet 2   • naproxen (NAPROSYN) 500 mg tablet Take 1 tablet (500 mg total) by mouth 2 (two) times a day with meals 30 tablet 0   • Sprintec 28 0.25-35 MG-MCG per tablet TAKE ONE TABLET BY MOUTH EVERY DAY 84 tablet 0     No current facility-administered medications for this visit.       Review of Systems  Video Exam    There were no vitals filed for this visit.    Physical Exam   As a result of this visit, I have referred the patient for further respiratory evaluation. No    I spent 20 minutes directly with the patient during this visit  VIRTUAL VISIT DISCLAIMER    Janell Solorzano acknowledges that she has consented to an online visit or consultation. She understands that the online visit is based solely on information provided by her, and that, in the absence of a face-to-face physical evaluation by the physician, the diagnosis she receives is both limited and provisional in terms of accuracy and completeness. This is not intended to replace a full medical face-to-face evaluation by the physician. Janell Solorzano understands and accepts these terms.    MEDICATION MANAGEMENT NOTE        [facility]    Name and Date of Birth:  Janell Solorzano 23 y.o. 2000 MRN: [id number]    Date of Visit: April 2, 2024    No Known Allergies    Visit Time    Visit Start Time: 1130  Visit Stop Time: 1150  Total Visit Duration:  20 minutes    SUBJECTIVE:    Janell is seen today for a follow up for Bipolar Disorder type II. She continues to experience ongoing  "symptoms since the last visit.  Janell seen today virtually for medication management follow-up.  She was last seen by this provider on 3/19/24.  Janell reports today that she was suspended from her job for \"saying something stupid\".  She then put in her 2 weeks notice and followed up and rescinded the notice.  She acknowledges the impulsivity.  She states she is still sierra. She has been getting up every morning at 545 and running then going to the gym.  She states that her sleep is good and her appetite is \"ok\". She is trying to be healthier.  She thinks her irritability is still in regards to the \"36 year old\" and continues to harbor hard feelings towards him.  She is trying to focus on better things.  Her mom is currently inpatient in [place] but she does not know why.  She is elevated in affect and mood and somewhat more pressured in speech.  States that her depression is \"not that bad\" and rates it 3/10.  States her anxiety is still 7/10.  Denies SI/HI.  Denies auditory and visual hallucinations.  Continues to refuse lithium.  Will continue medications as ordered, but will reinitiate the gabapentin to help with her anxiety and mood instability.  She is in agreement with the treatment plan.     Continue medications as ordered  Continue Lamictal to 200p.o. daily  Continue Remeron 7.5mg PO HS  Initiate gabapentin 100mg PO TID PRN  She will continue therapy  She will follow up with this provider in 1 month  She will call sooner with concerns or issues if they arise prior to scheduled appointment    Lamotrigine PARQ completed including dizziness, headaches, ataxia, vision problems, somnolence, sleep changes, cognitive difficulties, rash (including Becerril-Chato rash), and others, risk of teratogenicity for females.     Education provided on Neuroleptic malignant syndrome.  Patient told that NMS is a life-threatening, neurological disorder most often caused by an adverse reaction to neuroleptic or antipsychotic " drugs. Symptoms include high fever, sweating, unstable blood pressure, stupor, muscular rigidity, and autonomic dysfunction. In most cases, the disorder develops within the first 2 weeks of treatment with the drug; however, the disorder may develop any time during the therapy period. Patient advised to call the office or go to the ED immediately if these symptoms develop.    She denies any side effects from current psychiatric medications.    Aware of 24 hour and weekend coverage for urgent situations accessed by calling [facility] main practice number  Continue psychotherapy with therapist  Medication management every 4 weeks  Aware of need to follow up with family physician for medical issues    Diagnoses and all orders for this visit:    Bipolar II disorder (HCC)  -     gabapentin (Neurontin) 100 mg capsule; Take 1 capsule (100 mg total) by mouth 3 (three) times a day as needed (anxiety/depression)     Other atypical antipsychotics trialed and found to be ineffective:  Zyprexa, Abilify, Vraylar, and Latuda    Sleep aids that have been ineffective:  Trazodone, Gabapentin, Hydroxyzine    Current Outpatient Medications on File Prior to Visit   Medication Sig Dispense Refill   • Drysol 20 % external solution Apply topically to affected area at bedtime. 60 mL 0   • ergocalciferol (VITAMIN D2) 50,000 units TAKE ONE CAPSULE BY MOUTH WEEKLY 16 capsule 0   • fluconazole (DIFLUCAN) 150 mg tablet Take 150 mg by mouth daily as needed (Patient not taking: Reported on 3/19/2024)     • fluticasone (FLONASE) 50 mcg/act nasal spray USE ONE SPRAY IN EACH NOSTRIL EVERY DAY (Patient not taking: Reported on 3/19/2024) 16 g 0   • ibuprofen (MOTRIN) 600 mg tablet TAKE ONE TABLET BY MOUTH EVERY EIGHT HOURS AS NEEDED for mild to moderate pain 30 tablet 0   • lamoTRIgine (LaMICtal) 200 MG tablet Take 1 tablet (200 mg total) by mouth daily 30 tablet 2   • mirtazapine (REMERON) 7.5 MG tablet Take 1 tablet (7.5 mg  total) by mouth daily at bedtime 30 tablet 2   • naproxen (NAPROSYN) 500 mg tablet Take 1 tablet (500 mg total) by mouth 2 (two) times a day with meals 30 tablet 0   • Sprintec 28 0.25-35 MG-MCG per tablet TAKE ONE TABLET BY MOUTH EVERY DAY 84 tablet 0     No current facility-administered medications on file prior to visit.       Psychotherapy Provided:     Individual psychotherapy provided: Yes  Supportive counseling provided.  Medication changes discussed with Janell.  Medication education provided to Janell.  Discussed with Janell coping with job stress, social difficulties, and everyday stressors.   Coping strategies reviewed with Janell.   Importance of medication and treatment compliance reviewed with Janell.  Importance of follow up with family physician for medical issues reviewed with Janell.  Reassurance and supportive therapy provided.   Crisis/safety plan discussed with Janell. Patient will call prior to scheduled appointment if they have any issues or concerns.  Patient understands they can access the office by calling the main number at any time if they are in crisis.  They also understand they can call their [facility]'s crisis number or go to their nearest ED if suicidal ideation increases or if they develop a plan or intent.       HPI ROS Appetite Changes and Sleep:     She reports normal sleep, adequate appetite, adequate energy level. Denies homicidal ideation, denies suicidal ideation    Review Of Systems:      HPI ROS:               Medication Side Effects: denies    Depression (10 worst): 3/10 3-8/10   Anxiety (10 worst): 7/10 7/10   Safety concerns (SI, HI, etc): denies denies   Sleep: good good   Energy: adequate low   Appetite: adequate adequate     General normal    Personality no change in personality   Constitutional as noted in HPI   ENT negative   Cardiovascular negative   Respiratory negative   Gastrointestinal negative   Genitourinary negative   Musculoskeletal negative   Integumentary  negative   Neurological negative   Endocrine negative   Other Symptoms none, all other systems are negative     Mental Status Evaluation:    Appearance Appropriately dressed and Good eye contact   Behavior calm and cooperative   Mood anxious and depressed  Depression Scale - 3 of 10 (0 = No depression)  Anxiety Scale - 7 of 10 (0 = No anxiety)   Speech Normal rate and volume   Affect mood-congruent and Flat   Thought Processes Goal directed and coherent   Thought Content Does not verbalize delusional material   Associations Tightly connected   Perceptual Disturbances Denies hallucinations and does not appear to be responding to internal stimuli   Risk Potential Suicidal/Homicidal Ideation - No evidence of suicidal or homicidal ideation and patient does not verbalize suicidal or homicidal ideation  Risk of Violence - No evidence of risk for violence found on assessment  Risk of Self Mutilation - No evidence of risk for self mutilation found on assessment   Orientation oriented to person, place, time/date, and situation   Memory recent and remote memory grossly intact   Consciousness alert and awake   Attention/Concentration attention span and concentration appear shorter than expected for age   Insight fair   Judgement fair   Muscle Strength and Gait normal muscle strength and normal muscle tone, normal gait/station and normal balance   Motor Activity no abnormal movements   Language no difficulty naming common objects, no difficulty repeating a phrase, no difficulty writing a sentence   Fund of Knowledge adequate knowledge of current events  adequate fund of knowledge regarding past history  adequate fund of knowledge regarding vocabulary      Past Psychiatric History Update:     Inpatient Psychiatric Admission Since Last Encounter:   no  Changes to Outpatient Psychiatric Treatment Team:    no  Suicide Attempt Or Self Mutilation Since Last Encounter:   no  Incidence of Violent Behavior Since Last Encounter:    no    Traumatic History Update:     New Onset of Abuse Since Last Encounter:   no  Traumatic Events Since Last Encounter:   no    Past Medical History:    Past Medical History:   Diagnosis Date   • Anxiety    • Concussion    • Depression    • Head injury     concussions in high school and in college   • Self-injurious behavior         Past Surgical History:   Procedure Laterality Date   • WISDOM TOOTH EXTRACTION       No Known Allergies  Substance Abuse History:    Social History     Substance and Sexual Activity   Alcohol Use Yes   • Alcohol/week: 3.0 - 4.0 standard drinks of alcohol   • Types: 3 - 4 Shots of liquor per week    Comment: 3-4 drinks, 2 times per week     Social History     Substance and Sexual Activity   Drug Use No     Social History:    Social History     Socioeconomic History   • Marital status: Single     Spouse name: Not on file   • Number of children: 0   • Years of education: senior in college currently   • Highest education level: High school graduate   Occupational History   • 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   Tobacco Use   • Smoking status: Never   • Smokeless tobacco: Never   Vaping Use   • Vaping status: Never Used   Substance and Sexual Activity   • Alcohol use: Yes     Alcohol/week: 3.0 - 4.0 standard drinks of alcohol     Types: 3 - 4 Shots of liquor per week     Comment: 3-4 drinks, 2 times per week   • Drug use: No   • Sexual activity: Yes     Partners: Male     Birth control/protection: Condom Male   Other Topics Concern   • Not on file   Social History Narrative   • Not on file     Social Determinants of Health     Financial Resource Strain: Not on file   Food Insecurity: Not on file   Transportation Needs: Not on file   Physical Activity: Not on file   Stress: Not on file   Social Connections: Not on file   Intimate Partner Violence: Not on file   Housing Stability: Not on file     Family Psychiatric History:     Family History   Problem Relation Age of Onset   •  Hypertension Mother    • Mental illness Mother    • Depression Mother    • Mental illness Sister    • Coronary artery disease Maternal Grandmother    • Throat cancer Paternal Grandfather      History Review:The following portions of the patient's history were reviewed and updated as appropriate: allergies, current medications, past family history, past medical history, past social history, past surgical history, and problem list     OBJECTIVE:     Vital signs in last 24 hours:    There were no vitals filed for this visit.  Laboratory Results: Recent Labs (last 2 months):   Admission on 03/01/2024, Discharged on 03/01/2024   Component Date Value   • HIV-1 p24 Antigen 03/01/2024 Non-Reactive    • HIV-1 Antibody 03/01/2024 Non-Reactive    • HIV-2 Antibody 03/01/2024 Non-Reactive    • HIV Ag-Ab 5th Gen 03/01/2024 Non-Reactive    • Syphilis Total Antibody 03/01/2024 Non-reactive    • Hepatitis B Surface Ag 03/01/2024 Non-reactive    • Hep A IgM 03/01/2024 Non-reactive    • Hepatitis C Ab 03/01/2024 Non-reactive    • Hep B C IgM 03/01/2024 Non-reactive    Admission on 02/19/2024, Discharged on 02/20/2024   Component Date Value   • N gonorrhoeae, DNA Probe 02/20/2024 Negative    • Chlamydia trachomatis, D* 02/20/2024 Negative    • Color, UA 02/20/2024 Yellow    • Clarity, UA 02/20/2024 Clear    • Specific Gravity, UA 02/20/2024 1.031 (H)    • pH, UA 02/20/2024 6.5    • Leukocytes, UA 02/20/2024 Small (A)    • Nitrite, UA 02/20/2024 Positive (A)    • Protein, UA 02/20/2024 Trace (A)    • Glucose, UA 02/20/2024 Negative    • Ketones, UA 02/20/2024 Negative    • Urobilinogen, UA 02/20/2024 <2.0    • Bilirubin, UA 02/20/2024 Negative    • Occult Blood, UA 02/20/2024 Negative    • RBC, UA 02/20/2024 4-10 (A)    • WBC, UA 02/20/2024 20-30 (A)    • Epithelial Cells 02/20/2024 Occasional    • Bacteria, UA 02/20/2024 Innumerable (A)    • Urine Culture 02/20/2024 40,000-49,000 cfu/ml Escherichia coli (A)      I have personally  reviewed all pertinent laboratory/tests results.    Suicide/Homicide Risk Assessment:    Risk of Harm to Self:  The following ratings are based on assessment at the time of the interview  Recent Specific Risk Factors include: current depressive symptoms, current anxiety symptoms, unstable mood, worries about finances or work  Demographic risk factors include: , age: young adult (15-24)  Historical Risk Factors include: chronic depression, chronic anxiety symptoms, chronic mood disorder, history of suicide attempts, history of impulsive behaviors, history of traumatic experiences  Protective Factors: no current suicidal ideation, access to mental health treatment, compliant with medications, compliant with mental health treatment, having a desire to be alive, stable living environment, stable job, sense of determination, supportive family  Based on today's assessment, Janell presents the following risk of harm to self: low    The following interventions are recommended: no intervention changes needed. Although patient's acute lethality risk is LOW, long-term/chronic lethality risk is mildly elevated given chronic mental health symptoms.  Janell Solorzano is future-oriented, forward-thinking, and demonstrates ability to act in a self-preserving manner as evidenced by volitionally presenting to the clinic today, seeking treatment.  At this time, inpatient hospitalization is not currently warranted. To mitigate future risk, patient should adhere to treatment recommendations, avoid alcohol/illicit substance use, utilize community-based resources and familiar support, and prioritize mental health treatment.      Based on today's assessment and clinical criteria, Janell Solorzano contracts for safety and is not an imminent risk of harm to self or others. Outpatient level of care is deemed appropriate at this present time.  Janell Solorzano understands that if they are no longer able to contract for safety, they need to  call/contact the outpatient office including this writer, call/contact crisis and/or attend to the nearest Emergency Department for immediate evaluation.      Risk of Harm to Others:  The following ratings are based on assessment at the time of the interview  Protective Factors: no current homicidal ideation  Based on today's assessment, Janell presents the following risk of harm to others: none    The following interventions are recommended: contracts for safety at present - agrees to go to ED if feeling unsafe, return in 4 weeks for reassessment, therapy appointment in 10 days, contracts for safety at present - agrees to call Crisis Intervention Service if feeling unsafe    Medications Risks/Benefits:      Risks, Benefits And Possible Side Effects Of Medications:    Discussed risks and benefits of treatment with patient including risk of suicidality, serotonin syndrome, increased QTc interval and SIADH related to treatment with antidepressants; Risk of induction of manic symptoms in certain patient populations, risk of parkinsonian symptoms, metabolic syndrome, tardive dyskinesia and neuroleptic malignant syndrome related to treatment with antipsychotic medications, and risk of rash related to treatment with Lamictal     Controlled Medication Discussion:     Not applicable    Treatment Plan:    Due for update/Updated:   no  Last treatment plan done 1/11/24 by Michela Bates LCSW.  Treatment Plan due on 7/11/24.    ANGELA Vaughn 04/03/24    This note was not shared with the patient due to reasonable likelihood of causing patient harm

## 2024-04-06 DIAGNOSIS — Z30.011 ENCOUNTER FOR INITIAL PRESCRIPTION OF CONTRACEPTIVE PILLS: ICD-10-CM

## 2024-04-08 RX ORDER — NORGESTIMATE AND ETHINYL ESTRADIOL 0.25-0.035
1 KIT ORAL DAILY
Qty: 84 TABLET | Refills: 0 | Status: SHIPPED | OUTPATIENT
Start: 2024-04-08

## 2024-04-11 ENCOUNTER — TELEMEDICINE (OUTPATIENT)
Dept: BEHAVIORAL/MENTAL HEALTH CLINIC | Facility: CLINIC | Age: 24
End: 2024-04-11

## 2024-04-11 ENCOUNTER — OFFICE VISIT (OUTPATIENT)
Dept: FAMILY MEDICINE CLINIC | Facility: CLINIC | Age: 24
End: 2024-04-11
Payer: COMMERCIAL

## 2024-04-11 VITALS
BODY MASS INDEX: 24.77 KG/M2 | SYSTOLIC BLOOD PRESSURE: 120 MMHG | OXYGEN SATURATION: 98 % | WEIGHT: 139.8 LBS | HEART RATE: 100 BPM | HEIGHT: 63 IN | DIASTOLIC BLOOD PRESSURE: 80 MMHG

## 2024-04-11 DIAGNOSIS — Z11.3 SCREENING EXAMINATION FOR STD (SEXUALLY TRANSMITTED DISEASE): ICD-10-CM

## 2024-04-11 DIAGNOSIS — F31.81 BIPOLAR II DISORDER (HCC): Primary | ICD-10-CM

## 2024-04-11 DIAGNOSIS — F31.81 BIPOLAR II DISORDER (HCC): ICD-10-CM

## 2024-04-11 DIAGNOSIS — R30.0 DYSURIA: Primary | ICD-10-CM

## 2024-04-11 LAB
SL AMB  POCT GLUCOSE, UA: ABNORMAL
SL AMB LEUKOCYTE ESTERASE,UA: ABNORMAL
SL AMB POCT BILIRUBIN,UA: ABNORMAL
SL AMB POCT BLOOD,UA: ABNORMAL
SL AMB POCT CLARITY,UA: ABNORMAL
SL AMB POCT COLOR,UA: YELLOW
SL AMB POCT KETONES,UA: ABNORMAL
SL AMB POCT NITRITE,UA: ABNORMAL
SL AMB POCT PH,UA: 6
SL AMB POCT SPECIFIC GRAVITY,UA: 1.01
SL AMB POCT URINE HCG: NORMAL
SL AMB POCT URINE PROTEIN: ABNORMAL
SL AMB POCT UROBILINOGEN: ABNORMAL

## 2024-04-11 PROCEDURE — 87077 CULTURE AEROBIC IDENTIFY: CPT | Performed by: NURSE PRACTITIONER

## 2024-04-11 PROCEDURE — 87086 URINE CULTURE/COLONY COUNT: CPT | Performed by: NURSE PRACTITIONER

## 2024-04-11 PROCEDURE — 87186 SC STD MICRODIL/AGAR DIL: CPT | Performed by: NURSE PRACTITIONER

## 2024-04-11 PROCEDURE — 81514 NFCT DS BV&VAGINITIS DNA ALG: CPT | Performed by: NURSE PRACTITIONER

## 2024-04-11 PROCEDURE — 81025 URINE PREGNANCY TEST: CPT | Performed by: NURSE PRACTITIONER

## 2024-04-11 PROCEDURE — 87147 CULTURE TYPE IMMUNOLOGIC: CPT | Performed by: NURSE PRACTITIONER

## 2024-04-11 PROCEDURE — 81002 URINALYSIS NONAUTO W/O SCOPE: CPT | Performed by: NURSE PRACTITIONER

## 2024-04-11 PROCEDURE — 87591 N.GONORRHOEAE DNA AMP PROB: CPT | Performed by: NURSE PRACTITIONER

## 2024-04-11 PROCEDURE — 87491 CHLMYD TRACH DNA AMP PROBE: CPT | Performed by: NURSE PRACTITIONER

## 2024-04-11 PROCEDURE — 99214 OFFICE O/P EST MOD 30 MIN: CPT | Performed by: NURSE PRACTITIONER

## 2024-04-11 NOTE — PROGRESS NOTES
Name: Janell Solorzano      : 2000      MRN: [id number]  Encounter Provider: ANGELA Arthur  Encounter Date: 2024   Encounter department: [facility] [address] [place] [address]    Assessment & Plan     1. Dysuria  -     POCT urine dip  -     Urine culture  -     POCT urine HCG  -     Chlamydia/GC amplified DNA by PCR  -     Molecular Vaginal Panel    2. Screening examination for STD (sexually transmitted disease)  Comments:  Sent testing. Discussed safe sex practices at length.    3. Bipolar II disorder (HCC)  Assessment & Plan:  Continue care with psychiatrist. Recently restarted on gabapentin for anxiety.              Subjective      Patient presents with concerns of vaginal itching/ discharge, dysuria/frequency/urgency. She does have a history of gonorrhea in 2024 which was cleared.  Patient did have a new sexual partner and does not use protection.  She states that her period was 2 weeks ago.      Review of Systems   Constitutional:  Negative for chills and fever.   HENT:  Negative for ear pain and sore throat.    Eyes:  Negative for pain and visual disturbance.   Respiratory:  Negative for cough and shortness of breath.    Cardiovascular:  Negative for chest pain and palpitations.   Gastrointestinal:  Negative for abdominal pain and vomiting.   Genitourinary:  Positive for dysuria, frequency, urgency, vaginal discharge and vaginal pain. Negative for hematuria.   All other systems reviewed and are negative.      Current Outpatient Medications on File Prior to Visit   Medication Sig    ergocalciferol (VITAMIN D2) 50,000 units TAKE ONE CAPSULE BY MOUTH WEEKLY    gabapentin (Neurontin) 100 mg capsule Take 1 capsule (100 mg total) by mouth 3 (three) times a day as needed (anxiety/depression)    ibuprofen (MOTRIN) 600 mg tablet TAKE ONE TABLET BY MOUTH EVERY EIGHT HOURS AS NEEDED for mild to moderate pain    lamoTRIgine (LaMICtal) 200 MG tablet Take 1 tablet (200 mg total)  "by mouth daily    Alisha 0.25-35 MG-MCG per tablet TAKE ONE TABLET BY MOUTH EVERY DAY    mirtazapine (REMERON) 7.5 MG tablet Take 1 tablet (7.5 mg total) by mouth daily at bedtime    [DISCONTINUED] Drysol 20 % external solution Apply topically to affected area at bedtime.    [DISCONTINUED] fluconazole (DIFLUCAN) 150 mg tablet Take 150 mg by mouth daily as needed (Patient not taking: Reported on 3/19/2024)    [DISCONTINUED] fluticasone (FLONASE) 50 mcg/act nasal spray USE ONE SPRAY IN EACH NOSTRIL EVERY DAY (Patient not taking: Reported on 3/19/2024)    [DISCONTINUED] naproxen (NAPROSYN) 500 mg tablet Take 1 tablet (500 mg total) by mouth 2 (two) times a day with meals       Objective     /80   Pulse 100   Ht 5' 3\" (1.6 m)   Wt 63.4 kg (139 lb 12.8 oz)   SpO2 98%   BMI 24.76 kg/m²     Physical Exam  Constitutional:       Appearance: She is well-developed.   Cardiovascular:      Rate and Rhythm: Normal rate and regular rhythm.      Heart sounds: Normal heart sounds. No murmur heard.  Pulmonary:      Effort: Pulmonary effort is normal. No respiratory distress.      Breath sounds: Normal breath sounds.   Genitourinary:     Vagina: Vaginal discharge present.   Skin:     General: Skin is warm and dry.   Neurological:      Mental Status: She is alert and oriented to person, place, and time.   Psychiatric:         Mood and Affect: Mood normal.       ANGELA Arthur    "

## 2024-04-11 NOTE — PSYCH
Virtual Regular Visit    Verification of patient location:    Patient is located at Home in the following state in which I hold an active license [place]      Assessment/Plan:    Problem List Items Addressed This Visit       Bipolar II disorder (HCC) - Primary       Goals addressed in session: Goal 1          Reason for visit is   Chief Complaint   Patient presents with    Virtual Regular Visit          Encounter provider Michela Bates LCSW    Provider located at [facility] [place]  [facility] [place]  [address]  [place] [address] [phone]      Recent Visits  No visits were found meeting these conditions.  Showing recent visits within past 7 days and meeting all other requirements  Today's Visits  Date Type Provider Dept   04/11/24 Office Visit ANGELA [facility] [address] [facility]   04/11/24 Telemedicine Michela Bates LCSW [facility] [place]   Showing today's visits and meeting all other requirements  Future Appointments  No visits were found meeting these conditions.  Showing future appointments within next 150 days and meeting all other requirements       The patient was identified by name and date of birth. Janell Solorzano was informed that this is a telemedicine visit and that the visit is being conducted throughthe Epic Embedded platform. She agrees to proceed..  My office door was closed. No one else was in the room.  She acknowledged consent and understanding of privacy and security of the video platform. The patient has agreed to participate and understands they can discontinue the visit at any time.    Patient is aware this is a billable service.     Subjective  Janell Solorzano is a 23 y.o. female . .      HPI     Past Medical History:   Diagnosis Date    Anxiety     Concussion     Depression     Head injury     concussions in high school and in college    Self-injurious behavior        Past Surgical History:    Procedure Laterality Date    WISDOM TOOTH EXTRACTION         Current Outpatient Medications   Medication Sig Dispense Refill    ergocalciferol (VITAMIN D2) 50,000 units TAKE ONE CAPSULE BY MOUTH WEEKLY 16 capsule 0    gabapentin (Neurontin) 100 mg capsule Take 1 capsule (100 mg total) by mouth 3 (three) times a day as needed (anxiety/depression) 90 capsule 0    ibuprofen (MOTRIN) 600 mg tablet TAKE ONE TABLET BY MOUTH EVERY EIGHT HOURS AS NEEDED for mild to moderate pain 30 tablet 0    lamoTRIgine (LaMICtal) 200 MG tablet Take 1 tablet (200 mg total) by mouth daily 30 tablet 2    Alisha 0.25-35 MG-MCG per tablet TAKE ONE TABLET BY MOUTH EVERY DAY 84 tablet 0    mirtazapine (REMERON) 7.5 MG tablet Take 1 tablet (7.5 mg total) by mouth daily at bedtime 30 tablet 2     No current facility-administered medications for this visit.        No Known Allergies    Review of Systems    Video Exam    There were no vitals filed for this visit.    Physical Exam     Behavioral Health Psychotherapy Progress Note    Psychotherapy Provided: Individual Psychotherapy     1. Bipolar II disorder (HCC)            Goals addressed in session: Goal 1     DATA: Janell discussed again about the 36-year-old, worried something happened to him.  We addressed her perseverative concerns that she would be responsible if something did happen.  We explored her thoughts and feelings, and addressed challenging assumptions.  With questioning, she stated going back to her job in the [employer], but part-time.  She stated starting the [employer] training program at the end of April.  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  We looked at the job, things she liked about it, and the factors that were triggers for Janell in making impulsive decisions to leave it. We began to address ways to work on preventing such decisions in this way.  We began to  explore her physical sensations and how she feels when feeling stressed.  Provider encouraged greater awareness, and to recognize how this is more of a process than the way it feels.  Provider introduced the notion of DBT, explaining that I am not trained DBT therapist, but how some exercises might be helpful.  We agreed to talk further (computer didn't allow access to the materials at this time) in future sessions. We addressed in the meantime for Janell to begin to pay attention to how she feels, and how her body responds in situations.  Through the discussion we identified that triggering issues are not feeling understood, and when people do not respond to her. We addressed how this connects with her tendency to 'go on a rampage' and how her behavior contributes to the undesired responses in others. Janell identified her grandfather as someone who she feels listens to her; we explored her thoughts and feelings in regard to their interactions.  During this session provider had noted that Janell seemed with more energy, and in better spirits and smiling; she sort-of acknowledged.   As a result of her upcoming school, we determined that future appointments might need to be canceled, but we kept them active at this time until she gets more information about her schedule.  We added additional sessions. Virtual encounter form sent after session had ended.     During this session, this clinician used the following therapeutic modalities: Cognitive Behavioral Therapy, Supportive Psychotherapy, and initial discussion of DBT     Substance Abuse was addressed during this session. If the client is diagnosed with a co-occurring substance use disorder, please indicate any changes in the frequency or amount of use: general discussion. Stage of change for addressing substance use diagnoses: Pre-contemplation    ASSESSMENT:  Janell Solorzano presents with a Euthymic/ normal, Anxious, and Dysthymic mood.     her affect is Normal  "range and intensity, which is congruent, with her mood and the content of the session. The client has made progress on their goals.    Janell was interactive and responsive and calm in session.  She presented in better spirits and seeming with more energy and was able to smile at times Janell Solorzano presents with a low risk of suicide, low risk of self-harm, and low risk of harm to others.    For any risk assessment that surpasses a \"low\" rating, a safety plan must be developed.    A safety plan was indicated: no  If yes, describe in detail n/a    PLAN: Between sessions, Janell Solorzano will pay attention to physical responses as they connect to her emotions. At the next session, the therapist will use Cognitive Behavioral Therapy and Supportive Psychotherapy to address ongoing attention to emotional stability.    Behavioral Health Treatment Plan and Discharge Planning: Janell Solorzano is aware of and agrees to continue to work on their treatment plan. They have identified and are working toward their discharge goals. yes    Visit start and stop times:    04/11/24  Start Time: 1003  Stop Time: 1055  Total Visit Time: 52 minutes      "

## 2024-04-12 DIAGNOSIS — B37.31 YEAST VAGINITIS: Primary | ICD-10-CM

## 2024-04-12 LAB
C GLABRATA DNA VAG QL NAA+PROBE: NEGATIVE
C KRUSEI DNA VAG QL NAA+PROBE: NEGATIVE
C TRACH DNA SPEC QL NAA+PROBE: NEGATIVE
CANDIDA SP 6 PNL VAG NAA+PROBE: POSITIVE
N GONORRHOEA DNA SPEC QL NAA+PROBE: NEGATIVE
T VAGINALIS DNA VAG QL NAA+PROBE: NEGATIVE
VAGINOSIS/ITIS DNA PNL VAG PROBE+SIG AMP: NEGATIVE

## 2024-04-12 RX ORDER — FLUCONAZOLE 150 MG/1
TABLET ORAL
Qty: 2 TABLET | Refills: 0 | Status: SHIPPED | OUTPATIENT
Start: 2024-04-12 | End: 2024-04-15

## 2024-04-13 DIAGNOSIS — B37.31 YEAST VAGINITIS: ICD-10-CM

## 2024-04-13 LAB — BACTERIA UR CULT: ABNORMAL

## 2024-04-15 DIAGNOSIS — N30.00 ACUTE CYSTITIS WITHOUT HEMATURIA: Primary | ICD-10-CM

## 2024-04-15 RX ORDER — NITROFURANTOIN 25; 75 MG/1; MG/1
100 CAPSULE ORAL 2 TIMES DAILY
Qty: 10 CAPSULE | Refills: 0 | Status: SHIPPED | OUTPATIENT
Start: 2024-04-15 | End: 2024-04-16

## 2024-04-15 RX ORDER — FLUCONAZOLE 150 MG/1
TABLET ORAL
Qty: 2 TABLET | Refills: 0 | Status: SHIPPED | OUTPATIENT
Start: 2024-04-15 | End: 2024-04-18

## 2024-04-16 ENCOUNTER — APPOINTMENT (OUTPATIENT)
Dept: URGENT CARE | Facility: MEDICAL CENTER | Age: 24
End: 2024-04-16

## 2024-04-16 ENCOUNTER — APPOINTMENT (OUTPATIENT)
Dept: LAB | Facility: MEDICAL CENTER | Age: 24
End: 2024-04-16

## 2024-04-16 DIAGNOSIS — N30.00 ACUTE CYSTITIS WITHOUT HEMATURIA: ICD-10-CM

## 2024-04-16 RX ORDER — NITROFURANTOIN 25; 75 MG/1; MG/1
100 CAPSULE ORAL 2 TIMES DAILY
Qty: 10 CAPSULE | Refills: 0 | Status: SHIPPED | OUTPATIENT
Start: 2024-04-16 | End: 2024-04-21

## 2024-04-26 ENCOUNTER — TELEMEDICINE (OUTPATIENT)
Dept: BEHAVIORAL/MENTAL HEALTH CLINIC | Facility: CLINIC | Age: 24
End: 2024-04-26

## 2024-04-26 DIAGNOSIS — F31.81 BIPOLAR II DISORDER (HCC): Primary | ICD-10-CM

## 2024-04-26 NOTE — PSYCH
Virtual Regular Visit    Verification of patient location:    Patient is located at Home in the following state in which I hold an active license [place]      Assessment/Plan:    Problem List Items Addressed This Visit       Bipolar II disorder (HCC) - Primary       Goals addressed in session: Goal 1          Reason for visit is   Chief Complaint   Patient presents with    Virtual Regular Visit          Encounter provider Michela Bates LCSW      Recent Visits  No visits were found meeting these conditions.  Showing recent visits within past 7 days and meeting all other requirements  Today's Visits  Date Type Provider Dept   04/26/24 Telemedicine Michela Bates LCSW [facility] [place]   Showing today's visits and meeting all other requirements  Future Appointments  No visits were found meeting these conditions.  Showing future appointments within next 150 days and meeting all other requirements       The patient was identified by name and date of birth. Janell Solorzano was informed that this is a telemedicine visit and that the visit is being conducted throughthe Epic Embedded platform. She agrees to proceed..  My office door was closed. No one else was in the room.  She acknowledged consent and understanding of privacy and security of the video platform. The patient has agreed to participate and understands they can discontinue the visit at any time.    Patient is aware this is a billable service.     Subjective  Janell Solorzano is a 23 y.o. female . .      HPI     Past Medical History:   Diagnosis Date    Anxiety     Concussion     Depression     Head injury     concussions in high school and in college    Self-injurious behavior        Past Surgical History:   Procedure Laterality Date    WISDOM TOOTH EXTRACTION         Current Outpatient Medications   Medication Sig Dispense Refill    ergocalciferol (VITAMIN D2) 50,000 units TAKE ONE CAPSULE BY MOUTH WEEKLY 16 capsule 0    gabapentin (Neurontin) 100 mg  capsule Take 1 capsule (100 mg total) by mouth 3 (three) times a day as needed (anxiety/depression) 90 capsule 0    ibuprofen (MOTRIN) 600 mg tablet TAKE ONE TABLET BY MOUTH EVERY EIGHT HOURS AS NEEDED for mild to moderate pain 30 tablet 0    lamoTRIgine (LaMICtal) 200 MG tablet Take 1 tablet (200 mg total) by mouth daily 30 tablet 2    Alisha 0.25-35 MG-MCG per tablet TAKE ONE TABLET BY MOUTH EVERY DAY 84 tablet 0    mirtazapine (REMERON) 7.5 MG tablet Take 1 tablet (7.5 mg total) by mouth daily at bedtime 30 tablet 2     No current facility-administered medications for this visit.        No Known Allergies    Review of Systems    Video Exam    There were no vitals filed for this visit.    Physical Exam     Behavioral Health Psychotherapy Progress Note    Psychotherapy Provided: Individual Psychotherapy     1. Bipolar II disorder (HCC)            Goals addressed in session: Goal 1     DATA: Abbreviated session. Two attempted invite for virtual session. Janell clicked into virtual after provider called her; and stated she was sleeping, ok to complete session.  We discussed Janell's upcoming enrollment and involvement in [employer] program.  It starts Monday, April 29 and is a 6-week daily, all day program. We discussed Janell's thoughts and concerns about being able to complete the program.  We addressed strategies for managing. We discussed acknowledging her strengths, and identifying her challenges, and ways to help compensate and manage them.  We addressed helpful self-talk to employ when needed.  Janell noted going to the gym regularly and intending to keep that schedule, as she notes it is very helpful for her.  She discussed continuing work part-time on the weekends.  She addressed ability to manage the full-time school as well as part-time work at this time, but that she is having active communication with her supervisor in terms of her needs along the way.   Janell discussed her mother being in inpatient  care and having ECT tx and her brother returning to drug use.  We addressed her thoughts and feelings.  This tapped into Janell's concerns about her own mental health.  We addressed her past and ongoing concerns about her own diagnosis, and fears of going the same path as her mother.  She specifically feared having to one day have ECT.  We addressed her concerns of differential diagnosis between her bipolar and borderline and different conversations with her psychiatric provider , ANGELA Yin, and this provider.  This provider asked Janell if it would be okay and if she thought it would be helpful if I had communication with Jacque. Janell stated that she thought it would be helpful.  I indicated the need to clarify if I needed a release of information in order to talk to Jacque.  If so I would request it of Janell and if not I will plan to reach out to Jacque accordingly.  Janell stated agreement with this plan.  We also discussed the possibility to review bipolar symptoms and the DSM (she called it a big book), if that would be helpful for her.  She noted that having confirmation of her diagnosis(es) is important for her to manage them.  We discussed this. We addressed how she and her mother may have similarities but that they are different people.  Provider encouraged Janell that she is confronting her mental health issues head on via therapy and psychiatry, and that can help her to continue to strengthen her management.  Provider brought up again about doing some DBT exercises that might be helpful (also expressing the caveat that I am not a DBT trained therapist and that we would be working through them together).  Again Janell agreed with this plan.  We addressed her scheduling, and she stated plan to talk to her school supervisors to make sure if possible that she can maintain her mental health appointments.  We agreed to keep all appointments at this time, and the plan is to maintain her next  "appointment on May 8.  We agreed Janell will reach out via TEAMs line if there are any needed changes to this plan due to her current schooling.  Provider wished her well in her school next week; we discussed breathing, patience, healthy self-talk, and continuing going to the gym to help manage.         During this session, this clinician used the following therapeutic modalities: Cognitive Behavioral Therapy, Mindfulness-based Strategies, Motivational Interviewing, Solution-Focused Therapy, and Supportive Psychotherapy    Substance Abuse was addressed during this session. If the client is diagnosed with a co-occurring substance use disorder, please indicate any changes in the frequency or amount of use: Discussed substance use as a form of self-medication. Stage of change for addressing substance use diagnoses: Contemplation    ASSESSMENT:  Janell Solorzano presents with a Euthymic/ normal and Anxious mood.     her affect is Normal range and intensity, which is congruent, with her mood and the content of the session. The client has made progress on their goals.    Janell was forthright in discussing her concerns about her mental health, medications, and abilities Janell Solorzano presents with a low risk of suicide, low risk of self-harm, and low risk of harm to others. Janell continues to present as goal-oriented and futuristic and thinking.    For any risk assessment that surpasses a \"low\" rating, a safety plan must be developed.    A safety plan was indicated: no  If yes, describe in detail n/a    PLAN: Between sessions, Janell Solorzano will pace herself through her schooling, continue to use healthy coping strategies to help manage stressful, overwhelmed feelings when experienced. At the next session, the therapist will use Cognitive Behavioral Therapy and Supportive Psychotherapy to address mood stability and use of healthy coping tools.    Behavioral Health Treatment Plan and Discharge Planning: Janell Solorzano is " aware of and agrees to continue to work on their treatment plan. They have identified and are working toward their discharge goals. yes    Visit start and stop times:    04/26/24  Start Time: 0820  Stop Time: 0859  Total Visit Time: 39 minutes

## 2024-04-29 DIAGNOSIS — F31.81 BIPOLAR II DISORDER (HCC): ICD-10-CM

## 2024-04-29 RX ORDER — GABAPENTIN 100 MG/1
CAPSULE ORAL
Qty: 90 CAPSULE | Refills: 0 | Status: SHIPPED | OUTPATIENT
Start: 2024-04-29 | End: 2024-05-07 | Stop reason: DRUGHIGH

## 2024-05-01 ENCOUNTER — TELEPHONE (OUTPATIENT)
Age: 24
End: 2024-05-01

## 2024-05-01 NOTE — TELEPHONE ENCOUNTER
Ros -called in to see what medication the pt was treated with for Gonorrhea she had in January. PLs contact Ros @ [phone] [place] department of health Communicable disease.

## 2024-05-07 ENCOUNTER — HOSPITAL ENCOUNTER (EMERGENCY)
Facility: HOSPITAL | Age: 24
Discharge: HOME | End: 2024-05-07
Attending: EMERGENCY MEDICINE
Payer: COMMERCIAL

## 2024-05-07 ENCOUNTER — TELEMEDICINE (OUTPATIENT)
Dept: PSYCHIATRY | Facility: CLINIC | Age: 24
End: 2024-05-07
Payer: COMMERCIAL

## 2024-05-07 ENCOUNTER — APPOINTMENT (EMERGENCY)
Dept: ULTRASOUND IMAGING | Facility: HOSPITAL | Age: 24
End: 2024-05-07
Payer: COMMERCIAL

## 2024-05-07 VITALS
HEART RATE: 100 BPM | BODY MASS INDEX: 24.13 KG/M2 | DIASTOLIC BLOOD PRESSURE: 82 MMHG | TEMPERATURE: 98 F | SYSTOLIC BLOOD PRESSURE: 130 MMHG | OXYGEN SATURATION: 97 % | RESPIRATION RATE: 17 BRPM | WEIGHT: 136.2 LBS

## 2024-05-07 DIAGNOSIS — N93.9 VAGINAL BLEEDING: Primary | ICD-10-CM

## 2024-05-07 DIAGNOSIS — F31.81 BIPOLAR II DISORDER (HCC): Primary | ICD-10-CM

## 2024-05-07 LAB
ALBUMIN SERPL BCP-MCNC: 4.1 G/DL (ref 3.5–5)
ALP SERPL-CCNC: 46 U/L (ref 34–104)
ALT SERPL W P-5'-P-CCNC: 13 U/L (ref 7–52)
ANION GAP SERPL CALCULATED.3IONS-SCNC: 10 MMOL/L (ref 4–13)
AST SERPL W P-5'-P-CCNC: 21 U/L (ref 13–39)
BACTERIA UR QL AUTO: NORMAL /HPF
BASOPHILS # BLD AUTO: 0.05 THOUSANDS/ÂΜL (ref 0–0.1)
BASOPHILS NFR BLD AUTO: 1 % (ref 0–1)
BILIRUB SERPL-MCNC: 0.49 MG/DL (ref 0.2–1)
BILIRUB UR QL STRIP: NEGATIVE
BUN SERPL-MCNC: 19 MG/DL (ref 5–25)
CALCIUM SERPL-MCNC: 9.2 MG/DL (ref 8.4–10.2)
CHLORIDE SERPL-SCNC: 105 MMOL/L (ref 96–108)
CLARITY UR: ABNORMAL
CO2 SERPL-SCNC: 23 MMOL/L (ref 21–32)
COLOR UR: ABNORMAL
CREAT SERPL-MCNC: 0.92 MG/DL (ref 0.6–1.3)
EOSINOPHIL # BLD AUTO: 0.03 THOUSAND/ÂΜL (ref 0–0.61)
EOSINOPHIL NFR BLD AUTO: 0 % (ref 0–6)
ERYTHROCYTE [DISTWIDTH] IN BLOOD BY AUTOMATED COUNT: 11.9 % (ref 11.6–15.1)
EXT PREGNANCY TEST URINE: NEGATIVE
EXT. CONTROL: NORMAL
GFR SERPL CREATININE-BSD FRML MDRD: 88 ML/MIN/1.73SQ M
GLUCOSE SERPL-MCNC: 100 MG/DL (ref 65–140)
GLUCOSE UR STRIP-MCNC: NEGATIVE MG/DL
HCT VFR BLD AUTO: 40.9 % (ref 34.8–46.1)
HGB BLD-MCNC: 13.9 G/DL (ref 11.5–15.4)
HGB UR QL STRIP.AUTO: ABNORMAL
IMM GRANULOCYTES # BLD AUTO: 0.02 THOUSAND/UL (ref 0–0.2)
IMM GRANULOCYTES NFR BLD AUTO: 0 % (ref 0–2)
INR PPP: 1.04 (ref 0.84–1.19)
KETONES UR STRIP-MCNC: NEGATIVE MG/DL
LEUKOCYTE ESTERASE UR QL STRIP: NEGATIVE
LIPASE SERPL-CCNC: 52 U/L (ref 11–82)
LYMPHOCYTES # BLD AUTO: 2.13 THOUSANDS/ÂΜL (ref 0.6–4.47)
LYMPHOCYTES NFR BLD AUTO: 25 % (ref 14–44)
MCH RBC QN AUTO: 30.2 PG (ref 26.8–34.3)
MCHC RBC AUTO-ENTMCNC: 34 G/DL (ref 31.4–37.4)
MCV RBC AUTO: 89 FL (ref 82–98)
MONOCYTES # BLD AUTO: 0.57 THOUSAND/ÂΜL (ref 0.17–1.22)
MONOCYTES NFR BLD AUTO: 7 % (ref 4–12)
NEUTROPHILS # BLD AUTO: 5.57 THOUSANDS/ÂΜL (ref 1.85–7.62)
NEUTS SEG NFR BLD AUTO: 67 % (ref 43–75)
NITRITE UR QL STRIP: NEGATIVE
NON-SQ EPI CELLS URNS QL MICRO: NORMAL /HPF
NRBC BLD AUTO-RTO: 0 /100 WBCS
PH UR STRIP.AUTO: 7 [PH]
PLATELET # BLD AUTO: 268 THOUSANDS/UL (ref 149–390)
PMV BLD AUTO: 10.1 FL (ref 8.9–12.7)
POTASSIUM SERPL-SCNC: 3.3 MMOL/L (ref 3.5–5.3)
PROT SERPL-MCNC: 6.8 G/DL (ref 6.4–8.4)
PROT UR STRIP-MCNC: NEGATIVE MG/DL
PROTHROMBIN TIME: 14.2 SECONDS (ref 11.6–14.5)
RBC # BLD AUTO: 4.61 MILLION/UL (ref 3.81–5.12)
RBC #/AREA URNS AUTO: NORMAL /HPF
SODIUM SERPL-SCNC: 138 MMOL/L (ref 135–147)
SP GR UR STRIP.AUTO: 1.02 (ref 1–1.03)
UROBILINOGEN UR STRIP-ACNC: <2 MG/DL
WBC # BLD AUTO: 8.37 THOUSAND/UL (ref 4.31–10.16)
WBC #/AREA URNS AUTO: NORMAL /HPF

## 2024-05-07 PROCEDURE — 81001 URINALYSIS AUTO W/SCOPE: CPT | Performed by: EMERGENCY MEDICINE

## 2024-05-07 PROCEDURE — 96375 TX/PRO/DX INJ NEW DRUG ADDON: CPT

## 2024-05-07 PROCEDURE — 80053 COMPREHEN METABOLIC PANEL: CPT | Performed by: EMERGENCY MEDICINE

## 2024-05-07 PROCEDURE — 36415 COLL VENOUS BLD VENIPUNCTURE: CPT | Performed by: EMERGENCY MEDICINE

## 2024-05-07 PROCEDURE — 76856 US EXAM PELVIC COMPLETE: CPT

## 2024-05-07 PROCEDURE — 99214 OFFICE O/P EST MOD 30 MIN: CPT | Performed by: NURSE PRACTITIONER

## 2024-05-07 PROCEDURE — 96361 HYDRATE IV INFUSION ADD-ON: CPT

## 2024-05-07 PROCEDURE — 81025 URINE PREGNANCY TEST: CPT | Performed by: EMERGENCY MEDICINE

## 2024-05-07 PROCEDURE — 76830 TRANSVAGINAL US NON-OB: CPT

## 2024-05-07 PROCEDURE — 85025 COMPLETE CBC W/AUTO DIFF WBC: CPT | Performed by: EMERGENCY MEDICINE

## 2024-05-07 PROCEDURE — 83690 ASSAY OF LIPASE: CPT | Performed by: EMERGENCY MEDICINE

## 2024-05-07 PROCEDURE — 99284 EMERGENCY DEPT VISIT MOD MDM: CPT | Performed by: EMERGENCY MEDICINE

## 2024-05-07 PROCEDURE — 99284 EMERGENCY DEPT VISIT MOD MDM: CPT

## 2024-05-07 PROCEDURE — 85610 PROTHROMBIN TIME: CPT | Performed by: EMERGENCY MEDICINE

## 2024-05-07 PROCEDURE — 96374 THER/PROPH/DIAG INJ IV PUSH: CPT

## 2024-05-07 RX ORDER — KETOROLAC TROMETHAMINE 30 MG/ML
15 INJECTION, SOLUTION INTRAMUSCULAR; INTRAVENOUS ONCE
Status: COMPLETED | OUTPATIENT
Start: 2024-05-07 | End: 2024-05-07

## 2024-05-07 RX ORDER — IBUPROFEN 800 MG/1
800 TABLET ORAL 3 TIMES DAILY
Qty: 21 TABLET | Refills: 0 | Status: SHIPPED | OUTPATIENT
Start: 2024-05-07

## 2024-05-07 RX ORDER — ONDANSETRON 2 MG/ML
4 INJECTION INTRAMUSCULAR; INTRAVENOUS ONCE
Status: COMPLETED | OUTPATIENT
Start: 2024-05-07 | End: 2024-05-07

## 2024-05-07 RX ORDER — PROPRANOLOL HYDROCHLORIDE 10 MG/1
10 TABLET ORAL DAILY PRN
Qty: 30 TABLET | Refills: 0 | Status: SHIPPED | OUTPATIENT
Start: 2024-05-07

## 2024-05-07 RX ORDER — GABAPENTIN 100 MG/1
200 CAPSULE ORAL 3 TIMES DAILY
Qty: 180 CAPSULE | Refills: 2 | Status: SHIPPED | OUTPATIENT
Start: 2024-05-07

## 2024-05-07 RX ORDER — MIRTAZAPINE 7.5 MG/1
7.5 TABLET, FILM COATED ORAL
Qty: 30 TABLET | Refills: 2 | Status: SHIPPED | OUTPATIENT
Start: 2024-05-07

## 2024-05-07 RX ADMIN — KETOROLAC TROMETHAMINE 15 MG: 30 INJECTION, SOLUTION INTRAMUSCULAR; INTRAVENOUS at 18:11

## 2024-05-07 RX ADMIN — ONDANSETRON 4 MG: 2 INJECTION INTRAMUSCULAR; INTRAVENOUS at 18:12

## 2024-05-07 RX ADMIN — SODIUM CHLORIDE 1000 ML: 0.9 INJECTION, SOLUTION INTRAVENOUS at 18:08

## 2024-05-07 NOTE — ED NOTES
Patient ambulated to the restroom independently with a steady gait and back to assigned room.      Mercedes Gaxiola RN  05/07/24 1800

## 2024-05-07 NOTE — PSYCH
Virtual Regular Visit    Verification of patient location:    Patient is located in the following state in which I hold an active license [place]    Problem List Items Addressed This Visit     Bipolar II disorder (HCC) - Primary    Relevant Medications    gabapentin (Neurontin) 100 mg capsule    propranolol (INDERAL) 10 mg tablet    mirtazapine (REMERON) 7.5 MG tablet              Encounter provider ANGELA Vaughn    Provider located at    [facility]  [address] [facility] [place] [address] [phone]    Recent Visits  No visits were found meeting these conditions.  Showing recent visits within past 7 days and meeting all other requirements  Today's Visits  Date Type Provider Dept   05/07/24 Telemedicine ANGELA Vaughn  Psychiatric [facility]   Showing today's visits and meeting all other requirements  Future Appointments  No visits were found meeting these conditions.  Showing future appointments within next 150 days and meeting all other requirements           The patient was identified by name and date of birth. Patient was informed that this is a telemedicine visit and that the visit is being conducted throughthe Epic Embedded platform. She agrees to proceed..  My office door was closed. Luis Wilcox, BSN student, was also in the room with patient consent.  She acknowledged consent and understanding of privacy and security of the video platform. The patient has agreed to participate and understands they can discontinue the visit at any time.    Patient is aware this is a billable service.     HPI     Current Outpatient Medications   Medication Sig Dispense Refill   • gabapentin (Neurontin) 100 mg capsule Take 2 capsules (200 mg total) by mouth 3 (three) times a day 180 capsule 2   • mirtazapine (REMERON) 7.5 MG tablet Take 1 tablet (7.5 mg total) by mouth daily at bedtime 30 tablet 2   • propranolol (INDERAL) 10 mg tablet Take 1 tablet  (10 mg total) by mouth daily as needed (for anxiety) 30 tablet 0   • ergocalciferol (VITAMIN D2) 50,000 units TAKE ONE CAPSULE BY MOUTH WEEKLY 16 capsule 0   • ibuprofen (MOTRIN) 600 mg tablet TAKE ONE TABLET BY MOUTH EVERY EIGHT HOURS AS NEEDED for mild to moderate pain 30 tablet 0   • lamoTRIgine (LaMICtal) 200 MG tablet Take 1 tablet (200 mg total) by mouth daily 30 tablet 2   • Alisha 0.25-35 MG-MCG per tablet TAKE ONE TABLET BY MOUTH EVERY DAY 84 tablet 0     No current facility-administered medications for this visit.       Review of Systems  Video Exam    There were no vitals filed for this visit.    Physical Exam   As a result of this visit, I have referred the patient for further respiratory evaluation. No    I spent 25 minutes directly with the patient during this visit  VIRTUAL VISIT DISCLAIMER    Janell Solorzano acknowledges that she has consented to an online visit or consultation. She understands that the online visit is based solely on information provided by her, and that, in the absence of a face-to-face physical evaluation by the physician, the diagnosis she receives is both limited and provisional in terms of accuracy and completeness. This is not intended to replace a full medical face-to-face evaluation by the physician. Janell Solorzano understands and accepts these terms.    MEDICATION MANAGEMENT NOTE        [facility]    Name and Date of Birth:  Janell Solorzano 23 y.o. 2000 MRN: [id number]    Date of Visit: May 7, 2024    No Known Allergies    Visit Time    Visit Start Time: 1100  Visit Stop Time: 1125  Total Visit Duration:  25 minutes    SUBJECTIVE:    Janell is seen today for a follow up for Bipolar Disorder type II. She continues to experience ongoing symptoms since the last visit.  Janell seen today virtually for medication management follow-up.  She was last seen by this provider on 4/2/24.  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. She states that she was \"falling asleep in class, and that's how she knew it wasn't for her\".  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  She does not want to go back to school right now, stating that she had enough after 4 1/2 years of college. She reports she is moving back into her grandparents' house because her sister moved out of the home, and her mother is still inpatient getting ECT and her brother started doing drugs again.  She does not report having any feelings about this, but sounds angry, stating she is just \"waiting for him to die\".  She states she is having panic attacks 3x per week for the past month. Symptoms consist of inability to breathe, heart racing, chest pain, and inconsolable crying.  She states that she is not suicidal or homicidal currently, rates her depression 7/10 and anxiety 10/10. She is irritable during conversation. We did discuss again the differences between bipolar and borderline personality. We also discussed how it is possible to have both.  She is still refusing to take Lithium or depakote at this time. We will increase the gabapentin to 200mg PO TID.  She will also take propranolol 10mg PO daily PRN for anxiety/panic. She will follow up in one month and will call sooner if concerns or issues arise prior to scheduled appt.    Continue medications as ordered  Continue Lamictal to 200p.o. daily  Continue Remeron 7.5mg PO HS  Increase gabapentin 200mg PO TID PRN  Inititate Propranolol 10mg PO Daily PRN for anxiety  She will continue therapy  She will follow up with this provider in 1 month  She will call sooner with concerns or issues if they arise prior to scheduled appointment    Lamotrigine PARQ completed including dizziness, headaches, ataxia, vision problems, somnolence, sleep changes, cognitive difficulties, rash (including Becerril-Chato rash), and others, risk of " teratogenicity for females.     She denies any side effects from current psychiatric medications.    Aware of 24 hour and weekend coverage for urgent situations accessed by calling [facility] main practice number  Continue psychotherapy with therapist  Medication management every 4 weeks  Aware of need to follow up with family physician for medical issues    Diagnoses and all orders for this visit:    Bipolar II disorder (HCC)  -     gabapentin (Neurontin) 100 mg capsule; Take 2 capsules (200 mg total) by mouth 3 (three) times a day  -     propranolol (INDERAL) 10 mg tablet; Take 1 tablet (10 mg total) by mouth daily as needed (for anxiety)  -     mirtazapine (REMERON) 7.5 MG tablet; Take 1 tablet (7.5 mg total) by mouth daily at bedtime     Other atypical antipsychotics trialed and found to be ineffective:  Zyprexa, Abilify, Vraylar, and Latuda. Patient was also on Risperdal, but stopped because it was ineffective as well.   Sleep aids that have been ineffective:  Trazodone, Gabapentin, Hydroxyzine    Current Outpatient Medications on File Prior to Visit   Medication Sig Dispense Refill   • ergocalciferol (VITAMIN D2) 50,000 units TAKE ONE CAPSULE BY MOUTH WEEKLY 16 capsule 0   • ibuprofen (MOTRIN) 600 mg tablet TAKE ONE TABLET BY MOUTH EVERY EIGHT HOURS AS NEEDED for mild to moderate pain 30 tablet 0   • lamoTRIgine (LaMICtal) 200 MG tablet Take 1 tablet (200 mg total) by mouth daily 30 tablet 2   • Alisha 0.25-35 MG-MCG per tablet TAKE ONE TABLET BY MOUTH EVERY DAY 84 tablet 0   • [DISCONTINUED] gabapentin (NEURONTIN) 100 mg capsule TAKE ONE CAPSULE BY MOUTH THREE TIMES DAILY AS NEEDED for anxiety or depression 90 capsule 0   • [DISCONTINUED] mirtazapine (REMERON) 7.5 MG tablet Take 1 tablet (7.5 mg total) by mouth daily at bedtime 30 tablet 2     No current facility-administered medications on file prior to visit.       Psychotherapy Provided:     Individual psychotherapy provided:  Yes  Supportive counseling provided.  Medication changes discussed with Janell.  Medication education provided to Janell.  Discussed with Janell coping with job stress, social difficulties, and everyday stressors.   Coping strategies reviewed with Janell.   Importance of medication and treatment compliance reviewed with Janell.  Importance of follow up with family physician for medical issues reviewed with Janell.  Reassurance and supportive therapy provided.   Crisis/safety plan discussed with Janell. Patient will call prior to scheduled appointment if they have any issues or concerns.  Patient understands they can access the office by calling the main number at any time if they are in crisis.  They also understand they can call their Critical access hospital's crisis number or go to their nearest ED if suicidal ideation increases or if they develop a plan or intent.       HPI ROS Appetite Changes and Sleep:     She reports normal sleep, adequate appetite, adequate energy level. Denies homicidal ideation, denies suicidal ideation    Review Of Systems:      HPI ROS:               Medication Side Effects: denies    Depression (10 worst): 7/10 3-8/10   Anxiety (10 worst): 10/10 7/10   Safety concerns (SI, HI, etc): denies denies   Sleep: good good   Energy: adequate low   Appetite: adequate adequate     General normal    Personality no change in personality   Constitutional as noted in HPI   ENT negative   Cardiovascular negative   Respiratory negative   Gastrointestinal negative   Genitourinary negative   Musculoskeletal negative   Integumentary negative   Neurological negative   Endocrine negative   Other Symptoms none, all other systems are negative     Mental Status Evaluation:    Appearance Appropriately dressed and Good eye contact   Behavior calm and cooperative   Mood anxious, depressed, and irritable  Depression Scale - 7 of 10 (0 = No depression)  Anxiety Scale - 10 of 10 (0 = No anxiety)   Speech Normal rate and volume    Affect mood-congruent and labile   Thought Processes Goal directed and coherent   Thought Content Does not verbalize delusional material   Associations Tightly connected   Perceptual Disturbances Denies hallucinations and does not appear to be responding to internal stimuli   Risk Potential Suicidal/Homicidal Ideation - No evidence of suicidal or homicidal ideation and patient does not verbalize suicidal or homicidal ideation  Risk of Violence - No evidence of risk for violence found on assessment  Risk of Self Mutilation - No evidence of risk for self mutilation found on assessment   Orientation oriented to person, place, time/date, and situation   Memory recent and remote memory grossly intact   Consciousness alert and awake   Attention/Concentration attention span and concentration appear shorter than expected for age   Insight fair   Judgement fair   Muscle Strength and Gait normal muscle strength and normal muscle tone, normal gait/station and normal balance   Motor Activity no abnormal movements   Language no difficulty naming common objects, no difficulty repeating a phrase, no difficulty writing a sentence   Fund of Knowledge adequate knowledge of current events  adequate fund of knowledge regarding past history  adequate fund of knowledge regarding vocabulary      Past Psychiatric History Update:     Inpatient Psychiatric Admission Since Last Encounter:   no  Changes to Outpatient Psychiatric Treatment Team:    no  Suicide Attempt Or Self Mutilation Since Last Encounter:   no  Incidence of Violent Behavior Since Last Encounter:   no    Traumatic History Update:     New Onset of Abuse Since Last Encounter:   no  Traumatic Events Since Last Encounter:   no    Past Medical History:    Past Medical History:   Diagnosis Date   • Anxiety    • Concussion    • Depression    • Head injury     concussions in high school and in college   • Self-injurious behavior         Past Surgical History:   Procedure Laterality  Date   • WISDOM TOOTH EXTRACTION       No Known Allergies  Substance Abuse History:    Social History     Substance and Sexual Activity   Alcohol Use Yes   • Alcohol/week: 3.0 - 4.0 standard drinks of alcohol   • Types: 3 - 4 Shots of liquor per week    Comment: 3-4 drinks, 2 times per week     Social History     Substance and Sexual Activity   Drug Use No     Social History:    Social History     Socioeconomic History   • Marital status: Single     Spouse name: Not on file   • Number of children: 0   • Years of education: senior in college currently   • Highest education level: High school graduate   Occupational History   • 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   Tobacco Use   • Smoking status: Never   • Smokeless tobacco: Never   Vaping Use   • Vaping status: Never Used   Substance and Sexual Activity   • Alcohol use: Yes     Alcohol/week: 3.0 - 4.0 standard drinks of alcohol     Types: 3 - 4 Shots of liquor per week     Comment: 3-4 drinks, 2 times per week   • Drug use: No   • Sexual activity: Yes     Partners: Male     Birth control/protection: Condom Male   Other Topics Concern   • Not on file   Social History Narrative   • Not on file     Social Determinants of Health     Financial Resource Strain: Not on file   Food Insecurity: Not on file   Transportation Needs: Not on file   Physical Activity: Not on file   Stress: Not on file   Social Connections: Not on file   Intimate Partner Violence: Not on file   Housing Stability: Not on file     Family Psychiatric History:     Family History   Problem Relation Age of Onset   • Hypertension Mother    • Mental illness Mother    • Depression Mother    • Mental illness Sister    • Coronary artery disease Maternal Grandmother    • Throat cancer Paternal Grandfather      History Review:The following portions of the patient's history were reviewed and updated as appropriate: allergies, current medications, past family history, past medical history, past social  history, past surgical history, and problem list     OBJECTIVE:     Vital signs in last 24 hours:    There were no vitals filed for this visit.  Laboratory Results: Recent Labs (last 2 months):   Transcribe Orders on 04/16/2024   Component Date Value   • Mumps IgG 04/16/2024 IMMUNE    • Rubeola IgG 04/16/2024 IMMUNE    • Rubella IgG Quant 04/16/2024 65.7    • Varicella IgG 04/16/2024 NON-IMMUNE (A)    • QFT Nil 04/16/2024 0.03    • QFT TB1-NIL 04/16/2024 0.01    • QFT TB2-NIL 04/16/2024 0.03    • QFT Mitogen-NIL 04/16/2024 9.97    • QFT Final Interpretation 04/16/2024 Negative    Office Visit on 04/11/2024   Component Date Value   • LEUKOCYTE ESTERASE,UA 04/11/2024 -    • NITRITE,UA 04/11/2024 -    • SL AMB POCT UROBILINOGEN 04/11/2024 -    • POCT URINE PROTEIN 04/11/2024 +    •  PH,UA 04/11/2024 6.0    • BLOOD,UA 04/11/2024 +    • SPECIFIC GRAVITY,UA 04/11/2024 1.015    • KETONES,UA 04/11/2024 -    • BILIRUBIN,UA 04/11/2024 -    • GLUCOSE, UA 04/11/2024 -    •  COLOR,UA 04/11/2024 yellow    • CLARITY,UA 04/11/2024 cloudy    • Urine Culture 04/11/2024 80,000-89,000 cfu/ml Enterococcus faecalis (A)    • URINE HCG 04/11/2024 neg    • N gonorrhoeae, DNA Probe 04/11/2024 Negative    • Chlamydia trachomatis, D* 04/11/2024 Negative    • Bacterial Vaginosis 04/11/2024 Negative    • Candida species 04/11/2024 Positive (A)    • Candida glabrata 04/11/2024 Negative    • Jacqui krusei 04/11/2024 Negative    • Trichomonas vaginalis 04/11/2024 Negative      I have personally reviewed all pertinent laboratory/tests results.    Suicide/Homicide Risk Assessment:    Risk of Harm to Self:  The following ratings are based on assessment at the time of the interview  Recent Specific Risk Factors include: current depressive symptoms, current anxiety symptoms, unstable mood, worries about finances or work  Demographic risk factors include: , age: young adult (15-24)  Historical Risk Factors include: chronic depression, chronic  anxiety symptoms, chronic mood disorder, history of suicide attempts, history of impulsive behaviors, history of traumatic experiences  Protective Factors: no current suicidal ideation, access to mental health treatment, compliant with medications, compliant with mental health treatment, having a desire to be alive, stable living environment, stable job, sense of determination, supportive family  Based on today's assessment, Janell presents the following risk of harm to self: low    The following interventions are recommended: no intervention changes needed. Although patient's acute lethality risk is LOW, long-term/chronic lethality risk is mildly elevated given chronic mental health symptoms.  Janell Solorzano is future-oriented, forward-thinking, and demonstrates ability to act in a self-preserving manner as evidenced by volitionally presenting to the clinic today, seeking treatment.  At this time, inpatient hospitalization is not currently warranted. To mitigate future risk, patient should adhere to treatment recommendations, avoid alcohol/illicit substance use, utilize community-based resources and familiar support, and prioritize mental health treatment.      Based on today's assessment and clinical criteria, Janell Solorzano contracts for safety and is not an imminent risk of harm to self or others. Outpatient level of care is deemed appropriate at this present time.  Janell Solorzano understands that if they are no longer able to contract for safety, they need to call/contact the outpatient office including this writer, call/contact crisis and/or attend to the nearest Emergency Department for immediate evaluation.      Risk of Harm to Others:  The following ratings are based on assessment at the time of the interview  Protective Factors: no current homicidal ideation  Based on today's assessment, Janell presents the following risk of harm to others: none    The following interventions are recommended: contracts for  safety at present - agrees to go to ED if feeling unsafe, return in 4 weeks for reassessment, therapy appointment in 1 day, contracts for safety at present - agrees to call Crisis Intervention Service if feeling unsafe    Medications Risks/Benefits:      Risks, Benefits And Possible Side Effects Of Medications:    Discussed risks and benefits of treatment with patient including risk of suicidality, serotonin syndrome, increased QTc interval and SIADH related to treatment with antidepressants; Risk of induction of manic symptoms in certain patient populations, risk of parkinsonian symptoms, metabolic syndrome, tardive dyskinesia and neuroleptic malignant syndrome related to treatment with antipsychotic medications, and risk of rash related to treatment with Lamictal     Controlled Medication Discussion:     Not applicable    Treatment Plan:    Due for update/Updated:   no  Last treatment plan done 1/11/24 by Michela Bates LCSW.  Treatment Plan due on 7/11/24.    ANGELA Vaughn 05/07/24    This note was not shared with the patient due to reasonable likelihood of causing patient harm

## 2024-05-08 ENCOUNTER — TELEMEDICINE (OUTPATIENT)
Dept: BEHAVIORAL/MENTAL HEALTH CLINIC | Facility: CLINIC | Age: 24
End: 2024-05-08

## 2024-05-08 DIAGNOSIS — F31.81 BIPOLAR II DISORDER (HCC): Primary | ICD-10-CM

## 2024-05-08 NOTE — ED PROVIDER NOTES
History  Chief Complaint   Patient presents with    Vaginal Bleeding     Vaginal bleeding x 2 days. Pt also with nausea for a few weeks     23-year-old female presents emergency department for evaluation of vaginal bleeding.  Patient had 2 days of spotting/passing some clots.  Lighter than periods but she notes that she does not usually get periods because she takes birth control.  She has had also some cramping left lower quadrant/pelvic pain as well.  No urinary symptoms no blood in stool some nausea no vomiting no fevers chills chest pain palpitations or shortness of breath.        Prior to Admission Medications   Prescriptions Last Dose Informant Patient Reported? Taking?   Alisha 0.25-35 MG-MCG per tablet   No No   Sig: TAKE ONE TABLET BY MOUTH EVERY DAY   ergocalciferol (VITAMIN D2) 50,000 units   No No   Sig: TAKE ONE CAPSULE BY MOUTH WEEKLY   gabapentin (Neurontin) 100 mg capsule   No No   Sig: Take 2 capsules (200 mg total) by mouth 3 (three) times a day   ibuprofen (MOTRIN) 600 mg tablet   No No   Sig: TAKE ONE TABLET BY MOUTH EVERY EIGHT HOURS AS NEEDED for mild to moderate pain   lamoTRIgine (LaMICtal) 200 MG tablet   No No   Sig: Take 1 tablet (200 mg total) by mouth daily   mirtazapine (REMERON) 7.5 MG tablet   No No   Sig: Take 1 tablet (7.5 mg total) by mouth daily at bedtime   propranolol (INDERAL) 10 mg tablet   No No   Sig: Take 1 tablet (10 mg total) by mouth daily as needed (for anxiety)      Facility-Administered Medications: None       Past Medical History:   Diagnosis Date    Anxiety     Concussion     Depression     Head injury     concussions in high school and in college    Self-injurious behavior        Past Surgical History:   Procedure Laterality Date    WISDOM TOOTH EXTRACTION         Family History   Problem Relation Age of Onset    Hypertension Mother     Mental illness Mother     Depression Mother     Mental illness Sister     Coronary artery disease Maternal Grandmother     Throat  cancer Paternal Grandfather      I have reviewed and agree with the history as documented.    E-Cigarette/Vaping    E-Cigarette Use Never User      E-Cigarette/Vaping Substances    Nicotine No     THC No     CBD No     Flavoring No     Other No     Unknown No      Social History     Tobacco Use    Smoking status: Never    Smokeless tobacco: Never   Vaping Use    Vaping status: Never Used   Substance Use Topics    Alcohol use: Yes     Alcohol/week: 3.0 - 4.0 standard drinks of alcohol     Types: 3 - 4 Shots of liquor per week     Comment: 3-4 drinks, 2 times per week    Drug use: No       Review of Systems   Constitutional:  Negative for appetite change, chills, fatigue and fever.   HENT:  Negative for sneezing and sore throat.    Eyes:  Negative for visual disturbance.   Respiratory:  Negative for cough, choking, chest tightness, shortness of breath and wheezing.    Cardiovascular:  Negative for chest pain and palpitations.   Gastrointestinal:  Positive for nausea. Negative for abdominal pain, constipation, diarrhea and vomiting.   Genitourinary:  Positive for pelvic pain and vaginal bleeding. Negative for difficulty urinating and dysuria.   Neurological:  Negative for dizziness, weakness, light-headedness, numbness and headaches.   All other systems reviewed and are negative.      Physical Exam  Physical Exam  Vitals and nursing note reviewed.   Constitutional:       General: She is not in acute distress.     Appearance: She is well-developed. She is not diaphoretic.   HENT:      Head: Normocephalic and atraumatic.   Eyes:      Pupils: Pupils are equal, round, and reactive to light.   Neck:      Vascular: No JVD.      Trachea: No tracheal deviation.   Cardiovascular:      Rate and Rhythm: Normal rate and regular rhythm.      Heart sounds: Normal heart sounds. No murmur heard.     No friction rub. No gallop.   Pulmonary:      Effort: Pulmonary effort is normal. No respiratory distress.      Breath sounds: Normal  breath sounds. No wheezing or rales.   Abdominal:      General: Bowel sounds are normal. There is no distension.      Palpations: Abdomen is soft.      Tenderness: There is no abdominal tenderness. There is no guarding or rebound.   Skin:     General: Skin is warm and dry.      Coloration: Skin is not pale.   Neurological:      Mental Status: She is alert and oriented to person, place, and time.      Cranial Nerves: No cranial nerve deficit.      Motor: No abnormal muscle tone.   Psychiatric:         Behavior: Behavior normal.         Vital Signs  ED Triage Vitals   Temperature Pulse Respirations Blood Pressure SpO2   05/07/24 1646 05/07/24 1646 05/07/24 1646 05/07/24 1646 05/07/24 1646   97.8 °F (36.6 °C) (!) 118 19 145/90 97 %      Temp Source Heart Rate Source Patient Position - Orthostatic VS BP Location FiO2 (%)   05/07/24 1646 05/07/24 1646 05/07/24 1646 05/07/24 1646 --   Temporal Monitor Sitting Left arm       Pain Score       05/07/24 1808       6           Vitals:    05/07/24 1646 05/07/24 1930 05/07/24 2151   BP: 145/90 137/86 130/82   Pulse: (!) 118 104 100   Patient Position - Orthostatic VS: Sitting Sitting Sitting         Visual Acuity  Visual Acuity      Flowsheet Row Most Recent Value   L Pupil Size (mm) 3   R Pupil Size (mm) 3            ED Medications  Medications   ketorolac (TORADOL) injection 15 mg (15 mg Intravenous Given 5/7/24 1811)   ondansetron (ZOFRAN) injection 4 mg (4 mg Intravenous Given 5/7/24 1812)   sodium chloride 0.9 % bolus 1,000 mL (0 mL Intravenous Stopped 5/7/24 2008)       Diagnostic Studies  Results Reviewed       Procedure Component Value Units Date/Time    Urine Microscopic [[id number]]  (Normal) Collected: 05/07/24 1806    Lab Status: Final result Specimen: Urine, Clean Catch Updated: 05/07/24 1819     RBC, UA 1-2 /hpf      WBC, UA None Seen /hpf      Epithelial Cells Occasional /hpf      Bacteria, UA Occasional /hpf     POCT pregnancy, urine [[id number]]  (Normal)  Resulted: 05/07/24 1802    Lab Status: Final result Specimen: Urine Updated: 05/07/24 1812     EXT Preg Test, Ur Negative     Control Valid    UA w Reflex to Microscopic w Reflex to Culture [[id number]]  (Abnormal) Collected: 05/07/24 1806    Lab Status: Final result Specimen: Urine, Clean Catch Updated: 05/07/24 1812     Color, UA Light Yellow     Clarity, UA Turbid     Specific Gravity, UA 1.022     pH, UA 7.0     Leukocytes, UA Negative     Nitrite, UA Negative     Protein, UA Negative mg/dl      Glucose, UA Negative mg/dl      Ketones, UA Negative mg/dl      Urobilinogen, UA <2.0 mg/dl      Bilirubin, UA Negative     Occult Blood, UA Trace    Comprehensive metabolic panel [[id number]]  (Abnormal) Collected: 05/07/24 1711    Lab Status: Final result Specimen: Blood from Arm, Right Updated: 05/07/24 1733     Sodium 138 mmol/L      Potassium 3.3 mmol/L      Chloride 105 mmol/L      CO2 23 mmol/L      ANION GAP 10 mmol/L      BUN 19 mg/dL      Creatinine 0.92 mg/dL      Glucose 100 mg/dL      Calcium 9.2 mg/dL      AST 21 U/L      ALT 13 U/L      Alkaline Phosphatase 46 U/L      Total Protein 6.8 g/dL      Albumin 4.1 g/dL      Total Bilirubin 0.49 mg/dL      eGFR 88 ml/min/1.73sq m     Narrative:      National Kidney Disease Foundation guidelines for Chronic Kidney Disease (CKD):     Stage 1 with normal or high GFR (GFR > 90 mL/min/1.73 square meters)    Stage 2 Mild CKD (GFR = 60-89 mL/min/1.73 square meters)    Stage 3A Moderate CKD (GFR = 45-59 mL/min/1.73 square meters)    Stage 3B Moderate CKD (GFR = 30-44 mL/min/1.73 square meters)    Stage 4 Severe CKD (GFR = 15-29 mL/min/1.73 square meters)    Stage 5 End Stage CKD (GFR <15 mL/min/1.73 square meters)  Note: GFR calculation is accurate only with a steady state creatinine    Lipase [[id number]]  (Normal) Collected: 05/07/24 1711    Lab Status: Final result Specimen: Blood from Arm, Right Updated: 05/07/24 1733     Lipase 52 u/L     Protime-INR [[id number]]   (Normal) Collected: 05/07/24 1711    Lab Status: Final result Specimen: Blood from Arm, Right Updated: 05/07/24 1732     Protime 14.2 seconds      INR 1.04    CBC and differential [[id number]] Collected: 05/07/24 1711    Lab Status: Final result Specimen: Blood from Arm, Right Updated: 05/07/24 1716     WBC 8.37 Thousand/uL      RBC 4.61 Million/uL      Hemoglobin 13.9 g/dL      Hematocrit 40.9 %      MCV 89 fL      MCH 30.2 pg      MCHC 34.0 g/dL      RDW 11.9 %      MPV 10.1 fL      Platelets 268 Thousands/uL      nRBC 0 /100 WBCs      Segmented % 67 %      Immature Grans % 0 %      Lymphocytes % 25 %      Monocytes % 7 %      Eosinophils Relative 0 %      Basophils Relative 1 %      Absolute Neutrophils 5.57 Thousands/µL      Absolute Immature Grans 0.02 Thousand/uL      Absolute Lymphocytes 2.13 Thousands/µL      Absolute Monocytes 0.57 Thousand/µL      Eosinophils Absolute 0.03 Thousand/µL      Basophils Absolute 0.05 Thousands/µL                    US pelvis complete w transvaginal   Final Result by Sandhya Kc MD (05/07 2122)      No evidence of ovarian torsion.      Uterus, endometrium and ovaries within normal limits.                     Workstation performed: [id number]                    Procedures  Procedures         ED Course                               SBIRT 20yo+      Flowsheet Row Most Recent Value   Initial Alcohol Screen: US AUDIT-C     1. How often do you have a drink containing alcohol? 1 Filed at: 05/07/2024 2151   2. How many drinks containing alcohol do you have on a typical day you are drinking?  1 Filed at: 05/07/2024 2151   3b. FEMALE Any Age, or MALE 65+: How often do you have 4 or more drinks on one occassion? 0 Filed at: 05/07/2024 2151   Audit-C Score 2 Filed at: 05/07/2024 2151   RODRIGO: How many times in the past year have you...    Used an illegal drug or used a prescription medication for non-medical reasons? Never Filed at: 05/07/2024 2151                      Medical Decision  Making  20-year-old female with vaginal bleeding will check H&H, pride, pelvic ultrasound, reassess, likely refer to OB/GYN.    Amount and/or Complexity of Data Reviewed  Labs: ordered.  Radiology: ordered.    Risk  Prescription drug management.             Disposition  Final diagnoses:   Vaginal bleeding     Time reflects when diagnosis was documented in both MDM as applicable and the Disposition within this note       Time User Action Codes Description Comment    5/7/2024  9:32 PM Chandler Awan Add [N93.9] Vaginal bleeding           ED Disposition       ED Disposition   Discharge    Condition   Stable    Date/Time   Tue May 7, 2024 2132    Comment   Janell Solorzano discharge to home/self care.                   Follow-up Information       Follow up With Specialties Details Why Contact Info Additional Information    [facility]   [address] [facility] [address]  [phone] [facility] [address], [place], [address]     [phone]    [facility]   [address] [address] [phone]               Discharge Medication List as of 5/7/2024  9:34 PM        START taking these medications    Details   !! ibuprofen (MOTRIN) 800 mg tablet Take 1 tablet (800 mg total) by mouth 3 (three) times a day, Starting Tue 5/7/2024, Normal       !! - Potential duplicate medications found. Please discuss with provider.        CONTINUE these medications which have NOT CHANGED    Details   ergocalciferol (VITAMIN D2) 50,000 units TAKE ONE CAPSULE BY MOUTH WEEKLY, Starting Sat 12/9/2023, Normal      gabapentin (Neurontin) 100 mg capsule Take 2 capsules (200 mg total) by mouth 3 (three) times a day, Starting Tue 5/7/2024, Normal      !! ibuprofen (MOTRIN) 600 mg tablet TAKE ONE TABLET BY MOUTH EVERY EIGHT HOURS AS NEEDED for mild  to moderate pain, Normal      lamoTRIgine (LaMICtal) 200 MG tablet Take 1 tablet (200 mg total) by mouth daily, Starting Tue 3/19/2024, Normal      Alisha 0.25-35 MG-MCG per tablet TAKE ONE TABLET BY MOUTH EVERY DAY, Starting Mon 4/8/2024, Normal      mirtazapine (REMERON) 7.5 MG tablet Take 1 tablet (7.5 mg total) by mouth daily at bedtime, Starting Tue 5/7/2024, Normal      propranolol (INDERAL) 10 mg tablet Take 1 tablet (10 mg total) by mouth daily as needed (for anxiety), Starting Tue 5/7/2024, Normal       !! - Potential duplicate medications found. Please discuss with provider.          No discharge procedures on file.    PDMP Review         Value Time User    PDMP Reviewed  Yes 1/26/2022 10:52 AM ANGELA Vaughn            ED Provider  Electronically Signed by             Chandler Awan MD  05/08/24 0336

## 2024-05-08 NOTE — PSYCH
Virtual Regular Visit    Verification of patient location:    Patient is located at Home in the following state in which I hold an active license [place]      Assessment/Plan:    Problem List Items Addressed This Visit       Bipolar II disorder (HCC) - Primary       Goals addressed in session: Goal 1          Reason for visit is   Chief Complaint   Patient presents with    Virtual Regular Visit          Encounter provider Michela Bates LCSW      Recent Visits  No visits were found meeting these conditions.  Showing recent visits within past 7 days and meeting all other requirements  Today's Visits  Date Type Provider Dept   05/08/24 Telemedicine Michela Bates LCSW [facility] Therapist [place]   Showing today's visits and meeting all other requirements  Future Appointments  No visits were found meeting these conditions.  Showing future appointments within next 150 days and meeting all other requirements       The patient was identified by name and date of birth. Janell Solorzano was informed that this is a telemedicine visit and that the visit is being conducted throughthe Epic Embedded platform. She agrees to proceed..  My office door was closed. No one else was in the room.  She acknowledged consent and understanding of privacy and security of the video platform. The patient has agreed to participate and understands they can discontinue the visit at any time.    Patient is aware this is a billable service.     Subjective  Janell Solorzano is a 23 y.o. female . .      HPI     Past Medical History:   Diagnosis Date    Anxiety     Concussion     Depression     Head injury     concussions in high school and in college    Self-injurious behavior        Past Surgical History:   Procedure Laterality Date    WISDOM TOOTH EXTRACTION         Current Outpatient Medications   Medication Sig Dispense Refill    ergocalciferol (VITAMIN D2) 50,000 units TAKE ONE CAPSULE BY MOUTH WEEKLY 16 capsule 0    gabapentin (Neurontin) 100 mg  capsule Take 2 capsules (200 mg total) by mouth 3 (three) times a day 180 capsule 2    ibuprofen (MOTRIN) 600 mg tablet TAKE ONE TABLET BY MOUTH EVERY EIGHT HOURS AS NEEDED for mild to moderate pain 30 tablet 0    ibuprofen (MOTRIN) 800 mg tablet Take 1 tablet (800 mg total) by mouth 3 (three) times a day 21 tablet 0    lamoTRIgine (LaMICtal) 200 MG tablet Take 1 tablet (200 mg total) by mouth daily 30 tablet 2    Alisha 0.25-35 MG-MCG per tablet TAKE ONE TABLET BY MOUTH EVERY DAY 84 tablet 0    mirtazapine (REMERON) 7.5 MG tablet Take 1 tablet (7.5 mg total) by mouth daily at bedtime 30 tablet 2    propranolol (INDERAL) 10 mg tablet Take 1 tablet (10 mg total) by mouth daily as needed (for anxiety) 30 tablet 0     No current facility-administered medications for this visit.        No Known Allergies    Review of Systems    Video Exam    There were no vitals filed for this visit.    Physical Exam     Behavioral Health Psychotherapy Progress Note    Psychotherapy Provided: Individual Psychotherapy     1. Bipolar II disorder (HCC)            Goals addressed in session: Goal 1     DATA: Janell discussed quitting her schooling.  Provider encouraged discussion, and we addressed her thoughts and feelings.  We discussed self talk, when she was able to say she wanted to give it a chance, and we addressed her decision making to discontinue.  She acknowledged wanting to prove that she can do it but getting overwhelmed with everything. We discussed the process for Janell in decision making, and how in some ways she sets herself up.  Janell acknowledged this.  We addressed her decision to stay at her current job and we explored reasons for this decision.  We discussed that her psychiatric provider Jacque Alejandro and I did speak, as Janell and I had talked about at our last session.  Janell noted that she just saw Jacque and is aware of it.  Through questioning she stated that she learned that she is bipolar and that she just wanted  to know.  She stated they reviewed her diagnosis of both bipolar and borderline personality disorder.  She stated that she has been having increased anxiety and was given additional medications accordingly.  She stated they discussed the DBT related therapeutic content. Through our discussion Janell acknowledged increased anxiety related to everything that is going on.  She discussed interaction with another man at work, who was a friend of the previous jer at work.  We are referring to the first jer is #1 and the second jer is #2.  We addressed her choices to engage with them, including sexual contact.  We addressed the risks without protection.  We addressed the emotional aspects of sexual intimacy, and how this can give the illusion of emotional intimacy.  She addressed ways [employer] is talking to her, and we addressed her feelings about it.  We addressed ways Janell understands her choices are not always in her best interest, and explored the emotional reasons for doing so. Provider encouraged Janell to recognize that she has choices, and with whatever choices she makes, to go into those choices with intention, awareness and purpose.  Janell discussed having to leave her home because her sister is selling and moving in with her boyfriend, so that Janell will now be living with her grandmother.  She stated that she does not know exactly the parameters, because there are several people living in the home, and anticipating her mother to move back after her hospitalization.  She stated after this session she will be seeing her grandmother to address all of this.  At times Janell was tearful and we addressed her feelings. Provider encouraged Janell and her ability to manage her emotions through our sessions. We discussed introducing the DBT type exercises into her routine.  We agreed to start doing that in our upcoming sessions.  We confirmed and set additional sessions.  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    [Provider read recent case notes from Jacque Alejandro; content comprehensive and consistent in relation to today's session; encounter form sent virtually to Janell after session]     During this session, this clinician used the following therapeutic modalities: Cognitive Behavioral Therapy, Mindfulness-based Strategies, Motivational Interviewing, Solution-Focused Therapy, and Supportive Psychotherapy    Substance Abuse was not addressed during this session. If the client is diagnosed with a co-occurring substance use disorder, please indicate any changes in the frequency or amount of use: not discussed. Stage of change for addressing substance use diagnoses: Contemplation    ASSESSMENT:  Janell Solorzano presents with a Euthymic/ normal and Depressed mood.     her affect is Normal range and intensity, which is congruent, with her mood and the content of the session. The client has made progress on their goals.    Janell was tolerant and invested in the session Janell Solorzano presents with a low risk of suicide, low risk of self-harm, and low risk of harm to others.    For any risk assessment that surpasses a \"low\" rating, a safety plan must be developed.    A safety plan was indicated: no  If yes, describe in detail n/a    PLAN: Between sessions, Janell Solorzano will continue to consider choices with relationships. At the next session, the therapist will use Cognitive Behavioral Therapy and Supportive Psychotherapy to address mood stability and anxiety management.    Behavioral Health Treatment Plan and Discharge Planning: Janell Solorzano is aware of and agrees to continue to work on their treatment plan. They have identified and are working toward their discharge goals. yes    Visit start and stop times:    05/08/24  Start Time: 0800  Stop Time: 0852  Total Visit Time: 52 minutes    "

## 2024-05-09 ENCOUNTER — OFFICE VISIT (OUTPATIENT)
Dept: FAMILY MEDICINE CLINIC | Facility: CLINIC | Age: 24
End: 2024-05-09
Payer: COMMERCIAL

## 2024-05-09 VITALS
DIASTOLIC BLOOD PRESSURE: 70 MMHG | BODY MASS INDEX: 24.41 KG/M2 | WEIGHT: 137.8 LBS | OXYGEN SATURATION: 97 % | SYSTOLIC BLOOD PRESSURE: 110 MMHG | HEART RATE: 86 BPM | HEIGHT: 63 IN

## 2024-05-09 DIAGNOSIS — R10.30 LOWER ABDOMINAL PAIN: ICD-10-CM

## 2024-05-09 DIAGNOSIS — N93.9 VAGINA BLEEDING: Primary | ICD-10-CM

## 2024-05-09 LAB
SL AMB  POCT GLUCOSE, UA: ABNORMAL
SL AMB LEUKOCYTE ESTERASE,UA: ABNORMAL
SL AMB POCT BILIRUBIN,UA: ABNORMAL
SL AMB POCT BLOOD,UA: ABNORMAL
SL AMB POCT CLARITY,UA: CLEAR
SL AMB POCT COLOR,UA: ABNORMAL
SL AMB POCT KETONES,UA: ABNORMAL
SL AMB POCT NITRITE,UA: ABNORMAL
SL AMB POCT PH,UA: 6
SL AMB POCT SPECIFIC GRAVITY,UA: 1.01
SL AMB POCT URINE PROTEIN: ABNORMAL
SL AMB POCT UROBILINOGEN: ABNORMAL

## 2024-05-09 PROCEDURE — 81002 URINALYSIS NONAUTO W/O SCOPE: CPT | Performed by: NURSE PRACTITIONER

## 2024-05-09 PROCEDURE — 87491 CHLMYD TRACH DNA AMP PROBE: CPT | Performed by: NURSE PRACTITIONER

## 2024-05-09 PROCEDURE — 99214 OFFICE O/P EST MOD 30 MIN: CPT | Performed by: NURSE PRACTITIONER

## 2024-05-09 PROCEDURE — 87086 URINE CULTURE/COLONY COUNT: CPT | Performed by: NURSE PRACTITIONER

## 2024-05-09 PROCEDURE — 87591 N.GONORRHOEAE DNA AMP PROB: CPT | Performed by: NURSE PRACTITIONER

## 2024-05-09 NOTE — PROGRESS NOTES
Name: Janell Solorzano      : 2000      MRN: [id number]  Encounter Provider: ANGELA [facility]  Encounter Date: 2024   Encounter department: [place] [id number] [address]    Assessment & Plan     1. Lower abdominal pain  Comments:  Obtain CT abdomen pelvis.  Orders:  -     CT abdomen pelvis w wo contrast; Future; Expected date: 2024  -     Chlamydia/GC amplified DNA by PCR; Future  -     POCT urine dip  -     Urine culture    2. Vagina bleeding  Comments:  Patient did have pelvic ultrasound in the ER which showed no abnormalities.  Advised to monitor for increases.  Sent for CT scan and urine testing.  Orders:  -     Chlamydia/GC amplified DNA by PCR; Future  -     POCT urine dip  -     Urine culture           Subjective      Patient presents for concerns of lower abdominal pain and vaginal bleeding.  FDLMP- 2-3 weeks ago.  She did miss 1 to 2 days of her OCP.  Started 1 week ago. Rates the pain is 11/10 when it happens, happens 2-3 times a day. She has been passing blood clots the past 4 days. 1 new partner with no protection.The pain feels like squeezing pain.   She has been on this ocp for several years without issue.       Review of Systems   Constitutional:  Positive for fatigue. Negative for chills, diaphoresis and fever.   HENT:  Negative for ear pain and sore throat.    Eyes:  Negative for pain and visual disturbance.   Respiratory:  Positive for shortness of breath. Negative for cough.    Cardiovascular:  Positive for palpitations. Negative for chest pain.   Gastrointestinal:  Positive for abdominal distention, abdominal pain and nausea. Negative for anal bleeding, blood in stool, constipation, diarrhea and vomiting.   Genitourinary:  Positive for menstrual problem, pelvic pain and vaginal bleeding. Negative for dysuria, frequency, hematuria, urgency, vaginal discharge and vaginal pain.   Musculoskeletal:  Negative for arthralgias and back pain.   Skin:   "Negative for color change and rash.   Neurological:  Negative for dizziness, light-headedness and headaches.   All other systems reviewed and are negative.      Current Outpatient Medications on File Prior to Visit   Medication Sig    ergocalciferol (VITAMIN D2) 50,000 units TAKE ONE CAPSULE BY MOUTH WEEKLY    gabapentin (Neurontin) 100 mg capsule Take 2 capsules (200 mg total) by mouth 3 (three) times a day    ibuprofen (MOTRIN) 600 mg tablet TAKE ONE TABLET BY MOUTH EVERY EIGHT HOURS AS NEEDED for mild to moderate pain    ibuprofen (MOTRIN) 800 mg tablet Take 1 tablet (800 mg total) by mouth 3 (three) times a day    lamoTRIgine (LaMICtal) 200 MG tablet Take 1 tablet (200 mg total) by mouth daily    Alisha 0.25-35 MG-MCG per tablet TAKE ONE TABLET BY MOUTH EVERY DAY    mirtazapine (REMERON) 7.5 MG tablet Take 1 tablet (7.5 mg total) by mouth daily at bedtime    propranolol (INDERAL) 10 mg tablet Take 1 tablet (10 mg total) by mouth daily as needed (for anxiety)       Objective     /70   Pulse 86   Ht 5' 3\" (1.6 m)   Wt 62.5 kg (137 lb 12.8 oz)   LMP 04/23/2024 (Approximate)   SpO2 97%   BMI 24.41 kg/m²     Physical Exam  Constitutional:       Appearance: She is well-developed.   Cardiovascular:      Rate and Rhythm: Normal rate and regular rhythm.      Heart sounds: Normal heart sounds. No murmur heard.  Pulmonary:      Effort: Pulmonary effort is normal. No respiratory distress.      Breath sounds: Normal breath sounds.   Abdominal:      Tenderness: There is generalized abdominal tenderness and tenderness in the right lower quadrant.   Skin:     General: Skin is warm and dry.   Neurological:      Mental Status: She is alert and oriented to person, place, and time.       ANEGLA Arthur    "

## 2024-05-10 ENCOUNTER — TELEPHONE (OUTPATIENT)
Age: 24
End: 2024-05-10

## 2024-05-10 NOTE — TELEPHONE ENCOUNTER
Writer spoke to patient in regards to scheduling appointment for testing patient was scheduled for July 15th,2024 at 11 am -1 pm. With      Patient was then removed from the wait list due to being scheduled.

## 2024-05-16 ENCOUNTER — TELEMEDICINE (OUTPATIENT)
Dept: BEHAVIORAL/MENTAL HEALTH CLINIC | Facility: CLINIC | Age: 24
End: 2024-05-16

## 2024-05-16 DIAGNOSIS — F31.81 BIPOLAR II DISORDER (HCC): Primary | ICD-10-CM

## 2024-05-16 NOTE — PSYCH
Virtual Regular Visit    Verification of patient location:    Patient is located at Home in the following state in which I hold an active license [place]      Assessment/Plan:    Problem List Items Addressed This Visit       Bipolar II disorder (HCC) - Primary       Goals addressed in session: Goal 1          Reason for visit is   Chief Complaint   Patient presents with    Virtual Regular Visit          Encounter provider Michela Bates LCSW      Recent Visits  Date Type Provider Dept   05/09/24 Office Visit [facility] [address] [facility]   Showing recent visits within past 7 days and meeting all other requirements  Today's Visits  Date Type Provider Dept   05/16/24 Telemedicine Michela Bates LCSW [facility] [place]   Showing today's visits and meeting all other requirements  Future Appointments  No visits were found meeting these conditions.  Showing future appointments within next 150 days and meeting all other requirements       The patient was identified by name and date of birth. Janell Solorzano was informed that this is a telemedicine visit and that the visit is being conducted throughthe Epic Embedded platform. She agrees to proceed..  My office door was closed. No one else was in the room.  She acknowledged consent and understanding of privacy and security of the video platform. The patient has agreed to participate and understands they can discontinue the visit at any time.    Patient is aware this is a billable service.     Subjective  Janell Solorzano is a 23 y.o. female . .      HPI     Past Medical History:   Diagnosis Date    Anxiety     Concussion     Depression     Head injury     concussions in high school and in college    Self-injurious behavior        Past Surgical History:   Procedure Laterality Date    WISDOM TOOTH EXTRACTION         Current Outpatient Medications   Medication Sig Dispense Refill    ergocalciferol (VITAMIN D2) 50,000 units TAKE ONE CAPSULE BY  "MOUTH WEEKLY 16 capsule 0    gabapentin (Neurontin) 100 mg capsule Take 2 capsules (200 mg total) by mouth 3 (three) times a day 180 capsule 2    ibuprofen (MOTRIN) 600 mg tablet TAKE ONE TABLET BY MOUTH EVERY EIGHT HOURS AS NEEDED for mild to moderate pain 30 tablet 0    ibuprofen (MOTRIN) 800 mg tablet Take 1 tablet (800 mg total) by mouth 3 (three) times a day 21 tablet 0    lamoTRIgine (LaMICtal) 200 MG tablet Take 1 tablet (200 mg total) by mouth daily 30 tablet 2    Alisha 0.25-35 MG-MCG per tablet TAKE ONE TABLET BY MOUTH EVERY DAY 84 tablet 0    mirtazapine (REMERON) 7.5 MG tablet Take 1 tablet (7.5 mg total) by mouth daily at bedtime 30 tablet 2    propranolol (INDERAL) 10 mg tablet Take 1 tablet (10 mg total) by mouth daily as needed (for anxiety) 30 tablet 0     No current facility-administered medications for this visit.        No Known Allergies    Review of Systems    Video Exam    There were no vitals filed for this visit.    Physical Exam     Behavioral Health Psychotherapy Progress Note    Psychotherapy Provided: Individual Psychotherapy     1. Bipolar II disorder (HCC)            Goals addressed in session: Goal 1     DATA: Janell stated that her mother is out of the hospital.  She discussed the interactions between them and feelings and emotions that were triggered.  She discussed that her job has been going okay overall, and she is managing interactions with #2. Janell also mentioned having an upcoming CT scan to rule out an appendix issue.  We discussed this for a bit.  She also discussed being linked for neurological testing for ADD.  We discussed this and the potential therapeutic benefits in getting further clarification.  Janell proudly stated that she has not had any \"episodes\" in the past 2 weeks, which she clarified to mean any urgent tendency to lash out physically (and referencing a recent incident of punching a wall).  We addressed this incident, the consequences and thoughts and " feelings.  We addressed the progress for Janell in being able to better manage her feelings during the 2 weeks.  With questioning, she stated that she believes going to the gym has been helpful.  She voiced intent to continue her gym routine.  She showed provider her list of exercises that she laid out.  Provider encouraged her momentum in this way.  Janell stated not wanting to go over the bipolar disorder, but she was amenable to hearing some basics about the DBT exercises. Provider encouraged that such things are not mandatory, and can be therapeutic, but not forced, and encouraged Janell to be able to decline. With Janell's agreement, provider briefly stated the core DBT elements, and did so from a direct site.  Janell stated connecting with several of the 4 core areas (the first 3).  We agreed that as a starting point she will work on being present without judgment (part of mindfulness).  Within the discussion today, Janell was able to recognize a core theme for her is abandonment.  Provider encouraged awareness of such themes. At the end of the session as provider was trying to close things down she questioned whether she has OCD and she also mentioned that she can be manipulative.  Provider indicated that I will write it down and that these may be areas to explore in future sessions.  We confirmed appointment next week.     During this session, this clinician used the following therapeutic modalities: Cognitive Behavioral Therapy, Dialectical Behavior Therapy, Mindfulness-based Strategies, Motivational Interviewing, Solution-Focused Therapy, Supportive Psychotherapy, and began to discuss DBT concepts (not official tx)    Substance Abuse was not addressed during this session. If the client is diagnosed with a co-occurring substance use disorder, please indicate any changes in the frequency or amount of use: not discussed at this time. Stage of change for addressing substance use diagnoses:  "Pre-contemplation    ASSESSMENT:  Janell Solorzano presents with a Euthymic/ normal, Anxious, and Dysthymic mood.     her affect is Normal range and intensity, which is congruent, with her mood and the content of the session. The client has made progress on their goals.    Janell was able to acknowledge progress and areas of need Janell Solorzano presents with a low risk of suicide, low risk of self-harm, and low risk of harm to others. Janell denied being extremely depressed at this time.    For any risk assessment that surpasses a \"low\" rating, a safety plan must be developed.    A safety plan was indicated: no  If yes, describe in detail n/a    PLAN: Between sessions, Janell Solorzano will continue healthy coping strategies; pay attention to self-judgment. At the next session, the therapist will use Cognitive Processing Therapy and Supportive Psychotherapy to address attention to self-judgment; continuing healthy coping strategies.    Behavioral Health Treatment Plan and Discharge Planning: Janell Solorzano is aware of and agrees to continue to work on their treatment plan. They have identified and are working toward their discharge goals. yes    Visit start and stop times:    05/16/24  Start Time: 0903  Stop Time: 0955  Total Visit Time: 52 minutes    "

## 2024-05-17 ENCOUNTER — HOSPITAL ENCOUNTER (OUTPATIENT)
Dept: CT IMAGING | Facility: CLINIC | Age: 24
Discharge: HOME | End: 2024-05-17
Payer: COMMERCIAL

## 2024-05-17 DIAGNOSIS — R10.30 LOWER ABDOMINAL PAIN: ICD-10-CM

## 2024-05-17 PROCEDURE — 74177 CT ABD & PELVIS W/CONTRAST: CPT

## 2024-05-17 RX ADMIN — IOHEXOL 100 ML: 350 INJECTION, SOLUTION INTRAVENOUS at 09:11

## 2024-05-23 ENCOUNTER — TELEMEDICINE (OUTPATIENT)
Dept: BEHAVIORAL/MENTAL HEALTH CLINIC | Facility: CLINIC | Age: 24
End: 2024-05-23

## 2024-05-23 DIAGNOSIS — F31.81 BIPOLAR II DISORDER (HCC): Primary | ICD-10-CM

## 2024-05-23 NOTE — PSYCH
Virtual Regular Visit    Verification of patient location:    Patient is located at Home in the following state in which I hold an active license [place]      Assessment/Plan:    Problem List Items Addressed This Visit       Bipolar II disorder (HCC) - Primary       Goals addressed in session: Goal 1          Reason for visit is   Chief Complaint   Patient presents with    Virtual Regular Visit          Encounter provider Michela Bates LCSW      Recent Visits  Date Type Provider Dept   05/16/24 Telemedicine Michela Bates LCSW [facility] [place]   Showing recent visits within past 7 days and meeting all other requirements  Today's Visits  Date Type Provider Dept   05/23/24 Telemedicine Michela Bates LCSW [facility] [place]   Showing today's visits and meeting all other requirements  Future Appointments  No visits were found meeting these conditions.  Showing future appointments within next 150 days and meeting all other requirements       The patient was identified by name and date of birth. Janell Solorzano was informed that this is a telemedicine visit and that the visit is being conducted throughthe Epic Embedded platform. She agrees to proceed..  My office door was closed. No one else was in the room.  She acknowledged consent and understanding of privacy and security of the video platform. The patient has agreed to participate and understands they can discontinue the visit at any time.    Patient is aware this is a billable service.     Subjective  Janell Solorzano is a 23 y.o. female . .      HPI     Past Medical History:   Diagnosis Date    Anxiety     Concussion     Depression     Head injury     concussions in high school and in college    Self-injurious behavior        Past Surgical History:   Procedure Laterality Date    WISDOM TOOTH EXTRACTION         Current Outpatient Medications   Medication Sig Dispense Refill    ergocalciferol (VITAMIN D2) 50,000 units TAKE ONE CAPSULE BY  MOUTH WEEKLY 16 capsule 0    gabapentin (Neurontin) 100 mg capsule Take 2 capsules (200 mg total) by mouth 3 (three) times a day 180 capsule 2    ibuprofen (MOTRIN) 600 mg tablet TAKE ONE TABLET BY MOUTH EVERY EIGHT HOURS AS NEEDED for mild to moderate pain 30 tablet 0    ibuprofen (MOTRIN) 800 mg tablet Take 1 tablet (800 mg total) by mouth 3 (three) times a day 21 tablet 0    lamoTRIgine (LaMICtal) 200 MG tablet Take 1 tablet (200 mg total) by mouth daily 30 tablet 2    Alisha 0.25-35 MG-MCG per tablet TAKE ONE TABLET BY MOUTH EVERY DAY 84 tablet 0    mirtazapine (REMERON) 7.5 MG tablet Take 1 tablet (7.5 mg total) by mouth daily at bedtime 30 tablet 2    propranolol (INDERAL) 10 mg tablet Take 1 tablet (10 mg total) by mouth daily as needed (for anxiety) 30 tablet 0     No current facility-administered medications for this visit.        No Known Allergies    Review of Systems    Video Exam    There were no vitals filed for this visit.    Physical Exam     Behavioral Health Psychotherapy Progress Note    Psychotherapy Provided: Individual Psychotherapy     1. Bipolar II disorder (HCC)            Goals addressed in session: Goal 1     DATA: Addressed first exercise in DBT - intro/focus on an object. We addressed potential purpose, and broader function of being 'in the moment'. We addressed linkage to goals for developing ability to tolerate and deal with feelings versus unhealthy distractions, anxiousness, or dissociation. We addressed the activity, we did a breath - provider did an example of the activity. Provider inquired of Janell's thoughts at the time, and we discussed. We addressed the acceptance of mind wandering (as discussed in the exercise), attending to observation and avoiding judgment.  We discussed that Janell will attempt this activity at least 2 times a week over the next week.   Janell addressed latest with work colleague, and how she 'doesn't care'. We addressed communication, thoughts and  "feelings, and the 'not caring' response. We addressed cyclical dynamics and themes for Janell with relationships. We addressed how she finds unavailable, unhealthy relationships, filling voids, but ultimately keeping safe from true intimacy, and fear of healthy relationships due to not feeling worthy of it, and its unfamiliarity. We addressed understanding dynamics versus judging them.  As we addressed these possible themes, we spent some time talking about it and his Janell seemed to be trying to absorb the concepts.  We addressed the notion of choice, we addressed understanding her behaviors versus judging them, we addressed the notion of pausing and thinking through before acting.  We did a brief, intended decompress and review of today's discussion.  We confirmed next session in a week, we added an additional session in September, keeping weekly sessions at this time.  Provider advised Janell and sent virtual encounter form after session.     During this session, this clinician used the following therapeutic modalities: Cognitive Behavioral Therapy, Mindfulness-based Strategies, Motivational Interviewing, Solution-Focused Therapy, and Supportive Psychotherapy    Substance Abuse was addressed during this session. If the client is diagnosed with a co-occurring substance use disorder, please indicate any changes in the frequency or amount of use: drinking was referenced as a way to distract from feelings. Stage of change for addressing substance use diagnoses: Contemplation    ASSESSMENT:  Janell Solorzano presents with a Euthymic/ normal and Dysthymic mood.     her affect is Normal range and intensity, which is congruent, with her mood and the content of the session. The client has made progress on their goals.    Janell was responsive to the discussion Janell Solorzano presents with a low risk of suicide, low risk of self-harm, and low risk of harm to others.    For any risk assessment that surpasses a \"low\" rating, a " safety plan must be developed.    A safety plan was indicated: no  If yes, describe in detail n/a    PLAN: Between sessions, Janell Solorzano will try the DBT mindfulness exercise as discussed; address thoughtful pausing before engaging in interactive behaviors. At the next session, the therapist will use Cognitive Behavioral Therapy and Supportive Psychotherapy to address mood management.    Behavioral Health Treatment Plan and Discharge Planning: Janell Solorzano is aware of and agrees to continue to work on their treatment plan. They have identified and are working toward their discharge goals. yes    Visit start and stop times:    05/23/24  Start Time: 0902  Stop Time: 0953  Total Visit Time: 51 minutes

## 2024-05-30 ENCOUNTER — TELEMEDICINE (OUTPATIENT)
Dept: BEHAVIORAL/MENTAL HEALTH CLINIC | Facility: CLINIC | Age: 24
End: 2024-05-30

## 2024-05-30 DIAGNOSIS — F31.81 BIPOLAR II DISORDER (HCC): Primary | ICD-10-CM

## 2024-05-30 NOTE — PSYCH
Virtual Regular Visit    Verification of patient location:    Patient is located at Home in the following state in which I hold an active license [place]      Assessment/Plan:    Problem List Items Addressed This Visit       Bipolar II disorder (HCC) - Primary       Goals addressed in session: Goal 1          Reason for visit is   Chief Complaint   Patient presents with    Virtual Regular Visit          Encounter provider Michela Bates LCSW      Recent Visits  Date Type Provider Dept   05/23/24 Telemedicine Michela Bates LCSW [facility] [place]   Showing recent visits within past 7 days and meeting all other requirements  Today's Visits  Date Type Provider Dept   05/30/24 Telemedicine Michela Bates LCSW [facility] [place]   Showing today's visits and meeting all other requirements  Future Appointments  No visits were found meeting these conditions.  Showing future appointments within next 150 days and meeting all other requirements       The patient was identified by name and date of birth. Janell Solorzano was informed that this is a telemedicine visit and that the visit is being conducted throughthe Epic Embedded platform. She agrees to proceed..  My office door was closed. No one else was in the room.  She acknowledged consent and understanding of privacy and security of the video platform. The patient has agreed to participate and understands they can discontinue the visit at any time.    Patient is aware this is a billable service.     Subjective  Janell Solorzano is a 23 y.o. female . .      HPI     Past Medical History:   Diagnosis Date    Anxiety     Concussion     Depression     Head injury     concussions in high school and in college    Self-injurious behavior        Past Surgical History:   Procedure Laterality Date    WISDOM TOOTH EXTRACTION         Current Outpatient Medications   Medication Sig Dispense Refill    ergocalciferol (VITAMIN D2) 50,000 units TAKE ONE CAPSULE BY  MOUTH WEEKLY 16 capsule 0    gabapentin (Neurontin) 100 mg capsule Take 2 capsules (200 mg total) by mouth 3 (three) times a day 180 capsule 2    ibuprofen (MOTRIN) 600 mg tablet TAKE ONE TABLET BY MOUTH EVERY EIGHT HOURS AS NEEDED for mild to moderate pain 30 tablet 0    ibuprofen (MOTRIN) 800 mg tablet Take 1 tablet (800 mg total) by mouth 3 (three) times a day 21 tablet 0    lamoTRIgine (LaMICtal) 200 MG tablet Take 1 tablet (200 mg total) by mouth daily 30 tablet 2    Alisha 0.25-35 MG-MCG per tablet TAKE ONE TABLET BY MOUTH EVERY DAY 84 tablet 0    mirtazapine (REMERON) 7.5 MG tablet Take 1 tablet (7.5 mg total) by mouth daily at bedtime 30 tablet 2    propranolol (INDERAL) 10 mg tablet Take 1 tablet (10 mg total) by mouth daily as needed (for anxiety) 30 tablet 0     No current facility-administered medications for this visit.        No Known Allergies    Review of Systems    Video Exam    There were no vitals filed for this visit.    Physical Exam     Behavioral Health Psychotherapy Progress Note    Psychotherapy Provided: Individual Psychotherapy     1. Bipolar II disorder (HCC)            Goals addressed in session: Goal 1     DATA: Janell addressed multiple issues of stress, relating to work, relationships, and living situation. She noted 'hating' people and wanting to be alone. We explored her feelings. As we discussed each area, we also addressed the theme of her emotional distress in managing these issues.  As she discussed her discontent with the borderline diagnosis, we addressed those feelings and that her handling of her emotions is exactly what we want to deal with when working on her borderline symptoms.  Provider inquired about the exercise assignment from last session and she stated she forgot what it was.  Provider briefly discussed and encouraged further attempts.  As Janell discussed maintaining her gym routine, provider encouraged that she work on these mindfulness activities at that time.   "She addressed her relationships she stated recognition that she does not have self respect.  We explored this notion and it's meaning for Janell; we addressed ways to begin to work on this for herself. We discussed going into work 'as if' she had respect for herself, and explored what that would look like. Provider encouraged she try it and she agreed to do so.  We confirmed next appointment in 2 weeks, and Janell stated feeling 2 weeks will be fine at this point.     During this session, this clinician used the following therapeutic modalities: Cognitive Behavioral Therapy, Mindfulness-based Strategies, Motivational Interviewing, Solution-Focused Therapy, and Supportive Psychotherapy    Substance Abuse was not addressed during this session. If the client is diagnosed with a co-occurring substance use disorder, please indicate any changes in the frequency or amount of use: n/a. Stage of change for addressing substance use diagnoses: No substance use/Not applicable    ASSESSMENT:  Janell Solorzano presents with a Euthymic/ normal mood, anxious.     her affect is Normal range and intensity, which is congruent, with her mood and the content of the session. The client has made progress on their goals.    Janell was able to process discussion and remain focused, contained, and tolerant Janell Solorzano presents with a low risk of suicide, low risk of self-harm, and low risk of harm to others.    For any risk assessment that surpasses a \"low\" rating, a safety plan must be developed.    A safety plan was indicated: no  If yes, describe in detail n/a    PLAN: Between sessions, Janell Solorzano will continue going to the gym; practice 'as if' self-respect mindset, practice 'mindfulness' activity. At the next session, the therapist will use Cognitive Behavioral Therapy and Supportive Psychotherapy to address mood stability and management.    Behavioral Health Treatment Plan and Discharge Planning: Janell Solorzano is aware of and agrees " to continue to work on their treatment plan. They have identified and are working toward their discharge goals. yes    Visit start and stop times:    05/30/24  Start Time: 0901  Stop Time: 0953  Total Visit Time: 52 minutes

## 2024-06-04 ENCOUNTER — TELEMEDICINE (OUTPATIENT)
Dept: PSYCHIATRY | Facility: CLINIC | Age: 24
End: 2024-06-04

## 2024-06-04 ENCOUNTER — OFFICE VISIT (OUTPATIENT)
Dept: FAMILY MEDICINE CLINIC | Facility: CLINIC | Age: 24
End: 2024-06-04
Payer: COMMERCIAL

## 2024-06-04 VITALS
HEART RATE: 84 BPM | WEIGHT: 133.6 LBS | HEIGHT: 63 IN | DIASTOLIC BLOOD PRESSURE: 74 MMHG | RESPIRATION RATE: 18 BRPM | OXYGEN SATURATION: 98 % | BODY MASS INDEX: 23.67 KG/M2 | TEMPERATURE: 97.9 F | SYSTOLIC BLOOD PRESSURE: 108 MMHG

## 2024-06-04 DIAGNOSIS — J02.9 SORE THROAT: ICD-10-CM

## 2024-06-04 DIAGNOSIS — J02.9 PHARYNGITIS, UNSPECIFIED ETIOLOGY: ICD-10-CM

## 2024-06-04 DIAGNOSIS — J01.10 ACUTE NON-RECURRENT FRONTAL SINUSITIS: Primary | ICD-10-CM

## 2024-06-04 DIAGNOSIS — F31.81 BIPOLAR II DISORDER (HCC): Primary | ICD-10-CM

## 2024-06-04 LAB — S PYO AG THROAT QL: NEGATIVE

## 2024-06-04 PROCEDURE — 99213 OFFICE O/P EST LOW 20 MIN: CPT | Performed by: NURSE PRACTITIONER

## 2024-06-04 PROCEDURE — 87880 STREP A ASSAY W/OPTIC: CPT | Performed by: NURSE PRACTITIONER

## 2024-06-04 RX ORDER — AZITHROMYCIN 250 MG/1
TABLET, FILM COATED ORAL
Qty: 6 TABLET | Refills: 0 | Status: SHIPPED | OUTPATIENT
Start: 2024-06-04 | End: 2024-06-09

## 2024-06-04 RX ORDER — LITHIUM CARBONATE 300 MG/1
300 TABLET, FILM COATED, EXTENDED RELEASE ORAL
Qty: 30 TABLET | Refills: 0 | Status: SHIPPED | OUTPATIENT
Start: 2024-06-04

## 2024-06-04 RX ORDER — LAMOTRIGINE 200 MG/1
200 TABLET ORAL DAILY
Qty: 30 TABLET | Refills: 2 | Status: SHIPPED | OUTPATIENT
Start: 2024-06-04

## 2024-06-04 NOTE — PSYCH
Virtual Regular Visit    Verification of patient location:    Patient is located at Home in the following state in which I hold an active license [place]      Assessment/Plan:    Problem List Items Addressed This Visit       Bipolar II disorder (HCC) - Primary    Relevant Medications    lithium carbonate (LITHOBID) 300 mg CR tablet    lamoTRIgine (LaMICtal) 200 MG tablet   Reason for visit is   Chief Complaint   Patient presents with   • Virtual Regular Visit          Encounter provider ANGELA Vaughn      Recent Visits  No visits were found meeting these conditions.  Showing recent visits within past 7 days and meeting all other requirements  Today's Visits  Date Type Provider Dept   06/04/24 Telemedicine ANGELA Vaughn [facility]   Showing today's visits and meeting all other requirements  Future Appointments  No visits were found meeting these conditions.  Showing future appointments within next 150 days and meeting all other requirements       The patient was identified by name and date of birth. Janell Solorzano was informed that this is a telemedicine visit and that the visit is being conducted throughthe Epic Embedded platform. She agrees to proceed..  My office door was closed. The patient was notified the following individuals were present in the room ANGELA Kang student.  She acknowledged consent and understanding of privacy and security of the video platform. The patient has agreed to participate and understands they can discontinue the visit at any time.    Patient is aware this is a billable service.     HPI     Past Medical History:   Diagnosis Date   • Anxiety    • Concussion    • Depression    • Head injury     concussions in high school and in college   • Self-injurious behavior        Past Surgical History:   Procedure Laterality Date   • WISDOM TOOTH EXTRACTION         Current Outpatient Medications   Medication Sig Dispense Refill   • ibuprofen (MOTRIN) 600 mg  "tablet TAKE ONE TABLET BY MOUTH EVERY EIGHT HOURS AS NEEDED for mild to moderate pain 30 tablet 0   • lamoTRIgine (LaMICtal) 200 MG tablet Take 1 tablet (200 mg total) by mouth daily 30 tablet 2   • lithium carbonate (LITHOBID) 300 mg CR tablet Take 1 tablet (300 mg total) by mouth daily at bedtime 30 tablet 0   • Alisha 0.25-35 MG-MCG per tablet TAKE ONE TABLET BY MOUTH EVERY DAY 84 tablet 0   • mirtazapine (REMERON) 7.5 MG tablet Take 1 tablet (7.5 mg total) by mouth daily at bedtime 30 tablet 2   • ergocalciferol (VITAMIN D2) 50,000 units TAKE ONE CAPSULE BY MOUTH WEEKLY (Patient not taking: Reported on 6/4/2024) 16 capsule 0   • ibuprofen (MOTRIN) 800 mg tablet Take 1 tablet (800 mg total) by mouth 3 (three) times a day (Patient not taking: Reported on 6/4/2024) 21 tablet 0     No current facility-administered medications for this visit.        No Known Allergies    Review of Systems    Video Exam    There were no vitals filed for this visit.    Physical Exam     Visit Time    Visit Start Time: 0930  Visit Stop Time: 1000  Total Visit Duration: 30 minutes    MEDICATION MANAGEMENT NOTE        [facility]    Name and Date of Birth:  Janell Solorzano 23 y.o. 2000 MRN: [id number]    Date of Visit: June 4, 2024    SUBJECTIVE:    Janell is seen today for a follow up for Bipolar Disorder type II. She continues to decompensate since the last visit.  Janell seen virtually today for medication management follow-up.  She was last seen by this provider on 5/7/2024.  She is scant, guarded in conversation.  She does become tearful during conversation.  She states she is depressed 10 out of 10, anxious 8 out of 10.  She cannot explain why she is depressed or anxious.  She denies suicidal and homicidal ideation, denies any auditory or visual hallucinations.  She is now living with her grandparents, and states that she is \"trying not to talk to anyone.  She states that her panic " "attacks are not occurring as often, states she does not want to take the gabapentin or propranolol anymore as \"they do not work\".  She states she is sleeping \"too much\".  She states she was too tired this morning to get up to go to the gym.  However, she does state that all she does is go to work in the gym.  She is working evening shift at work.  She denies drinking alcohol, denies using drugs.  She states that she has not been going out with her friends as often, stating that they \"have their own lives\".  We discussed again starting lithium.  She states that her mom did not like lithium because it made her \"feel weird\".  I did discuss with Janell that we could always stop it if it did not make her feel better.  She was in agreement at this time to try a low dose of lithium at night.  We will assess for side effects.  Kidney function was good on 5/7/2024 when last tested.  TSH was within normal limits in December 2023 when last tested.  We will continue to monitor if lithium is continued and ongoing.  We will follow-up in 2 weeks.  She will continue the Lamictal and the mirtazapine at this time.    She denies any side effects from current psychiatric medications.    PLAN:  Continue Lamictal 200 mg p.o. daily  Continue mirtazapine 7.5 mg p.o. at bedtime  Initiate lithium 300 mg p.o. at bedtime  Follow-up in 2 weeks  Continue therapy with Michela, focusing on borderline personality disorder traits  She will call this provider with any concerns or issues if they arise prior to scheduled appointment    Diagnoses and all orders for this visit:    Bipolar II disorder (HCC)  -     lithium carbonate (LITHOBID) 300 mg CR tablet; Take 1 tablet (300 mg total) by mouth daily at bedtime  -     lamoTRIgine (LaMICtal) 200 MG tablet; Take 1 tablet (200 mg total) by mouth daily      Lamotrigine PARQ completed including dizziness, headaches, ataxia, vision problems, somnolence, sleep changes, cognitive difficulties, rash (including " Becerril-Chato rash), and others, risk of teratogenicity for females.     PARQ on lithium including weight gain, headaches, renal and thyroid risks, interactions with various medications such as anti-inflammatories and blood pressure medications, lithium toxicity, GI upset, tremors, need for lab monitoring, and others, teratogenicity for female patients      Psychotherapy Provided:     Individual psychotherapy provided: Yes  Counseling was provided during the session today for 16 minutes.  Supportive counseling provided.  Medication changes discussed with Janell.  Medication education provided to Janell.  Discussed with Janell coping with family problems, limited support, everyday stressors, and occasional anxiety.   Coping strategies reviewed with Janell.   Importance of medication and treatment compliance reviewed with Janell.  Importance of follow up with family physician for medical issues reviewed with Janell.  Reassurance and supportive therapy provided.   Crisis/safety plan discussed with Janell. Patient will call prior to scheduled appointment if they have any issues or concerns.  Patient understands they can access the office by calling the main number at any time if they are in crisis.  They also understand they can call their [facility]'s crisis number or go to their nearest ED if suicidal ideation increases or if they develop a plan or intent.       HPI ROS Appetite Changes and Sleep:     She reports  increased sleep , adequate appetite, low energy. Denies homicidal ideation, denies suicidal ideation    Review Of Systems:      HPI ROS:               Medication Side Effects:  denies   Depression (10 worst): 10/10   Anxiety (10 worst): 8/10   Safety concerns (SI, HI, etc): denies   Sleep: increased   Energy: fair   Appetite: good     General emotional problems, decreased functioning, and sleep disturbances   Personality no change in personality   Constitutional as noted in HPI   ENT negative   Cardiovascular  negative   Respiratory negative   Gastrointestinal negative   Genitourinary negative   Musculoskeletal negative   Integumentary negative   Neurological negative   Endocrine negative   Other Symptoms none, all other systems are negative     Mental Status Evaluation:    Appearance Appropriately dressed and Good eye contact   Behavior Superficial and guarded   Mood anxious, depressed, and irritable  Depression Scale - 10 of 10 (0 = No depression)  Anxiety Scale - 8 of 10 (0 = No anxiety)   Speech Normal rate and volume   Affect Tearful and Flat   Thought Processes Goal directed and coherent   Thought Content Does not verbalize delusional material   Associations Tightly connected   Perceptual Disturbances Denies hallucinations and does not appear to be responding to internal stimuli   Risk Potential Suicidal/Homicidal Ideation - No evidence of suicidal or homicidal ideation and patient does not verbalize suicidal or homicidal ideation  Risk of Violence - No evidence of risk for violence found on assessment  Risk of Self Mutilation - No evidence of risk for self mutilation found on assessment   Orientation oriented to person, place, time/date, and situation   Memory recent and remote memory grossly intact   Consciousness alert and awake   Attention/Concentration attention span and concentration are age appropriate   Insight fair   Judgement fair   Muscle Strength and Gait normal muscle strength and normal muscle tone, normal gait/station and normal balance   Motor Activity no abnormal movements   Language no difficulty naming common objects, no difficulty repeating a phrase, no difficulty writing a sentence   Fund of Knowledge adequate knowledge of current events  adequate fund of knowledge regarding past history  adequate fund of knowledge regarding vocabulary      Past Psychiatric History Update:     Inpatient Psychiatric Admission Since Last Encounter:   no  Changes to Outpatient Psychiatric Treatment Team:     no  Suicide Attempt Or Self Mutilation Since Last Encounter:   no  Incidence of Violent Behavior Since Last Encounter:   no    Traumatic History Update:     New Onset of Abuse Since Last Encounter:   no  Traumatic Events Since Last Encounter:   no    Substance Abuse History:    Social History     Substance and Sexual Activity   Alcohol Use Yes   • Alcohol/week: 3.0 - 4.0 standard drinks of alcohol   • Types: 3 - 4 Shots of liquor per week    Comment: 3-4 drinks, 2 times per week     Social History     Substance and Sexual Activity   Drug Use No     Social History:    Social History     Socioeconomic History   • Marital status: Single     Spouse name: Not on file   • Number of children: 0   • Years of education: senior in college currently   • Highest education level: High school graduate   Occupational History   • 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   Tobacco Use   • Smoking status: Never   • Smokeless tobacco: Never   Vaping Use   • Vaping status: Never Used   Substance and Sexual Activity   • Alcohol use: Yes     Alcohol/week: 3.0 - 4.0 standard drinks of alcohol     Types: 3 - 4 Shots of liquor per week     Comment: 3-4 drinks, 2 times per week   • Drug use: No   • Sexual activity: Yes     Partners: Male     Birth control/protection: Condom Male   Other Topics Concern   • Not on file   Social History Narrative   • Not on file     Social Determinants of Health     Financial Resource Strain: Not on file   Food Insecurity: Not on file   Transportation Needs: Not on file   Physical Activity: Not on file   Stress: Not on file   Social Connections: Not on file   Intimate Partner Violence: Not on file   Housing Stability: Not on file     Family Psychiatric History:     Family History   Problem Relation Age of Onset   • Hypertension Mother    • Mental illness Mother    • Depression Mother    • Mental illness Sister    • Coronary artery disease Maternal Grandmother    • Throat cancer Paternal Grandfather       History Review:The following portions of the patient's history were reviewed and updated as appropriate: allergies, current medications, past family history, past medical history, past social history, past surgical history, and problem list     OBJECTIVE:     Vital signs in last 24 hours:    There were no vitals filed for this visit.  Laboratory Results: I have personally reviewed all pertinent laboratory/tests results.    Suicide/Homicide Risk Assessment:    Risk of Harm to Self:  The following ratings are based on assessment at the time of the interview  Recent Specific Risk Factors include: current depressive symptoms, current anxiety symptoms, unstable mood, hopelessness, lack of support, social isolation  Demographic risk factors include: , age: young adult (15-24)  Historical Risk Factors include: chronic depression, chronic anxiety symptoms, chronic mood disorder, history of self-abusive behavior, history of substance use, history of abuse, history of impulsive behaviors, history of traumatic experiences  Protective Factors: no current suicidal ideation, access to mental health treatment, compliant with medications, compliant with mental health treatment, having a desire to be alive, responsibilities and duties to others, stable living environment, stable job, sense of determination  Weapons: none. The following steps have been taken to ensure weapons are properly secured: not applicable  Based on today's assessment, Janell presents the following risk of harm to self: low    Risk of Harm to Others:  The following ratings are based on assessment at the time of the interview  Protective Factors: no current homicidal ideation  Based on today's assessment, Janell presents the following risk of harm to others: none    The following interventions are recommended: contracts for safety at present - agrees to go to ED if feeling unsafe, return in 2 weeks for reassessment, therapy appointment in 9 days,  contracts for safety at present - agrees to call Crisis Intervention Service if feeling unsafe    Medications Risks/Benefits:      Risks, Benefits And Possible Side Effects Of Medications:    Discussed risks and benefits of treatment with patient including risk of rash related to treatment with Lamictal and risk of kidney impairment related to treatment with Lithium     Controlled Medication Discussion:     Not applicable    Treatment Plan:    Due for update/Updated:   no  Last treatment plan done 1/11/24 by Michela Bates LCSW.  Treatment Plan due on 7/11/24.    ANGELA Vaughn 06/04/24    This note was not shared with the patient due to reasonable likelihood of causing patient harm

## 2024-06-04 NOTE — PROGRESS NOTES
"   Assessment/Plan:     Chronic Problems:  No problem-specific Assessment & Plan notes found for this encounter.      Visit Diagnosis:  Diagnoses and all orders for this visit:    Acute non-recurrent frontal sinusitis  Comments:  Will treat with azithromycin and Flonase.  Advised patient to call if not improving.    Sore throat  -     POCT rapid ANTIGEN strepA    Pharyngitis, unspecified etiology  -     azithromycin (Zithromax) 250 mg tablet; Take 2 tablets (500 mg total) by mouth daily for 1 day, THEN 1 tablet (250 mg total) daily for 4 days.          Subjective:    Patient ID: Janell Solorzano is a 23 y.o. female.    Patient presents for sick visit.  This started about 1 week ago with sore throat, sinus pain and pressure and cough.  Patient has not taken anything over-the-counter.        The following portions of the patient's history were reviewed and updated as appropriate: allergies, current medications, past family history, past medical history, past social history, past surgical history and problem list.    Review of Systems   Constitutional:  Positive for fatigue. Negative for chills, diaphoresis and fever.   HENT:  Positive for ear pain, sinus pressure, sinus pain and sore throat. Negative for congestion.    Respiratory:  Positive for cough. Negative for shortness of breath.    Cardiovascular:  Negative for chest pain and palpitations.   Gastrointestinal:  Negative for abdominal pain and vomiting.   Genitourinary:  Negative for dysuria and hematuria.   Skin:  Negative for color change and rash.   Neurological:  Positive for headaches.   All other systems reviewed and are negative.        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   Tobacco Use    Smoking status: Never    Smokeless tobacco: Never   Vaping Use    Vaping status: Never Used   Substance and Sexual Activity    Alcohol use: Yes     Alcohol/week: 3.0 - 4.0 standard drinks of alcohol     Types: 3 - 4 Shots of liquor per week     Comment: 3-4 drinks, 2 times per week    Drug use: No    Sexual activity: Yes     Partners: Male     Birth control/protection: Condom Male   Other Topics Concern    Not on file   Social History Narrative    Not on file     Social Determinants of Health     Financial Resource Strain: Not on file   Food Insecurity: Not on file   Transportation Needs: Not on file   Physical Activity: Not on file   Stress: Not on file   Social Connections: Not on file   Intimate Partner Violence: Not on file   Housing Stability: Not on file     Past Medical History:   Diagnosis Date    Anxiety     Concussion     Depression     Head injury     concussions in high school and in college    Self-injurious behavior      Family History   Problem Relation Age of Onset    Hypertension Mother     Mental illness Mother     Depression Mother     Mental illness Sister     Coronary artery disease Maternal Grandmother     Throat cancer Paternal Grandfather      Past Surgical History:   Procedure Laterality Date    WISDOM TOOTH EXTRACTION         Current Outpatient Medications:     azithromycin (Zithromax) 250 mg tablet, Take 2 tablets (500 mg total) by mouth daily for 1 day, THEN 1 tablet (250 mg total) daily for 4 days., Disp: 6 tablet, Rfl: 0    lamoTRIgine (LaMICtal) 200 MG tablet, Take 1 tablet (200 mg total) by mouth daily, Disp: 30 tablet, Rfl: 2    lithium carbonate (LITHOBID) 300 mg CR tablet, Take 1 tablet (300 mg total) by mouth daily at bedtime, Disp: 30 tablet, Rfl: 0    Alisha 0.25-35 MG-MCG  per tablet, TAKE ONE TABLET BY MOUTH EVERY DAY, Disp: 84 tablet, Rfl: 0    mirtazapine (REMERON) 7.5 MG tablet, Take 1 tablet (7.5 mg total) by mouth daily at bedtime, Disp: 30 tablet, Rfl: 2    ergocalciferol (VITAMIN D2) 50,000 units, TAKE ONE CAPSULE BY MOUTH WEEKLY (Patient not taking: Reported on 6/4/2024), Disp: 16 capsule, Rfl: 0    ibuprofen (MOTRIN) 600 mg tablet, TAKE ONE TABLET BY MOUTH EVERY EIGHT HOURS AS NEEDED for mild to moderate pain, Disp: 30 tablet, Rfl: 0    ibuprofen (MOTRIN) 800 mg tablet, Take 1 tablet (800 mg total) by mouth 3 (three) times a day (Patient not taking: Reported on 6/4/2024), Disp: 21 tablet, Rfl: 0    No Known Allergies       Lab Review   not applicable     Imaging: CT abdomen pelvis w contrast    Result Date: 5/24/2024  Narrative: CT ABDOMEN AND PELVIS WITH IV CONTRAST INDICATION:   Lower abdominal pain, unspecified.  COMPARISON: 11/25/2020 and pelvic ultrasound dated 5/7/2024 TECHNIQUE:  CT examination of the abdomen and pelvis was performed. Multiplanar 2D reformatted images were created from the source data. This examination, like all CT scans performed in the [facility], was performed utilizing techniques to minimize radiation dose exposure, including the use of iterative reconstruction and automated exposure control. Radiation dose length product (DLP) for this visit: IV Contrast:   - iohexol (OMNIPAQUE) 350 MG/ML injection (SINGLE-DOSE) 100 mL Enteric Contrast:  Enteric contrast was not administered. FINDINGS: ABDOMEN LOWER CHEST: No clinically significant abnormality in the visualized lower chest. LIVER/BILIARY TREE: Unremarkable. GALLBLADDER: No calcified gallstones. No pericholecystic inflammatory change. SPLEEN: Unremarkable. PANCREAS: Unremarkable. ADRENAL GLANDS: Unremarkable. KIDNEYS/URETERS: Unremarkable. No hydronephrosis. STOMACH AND BOWEL: Unremarkable. APPENDIX: No findings to suggest appendicitis. ABDOMINOPELVIC CAVITY: No ascites. No  pneumoperitoneum. No lymphadenopathy. VESSELS: Unremarkable for patient's age. PELVIS REPRODUCTIVE ORGANS: Unremarkable for patient's age. URINARY BLADDER: Unremarkable. ABDOMINAL WALL/INGUINAL REGIONS: Unremarkable. BONES: No acute fracture or suspicious osseous lesion.     Impression: 1. No acute intra-abdominal disease. Normal caliber bowel loops and appendix. 2. Normal size ovaries. Normal size uterus. No free fluid Electronically signed: 05/24/2024 05:36 AM Wayne Lion MD    US pelvis complete w transvaginal    Result Date: 5/7/2024  Narrative: PELVIC ULTRASOUND, COMPLETE INDICATION: The patient is 23 years old. LMP 4/25/2024. Pelvic pain, irregular bleeding and passing clots for 2 days. Rule out torsion. COMPARISON: None TECHNIQUE: Transabdominal pelvic ultrasound was performed in sagittal and transverse planes with a curvilinear transducer. Additional transvaginal imaging was performed to better evaluate the endometrium and ovaries. Imaging included volumetric sweeps as  well as traditional still imaging technique. FINDINGS: UTERUS: The uterus is anteverted in position, measuring 8.6 x 3.2 x 3.9 cm. The uterus has a normal contour and echotexture. The cervix appears within normal limits. ENDOMETRIUM: The endometrial echo complex has an AP caliber of 5.0 mm. Appearance within normal limits. OVARIES/ADNEXA: Right ovary: 2.6 x 1.7 x 1.4 cm. 3.3 mL. Ovarian Doppler flow is within normal limits. No suspicious ovarian or adnexal abnormality. Left ovary: 2.2 x 1.2 x 1.2 cm. 1.7 mL. Ovarian Doppler flow is within normal limits. No suspicious ovarian or adnexal abnormality. OTHER: No free fluid or loculated fluid collections.     Impression: No evidence of ovarian torsion. Uterus, endometrium and ovaries within normal limits. Workstation performed: [id number]       Objective:     Physical Exam  Constitutional:       Appearance: She is well-developed.   HENT:      Right Ear: A middle ear effusion is present.      Left  "Ear: A middle ear effusion is present.      Nose: Congestion present.      Mouth/Throat:      Pharynx: Posterior oropharyngeal erythema present.      Tonsils: Tonsillar exudate present. 2+ on the right. 2+ on the left.   Cardiovascular:      Rate and Rhythm: Normal rate and regular rhythm.      Heart sounds: Normal heart sounds. No murmur heard.  Pulmonary:      Effort: Pulmonary effort is normal. No respiratory distress.      Breath sounds: Normal breath sounds.   Skin:     General: Skin is warm and dry.   Neurological:      Mental Status: She is alert and oriented to person, place, and time.           There are no Patient Instructions on file for this visit.    ANGELA Arthur    Portions of the record may have been created with voice recognition software.  Occasional wrong word or \"sound a like\" substitutions may have occurred due to the inherent limitations of voice recognition software.  Read the chart carefully and recognize, using context, where substitutions have occurred.  "

## 2024-06-11 ENCOUNTER — OFFICE VISIT (OUTPATIENT)
Dept: FAMILY MEDICINE CLINIC | Facility: CLINIC | Age: 24
End: 2024-06-11
Payer: COMMERCIAL

## 2024-06-11 VITALS
HEART RATE: 80 BPM | SYSTOLIC BLOOD PRESSURE: 112 MMHG | WEIGHT: 137 LBS | RESPIRATION RATE: 18 BRPM | DIASTOLIC BLOOD PRESSURE: 76 MMHG | HEIGHT: 63 IN | BODY MASS INDEX: 24.27 KG/M2 | TEMPERATURE: 98 F | OXYGEN SATURATION: 98 %

## 2024-06-11 DIAGNOSIS — R30.0 DYSURIA: Primary | ICD-10-CM

## 2024-06-11 LAB
SL AMB  POCT GLUCOSE, UA: NORMAL
SL AMB LEUKOCYTE ESTERASE,UA: NORMAL
SL AMB POCT BILIRUBIN,UA: NORMAL
SL AMB POCT BLOOD,UA: NORMAL
SL AMB POCT CLARITY,UA: CLEAR
SL AMB POCT COLOR,UA: YELLOW
SL AMB POCT KETONES,UA: NORMAL
SL AMB POCT NITRITE,UA: NORMAL
SL AMB POCT PH,UA: 7
SL AMB POCT SPECIFIC GRAVITY,UA: 1.01
SL AMB POCT URINE PROTEIN: NORMAL
SL AMB POCT UROBILINOGEN: NORMAL

## 2024-06-11 PROCEDURE — 81002 URINALYSIS NONAUTO W/O SCOPE: CPT | Performed by: NURSE PRACTITIONER

## 2024-06-11 PROCEDURE — 87491 CHLMYD TRACH DNA AMP PROBE: CPT | Performed by: NURSE PRACTITIONER

## 2024-06-11 PROCEDURE — 87591 N.GONORRHOEAE DNA AMP PROB: CPT | Performed by: NURSE PRACTITIONER

## 2024-06-11 PROCEDURE — 99213 OFFICE O/P EST LOW 20 MIN: CPT | Performed by: NURSE PRACTITIONER

## 2024-06-11 NOTE — PROGRESS NOTES
"   Assessment/Plan:     Chronic Problems:  No problem-specific Assessment & Plan notes found for this encounter.      Visit Diagnosis:  Diagnoses and all orders for this visit:    Dysuria  Comments:  Urine dip negative, send culture and G/C.  Orders:  -     POCT urine dip  -     Cancel: Chlamydia/GC amplified DNA by PCR  -     Chlamydia/GC amplified DNA by PCR          Subjective:    Patient ID: Janell Solorzano is a 23 y.o. female.    Patient presents with concerns of dysuria, urinary urgency and frequency.  Denies blood in her urine, she does have her menses.  She did miss 4 days of her oral contraceptive pill.  She does have a new partner and and did not use protection, would like STD screening.        The following portions of the patient's history were reviewed and updated as appropriate: allergies, current medications, past family history, past medical history, past social history, past surgical history and problem list.    Review of Systems   Constitutional:  Negative for chills and fever.   HENT:  Negative for ear pain and sore throat.    Eyes:  Negative for pain and visual disturbance.   Respiratory:  Negative for cough and shortness of breath.    Cardiovascular:  Negative for chest pain and palpitations.   Gastrointestinal:  Negative for abdominal pain and vomiting.   Genitourinary:  Positive for dysuria, frequency and urgency. Negative for flank pain and hematuria.   Musculoskeletal:  Negative for arthralgias and back pain.   Skin:  Negative for color change and rash.   Psychiatric/Behavioral:  Positive for dysphoric mood. Negative for sleep disturbance and suicidal ideas. The patient is nervous/anxious.    All other systems reviewed and are negative.        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   Tobacco Use    Smoking status: Never    Smokeless tobacco: Never   Vaping Use    Vaping status: Never Used   Substance and Sexual Activity    Alcohol use: Yes     Alcohol/week: 3.0 - 4.0 standard drinks of alcohol     Types: 3 - 4 Shots of liquor per week     Comment: 3-4 drinks, 2 times per week    Drug use: No    Sexual activity: Yes     Partners: Male     Birth control/protection: Condom Male   Other Topics Concern    Not on file   Social History Narrative    Not on file     Social Determinants of Health     Financial Resource Strain: Not on file   Food Insecurity: Not on file   Transportation Needs: Not on file   Physical Activity: Not on file   Stress: Not on file   Social Connections: Not on file   Intimate Partner Violence: Not on file   Housing Stability: Not on file     Past Medical History:   Diagnosis Date    Anxiety     Concussion     Depression     Head injury     concussions in high school and in college    Self-injurious behavior      Family History   Problem Relation Age of Onset    Hypertension Mother     Mental illness Mother     Depression Mother     Mental illness Sister     Coronary artery disease Maternal Grandmother     Throat cancer Paternal Grandfather      Past Surgical History:   Procedure Laterality Date    WISDOM TOOTH EXTRACTION         Current Outpatient Medications:     lamoTRIgine (LaMICtal) 200 MG tablet, Take 1 tablet (200 mg total) by mouth daily, Disp: 30 tablet, Rfl: 2    lithium carbonate (LITHOBID) 300 mg CR tablet, Take 1 tablet (300 mg total) by mouth daily at bedtime, Disp: 30 tablet, Rfl: 0    Alisha 0.25-35 MG-MCG per tablet, TAKE ONE TABLET BY MOUTH EVERY DAY, Disp: 84 tablet, Rfl: 0    mirtazapine (REMERON) 7.5 MG tablet, Take 1 tablet (7.5 mg total)  by mouth daily at bedtime, Disp: 30 tablet, Rfl: 2    ergocalciferol (VITAMIN D2) 50,000 units, TAKE ONE CAPSULE BY MOUTH WEEKLY (Patient not taking: Reported on 6/4/2024), Disp: 16 capsule, Rfl: 0    ibuprofen (MOTRIN) 600 mg tablet, TAKE ONE TABLET BY MOUTH EVERY EIGHT HOURS AS NEEDED for mild to moderate pain, Disp: 30 tablet, Rfl: 0    ibuprofen (MOTRIN) 800 mg tablet, Take 1 tablet (800 mg total) by mouth 3 (three) times a day (Patient not taking: Reported on 6/4/2024), Disp: 21 tablet, Rfl: 0    No Known Allergies       Lab Review   not applicable     Imaging: CT abdomen pelvis w contrast    Result Date: 5/24/2024  Narrative: CT ABDOMEN AND PELVIS WITH IV CONTRAST INDICATION:   Lower abdominal pain, unspecified.  COMPARISON: 11/25/2020 and pelvic ultrasound dated 5/7/2024 TECHNIQUE:  CT examination of the abdomen and pelvis was performed. Multiplanar 2D reformatted images were created from the source data. This examination, like all CT scans performed in the [facility], was performed utilizing techniques to minimize radiation dose exposure, including the use of iterative reconstruction and automated exposure control. Radiation dose length product (DLP) for this visit: IV Contrast:   - iohexol (OMNIPAQUE) 350 MG/ML injection (SINGLE-DOSE) 100 mL Enteric Contrast:  Enteric contrast was not administered. FINDINGS: ABDOMEN LOWER CHEST: No clinically significant abnormality in the visualized lower chest. LIVER/BILIARY TREE: Unremarkable. GALLBLADDER: No calcified gallstones. No pericholecystic inflammatory change. SPLEEN: Unremarkable. PANCREAS: Unremarkable. ADRENAL GLANDS: Unremarkable. KIDNEYS/URETERS: Unremarkable. No hydronephrosis. STOMACH AND BOWEL: Unremarkable. APPENDIX: No findings to suggest appendicitis. ABDOMINOPELVIC CAVITY: No ascites. No pneumoperitoneum. No lymphadenopathy. VESSELS: Unremarkable for patient's age. PELVIS REPRODUCTIVE ORGANS: Unremarkable for patient's age. URINARY  "BLADDER: Unremarkable. ABDOMINAL WALL/INGUINAL REGIONS: Unremarkable. BONES: No acute fracture or suspicious osseous lesion.     Impression: 1. No acute intra-abdominal disease. Normal caliber bowel loops and appendix. 2. Normal size ovaries. Normal size uterus. No free fluid Electronically signed: 05/24/2024 05:36 AM Wayne Lion MD      Objective:     Physical Exam  Constitutional:       Appearance: She is well-developed.   Cardiovascular:      Rate and Rhythm: Normal rate and regular rhythm.      Heart sounds: Normal heart sounds. No murmur heard.  Pulmonary:      Effort: Pulmonary effort is normal. No respiratory distress.      Breath sounds: Normal breath sounds.   Abdominal:      Tenderness: There is no right CVA tenderness or left CVA tenderness.   Skin:     General: Skin is warm and dry.   Neurological:      Mental Status: She is alert and oriented to person, place, and time.           There are no Patient Instructions on file for this visit.    ANGELA [facility]    Portions of the record may have been created with voice recognition software.  Occasional wrong word or \"sound a like\" substitutions may have occurred due to the inherent limitations of voice recognition software.  Read the chart carefully and recognize, using context, where substitutions have occurred.  "

## 2024-06-12 LAB
C TRACH DNA SPEC QL NAA+PROBE: NEGATIVE
N GONORRHOEA DNA SPEC QL NAA+PROBE: NEGATIVE

## 2024-06-13 ENCOUNTER — TELEPHONE (OUTPATIENT)
Dept: BEHAVIORAL/MENTAL HEALTH CLINIC | Facility: CLINIC | Age: 24
End: 2024-06-13

## 2024-06-13 NOTE — TELEPHONE ENCOUNTER
Call from Janell Solorzano approximately 8:30am (she LM and provider returned the call at 9am).   She requested to cancel today's appointment at 11am due to needing to  a work shift today.  She referenced happenings in her life but confirmed being okay to not have today's session.  We confirmed the next scheduled session in 2 weeks 6/27; she did request if an earlier appointment (next week) becomes available, that she would be interested; provider agreed to keep an eye out accordingly.

## 2024-06-15 ENCOUNTER — HOSPITAL ENCOUNTER (EMERGENCY)
Facility: HOSPITAL | Age: 24
Discharge: HOME | End: 2024-06-15
Attending: EMERGENCY MEDICINE
Payer: COMMERCIAL

## 2024-06-15 VITALS
TEMPERATURE: 98.6 F | HEIGHT: 63 IN | WEIGHT: 136 LBS | SYSTOLIC BLOOD PRESSURE: 132 MMHG | RESPIRATION RATE: 17 BRPM | BODY MASS INDEX: 24.1 KG/M2 | DIASTOLIC BLOOD PRESSURE: 79 MMHG | OXYGEN SATURATION: 100 % | HEART RATE: 105 BPM

## 2024-06-15 DIAGNOSIS — F41.9 ANXIETY: Primary | ICD-10-CM

## 2024-06-15 DIAGNOSIS — F32.A DEPRESSION: ICD-10-CM

## 2024-06-15 DIAGNOSIS — Z86.59 HISTORY OF BIPOLAR DISORDER: ICD-10-CM

## 2024-06-15 LAB — ETHANOL EXG-MCNC: 0 MG/DL

## 2024-06-15 PROCEDURE — 99284 EMERGENCY DEPT VISIT MOD MDM: CPT

## 2024-06-15 PROCEDURE — 82075 ASSAY OF BREATH ETHANOL: CPT | Performed by: EMERGENCY MEDICINE

## 2024-06-15 PROCEDURE — 99284 EMERGENCY DEPT VISIT MOD MDM: CPT | Performed by: EMERGENCY MEDICINE

## 2024-06-15 RX ORDER — HYDROXYZINE HYDROCHLORIDE 25 MG/1
25 TABLET, FILM COATED ORAL EVERY 6 HOURS PRN
Qty: 20 TABLET | Refills: 0 | Status: SHIPPED | OUTPATIENT
Start: 2024-06-15 | End: 2024-06-25 | Stop reason: SDUPTHER

## 2024-06-15 RX ORDER — HYDROXYZINE HYDROCHLORIDE 25 MG/1
25 TABLET, FILM COATED ORAL ONCE
Status: COMPLETED | OUTPATIENT
Start: 2024-06-15 | End: 2024-06-15

## 2024-06-15 RX ADMIN — HYDROXYZINE HYDROCHLORIDE 25 MG: 25 TABLET ORAL at 18:49

## 2024-06-15 NOTE — ED PROVIDER NOTES
History  Chief Complaint   Patient presents with    Depression     Patient arrived to ER c/o depression that's been worsening the last few days. Patient denies SI/HI/AVH . Hx of anxiety; Patient wants crisis resources for outpatient.        Depression      Prior to Admission Medications   Prescriptions Last Dose Informant Patient Reported? Taking?   Alisha 0.25-35 MG-MCG per tablet   No No   Sig: TAKE ONE TABLET BY MOUTH EVERY DAY   ergocalciferol (VITAMIN D2) 50,000 units   No No   Sig: TAKE ONE CAPSULE BY MOUTH WEEKLY   Patient not taking: Reported on 6/4/2024   ibuprofen (MOTRIN) 600 mg tablet   No No   Sig: TAKE ONE TABLET BY MOUTH EVERY EIGHT HOURS AS NEEDED for mild to moderate pain   ibuprofen (MOTRIN) 800 mg tablet   No No   Sig: Take 1 tablet (800 mg total) by mouth 3 (three) times a day   Patient not taking: Reported on 6/4/2024   lamoTRIgine (LaMICtal) 200 MG tablet   No No   Sig: Take 1 tablet (200 mg total) by mouth daily   lithium carbonate (LITHOBID) 300 mg CR tablet   No No   Sig: Take 1 tablet (300 mg total) by mouth daily at bedtime   mirtazapine (REMERON) 7.5 MG tablet   No No   Sig: Take 1 tablet (7.5 mg total) by mouth daily at bedtime      Facility-Administered Medications: None       Past Medical History:   Diagnosis Date    Anxiety     Concussion     Depression     Head injury     concussions in high school and in college    Self-injurious behavior        Past Surgical History:   Procedure Laterality Date    WISDOM TOOTH EXTRACTION         Family History   Problem Relation Age of Onset    Hypertension Mother     Mental illness Mother     Depression Mother     Mental illness Sister     Coronary artery disease Maternal Grandmother     Throat cancer Paternal Grandfather      I have reviewed and agree with the history as documented.    E-Cigarette/Vaping    E-Cigarette Use Never User      E-Cigarette/Vaping Substances    Nicotine No     THC No     CBD No     Flavoring No     Other No     Unknown No       Social History     Tobacco Use    Smoking status: Never    Smokeless tobacco: Never   Vaping Use    Vaping status: Never Used   Substance Use Topics    Alcohol use: Yes     Alcohol/week: 3.0 - 4.0 standard drinks of alcohol     Types: 3 - 4 Shots of liquor per week     Comment: 3-4 drinks, 2 times per week    Drug use: No       Review of Systems   Psychiatric/Behavioral:  Positive for depression.        Physical Exam  Physical Exam  Vitals and nursing note reviewed.   Constitutional:       General: She is not in acute distress.     Appearance: She is well-developed.   HENT:      Head: Normocephalic and atraumatic.   Eyes:      Conjunctiva/sclera: Conjunctivae normal.      Pupils: Pupils are equal, round, and reactive to light.   Neck:      Trachea: No tracheal deviation.   Cardiovascular:      Rate and Rhythm: Normal rate and regular rhythm.      Heart sounds: Normal heart sounds.   Pulmonary:      Effort: Pulmonary effort is normal. No respiratory distress.      Breath sounds: Normal breath sounds.   Musculoskeletal:      Cervical back: Normal range of motion.   Skin:     General: Skin is warm and dry.   Neurological:      Mental Status: She is alert and oriented to person, place, and time.      GCS: GCS eye subscore is 4. GCS verbal subscore is 5. GCS motor subscore is 6.   Psychiatric:         Mood and Affect: Mood normal. Affect is tearful.         Behavior: Behavior normal. Behavior is cooperative.         Vital Signs  ED Triage Vitals [06/15/24 1647]   Temperature Pulse Respirations Blood Pressure SpO2   98.6 °F (37 °C) 105 17 132/79 100 %      Temp Source Heart Rate Source Patient Position - Orthostatic VS BP Location FiO2 (%)   Temporal Monitor -- Left arm --      Pain Score       --           Vitals:    06/15/24 1647   BP: 132/79   Pulse: 105         Visual Acuity      ED Medications  Medications   hydrOXYzine HCL (ATARAX) tablet 25 mg (25 mg Oral Given 6/15/24 0216)       Diagnostic Studies  Results  "Reviewed       Procedure Component Value Units Date/Time    POCT alcohol breath test [[id number]]  (Normal) Resulted: 06/15/24 1720    Lab Status: Final result Updated: 06/15/24 1720     EXTBreath Alcohol 0.00                   No orders to display              Procedures  Procedures         ED Course                               SBIRT 20yo+      Flowsheet Row Most Recent Value   Initial Alcohol Screen: US AUDIT-C     1. How often do you have a drink containing alcohol? 0 Filed at: 06/15/2024 1651   2. How many drinks containing alcohol do you have on a typical day you are drinking?  0 Filed at: 06/15/2024 1651   3b. FEMALE Any Age, or MALE 65+: How often do you have 4 or more drinks on one occassion? 0 Filed at: 06/15/2024 1651   Audit-C Score 0 Filed at: 06/15/2024 1651   RODRIGO: How many times in the past year have you...    Used an illegal drug or used a prescription medication for non-medical reasons? Never Filed at: 06/15/2024 1651                      Medical Decision Making  This is a 23-year-old female with a history of bipolar disorder who presents here today stating that she needs to \"talk to somebody.\"  She endorses being more anxious recently due to \"people.\"  She is feeling down, but denies suicidal thoughts, homicidal ideation, auditory or visual hallucinations.  She says she has been on both daily medications as well as as needed hydroxyzine for her anxiety but does not feel like it was well-controlled.  She said she was taking the hydroxyzine 3 times a day every day and felt like it lost its efficacy over time that was initially helpful.  She does see a psychiatrist she started on lithium about a week ago but she just started taking this.  She does see a therapist.  She does not feel like symptoms are bad enough to require inpatient admission.    ROS: Otherwise negative, unless stated as above.    She is well-appearing, no acute distress.  She is somewhat tearful and not wanting to provide specific " details.  Exam is otherwise unremarkable.  We we will have her evaluated by crisis.  She does have outpatient resources in place, but can raise the discussion of possible partial placement.  She does not meet criteria for 302 involuntary admission.    She was seen by crisis.  She will be discharged home.  She did mention wanting to try hydroxyzine again.  I discussed with her continued management, follow-up, indications for return, and she expresses understanding with this plan.    Problems Addressed:  Anxiety: acute illness or injury  Depression: acute illness or injury  History of bipolar disorder: acute illness or injury    Amount and/or Complexity of Data Reviewed  Labs: ordered. Decision-making details documented in ED Course.    Risk  Prescription drug management.             Disposition  Final diagnoses:   Anxiety   Depression   History of bipolar disorder     Time reflects when diagnosis was documented in both MDM as applicable and the Disposition within this note       Time User Action Codes Description Comment    6/15/2024  6:44 PM Hilary Mann [F41.9] Anxiety     6/15/2024  6:44 PM Hilary Mann [F32.A] Depression     6/15/2024  6:44 PM Hilary Mann [Z86.59] History of bipolar disorder           ED Disposition       ED Disposition   Discharge    Condition   Good    Date/Time   Sat Julian 15, 2024 1835    Comment   Janell Solorzano discharge to home/self care.             MD Documentation      Flowsheet Row Most Recent Value   Sending MD Dr ROSA Pompa          Follow-up Information       Follow up With Specialties Details Why Contact Info    [facility] Schedule an appointment as soon as possible for a visit  As needed, to follow up [address] [place] [id number] [phone]              Patient's Medications   Discharge Prescriptions    HYDROXYZINE HCL (ATARAX) 25 MG TABLET    Take 1 tablet (25 mg total) by mouth every 6  (six) hours as needed for anxiety       Start Date: 6/15/2024 End Date: --       Order Dose: 25 mg       Quantity: 20 tablet    Refills: 0       No discharge procedures on file.    PDMP Review         Value Time User    PDMP Reviewed  Yes 1/26/2022 10:52 AM ANGELA Vaughn            ED Provider  Electronically Signed by             Hilary Mann MD  06/15/24 [id number]
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
## 2024-06-15 NOTE — DISCHARGE INSTRUCTIONS
Follow-up with your psychiatrist and therapist as needed for further evaluation and management of your symptoms.  Take the hydroxyzine as needed.  Return if you have worsening anxiety and feel like you are having a hard time coping at home, or concern you are going to harm yourself, or for any other concerns.

## 2024-06-25 ENCOUNTER — TELEMEDICINE (OUTPATIENT)
Dept: PSYCHIATRY | Facility: CLINIC | Age: 24
End: 2024-06-25
Payer: COMMERCIAL

## 2024-06-25 DIAGNOSIS — F31.81 BIPOLAR II DISORDER (HCC): Primary | ICD-10-CM

## 2024-06-25 DIAGNOSIS — F41.9 ANXIETY: ICD-10-CM

## 2024-06-25 PROCEDURE — 99214 OFFICE O/P EST MOD 30 MIN: CPT | Performed by: NURSE PRACTITIONER

## 2024-06-25 RX ORDER — LITHIUM CARBONATE 300 MG/1
300 TABLET, FILM COATED, EXTENDED RELEASE ORAL 2 TIMES DAILY
Qty: 60 TABLET | Refills: 1 | Status: SHIPPED | OUTPATIENT
Start: 2024-06-25

## 2024-06-25 RX ORDER — HYDROXYZINE HYDROCHLORIDE 25 MG/1
25 TABLET, FILM COATED ORAL EVERY 6 HOURS PRN
Qty: 90 TABLET | Refills: 2 | Status: SHIPPED | OUTPATIENT
Start: 2024-06-25

## 2024-06-25 NOTE — PSYCH
Virtual Regular Visit    Verification of patient location:    Patient is located at Home in the following state in which I hold an active license [place]      Assessment/Plan:    Problem List Items Addressed This Visit     Bipolar II disorder (HCC) - Primary    Relevant Medications    lithium carbonate (LITHOBID) 300 mg CR tablet    hydrOXYzine HCL (ATARAX) 25 mg tablet    Other Relevant Orders    Lithium level    Comprehensive metabolic panel   Other Visit Diagnoses     Anxiety        Relevant Medications    hydrOXYzine HCL (ATARAX) 25 mg tablet          Reason for visit is   Chief Complaint   Patient presents with   • Virtual Regular Visit          Encounter provider ANGELA Vaughn      Recent Visits  No visits were found meeting these conditions.  Showing recent visits within past 7 days and meeting all other requirements  Today's Visits  Date Type Provider Dept   06/25/24 Telemedicine ANGELA Vaughn  [facility]   Showing today's visits and meeting all other requirements  Future Appointments  No visits were found meeting these conditions.  Showing future appointments within next 150 days and meeting all other requirements       The patient was identified by name and date of birth. Janell Solorzano was informed that this is a telemedicine visit and that the visit is being conducted throughthe Epic Embedded platform. She agrees to proceed..  My office door was closed. The patient was notified the following individuals were present in the room ANGELA Kang student.  She acknowledged consent and understanding of privacy and security of the video platform. The patient has agreed to participate and understands they can discontinue the visit at any time.    Patient is aware this is a billable service.     HPI     Past Medical History:   Diagnosis Date   • Anxiety    • Concussion    • Depression    • Head injury     concussions in high school and in college   • Self-injurious behavior   "      Past Surgical History:   Procedure Laterality Date   • WISDOM TOOTH EXTRACTION         Current Outpatient Medications   Medication Sig Dispense Refill   • hydrOXYzine HCL (ATARAX) 25 mg tablet Take 1 tablet (25 mg total) by mouth every 6 (six) hours as needed for anxiety 90 tablet 2   • lamoTRIgine (LaMICtal) 200 MG tablet Take 1 tablet (200 mg total) by mouth daily 30 tablet 2   • lithium carbonate (LITHOBID) 300 mg CR tablet Take 1 tablet (300 mg total) by mouth 2 (two) times a day 60 tablet 1   • Alisha 0.25-35 MG-MCG per tablet TAKE ONE TABLET BY MOUTH EVERY DAY 84 tablet 0   • mirtazapine (REMERON) 7.5 MG tablet Take 1 tablet (7.5 mg total) by mouth daily at bedtime 30 tablet 2   • ergocalciferol (VITAMIN D2) 50,000 units TAKE ONE CAPSULE BY MOUTH WEEKLY (Patient not taking: Reported on 6/4/2024) 16 capsule 0   • ibuprofen (MOTRIN) 800 mg tablet Take 1 tablet (800 mg total) by mouth 3 (three) times a day (Patient not taking: Reported on 6/4/2024) 21 tablet 0     No current facility-administered medications for this visit.        No Known Allergies    Review of Systems    Video Exam    There were no vitals filed for this visit.    Physical Exam     Visit Time    Visit Start Time: 0900  Visit Stop Time: 0915  Total Visit Duration:  15 minutes      MEDICATION MANAGEMENT NOTE        [facility]    Name and Date of Birth:  Janell Solorzano 23 y.o. 2000 MRN: [id number]    Date of Visit: June 25, 2024    SUBJECTIVE:    Janell is seen today for a follow up for Bipolar Disorder type II. She continues to experience ongoing symptoms since the last visit. Janell seen virtually today for medication management follow-up.  She was last seen by this provider on 6/4/2024 when lithium was initiated 300 mg p.o. at bedtime.  She appears flat and in different in conversation today.  She states that \"some days are bad, some days she is better than everybody else\".  She states " that she did go to the ED after having a bad day a couple weeks ago and they gave her hydroxyzine again which has been working for her sleep.  She denies any side effects from the lithium.  She denies any suicidal thoughts at this time.  She has been going out with her friends, drinking 2 nights per week, 4 drinks per night.  Advised to not drink while on Lithium, and to be aware of how she is feeling during this time.  She states that she has started going back to the gym again.  She has been going to work.  Sleep has been good, stating that she sleeps too much some nights. She currently denies SI/HI. Denies auditory and visual hallucinations.  She is agreeable to an increase in the lithium along with bloodwork in one week. Educated on how to obtain the bloodwork, 12 hrs after last dose, prior to taking am dose. She is agreeable.  We will follow up in 3 weeks.     She denies any side effects from current psychiatric medications.    PLAN:  Hydroxyzine 25 mg p.o. every 6 as needed for anxiety  Lamictal 200 mg p.o. daily  Increase lithium 300 mg p.o. twice daily  Mirtazapine 7.5 mg p.o. daily at bedtime  Continue therapy with therapist  Follow up with this provider in 3 weeks  The patient will call this provider sooner if concerns or issues arise prior to scheduled appointment.    PARQ on lithium including weight gain, headaches, renal and thyroid risks, interactions with various medications such as anti-inflammatories and blood pressure medications, lithium toxicity, GI upset, tremors, need for lab monitoring, and others, teratogenicity for female patients    Patient education on serotonin syndrome symptoms completed which included the following:  symptoms routinely occur when starting a new drug or increasing the dose of a drug you're already taking. Signs and symptoms include: agitation,restlessness,confusion,rapid heart rate, high blood pressure, dilated pupils, loss of muscle coordination, muscle twitching or  rigidity, heavy sweating, diarrhea, headache, shivering, goose bumps.  The patient was also informed that severe serotonin syndrome can be life-threatening and needs to seek immediate medical care, these sign and symptoms include: high fever, seizures, irregular heartbeat, and unconsciousness.  The patient verbalized understanding.    Lamotrigine PARQ completed including dizziness, headaches, ataxia, vision problems, somnolence, sleep changes, cognitive difficulties, rash (including Becerril-Chato rash), and others, risk of teratogenicity for females.     Aware of 24 hour and weekend coverage for urgent situations accessed by calling [facility] main practice number  Continue psychotherapy with therapist  Medication management every 3 weeks  Aware of need to follow up with family physician for medical issues    Diagnoses and all orders for this visit:    Bipolar II disorder (HCC)  -     lithium carbonate (LITHOBID) 300 mg CR tablet; Take 1 tablet (300 mg total) by mouth 2 (two) times a day  -     Cancel: Lithium level; Future  -     Lithium level; Future  -     Lithium level  -     Comprehensive metabolic panel; Future  -     Comprehensive metabolic panel    Anxiety  -     hydrOXYzine HCL (ATARAX) 25 mg tablet; Take 1 tablet (25 mg total) by mouth every 6 (six) hours as needed for anxiety        Current Rating Scores:     Current PHQ-9   PHQ-2/9 Depression Screening    Little interest or pleasure in doing things: 2 - more than half the days  Feeling down, depressed, or hopeless: 1 - several days  Trouble falling or staying asleep, or sleeping too much: 2 - more than half the days  Feeling tired or having little energy: 2 - more than half the days  Poor appetite or overeating: 3 - nearly every day  Feeling bad about yourself - or that you are a failure or have let yourself or your family down: 1 - several days  Trouble concentrating on things, such as reading the newspaper or watching  television: 3 - nearly every day  Moving or speaking so slowly that other people could have noticed. Or the opposite - being so fidgety or restless that you have been moving around a lot more than usual: 1 - several days  Thoughts that you would be better off dead, or of hurting yourself in some way: 1 - several days  PHQ-9 Score: 16  PHQ-9 Interpretation: Moderately severe depression       Current JOHANA-7 is   JOHANA-7 Flowsheet Screening    Flowsheet Row Most Recent Value   Over the last 2 weeks, how often have you been bothered by any of the following problems?    Feeling nervous, anxious, or on edge 3   Not being able to stop or control worrying 3   Worrying too much about different things 3   Trouble relaxing 3   Being so restless that it is hard to sit still 1   Becoming easily annoyed or irritable 3   Feeling afraid as if something awful might happen 3   JOHANA-7 Total Score 19      .    Psychotherapy Provided:     Individual psychotherapy provided: Yes  Supportive counseling provided.  Medication changes discussed with Janell.  Medication education provided to Janell.  Discussed with Janell coping with everyday stressors, ongoing anxiety, and chronic mental illness.   Coping strategies reviewed with Janell.   Importance of medication and treatment compliance reviewed with Janell.  Importance of follow up with family physician for medical issues reviewed with Janell.  Reassurance and supportive therapy provided.   Crisis/safety plan discussed with Janell. Patient will call prior to scheduled appointment if they have any issues or concerns.  Patient understands they can access the office by calling the main number at any time if they are in crisis.  They also understand they can call their county's crisis number or go to their nearest ED if suicidal ideation increases or if they develop a plan or intent.       HPI ROS Appetite Changes and Sleep:     She reports fluctuating sleep pattern, adequate appetite, normal  "energy level. Denies homicidal ideation, denies suicidal ideation    Review Of Systems:      HPI ROS:               Medication Side Effects:  denies   Depression (10 worst): Some days bad, some good   Anxiety (10 worst): high   Safety concerns (SI, HI, etc): denies   Sleep: fluctuating   Energy: adequate   Appetite: adequate     General emotional problems and sleep disturbances   Personality no change in personality   Constitutional as noted in HPI   ENT negative   Cardiovascular negative   Respiratory negative   Gastrointestinal negative   Genitourinary negative   Musculoskeletal negative   Integumentary negative   Neurological negative   Endocrine negative   Other Symptoms none, all other systems are negative     Mental Status Evaluation:    Appearance Appropriately dressed and fair eye contact   Behavior guarded and Dismissive   Mood anxious, depressed, and Apathetic  Depression Scale -  \"some days good, some bad\"  of 10 (0 = No depression)  Anxiety Scale -  \"high\"  of 10 (0 = No anxiety)   Speech Normal rate and volume   Affect Flat   Thought Processes Goal directed and coherent   Thought Content Does not verbalize delusional material   Associations Tightly connected   Perceptual Disturbances Denies hallucinations and does not appear to be responding to internal stimuli   Risk Potential Suicidal/Homicidal Ideation - No evidence of suicidal or homicidal ideation and patient does not verbalize suicidal or homicidal ideation  Risk of Violence - No evidence of risk for violence found on assessment  Risk of Self Mutilation - No evidence of risk for self mutilation found on assessment   Orientation oriented to person, place, time/date, and situation   Memory recent and remote memory grossly intact   Consciousness alert and awake   Attention/Concentration attention span and concentration are age appropriate   Insight fair   Judgement fair   Muscle Strength and Gait normal muscle strength and normal muscle tone, normal " gait/station and normal balance   Motor Activity no abnormal movements   Language no difficulty naming common objects, no difficulty repeating a phrase, no difficulty writing a sentence   Fund of Knowledge adequate knowledge of current events  adequate fund of knowledge regarding past history  adequate fund of knowledge regarding vocabulary      Past Psychiatric History Update:     Inpatient Psychiatric Admission Since Last Encounter:   no  Changes to Outpatient Psychiatric Treatment Team:    no  Suicide Attempt Or Self Mutilation Since Last Encounter:   no  Incidence of Violent Behavior Since Last Encounter:   no    Traumatic History Update:     New Onset of Abuse Since Last Encounter:   no  Traumatic Events Since Last Encounter:   no    Substance Abuse History:    Social History     Substance and Sexual Activity   Alcohol Use Yes   • Alcohol/week: 3.0 - 4.0 standard drinks of alcohol   • Types: 3 - 4 Shots of liquor per week    Comment: 3-4 drinks, 2 times per week     Social History     Substance and Sexual Activity   Drug Use No     Social History:    Social History     Socioeconomic History   • Marital status: Single     Spouse name: Not on file   • Number of children: 0   • Years of education: senior in college currently   • Highest education level: High school graduate   Occupational History   • 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   Tobacco Use   • Smoking status: Never   • Smokeless tobacco: Never   Vaping Use   • Vaping status: Never Used   Substance and Sexual Activity   • Alcohol use: Yes     Alcohol/week: 3.0 - 4.0 standard drinks of alcohol     Types: 3 - 4 Shots of liquor per week     Comment: 3-4 drinks, 2 times per week   • Drug use: No   • Sexual activity: Yes     Partners: Male     Birth control/protection: Condom Male   Other Topics Concern   • Not on file   Social History Narrative   • Not on file     Social Determinants of Health     Financial Resource Strain: Not on file   Food  Insecurity: Not on file   Transportation Needs: Not on file   Physical Activity: Not on file   Stress: Not on file   Social Connections: Unknown (6/18/2024)    Received from [facility]    • How often do you feel lonely or isolated from those around you? (Adult - for ages 18 years and over): Not on file   Intimate Partner Violence: Not on file   Housing Stability: Not on file     Family Psychiatric History:     Family History   Problem Relation Age of Onset   • Hypertension Mother    • Mental illness Mother    • Depression Mother    • Mental illness Sister    • Coronary artery disease Maternal Grandmother    • Throat cancer Paternal Grandfather      History Review:The following portions of the patient's history were reviewed and updated as appropriate: allergies, current medications, past family history, past medical history, past social history, past surgical history, and problem list     OBJECTIVE:     Vital signs in last 24 hours:    There were no vitals filed for this visit.  Laboratory Results: I have personally reviewed all pertinent laboratory/tests results.    Suicide/Homicide Risk Assessment:    Risk of Harm to Self:  The following ratings are based on assessment at the time of the interview  Recent Specific Risk Factors include: current depressive symptoms, current anxiety symptoms  Demographic risk factors include: , age: young adult (15-24)  Historical Risk Factors include: chronic depressive symptoms, chronic anxiety symptoms, chronic mood disorder, history of impulsive behaviors, history of traumatic experiences  Protective Factors: no current suicidal ideation, access to mental health treatment, compliant with mental health treatment, stable job, sense of determination, sense of importance of health and wellness  Weapons: none. The following steps have been taken to ensure weapons are properly secured: not applicable  Based on today's assessment, Janell presents the  following risk of harm to self: low    Risk of Harm to Others:  The following ratings are based on assessment at the time of the interview  Protective Factors: no current homicidal ideation  Based on today's assessment, Janell presents the following risk of harm to others: none    The following interventions are recommended: contracts for safety at present - agrees to go to ED if feeling unsafe, return in 3 weeks for reassessment, therapy appointment in 2 days, contracts for safety at present - agrees to call Crisis Intervention Service if feeling unsafe    Medications Risks/Benefits:      Risks, Benefits And Possible Side Effects Of Medications:    Discussed risks and benefits of treatment with patient including risk of suicidality, serotonin syndrome, increased QTc interval and SIADH related to treatment with antidepressants; Risk of induction of manic symptoms in certain patient populations, risk of rash related to treatment with Lamictal, and risk of kidney impairment related to treatment with Lithium     Controlled Medication Discussion:     Not applicable    Treatment Plan:    Due for update/Updated:   no  Last treatment plan done 1/11/24 by Michela Bates.  Treatment Plan due on 7/11/24.    ANGELA Vaughn 06/25/24    This note was not shared with the patient due to reasonable likelihood of causing patient harm

## 2024-06-27 ENCOUNTER — TELEMEDICINE (OUTPATIENT)
Dept: BEHAVIORAL/MENTAL HEALTH CLINIC | Facility: CLINIC | Age: 24
End: 2024-06-27
Payer: COMMERCIAL

## 2024-06-27 DIAGNOSIS — F31.81 BIPOLAR II DISORDER (HCC): Primary | ICD-10-CM

## 2024-06-27 PROCEDURE — 90834 PSYTX W PT 45 MINUTES: CPT

## 2024-06-27 NOTE — PSYCH
Virtual Regular Visit    Verification of patient location:    Patient is located at Home in the following state in which I hold an active license [place]      Assessment/Plan:    Problem List Items Addressed This Visit       Bipolar II disorder (HCC) - Primary       Goals addressed in session: Goal 1          Reason for visit is   Chief Complaint   Patient presents with    Virtual Regular Visit          Encounter provider Michela Bates LCSW      Recent Visits  No visits were found meeting these conditions.  Showing recent visits within past 7 days and meeting all other requirements  Today's Visits  Date Type Provider Dept   06/27/24 Telemedicine Michela Bates LCSW [facility] [place]   Showing today's visits and meeting all other requirements  Future Appointments  No visits were found meeting these conditions.  Showing future appointments within next 150 days and meeting all other requirements       The patient was identified by name and date of birth. Janell Solorzano was informed that this is a telemedicine visit and that the visit is being conducted throughthe Epic Embedded platform. She agrees to proceed..  My office door was closed. No one else was in the room.  She acknowledged consent and understanding of privacy and security of the video platform. The patient has agreed to participate and understands they can discontinue the visit at any time.    Patient is aware this is a billable service.     Subjective  Janell Solorzano is a 23 y.o. female . .      HPI     Past Medical History:   Diagnosis Date    Anxiety     Concussion     Depression     Head injury     concussions in high school and in college    Self-injurious behavior        Past Surgical History:   Procedure Laterality Date    WISDOM TOOTH EXTRACTION         Current Outpatient Medications   Medication Sig Dispense Refill    ergocalciferol (VITAMIN D2) 50,000 units TAKE ONE CAPSULE BY MOUTH WEEKLY (Patient not taking: Reported on 6/4/2024) 16  capsule 0    hydrOXYzine HCL (ATARAX) 25 mg tablet Take 1 tablet (25 mg total) by mouth every 6 (six) hours as needed for anxiety 90 tablet 2    ibuprofen (MOTRIN) 800 mg tablet Take 1 tablet (800 mg total) by mouth 3 (three) times a day (Patient not taking: Reported on 6/4/2024) 21 tablet 0    lamoTRIgine (LaMICtal) 200 MG tablet Take 1 tablet (200 mg total) by mouth daily 30 tablet 2    lithium carbonate (LITHOBID) 300 mg CR tablet Take 1 tablet (300 mg total) by mouth 2 (two) times a day 60 tablet 1    Alisha 0.25-35 MG-MCG per tablet TAKE ONE TABLET BY MOUTH EVERY DAY 84 tablet 0    mirtazapine (REMERON) 7.5 MG tablet Take 1 tablet (7.5 mg total) by mouth daily at bedtime 30 tablet 2     No current facility-administered medications for this visit.        No Known Allergies    Review of Systems    Video Exam    There were no vitals filed for this visit.    Physical Exam     Behavioral Health Psychotherapy Progress Note    Psychotherapy Provided: Individual Psychotherapy     1. Bipolar II disorder (HCC)            Goals addressed in session: Goal 1     DATA: Appointment started late [facility]  Janell discussed latest frustrations putting in usual grand terms of 'hating everything', her life sucks, and she is crazy. She noted now living with her grandparents, and not ideal, but has her own room (with her cat Chubbs).  We addressed her awareness and insight, and her struggle in making changes.  We addressed the importance to consider the potential consequences in making change, as well as the emotions, such as fear, with making changes.  She addressed her frustration and so many med changes, and not sure she needs meds or if her current meds are even helping.  She again questioned her diagnoses, voiced question and concern if she really has BPD. She now stated believing she has OCD (because she perseverates, and has to assure herself with something 3 times). She  confirmed believing she has ADD due to having so many thoughts in her head, and going from one to the other. We discussed her concerns re: diagnoses. We discussed that she is scheduled for psychological testing. We addressed how this testing was initiated through her and Jacque, and how it may help in clarifying diagnoses. Provider advised Janell that I would likely be asked to discuss her and I gave a brief review of what I might address.  Provider inquired how she feels this testing might be helpful, and what her concerns may be with the potential diagnostic results.  She was not fully able to articulate at this time.  Janell again addressed her current interactions with the work colleague and her frustrations with this toxic dynamic. Provider encouraged Janell's self-awareness and the struggle with doing differently. Provider encouraged resuming going to the gym, eating more steadily and healthily to establish some stable routine and patterns to help manage this other chaotic part of her life. Provider encouraged working on decreasing her negative, deprecating self-talk ('i'm crazy', 'messed up' -type phrasing). She denied feeling more anxious, denied overt worsening at this time. We confirmed our next session in 2 weeks. Virtual encounter form sent after session.       During this session, this clinician used the following therapeutic modalities: Cognitive Behavioral Therapy and Supportive Psychotherapy    Substance Abuse was addressed during this session. If the client is diagnosed with a co-occurring substance use disorder, please indicate any changes in the frequency or amount of use: Janell acknowledged some drinking when going out but doesn't acknowledge, when asked, if she believes the drinking contributes to problematic interactions at those times. Stage of change for addressing substance use diagnoses: Contemplation    ASSESSMENT:  Janell Solorzano presents with a Euthymic/ normal and Anxious mood.     her  "affect is Normal range and intensity, which is congruent, with her mood and the content of the session. The client has made progress on their goals.    Janell presented with somewhat increased anxiety but contained, thoughtful and goal-directed Janell Solorzano presents with a low risk of suicide, low risk of self-harm, and low risk of harm to others.    For any risk assessment that surpasses a \"low\" rating, a safety plan must be developed.    A safety plan was indicated: no  If yes, describe in detail n/a    PLAN: Between sessions, Janell Solorzano will work on keeping routine where possible as discussed. At the next session, the therapist will use Cognitive Behavioral Therapy and Supportive Psychotherapy to address mood stability and continued ADL and overall management.    Behavioral Health Treatment Plan and Discharge Planning: Janell Solorzano is aware of and agrees to continue to work on their treatment plan. They have identified and are working toward their discharge goals. yes    Visit start and stop times:    06/27/24  Start Time: 1110  Stop Time: 1155  Total Visit Time: 45 minutes      "

## 2024-06-30 DIAGNOSIS — Z30.011 ENCOUNTER FOR INITIAL PRESCRIPTION OF CONTRACEPTIVE PILLS: ICD-10-CM

## 2024-07-01 ENCOUNTER — TELEPHONE (OUTPATIENT)
Age: 24
End: 2024-07-01

## 2024-07-01 NOTE — TELEPHONE ENCOUNTER
Writer called insurance to start authorization for testing with . for July 15th,2024. Writer will update this encounter when Authorization is obtained.

## 2024-07-02 ENCOUNTER — OFFICE VISIT (OUTPATIENT)
Dept: FAMILY MEDICINE CLINIC | Facility: CLINIC | Age: 24
End: 2024-07-02
Payer: COMMERCIAL

## 2024-07-02 ENCOUNTER — APPOINTMENT (OUTPATIENT)
Dept: LAB | Facility: HOSPITAL | Age: 24
End: 2024-07-02
Payer: COMMERCIAL

## 2024-07-02 VITALS
TEMPERATURE: 97.7 F | BODY MASS INDEX: 23.07 KG/M2 | SYSTOLIC BLOOD PRESSURE: 126 MMHG | DIASTOLIC BLOOD PRESSURE: 78 MMHG | WEIGHT: 130.2 LBS | HEART RATE: 96 BPM | OXYGEN SATURATION: 100 % | HEIGHT: 63 IN

## 2024-07-02 DIAGNOSIS — R63.4 UNINTENDED WEIGHT LOSS: Primary | ICD-10-CM

## 2024-07-02 DIAGNOSIS — R63.0 LOSS OF APPETITE: ICD-10-CM

## 2024-07-02 DIAGNOSIS — R11.0 NAUSEA: ICD-10-CM

## 2024-07-02 DIAGNOSIS — F31.81 BIPOLAR II DISORDER (HCC): ICD-10-CM

## 2024-07-02 LAB
ALBUMIN SERPL BCG-MCNC: 4.3 G/DL (ref 3.5–5)
ALP SERPL-CCNC: 43 U/L (ref 34–104)
ALT SERPL W P-5'-P-CCNC: 12 U/L (ref 7–52)
ANION GAP SERPL CALCULATED.3IONS-SCNC: 8 MMOL/L (ref 4–13)
AST SERPL W P-5'-P-CCNC: 15 U/L (ref 13–39)
BILIRUB SERPL-MCNC: 0.48 MG/DL (ref 0.2–1)
BUN SERPL-MCNC: 10 MG/DL (ref 5–25)
CALCIUM SERPL-MCNC: 8.9 MG/DL (ref 8.4–10.2)
CHLORIDE SERPL-SCNC: 104 MMOL/L (ref 96–108)
CO2 SERPL-SCNC: 26 MMOL/L (ref 21–32)
CREAT SERPL-MCNC: 0.9 MG/DL (ref 0.6–1.3)
GFR SERPL CREATININE-BSD FRML MDRD: 90 ML/MIN/1.73SQ M
GLUCOSE P FAST SERPL-MCNC: 90 MG/DL (ref 65–99)
LITHIUM SERPL-SCNC: 0.44 MMOL/L (ref 0.6–1.2)
POTASSIUM SERPL-SCNC: 3.5 MMOL/L (ref 3.5–5.3)
PROT SERPL-MCNC: 7 G/DL (ref 6.4–8.4)
SODIUM SERPL-SCNC: 138 MMOL/L (ref 135–147)

## 2024-07-02 PROCEDURE — 36415 COLL VENOUS BLD VENIPUNCTURE: CPT | Performed by: NURSE PRACTITIONER

## 2024-07-02 PROCEDURE — 80178 ASSAY OF LITHIUM: CPT | Performed by: NURSE PRACTITIONER

## 2024-07-02 PROCEDURE — 99214 OFFICE O/P EST MOD 30 MIN: CPT | Performed by: NURSE PRACTITIONER

## 2024-07-02 PROCEDURE — 80053 COMPREHEN METABOLIC PANEL: CPT | Performed by: NURSE PRACTITIONER

## 2024-07-02 RX ORDER — PANTOPRAZOLE SODIUM 40 MG/1
40 TABLET, DELAYED RELEASE ORAL
Qty: 30 TABLET | Refills: 1 | Status: SHIPPED | OUTPATIENT
Start: 2024-07-02

## 2024-07-02 RX ORDER — ONDANSETRON 4 MG/1
4 TABLET, FILM COATED ORAL EVERY 8 HOURS PRN
Qty: 20 TABLET | Refills: 0 | Status: SHIPPED | OUTPATIENT
Start: 2024-07-02

## 2024-07-02 NOTE — PROGRESS NOTES
Ambulatory Visit  Name: Janell Solorzano      : 2000      MRN: [id number]  Encounter Provider: ANGELA Arthur  Encounter Date: 2024   Encounter department: [facility] [id number] [place] [address]    Assessment & Plan   1. Unintended weight loss  Comments:  I do suspect this is mostly relate to mental health, but i would like her to have further work up with GI. Follow up in 1 month for weight check.  Orders:  -     ondansetron (ZOFRAN) 4 mg tablet; Take 1 tablet (4 mg total) by mouth every 8 (eight) hours as needed for nausea or vomiting  -     pantoprazole (PROTONIX) 40 mg tablet; Take 1 tablet (40 mg total) by mouth daily before breakfast  -     Ambulatory Referral to Gastroenterology; Future  2. Nausea  Comments:  Advise small, bland meals. Zofran prn.  Orders:  -     ondansetron (ZOFRAN) 4 mg tablet; Take 1 tablet (4 mg total) by mouth every 8 (eight) hours as needed for nausea or vomiting  3. Loss of appetite  Comments:  Refer to GI. Suspect this is MH related.  Orders:  -     ondansetron (ZOFRAN) 4 mg tablet; Take 1 tablet (4 mg total) by mouth every 8 (eight) hours as needed for nausea or vomiting  -     pantoprazole (PROTONIX) 40 mg tablet; Take 1 tablet (40 mg total) by mouth daily before breakfast  -     Ambulatory Referral to Gastroenterology; Future  4. Bipolar II disorder (HCC)  Assessment & Plan:  Currently adjusting meds with psychiatry. Seeing therapist regularly.        History of Present Illness     Patient presents for concerns of unintended weight loss. Fdlmp- , missed 4 days of ocp. She lost about 5-10 lbs. She has no appetite. This has been for 3 weeks. Recently increased lithium to twice daily.   She has been having vaginal bleeding for 2 months. She did skip some ocp's.         Review of Systems   Constitutional:  Positive for appetite change, fatigue and unexpected weight change. Negative for chills, diaphoresis and fever.   HENT:  Negative for  congestion, ear pain and sore throat.    Eyes:  Negative for pain and visual disturbance.   Respiratory:  Negative for cough and shortness of breath.    Cardiovascular:  Negative for chest pain and palpitations.   Gastrointestinal:  Positive for abdominal distention, constipation and nausea. Negative for abdominal pain, anal bleeding, blood in stool, diarrhea and vomiting.   Genitourinary:  Positive for menstrual problem. Negative for dysuria, frequency and hematuria.   Musculoskeletal:  Negative for arthralgias and back pain.   Skin:  Negative for color change and rash.   Neurological:  Positive for dizziness and light-headedness. Negative for seizures, syncope and headaches.   Psychiatric/Behavioral:  Positive for dysphoric mood and suicidal ideas. Negative for sleep disturbance. The patient is nervous/anxious.    All other systems reviewed and are negative.    Current Outpatient Medications on File Prior to Visit   Medication Sig Dispense Refill    hydrOXYzine HCL (ATARAX) 25 mg tablet Take 1 tablet (25 mg total) by mouth every 6 (six) hours as needed for anxiety 90 tablet 2    lamoTRIgine (LaMICtal) 200 MG tablet Take 1 tablet (200 mg total) by mouth daily 30 tablet 2    lithium carbonate (LITHOBID) 300 mg CR tablet Take 1 tablet (300 mg total) by mouth 2 (two) times a day 60 tablet 1    mirtazapine (REMERON) 7.5 MG tablet Take 1 tablet (7.5 mg total) by mouth daily at bedtime 30 tablet 2    norgestimate-ethinyl estradiol (Sprintec 28) 0.25-35 MG-MCG per tablet TAKE ONE TABLET BY MOUTH EVERY DAY 84 tablet 1    [DISCONTINUED] ergocalciferol (VITAMIN D2) 50,000 units TAKE ONE CAPSULE BY MOUTH WEEKLY (Patient not taking: Reported on 6/4/2024) 16 capsule 0    [DISCONTINUED] ibuprofen (MOTRIN) 800 mg tablet Take 1 tablet (800 mg total) by mouth 3 (three) times a day (Patient not taking: Reported on 6/4/2024) 21 tablet 0     No current facility-administered medications on file prior to visit.      Objective     BP  "126/78 (BP Location: Left arm, Patient Position: Sitting)   Pulse 96   Temp 97.7 °F (36.5 °C)   Ht 5' 3\" (1.6 m)   Wt 59.1 kg (130 lb 3.2 oz)   LMP 06/08/2024   SpO2 100%   BMI 23.06 kg/m²     Physical Exam  Vitals and nursing note reviewed.   Constitutional:       General: She is not in acute distress.     Appearance: She is well-developed.   HENT:      Head: Normocephalic and atraumatic.   Eyes:      Conjunctiva/sclera: Conjunctivae normal.   Cardiovascular:      Rate and Rhythm: Normal rate and regular rhythm.      Heart sounds: No murmur heard.  Pulmonary:      Effort: Pulmonary effort is normal. No respiratory distress.      Breath sounds: Normal breath sounds.   Abdominal:      Palpations: Abdomen is soft.      Tenderness: There is no abdominal tenderness.   Musculoskeletal:         General: No swelling.      Cervical back: Neck supple.   Skin:     General: Skin is warm and dry.      Capillary Refill: Capillary refill takes less than 2 seconds.   Neurological:      Mental Status: She is alert.   Psychiatric:         Mood and Affect: Mood normal.       Administrative Statements   I have spent a total time of 20 minutes in caring for this patient on the day of the visit/encounter including Counseling / Coordination of care, Documenting in the medical record, Reviewing / ordering tests, medicine, procedures  , Obtaining or reviewing history  , and Communicating with other healthcare professionals .        "

## 2024-07-08 ENCOUNTER — TELEPHONE (OUTPATIENT)
Dept: BEHAVIORAL/MENTAL HEALTH CLINIC | Facility: CLINIC | Age: 24
End: 2024-07-08

## 2024-07-08 ENCOUNTER — TELEPHONE (OUTPATIENT)
Dept: PSYCHIATRY | Facility: CLINIC | Age: 24
End: 2024-07-08

## 2024-07-08 ENCOUNTER — OFFICE VISIT (OUTPATIENT)
Dept: FAMILY MEDICINE CLINIC | Facility: CLINIC | Age: 24
End: 2024-07-08
Payer: COMMERCIAL

## 2024-07-08 VITALS
BODY MASS INDEX: 22.61 KG/M2 | HEIGHT: 63 IN | RESPIRATION RATE: 18 BRPM | OXYGEN SATURATION: 98 % | SYSTOLIC BLOOD PRESSURE: 102 MMHG | TEMPERATURE: 98.6 F | HEART RATE: 86 BPM | WEIGHT: 127.6 LBS | DIASTOLIC BLOOD PRESSURE: 74 MMHG

## 2024-07-08 DIAGNOSIS — N62 MACROMASTIA: ICD-10-CM

## 2024-07-08 DIAGNOSIS — N30.00 ACUTE CYSTITIS WITHOUT HEMATURIA: Primary | ICD-10-CM

## 2024-07-08 PROCEDURE — 87086 URINE CULTURE/COLONY COUNT: CPT | Performed by: NURSE PRACTITIONER

## 2024-07-08 PROCEDURE — 87186 SC STD MICRODIL/AGAR DIL: CPT | Performed by: NURSE PRACTITIONER

## 2024-07-08 PROCEDURE — 99214 OFFICE O/P EST MOD 30 MIN: CPT | Performed by: NURSE PRACTITIONER

## 2024-07-08 PROCEDURE — 81002 URINALYSIS NONAUTO W/O SCOPE: CPT | Performed by: NURSE PRACTITIONER

## 2024-07-08 PROCEDURE — 87077 CULTURE AEROBIC IDENTIFY: CPT | Performed by: NURSE PRACTITIONER

## 2024-07-08 RX ORDER — NITROFURANTOIN 25; 75 MG/1; MG/1
100 CAPSULE ORAL 2 TIMES DAILY
Qty: 10 CAPSULE | Refills: 0 | Status: SHIPPED | OUTPATIENT
Start: 2024-07-08 | End: 2024-07-13

## 2024-07-08 NOTE — TELEPHONE ENCOUNTER
Writer spoke to patient and let her know that 7/15 is the earliest appointment we can get. Because we where still working on obtaining the authorization for testing. Patient under stood.

## 2024-07-08 NOTE — PROGRESS NOTES
Ambulatory Visit  Name: Janell Solorzano      : 2000      MRN: [id number]  Encounter Provider: ANGELA Arthur  Encounter Date: 2024   Encounter department: [facility] [id number] [place] [address]    Assessment & Plan   1. Acute cystitis without hematuria  Comments:  Will treat with Macrobid.  Advised to increase fluids.  Sent urine culture and will will change antibiotic if necessary.  Orders:  -     POCT urine dip  -     Urine culture  -     nitrofurantoin (MACROBID) 100 mg capsule; Take 1 capsule (100 mg total) by mouth 2 (two) times a day for 5 days  2. Macromastia  Comments:  Would like to see plastic surgery for possible breast reduction.  Orders:  -     Ambulatory Referral to Plastic Surgery; Future       History of Present Illness     Patient presents for concerns of UTI. Started 3 days ago with dysuria, urgency and frequency.  Denies flank pain, nausea or vomiting.  She is requesting a plastic surgery referral for possible breast reduction.        Review of Systems   Constitutional:  Negative for chills and fever.   HENT:  Negative for ear pain and sore throat.    Eyes:  Negative for pain and visual disturbance.   Respiratory:  Negative for cough and shortness of breath.    Cardiovascular:  Negative for chest pain and palpitations.   Gastrointestinal:  Negative for abdominal pain and vomiting.   Genitourinary:  Positive for dysuria, frequency and urgency. Negative for flank pain and hematuria.   Musculoskeletal:  Positive for back pain. Negative for arthralgias.   Skin:  Negative for color change and rash.   Neurological:  Negative for seizures and syncope.   Psychiatric/Behavioral:  Positive for dysphoric mood and sleep disturbance. The patient is nervous/anxious.    All other systems reviewed and are negative.    Current Outpatient Medications on File Prior to Visit   Medication Sig Dispense Refill    hydrOXYzine HCL (ATARAX) 25 mg tablet Take 1 tablet (25 mg total) by  "mouth every 6 (six) hours as needed for anxiety 90 tablet 2    lamoTRIgine (LaMICtal) 200 MG tablet Take 1 tablet (200 mg total) by mouth daily 30 tablet 2    lithium carbonate (LITHOBID) 300 mg CR tablet Take 1 tablet (300 mg total) by mouth 2 (two) times a day 60 tablet 1    mirtazapine (REMERON) 7.5 MG tablet Take 1 tablet (7.5 mg total) by mouth daily at bedtime 30 tablet 2    norgestimate-ethinyl estradiol (Sprintec 28) 0.25-35 MG-MCG per tablet TAKE ONE TABLET BY MOUTH EVERY DAY 84 tablet 1    ondansetron (ZOFRAN) 4 mg tablet Take 1 tablet (4 mg total) by mouth every 8 (eight) hours as needed for nausea or vomiting 20 tablet 0    pantoprazole (PROTONIX) 40 mg tablet Take 1 tablet (40 mg total) by mouth daily before breakfast 30 tablet 1     No current facility-administered medications on file prior to visit.      Objective     /74 (BP Location: Left arm, Patient Position: Sitting)   Pulse 86   Temp 98.6 °F (37 °C)   Resp 18   Ht 5' 3\" (1.6 m)   Wt 57.9 kg (127 lb 9.6 oz)   LMP 06/08/2024   SpO2 98%   BMI 22.60 kg/m²     Physical Exam  Vitals and nursing note reviewed.   Constitutional:       General: She is not in acute distress.     Appearance: She is well-developed.   HENT:      Head: Normocephalic and atraumatic.   Cardiovascular:      Rate and Rhythm: Normal rate and regular rhythm.      Heart sounds: No murmur heard.  Pulmonary:      Effort: Pulmonary effort is normal. No respiratory distress.      Breath sounds: Normal breath sounds.   Abdominal:      Palpations: Abdomen is soft.      Tenderness: There is no abdominal tenderness. There is no right CVA tenderness or left CVA tenderness.   Musculoskeletal:         General: No swelling.      Cervical back: Neck supple.   Skin:     General: Skin is warm and dry.      Capillary Refill: Capillary refill takes less than 2 seconds.   Neurological:      Mental Status: She is alert.   Psychiatric:         Mood and Affect: Mood normal.           "

## 2024-07-08 NOTE — TELEPHONE ENCOUNTER
Phone call message from Janell Solorzano; provider returned call. Ms. Solorzano addressed increased symptoms and believing that she is manic. She reported getting tattoos and botox, and increased, more excessive, communications to a coworker that she is romantically interested in. She stated taking a voluntary leave from her work due to increased stress and crying on the job.  With questioning, Ms. Solorzano denied thought of harm to herself or others, denied suicidal thought, intent, or plan, and stated belief that she can contain her impulsive behaviors. She was aware of ER if needed. She stated concern about her current medications, but not sure if her symptomology is due to that. She stated that she was able to get an appointment virtually with psychiatric provider Jacque Alejandro tomorrow morning. She plans to address meds and her overall symptoms and management at that discussion. We addressed our next appointment this Thursday.  We discussed changing it from virtual to in person, and made the adjustment accordingly.  We discussed overall goals for our session.  In the course of our present conversation, Ms. Solorzano seemed to calm somewhat and demonstrate being able to pause and consider options. Discussed that she has friend supports, and encouraged also reaching out to them as needed. Provider offered if an opening arises before Thursday, and also for Janell to call again if needed in the interim, and provider will call back asap (but explained limited availability).

## 2024-07-08 NOTE — TELEPHONE ENCOUNTER
Patient called office requesting an earlier appointment. Writer informed patient the message would be sent and someone will return her call.

## 2024-07-09 ENCOUNTER — TELEMEDICINE (OUTPATIENT)
Dept: PSYCHIATRY | Facility: CLINIC | Age: 24
End: 2024-07-09

## 2024-07-09 DIAGNOSIS — F31.12 BIPOLAR 1 DISORDER WITH MODERATE MANIA (HCC): Primary | ICD-10-CM

## 2024-07-09 PROBLEM — F31.9 BIPOLAR 1 DISORDER (HCC): Status: ACTIVE | Noted: 2021-10-13

## 2024-07-09 RX ORDER — DIVALPROEX SODIUM 250 MG/1
TABLET, EXTENDED RELEASE ORAL
Qty: 67 TABLET | Refills: 0 | Status: SHIPPED | OUTPATIENT
Start: 2024-07-09 | End: 2024-07-19 | Stop reason: DRUGHIGH

## 2024-07-09 RX ORDER — LAMOTRIGINE 200 MG/1
100 TABLET ORAL DAILY
Qty: 30 TABLET | Refills: 2 | Status: SHIPPED | OUTPATIENT
Start: 2024-07-09 | End: 2024-07-19 | Stop reason: ALTCHOICE

## 2024-07-09 NOTE — PSYCH
Virtual Regular Visit    Verification of patient location:    Patient is located at Home in the following state in which I hold an active license [place]      Assessment/Plan:    Problem List Items Addressed This Visit     Bipolar 1 disorder (HCC) - Primary    Relevant Medications    lamoTRIgine (LaMICtal) 200 MG tablet    divalproex sodium (Depakote ER) 250 mg 24 hr tablet       Reason for visit is   Chief Complaint   Patient presents with   • Virtual Regular Visit              Encounter provider ANGELA Vaughn      Recent Visits  Date Type Provider Dept   07/08/24 Office Visit [facility]GELA [facility] [address] [place] [address]   07/02/24 Office Visit [facility]GELA [facility] [address]   Showing recent visits within past 7 days and meeting all other requirements  Today's Visits  Date Type Provider Dept   07/09/24 Telemedicine [facility]A [facility]   Showing today's visits and meeting all other requirements  Future Appointments  No visits were found meeting these conditions.  Showing future appointments within next 150 days and meeting all other requirements       The patient was identified by name and date of birth. Janell Solorzano was informed that this is a telemedicine visit and that the visit is being conducted throughthe Epic Embedded platform. She agrees to proceed..  My office door was closed. No one else was in the room.  She acknowledged consent and understanding of privacy and security of the video platform. The patient has agreed to participate and understands they can discontinue the visit at any time.    Patient is aware this is a billable service.     HPI     Past Medical History:   Diagnosis Date   • Anxiety    • Concussion    • Depression    • Head injury     concussions in high school and in college   • Self-injurious behavior        Past Surgical History:   Procedure Laterality Date   • WISDOM TOOTH EXTRACTION         Current Outpatient  Medications   Medication Sig Dispense Refill   • divalproex sodium (Depakote ER) 250 mg 24 hr tablet Take 1 tablet (250 mg total) by mouth daily for 7 days, THEN 2 tablets (500 mg total) daily. 67 tablet 0   • lamoTRIgine (LaMICtal) 200 MG tablet Take 0.5 tablets (100 mg total) by mouth daily Take 100mg for 7 days and discontinue 30 tablet 2   • hydrOXYzine HCL (ATARAX) 25 mg tablet Take 1 tablet (25 mg total) by mouth every 6 (six) hours as needed for anxiety 90 tablet 2   • mirtazapine (REMERON) 7.5 MG tablet Take 1 tablet (7.5 mg total) by mouth daily at bedtime 30 tablet 2   • nitrofurantoin (MACROBID) 100 mg capsule Take 1 capsule (100 mg total) by mouth 2 (two) times a day for 5 days 10 capsule 0   • norgestimate-ethinyl estradiol (Sprintec 28) 0.25-35 MG-MCG per tablet TAKE ONE TABLET BY MOUTH EVERY DAY 84 tablet 1   • ondansetron (ZOFRAN) 4 mg tablet Take 1 tablet (4 mg total) by mouth every 8 (eight) hours as needed for nausea or vomiting 20 tablet 0   • pantoprazole (PROTONIX) 40 mg tablet Take 1 tablet (40 mg total) by mouth daily before breakfast 30 tablet 1     No current facility-administered medications for this visit.        No Known Allergies    Review of Systems    Video Exam    There were no vitals filed for this visit.    Physical Exam     Visit Time    Visit Start Time: 0930  Visit Stop Time: 0952  Total Visit Duration:  22 minutes      MEDICATION MANAGEMENT NOTE        [facility]    Name and Date of Birth:  Janell Solorzano 23 y.o. 2000 MRN: [id number]    Date of Visit: July 9, 2024    SUBJECTIVE:    Janell is seen today for a follow up for Bipolar Disorder type II. She continues to experience ongoing symptoms since the last visit. Janell seen virtually today for medication management follow-up.  She was last seen by this provider on 6/25/24 when lithium was increased to 300 mg p.o. BID.  Janell states today that she has gotten 2 tattoos,  "Botox, and has spent a lot of money.  She reports she sent a lot of text messages to a male and is embarrassed about her actions.  She states \"I hate myself\" but denies any suicidal ideation.  She is restless and fidgety.  She states she is not eating.   She states she almost quit her job, she is irritable, appears agitated.  She does report she is sleeping with the mirtazapine.     She does not like the lithium and does not want an increase in the medication, despite it not being therapeutic at her last lab. We discussed other mood stabilizers. She is agreeable to depakote, understands we need to d/c the lamictal in order to initiate the depakote. She has tried other atypical antipsychotics which made her \"feel weird\" and were ineffective, such as abilify, vraylar, risperdal, zyprexa, and latuda. She does not want anything that will make her gain weight.  She reports that her mother was on lithium but also did not like it.  She does not know what her mom is currently taking, states she is on a \"sleeping pill\" and had ECT.        At this time, we will decrease lamictal to 100mg po daily x 7 days then d/c.  We will initiate depakote at 250mg PO HS x 7 days, then increase to 500mg PO HS.  She is agreeable at this time. She has a neuropsych eval scheduled for next week.  She will follow up with this provider in 2 weeks. The patient will call this provider sooner if concerns or issues arise prior to scheduled appointment.      This provider did recommend PHP at this time, Janell did state that she would think about it.  Will continue to encourage participation at subsequent appointments if needed.     She denies any side effects from current psychiatric medications.    PLAN:  Hydroxyzine 25 mg p.o. every 6 as needed for anxiety  D/C lithium 300 mg p.o. twice daily  Mirtazapine 7.5 mg p.o. daily at bedtime  At this time, we will decrease lamictal to 100mg po daily x 7 days then d/c.  We will initiate depakote at 250mg PO " HS x 7 days, then increase to 500mg PO HS.  She is agreeable at this time. She has a neuropsych eval scheduled for next week.  She will follow up with this provider in 2 weeks. The patient will call this provider sooner if concerns or issues arise prior to scheduled appointment  Continue therapy with therapist    Depakote PARQ completed including GI distress, tremor, weight gain, and hepatic risks, blood dyscrasias/bone marrow effects, SIADH, pancreatitis, andrenergic effects, PCOS, allergic reactions, worsened depression/suicidality, fetal abnormality risks, and others including need for blood testing and monitoring.     Lamotrigine PARQ completed including dizziness, headaches, ataxia, vision problems, somnolence, sleep changes, cognitive difficulties, rash (including Becerril-Chato rash), and others, risk of teratogenicity for females.     Aware of 24 hour and weekend coverage for urgent situations accessed by calling [facility] main practice number  Continue psychotherapy with therapist  Medication management every 2 weeks  Aware of need to follow up with family physician for medical issues    Diagnoses and all orders for this visit:    Bipolar 1 disorder with moderate saul (HCC)  -     lamoTRIgine (LaMICtal) 200 MG tablet; Take 0.5 tablets (100 mg total) by mouth daily Take 100mg for 7 days and discontinue  -     divalproex sodium (Depakote ER) 250 mg 24 hr tablet; Take 1 tablet (250 mg total) by mouth daily for 7 days, THEN 2 tablets (500 mg total) daily.      Psychotherapy Provided:     Individual psychotherapy provided: Yes  Supportive counseling provided.  Medication changes discussed with Janell.  Medication education provided to Janell.  Discussed with Janell coping with everyday stressors, ongoing anxiety, and chronic mental illness.   Coping strategies reviewed with Janell.   Importance of medication and treatment compliance reviewed with Janell.  Importance of follow up with  "family physician for medical issues reviewed with Janell.  Reassurance and supportive therapy provided.   Crisis/safety plan discussed with Janell. Patient will call prior to scheduled appointment if they have any issues or concerns.  Patient understands they can access the office by calling the main number at any time if they are in crisis.  They also understand they can call their [facility]'s crisis number or go to their nearest ED if suicidal ideation increases or if they develop a plan or intent.       HPI ROS Appetite Changes and Sleep:     She reports fluctuating sleep pattern, adequate appetite, normal energy level. Denies homicidal ideation, denies suicidal ideation    Review Of Systems:      HPI ROS:               Medication Side Effects:   denies   Depression (10 worst): Per HPI Some days bad, some good   Anxiety (10 worst): Per HPI high   Safety concerns (SI, HI, etc): Denies at this time  denies   Sleep: fluctuating fluctuating   Energy: elevated adequate   Appetite: poor adequate     General emotional problems and sleep disturbances   Personality change in personality, states when she is manic \"that is a different person\"   Constitutional as noted in HPI   ENT negative   Cardiovascular negative   Respiratory negative   Gastrointestinal negative   Genitourinary negative   Musculoskeletal negative   Integumentary negative   Neurological negative   Endocrine negative   Other Symptoms none, all other systems are negative     Mental Status Evaluation:    Appearance Appropriately dressed and fair eye contact   Behavior cooperative   Mood anxious, depressed, and irritable  Depression Scale -  per hpi  of 10 (0 = No depression)  Anxiety Scale -  per hpi  of 10 (0 = No anxiety)   Speech Normal rate and volume   Affect labile   Thought Processes Goal directed and coherent   Thought Content Does not verbalize delusional material   Associations Tightly connected   Perceptual Disturbances Denies hallucinations and does " not appear to be responding to internal stimuli   Risk Potential Suicidal/Homicidal Ideation - No evidence of suicidal or homicidal ideation and patient does not verbalize suicidal or homicidal ideation  Risk of Violence - No evidence of risk for violence found on assessment  Risk of Self Mutilation - No evidence of risk for self mutilation found on assessment   Orientation oriented to person, place, time/date, and situation   Memory recent and remote memory grossly intact   Consciousness alert and awake   Attention/Concentration attention span and concentration are age appropriate   Insight fair   Judgement fair   Muscle Strength and Gait normal muscle strength and normal muscle tone, normal gait/station and normal balance   Motor Activity no abnormal movements   Language no difficulty naming common objects, no difficulty repeating a phrase, no difficulty writing a sentence   Fund of Knowledge adequate knowledge of current events  adequate fund of knowledge regarding past history  adequate fund of knowledge regarding vocabulary      Past Psychiatric History Update:     Inpatient Psychiatric Admission Since Last Encounter:   no  Changes to Outpatient Psychiatric Treatment Team:    no  Suicide Attempt Or Self Mutilation Since Last Encounter:   no  Incidence of Violent Behavior Since Last Encounter:   no    Traumatic History Update:     New Onset of Abuse Since Last Encounter:   no  Traumatic Events Since Last Encounter:   no    Substance Abuse History:    Social History     Substance and Sexual Activity   Alcohol Use Yes   • Alcohol/week: 3.0 - 4.0 standard drinks of alcohol   • Types: 3 - 4 Shots of liquor per week    Comment: 3-4 drinks, 2 times per week     Social History     Substance and Sexual Activity   Drug Use No     Social History:    Social History     Socioeconomic History   • Marital status: Single     Spouse name: Not on file   • Number of children: 0   • Years of education: senior in college currently    • Highest education level: High school graduate   Occupational History   • 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   Tobacco Use   • Smoking status: Never   • Smokeless tobacco: Never   Vaping Use   • Vaping status: Never Used   Substance and Sexual Activity   • Alcohol use: Yes     Alcohol/week: 3.0 - 4.0 standard drinks of alcohol     Types: 3 - 4 Shots of liquor per week     Comment: 3-4 drinks, 2 times per week   • Drug use: No   • Sexual activity: Yes     Partners: Male     Birth control/protection: Condom Male   Other Topics Concern   • Not on file   Social History Narrative   • Not on file     Social Determinants of Health     Financial Resource Strain: Not on file   Food Insecurity: Not on file   Transportation Needs: Not on file   Physical Activity: Not on file   Stress: Not on file   Social Connections: Unknown (6/18/2024)    Received from [facility]    • How often do you feel lonely or isolated from those around you? (Adult - for ages 18 years and over): Not on file   Intimate Partner Violence: Not on file   Housing Stability: Not on file     Family Psychiatric History:     Family History   Problem Relation Age of Onset   • Hypertension Mother    • Mental illness Mother    • Depression Mother    • Mental illness Sister    • Coronary artery disease Maternal Grandmother    • Throat cancer Paternal Grandfather      History Review:The following portions of the patient's history were reviewed and updated as appropriate: allergies, current medications, past family history, past medical history, past social history, past surgical history, and problem list     OBJECTIVE:     Vital signs in last 24 hours:    There were no vitals filed for this visit.  Laboratory Results: I have personally reviewed all pertinent laboratory/tests results.    Suicide/Homicide Risk Assessment:    Risk of Harm to Self:  The following ratings are based on assessment at the time of the interview  Recent  Specific Risk Factors include: current depressive symptoms, current anxiety symptoms  Demographic risk factors include: , age: young adult (15-24)  Historical Risk Factors include: chronic depressive symptoms, chronic anxiety symptoms, chronic mood disorder, history of impulsive behaviors, history of traumatic experiences  Protective Factors: no current suicidal ideation, access to mental health treatment, compliant with mental health treatment, stable job, sense of determination, sense of importance of health and wellness  Weapons: none. The following steps have been taken to ensure weapons are properly secured: not applicable  Based on today's assessment, Janell presents the following risk of harm to self: low    Risk of Harm to Others:  The following ratings are based on assessment at the time of the interview  Protective Factors: no current homicidal ideation  Based on today's assessment, Janell presents the following risk of harm to others: none    The following interventions are recommended: contracts for safety at present - agrees to go to ED if feeling unsafe, return in 2 weeks for reassessment, therapy appointment in 2 days, contracts for safety at present - agrees to call Crisis Intervention Service if feeling unsafe    Medications Risks/Benefits:      Risks, Benefits And Possible Side Effects Of Medications:    Discussed risks and benefits of treatment with patient including risk of suicidality, serotonin syndrome, increased QTc interval and SIADH related to treatment with antidepressants; Risk of induction of manic symptoms in certain patient populations, risk of rash related to treatment with Lamictal, and risk of kidney impairment related to treatment with Lithium     Controlled Medication Discussion:     Not applicable    Treatment Plan:    Due for update/Updated:   no  Last treatment plan done 1/11/24 by Michela Bates.  Treatment Plan due on 7/11/24.    ANGELA Vaughn 07/09/24    This note  was not shared with the patient due to reasonable likelihood of causing patient harm

## 2024-07-09 NOTE — LETTER
July 9, 2024     Patient: Janell Solorzano  YOB: 2000  Date of Visit: 7/9/2024      To Whom it May Concern:    Janell Solorzano is under my professional care. Janell was seen for an appointment on 7/9/2024. Please excuse Janell from work from 7/6/24-7/15/24 for mental health reasons.  She is able to return to work on 7/16/24.       If you have any questions or concerns, please don't hesitate to call.          Sincerely,          ANGELA Vaughn        CC: No Recipients

## 2024-07-09 NOTE — TELEPHONE ENCOUNTER
Writer completed non par authorization form with casework. Writer will update this encounter when authorization is obtained.

## 2024-07-10 LAB — BACTERIA UR CULT: ABNORMAL

## 2024-07-11 ENCOUNTER — SOCIAL WORK (OUTPATIENT)
Dept: BEHAVIORAL/MENTAL HEALTH CLINIC | Facility: CLINIC | Age: 24
End: 2024-07-11
Payer: COMMERCIAL

## 2024-07-11 DIAGNOSIS — F31.9 BIPOLAR 1 DISORDER (HCC): Primary | ICD-10-CM

## 2024-07-11 PROCEDURE — 90837 PSYTX W PT 60 MINUTES: CPT

## 2024-07-11 NOTE — PSYCH
Behavioral Health Psychotherapy Progress Note    Psychotherapy Provided: Individual Psychotherapy     1. Bipolar 1 disorder (HCC)            Goals addressed in session: Goal 1     DATA: First in person session since start of therapy. Agreed upon in-person. We reviewed Janell's recent session with psychiatric provider Jacque Alejandro. We addressed med change, Janell's frustration with so many meds, discussed pros and cons of having so many choices, and how ruling in/ruling out to find a balance and help with overall diagnostic process. Janell indicated not wanting to try anything else if the depakote doesn't work. Provider encouraged ongoing discussion with Jacque. Encouraged Janell in her ability for free will and choice, and recognizing that if she ever decides to d/c meds (even if not recommended), that she can choose to start again on meds. Addressed her discussion with Jacque for potential to enroll in PHP. We addressed the program, elements of the program content, process. Encouraged the potential for the program to be helpful for Janell at this time, and considering overall how she is feeling. We addressed the notion of breathing, pausing, grounding necessary for Janell, as well as establishing some elements of structure to help manage when she is feeling 'crazy' and with endless thoughts in her head. We addressed how program can help, while also considering ways on her own. Janell acknowledged having coping skills but not using them, and we explored things getting in the way of using them.   We addressed her thoughts about home life and work. Addressed family, goals to not live with her mother and grandparents. She acknowledged how it is unhealthy for her, and we discussed. We addressed seeing it as a temporary situation, and with a long-term goal of more independent living. We addressed her feelings about her mother, and explored a bit into her relationship, and 'not really knowing' her father, sometimes  wondering if things were reversed (mother , father living). We addressed these feelings in relation to loss of stability, security in her family life. We addressed the notion of wanting the parents she didn't have. We addressed recognition of her losses, recognizing the traumas that she has gotten through. We discussed ongoing process to help develop emotional coping and creating a healthier life with who she is, and recognizing the positive qualities she does possess, and at times can actually acknowledge.   We addressed work situation, and the coworker relationship that contributed to recent events. She acknowledged inappropriate behaviors at work, and crying. She stated intention to write a formal apology and we discussed ways to professionally do so. She addressed believing her phone is an issue. We explored her thoughts, and provider encouraged she start with one hour a day to put the phone down. We addressed recent times at the bar and drinking behaviors as contributing to negative choices. We discussed how her friends are currently supportive. We addressed the inner self contributors while also recognizing the external circumstances and factors. Encouraged currently establishing routine and schedule where possible (eg, going to bed time, going to gym) to help create some sense of balance.   We reviewed our discussion today.  We reviewed ongoing goals for therapy at this time, confirmed next appointment, and scheduled additional ones into November/ weekly. We agreed provider would inform Jacque of our session today; encouraged Janell to continue to consider PHP,and potential benefits at this time, adjusting our scheduling as needed to accommodate. Encounter form sent electronically, at Janell's request. Intend to review and formalize updated tx plan next time.     During this session, this clinician used the following therapeutic modalities: Bereavement Therapy, Cognitive Behavioral Therapy,  "Mindfulness-based Strategies, Motivational Interviewing, Solution-Focused Therapy, and Supportive Psychotherapy    Substance Abuse was addressed during this session. If the client is diagnosed with a co-occurring substance use disorder, please indicate any changes in the frequency or amount of use: Janell acknowledged drinking affects her choices. Stage of change for addressing substance use diagnoses: Contemplation    ASSESSMENT:  Janell Solorzano presents with a Euthymic/ normal, Anxious, and Depressed mood.     her affect is Normal range and intensity, which is congruent, with her mood and the content of the session. The client has made progress on their goals.     Janell was responsive to the discussion Janell Solorzano presents with a low risk of suicide, low risk of self-harm, and low risk of harm to others. No current SI/HI/thoughts of self-harm.    For any risk assessment that surpasses a \"low\" rating, a safety plan must be developed.    A safety plan was indicated: no  If yes, describe in detail n/a at this time    PLAN: Between sessions, Janell Solorzano will continue psychiatry, consider PHP, practice structure. At the next session, the therapist will use Cognitive Behavioral Therapy and Supportive Psychotherapy to address mood stability, med compliance, tx plan continuation.    Behavioral Health Treatment Plan and Discharge Planning: Janell Solorzano is aware of and agrees to continue to work on their treatment plan. They have identified and are working toward their discharge goals. yes    Visit start and stop times:    07/11/24  Start Time: 1000  Stop Time: 1056  Total Visit Time: 56 minutes  "

## 2024-07-11 NOTE — TELEPHONE ENCOUNTER
Writer reached back out to insurance to see how the process was going and if we needed to cancel patients appointment or not. Writer expecting a call back by 2:30. To know more information.

## 2024-07-12 ENCOUNTER — TELEPHONE (OUTPATIENT)
Age: 24
End: 2024-07-12

## 2024-07-12 NOTE — TELEPHONE ENCOUNTER
Called and verified appointment for Monday 7/15/2024 @11:00 @ [facility] location

## 2024-07-15 ENCOUNTER — TELEPHONE (OUTPATIENT)
Dept: PSYCHIATRY | Facility: CLINIC | Age: 24
End: 2024-07-15

## 2024-07-15 ENCOUNTER — OFFICE VISIT (OUTPATIENT)
Age: 24
End: 2024-07-15

## 2024-07-15 DIAGNOSIS — F43.10 POST TRAUMATIC STRESS DISORDER (PTSD): Primary | ICD-10-CM

## 2024-07-15 DIAGNOSIS — F31.9 BIPOLAR 1 DISORDER (HCC): ICD-10-CM

## 2024-07-15 NOTE — TELEPHONE ENCOUNTER
PT came to FD with provider to sign 2 ROSSANA's for communication between providers. Both ROSSANA's scanned into chart.
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
## 2024-07-19 ENCOUNTER — TELEMEDICINE (OUTPATIENT)
Dept: PSYCHIATRY | Facility: CLINIC | Age: 24
End: 2024-07-19

## 2024-07-19 DIAGNOSIS — F31.12 BIPOLAR 1 DISORDER WITH MODERATE MANIA (HCC): Primary | ICD-10-CM

## 2024-07-19 RX ORDER — DIVALPROEX SODIUM 250 MG/1
750 TABLET, EXTENDED RELEASE ORAL
Qty: 90 TABLET | Refills: 1 | Status: SHIPPED | OUTPATIENT
Start: 2024-07-19

## 2024-07-19 NOTE — PSYCH
Virtual Regular Visit    Verification of patient location:    Patient is located at Home in the following state in which I hold an active license [place]      Assessment/Plan:    Problem List Items Addressed This Visit    None  Visit Diagnoses     Bipolar 1 disorder with moderate saul (HCC)    -  Primary    Relevant Medications    divalproex sodium (Depakote ER) 250 mg 24 hr tablet    Other Relevant Orders    Valproic acid level, total    Comprehensive metabolic panel    CBC and differential          Reason for visit is   Chief Complaint   Patient presents with   • Virtual Regular Visit                  Encounter provider ANGELA Vaughn      Recent Visits  No visits were found meeting these conditions.  Showing recent visits within past 7 days and meeting all other requirements  Today's Visits  Date Type Provider Dept   07/19/24 Telemedicine ANGELA Vaughn  [facility]   Showing today's visits and meeting all other requirements  Future Appointments  No visits were found meeting these conditions.  Showing future appointments within next 150 days and meeting all other requirements       The patient was identified by name and date of birth. Janell Solorzano was informed that this is a telemedicine visit and that the visit is being conducted throughthe Epic Embedded platform. She agrees to proceed..  My office door was closed. No one else was in the room.  She acknowledged consent and understanding of privacy and security of the video platform. The patient has agreed to participate and understands they can discontinue the visit at any time.    Patient is aware this is a billable service.     HPI     Past Medical History:   Diagnosis Date   • Anxiety    • Concussion    • Depression    • Head injury     concussions in high school and in college   • Self-injurious behavior        Past Surgical History:   Procedure Laterality Date   • WISDOM TOOTH EXTRACTION         Current Outpatient Medications  "  Medication Sig Dispense Refill   • divalproex sodium (Depakote ER) 250 mg 24 hr tablet Take 3 tablets (750 mg total) by mouth daily at bedtime 90 tablet 1   • hydrOXYzine HCL (ATARAX) 25 mg tablet Take 1 tablet (25 mg total) by mouth every 6 (six) hours as needed for anxiety 90 tablet 2   • mirtazapine (REMERON) 7.5 MG tablet Take 1 tablet (7.5 mg total) by mouth daily at bedtime 30 tablet 2   • norgestimate-ethinyl estradiol (Sprintec 28) 0.25-35 MG-MCG per tablet TAKE ONE TABLET BY MOUTH EVERY DAY 84 tablet 1   • ondansetron (ZOFRAN) 4 mg tablet Take 1 tablet (4 mg total) by mouth every 8 (eight) hours as needed for nausea or vomiting 20 tablet 0   • pantoprazole (PROTONIX) 40 mg tablet Take 1 tablet (40 mg total) by mouth daily before breakfast 30 tablet 1     No current facility-administered medications for this visit.        No Known Allergies    Review of Systems    Video Exam    There were no vitals filed for this visit.    Physical Exam     Visit Time    Visit Start Time: 0830  Visit Stop Time: 0845  Total Visit Duration:  15 minutes      MEDICATION MANAGEMENT NOTE        [facility]    Name and Date of Birth:  Janell Solorzano 23 y.o. 2000 MRN: [id number]    Date of Visit: July 19, 2024    SUBJECTIVE:    Janell is seen today for a follow up for Bipolar Disorder type II. She continues to experience ongoing symptoms since the last visit. Janell seen virtually today for medication management follow-up.  She was last seen by this provider on 7/9/24. Janell states today that she quit her job 2 days ago. She states that her depression \"depends on the day\".  She rates her current depression 3/10, anxiety 9/10. She is sleeping well. She denies SI/HI. Denies auditory and visual hallucinations. She is superficial and flat in conversation. Somewhat irritable. We discussed how her first day went with her neuropsych testing, which she states was \"fine\". She just " "\"wants to know what is wrong with (her)\". She states she has been compliant with the depakote. Will increase to 750mg PO HS and she will complete a depakote level in one week along with a cmp and cbc.  She is agreeable.      This provider did again recommend PHP.  She states that she would continue to think about it, but will not do it until after her neuropsych testing is completed on 7/30.  She states she does not know why she needs to do PHP if she has this provider and her therapist.  This provider did discuss in detail what occurs at PHP and why it would benefit Janell, including learning coping skills, more intensive monitoring. She was not willing to commit at this time.  Will continue to encourage participation at subsequent appointments if needed.     She denies any side effects from current psychiatric medications.    PLAN:  Hydroxyzine 25 mg p.o. every 6 as needed for anxiety  Mirtazapine 7.5 mg p.o. daily at bedtime  Increase depakote to 750mg PO HS  Depakote level, CMP, CBC scheduled for one week  Neuropsych eval scheduled for 7/30.  She will follow up with this provider in 2 weeks. The patient will call this provider sooner if concerns or issues arise prior to scheduled appointment  Continue therapy with therapist    Depakote PARQ completed including GI distress, tremor, weight gain, and hepatic risks, blood dyscrasias/bone marrow effects, SIADH, pancreatitis, andrenergic effects, PCOS, allergic reactions, worsened depression/suicidality, fetal abnormality risks, and others including need for blood testing and monitoring.     Aware of 24 hour and weekend coverage for urgent situations accessed by calling [facility] main practice number  Continue psychotherapy with therapist  Medication management every 2 weeks  Aware of need to follow up with family physician for medical issues    Diagnoses and all orders for this visit:    Bipolar 1 disorder with moderate saul (HCC)  -     " divalproex sodium (Depakote ER) 250 mg 24 hr tablet; Take 3 tablets (750 mg total) by mouth daily at bedtime  -     Valproic acid level, total; Future  -     Comprehensive metabolic panel; Future  -     CBC and differential; Future    Psychotherapy Provided:     Individual psychotherapy provided: Yes  Supportive counseling provided.  Medication changes discussed with Janell.  Medication education provided to Janell.  Discussed with Janell coping with everyday stressors, ongoing anxiety, and chronic mental illness.   Coping strategies reviewed with Janell.   Importance of medication and treatment compliance reviewed with Janell.  Importance of follow up with family physician for medical issues reviewed with Janell.  Reassurance and supportive therapy provided.   Crisis/safety plan discussed with Janell. Patient will call prior to scheduled appointment if they have any issues or concerns.  Patient understands they can access the office by calling the main number at any time if they are in crisis.  They also understand they can call their Psychiatric hospital's crisis number or go to their nearest ED if suicidal ideation increases or if they develop a plan or intent.       HPI ROS Appetite Changes and Sleep:     She reports fluctuating sleep pattern, adequate appetite, normal energy level. Denies homicidal ideation, denies suicidal ideation    Review Of Systems:      HPI ROS:               Medication Side Effects: denies    Depression (10 worst): 3/10 Per HPI   Anxiety (10 worst): 9/10 Per HPI   Safety concerns (SI, HI, etc): Denies  Denies at this time    Sleep: good fluctuating   Energy: adequate elevated   Appetite: adequate poor     General emotional problems and sleep disturbances   Personality Irritable and superficial   Constitutional as noted in HPI   ENT negative   Cardiovascular negative   Respiratory negative   Gastrointestinal negative   Genitourinary negative   Musculoskeletal negative   Integumentary negative    Neurological negative   Endocrine negative   Other Symptoms none, all other systems are negative     Mental Status Evaluation:    Appearance Adequate hygiene and grooming and Good eye contact, in bed   Behavior cooperative   Mood anxious, depressed, and irritable  Depression Scale - 3 of 10 (0 = No depression)  Anxiety Scale - 9 of 10 (0 = No anxiety)   Speech Normal rate and volume   Affect Flat, irritable at times   Thought Processes Goal directed and coherent   Thought Content Does not verbalize delusional material   Associations Tightly connected   Perceptual Disturbances Denies hallucinations and does not appear to be responding to internal stimuli   Risk Potential Suicidal/Homicidal Ideation - No evidence of suicidal or homicidal ideation and patient does not verbalize suicidal or homicidal ideation  Risk of Violence - No evidence of risk for violence found on assessment  Risk of Self Mutilation - No evidence of risk for self mutilation found on assessment   Orientation oriented to person, place, time/date, and situation   Memory recent and remote memory grossly intact   Consciousness alert and awake   Attention/Concentration attention span and concentration are age appropriate   Insight fair   Judgement fair   Muscle Strength and Gait normal muscle strength and normal muscle tone, normal gait/station and normal balance   Motor Activity no abnormal movements   Language no difficulty naming common objects, no difficulty repeating a phrase, no difficulty writing a sentence   Fund of Knowledge adequate knowledge of current events  adequate fund of knowledge regarding past history  adequate fund of knowledge regarding vocabulary      Past Psychiatric History Update:     Inpatient Psychiatric Admission Since Last Encounter:   no  Changes to Outpatient Psychiatric Treatment Team:    no  Suicide Attempt Or Self Mutilation Since Last Encounter:   no  Incidence of Violent Behavior Since Last Encounter:    no    Traumatic History Update:     New Onset of Abuse Since Last Encounter:   no  Traumatic Events Since Last Encounter:   no    Substance Abuse History:    Social History     Substance and Sexual Activity   Alcohol Use Yes   • Alcohol/week: 3.0 - 4.0 standard drinks of alcohol   • Types: 3 - 4 Shots of liquor per week    Comment: 3-4 drinks, 2 times per week     Social History     Substance and Sexual Activity   Drug Use No     Social History:    Social History     Socioeconomic History   • Marital status: Single     Spouse name: Not on file   • Number of children: 0   • Years of education: senior in college currently   • Highest education level: High school graduate   Occupational History   • 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   Tobacco Use   • Smoking status: Never   • Smokeless tobacco: Never   Vaping Use   • Vaping status: Never Used   Substance and Sexual Activity   • Alcohol use: Yes     Alcohol/week: 3.0 - 4.0 standard drinks of alcohol     Types: 3 - 4 Shots of liquor per week     Comment: 3-4 drinks, 2 times per week   • Drug use: No   • Sexual activity: Yes     Partners: Male     Birth control/protection: Condom Male   Other Topics Concern   • Not on file   Social History Narrative   • Not on file     Social Determinants of Health     Financial Resource Strain: Not on file   Food Insecurity: Not on file   Transportation Needs: Not on file   Physical Activity: Not on file   Stress: Not on file   Social Connections: Unknown (6/18/2024)    Received from [employer]    [facility]    • How often do you feel lonely or isolated from those around you? (Adult - for ages 18 years and over): Not on file   Intimate Partner Violence: Not on file   Housing Stability: Not on file     Family Psychiatric History:     Family History   Problem Relation Age of Onset   • Hypertension Mother    • Mental illness Mother    • Depression Mother    • Mental illness Sister    • Coronary artery disease Maternal  Grandmother    • Throat cancer Paternal Grandfather      History Review:The following portions of the patient's history were reviewed and updated as appropriate: allergies, current medications, past family history, past medical history, past social history, past surgical history, and problem list     OBJECTIVE:     Vital signs in last 24 hours:    There were no vitals filed for this visit.  Laboratory Results: I have personally reviewed all pertinent laboratory/tests results.    Suicide/Homicide Risk Assessment:    Risk of Harm to Self:  The following ratings are based on assessment at the time of the interview  Recent Specific Risk Factors include: current depressive symptoms, current anxiety symptoms  Demographic risk factors include: , age: young adult (15-24)  Historical Risk Factors include: chronic depressive symptoms, chronic anxiety symptoms, chronic mood disorder, history of impulsive behaviors, history of traumatic experiences  Protective Factors: no current suicidal ideation, access to mental health treatment, compliant with mental health treatment, stable job, sense of determination, sense of importance of health and wellness  Weapons: none. The following steps have been taken to ensure weapons are properly secured: not applicable  Based on today's assessment, Janell presents the following risk of harm to self: low    Risk of Harm to Others:  The following ratings are based on assessment at the time of the interview  Protective Factors: no current homicidal ideation  Based on today's assessment, Janell presents the following risk of harm to others: none    The following interventions are recommended:  recommended PHP, contracts for safety at present - agrees to go to ED if feeling unsafe, return in 2 weeks for reassessment, therapy appointment in 1 week, contracts for safety at present - agrees to call Crisis Intervention Service if feeling unsafe    Medications Risks/Benefits:      Risks,  Benefits And Possible Side Effects Of Medications:    Discussed risks and benefits of treatment with patient including risk of suicidality, serotonin syndrome, increased QTc interval and SIADH related to treatment with antidepressants; Risk of induction of manic symptoms in certain patient populations and risk of liver impairment related to treatment with Depakote     Controlled Medication Discussion:     Not applicable    Treatment Plan:    Due for update/Updated:   no  Last treatment plan done 1/11/24 by Michela Bates.  Treatment Plan due on 7/11/24.    ANGELA Vaughn 07/19/24    This note was not shared with the patient due to reasonable likelihood of causing patient harm

## 2024-07-25 ENCOUNTER — TELEMEDICINE (OUTPATIENT)
Dept: BEHAVIORAL/MENTAL HEALTH CLINIC | Facility: CLINIC | Age: 24
End: 2024-07-25
Payer: COMMERCIAL

## 2024-07-25 DIAGNOSIS — F31.9 BIPOLAR 1 DISORDER (HCC): ICD-10-CM

## 2024-07-25 DIAGNOSIS — F43.10 POST TRAUMATIC STRESS DISORDER (PTSD): Primary | ICD-10-CM

## 2024-07-25 PROCEDURE — 90834 PSYTX W PT 45 MINUTES: CPT

## 2024-07-25 NOTE — PSYCH
Virtual Regular Visit    Verification of patient location:    Patient is located at Home in the following state in which I hold an active license [place]      Assessment/Plan:    Problem List Items Addressed This Visit       Bipolar 1 disorder (HCC)    Post traumatic stress disorder (PTSD) - Primary       Goals addressed in session: Goal 1          Reason for visit is   Chief Complaint   Patient presents with    Virtual Regular Visit          Encounter provider Michela Bates LCSW      Recent Visits  No visits were found meeting these conditions.  Showing recent visits within past 7 days and meeting all other requirements  Today's Visits  Date Type Provider Dept   07/25/24 Telemedicine Michela Bates LCSW [facility] [place]   Showing today's visits and meeting all other requirements  Future Appointments  No visits were found meeting these conditions.  Showing future appointments within next 150 days and meeting all other requirements       The patient was identified by name and date of birth. Janell Solorzano was informed that this is a telemedicine visit and that the visit is being conducted throughthe Epic Embedded platform. She agrees to proceed..  My office door was closed. No one else was in the room.  She acknowledged consent and understanding of privacy and security of the video platform. The patient has agreed to participate and understands they can discontinue the visit at any time.    Patient is aware this is a billable service.     Subjective  Janell Solorzano is a 23 y.o. female . .      HPI     Past Medical History:   Diagnosis Date    Anxiety     Concussion     Depression     Head injury     concussions in high school and in college    Self-injurious behavior        Past Surgical History:   Procedure Laterality Date    WISDOM TOOTH EXTRACTION         Current Outpatient Medications   Medication Sig Dispense Refill    divalproex sodium (Depakote ER) 250 mg 24 hr tablet Take 3 tablets (750 mg total)  by mouth daily at bedtime 90 tablet 1    hydrOXYzine HCL (ATARAX) 25 mg tablet Take 1 tablet (25 mg total) by mouth every 6 (six) hours as needed for anxiety 90 tablet 2    mirtazapine (REMERON) 7.5 MG tablet Take 1 tablet (7.5 mg total) by mouth daily at bedtime 30 tablet 2    norgestimate-ethinyl estradiol (Sprintec 28) 0.25-35 MG-MCG per tablet TAKE ONE TABLET BY MOUTH EVERY DAY 84 tablet 1    ondansetron (ZOFRAN) 4 mg tablet Take 1 tablet (4 mg total) by mouth every 8 (eight) hours as needed for nausea or vomiting 20 tablet 0    pantoprazole (PROTONIX) 40 mg tablet Take 1 tablet (40 mg total) by mouth daily before breakfast 30 tablet 1     No current facility-administered medications for this visit.        No Known Allergies    Review of Systems    Video Exam    There were no vitals filed for this visit.    Physical Exam     Behavioral Health Psychotherapy Progress Note    Psychotherapy Provided: Individual Psychotherapy     1. Post traumatic stress disorder (PTSD)        2. Bipolar 1 disorder (HCC)            Goals addressed in session: Goal 1     DATA: Janell stated that she quit her job sometime about a week ago.  She stated she decided to quit in order to not be fired, which we was she was concerned was going to happen after further issues related to the communications with her coworker.  She did not articulate specific feelings secondary to this decision, thought did acknowledge she misses it.  She stated she has been spending her time looking for jobs and hanging out with her friends, but mostly not doing much else.  She stated continued communication with the coworker and an anticipated ending meeting possibly today.  We addressed the dynamic with this coworker. We addressed how Janell is not getting what she wants in the relationship, and how she is looking for him to provide something that he is not prepared to give.  We addressed how Janell has felt hurt, but how it is also important for her to not  "expect that he should give her something when he is telling her he is not going to.  She acknowledged. Provider inquired if Janell feels she deserves to be treated better and she stated that she does.  Provider encouraged Janell with this understanding of herself and her needs.  We addressed potential ways to manage this meeting if it does happen, and ways to approach any communication in this regard.  Janell did not note current bipolar symptoms.  She acknowledged showering and essential ADLs.  She denied self-injurious thoughts or behavior, and denied suicidal thoughts or intent.  Provider referenced PHP but did not push this point at this time. Provider offered another session next week (due to an opening), and she accepted the session.     During this session, this clinician used the following therapeutic modalities: Cognitive Behavioral Therapy, Mindfulness-based Strategies, Motivational Interviewing, Solution-Focused Therapy, and Supportive Psychotherapy    Substance Abuse was not addressed during this session. If the client is diagnosed with a co-occurring substance use disorder, please indicate any changes in the frequency or amount of use: not discussed at this time. Stage of change for addressing substance use diagnoses: Pre-contemplation, the extent to which substance use is an issue is unclear at this time    ASSESSMENT:  Janell Solorzano presents with a Euthymic/ normal and Depressed mood.     her affect is Normal range and intensity and Constricted, which is congruent, with her mood and the content of the session. The client has made progress on their goals.    Janell presented as tired but sustained a conversation Janell Solorzano presents with a low risk of suicide, low risk of self-harm, and low risk of harm to others.    For any risk assessment that surpasses a \"low\" rating, a safety plan must be developed.    A safety plan was indicated: no  If yes, describe in detail n/a    PLAN: Between sessions, " Janell Solorzano will continue therapy and psychiatry follow-up. At the next session, the therapist will use Cognitive Behavioral Therapy and Supportive Psychotherapy to address mood stability, daily coping.    Behavioral Health Treatment Plan and Discharge Planning: Janell Solorzano is aware of and agrees to continue to work on their treatment plan. They have identified and are working toward their discharge goals. yes    Visit start and stop times:    07/25/24  Start Time: 1105  Stop Time: 1155  Total Visit Time: 50 minutes

## 2024-07-30 ENCOUNTER — TELEPHONE (OUTPATIENT)
Dept: PSYCHIATRY | Facility: CLINIC | Age: 24
End: 2024-07-30

## 2024-07-30 ENCOUNTER — CLINICAL SUPPORT (OUTPATIENT)
Age: 24
End: 2024-07-30

## 2024-07-30 ENCOUNTER — DOCUMENTATION (OUTPATIENT)
Dept: BEHAVIORAL/MENTAL HEALTH CLINIC | Facility: CLINIC | Age: 24
End: 2024-07-30

## 2024-07-30 ENCOUNTER — HOSPITAL ENCOUNTER (EMERGENCY)
Facility: HOSPITAL | Age: 24
Discharge: HOME | End: 2024-07-30
Attending: EMERGENCY MEDICINE
Payer: COMMERCIAL

## 2024-07-30 VITALS
HEIGHT: 63 IN | WEIGHT: 123.46 LBS | OXYGEN SATURATION: 97 % | BODY MASS INDEX: 21.88 KG/M2 | SYSTOLIC BLOOD PRESSURE: 145 MMHG | DIASTOLIC BLOOD PRESSURE: 101 MMHG | TEMPERATURE: 98 F | HEART RATE: 116 BPM | RESPIRATION RATE: 18 BRPM

## 2024-07-30 DIAGNOSIS — F31.9 BIPOLAR I DISORDER (HCC): ICD-10-CM

## 2024-07-30 DIAGNOSIS — F43.10 POST TRAUMATIC STRESS DISORDER (PTSD): Primary | ICD-10-CM

## 2024-07-30 DIAGNOSIS — N39.0 UTI (URINARY TRACT INFECTION): Primary | ICD-10-CM

## 2024-07-30 LAB
BACTERIA UR QL AUTO: ABNORMAL /HPF
BILIRUB UR QL STRIP: NEGATIVE
CLARITY UR: ABNORMAL
COLOR UR: YELLOW
EXT PREGNANCY TEST URINE: NEGATIVE
EXT. CONTROL: NORMAL
GLUCOSE UR STRIP-MCNC: NEGATIVE MG/DL
HGB UR QL STRIP.AUTO: ABNORMAL
HIV 1+2 AB+HIV1 P24 AG SERPL QL IA: NORMAL
HIV1 P24 AG SER QL: NORMAL
KETONES UR STRIP-MCNC: ABNORMAL MG/DL
LEUKOCYTE ESTERASE UR QL STRIP: ABNORMAL
MUCOUS THREADS UR QL AUTO: ABNORMAL
NITRITE UR QL STRIP: NEGATIVE
NON-SQ EPI CELLS URNS QL MICRO: ABNORMAL /HPF
PH UR STRIP.AUTO: 7 [PH]
PROT UR STRIP-MCNC: ABNORMAL MG/DL
RBC #/AREA URNS AUTO: ABNORMAL /HPF
SP GR UR STRIP.AUTO: 1.01 (ref 1–1.03)
TREPONEMA PALLIDUM IGG+IGM AB [PRESENCE] IN SERUM OR PLASMA BY IMMUNOASSAY: NORMAL
UROBILINOGEN UR STRIP-ACNC: 2 MG/DL
WBC #/AREA URNS AUTO: ABNORMAL /HPF

## 2024-07-30 PROCEDURE — 36415 COLL VENOUS BLD VENIPUNCTURE: CPT | Performed by: EMERGENCY MEDICINE

## 2024-07-30 PROCEDURE — 87806 HIV AG W/HIV1&2 ANTB W/OPTIC: CPT | Performed by: EMERGENCY MEDICINE

## 2024-07-30 PROCEDURE — 80074 ACUTE HEPATITIS PANEL: CPT | Performed by: EMERGENCY MEDICINE

## 2024-07-30 PROCEDURE — 86780 TREPONEMA PALLIDUM: CPT | Performed by: EMERGENCY MEDICINE

## 2024-07-30 PROCEDURE — 87086 URINE CULTURE/COLONY COUNT: CPT | Performed by: EMERGENCY MEDICINE

## 2024-07-30 PROCEDURE — 81001 URINALYSIS AUTO W/SCOPE: CPT | Performed by: EMERGENCY MEDICINE

## 2024-07-30 PROCEDURE — 99283 EMERGENCY DEPT VISIT LOW MDM: CPT

## 2024-07-30 PROCEDURE — 87591 N.GONORRHOEAE DNA AMP PROB: CPT | Performed by: EMERGENCY MEDICINE

## 2024-07-30 PROCEDURE — 87491 CHLMYD TRACH DNA AMP PROBE: CPT | Performed by: EMERGENCY MEDICINE

## 2024-07-30 PROCEDURE — 81025 URINE PREGNANCY TEST: CPT | Performed by: EMERGENCY MEDICINE

## 2024-07-30 PROCEDURE — 99284 EMERGENCY DEPT VISIT MOD MDM: CPT | Performed by: EMERGENCY MEDICINE

## 2024-07-30 RX ORDER — CEPHALEXIN 500 MG/1
500 CAPSULE ORAL EVERY 12 HOURS SCHEDULED
Qty: 14 CAPSULE | Refills: 0 | Status: SHIPPED | OUTPATIENT
Start: 2024-07-30 | End: 2024-08-06

## 2024-07-30 RX ORDER — CEPHALEXIN 250 MG/1
500 CAPSULE ORAL ONCE
Status: COMPLETED | OUTPATIENT
Start: 2024-07-30 | End: 2024-07-30

## 2024-07-30 RX ADMIN — CEPHALEXIN 500 MG: 250 CAPSULE ORAL at 17:56

## 2024-07-30 NOTE — ED PROVIDER NOTES
History  Chief Complaint   Patient presents with    Possible UTI     Pt presents to ER from home for reports of urinary burning and frequency x3-4 days. Patient also requesting test for sexually transmitted disease stating her partner had a lesion on his groin, pt denies any for herself.      Patient presents wih UTI symptoms, burning on urination.  Patient is urinary tract infection.  She states that she never waits until going to the doctor, she always comes to the emergency department.  Symptoms have been ongoing for 3 days.    Patient also demanding all STD testing and blood work.  Patient is advised that this is inappropriate use of the emergency department, patient gets very agitated.  Blood work will be performed patient can follow-up with her primary care provider as this will not be released on [facility] which is the recommendation for HIV testing given the sensitive nature of the result of a positive test.           Prior to Admission Medications   Prescriptions Last Dose Informant Patient Reported? Taking?   divalproex sodium (Depakote ER) 250 mg 24 hr tablet   No No   Sig: Take 3 tablets (750 mg total) by mouth daily at bedtime   hydrOXYzine HCL (ATARAX) 25 mg tablet   No No   Sig: Take 1 tablet (25 mg total) by mouth every 6 (six) hours as needed for anxiety   mirtazapine (REMERON) 7.5 MG tablet   No No   Sig: Take 1 tablet (7.5 mg total) by mouth daily at bedtime   norgestimate-ethinyl estradiol (Sprintec 28) 0.25-35 MG-MCG per tablet   No No   Sig: TAKE ONE TABLET BY MOUTH EVERY DAY   ondansetron (ZOFRAN) 4 mg tablet   No No   Sig: Take 1 tablet (4 mg total) by mouth every 8 (eight) hours as needed for nausea or vomiting   pantoprazole (PROTONIX) 40 mg tablet   No No   Sig: Take 1 tablet (40 mg total) by mouth daily before breakfast      Facility-Administered Medications: None       Past Medical History:   Diagnosis Date    Anxiety     Concussion     Depression     Head injury     concussions in high  school and in college    Self-injurious behavior        Past Surgical History:   Procedure Laterality Date    WISDOM TOOTH EXTRACTION         Family History   Problem Relation Age of Onset    Hypertension Mother     Mental illness Mother     Depression Mother     Mental illness Sister     Coronary artery disease Maternal Grandmother     Throat cancer Paternal Grandfather      I have reviewed and agree with the history as documented.    E-Cigarette/Vaping    E-Cigarette Use Never User      E-Cigarette/Vaping Substances    Nicotine No     THC No     CBD No     Flavoring No     Other No     Unknown No      Social History     Tobacco Use    Smoking status: Never    Smokeless tobacco: Never   Vaping Use    Vaping status: Never Used   Substance Use Topics    Alcohol use: Yes     Alcohol/week: 3.0 - 4.0 standard drinks of alcohol     Types: 3 - 4 Shots of liquor per week     Comment: 3-4 drinks, 2 times per week    Drug use: No       Review of Systems   Genitourinary:  Positive for dysuria, frequency and urgency. Negative for flank pain, genital sores, pelvic pain and vaginal discharge.   All other systems reviewed and are negative.      Physical Exam  Physical Exam  Vitals reviewed.   Constitutional:       General: She is not in acute distress.     Appearance: She is well-developed. She is not diaphoretic.   HENT:      Head: Normocephalic and atraumatic.   Eyes:      Conjunctiva/sclera: Conjunctivae normal.   Pulmonary:      Effort: Pulmonary effort is normal. No respiratory distress.   Musculoskeletal:         General: Normal range of motion.      Cervical back: Normal range of motion.   Skin:     General: Skin is warm and dry.      Coloration: Skin is not pale.   Neurological:      Mental Status: She is alert and oriented to person, place, and time.      Cranial Nerves: No cranial nerve deficit.   Psychiatric:         Behavior: Behavior normal.         Vital Signs  ED Triage Vitals [07/30/24 1644]   Temperature Pulse  Respirations Blood Pressure SpO2   98 °F (36.7 °C) (!) 116 18 (!) 145/101 97 %      Temp Source Heart Rate Source Patient Position - Orthostatic VS BP Location FiO2 (%)   Temporal Monitor Sitting Left arm --      Pain Score       --           Vitals:    07/30/24 1644   BP: (!) 145/101   Pulse: (!) 116   Patient Position - Orthostatic VS: Sitting         Visual Acuity      ED Medications  Medications   cephalexin (KEFLEX) capsule 500 mg (500 mg Oral Given 7/30/24 1756)       Diagnostic Studies  Results Reviewed       Procedure Component Value Units Date/Time    RPR-Syphilis Screening (Total Syphilis IGG/IGM) [[id number]] Collected: 07/30/24 1842    Lab Status: In process Specimen: Blood Updated: 07/30/24 1843    RAPID HIV 1/2 AB-AG COMBO for 12 years old and above [[id number]] Collected: 07/30/24 1842    Lab Status: In process Specimen: Blood Updated: 07/30/24 1842    Hepatitis panel, acute [[id number]] Collected: 07/30/24 1842    Lab Status: In process Specimen: Blood Updated: 07/30/24 1842    Chlamydia/GC amplified DNA by PCR [[id number]] Collected: 07/30/24 1825    Lab Status: In process Specimen: Urine, Other Updated: 07/30/24 1828    Urine Microscopic [[id number]]  (Abnormal) Collected: 07/30/24 1650    Lab Status: Final result Specimen: Urine, Clean Catch Updated: 07/30/24 1702     RBC, UA 2-4 /hpf      WBC, UA 20-30 /hpf      Epithelial Cells Innumerable /hpf      Bacteria, UA Occasional /hpf      MUCUS THREADS Moderate    Urine culture [[id number]] Collected: 07/30/24 1650    Lab Status: In process Specimen: Urine, Clean Catch Updated: 07/30/24 1701    UA w Reflex to Microscopic w Reflex to Culture [[id number]]  (Abnormal) Collected: 07/30/24 1650    Lab Status: Final result Specimen: Urine, Clean Catch Updated: 07/30/24 1659     Color, UA Yellow     Clarity, UA Turbid     Specific Gravity, UA 1.015     pH, UA 7.0     Leukocytes, UA Moderate     Nitrite, UA Negative     Protein, UA Trace mg/dl      Glucose, UA  Negative mg/dl      Ketones, UA 10 (1+) mg/dl      Urobilinogen, UA 2.0 mg/dl      Bilirubin, UA Negative     Occult Blood, UA Small    POCT pregnancy, urine [[id number]]  (Normal) Resulted: 07/30/24 1654    Lab Status: Final result Updated: 07/30/24 1654     EXT Preg Test, Ur Negative     Control Valid                   No orders to display              Procedures  Procedures         ED Course  ED Course as of 07/30/24 1848 Tue Jul 30, 2024   1747 Bacteria, UA: Occasional   1747 Leukocytes, UA(!): Moderate   1832 PREGNANCY TEST URINE: Negative                                 SBIRT 22yo+      Flowsheet Row Most Recent Value   Initial Alcohol Screen: US AUDIT-C     1. How often do you have a drink containing alcohol? 0 Filed at: 07/30/2024 1648   2. How many drinks containing alcohol do you have on a typical day you are drinking?  0 Filed at: 07/30/2024 1648   3b. FEMALE Any Age, or MALE 65+: How often do you have 4 or more drinks on one occassion? 0 Filed at: 07/30/2024 1648   Audit-C Score 0 Filed at: 07/30/2024 1648   RODRIGO: How many times in the past year have you...    Used an illegal drug or used a prescription medication for non-medical reasons? Never Filed at: 07/30/2024 1648                      Medical Decision Making  Patient presents for urinary tract infection symptoms and for STD testing.  Advised follow-up with appropriate outpatient provider.  Treated for UTI    As recommended for mental health, the STD testing was requested to not be released on [facility] as any positive STD testing needs to be a discussion with the patient and the provider, specifically regarding should the HIV testing be positive.     Amount and/or Complexity of Data Reviewed  Labs: ordered. Decision-making details documented in ED Course.    Risk  Prescription drug management.                 Disposition  Final diagnoses:   UTI (urinary tract infection)     Time reflects when diagnosis was documented in both MDM as applicable and  the Disposition within this note       Time User Action Codes Description Comment    7/30/2024  6:30 PM Prema Gomez Add [N39.0] UTI (urinary tract infection)           ED Disposition       ED Disposition   Discharge    Condition   Stable    Date/Time   Tue Jul 30, 2024 1830    Comment   Janell Solorzano discharge to home/self care.                   Follow-up Information       Follow up With Specialties Details Why Contact Info Additional Information    [facility] Schedule an appointment as soon as possible for a visit  for STD results and further testing as needed [address] [place] [id number]  [phone]       [facility] Emergency Medicine  If symptoms worsen [address] [facility] [id number] [phone] [facility], [address] [place], [place], [address]    [facility]  for further std testing treatment and care [address] [facility] [id number] [phone] [facility], [address], [place], [id number] [phone]            Current Discharge Medication List        START taking these medications    Details   cephalexin (KEFLEX) 500 mg capsule Take 1 capsule (500 mg total) by mouth every 12 (twelve) hours for 7 days  Qty: 14 capsule, Refills: 0    Associated Diagnoses: UTI (urinary tract infection)           CONTINUE these medications which have NOT CHANGED    Details   divalproex sodium (Depakote ER) 250 mg 24 hr tablet Take 3 tablets (750 mg total) by mouth daily at bedtime  Qty: 90 tablet, Refills: 1    Associated Diagnoses: Bipolar 1 disorder with moderate saul (HCC)      hydrOXYzine HCL (ATARAX) 25 mg tablet Take 1 tablet (25 mg total) by mouth every 6 (six) hours as needed for  anxiety  Qty: 90 tablet, Refills: 2    Associated Diagnoses: Anxiety      mirtazapine (REMERON) 7.5 MG tablet Take 1 tablet (7.5 mg total) by mouth daily at bedtime  Qty: 30 tablet, Refills: 2    Associated Diagnoses: Bipolar II disorder (HCC)      norgestimate-ethinyl estradiol (Sprintec 28) 0.25-35 MG-MCG per tablet TAKE ONE TABLET BY MOUTH EVERY DAY  Qty: 84 tablet, Refills: 1    Associated Diagnoses: Encounter for initial prescription of contraceptive pills      ondansetron (ZOFRAN) 4 mg tablet Take 1 tablet (4 mg total) by mouth every 8 (eight) hours as needed for nausea or vomiting  Qty: 20 tablet, Refills: 0    Associated Diagnoses: Unintended weight loss; Nausea; Loss of appetite      pantoprazole (PROTONIX) 40 mg tablet Take 1 tablet (40 mg total) by mouth daily before breakfast  Qty: 30 tablet, Refills: 1    Associated Diagnoses: Unintended weight loss; Loss of appetite             No discharge procedures on file.    PDMP Review         Value Time User    PDMP Reviewed  Yes 1/26/2022 10:52 AM ANGELA Vaughn            ED Provider  Electronically Signed by             Prema Gomez,   07/30/24 1837       Prema Gomez, DO  07/30/24 1848       Prema Gomez, DO  07/30/24 1848

## 2024-07-30 NOTE — TELEPHONE ENCOUNTER
Left voicemail informing patient and/or parent/guardian of the Psych Encounter form needing to be signed as a requirement from the insurance company for billing purposes. Patient can access form via [employer] and sign electronically.     Please make patient aware this form must be signed for each visit as a requirement to continue future visits with provider.

## 2024-07-30 NOTE — PROGRESS NOTES
"Provider met with client due to endorsing suicidal ideation on MMPI. When asked client denied having intent or plan. She advised she \"would never do it\". She declined option for partial hospitalization program. Client reports her next therapy appt is tomorrow. Client denies any history of attempts. She acknowledged hospitalization in 2020 for suicidal ideation and stated she is no where near how she was in 2020.  "

## 2024-07-30 NOTE — PSYCH
Janell Solorzano completed psychological assessment by me Esthela Fox psychometrist, on 2024.      Start: 9:00 am   End: 11:50 am   Scorin min  Total: 210 min (3 hr 30 min)     The following assessments were administered:   Wechsler Adult Intelligence Scale (WAIS-IV)  Scale Sum of Scales Scores Composite Score Percentile Rank    Verbal Comprehension 21 83 13   Perceptual Reasoning 22 84 14   Working Memory 22 105 63   Processing Speed 24 111 77   Full Scale  89 92 30     [place] Multiphasic Personality Inventory-3 (MMPI)  *this protocol is invalid (F > 99)*    Protocol validity   Content Non-Responsiveness   CNS 0, CRIn 54, VRIN 55, BOBO 60 T  Over-Reporting  F 120, Fp 84, Fs 97, FBS 84, RBS 87  Under-Reporting   L 36, K 32  Substantive scales   Somatic/Cognitive Dysfunction  RC1 88, MLS 77, NUC 88, EAT 56, COG 81  Emotional Dysfunction  ELIZA 84  Rcd 77, ZIYAD 87, HLP 86, SFD 78, NFC 66, RC2 79, INTR 69, RC7 85, STR 76, WRY 72, CMP 76, ARX 85, ANP 74, BRF 74, NEGE 80  Thought Dysfunction  THD 82  RC6 79, RC8 88, PSYC 83  Behavioral Dysfunction  BXD 75  RC4 62, FML 86, JCP 61, SUB 69, RC9 67, IMP 76, ACT 58, AGG 86, SANTI 79, DISC 63  Interpersonal Functioning   SFI 31, DOM 42, AGGR 63, DSF 65, JOSSELYN 60, SHY 61     Thanh Continuous Performance Test  Variable Type  Measure T-Score   Detectability  d' 75   Error Type Omissions 80    Commissions 74    Perseverations 81   Reaction Time Statistic  HRT 38    HRT SD 62    Variability  68    HRT Block Change  51    HRT MARY Change  53     Thanh Continuous Auditory Test of Attention  Variable Type  Measure T-Score   Detectability  d' 65   Error Type Omissions 49    Commissions 66    Perseverative Commissions 55   Reaction Time Statistic  HRT 43    HRT SD 69    HRT Block Change  66     PCL-5  Total: 72     MDQ (Mood Disorder)    [place] Obsessive Compulsive Scale  Obsession subtotal: 14  Compulsive subtotal: 16  Sentence Completion Test    Symptom Bpjohdbqy-29-V  (SCL-90-R)   SUSY O-C I-S DEP ANX HOS PHOB PAR PSY GSI PSDI PST   Outpatient T Score 68 67 65 61 74 68 53 60 71 71 68 67   Raw Score 2.67 3.30 2.89 3.08 3.70 3.17 0.86 2.17 3.00 2.88 3.20 81        CPT Codes  59839: 1 unit  47339: 6 units

## 2024-07-31 ENCOUNTER — TELEMEDICINE (OUTPATIENT)
Dept: BEHAVIORAL/MENTAL HEALTH CLINIC | Facility: CLINIC | Age: 24
End: 2024-07-31
Payer: COMMERCIAL

## 2024-07-31 DIAGNOSIS — F43.10 POST TRAUMATIC STRESS DISORDER (PTSD): ICD-10-CM

## 2024-07-31 DIAGNOSIS — F31.9 BIPOLAR 1 DISORDER (HCC): Primary | ICD-10-CM

## 2024-07-31 LAB
C TRACH DNA SPEC QL NAA+PROBE: NEGATIVE
HAV IGM SER QL: NORMAL
HBV CORE IGM SER QL: NORMAL
HBV SURFACE AG SER QL: NORMAL
HCV AB SER QL: NORMAL
N GONORRHOEA DNA SPEC QL NAA+PROBE: NEGATIVE

## 2024-07-31 PROCEDURE — 90837 PSYTX W PT 60 MINUTES: CPT

## 2024-07-31 NOTE — PSYCH
Virtual Regular Visit    Verification of patient location:    Patient is located at Home in the following state in which I hold an active license [place]      Assessment/Plan:    Problem List Items Addressed This Visit       Bipolar 1 disorder (HCC) - Primary    Post traumatic stress disorder (PTSD)       Goals addressed in session: Goal 1          Reason for visit is   Chief Complaint   Patient presents with    Virtual Regular Visit          Encounter provider Michela Bates LCSW      Recent Visits  Date Type Provider Dept   07/25/24 Telemedicine Michela Bates LCSW [facility] Therapist [place]   Showing recent visits within past 7 days and meeting all other requirements  Today's Visits  Date Type Provider Dept   07/31/24 Telemedicine Michela Bates LCSW [facility]rap[facility] [place]   Showing today's visits and meeting all other requirements  Future Appointments  No visits were found meeting these conditions.  Showing future appointments within next 150 days and meeting all other requirements       The patient was identified by name and date of birth. Janell Solorzano was informed that this is a telemedicine visit and that the visit is being conducted throughthe Epic Embedded platform. She agrees to proceed..  My office door was closed. No one else was in the room.  She acknowledged consent and understanding of privacy and security of the video platform. The patient has agreed to participate and understands they can discontinue the visit at any time.    Patient is aware this is a billable service.     Subjective  Janell Solorzano is a 23 y.o. female . .      HPI     Past Medical History:   Diagnosis Date    Anxiety     Concussion     Depression     Head injury     concussions in high school and in college    Self-injurious behavior        Past Surgical History:   Procedure Laterality Date    WISDOM TOOTH EXTRACTION         Current Outpatient Medications   Medication Sig Dispense Refill    cephalexin (KEFLEX) 500  mg capsule Take 1 capsule (500 mg total) by mouth every 12 (twelve) hours for 7 days 14 capsule 0    divalproex sodium (Depakote ER) 250 mg 24 hr tablet Take 3 tablets (750 mg total) by mouth daily at bedtime 90 tablet 1    hydrOXYzine HCL (ATARAX) 25 mg tablet Take 1 tablet (25 mg total) by mouth every 6 (six) hours as needed for anxiety 90 tablet 2    mirtazapine (REMERON) 7.5 MG tablet Take 1 tablet (7.5 mg total) by mouth daily at bedtime 30 tablet 2    norgestimate-ethinyl estradiol (Sprintec 28) 0.25-35 MG-MCG per tablet TAKE ONE TABLET BY MOUTH EVERY DAY 84 tablet 1    ondansetron (ZOFRAN) 4 mg tablet Take 1 tablet (4 mg total) by mouth every 8 (eight) hours as needed for nausea or vomiting 20 tablet 0    pantoprazole (PROTONIX) 40 mg tablet Take 1 tablet (40 mg total) by mouth daily before breakfast 30 tablet 1     No current facility-administered medications for this visit.        No Known Allergies    Review of Systems    Video Exam    There were no vitals filed for this visit.    Physical Exam     Behavioral Health Psychotherapy Progress Note    Psychotherapy Provided: Individual Psychotherapy     1. Bipolar 1 disorder (HCC)        2. Post traumatic stress disorder (PTSD)            Goals addressed in session: Goal 1     DATA: Janell addressed follow-up experience with psych testing.  She discussed concerns about being followed-up with questioning and concerns they were going to put her in a psych hospital due to some of her answers.  She denied suicidality, but acknowledged answering some of the questions affirmative to thinking about death, and questioning 'doesn't everybody feel that way sometimes?'  She stated balking at approving communication with her mother for fear that her mother would not tell the truth and 'make it all about herself'.  Provider inquired if she shared any of this with the interviewer, and she said she did say that she thought her mother would not be truthful about things.  Provider indicated having a brief conversation with primary psychologist conducting the testing, and that we addressed potential DBT follow-up.  Janell stated she would not do any type of group, but she would be okay to continue some of the individual lessons.  We addressed how she is doing overall since quitting her job.  Provider encouraged benefit in establishing a continued routine within the context of housing and job changes.  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  We discussed a bit.  She noted missing some scheduled interviews, and we addressed motivation, and mindset, with Janell's acknowledgment that she can be her own worst enemy sometimes. We addressed the general flare of professional interest, and encouraged continued movement in a direction that she feels connected to. She calmly and quietly stated that she is 'trying'.  We reviewed overall coping, and encouraged use of healthy supports.  We confirmed appointment next week.     During this session, this clinician used the following therapeutic modalities: Cognitive Behavioral Therapy, Mindfulness-based Strategies, Motivational Interviewing, Solution-Focused Therapy, and Supportive Psychotherapy    Substance Abuse was not addressed during this session. If the client is diagnosed with a co-occurring substance use disorder, please indicate any changes in the frequency or amount of use: not discussed at this time. Stage of change for addressing substance use diagnoses: No substance use/Not applicable    ASSESSMENT:  Janell Solorzano presents with a Euthymic/ normal and Depressed mood.     her affect is Normal range and intensity, which is congruent, with her mood and the content of the session. The client has made progress on their goals.    Janell was tolerant of the discussion and able to address areas of concern Janell Solorzano presents with a low risk of  "suicide, low risk of self-harm, and low risk of harm to others.  Denied.    For any risk assessment that surpasses a \"low\" rating, a safety plan must be developed.    A safety plan was indicated: no  If yes, describe in detail n/a    PLAN: Between sessions, Janell Solorzano will continue seeking routine and balance in context. At the next session, the therapist will use Cognitive Behavioral Therapy and Supportive Psychotherapy to address mood stability.    Behavioral Health Treatment Plan and Discharge Planning: Janell Solorzano is aware of and agrees to continue to work on their treatment plan. They have identified and are working toward their discharge goals. yes    Visit start and stop times:    07/31/24  Start Time: 1102  Stop Time: 1157  Total Visit Time: 55 minutes      "

## 2024-08-01 LAB — BACTERIA UR CULT: NORMAL

## 2024-08-02 ENCOUNTER — OFFICE VISIT (OUTPATIENT)
Dept: FAMILY MEDICINE CLINIC | Facility: CLINIC | Age: 24
End: 2024-08-02
Payer: COMMERCIAL

## 2024-08-02 ENCOUNTER — APPOINTMENT (OUTPATIENT)
Dept: LAB | Facility: HOSPITAL | Age: 24
End: 2024-08-02
Payer: COMMERCIAL

## 2024-08-02 VITALS
HEIGHT: 63 IN | WEIGHT: 124.8 LBS | HEART RATE: 103 BPM | DIASTOLIC BLOOD PRESSURE: 78 MMHG | SYSTOLIC BLOOD PRESSURE: 122 MMHG | BODY MASS INDEX: 22.11 KG/M2 | OXYGEN SATURATION: 98 %

## 2024-08-02 DIAGNOSIS — J02.9 SORE THROAT: ICD-10-CM

## 2024-08-02 DIAGNOSIS — F31.12 BIPOLAR 1 DISORDER WITH MODERATE MANIA (HCC): ICD-10-CM

## 2024-08-02 DIAGNOSIS — N89.8 VAGINAL DISCHARGE: Primary | ICD-10-CM

## 2024-08-02 LAB
ALBUMIN SERPL BCG-MCNC: 4.2 G/DL (ref 3.5–5)
ALP SERPL-CCNC: 37 U/L (ref 34–104)
ALT SERPL W P-5'-P-CCNC: 12 U/L (ref 7–52)
ANION GAP SERPL CALCULATED.3IONS-SCNC: 8 MMOL/L (ref 4–13)
AST SERPL W P-5'-P-CCNC: 14 U/L (ref 13–39)
BASOPHILS # BLD AUTO: 0.05 THOUSANDS/ÂΜL (ref 0–0.1)
BASOPHILS NFR BLD AUTO: 1 % (ref 0–1)
BILIRUB SERPL-MCNC: 0.38 MG/DL (ref 0.2–1)
BUN SERPL-MCNC: 9 MG/DL (ref 5–25)
CALCIUM SERPL-MCNC: 9 MG/DL (ref 8.4–10.2)
CHLORIDE SERPL-SCNC: 107 MMOL/L (ref 96–108)
CO2 SERPL-SCNC: 25 MMOL/L (ref 21–32)
CREAT SERPL-MCNC: 0.88 MG/DL (ref 0.6–1.3)
EOSINOPHIL # BLD AUTO: 0.24 THOUSAND/ÂΜL (ref 0–0.61)
EOSINOPHIL NFR BLD AUTO: 4 % (ref 0–6)
ERYTHROCYTE [DISTWIDTH] IN BLOOD BY AUTOMATED COUNT: 12.2 % (ref 11.6–15.1)
GFR SERPL CREATININE-BSD FRML MDRD: 92 ML/MIN/1.73SQ M
GLUCOSE P FAST SERPL-MCNC: 92 MG/DL (ref 65–99)
HCT VFR BLD AUTO: 47 % (ref 34.8–46.1)
HGB BLD-MCNC: 15.5 G/DL (ref 11.5–15.4)
IMM GRANULOCYTES # BLD AUTO: 0.01 THOUSAND/UL (ref 0–0.2)
IMM GRANULOCYTES NFR BLD AUTO: 0 % (ref 0–2)
LYMPHOCYTES # BLD AUTO: 3.13 THOUSANDS/ÂΜL (ref 0.6–4.47)
LYMPHOCYTES NFR BLD AUTO: 56 % (ref 14–44)
MCH RBC QN AUTO: 29.3 PG (ref 26.8–34.3)
MCHC RBC AUTO-ENTMCNC: 33 G/DL (ref 31.4–37.4)
MCV RBC AUTO: 89 FL (ref 82–98)
MONOCYTES # BLD AUTO: 0.53 THOUSAND/ÂΜL (ref 0.17–1.22)
MONOCYTES NFR BLD AUTO: 10 % (ref 4–12)
NEUTROPHILS # BLD AUTO: 1.6 THOUSANDS/ÂΜL (ref 1.85–7.62)
NEUTS SEG NFR BLD AUTO: 29 % (ref 43–75)
NRBC BLD AUTO-RTO: 0 /100 WBCS
PLATELET # BLD AUTO: 264 THOUSANDS/UL (ref 149–390)
PMV BLD AUTO: 10.4 FL (ref 8.9–12.7)
POTASSIUM SERPL-SCNC: 4.1 MMOL/L (ref 3.5–5.3)
PROT SERPL-MCNC: 6.8 G/DL (ref 6.4–8.4)
RBC # BLD AUTO: 5.29 MILLION/UL (ref 3.81–5.12)
S PYO AG THROAT QL: NEGATIVE
SL AMB  POCT GLUCOSE, UA: NORMAL
SL AMB LEUKOCYTE ESTERASE,UA: NORMAL
SL AMB POCT BILIRUBIN,UA: NORMAL
SL AMB POCT BLOOD,UA: NORMAL
SL AMB POCT CLARITY,UA: NORMAL
SL AMB POCT COLOR,UA: NORMAL
SL AMB POCT KETONES,UA: NORMAL
SL AMB POCT NITRITE,UA: NORMAL
SL AMB POCT PH,UA: 6
SL AMB POCT SPECIFIC GRAVITY,UA: 1.01
SL AMB POCT URINE PROTEIN: NORMAL
SL AMB POCT UROBILINOGEN: NORMAL
SODIUM SERPL-SCNC: 140 MMOL/L (ref 135–147)
VALPROATE SERPL-MCNC: 85 UG/ML (ref 50–100)
WBC # BLD AUTO: 5.56 THOUSAND/UL (ref 4.31–10.16)

## 2024-08-02 PROCEDURE — 87880 STREP A ASSAY W/OPTIC: CPT | Performed by: NURSE PRACTITIONER

## 2024-08-02 PROCEDURE — 87086 URINE CULTURE/COLONY COUNT: CPT | Performed by: NURSE PRACTITIONER

## 2024-08-02 PROCEDURE — 81002 URINALYSIS NONAUTO W/O SCOPE: CPT | Performed by: NURSE PRACTITIONER

## 2024-08-02 PROCEDURE — 36415 COLL VENOUS BLD VENIPUNCTURE: CPT

## 2024-08-02 PROCEDURE — 80164 ASSAY DIPROPYLACETIC ACD TOT: CPT

## 2024-08-02 PROCEDURE — 80053 COMPREHEN METABOLIC PANEL: CPT

## 2024-08-02 PROCEDURE — 85025 COMPLETE CBC W/AUTO DIFF WBC: CPT

## 2024-08-02 PROCEDURE — 99214 OFFICE O/P EST MOD 30 MIN: CPT | Performed by: NURSE PRACTITIONER

## 2024-08-02 PROCEDURE — 81514 NFCT DS BV&VAGINITIS DNA ALG: CPT | Performed by: NURSE PRACTITIONER

## 2024-08-02 NOTE — PROGRESS NOTES
Ambulatory Visit  Name: Janell Solorzano      : 2000      MRN: [id number]  Encounter Provider: ANGELA Arthur  Encounter Date: 2024   Encounter department: [facility] [address]    Assessment & Plan   1. Sore throat  -     POCT rapid ANTIGEN strepA       History of Present Illness     Patient presents for concerns for vaginal discharge. She recently started keflex for UTI from ER. Dysuria as well. Concerned with sore throat. Concerned that her partner has a lesion in groin area. She has no lesions.         Review of Systems   Constitutional:  Negative for chills, diaphoresis and fever.   HENT:  Positive for sore throat. Negative for ear pain.    Eyes:  Negative for pain and visual disturbance.   Respiratory:  Negative for cough and shortness of breath.    Cardiovascular:  Negative for chest pain and palpitations.   Gastrointestinal:  Negative for abdominal pain and vomiting.   Genitourinary:  Positive for dysuria, frequency, urgency and vaginal discharge. Negative for hematuria.   Musculoskeletal:  Negative for arthralgias and back pain.   Skin:  Negative for color change and rash.   Neurological:  Negative for seizures and syncope.   All other systems reviewed and are negative.    Current Outpatient Medications on File Prior to Visit   Medication Sig Dispense Refill    cephalexin (KEFLEX) 500 mg capsule Take 1 capsule (500 mg total) by mouth every 12 (twelve) hours for 7 days 14 capsule 0    divalproex sodium (Depakote ER) 250 mg 24 hr tablet Take 3 tablets (750 mg total) by mouth daily at bedtime 90 tablet 1    hydrOXYzine HCL (ATARAX) 25 mg tablet Take 1 tablet (25 mg total) by mouth every 6 (six) hours as needed for anxiety 90 tablet 2    mirtazapine (REMERON) 7.5 MG tablet Take 1 tablet (7.5 mg total) by mouth daily at bedtime 30 tablet 2    norgestimate-ethinyl estradiol (Sprintec 28) 0.25-35 MG-MCG per tablet TAKE ONE TABLET BY MOUTH EVERY DAY 84 tablet 1     "ondansetron (ZOFRAN) 4 mg tablet Take 1 tablet (4 mg total) by mouth every 8 (eight) hours as needed for nausea or vomiting 20 tablet 0    pantoprazole (PROTONIX) 40 mg tablet Take 1 tablet (40 mg total) by mouth daily before breakfast 30 tablet 1     No current facility-administered medications on file prior to visit.      Objective     /78   Pulse 103   Ht 5' 3\" (1.6 m)   Wt 56.6 kg (124 lb 12.8 oz)   LMP  (LMP Unknown)   SpO2 98%   BMI 22.11 kg/m²     Physical Exam  Vitals and nursing note reviewed.   Constitutional:       General: She is not in acute distress.     Appearance: She is well-developed.   HENT:      Head: Normocephalic and atraumatic.      Right Ear: Tympanic membrane normal.      Left Ear: Tympanic membrane normal.      Mouth/Throat:      Pharynx: No oropharyngeal exudate or posterior oropharyngeal erythema.   Eyes:      Conjunctiva/sclera: Conjunctivae normal.   Cardiovascular:      Rate and Rhythm: Normal rate and regular rhythm.      Heart sounds: No murmur heard.  Pulmonary:      Effort: Pulmonary effort is normal. No respiratory distress.      Breath sounds: Normal breath sounds.   Abdominal:      Palpations: Abdomen is soft.      Tenderness: There is no abdominal tenderness.   Musculoskeletal:         General: No swelling.      Cervical back: Neck supple.   Skin:     General: Skin is warm and dry.      Capillary Refill: Capillary refill takes less than 2 seconds.   Neurological:      Mental Status: She is alert and oriented to person, place, and time.   Psychiatric:         Mood and Affect: Mood normal.       Administrative Statements   I have spent a total time of 20 minutes in caring for this patient on the day of the visit/encounter including Patient and family education, Importance of tx compliance, Documenting in the medical record, Reviewing / ordering tests, medicine, procedures  , and Obtaining or reviewing history  .        "

## 2024-08-03 LAB — BACTERIA UR CULT: NORMAL

## 2024-08-04 LAB
C GLABRATA DNA VAG QL NAA+PROBE: NEGATIVE
C KRUSEI DNA VAG QL NAA+PROBE: NEGATIVE
CANDIDA SP 6 PNL VAG NAA+PROBE: POSITIVE
T VAGINALIS DNA VAG QL NAA+PROBE: NEGATIVE
VAGINOSIS/ITIS DNA PNL VAG PROBE+SIG AMP: NEGATIVE

## 2024-08-05 ENCOUNTER — TELEPHONE (OUTPATIENT)
Dept: LAB | Facility: HOSPITAL | Age: 24
End: 2024-08-05

## 2024-08-05 ENCOUNTER — TELEPHONE (OUTPATIENT)
Age: 24
End: 2024-08-05

## 2024-08-05 DIAGNOSIS — B37.31 YEAST VAGINITIS: Primary | ICD-10-CM

## 2024-08-05 RX ORDER — FLUCONAZOLE 150 MG/1
TABLET ORAL
Qty: 2 TABLET | Refills: 0 | Status: SHIPPED | OUTPATIENT
Start: 2024-08-05 | End: 2024-08-08

## 2024-08-05 NOTE — TELEPHONE ENCOUNTER
Patient called regarding her chlamydia testing.  She wants Seema's advise on when she should go because she just started menstruating.  She wants to know if she should wait or go now?    Please advise and notify.     Thank you.

## 2024-08-05 NOTE — TELEPHONE ENCOUNTER
Chalmydia/gc was collected on this patient with two swabs in container, test has since been cancelled, you can only use one swab in container. Please call patient for recollection if recollected.

## 2024-08-06 ENCOUNTER — TELEMEDICINE (OUTPATIENT)
Dept: PSYCHIATRY | Facility: CLINIC | Age: 24
End: 2024-08-06
Payer: COMMERCIAL

## 2024-08-06 ENCOUNTER — TELEPHONE (OUTPATIENT)
Age: 24
End: 2024-08-06

## 2024-08-06 DIAGNOSIS — F31.12 BIPOLAR 1 DISORDER WITH MODERATE MANIA (HCC): Primary | ICD-10-CM

## 2024-08-06 DIAGNOSIS — Z11.3 SCREENING EXAMINATION FOR STD (SEXUALLY TRANSMITTED DISEASE): Primary | ICD-10-CM

## 2024-08-06 PROCEDURE — 99214 OFFICE O/P EST MOD 30 MIN: CPT | Performed by: NURSE PRACTITIONER

## 2024-08-06 NOTE — PSYCH
Virtual Regular Visit    Verification of patient location:    Patient is located at Home in the following state in which I hold an active license [place]      Assessment/Plan:    Problem List Items Addressed This Visit    None  Visit Diagnoses     Bipolar 1 disorder with moderate saul (HCC)    -  Primary    Relevant Orders    CBC and differential          Reason for visit is No chief complaint on file.                   Encounter provider ANGELA Vaughn      Recent Visits  Date Type Provider Dept   08/02/24 Office Visit ANGELA Arthur [facility] [address]   Showing recent visits within past 7 days and meeting all other requirements  Today's Visits  Date Type Provider Dept   08/06/24 Telemedicine ANGELA Vaughn [facility]   Showing today's visits and meeting all other requirements  Future Appointments  No visits were found meeting these conditions.  Showing future appointments within next 150 days and meeting all other requirements       The patient was identified by name and date of birth. Janell Solorzano was informed that this is a telemedicine visit and that the visit is being conducted throughthe Epic Embedded platform. She agrees to proceed..  My office door was closed. ANGELA Kang student was also in the room with patient consent.  She acknowledged consent and understanding of privacy and security of the video platform. The patient has agreed to participate and understands they can discontinue the visit at any time.    Patient is aware this is a billable service.     HPI     Past Medical History:   Diagnosis Date   • Anxiety    • Concussion    • Depression    • Head injury     concussions in high school and in college   • Self-injurious behavior        Past Surgical History:   Procedure Laterality Date   • WISDOM TOOTH EXTRACTION         Current Outpatient Medications   Medication Sig Dispense Refill   • cephalexin (KEFLEX) 500 mg capsule Take 1 capsule  "(500 mg total) by mouth every 12 (twelve) hours for 7 days 14 capsule 0   • divalproex sodium (Depakote ER) 250 mg 24 hr tablet Take 3 tablets (750 mg total) by mouth daily at bedtime 90 tablet 1   • fluconazole (DIFLUCAN) 150 mg tablet Take 1 dose today and repeat in 3 days if still symptomatic. 2 tablet 0   • hydrOXYzine HCL (ATARAX) 25 mg tablet Take 1 tablet (25 mg total) by mouth every 6 (six) hours as needed for anxiety 90 tablet 2   • mirtazapine (REMERON) 7.5 MG tablet Take 1 tablet (7.5 mg total) by mouth daily at bedtime 30 tablet 2   • norgestimate-ethinyl estradiol (Sprintec 28) 0.25-35 MG-MCG per tablet TAKE ONE TABLET BY MOUTH EVERY DAY 84 tablet 1   • ondansetron (ZOFRAN) 4 mg tablet Take 1 tablet (4 mg total) by mouth every 8 (eight) hours as needed for nausea or vomiting 20 tablet 0   • pantoprazole (PROTONIX) 40 mg tablet Take 1 tablet (40 mg total) by mouth daily before breakfast 30 tablet 1     No current facility-administered medications for this visit.        No Known Allergies    Review of Systems    Video Exam    There were no vitals filed for this visit.    Physical Exam     Visit Time    Visit Start Time: 1330  Visit Stop Time: 1355  Total Visit Duration:  25 minutes      MEDICATION MANAGEMENT NOTE        [facility]    Name and Date of Birth:  Janell Solorzano 23 y.o. 2000 MRN: [id number]    Date of Visit: August 6, 2024    SUBJECTIVE:    Janell is seen today for a follow up for Bipolar Disorder type II. She continues to experience ongoing symptoms since the last visit. Janell seen virtually today for medication management follow-up.  She was last seen by this provider on 7/19/24. Janell reports that she continues to feel \"crazy\".  She states that she \"does not feel better\" on the Depakote.  She reports she is sleeping, and her anxiety is \"still pretty high\".  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  When asked what she has been doing in her spare time she states she is sitting in her room or hanging out with friends but is \"on edge\" about life.  She has no active SI, but is passively suicidal.  She denies any plans or intent at this time.  She states that she mistakenly started taking the Depakote 750 mg immediately, without titrating.  She is more calm, becomes tearful at times.  She did ask about inpatient admission, and asked if she would need to remove her piercings if she was admitted, stating she would not go inpatient if she needed to \"go through all that\".  We discussed PHP again.  She did agree to the referral today.  She states she would do in person at [facility].      This provider also did review lab work completed by Janell at this time.  Her Depakote level was in within normal limits and therapeutic at 84.  However, her ANC was low at 1.60.  We will repeat the lab at this time.  If it remains low we will discontinue the Depakote and investigate alternatives.  She is in agreement at this time.  She denies any side effects from current psychiatric medications.    PLAN:  Hydroxyzine 25 mg p.o. every 6 as needed for anxiety  Mirtazapine 7.5 mg p.o. daily at bedtime  Depakote to 750mg PO HS  Depakote level, CMP, CBC scheduled for one day  Neuropsych appt for results on 8/27/24.   PHP Referral made at this time  The patient will call this provider sooner if concerns or issues arise prior to scheduled appointment  Continue therapy with therapist    Depakote PARQ completed including GI distress, tremor, weight gain, and hepatic risks, blood dyscrasias/bone marrow effects, SIADH, pancreatitis, andrenergic effects, PCOS, allergic reactions, worsened depression/suicidality, fetal abnormality risks, and others including need for blood testing and monitoring.     Aware of 24 hour and weekend coverage for urgent situations accessed by calling [facility] main practice " number  Continue psychotherapy with therapist  Medication management every 1 month  Aware of need to follow up with family physician for medical issues  Referral to Partial Hospitalization Program    Diagnoses and all orders for this visit:    Bipolar 1 disorder with moderate saul (HCC)  -     Cancel: CBC and differential; Future  -     CBC and differential; Future    Psychotherapy Provided:     Individual psychotherapy provided: Yes  Supportive counseling provided.  Medication changes discussed with Janell.  Medication education provided to Janell.  Discussed with Janell coping with everyday stressors, ongoing anxiety, and chronic mental illness.   Coping strategies reviewed with Janell.   Importance of medication and treatment compliance reviewed with Janell.  Importance of follow up with family physician for medical issues reviewed with Janell.  Reassurance and supportive therapy provided.   Crisis/safety plan discussed with Janell. Patient will call prior to scheduled appointment if they have any issues or concerns.  Patient understands they can access the office by calling the main number at any time if they are in crisis.  They also understand they can call their [facility]'s crisis number or go to their nearest ED if suicidal ideation increases or if they develop a plan or intent.       HPI ROS Appetite Changes and Sleep:     She reports normal sleep, adequate appetite, normal energy level. Denies homicidal ideation, Intermittent passive suicidal ideation without intent or plan    Review Of Systems:      HPI ROS:               Medication Side Effects:  denies   Depression (10 worst): No change 3/10   Anxiety (10 worst): No change 9/10   Safety concerns (SI, HI, etc): Passive SI Denies    Sleep: good good   Energy: adequate adequate   Appetite: adequate adequate     General emotional problems and decreased functioning   Personality Irritable and superficial   Constitutional as noted in HPI   ENT negative  "  Cardiovascular negative   Respiratory negative   Gastrointestinal negative   Genitourinary negative   Musculoskeletal negative   Integumentary negative   Neurological negative   Endocrine negative   Other Symptoms none, all other systems are negative     Mental Status Evaluation:    Appearance Adequate hygiene and grooming and Good eye contact, in bed   Behavior cooperative   Mood anxious, depressed, and irritable  Depression Scale -  \"no change\"  of 10 (0 = No depression)  Anxiety Scale -  \"no change\"  of 10 (0 = No anxiety)   Speech Normal rate and volume   Affect mood-congruent and Tearful, irritable at times   Thought Processes Goal directed and coherent   Thought Content Does not verbalize delusional material   Associations Tightly connected   Perceptual Disturbances Denies hallucinations and does not appear to be responding to internal stimuli   Risk Potential Suicidal/Homicidal Ideation - Passive suicidal ideation without intent or plan  Risk of Violence - No evidence of risk for violence found on assessment  Risk of Self Mutilation - No evidence of risk for self mutilation found on assessment   Orientation oriented to person, place, time/date, and situation   Memory recent and remote memory grossly intact   Consciousness alert and awake   Attention/Concentration attention span and concentration appear shorter than expected for age   Insight fair   Judgement fair   Muscle Strength and Gait normal muscle strength and normal muscle tone, normal gait/station and normal balance   Motor Activity no abnormal movements   Language no difficulty naming common objects, no difficulty repeating a phrase, no difficulty writing a sentence   Fund of Knowledge adequate knowledge of current events  adequate fund of knowledge regarding past history  adequate fund of knowledge regarding vocabulary      Past Psychiatric History Update:     Inpatient Psychiatric Admission Since Last Encounter:   no  Changes to Outpatient " Psychiatric Treatment Team:    no  Suicide Attempt Or Self Mutilation Since Last Encounter:   no  Incidence of Violent Behavior Since Last Encounter:   no    Traumatic History Update:     New Onset of Abuse Since Last Encounter:   no  Traumatic Events Since Last Encounter:   no    Substance Abuse History:    Social History     Substance and Sexual Activity   Alcohol Use Yes   • Alcohol/week: 3.0 - 4.0 standard drinks of alcohol   • Types: 3 - 4 Shots of liquor per week    Comment: 3-4 drinks, 2 times per week     Social History     Substance and Sexual Activity   Drug Use No     Social History:    Social History     Socioeconomic History   • Marital status: Single     Spouse name: Not on file   • Number of children: 0   • Years of education: senior in college currently   • Highest education level: High school graduate   Occupational History   • 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   Tobacco Use   • Smoking status: Never   • Smokeless tobacco: Never   Vaping Use   • Vaping status: Never Used   Substance and Sexual Activity   • Alcohol use: Yes     Alcohol/week: 3.0 - 4.0 standard drinks of alcohol     Types: 3 - 4 Shots of liquor per week     Comment: 3-4 drinks, 2 times per week   • Drug use: No   • Sexual activity: Yes     Partners: Male     Birth control/protection: Condom Male   Other Topics Concern   • Not on file   Social History Narrative   • Not on file     Social Determinants of Health     Financial Resource Strain: Not on file   Food Insecurity: Not on file   Transportation Needs: Not on file   Physical Activity: Not on file   Stress: Not on file   Social Connections: Unknown (6/18/2024)    Received from [facility]    Social Connections    • How often do you feel lonely or isolated from those around you? (Adult - for ages 18 years and over): Not on file   Intimate Partner Violence: Not on file   Housing Stability: Not on file     Family Psychiatric History:     Family History   Problem Relation Age  of Onset   • Hypertension Mother    • Mental illness Mother    • Depression Mother    • Mental illness Sister    • Coronary artery disease Maternal Grandmother    • Throat cancer Paternal Grandfather      History Review:The following portions of the patient's history were reviewed and updated as appropriate: allergies, current medications, past family history, past medical history, past social history, past surgical history, and problem list     OBJECTIVE:     Vital signs in last 24 hours:    There were no vitals filed for this visit.  Laboratory Results: I have personally reviewed all pertinent laboratory/tests results.    Suicide/Homicide Risk Assessment:    Risk of Harm to Self:  The following ratings are based on assessment at the time of the interview  Recent Specific Risk Factors include: current depressive symptoms, current anxiety symptoms, passive death wishes  Demographic risk factors include: , age: young adult (15-24)  Historical Risk Factors include: chronic depressive symptoms, chronic anxiety symptoms, chronic mood disorder, history of impulsive behaviors, history of traumatic experiences  Protective Factors: access to mental health treatment, compliant with mental health treatment, stable job, sense of determination, sense of importance of health and wellness  Weapons: none. The following steps have been taken to ensure weapons are properly secured: not applicable  Based on today's assessment, Janell presents the following risk of harm to self: low    Risk of Harm to Others:  The following ratings are based on assessment at the time of the interview  Protective Factors: no current homicidal ideation  Based on today's assessment, Janell presents the following risk of harm to others: none    The following interventions are recommended:  recommended PHP, contracts for safety at present - agrees to go to ED if feeling unsafe, return in 1 month for reassessment, therapy appointment in 2 days,  contracts for safety at present - agrees to call Crisis Intervention Service if feeling unsafe    Medications Risks/Benefits:      Risks, Benefits And Possible Side Effects Of Medications:    Discussed risks and benefits of treatment with patient including risk of suicidality, serotonin syndrome, increased QTc interval and SIADH related to treatment with antidepressants; Risk of induction of manic symptoms in certain patient populations and risk of liver impairment related to treatment with Depakote     Controlled Medication Discussion:     Not applicable    Treatment Plan:    Due for update/Updated:   no  Last treatment plan done 1/11/24 by Michela Bates.  Treatment Plan due on 7/11/24.    ANGELA Vaughn 08/06/24    This note was not shared with the patient due to reasonable likelihood of causing patient harm

## 2024-08-07 ENCOUNTER — APPOINTMENT (OUTPATIENT)
Dept: LAB | Facility: HOSPITAL | Age: 24
End: 2024-08-07
Payer: COMMERCIAL

## 2024-08-07 ENCOUNTER — TELEPHONE (OUTPATIENT)
Dept: PSYCHOLOGY | Facility: CLINIC | Age: 24
End: 2024-08-07

## 2024-08-07 DIAGNOSIS — F31.12 BIPOLAR 1 DISORDER WITH MODERATE MANIA (HCC): ICD-10-CM

## 2024-08-07 DIAGNOSIS — Z11.3 SCREENING EXAMINATION FOR STD (SEXUALLY TRANSMITTED DISEASE): ICD-10-CM

## 2024-08-07 DIAGNOSIS — F31.81 BIPOLAR II DISORDER (HCC): ICD-10-CM

## 2024-08-07 LAB
BASOPHILS # BLD AUTO: 0.04 THOUSANDS/ÂΜL (ref 0–0.1)
BASOPHILS NFR BLD AUTO: 1 % (ref 0–1)
EOSINOPHIL # BLD AUTO: 0.05 THOUSAND/ÂΜL (ref 0–0.61)
EOSINOPHIL NFR BLD AUTO: 1 % (ref 0–6)
ERYTHROCYTE [DISTWIDTH] IN BLOOD BY AUTOMATED COUNT: 12 % (ref 11.6–15.1)
HCT VFR BLD AUTO: 43.9 % (ref 34.8–46.1)
HGB BLD-MCNC: 15 G/DL (ref 11.5–15.4)
IMM GRANULOCYTES # BLD AUTO: 0.01 THOUSAND/UL (ref 0–0.2)
IMM GRANULOCYTES NFR BLD AUTO: 0 % (ref 0–2)
LYMPHOCYTES # BLD AUTO: 2.42 THOUSANDS/ÂΜL (ref 0.6–4.47)
LYMPHOCYTES NFR BLD AUTO: 46 % (ref 14–44)
MCH RBC QN AUTO: 29.4 PG (ref 26.8–34.3)
MCHC RBC AUTO-ENTMCNC: 34.2 G/DL (ref 31.4–37.4)
MCV RBC AUTO: 86 FL (ref 82–98)
MONOCYTES # BLD AUTO: 0.61 THOUSAND/ÂΜL (ref 0.17–1.22)
MONOCYTES NFR BLD AUTO: 12 % (ref 4–12)
NEUTROPHILS # BLD AUTO: 2.08 THOUSANDS/ÂΜL (ref 1.85–7.62)
NEUTS SEG NFR BLD AUTO: 40 % (ref 43–75)
NRBC BLD AUTO-RTO: 0 /100 WBCS
PLATELET # BLD AUTO: 249 THOUSANDS/UL (ref 149–390)
PMV BLD AUTO: 10.2 FL (ref 8.9–12.7)
RBC # BLD AUTO: 5.11 MILLION/UL (ref 3.81–5.12)
WBC # BLD AUTO: 5.21 THOUSAND/UL (ref 4.31–10.16)

## 2024-08-07 PROCEDURE — 87591 N.GONORRHOEAE DNA AMP PROB: CPT

## 2024-08-07 PROCEDURE — 85025 COMPLETE CBC W/AUTO DIFF WBC: CPT

## 2024-08-07 PROCEDURE — 87491 CHLMYD TRACH DNA AMP PROBE: CPT

## 2024-08-07 PROCEDURE — 36415 COLL VENOUS BLD VENIPUNCTURE: CPT

## 2024-08-07 RX ORDER — MIRTAZAPINE 7.5 MG/1
7.5 TABLET, FILM COATED ORAL
Qty: 30 TABLET | Refills: 2 | Status: SHIPPED | OUTPATIENT
Start: 2024-08-07

## 2024-08-08 ENCOUNTER — TELEPHONE (OUTPATIENT)
Age: 24
End: 2024-08-08

## 2024-08-08 ENCOUNTER — TELEPHONE (OUTPATIENT)
Dept: PSYCHIATRY | Facility: CLINIC | Age: 24
End: 2024-08-08

## 2024-08-08 LAB
C TRACH DNA SPEC QL NAA+PROBE: NEGATIVE
N GONORRHOEA DNA SPEC QL NAA+PROBE: NEGATIVE

## 2024-08-08 NOTE — TELEPHONE ENCOUNTER
Called Janell and let her know that her ANCs were back WNL and that it was ok to continue to take her depakote. Will continue to monitor.  She voiced verbal understanding at this time.

## 2024-08-08 NOTE — TELEPHONE ENCOUNTER
Patient called in regards to VM provider left. Patient wanted to relay message to provider that she recently started a partial program and that is why she missed the appt. Writer informed patient they would relay message.

## 2024-08-15 ENCOUNTER — TELEPHONE (OUTPATIENT)
Age: 24
End: 2024-08-15

## 2024-08-15 ENCOUNTER — OFFICE VISIT (OUTPATIENT)
Dept: PSYCHIATRY | Facility: CLINIC | Age: 24
End: 2024-08-15

## 2024-08-15 ENCOUNTER — OFFICE VISIT (OUTPATIENT)
Dept: PSYCHOLOGY | Facility: CLINIC | Age: 24
End: 2024-08-15
Payer: COMMERCIAL

## 2024-08-15 DIAGNOSIS — F31.4 BIPOLAR I, MOST RECENT EPISODE DEPRESSED, SEVERE (HCC): Primary | ICD-10-CM

## 2024-08-15 DIAGNOSIS — F43.10 POST TRAUMATIC STRESS DISORDER (PTSD): ICD-10-CM

## 2024-08-15 DIAGNOSIS — F41.9 ANXIETY: ICD-10-CM

## 2024-08-15 PROCEDURE — H0035 MH PARTIAL HOSP TX UNDER 24H: HCPCS

## 2024-08-15 RX ORDER — LUMATEPERONE 21 MG/1
21 CAPSULE ORAL DAILY
Qty: 90 CAPSULE | Refills: 1 | Status: SHIPPED | OUTPATIENT
Start: 2024-08-15

## 2024-08-15 NOTE — BH TREATMENT PLAN
"Assessment/Plan:      Diagnoses and all orders for this visit:    Bipolar I, most recent episode depressed, severe (HCC)    Anxiety          Subjective:     Patient ID: Janell Solorzano is a 23 y.o. female.    [facility] Plan   AREAS OF NEED: Bipolar I, most recent episode depressed, severe and anxiety as evidenced by decrease in ADL'S, isolating, shaking, chest hurting, occasionally vomitting, fluctuation in mood, feeling over stimulated, overwhelmed, impulsive behaviors, increase in sleep, lack of appetite, lack of motivation, passive death wishes and HI without a plan or intent due to stress with relationships and finances .  Date Initiated: 08/15/24    Strengths: \"I can de-escalate myself at times\"     LONG TERM GOAL:   Date Initiated: 08/15/24  1.0 I will identify and share three ways in which my day-to-day functioning has improved since attending program.  Target Date: 9/12/24  Completion Date:       SHORT TERM OBJECTIVES:     Date Initiated: 08/15/24  1.1 I will begin to focus more on learning and implementing DBT skills by working on one exercise daily in the workbook provided and share the challenges and successes with implementing the skills.  Revision Date:   Target Date: 8/26/24  Completion Date:     Date Initiated: 08/15/24  1.2 I will demonstrate two new ways of appropriate verbal expression of thoughts and feelings that I learned during my time at program.  Target Date: 8/26/24  Completion Date:    Date Initiated: 08/15/24  1.3 I will take medications as prescribed and share questions and concerns if arise.    Revision Date:  Target Date: 8/26/24  Completion Date:     Date Initiated: 08/15/24  1.4 I will identify 3 ways my supports can assist in my recovery and agree to staff/support contact as indicated.    Revision Date:  Target Date: 8/26/24  Completion Date:          7 DAY REVISION:    Date Initiated:  Revision Date:   Target Date:   Completion Date:      PSYCHIATRY:  Date Initiated:  " 08/15/24  Medication Management and Education       Revision Date:       The person(s) responsible for carrying out the plan is Mayur Moura DO and Renetta Rios PA-C    NURSING/SYMPTOM EDUCATION:   Date Initiated: 08/15/24  1.1, 1.2. 1.3, 1.4 This RN/Or Therapist will provide wellness/symptoms and skill education groups three to five days weekly to educate Janell Solorzano on signs and symptoms of diagnoses, skills to manage, and medication questions that will be addressed by the treatment team.    Revision date:  The person(s) responsible for carrying out the plan is Jens Alvarez; Digna Choe, RN, BSN; MACHELLE Eckert    PSYCHOLOGY:   Date Initiated: 08/15/24       1.1, 1.2, 1.4 Provide psychotherapy group 5 times per week to allow opportunity for Janell Solorzano  to explore stressors and ways of coping.   Revision Date:   The person(s) responsible for carrying out the plan is MITALI Aguila,MAURY; Ashley Costenbader, MA LMJAY    ALLIED THERAPY:   Date Initiated: 08/15/24  1.1,1.2 Engage Janell Solorzano in AT group 5 times weekly to encourage development and use of wellness tools to decrease symptoms and promote recovery through meaningful activity.  Revision Date:       The person(s) responsible for carrying out the plan is Brenda Schaefer MTTruBC ; DAV Lopez, MT-BC    CASE MANAGEMENT:   Date Initiated: 08/15/24      1.0 This  will meet with Janell Solorzano  3-4 times weekly to assess treatment progress, discharge planning, connection to community supports and UR as indicated.  Revision Date:   The person(s) responsible for carrying out the plan is MITALI Aguila,MAURY    TREATMENT REVIEW/COMMENTS:     DISCHARGE CRITERIA: Identify 3 signs of progress and complete a safety plan.    DISCHARGE PLAN: Connect with identified outpatient providers.   Estimated Length of Stay: 10 treatment days             Diagnosis and Treatment Plan explained to Janell Maciel relates understanding  diagnosis and is agreeable to Treatment Plan.        CLIENT COMMENTS / Please share your thoughts, feelings, need and/or experiences regarding your treatment plan with Staff.  Please see follow up note with comments.    Signatures can be found on [facility] Treatment plan consent form.

## 2024-08-15 NOTE — TELEPHONE ENCOUNTER
Patient is calling regarding cancelling an appointment.    Date/Time: 8/15/24, 8/22/24, 8/29/24    Reason: Program that is interfering with appointments    Patient was rescheduled: YES [] NO [x]  If yes, when was Patient reschedule for: 9/5/24 upcoming appointment    Patient requesting call back to reschedule: YES [] NO [x]

## 2024-08-15 NOTE — PSYCH
Visit Time    Visit Start Time: 0835  Visit Stop Time: 0930  Total Visit Duration:  55 minutes    Subjective:     Patient ID: Janell Solorzano is a 23 y.o. female.    [facility] Clinical Progress Notes      Specialized Services Documentation  Therapist must complete separate progress note for each specific clinical activity in which the individual participated during the day.       Case Management Note    Current suicide risk : Low     Medications changes/added/denied? Yes     Treatment session number: Assessment and day 1    Individual Case Management Visit provided today? yes    [facility] follow up physician's orders: Admit to PHP- See 's note         INDIVIDUAL PSYCHOTHERAPY     Met with Janell Solorzano .  Reviewed program, initial paperwork reviewed: Consent for Treatment, PHP handbook, HIPPA, General Consent, Client Bill of Rights, and Smoking/Drug and Alcohol Policy. Release of Information obtained for emergency contact - Melany Hernandes (grandmother) [phone] and PCP and Health Care Coordination Form. Janell Solorzano has hard copies of all paperwork and verbally gave consent. Reviewed and given on call number. PCP notified of admission and health care coordination form sent. Completed initial psycho-social evaluation and initial treatment goals discussed.Program guidelines reviewed.

## 2024-08-15 NOTE — PSYCH
"Visit Time    Visit Start Time: 1045  Visit Stop Time: 1145  Total Visit Duration:  60 minutes    Subjective:     Patient ID: Janell Solorzano is a 23 y.o. female.    [facility] Clinical Progress Notes      Specialized Services Documentation  Therapist must complete separate progress note for each specific clinical activity in which the individual participated during the day.     Allied Therapy [id number] Janell Solorzano participated in music therapy group regarding time management. The group listened to and discussed the song \"Time\" and how it relates to their philosophy of time management. The group participated in a discussion about time management in there lives and where they could use improvement. The group read and discussed handouts about tips for time management, time wasters, and daily/weekly schedules. The group then discussed the pomodoro method of time management and created a music playlist to as a method of tracking the 25 minute module. Janell did not share but appeared attentive throughout. Some effort noted towards treatment goal. Continue AT to encourage the development and proactive use of time management skills. Tx Plan Objective: n/a, Therapist:  Misha Gonzalez, DAV, MT-BC  "
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
## 2024-08-15 NOTE — PSYCH
Visit Time    Visit Start Time: 1215  Visit Stop Time: 1315  Total Visit Duration:  60 minutes    Subjective:     Patient ID: Janell Solorzano is a 23 y.o. female.    [facility] Clinical Progress Notes      Specialized Services Documentation  Therapist must complete separate progress note for each specific clinical activity in which the individual participated during the day.       Group Psychotherapy Life Skills Group ([id number]) Janell Solorzano actively engaged in group related to mindfulness. Group members played the mindfulness game which consisted of true/false statements, visualization cards, and mindfulness practice cards. Group members engaged in mindfulness activities. Group members discussed the importance of mindfulness. Janell identified how they could practice mindfulness today. Janell Solorzano continues to make progress towards goals through verbal participation in group; to accomplish long term goals continue to utilize skills learned in programming. Continue with psychotherapy to educate and encourage use of wellness tools. Tx Plan Objective: 1.1,1.2 Therapist: MITALI Aguila,MAURY

## 2024-08-15 NOTE — PSYCH
Visit Time    Visit Start Time: 0930  Visit Stop Time: 1030  Total Visit Duration:  0 minutes    Subjective:     Patient ID: Janell Solorzano is a 23 y.o. female.    [facility] Clinical Progress Notes      Specialized Services Documentation  Therapist must complete separate progress note for each specific clinical activity in which the individual participated during the day.     Group Psychotherapy  This group was facilitated in a private office.  ([phone]) Janell Solorzano did not attend group due to intake assessment- Wellness Recovery Action Plan. Treatment Plan Objectives: 1.1, 1.2  Therapist: Digna WHITESIDE RN

## 2024-08-15 NOTE — PSYCH
Visit Time    Visit Start Time: 0830  Visit Stop Time: 0930  Total Visit Duration:  0 minutes    Subjective:     Patient ID: Janell Solorzano is a 23 y.o. female.    [facility] Clinical Progress Notes      Specialized Services Documentation  Therapist must complete separate progress note for each specific clinical activity in which the individual participated during the day.       EDUCATION THERAPY      Janell Solorzano was not present during morning assessment and goal review due to the intake process.   Tx Plan Objective: 1.1, Therapist: Ashley Costenbader, MA LMT     Visit Time    Visit Start Time: 1330  Visit Stop Time: 1430  Total Visit Duration:  60 minutes    Subjective:     Patient ID: Janlel Solorzano is a 23 y.o. female.    [facility] Clinical Progress Notes      Specialized Services Documentation  Therapist must complete separate progress note for each specific clinical activity in which the individual participated during the day.       GROUP PSYCHOTHERAPY    Janell Solorzano participated with prompts in psychotherapy group.  This was observed Consistent throughout the treatment day. They were engaged in learning related to Wellness Tools. Staff utilized Verbal, Written, and Demonstration teaching methods.  Janell Solorzano shared area of learning and set a goal for outside of program to not text boyfriend and hang out with her friends.  Janell Solorzano was able to share items explored during the day and shared in adding to their WRAP.  Ended session with staff led mindfulness exercise.  Janell Solorzano demonstrated fair progress toward goal.  Continue group psychotherapy to actively practice learned skills and encourage transfer of knowledge to unstructured time.    Tx Plan Objective: 1.1, Therapist: Ashley Costenbader, MA LMT

## 2024-08-16 ENCOUNTER — APPOINTMENT (OUTPATIENT)
Dept: PSYCHOLOGY | Facility: CLINIC | Age: 24
End: 2024-08-16
Payer: COMMERCIAL

## 2024-08-16 ENCOUNTER — DOCUMENTATION (OUTPATIENT)
Dept: PSYCHOLOGY | Facility: CLINIC | Age: 24
End: 2024-08-16

## 2024-08-16 ENCOUNTER — TELEPHONE (OUTPATIENT)
Age: 24
End: 2024-08-16

## 2024-08-16 NOTE — TELEPHONE ENCOUNTER
PA for Lumateperone Tosylate (Caplyta) 21 MG SUBMITTED     via    [x]CMM-KEY: [id number]  []Surescripts-Case ID #   []Faxed to plan   []Other website   []Phone call Case ID #     Office notes sent, clinical questions answered. Awaiting determination    Turnaround time for your insurance to make a decision on your Prior Authorization can take 7-21 business days.

## 2024-08-16 NOTE — PROGRESS NOTES
Subjective:     Patient ID: Janell Solorzano is a 23 y.o. female.    [facility] Clinical Progress Notes      Specialized Services Documentation  Therapist must complete separate progress note for each specific clinical activity in which the individual participated during the day.     Case Management Note    MITALI Aguila    Current suicide risk : Unable to assess due to absence.    Janell Solorzano was excused from  program today 08/16/24 due to feeling sick in the morning. She spoke with , Vidya Bateman.  contacted Janell Solorzano at [phone].Janell reports that she was feeling better and should be in on Monday.    Medications changes/added/denied? No    Treatment session number: N/A    Individual Case Management Visit provided today? No    [facility] follow up physician's orders: None at this time.

## 2024-08-16 NOTE — PSYCH
Visit Time    Visit Start Time: 0830  Visit Stop Time: 0930  Total Visit Duration: {Psych Total Visit Time:36908}    Subjective:     Patient ID: Janell Solorzano is a 23 y.o. female.    [facility] Clinical Progress Notes      Specialized Services Documentation  Therapist must complete separate progress note for each specific clinical activity in which the individual participated during the day.       EDUCATION THERAPY      Janell Solorzano {SL AMB PSYCH ACTIVELY:38069} shared in morning assessment and goal review. Presented as {Readiness to Learnin} related to readiness to learn. Janell Solorzano  {DID/DID NOT:64935} complete goal from last treatment day identifying gaining {HEARS:79097}. Janell Solorzano {DID/DID NOT:50319} present with any barriers to learning. Throughout morning group, Janell Solorzano participated in {morning assessmemt experiences:76967}. Janell Solorzano made *** progress toward goal. Continue education group to assess willingness to engage, assess transfer of knowledge, and participate in skill development.    Tx Plan Objective: ***, Therapist: {THERAPIST:25841}     Visit Time    Visit Start Time: 1330  Visit Stop Time: 1430  Total Visit Duration: {Psych Total Visit Time:64585}    Subjective:     Patient ID: Janell Solorzano is a 23 y.o. female.    [facility] Clinical Progress Notes      Specialized Services Documentation  Therapist must complete separate progress note for each specific clinical activity in which the individual participated during the day.       GROUP PSYCHOTHERAPY    Janell Solorzano participated {SL AMB PSYCH ACTIVELY:86541} in psychotherapy group.  This was observed {consistent/inconsistent pain:5100073027} throughout the treatment day. They were engaged in learning related to {Education Completed:60615}. Staff utilized {Teaching Method:40432} teaching methods.  Janell Solorzano shared area of learning and set a goal for outside of program to ***.  Janell Solorzano was able to share items  "explored during the day and shared in adding to their WRAP.  Ended session with {staff/peer led:43627} led exercise ***.  Janell Solorzano demonstrated *** progress toward goal.  Continue group psychotherapy to actively practice learned skills and encourage transfer of knowledge to unstructured time.    Tx Plan Objective: ***, Therapist: {THERAPIST:27060}     Visit Time    Visit Start Time: ...  Visit Stop Time: ...  Total Visit Duration: ... minutes      Subjective:     Patient ID: Janell Solorzano is a 23 y.o. female.     [facility] Clinical Progress Notes      Specialized Services Documentation  Therapist must complete separate progress note for each specific clinical activity in which the individual participated during the day.      Group Psychotherapy - Anger Management   Janell Solorzano {AD Group Parti:65786::\"participated actively in\"} a psychotherapy group focused on Anger Management. Today group members focused on the warning signs of their anger cycle of anger, evaluating each aspect of the cycle, and apply personal experience to the cycle. Group members also were to complete a personal anger thermometer to track the stages of their anger development. The goal of today was for group members to participate in groups discussion on the cycle of anger, warning signs, and coping techniques. As well as evaluate coping techniques they could utilize to cope with anger and stop the cycle. Members evaluated their anger triggers and identified ways they struggle to work through anger on top of ways they cope. This writer encouraged members to openly share and integrate coping skills into their daily routine. Janell Solorzano made *** efforts towards progress goals which were displayed through participation, notetaking, and engagement in topic.    Tx Plan Objective: 1.1,1.2,1.4 Therapist: Ashley Costenbader MA LMT      "

## 2024-08-16 NOTE — PROGRESS NOTES
[facility] Insurance Authorization for Treatment       Call Start Time: 1320  Call Stop Time: 1342  Total Visit Duration:  22 minutes     Subjective:        Subjective  Patient ID: Janell Solorzano is a 23 y.o. female.     Phone call received from [facility]  Phone number: [phone]  Tax ID and/or NPI used not requested  Location: [address] [place] [address]  Spoke to Aminta  Code Used for Authorization: none requested  10 days approved from 8/15/24 to 8/28/24   Level of Care PHP     Review on 8/28/24  Reviewer Assigned: Kaitlin  Phone number: [phone]     Authorization # [id number]  Call Reference # NA     Clinical Faxed no  NA Message Left Awaiting Return Call   NA Missed Treatment Days and Authorization Extended Through NA     Therapist: MAURY Aguila

## 2024-08-16 NOTE — PSYCH
"Visit Time    Visit Start Time: 1045  Visit Stop Time: 1145  Total Visit Duration: 60 minutes ***    Subjective:     Patient ID: Janell Solorzano is a 23 y.o. female.    [facility] Clinical Progress Notes      Specialized Services Documentation  Therapist must complete separate progress note for each specific clinical activity in which the individual participated during the day.       Group Psychotherapy  This group was facilitated in a private office..  ([phone]) Janell Solorzano actively *** shared in psychoeducational group which focused on nutrition to improve physical and mental wellbeing. Topics included:  How our diet affects our mental health  Mental health benefits of healthy eating  Barriers to eating healthy  Ways to overcome barriers  Macro and Micro nutrients   Appropriate serving sizes for all food groups as well as benefits to eating for health       The group then viewed a TAWANDA talk video titled \"The Brain Intervention at the End of our [place]\" by Dr Juma Barrios. They then discussed ideas on how to improve on their nutrition.  *** progress toward goal noted.  Continue psychoeducation to educate on the importance of making nutrition a priority.  Tx Plan Objectives: 1.1,1.2    Therapist: Digna WHITESIDE      "

## 2024-08-16 NOTE — PROGRESS NOTES
[facility] Insurance Authorization for Treatment      Call Start Time: 1455  Call Stop Time: 1301  Total Visit Duration:  6 minutes    Subjective:     Patient ID: Janell Solorzano is a 23 y.o. female.    Phone call placed to [facility]  Phone number: [phone]  Tax ID and/or NPI used not requested  Location: [address] [place], [place] [address]  Spoke to Tatyana POLLARD  Code Used for Authorization: none requested  Awaiting a call back from a clinician   Level of Care PHP    Review on NA  Reviewer Assigned: NA  Phone number: NA    Authorization # NA  Call Reference # [id number]    Clinical Faxed no  NA Message Left Awaiting Return Call   NA Missed Treatment Days and Authorization Extended Through NA    Therapist: MAURY Aguila

## 2024-08-19 ENCOUNTER — DOCUMENTATION (OUTPATIENT)
Dept: PSYCHOLOGY | Facility: CLINIC | Age: 24
End: 2024-08-19

## 2024-08-19 ENCOUNTER — TELEPHONE (OUTPATIENT)
Dept: PLASTIC SURGERY | Facility: CLINIC | Age: 24
End: 2024-08-19

## 2024-08-19 ENCOUNTER — APPOINTMENT (OUTPATIENT)
Dept: PSYCHOLOGY | Facility: CLINIC | Age: 24
End: 2024-08-19
Payer: COMMERCIAL

## 2024-08-19 DIAGNOSIS — F31.4 BIPOLAR I, MOST RECENT EPISODE DEPRESSED, SEVERE (HCC): Primary | ICD-10-CM

## 2024-08-19 DIAGNOSIS — F41.9 ANXIETY: ICD-10-CM

## 2024-08-19 NOTE — PROGRESS NOTES
Visit Time    Visit Start Time: 0808  Visit Stop Time: 0809  Total Visit Duration:  1 minutes      Subjective:     Patient ID: Janell Solorzano is a 23 y.o. female.    [facility] Clinical Progress Notes      Specialized Services Documentation  Therapist must complete separate progress note for each specific clinical activity in which the individual participated during the day.     Case Management Note    MAURY Aguila    Current suicide risk : Unable to assess due to absence     [id number]- This writer called and spoke with Janell informing her that we received her phone call requesting discharge due to feeling like she did not need the program . Discharging AMA per patient request . Informed Janell that a letter would be sent to her. See discharge summary for treatment details. Aftercare providers to receive discharge summary.Does have follow up appointment with therapist. Requested that she calls medication management provider to set up appointment. Unable to complete Safety Plan and review treatment plan due to lack of attendance.    Medications changes/added/denied? NA    Treatment session number: NA    Individual Case Management Visit provided today? NA    [facility] follow up physician's orders: Discharge AMA per patient request-See Dr.Alex Short's note

## 2024-08-19 NOTE — TELEPHONE ENCOUNTER
Emailed patient regarding Breast Reduction Criteria. Criteria provided through link in email. Made pt aware they can email or call Hannah with any questions. Pt needs all criteria.
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
## 2024-08-19 NOTE — PROGRESS NOTES
"Assessment/Plan:         Assessment  Diagnoses and all orders for this visit:     Bipolar I, most recent episode depressed, severe (HCC)     Anxiety              Subjective:        Subjective  Patient ID: Janell Solorzano is a 23 y.o. female.     [facility] Treatment Plan   AREAS OF NEED: Bipolar I, most recent episode depressed, severe and anxiety as evidenced by decrease in ADL'S, isolating, shaking, chest hurting, occasionally vomitting, fluctuation in mood, feeling over stimulated, overwhelmed, impulsive behaviors, increase in sleep, lack of appetite, lack of motivation, passive death wishes and HI without a plan or intent due to stress with relationships and finances .  Date Initiated: 08/15/24     Strengths: \"I can de-escalate myself at times\"      LONG TERM GOAL:   Date Initiated: 08/15/24  1.0 I will identify and share three ways in which my day-to-day functioning has improved since attending program.  Target Date: 9/12/24  Completion Date: 8/19/24- Discharge AMA per patient request        SHORT TERM OBJECTIVES:      Date Initiated: 08/15/24  1.1 I will begin to focus more on learning and implementing DBT skills by working on one exercise daily in the workbook provided and share the challenges and successes with implementing the skills.  Revision Date: 8/19/24- Discharge AMA per patient request  Target Date: 8/26/24  Completion Date: 8/19/24- Discharge AMA per patient request     Date Initiated: 08/15/24  1.2 I will demonstrate two new ways of appropriate verbal expression of thoughts and feelings that I learned during my time at program.  Revision Date: 8/19/24- Discharge AMA per patient request  Target Date: 8/26/24  Completion Date:8/19/24- Discharge AMA per patient request     Date Initiated: 08/15/24  1.3 I will take medications as prescribed and share questions and concerns if arise.    Revision Date:8/19/24- Discharge AMA per patient request  Target Date: 8/26/24  Completion Date: 8/19/24- Discharge AMA " per patient request     Date Initiated: 08/15/24  1.4 I will identify 3 ways my supports can assist in my recovery and agree to staff/support contact as indicated.    Revision Date:8/19/24- Discharge AMA per patient request  Target Date: 8/26/24  Completion Date:8/19/24- Discharge AMA per patient request            7 DAY REVISION:     Date Initiated:  Revision Date:   Target Date:   Completion Date:        PSYCHIATRY:  Date Initiated:  08/15/24  Medication Management and Education       Revision Date: 8/19/24- Discharge AMA per patient request      The person(s) responsible for carrying out the plan is Mayur Moura DO and Renetta Rios PA-C     NURSING/SYMPTOM EDUCATION:   Date Initiated: 08/15/24  1.1, 1.2. 1.3, 1.4 This RN/Or Therapist will provide wellness/symptoms and skill education groups three to five days weekly to educate Janell Solorzano on signs and symptoms of diagnoses, skills to manage, and medication questions that will be addressed by the treatment team.    Revision date:8/19/24- Discharge AMA per patient request  The person(s) responsible for carrying out the plan is Jens Alvarez; Digna Choe, RN, BSN; MACHELLE Eckert     PSYCHOLOGY:   Date Initiated: 08/15/24       1.1, 1.2, 1.4 Provide psychotherapy group 5 times per week to allow opportunity for Janell Solorzano  to explore stressors and ways of coping.   Revision Date: 8/19/24- Discharge AMA per patient request  The person(s) responsible for carrying out the plan is MITALI Aguila,LSW; Ashley Costenbader, MA LMT     ALLIED THERAPY:   Date Initiated: 08/15/24  1.1,1.2 Engage Janell Solorzano in AT group 5 times weekly to encourage development and use of wellness tools to decrease symptoms and promote recovery through meaningful activity.  Revision Date: 8/19/24- Discharge AMA per patient request      The person(s) responsible for carrying out the plan is HAROLDO GoelBC ; DAV Lopez, MT-BC     CASE MANAGEMENT:   Date  Initiated: 08/15/24      1.0 This  will meet with Janell Solorzano  3-4 times weekly to assess treatment progress, discharge planning, connection to community supports and UR as indicated.  Revision Date: 8/19/24- Discharge AMA per patient request  The person(s) responsible for carrying out the plan is MITALI Aguila,LSW     TREATMENT REVIEW/COMMENTS:      DISCHARGE CRITERIA: Identify 3 signs of progress and complete a safety plan.    DISCHARGE PLAN: Connect with identified outpatient providers.   Estimated Length of Stay: 10 treatment days                Diagnosis and Treatment Plan explained to Janell Maciel relates understanding diagnosis and is agreeable to Treatment Plan.         CLIENT COMMENTS / Please share your thoughts, feelings, need and/or experiences regarding your treatment plan with Staff.  Please see follow up note with comments.     Signatures can be found on [facility] Treatment plan consent form.

## 2024-08-20 ENCOUNTER — APPOINTMENT (OUTPATIENT)
Dept: PSYCHOLOGY | Facility: CLINIC | Age: 24
End: 2024-08-20
Payer: COMMERCIAL

## 2024-08-20 NOTE — TELEPHONE ENCOUNTER
Sheela Solorzano         Your insurance has approved the prior authorization for medication (Caplyta) 21 capsule. Please contact your pharmacy to see when your medication will be available for you. Please contact your provider with any questions.        Have a great day     [facility] Prior Authorization Team

## 2024-08-20 NOTE — TELEPHONE ENCOUNTER
PA for (Caplyta) 21 capsule Approved     Date(s) approved 8/16/2024-no end date on letter     Case #    Patient advised by          [x] My Sourcebox[employer] Message  [] Phone call   []LMOM  []L/M to call office as no active Communication consent on file  []Unable to leave detailed message as VM not approved on Communication consent       Pharmacy advised by    []Fax  [x]Phone call-pharmacy did not receive prescription that was sent on 8/15/2024    Approval letter scanned into Media Yes

## 2024-08-21 ENCOUNTER — APPOINTMENT (OUTPATIENT)
Dept: PSYCHOLOGY | Facility: CLINIC | Age: 24
End: 2024-08-21
Payer: COMMERCIAL

## 2024-08-22 ENCOUNTER — APPOINTMENT (OUTPATIENT)
Dept: PSYCHOLOGY | Facility: CLINIC | Age: 24
End: 2024-08-22
Payer: COMMERCIAL

## 2024-08-23 ENCOUNTER — APPOINTMENT (OUTPATIENT)
Dept: PSYCHOLOGY | Facility: CLINIC | Age: 24
End: 2024-08-23
Payer: COMMERCIAL

## 2024-08-26 ENCOUNTER — DOCUMENTATION (OUTPATIENT)
Age: 24
End: 2024-08-26

## 2024-08-26 DIAGNOSIS — F43.10 POST TRAUMATIC STRESS DISORDER (PTSD): ICD-10-CM

## 2024-08-26 DIAGNOSIS — F31.9 BIPOLAR 1 DISORDER (HCC): Primary | ICD-10-CM

## 2024-08-26 DIAGNOSIS — F42.2 MIXED OBSESSIONAL THOUGHTS AND ACTS: ICD-10-CM

## 2024-08-26 DIAGNOSIS — F60.3 BORDERLINE PERSONALITY DISORDER (HCC): ICD-10-CM

## 2024-08-27 ENCOUNTER — OFFICE VISIT (OUTPATIENT)
Age: 24
End: 2024-08-27

## 2024-08-27 DIAGNOSIS — F43.10 POST TRAUMATIC STRESS DISORDER (PTSD): ICD-10-CM

## 2024-08-27 DIAGNOSIS — F42.2 MIXED OBSESSIONAL THOUGHTS AND ACTS: ICD-10-CM

## 2024-08-27 DIAGNOSIS — F60.3 BORDERLINE PERSONALITY DISORDER (HCC): ICD-10-CM

## 2024-08-27 DIAGNOSIS — F31.4 BIPOLAR I, MOST RECENT EPISODE DEPRESSED, SEVERE (HCC): Primary | ICD-10-CM

## 2024-08-27 NOTE — PROGRESS NOTES
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 note that although, Janell's results on the CPT-3 and the RONNY are indicative of inattention and impulsivity, there are other psychological conditions that can also lead to atypical scores on these measures.      Mood Disorders Questionnaire   The Mood Disorders questionnaire (MDQ) is a 15 items inventory that measures problems of elevated mood and/or saul. The questionnaire reflects DSM diagnostic criteria for mood disorders. The MDQ specifies that symptoms are occurring during a period when you were not your usual self and while not using drugs or alcohol.  Janell endorsed 13 out of 13 symptoms associated with saul. Particularly, she endorsed items that are associated with increased energy, distractibility, and irritability Furthermore, she reported experiencing racing thoughts, increased in goal directed behaviors, increased in reckless behaviors and increased interest in sex. She also reported that these symptoms have happened during the same period of time in the past, causing her to have “serious problems”.      PTSD Checklist for DSM-5 (PCL-5)  The PCL-5 is a 20-item self-report checklist of PTSD symptoms based closely on the DSM-5 criteria. Respondents rate each item from 0 (\"not at all\") to 4 " "(\"extremely\") to indicate the degree to which they have been bothered by that particular symptom over the past month. Janell obtained a score of 72 on this test.  She endorsed repeating and disturbing and unwanted memories of the stressful situation, avoidance of memories and thoughts related to the traumatic event as well as strong negative feelings such as fear guilt and shame.  Janell also reported loss of interest, feeling distant from people, trouble experiencing positive feelings, feeling jumpy and difficulty with concentration. As previously stated, Janell endorsed several traumatic experiences that occurred within her life span.     [place] Multiphasic Personality Inventory -3 (MMPI-3)  The MMPI -3 is a 335-item version of the MMPI instruments designed to provide a comprehensive and an efficient assessment of clinically relevant variables. It is a broadband instrument intended for use in a wide range of psychological assessments. In mental health settings, the test can be used to obtain a comprehensive assessment of a test taker's psychological functioning. There were indications of significant over reporting of clinical symptoms and as such she produced an invalid and uninterpretable report.    Allardt-Brown Obsessive Compulsive Scale (Y-BOCS)  The Zohra-Brown Obsessive Compulsive Scale is a clinician-rated, 10-item scale, each item rated from 0 (no symptoms) to 4 (extreme symptoms). The scale includes questions about the amount of time the patient spends on obsessions, how much impairment or distress they experience, and how much resistance and control they have over these thoughts. As well, the same types of questions are asked about compulsions (i.e., time spent, interference, etc). The results can be interpreted based on the score. A score of 0-7 is sub-clinical; 8-15 is mild; 16-23 is moderate; 24-31 is severe; and 32-40 is extreme.     Janell obtained a score of 30 placing her in the Severe range. "   Obsessions are defined as a compulsive preoccupation with a fixed idea or an unwanted feeling or emotion, often accompanied by symptoms of anxiety.  Janell reported spending between 3-8 hours hour a day on constant intrusions which causes extreme distress and moderate impairment in her ability to complete her activities of daily living.  She reported difficulty resisting her obsessions and has minimal control in stopping or diverting her obsessions. Janell endorsed experiencing varied types of obsessions such as: aggressive obsessions, contamination obsessions, sexual obsessions, hoarding/saving obsessions, obsessions with need for symmetry, somatic obsessions and other miscellaneous obsessions.         Janell endorsed having the following obsessions currently and in the past:  Fearing doing something embarrassing.   Fearing I will act on an unwanted impulse.   Fearing that I'll be responsible for something else terrible happening.   Concern with dirt or germs.    Forbidden or pervasive sexual thoughts, images of impulses.  Obsessions of symmetry or exactness.  Fearing of not saying just the right things.  Intrusive mental nonsense sounds words or music.  Being bothered by certain sounds or noises.  Excessive Concern with a part of my body or an aspect of appearance    Compulsions are defined as repetitive behaviors or mental acts the goal of which is to prevent or reduce anxiety or distress. Janell reported that she spends between 3-8 hours engaging in the compulsions. She reported minimal control over the compulsions and that the compulsions severely interfere with her ability to complete her ADL's. Janell noted that she yields to almost all her compulsions without attempting to control them. She noted extreme distress if she is unable to engage in the compulsion.      Janell endorsed having the following compulsions currently and historically:  Checking that I did not harm others.  Checking that I did not make  a mistake.  Checking that nothing terrible had happened.  I reread or rewrite things.  Ordering and rearranging.  Needing to tell, ask or confess.  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    Sentence Completion Test:   The Sentence Completion Test is a projective assessment technique whereby attitudes, beliefs and motivations can be discerned by having the client complete the beginning of sentences (stems).  Janell appeared to be preoccupied by her ongoing interpersonal stressors as well as her clinical symptoms and safety behaviors.   The following are illustrative:  I can't- “let go or move on from this boy/ go a day without talking to him.”   I feel safe-“when I am alone.”   What hurts me- “is my own self.”   I wonder- “if I'll ever grow out of this, I hate everyone phase.”   I am not good at- “giving people space.”   Arguing- “makes me my chest numb, I feel misunderstood.”  Girls- “are treated better by their man than I am.”   My mother thinks- “I shouldn't be depressed about anything.”  My brain is “fucked up.”   I am sorry- “I can't leave you alone when you want to be.”   I am best- “when I am drunk (sometimes).”   I often wish- “I didn't sleep with so many people.”     Symptom Cdjdliqqx-18-O (SCL-90-R)  The SCL-90-R is a 90 item self-report symptom inventory designed to reflect the psychological symptom patterns of community, medical and psychiatric respondents.  Each item is rated on a 5-point scale of distress (0-4) ranging from not at all to extremely.  The SCL-90-R is scored and interpreted in terms of 9 primary symptom dimension and 3 global indices of distress.  The primary symptom dimension scales: include Somatization, Obsessive-Compulsive, Interpersonal Sensitivity, Depression, Anxiety, Hostility, Phobic Anxiety, Paranoid Ideation, Psychoticism.  The global indices which include the Global Severity Index, Positive Symptoms of Distress Index and the Positive Symptoms Total were developed to  provide more flexibility in the overall assessment of the patient psychopathological status and to furnish summary indices of levels of symptomology and psychological distress.  Standardized scores were developed which indicated a T-score with a mean of 50 and a standard deviation of 10.    Overall, on this test Janell endorsed chronic and pervasive symptoms of anxiety and depression which causes significant clinical symptoms. Janell produced significant elevations on most of the clinical scales. Her highest scales were on the OCD (T=81) as well as the Anxiety (T=74) the Psychoticism Scales (T=71). Specifically, she reported ongoing engagement with obsessions and compulsions. Most notably the obsessions are egodystonic, and the compulsions are meant to neutralize the obsessions. There were also indications of general signs of anxiety such as nervousness, tension, feeling of fear, apprehension and dread. Although Janell obtained an elevation on the psychoticism scale, the primary symptoms endorsed are attributable to her PTSD and OCD diagnoses. Overall, these clinical symptoms have caused significant distress and are impeding her ability to function to the best of her abilities.      Summary and Recommendation  Janell is a 23-year female who presents for a psychological evaluation upon kind referral from her behavioral health providers. The results of this evaluation indicated that Janell's symptom constellations are most consistent with Post Traumatic Stress Disorder,   Bipolar I disorder, Obsessive Compulsive Disorder and Borderline Personality Disorder.     Janell meets diagnostic criteria for PTSD as she reported several events that she characterized as traumatic. She reported ongoing pattern of avoidance and repeated unwanted memories.  Janell is prone to ongoing negative emotions associated with trauma such as shame and guilt.  Furthermore, she internalized many of the negative labels that were placed on her.      Janell endorsed symptoms that are associated with impulsivity, distractibility, reckless behaviors and racing thoughts. Conversely, there were ongoing indication of depressive episodes which includes: fatigue, lack of motivation, feelings of hopelessness and helplessness. During the depressive episodes, Janell demonstrates difficulty complete her activities of daily living.     Additionally, Janell meets diagnostic criteria for OCD as she endorsed experiencing many obsessive thoughts that are egodystonic.  Jayys obsessions are present in several categories which include aggressive obsessions, contamination obsessions, Protestant obsession, and sexual obsessions.  Janell acknowledged that she tends to engage in compulsive behaviors to neutralize the anxiety associated with her obsessions. She reported minimal control over obsessions and compulsions and her engagement in the safety behaviors are time consuming and interfere with her ability to complete her activities of daily living.  It is likely that she developed compulsions as part of her desire to control things during a period of time in her life in which she had minimal control.     Borderline Personality disorder is characterized by a pattern of unstable intense relationships, skewed self-perception as well as impulsiveness and difficulty regulating emotions. Throughout, the evaluation, Janell acknowledged experiencing chronic emptiness, difficulty managing interpersonal relationships due to excessive fear of rejection and abandonment. There were also ongoing difficulty regulating her emotions which results in interpersonal conflict.       With regards to Attention Deficit Hyperactivity Disorder the results of the testing are inconclusive. On the RONNY, and CPT-3, there were indications of some impulsivity and inattention. However, those symptoms can also be explained by her other diagnoses.  Janell's cognitive profile is not indicative of ADHD.  Furthermore, due to Janell's unwillingness to sign an ROSSANA, there was no way to substantiate clinical symptoms prior to the age of 12 and ss such a diagnosis of ADHD can't be given.      It is likely that her perceived difficulties with inattention and impulsivity seem to be attributed by an increase level of anxiety, mood instability, negative self-perception. and skewed self-appraisal.  She also exhibits some ruminative negative thinking pattern and a tendency to over think certain triggers.  The increased anxiety may be contributing to difficulty with completing task efficiently. Furthermore, her ongoing clinical symptoms have likely created a bandwidth issue in which her cognitive and emotional resource were stretched and as such played a role in her attentional and executive functioning problems.    Overall, Janell had established a cyclical pattern which consists of feeling overwhelmed by demands which then leads to engagement in safety behaviors such as avoidance and compulsions thus believing that her problems are insurmountable consequently perpetuating her self-fulfilling prophecy of inadequacy.    Treatment Recommendations  Janell should continue to consult with her prescribing provider about appropriate medication management of her symptoms.    Janell should continue working with a therapist to address her anxiety and mood symptoms.  She would benefit from cognitive behavioral therapies to help identify thoughts and behavioral patterns that may be contributing to and or maintaining her current clinical symptomology.  It would be beneficial for Janell to identify the cognitive distortions that she displays and starts challenging them using cognitive restructuring techniques.   Janell would benefit from relaxation strategies such as mindfulness skills, progressive muscle relaxation as well as grounding techniques.  Janell Should be offered specialized therapeutic services, such as dialectical behavioral  therapy. Essentially, a strong therapeutic alliance with her will need to be developed and this would in part be accomplished through ongoing validation efforts. Skills training, which offers both acceptance and change strategies, can be taught and these would include mindfulness, distress tolerance, emotional regulation, interpersonal effectiveness, and middle path.  Janell should be afforded individual and group therapy in a trauma focused track. She needs to develop and use her insights as to how her past experiences have shaped her behaviors. She needs to be able to examine her own thought process as they contribute to her behaviors and choices. It would be helpful for her to eventually confront situations, which engender fear and help her develop a better sense of empowerment.  Due to ongoing avoidance, treatment planning should also help her to gradually master feared situations using a trauma timeline, imaginal and gradual exposures.  Her mental status should be closely monitored for safety purposes.

## 2024-08-27 NOTE — PSYCH
"Psychological Evaluation  [facility]  [address] [place] [id number]  P: [phone] ? F: [phone]      Feedback from Psychological Evaluation    Ms. Solorzano returned for a feedback appointment to review the findings and recommendations from a psychological evaluation. Findings from the evaluation, indicated that Janell's symptom constellations are most consistent with Post Traumatic Stress Disorder, Bipolar I disorder, Obsessive Compulsive Disorder and Borderline Personality Disorder. Janell noted that the findings are \"spot on.\"  Recommendations were reviewed (see evaluation report from 8/26/2024 for full details). The patient was given the opportunity to ask questions and expressed satisfaction with answers provided. Contact information for this provider was given to the patient and she was encouraged to contact the office with any further questions or concerns. The psychological evaluation report was routed to the referring provider, Jacque Alejandro and Michela Bates, for review and follow-up as needed.    She denied any safety concerns and demonstrated future oriented thinking as she was recently hired by the [facility]. She is scheduled to see her therapist next week.      Jada Rojas PsyD  Clinical Psychologist  [place] Licensed Psychologist  Lic.#[id number]             Tasks Conducted Total Time Spent Associated CPT Codes   Report Writing 120 min.    96131 x 2 units       Chart Review 30 min. 96130 x 1 unit  55060 1 units   Interpretation of Test Data 60 min.    Feedback to Patient/Family Members 26 min.          "

## 2024-09-04 ENCOUNTER — OFFICE VISIT (OUTPATIENT)
Dept: PSYCHIATRY | Facility: CLINIC | Age: 24
End: 2024-09-04
Payer: COMMERCIAL

## 2024-09-04 ENCOUNTER — TELEPHONE (OUTPATIENT)
Age: 24
End: 2024-09-04

## 2024-09-04 VITALS — SYSTOLIC BLOOD PRESSURE: 120 MMHG | HEART RATE: 84 BPM | DIASTOLIC BLOOD PRESSURE: 83 MMHG

## 2024-09-04 DIAGNOSIS — F43.10 POST TRAUMATIC STRESS DISORDER (PTSD): ICD-10-CM

## 2024-09-04 DIAGNOSIS — F31.12 BIPOLAR 1 DISORDER WITH MODERATE MANIA (HCC): ICD-10-CM

## 2024-09-04 DIAGNOSIS — F42.9 OBSESSIVE-COMPULSIVE DISORDER, UNSPECIFIED TYPE: Primary | ICD-10-CM

## 2024-09-04 PROCEDURE — 99214 OFFICE O/P EST MOD 30 MIN: CPT | Performed by: NURSE PRACTITIONER

## 2024-09-04 RX ORDER — HYDROXYZINE HYDROCHLORIDE 25 MG/1
25 TABLET, FILM COATED ORAL EVERY 6 HOURS PRN
Qty: 90 TABLET | Refills: 2 | Status: SHIPPED | OUTPATIENT
Start: 2024-09-04

## 2024-09-04 RX ORDER — DIVALPROEX SODIUM 250 MG/1
750 TABLET, EXTENDED RELEASE ORAL
Qty: 90 TABLET | Refills: 2 | Status: SHIPPED | OUTPATIENT
Start: 2024-09-04

## 2024-09-04 RX ORDER — FLUOXETINE 10 MG/1
10 CAPSULE ORAL DAILY
Qty: 30 CAPSULE | Refills: 1 | Status: SHIPPED | OUTPATIENT
Start: 2024-09-04

## 2024-09-04 NOTE — PSYCH
Regular Visit    Problem List Items Addressed This Visit     Post traumatic stress disorder (PTSD)    Relevant Medications    FLUoxetine (PROzac) 10 mg capsule    hydrOXYzine HCL (ATARAX) 25 mg tablet   Other Visit Diagnoses     Obsessive-compulsive disorder, unspecified type    -  Primary    Relevant Medications    FLUoxetine (PROzac) 10 mg capsule    hydrOXYzine HCL (ATARAX) 25 mg tablet    Bipolar 1 disorder with moderate saul (HCC)        Relevant Medications    FLUoxetine (PROzac) 10 mg capsule    divalproex sodium (Depakote ER) 250 mg 24 hr tablet    hydrOXYzine HCL (ATARAX) 25 mg tablet             Encounter provider ANGELA Vaughn    Provider located at    [facility]  [address] [facility] [address] [phone]    Recent Visits  No visits were found meeting these conditions.  Showing recent visits within past 7 days and meeting all other requirements  Today's Visits  Date Type Provider Dept   09/04/24 Office Visit ANGELA Vaughn [facility]   Showing today's visits and meeting all other requirements  Future Appointments  No visits were found meeting these conditions.  Showing future appointments within next 150 days and meeting all other requirements       HPI     Current Outpatient Medications   Medication Sig Dispense Refill   • divalproex sodium (Depakote ER) 250 mg 24 hr tablet Take 3 tablets (750 mg total) by mouth daily at bedtime 90 tablet 2   • FLUoxetine (PROzac) 10 mg capsule Take 1 capsule (10 mg total) by mouth daily 30 capsule 1   • hydrOXYzine HCL (ATARAX) 25 mg tablet Take 1 tablet (25 mg total) by mouth every 6 (six) hours as needed for anxiety 90 tablet 2   • mirtazapine (REMERON) 7.5 MG tablet TAKE ONE TABLET BY MOUTH AT BEDTIME 30 tablet 2   • norgestimate-ethinyl estradiol (Sprintec 28) 0.25-35 MG-MCG per tablet TAKE ONE TABLET BY MOUTH EVERY DAY 84 tablet 1   • ondansetron (ZOFRAN) 4 mg  "tablet Take 1 tablet (4 mg total) by mouth every 8 (eight) hours as needed for nausea or vomiting 20 tablet 0   • pantoprazole (PROTONIX) 40 mg tablet Take 1 tablet (40 mg total) by mouth daily before breakfast 30 tablet 1     No current facility-administered medications for this visit.       Review of Systems    I spent 30 minutes directly with the patient during this visit      MEDICATION MANAGEMENT NOTE        [facility]    Name and Date of Birth:  Janell Solorzano 24 y.o. 2000 MRN: [id number]    Date of Visit: September 4, 2024    No Known Allergies    Visit Time    Visit Start Time: 1030  Visit Stop Time: 1100  Total Visit Duration:  30 minutes    SUBJECTIVE:    Janell is seen today for a follow up for Bipolar Disorder, PTSD, and OCD. She continues to improve slowly since the last visit. Janell seen in the office today for medication management follow up. She was last seen by this provider on 8/6/24. She is calm and appropriate in conversation today. States that she saw Dr Dawn for her test results and was \"not surprised\" by her dx of Bipolar, OCD, and PTSD. She states that she tried PHP but could not do more than one day. She states that she also tried the Caplyta that Dr. Moura started her on and it was \"not good\". She reports that it made her skin feel \"cold\" and she did not feel right on the medication. She stopped it after 3 days.  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  She is looking forward to starting this job as they have a flexible schedule.  Additionally she has started going back to the gym, and she continues to work as a  at the restaurant.  She states that her depression has been better, but she continues to have anxiety with ruminating and intrusive thoughts that she has trouble controlling. Janell has unsuccessfully trialed many antipsychotics:  Caplyta, " abilify, vraylar, risperdal, zyprexa, and latuda. Her depakote level was therapeutic at 84.  At this time, we will initiate prozac 10mg PO daily to help with the ruminating thoughts and anxiety.  Will continue other medications as ordered. She was educated on signs of saul. She will watch for decreased sleep, increased goal directed behavior, impulsivity.  She denies SI/HI. Denies auditory and visual hallucinations.  She states she is feeling good.  We will follow up in one month, and she has a therapy appt in one day.    She denies any side effects from current psychiatric medications.    PLAN:  Continue depakote 750mg PO HS  Continue hydroxyzine 25mg PO Q6 PRN  Continue mirtazapine 7.5mg PO HS  Initiate prozac 10mg PO Daily   Follow up in one month  Follow up with therapy  The patient will call this provider sooner if concerns or issues arise prior to scheduled appointment.    Aware of 24 hour and weekend coverage for urgent situations accessed by calling [facility] main practice number  Continue psychotherapy with therapist  Medication management every 1 month  Aware of need to follow up with family physician for medical issues    Diagnoses and all orders for this visit:    Obsessive-compulsive disorder, unspecified type  -     FLUoxetine (PROzac) 10 mg capsule; Take 1 capsule (10 mg total) by mouth daily    Bipolar 1 disorder with moderate saul (HCC)  -     divalproex sodium (Depakote ER) 250 mg 24 hr tablet; Take 3 tablets (750 mg total) by mouth daily at bedtime  -     hydrOXYzine HCL (ATARAX) 25 mg tablet; Take 1 tablet (25 mg total) by mouth every 6 (six) hours as needed for anxiety    Post traumatic stress disorder (PTSD)        Current Rating Scores:     Current PHQ-9   PHQ-2/9 Depression Screening    Little interest or pleasure in doing things: 1 - several days  Feeling down, depressed, or hopeless: 1 - several days  Trouble falling or staying asleep, or sleeping too much: 0 - not  at all  Feeling tired or having little energy: 1 - several days  Poor appetite or overeatin - not at all  Feeling bad about yourself - or that you are a failure or have let yourself or your family down: 1 - several days  Trouble concentrating on things, such as reading the newspaper or watching television: 3 - nearly every day  Moving or speaking so slowly that other people could have noticed. Or the opposite - being so fidgety or restless that you have been moving around a lot more than usual: 1 - several days  Thoughts that you would be better off dead, or of hurting yourself in some way: 0 - not at all  PHQ-9 Score: 8  PHQ-9 Interpretation: Mild depression       Current JOHANA-7 is   JOHANA-7 Flowsheet Screening    Flowsheet Row Most Recent Value   Over the last two weeks, how often have you been bothered by the following problems?     Feeling nervous, anxious, or on edge 3   Not being able to stop or control worrying 3   Worrying too much about different things 3   Trouble relaxing  3   Being so restless that it's hard to sit still 1   Becoming easily annoyed or irritable  2   Feeling afraid as if something awful might happen 1   How difficult have these problems made it for you to do your work, take care of things at home, or get along with other people?  Somewhat difficult   JOHANA Score  16      .    Current Outpatient Medications on File Prior to Visit   Medication Sig Dispense Refill   • mirtazapine (REMERON) 7.5 MG tablet TAKE ONE TABLET BY MOUTH AT BEDTIME 30 tablet 2   • norgestimate-ethinyl estradiol (Sprintec 28) 0.25-35 MG-MCG per tablet TAKE ONE TABLET BY MOUTH EVERY DAY 84 tablet 1   • ondansetron (ZOFRAN) 4 mg tablet Take 1 tablet (4 mg total) by mouth every 8 (eight) hours as needed for nausea or vomiting 20 tablet 0   • pantoprazole (PROTONIX) 40 mg tablet Take 1 tablet (40 mg total) by mouth daily before breakfast 30 tablet 1   • [DISCONTINUED] divalproex sodium (Depakote ER) 250 mg 24 hr tablet Take  "3 tablets (750 mg total) by mouth daily at bedtime 90 tablet 1   • [DISCONTINUED] hydrOXYzine HCL (ATARAX) 25 mg tablet Take 1 tablet (25 mg total) by mouth every 6 (six) hours as needed for anxiety 90 tablet 2   • [DISCONTINUED] Lumateperone Tosylate (Caplyta) 21 MG CAPS Take 21 mg by mouth daily (Patient not taking: Reported on 9/4/2024) 90 capsule 1     No current facility-administered medications on file prior to visit.       Psychotherapy Provided:     Individual psychotherapy provided: Yes  Supportive counseling provided.  Medication changes discussed with Janell.  Medication education provided to Janell.  Discussed with Janell coping with family conflict, social difficulties, and ongoing anxiety.   Coping strategies reviewed with Janell.   Importance of medication and treatment compliance reviewed with Janell.  Importance of follow up with family physician for medical issues reviewed with Janell.  Reassurance and supportive therapy provided.   Crisis/safety plan discussed with Janell. Patient will call prior to scheduled appointment if they have any issues or concerns.  Patient understands they can access the office by calling the main number at any time if they are in crisis.  They also understand they can call their [facility]'s crisis number or go to their nearest ED if suicidal ideation increases or if they develop a plan or intent.       HPI ROS Appetite Changes and Sleep:     She reports normal sleep, adequate appetite, adequate energy level. Denies homicidal ideation, denies suicidal ideation    Review Of Systems:      HPI ROS:               Medication Side Effects:  denies   Depression (10 worst): \"Not bad\"   Anxiety (10 worst): \"High\"   Safety concerns (SI, HI, etc): denies   Sleep: good   Energy: adequate   Appetite: adequate     General normal    Personality no change in personality   Constitutional as noted in HPI   ENT negative   Cardiovascular negative   Respiratory negative   Gastrointestinal " "negative   Genitourinary negative   Musculoskeletal negative   Integumentary negative   Neurological negative   Endocrine negative   Other Symptoms none, all other systems are negative     Mental Status Evaluation:    Appearance Appropriately dressed and Good eye contact   Behavior calm and cooperative   Mood anxious  Depression Scale -  \"not bad\"  of 10 (0 = No depression)  Anxiety Scale -  \"high\"  of 10 (0 = No anxiety)   Speech Normal rate and volume   Affect appropriate and mood-congruent   Thought Processes Goal directed and coherent   Thought Content Does not verbalize delusional material   Associations Tightly connected   Perceptual Disturbances Denies hallucinations and does not appear to be responding to internal stimuli   Risk Potential Suicidal/Homicidal Ideation - No evidence of suicidal or homicidal ideation and patient does not verbalize suicidal or homicidal ideation  Risk of Violence - No evidence of risk for violence found on assessment  Risk of Self Mutilation - No evidence of risk for self mutilation found on assessment   Orientation oriented to person, place, time/date, and situation   Memory recent and remote memory grossly intact   Consciousness alert and awake   Attention/Concentration attention span and concentration are age appropriate   Insight intact   Judgement intact   Muscle Strength and Gait normal muscle strength and normal muscle tone, normal gait/station and normal balance   Motor Activity no abnormal movements   Language no difficulty naming common objects, no difficulty repeating a phrase, no difficulty writing a sentence   Fund of Knowledge adequate knowledge of current events  adequate fund of knowledge regarding past history  adequate fund of knowledge regarding vocabulary      Past Psychiatric History Update:     Inpatient Psychiatric Admission Since Last Encounter:   no  Changes to Outpatient Psychiatric Treatment Team:    no  Suicide Attempt Or Self Mutilation Since Last " Encounter:   no  Incidence of Violent Behavior Since Last Encounter:   no    Traumatic History Update:     New Onset of Abuse Since Last Encounter:   no  Traumatic Events Since Last Encounter:   no    Past Medical History:    Past Medical History:   Diagnosis Date   • Anxiety    • Concussion    • Depression    • Head injury     concussions in high school and in college   • Self-injurious behavior         Past Surgical History:   Procedure Laterality Date   • WISDOM TOOTH EXTRACTION       No Known Allergies  Substance Abuse History:    Social History     Substance and Sexual Activity   Alcohol Use Yes   • Alcohol/week: 3.0 - 4.0 standard drinks of alcohol   • Types: 3 - 4 Shots of liquor per week    Comment: 3-4 drinks, 2 times per week     Social History     Substance and Sexual Activity   Drug Use No     Social History:    Social History     Socioeconomic History   • Marital status: Single     Spouse name: Not on file   • Number of children: 0   • Years of education: senior in college currently   • Highest education level: High school graduate   Occupational History   • 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   Tobacco Use   • Smoking status: Never   • Smokeless tobacco: Never   Vaping Use   • Vaping status: Never Used   Substance and Sexual Activity   • Alcohol use: Yes     Alcohol/week: 3.0 - 4.0 standard drinks of alcohol     Types: 3 - 4 Shots of liquor per week     Comment: 3-4 drinks, 2 times per week   • Drug use: No   • Sexual activity: Yes     Partners: Male     Birth control/protection: Condom Male   Other Topics Concern   • Not on file   Social History Narrative   • Not on file     Social Determinants of Health     Financial Resource Strain: Not on file   Food Insecurity: Not on file   Transportation Needs: Not on file   Physical Activity: Not on file   Stress: Not on file   Social Connections: Unknown (6/18/2024)    Received from [facility]    Social Bestowed    • How often do you feel lonely  or isolated from those around you? (Adult - for ages 18 years and over): Not on file   Intimate Partner Violence: Not on file   Housing Stability: Not on file     Family Psychiatric History:     Family History   Problem Relation Age of Onset   • Hypertension Mother    • Mental illness Mother    • Depression Mother    • Mental illness Sister    • Coronary artery disease Maternal Grandmother    • Throat cancer Paternal Grandfather      History Review:The following portions of the patient's history were reviewed and updated as appropriate: allergies, current medications, past family history, past medical history, past social history, past surgical history, and problem list     OBJECTIVE:     Vital signs in last 24 hours:    Vitals:    09/04/24 1434   BP: 120/83   Pulse: 84     Laboratory Results: Recent Labs (last 2 months):   Appointment on 08/07/2024   Component Date Value   • N gonorrhoeae, DNA Probe 08/07/2024 Negative    • Chlamydia trachomatis, D* 08/07/2024 Negative    • WBC 08/07/2024 5.21    • RBC 08/07/2024 5.11    • Hemoglobin 08/07/2024 15.0    • Hematocrit 08/07/2024 43.9    • MCV 08/07/2024 86    • MCH 08/07/2024 29.4    • MCHC 08/07/2024 34.2    • RDW 08/07/2024 12.0    • MPV 08/07/2024 10.2    • Platelets 08/07/2024 249    • nRBC 08/07/2024 0    • Segmented % 08/07/2024 40 (L)    • Immature Grans % 08/07/2024 0    • Lymphocytes % 08/07/2024 46 (H)    • Monocytes % 08/07/2024 12    • Eosinophils Relative 08/07/2024 1    • Basophils Relative 08/07/2024 1    • Absolute Neutrophils 08/07/2024 2.08    • Absolute Immature Grans 08/07/2024 0.01    • Absolute Lymphocytes 08/07/2024 2.42    • Absolute Monocytes 08/07/2024 0.61    • Eosinophils Absolute 08/07/2024 0.05    • Basophils Absolute 08/07/2024 0.04    Appointment on 08/02/2024   Component Date Value   • Valproic Acid, Total 08/02/2024 85    • Sodium 08/02/2024 140    • Potassium 08/02/2024 4.1    • Chloride 08/02/2024 107    • CO2 08/02/2024 25    •  ANION GAP 08/02/2024 8    • BUN 08/02/2024 9    • Creatinine 08/02/2024 0.88    • Glucose, Fasting 08/02/2024 92    • Calcium 08/02/2024 9.0    • AST 08/02/2024 14    • ALT 08/02/2024 12    • Alkaline Phosphatase 08/02/2024 37    • Total Protein 08/02/2024 6.8    • Albumin 08/02/2024 4.2    • Total Bilirubin 08/02/2024 0.38    • eGFR 08/02/2024 92    • WBC 08/02/2024 5.56    • RBC 08/02/2024 5.29 (H)    • Hemoglobin 08/02/2024 15.5 (H)    • Hematocrit 08/02/2024 47.0 (H)    • MCV 08/02/2024 89    • MCH 08/02/2024 29.3    • MCHC 08/02/2024 33.0    • RDW 08/02/2024 12.2    • MPV 08/02/2024 10.4    • Platelets 08/02/2024 264    • nRBC 08/02/2024 0    • Segmented % 08/02/2024 29 (L)    • Immature Grans % 08/02/2024 0    • Lymphocytes % 08/02/2024 56 (H)    • Monocytes % 08/02/2024 10    • Eosinophils Relative 08/02/2024 4    • Basophils Relative 08/02/2024 1    • Absolute Neutrophils 08/02/2024 1.60 (L)    • Absolute Immature Grans 08/02/2024 0.01    • Absolute Lymphocytes 08/02/2024 3.13    • Absolute Monocytes 08/02/2024 0.53    • Eosinophils Absolute 08/02/2024 0.24    • Basophils Absolute 08/02/2024 0.05    Office Visit on 08/02/2024   Component Date Value   •  RAPID STREP A 08/02/2024 Negative    • Bacterial Vaginosis 08/02/2024 Negative    • Candida species 08/02/2024 Positive (A)    • Candida glabrata 08/02/2024 Negative    • Jacqui krusei 08/02/2024 Negative    • Trichomonas vaginalis 08/02/2024 Negative    • Urine Culture 08/02/2024 No Growth <1000 cfu/mL    • LEUKOCYTE ESTERASE,UA 08/02/2024 +    • NITRITE,UA 08/02/2024 -    • SL AMB POCT UROBILINOGEN 08/02/2024 -    • POCT URINE PROTEIN 08/02/2024 -    •  PH,UA 08/02/2024 6.0    • BLOOD,UA 08/02/2024 -    • SPECIFIC GRAVITY,UA 08/02/2024 1.015    • KETONES,UA 08/02/2024 -    • BILIRUBIN,UA 08/02/2024 -    • GLUCOSE, UA 08/02/2024 -    •  COLOR,UA 08/02/2024 presley    • CLARITY,UA 08/02/2024 cloudy    Admission on 07/30/2024, Discharged on 07/30/2024    Component Date Value   • EXT Preg Test, Ur 07/30/2024 Negative    • Control 07/30/2024 Valid    • Color, UA 07/30/2024 Yellow    • Clarity, UA 07/30/2024 Turbid    • Specific Gravity, UA 07/30/2024 1.015    • pH, UA 07/30/2024 7.0    • Leukocytes, UA 07/30/2024 Moderate (A)    • Nitrite, UA 07/30/2024 Negative    • Protein, UA 07/30/2024 Trace (A)    • Glucose, UA 07/30/2024 Negative    • Ketones, UA 07/30/2024 10 (1+) (A)    • Urobilinogen, UA 07/30/2024 2.0 (A)    • Bilirubin, UA 07/30/2024 Negative    • Occult Blood, UA 07/30/2024 Small (A)    • RBC, UA 07/30/2024 2-4 (A)    • WBC, UA 07/30/2024 20-30 (A)    • Epithelial Cells 07/30/2024 Innumerable (A)    • Bacteria, UA 07/30/2024 Occasional    • MUCUS THREADS 07/30/2024 Moderate (A)    • Urine Culture 07/30/2024 >100,000 cfu/ml    • N gonorrhoeae, DNA Probe 07/30/2024 Negative    • Chlamydia trachomatis, D* 07/30/2024 Negative    • Rapid HIV 1 AND 2 07/30/2024 Non-Reactive    • HIV-1 P24 Ag Screen 07/30/2024 Non-Reactive    • Syphilis Total Antibody 07/30/2024 Non-reactive    • Hepatitis B Surface Ag 07/30/2024 Non-reactive    • Hep A IgM 07/30/2024 Non-reactive    • Hepatitis C Ab 07/30/2024 Non-reactive    • Hep B C IgM 07/30/2024 Non-reactive    Office Visit on 07/08/2024   Component Date Value   • LEUKOCYTE ESTERASE,UA 07/08/2024 15 Duyen/uL    • NITRITE,UA 07/08/2024 -    • SL AMB POCT UROBILINOGEN 07/08/2024 3.5 umol/L    • POCT URINE PROTEIN 07/08/2024 -    •  PH,UA 07/08/2024 7.0    • BLOOD,UA 07/08/2024 -    • SPECIFIC GRAVITY,UA 07/08/2024 1.010    • KETONES,UA 07/08/2024 -    • BILIRUBIN,UA 07/08/2024 -    • GLUCOSE, UA 07/08/2024 -    •  COLOR,UA 07/08/2024 yellow    • CLARITY,UA 07/08/2024 clear    • Urine Culture 07/08/2024 20,000-29,000 cfu/ml Staphylococcus epidermidis (A)      I have personally reviewed all pertinent laboratory/tests results.    Suicide/Homicide Risk Assessment:    Risk of Harm to Self:  The following ratings are based on  assessment at the time of the interview  Recent Specific Risk Factors include: current anxiety symptoms  Demographic risk factors include: , age: young adult (15-24)  Historical Risk Factors include: chronic depressive symptoms, chronic anxiety symptoms, chronic mood disorder, history of suicidal behaviors, history of self-abusive behavior, history of substance use, history of abuse, history of impulsive behaviors, history of traumatic experiences, a relative or close friend who  by suicide  Protective Factors: no current suicidal ideation, access to mental health treatment, compliant with medications, compliant with mental health treatment, having a desire to be alive, stable living environment, stable job, sense of determination, strong relationships, supportive family  Weapons: none. The following steps have been taken to ensure weapons are properly secured: not applicable  Based on today's assessment, Janell presents the following risk of harm to self: none    Risk of Harm to Others:  The following ratings are based on assessment at the time of the interview  Protective Factors: no current homicidal ideation  Based on today's assessment, Janell presents the following risk of harm to others: none    The following interventions are recommended: contracts for safety at present - agrees to go to ED if feeling unsafe, return in 1 month for reassessment, therapy appointment in 1 day, contracts for safety at present - agrees to call Crisis Intervention Service if feeling unsafe    Medications Risks/Benefits:      Risks, Benefits And Possible Side Effects Of Medications:    Discussed risks and benefits of treatment with patient including risk of suicidality, serotonin syndrome, increased QTc interval and SIADH related to treatment with antidepressants; Risk of induction of manic symptoms in certain patient populations and risk of liver impairment related to treatment with Depakote     Controlled Medication  Discussion:     Not applicable    Treatment Plan:    Due for update/Updated:   no  Last treatment plan done 8/15/24 by Hillary Olmedo.  Treatment Plan due on 2/15/25.    ANGELA Vaughn 09/04/24    This note was shared with patient.

## 2024-09-04 NOTE — TELEPHONE ENCOUNTER
PA for HYDROXYZINE HCL 25 MG TABLET SUBMITTED     via    []CMM-KEY:   []Surescripts-Case ID #   []Faxed to plan   [x]Other website Prompt ARIADNA Romano Auth ([facility]) ID: [id number]  []Phone call Case ID #     Office notes sent, clinical questions answered. Awaiting determination    Turnaround time for your insurance to make a decision on your Prior Authorization can take 7-21 business days.

## 2024-09-04 NOTE — TELEPHONE ENCOUNTER
PA for ***SUBMITTED     via    [][facility]-KEY: ***  []Surescripts-Case ID # ***  []Faxed to plan   []Other website ***  []Phone call Case ID # ***    Office notes sent, clinical questions answered. Awaiting determination    Turnaround time for your insurance to make a decision on your Prior Authorization can take 7-21 business days.

## 2024-09-05 ENCOUNTER — TELEMEDICINE (OUTPATIENT)
Dept: BEHAVIORAL/MENTAL HEALTH CLINIC | Facility: CLINIC | Age: 24
End: 2024-09-05
Payer: COMMERCIAL

## 2024-09-05 DIAGNOSIS — F42.2 MIXED OBSESSIONAL THOUGHTS AND ACTS: ICD-10-CM

## 2024-09-05 DIAGNOSIS — F43.10 POST TRAUMATIC STRESS DISORDER (PTSD): Primary | ICD-10-CM

## 2024-09-05 DIAGNOSIS — F60.3 BORDERLINE PERSONALITY DISORDER (HCC): ICD-10-CM

## 2024-09-05 DIAGNOSIS — F31.4 BIPOLAR I, MOST RECENT EPISODE DEPRESSED, SEVERE (HCC): ICD-10-CM

## 2024-09-05 PROCEDURE — 90834 PSYTX W PT 45 MINUTES: CPT

## 2024-09-05 NOTE — PSYCH
Virtual Regular Visit    Verification of patient location:    Patient is located at Home in the following state in which I hold an active license [place]      Assessment/Plan:    Problem List Items Addressed This Visit       Post traumatic stress disorder (PTSD) - Primary    Bipolar I, most recent episode depressed, severe (HCC)    Mixed obsessional thoughts and acts    Borderline personality disorder (HCC)       Goals addressed in session: Goal 1          Reason for visit is   Chief Complaint   Patient presents with    Virtual Regular Visit          Encounter provider Michela Bates LCSW      Recent Visits  No visits were found meeting these conditions.  Showing recent visits within past 7 days and meeting all other requirements  Today's Visits  Date Type Provider Dept   09/05/24 Telemedicine Michela Bates LCSW [facility] Therapist [place]   Showing today's visits and meeting all other requirements  Future Appointments  No visits were found meeting these conditions.  Showing future appointments within next 150 days and meeting all other requirements       The patient was identified by name and date of birth. Janell Solorzano was informed that this is a telemedicine visit and that the visit is being conducted throughthe Epic Embedded platform. She agrees to proceed..  My office door was closed. No one else was in the room.  She acknowledged consent and understanding of privacy and security of the video platform. The patient has agreed to participate and understands they can discontinue the visit at any time.    Patient is aware this is a billable service.     Subjective  Janell Solorzano is a 24 y.o. female . .      HPI     Past Medical History:   Diagnosis Date    Anxiety     Concussion     Depression     Head injury     concussions in high school and in college    Self-injurious behavior        Past Surgical History:   Procedure Laterality Date    WISDOM TOOTH EXTRACTION         Current Outpatient Medications    Medication Sig Dispense Refill    divalproex sodium (Depakote ER) 250 mg 24 hr tablet Take 3 tablets (750 mg total) by mouth daily at bedtime 90 tablet 2    FLUoxetine (PROzac) 10 mg capsule Take 1 capsule (10 mg total) by mouth daily 30 capsule 1    hydrOXYzine HCL (ATARAX) 25 mg tablet Take 1 tablet (25 mg total) by mouth every 6 (six) hours as needed for anxiety 90 tablet 2    mirtazapine (REMERON) 7.5 MG tablet TAKE ONE TABLET BY MOUTH AT BEDTIME 30 tablet 2    norgestimate-ethinyl estradiol (Sprintec 28) 0.25-35 MG-MCG per tablet TAKE ONE TABLET BY MOUTH EVERY DAY 84 tablet 1    ondansetron (ZOFRAN) 4 mg tablet Take 1 tablet (4 mg total) by mouth every 8 (eight) hours as needed for nausea or vomiting 20 tablet 0    pantoprazole (PROTONIX) 40 mg tablet Take 1 tablet (40 mg total) by mouth daily before breakfast 30 tablet 1     No current facility-administered medications for this visit.        No Known Allergies    Review of Systems    Video Exam    There were no vitals filed for this visit.    Physical Exam     Behavioral Health Psychotherapy Progress Note    Psychotherapy Provided: Individual Psychotherapy     1. Post traumatic stress disorder (PTSD)        2. Bipolar I, most recent episode depressed, severe (HCC)        3. Mixed obsessional thoughts and acts        4. Borderline personality disorder (HCC)            Goals addressed in session: Goal 1     DATA: First session since Janell's involvement in the PHP program and doing psychological testing and results.  We recapped her thoughts and feelings about the program and the testing, and diagnostic results.  She stated only doing one PHP session because she did not feel like she needed it, and that she did not have the same issues and problems with the people in the group (about 9 people).  She also stated there was one person who reminded her of her mother, and this was 'weird'.   We addressed the testing and the testing results and recommendations.  She  "reviewed conversations with the testing psychologist Dr. Dawn, and post discussion with her psychiatrist Jacque Alejandro.  We addressed the recommendations, and Janell refused any type of group at this point.  We addressed the notion of talking about past trauma, and although she stated being encouraged by all sources that this could be helpful, she stated preferring talking about the here and now with this provider. She stated always having a lot going on that she wants to talk about. Provider encouraged that we allow for discussion of past triggers or reminders should they come up naturally. Provider also noted how I might at times offer questions to help identify possible connections. We also addressed DBT exercises, and provider encouraged how a more dedicated routine, even a few minutes each session, may be a helpful approach, and how we both will need to make changes in our therapy to do so. Janell generally addressed how she feels she is doing better.  She stated returning to the gym, thinking less so about the jer at work and other past jer (\"36 yr old\") she feels she \"has not gotten over\".  She stated believing she needs to stay away from the relationship right now and be on her own.  She stated return to work has been helpful.  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 She stated not sure this is really what she wants to do. She admitted feeling bored with jobs; we addressed past experiences.  Provider questioned regarding other thoughts and feelings in addition to boredom, though she denied.  Provider encouraged that any of these experiences can be helpful in understanding what she does and does not want and what is not a fit for her just as much as what is a fit. Provider encouraged pacing accordingly.  Provider addressed with Janell today how her thinking was much more proactive and coming from a place of her needs, her thoughts, and her wants versus a reactive response to the " "behaviors of others.  Provider encouraged how this seems healthier for her and to continue attention to her own thoughts in this regard.  We confirmed and set appointments.  Lisa stated believing scheduled appointments will not be in conflict with her work.     During this session, this clinician used the following therapeutic modalities: Engagement Strategies, Cognitive Behavioral Therapy, Motivational Interviewing, and Supportive Psychotherapy    Substance Abuse was not addressed during this session. If the client is diagnosed with a co-occurring substance use disorder, please indicate any changes in the frequency or amount of use: not discussed today. Stage of change for addressing substance use diagnoses: Pre-contemplation - contemplative    ASSESSMENT:  lisa Solorzano presents with a Euthymic/ normal, Anxious, and Dysthymic mood.     her affect is Normal range and intensity, which is congruent, with her mood and the content of the session. The client has made progress on their goals.    Lisa was fidgety, interrupted at times, but overall was more thoughtful, more calm emotionally, and verbalized awareness of ways she needs to change behaviors for healthier living. lisa Solorzano presents with a low risk of suicide, low risk of self-harm, and low risk of harm to others. No SI/HI/self-harm.    For any risk assessment that surpasses a \"low\" rating, a safety plan must be developed.    A safety plan was indicated: no  If yes, describe in detail n/a    PLAN: Between sessions, lisa Solorzano will continue proactive thoughts, continue gym, continue addressing self-talk. At the next session, the therapist will use Cognitive Behavioral Therapy and Supportive Psychotherapy to address mood stability and overall coping.    Behavioral Health Treatment Plan and Discharge Planning: lisa Solorzano is aware of and agrees to continue to work on their treatment plan. They have identified and are working toward their discharge " goals. yes    Visit start and stop times:    09/05/24  Start Time: 0920  Stop Time: 0959  Total Visit Time: 39 minutes

## 2024-09-09 ENCOUNTER — OFFICE VISIT (OUTPATIENT)
Dept: FAMILY MEDICINE CLINIC | Facility: CLINIC | Age: 24
End: 2024-09-09
Payer: COMMERCIAL

## 2024-09-09 VITALS
OXYGEN SATURATION: 96 % | DIASTOLIC BLOOD PRESSURE: 60 MMHG | HEIGHT: 63 IN | HEART RATE: 79 BPM | SYSTOLIC BLOOD PRESSURE: 110 MMHG | BODY MASS INDEX: 22.36 KG/M2 | WEIGHT: 126.2 LBS

## 2024-09-09 DIAGNOSIS — Z00.00 ANNUAL PHYSICAL EXAM: Primary | ICD-10-CM

## 2024-09-09 DIAGNOSIS — R25.1 TREMORS OF NERVOUS SYSTEM: ICD-10-CM

## 2024-09-09 DIAGNOSIS — M54.9 UPPER BACK PAIN: ICD-10-CM

## 2024-09-09 DIAGNOSIS — Z82.0 FAMILY HISTORY OF PARKINSON'S DISEASE: ICD-10-CM

## 2024-09-09 DIAGNOSIS — F31.4 BIPOLAR I, MOST RECENT EPISODE DEPRESSED, SEVERE (HCC): ICD-10-CM

## 2024-09-09 DIAGNOSIS — Z11.1 ENCOUNTER FOR PPD TEST: ICD-10-CM

## 2024-09-09 PROBLEM — F31.9 BIPOLAR 1 DISORDER (HCC): Status: RESOLVED | Noted: 2021-10-13 | Resolved: 2024-09-09

## 2024-09-09 PROCEDURE — 99213 OFFICE O/P EST LOW 20 MIN: CPT | Performed by: NURSE PRACTITIONER

## 2024-09-09 PROCEDURE — 86580 TB INTRADERMAL TEST: CPT

## 2024-09-09 PROCEDURE — 99395 PREV VISIT EST AGE 18-39: CPT | Performed by: NURSE PRACTITIONER

## 2024-09-09 NOTE — PROGRESS NOTES
Adult Annual Physical  Name: Janell Solorzano      : 2000      MRN: [id number]  Encounter Provider: ANGELA Arthur  Encounter Date: 2024   Encounter department: [facility] [address] [place] [address]    Assessment & Plan   1. Annual physical exam  2. Family history of Parkinson's disease  -     Ambulatory Referral to Neurology; Future  3. Tremors of nervous system  -     Ambulatory Referral to Neurology; Future  4. Upper back pain  Comments:  Recommend physical therapy for back pain.  Orders:  -     Ambulatory Referral to Physical Therapy; Future  5. Encounter for PPD test  -     TB Skin Test  6. Bipolar I, most recent episode depressed, severe (HCC)  Assessment & Plan:  Continue care with psychiatry.  Doing well with current medications.    Immunizations and preventive care screenings were discussed with patient today. Appropriate education was printed on patient's after visit summary.    Counseling:  Exercise: the importance of regular exercise/physical activity was discussed. Recommend exercise 3-5 times per week for at least 30 minutes.       Depression Screening and Follow-up Plan: Continue regular follow-up with their mental health provider who is managing their mental health condition(s).         History of Present Illness     Adult Annual Physical:  Patient presents for annual physical. Patient presents for annual physical. She is having upper back pain due to large breasts. She is having indentation from her bra and also frequent rashes under breast..     Diet and Physical Activity:  - Diet/Nutrition: well balanced diet.  - Exercise: 3-4 times a week on average.    General Health:  - Sleep: sleeps well.  - Hearing: normal hearing bilateral ears.  - Vision: wears glasses and goes for regular eye exams.  - Dental: no dental visits for > 1 year.    /GYN Health:  - Follows with GYN: no.   - Menopause: premenopausal.   - Last menstrual cycle: 2024.   - History of STDs:  "yes  - Contraception: oral contraceptives.      Review of Systems   Constitutional:  Negative for chills and fever.   HENT:  Negative for ear pain and sore throat.    Eyes:  Negative for pain and visual disturbance.   Respiratory:  Negative for cough and shortness of breath.    Cardiovascular:  Negative for chest pain and palpitations.   Gastrointestinal:  Negative for abdominal pain and vomiting.   Genitourinary:  Negative for dysuria and hematuria.   Musculoskeletal:  Positive for back pain. Negative for arthralgias.   Skin:  Negative for color change and rash.   Neurological:  Negative for dizziness, seizures, syncope, light-headedness and headaches.   Psychiatric/Behavioral:  Positive for dysphoric mood. Negative for sleep disturbance. The patient is nervous/anxious.    All other systems reviewed and are negative.    Current Outpatient Medications on File Prior to Visit   Medication Sig Dispense Refill    divalproex sodium (Depakote ER) 250 mg 24 hr tablet Take 3 tablets (750 mg total) by mouth daily at bedtime 90 tablet 2    FLUoxetine (PROzac) 10 mg capsule Take 1 capsule (10 mg total) by mouth daily 30 capsule 1    hydrOXYzine HCL (ATARAX) 25 mg tablet Take 1 tablet (25 mg total) by mouth every 6 (six) hours as needed for anxiety 90 tablet 2    mirtazapine (REMERON) 7.5 MG tablet TAKE ONE TABLET BY MOUTH AT BEDTIME 30 tablet 2    norgestimate-ethinyl estradiol (Sprintec 28) 0.25-35 MG-MCG per tablet TAKE ONE TABLET BY MOUTH EVERY DAY 84 tablet 1    ondansetron (ZOFRAN) 4 mg tablet Take 1 tablet (4 mg total) by mouth every 8 (eight) hours as needed for nausea or vomiting 20 tablet 0    pantoprazole (PROTONIX) 40 mg tablet Take 1 tablet (40 mg total) by mouth daily before breakfast 30 tablet 1     No current facility-administered medications on file prior to visit.        Objective     /60 (BP Location: Right arm, Patient Position: Sitting, Cuff Size: Standard)   Pulse 79   Ht 5' 3\" (1.6 m)   Wt 57.2 " kg (126 lb 3.2 oz)   SpO2 96%   BMI 22.36 kg/m²     Physical Exam  Vitals and nursing note reviewed.   Constitutional:       General: She is not in acute distress.     Appearance: She is well-developed.   HENT:      Head: Normocephalic and atraumatic.      Right Ear: Tympanic membrane normal.      Left Ear: Tympanic membrane normal.      Nose: No congestion.      Mouth/Throat:      Pharynx: No oropharyngeal exudate.   Eyes:      Pupils: Pupils are equal, round, and reactive to light.   Cardiovascular:      Rate and Rhythm: Normal rate and regular rhythm.      Heart sounds: No murmur heard.  Pulmonary:      Effort: Pulmonary effort is normal. No respiratory distress.      Breath sounds: Normal breath sounds.   Musculoskeletal:         General: No swelling.      Cervical back: Neck supple.   Skin:     General: Skin is warm and dry.      Capillary Refill: Capillary refill takes less than 2 seconds.   Neurological:      Mental Status: She is alert and oriented to person, place, and time.   Psychiatric:         Mood and Affect: Mood normal.       Administrative Statements   I have spent a total time of 20 minutes in caring for this patient on the day of the visit/encounter including Counseling / Coordination of care, Documenting in the medical record, Reviewing / ordering tests, medicine, procedures  , and Obtaining or reviewing history  .

## 2024-09-11 ENCOUNTER — CLINICAL SUPPORT (OUTPATIENT)
Dept: FAMILY MEDICINE CLINIC | Facility: CLINIC | Age: 24
End: 2024-09-11

## 2024-09-11 DIAGNOSIS — Z11.1 ENCOUNTER FOR PPD SKIN TEST READING: Primary | ICD-10-CM

## 2024-09-11 LAB
INDURATION: 0 MM
TB SKIN TEST: NEGATIVE

## 2024-09-12 ENCOUNTER — TELEMEDICINE (OUTPATIENT)
Dept: BEHAVIORAL/MENTAL HEALTH CLINIC | Facility: CLINIC | Age: 24
End: 2024-09-12
Payer: COMMERCIAL

## 2024-09-12 DIAGNOSIS — F42.2 MIXED OBSESSIONAL THOUGHTS AND ACTS: ICD-10-CM

## 2024-09-12 DIAGNOSIS — F60.3 BORDERLINE PERSONALITY DISORDER (HCC): ICD-10-CM

## 2024-09-12 DIAGNOSIS — F43.10 POST TRAUMATIC STRESS DISORDER (PTSD): Primary | ICD-10-CM

## 2024-09-12 DIAGNOSIS — F31.4 BIPOLAR I, MOST RECENT EPISODE DEPRESSED, SEVERE (HCC): ICD-10-CM

## 2024-09-12 PROCEDURE — 90834 PSYTX W PT 45 MINUTES: CPT

## 2024-09-12 NOTE — PSYCH
Virtual Regular Visit    Verification of patient location:    Patient is located at Home in the following state in which I hold an active license [place]      Assessment/Plan:    Problem List Items Addressed This Visit       Post traumatic stress disorder (PTSD) - Primary    Bipolar I, most recent episode depressed, severe (HCC)    Mixed obsessional thoughts and acts    Borderline personality disorder (HCC)       Goals addressed in session: Goal 1          Reason for visit is   Chief Complaint   Patient presents with    Virtual Regular Visit          Encounter provider Michela Bates LCSW      Recent Visits  Date Type Provider Dept   09/09/24 Office Visit ANGELA [facility] [address]   09/05/24 Telemedicine Michela Bates LCSW [facility]rap[facility] [place]   Showing recent visits within past 7 days and meeting all other requirements  Today's Visits  Date Type Provider Dept   09/12/24 Telemedicine Michela Bates LCSW [facility]rap[facility] [place]   Showing today's visits and meeting all other requirements  Future Appointments  No visits were found meeting these conditions.  Showing future appointments within next 150 days and meeting all other requirements       The patient was identified by name and date of birth. Janell Solorzano was informed that this is a telemedicine visit and that the visit is being conducted throughthe Epic Embedded platform. She agrees to proceed..  My office door was closed. No one else was in the room.  She acknowledged consent and understanding of privacy and security of the video platform. The patient has agreed to participate and understands they can discontinue the visit at any time.    Patient is aware this is a billable service.     Subjective  Janell Solorzano is a 24 y.o. female . .      HPI     Past Medical History:   Diagnosis Date    Anxiety     Concussion     Depression     Head injury     concussions in high school and in college    Self-injurious  behavior        Past Surgical History:   Procedure Laterality Date    WISDOM TOOTH EXTRACTION         Current Outpatient Medications   Medication Sig Dispense Refill    divalproex sodium (Depakote ER) 250 mg 24 hr tablet Take 3 tablets (750 mg total) by mouth daily at bedtime 90 tablet 2    FLUoxetine (PROzac) 10 mg capsule Take 1 capsule (10 mg total) by mouth daily 30 capsule 1    hydrOXYzine HCL (ATARAX) 25 mg tablet Take 1 tablet (25 mg total) by mouth every 6 (six) hours as needed for anxiety 90 tablet 2    mirtazapine (REMERON) 7.5 MG tablet TAKE ONE TABLET BY MOUTH AT BEDTIME 30 tablet 2    norgestimate-ethinyl estradiol (Sprintec 28) 0.25-35 MG-MCG per tablet TAKE ONE TABLET BY MOUTH EVERY DAY 84 tablet 1    ondansetron (ZOFRAN) 4 mg tablet Take 1 tablet (4 mg total) by mouth every 8 (eight) hours as needed for nausea or vomiting 20 tablet 0    pantoprazole (PROTONIX) 40 mg tablet Take 1 tablet (40 mg total) by mouth daily before breakfast 30 tablet 1     No current facility-administered medications for this visit.        No Known Allergies    Review of Systems    Video Exam    There were no vitals filed for this visit.    Physical Exam     Behavioral Health Psychotherapy Progress Note    Psychotherapy Provided: Individual Psychotherapy     1. Post traumatic stress disorder (PTSD)        2. Bipolar I, most recent episode depressed, severe (HCC)        3. Mixed obsessional thoughts and acts        4. Borderline personality disorder (HCC)            Goals addressed in session: Goal 1     DATA: Janell notably colored her hair dark, and we discussed. She discussed feeling 'sad' regarding last jer. We explored and addressed the evolution of her feelings and management of her feelings.  We explored the ability to get through this difficult time, and tolerating feelings of loneliness.  She acknowledged this general concept was also discussed during her psychological evaluation process, and we addressed the  potential positives and strengths in so doing. Lisa addressed new job starting on Monday.  She voiced her doubt and we addressed her outlook. We validated her feelings, ,while also challenging her self-deprecating, fatalistic statements; provider attempted to help Lisa reframe her thoughts to more objective, present tense and reasonably forward-looking. Lisa stated in general that her self-talk has not really been very negative, and we addressed this as a positive.  We further addressed the potential healthiness there for her and having some time on her own.  She noted being more social lately, and going to the gym. She acknowledged that when she is on her own she tends to feel healthier, and how this relates to not being able to handle relationships.  We addressed these awareness as an point as ongoing goals, and for now acknowledging and managing through the place where she is currently at.  We addressed thought process in thinking about present and future goals, we addressed grounding.  We confirmed next appointment in 2 weeks she stated being okay for the 2-week interim.     During this session, this clinician used the following therapeutic modalities: Cognitive Behavioral Therapy, Mindfulness-based Strategies, Motivational Interviewing, Solution-Focused Therapy, and Supportive Psychotherapy    Substance Abuse was not addressed during this session. If the client is diagnosed with a co-occurring substance use disorder, please indicate any changes in the frequency or amount of use: not discussed today. Stage of change for addressing substance use diagnoses: Pre-contemplation    ASSESSMENT:  lsia Solorzano presents with a Euthymic/ normal and Dysthymic mood.     her affect is Normal range and intensity, which is congruent, with her mood and the content of the session. The client has made progress on their goals.    Lisa presented in a thoughtful way; she was more paced and tempered, less anxious, slightly  "more down, no overt distress noted. lisa Solorzano presents with a low risk of suicide, low risk of self-harm, and low risk of harm to others.    For any risk assessment that surpasses a \"low\" rating, a safety plan must be developed.    A safety plan was indicated: no  If yes, describe in detail n/a    PLAN: Between sessions, lisa Solorzano will begin new job, pace self, use grounding. At the next session, the therapist will use Cognitive Behavioral Therapy and Supportive Psychotherapy to address mood stability.    Behavioral Health Treatment Plan and Discharge Planning: lisa Solorzano is aware of and agrees to continue to work on their treatment plan. They have identified and are working toward their discharge goals. yes    Visit start and stop times:    09/12/24  Start Time: 0903  Stop Time: 0955  Total Visit Time: 52 minutes      "

## 2024-09-24 DIAGNOSIS — R30.0 DYSURIA: Primary | ICD-10-CM

## 2024-09-29 ENCOUNTER — HOSPITAL ENCOUNTER (EMERGENCY)
Facility: HOSPITAL | Age: 24
Discharge: HOME | End: 2024-09-29
Attending: EMERGENCY MEDICINE
Payer: COMMERCIAL

## 2024-09-29 VITALS
SYSTOLIC BLOOD PRESSURE: 130 MMHG | TEMPERATURE: 97.6 F | OXYGEN SATURATION: 98 % | HEART RATE: 80 BPM | HEIGHT: 63 IN | BODY MASS INDEX: 22.58 KG/M2 | WEIGHT: 127.43 LBS | DIASTOLIC BLOOD PRESSURE: 85 MMHG | RESPIRATION RATE: 20 BRPM

## 2024-09-29 DIAGNOSIS — N39.0 UTI (URINARY TRACT INFECTION): Primary | ICD-10-CM

## 2024-09-29 LAB
BACTERIA UR QL AUTO: ABNORMAL /HPF
BILIRUB UR QL STRIP: ABNORMAL
CLARITY UR: ABNORMAL
COLOR UR: YELLOW
EXT PREGNANCY TEST URINE: NEGATIVE
EXT. CONTROL: NORMAL
GLUCOSE UR STRIP-MCNC: NEGATIVE MG/DL
HGB UR QL STRIP.AUTO: ABNORMAL
KETONES UR STRIP-MCNC: NEGATIVE MG/DL
LEUKOCYTE ESTERASE UR QL STRIP: ABNORMAL
MUCOUS THREADS UR QL AUTO: ABNORMAL
NITRITE UR QL STRIP: NEGATIVE
NON-SQ EPI CELLS URNS QL MICRO: ABNORMAL /HPF
PH UR STRIP.AUTO: 6 [PH]
PROT UR STRIP-MCNC: ABNORMAL MG/DL
RBC #/AREA URNS AUTO: ABNORMAL /HPF
SP GR UR STRIP.AUTO: >=1.03 (ref 1–1.03)
UROBILINOGEN UR QL STRIP.AUTO: 2 E.U./DL
WBC #/AREA URNS AUTO: ABNORMAL /HPF

## 2024-09-29 PROCEDURE — 99284 EMERGENCY DEPT VISIT MOD MDM: CPT | Performed by: EMERGENCY MEDICINE

## 2024-09-29 PROCEDURE — 99283 EMERGENCY DEPT VISIT LOW MDM: CPT

## 2024-09-29 PROCEDURE — 81001 URINALYSIS AUTO W/SCOPE: CPT | Performed by: EMERGENCY MEDICINE

## 2024-09-29 PROCEDURE — 87491 CHLMYD TRACH DNA AMP PROBE: CPT | Performed by: EMERGENCY MEDICINE

## 2024-09-29 PROCEDURE — 87591 N.GONORRHOEAE DNA AMP PROB: CPT | Performed by: EMERGENCY MEDICINE

## 2024-09-29 PROCEDURE — 81025 URINE PREGNANCY TEST: CPT | Performed by: EMERGENCY MEDICINE

## 2024-09-29 RX ORDER — PHENAZOPYRIDINE HYDROCHLORIDE 200 MG/1
200 TABLET, FILM COATED ORAL 3 TIMES DAILY
Qty: 6 TABLET | Refills: 0 | Status: SHIPPED | OUTPATIENT
Start: 2024-09-29

## 2024-09-29 RX ORDER — PHENAZOPYRIDINE HYDROCHLORIDE 100 MG/1
200 TABLET, FILM COATED ORAL ONCE
Status: COMPLETED | OUTPATIENT
Start: 2024-09-29 | End: 2024-09-29

## 2024-09-29 RX ORDER — CEPHALEXIN 500 MG/1
500 CAPSULE ORAL EVERY 12 HOURS SCHEDULED
Qty: 14 CAPSULE | Refills: 0 | Status: SHIPPED | OUTPATIENT
Start: 2024-09-29 | End: 2024-10-06

## 2024-09-29 RX ORDER — PHENAZOPYRIDINE HYDROCHLORIDE 100 MG/1
100 TABLET, FILM COATED ORAL ONCE
Status: DISCONTINUED | OUTPATIENT
Start: 2024-09-29 | End: 2024-09-29

## 2024-09-29 RX ADMIN — PHENAZOPYRIDINE 200 MG: 100 TABLET ORAL at 22:41

## 2024-09-29 RX ADMIN — CEPHALEXIN 500 MG: 250 CAPSULE ORAL at 22:41

## 2024-09-30 LAB
C TRACH DNA SPEC QL NAA+PROBE: NEGATIVE
N GONORRHOEA DNA SPEC QL NAA+PROBE: NEGATIVE

## 2024-09-30 NOTE — ED PROVIDER NOTES
Final diagnoses:   UTI (urinary tract infection)     ED Disposition       ED Disposition   Discharge    Condition   Stable    Date/Time   Sun Sep 29, 2024 10:12 PM    Comment   Janell Solorzano discharge to home/self care.                   Assessment & Plan       Medical Decision Making  23 y/o female with urinary symptoms - will get UA/ urine preg, g/c, and reassess.     Will treat with abx and pyridum     Amount and/or Complexity of Data Reviewed  Labs: ordered. Decision-making details documented in ED Course.    Risk  Prescription drug management.        ED Course as of 09/29/24 2313   Sun Sep 29, 2024   2121 Dysuria urgency and frequency. Suprapubic pressure. No fever. X 3 days. Hx of uti last one 2m.    2135 Leukocytes, UA(!): Small       Medications   cephalexin (KEFLEX) capsule 500 mg (500 mg Oral Given 9/29/24 2241)   phenazopyridine (PYRIDIUM) tablet 200 mg (200 mg Oral Given 9/29/24 2241)       ED Risk Strat Scores                           SBIRT 20yo+      Flowsheet Row Most Recent Value   Initial Alcohol Screen: US AUDIT-C     1. How often do you have a drink containing alcohol? 0 Filed at: 09/29/2024 2104   2. How many drinks containing alcohol do you have on a typical day you are drinking?  0 Filed at: 09/29/2024 2104   3a. Male UNDER 65: How often do you have five or more drinks on one occasion? 0 Filed at: 09/29/2024 2104   3b. FEMALE Any Age, or MALE 65+: How often do you have 4 or more drinks on one occassion? 0 Filed at: 09/29/2024 2104   Audit-C Score 0 Filed at: 09/29/2024 2104   RODRIGO: How many times in the past year have you...    Used an illegal drug or used a prescription medication for non-medical reasons? Never Filed at: 09/29/2024 2104                            History of Present Illness       Chief Complaint   Patient presents with    Possible UTI     Reports painful and frequent urination for a few days. Pt requesting STI and preg testing as well. Denies vaginal discharge/rashes        Past Medical History:   Diagnosis Date    Anxiety     Concussion     Depression     Head injury     concussions in high school and in college    Self-injurious behavior       Past Surgical History:   Procedure Laterality Date    WISDOM TOOTH EXTRACTION        Family History   Problem Relation Age of Onset    Hypertension Mother     Mental illness Mother     Depression Mother     Mental illness Sister     Coronary artery disease Maternal Grandmother     Throat cancer Paternal Grandfather       Social History     Tobacco Use    Smoking status: Never    Smokeless tobacco: Never   Vaping Use    Vaping status: Never Used   Substance Use Topics    Alcohol use: Yes     Alcohol/week: 3.0 - 4.0 standard drinks of alcohol     Types: 3 - 4 Shots of liquor per week     Comment: 3-4 drinks, 2 times per week    Drug use: No      E-Cigarette/Vaping    E-Cigarette Use Never User       E-Cigarette/Vaping Substances    Nicotine No     THC No     CBD No     Flavoring No     Other No     Unknown No       I have reviewed and agree with the history as documented.     25 y/o female presents to the ED for urinary symptoms x 3 days. She states that she has had dysuria, frequency, and urgency. Reports that she has also had suprapubic pressure. She denies any fever, flank pain, or vaginal d/c. Has not tried anything for the symptoms. Has a hx of UTI and chlamydia and states that symptoms feel similar to both of these.  She states that she is sexually active with 1 individual but does not know any known exposure to STDs.  She denies any other complaints.      History provided by:  Patient  Urinary Frequency  Location:  Frequency, urgency, and dysuria  Severity:  Moderate  Onset quality:  Sudden  Timing:  Constant  Progression:  Worsening  Chronicity:  New  Associated symptoms: abdominal pain    Associated symptoms: no chest pain, no congestion, no cough, no diarrhea, no ear pain, no fever, no headaches, no nausea, no rash, no shortness  of breath, no sore throat, no vomiting and no wheezing        Review of Systems   Constitutional:  Negative for chills and fever.   HENT:  Negative for congestion, ear pain and sore throat.    Eyes:  Negative for pain and visual disturbance.   Respiratory:  Negative for cough, shortness of breath and wheezing.    Cardiovascular:  Negative for chest pain and leg swelling.   Gastrointestinal:  Positive for abdominal pain. Negative for diarrhea, nausea and vomiting.   Genitourinary:  Positive for frequency. Negative for dysuria, hematuria and urgency.   Musculoskeletal:  Negative for neck pain and neck stiffness.   Skin:  Negative for rash and wound.   Neurological:  Negative for weakness, numbness and headaches.   Psychiatric/Behavioral:  Negative for agitation and confusion.    All other systems reviewed and are negative.          Objective       ED Triage Vitals   Temperature Pulse Blood Pressure Respirations SpO2 Patient Position - Orthostatic VS   09/29/24 2103 09/29/24 2103 09/29/24 2103 09/29/24 2103 09/29/24 2103 09/29/24 2244   97.6 °F (36.4 °C) (!) 136 145/90 20 99 % Sitting      Temp Source Heart Rate Source BP Location FiO2 (%) Pain Score    09/29/24 2103 -- 09/29/24 2244 -- 09/29/24 2103    Oral  Right arm  8      Vitals      Date and Time Temp Pulse SpO2 Resp BP Pain Score FACES Pain Rating User   09/29/24 2244 -- 80 98 % 20 130/85 No Pain -- VC   09/29/24 2103 97.6 °F (36.4 °C) 136 99 % 20 145/90 8 -- VB            Physical Exam  Vitals and nursing note reviewed.   Constitutional:       Appearance: She is well-developed.   HENT:      Head: Normocephalic and atraumatic.   Eyes:      Pupils: Pupils are equal, round, and reactive to light.   Cardiovascular:      Rate and Rhythm: Normal rate and regular rhythm.   Pulmonary:      Effort: Pulmonary effort is normal.      Breath sounds: Normal breath sounds.   Abdominal:      General: Bowel sounds are normal.      Palpations: Abdomen is soft.      Tenderness:  There is no abdominal tenderness.   Musculoskeletal:         General: Normal range of motion.      Cervical back: Normal range of motion and neck supple.   Skin:     General: Skin is warm and dry.   Neurological:      General: No focal deficit present.      Mental Status: She is alert and oriented to person, place, and time.      Comments: No focal deficits         Results Reviewed       Procedure Component Value Units Date/Time    Chlamydia/GC amplified DNA by PCR [[id number]] Collected: 09/29/24 2213    Lab Status: In process Specimen: Urine, Other Updated: 09/29/24 2216    Urine Microscopic [[id number]]  (Abnormal) Collected: 09/29/24 2125    Lab Status: Final result Specimen: Urine, Clean Catch Updated: 09/29/24 2145     RBC, UA None Seen /hpf      WBC, UA 0-5 /hpf      Epithelial Cells Occasional /hpf      Bacteria, UA Occasional /hpf      MUCUS THREADS Occasional    UA w Reflex to Microscopic w Reflex to Culture [[id number]]  (Abnormal) Collected: 09/29/24 2125    Lab Status: Final result Specimen: Urine, Clean Catch Updated: 09/29/24 2131     Color, UA Yellow     Clarity, UA Cloudy     Specific Gravity, UA >=1.030     pH, UA 6.0     Leukocytes, UA Small     Nitrite, UA Negative     Protein,  (2+) mg/dl      Glucose, UA Negative mg/dl      Ketones, UA Negative mg/dl      Urobilinogen, UA 2.0 E.U./dl      Bilirubin, UA Small     Occult Blood, UA Large    POCT pregnancy, urine [[id number]]  (Normal) Resulted: 09/29/24 2126    Lab Status: Final result Updated: 09/29/24 2127     EXT Preg Test, Ur Negative     Control Valid            No orders to display       Procedures    ED Medication and Procedure Management   Prior to Admission Medications   Prescriptions Last Dose Informant Patient Reported? Taking?   FLUoxetine (PROzac) 10 mg capsule   No No   Sig: Take 1 capsule (10 mg total) by mouth daily   divalproex sodium (Depakote ER) 250 mg 24 hr tablet   No No   Sig: Take 3 tablets (750 mg total) by mouth daily  at bedtime   hydrOXYzine HCL (ATARAX) 25 mg tablet   No No   Sig: Take 1 tablet (25 mg total) by mouth every 6 (six) hours as needed for anxiety   mirtazapine (REMERON) 7.5 MG tablet   No No   Sig: TAKE ONE TABLET BY MOUTH AT BEDTIME   norgestimate-ethinyl estradiol (Sprintec 28) 0.25-35 MG-MCG per tablet   No No   Sig: TAKE ONE TABLET BY MOUTH EVERY DAY   ondansetron (ZOFRAN) 4 mg tablet   No No   Sig: Take 1 tablet (4 mg total) by mouth every 8 (eight) hours as needed for nausea or vomiting   pantoprazole (PROTONIX) 40 mg tablet   No No   Sig: Take 1 tablet (40 mg total) by mouth daily before breakfast      Facility-Administered Medications: None     Discharge Medication List as of 9/29/2024 10:27 PM        START taking these medications    Details   cephalexin (KEFLEX) 500 mg capsule Take 1 capsule (500 mg total) by mouth every 12 (twelve) hours for 7 days, Starting Sun 9/29/2024, Until Sun 10/6/2024, Normal      phenazopyridine (PYRIDIUM) 200 mg tablet Take 1 tablet (200 mg total) by mouth 3 (three) times a day, Starting Sun 9/29/2024, Normal           CONTINUE these medications which have NOT CHANGED    Details   divalproex sodium (Depakote ER) 250 mg 24 hr tablet Take 3 tablets (750 mg total) by mouth daily at bedtime, Starting Wed 9/4/2024, Normal      FLUoxetine (PROzac) 10 mg capsule Take 1 capsule (10 mg total) by mouth daily, Starting Wed 9/4/2024, Normal      hydrOXYzine HCL (ATARAX) 25 mg tablet Take 1 tablet (25 mg total) by mouth every 6 (six) hours as needed for anxiety, Starting Wed 9/4/2024, Normal      mirtazapine (REMERON) 7.5 MG tablet TAKE ONE TABLET BY MOUTH AT BEDTIME, Starting Wed 8/7/2024, Normal      norgestimate-ethinyl estradiol (Sprintec 28) 0.25-35 MG-MCG per tablet TAKE ONE TABLET BY MOUTH EVERY DAY, Starting Sun 6/30/2024, Normal      ondansetron (ZOFRAN) 4 mg tablet Take 1 tablet (4 mg total) by mouth every 8 (eight) hours as needed for nausea or vomiting, Starting Tue 7/2/2024,  Normal      pantoprazole (PROTONIX) 40 mg tablet Take 1 tablet (40 mg total) by mouth daily before breakfast, Starting Tue 7/2/2024, Normal           No discharge procedures on file.  ED SEPSIS DOCUMENTATION   Time reflects when diagnosis was documented in both MDM as applicable and the Disposition within this note       Time User Action Codes Description Comment    9/29/2024 10:25 PM Saritha Walden Add [N39.0] UTI (urinary tract infection)                  Saritha Walden DO  09/29/24 7253

## 2024-09-30 NOTE — ED NOTES
Patient attempted to provide urine sample unable at this time. Provided pt with something to drink.      Ashley Granados RN  09/29/24 7585

## 2024-10-01 ENCOUNTER — TELEPHONE (OUTPATIENT)
Dept: BEHAVIORAL/MENTAL HEALTH CLINIC | Facility: CLINIC | Age: 24
End: 2024-10-01

## 2024-10-01 NOTE — TELEPHONE ENCOUNTER
Preemptive call to Janell Solorzano re: tomorrow's appointment and possible switch to virtual/need to cancel due to provider having to work remotely and the apparent insurance coverage limits with such parameters. As this would be the second missed appointment, agreed to the potential to briefly touch base to see how Ms. Solorzano is doing. Ms. Solorzano was accepting of the potential changes; provider pending supervisory confirmation. Meanwhile, next therapy session is scheduled for 10/9.

## 2024-10-02 ENCOUNTER — TELEPHONE (OUTPATIENT)
Dept: BEHAVIORAL/MENTAL HEALTH CLINIC | Facility: CLINIC | Age: 24
End: 2024-10-02

## 2024-10-02 NOTE — TELEPHONE ENCOUNTER
Call to Janell Solorzano as a brief check-in due to having to cancel 2 sessions (insurance not cover virtual-virtual). Ms. Solorzano reported doing ok at this time, and stated being ok to wait until next session on 10/9 to followup.

## 2024-10-08 ENCOUNTER — TELEPHONE (OUTPATIENT)
Age: 24
End: 2024-10-08

## 2024-10-08 NOTE — TELEPHONE ENCOUNTER
Patient is calling regarding cancelling an appointment.    Date/Time: 10/9 @ 1    Reason: conflict    Patient was rescheduled: YES [x] NO []  If yes, when was Patient reschedule for: 10/9 @ 12:30    Patient requesting call back to reschedule: YES [] NO [x]

## 2024-10-09 ENCOUNTER — SOCIAL WORK (OUTPATIENT)
Dept: BEHAVIORAL/MENTAL HEALTH CLINIC | Facility: CLINIC | Age: 24
End: 2024-10-09
Payer: COMMERCIAL

## 2024-10-09 ENCOUNTER — OFFICE VISIT (OUTPATIENT)
Dept: PSYCHIATRY | Facility: CLINIC | Age: 24
End: 2024-10-09
Payer: COMMERCIAL

## 2024-10-09 DIAGNOSIS — F42.9 OBSESSIVE-COMPULSIVE DISORDER, UNSPECIFIED TYPE: ICD-10-CM

## 2024-10-09 DIAGNOSIS — F60.3 BORDERLINE PERSONALITY DISORDER (HCC): ICD-10-CM

## 2024-10-09 DIAGNOSIS — F31.4 BIPOLAR I, MOST RECENT EPISODE DEPRESSED, SEVERE (HCC): ICD-10-CM

## 2024-10-09 DIAGNOSIS — F43.10 POST TRAUMATIC STRESS DISORDER (PTSD): Primary | ICD-10-CM

## 2024-10-09 DIAGNOSIS — F31.4 BIPOLAR I, MOST RECENT EPISODE DEPRESSED, SEVERE (HCC): Primary | ICD-10-CM

## 2024-10-09 DIAGNOSIS — F42.2 MIXED OBSESSIONAL THOUGHTS AND ACTS: ICD-10-CM

## 2024-10-09 DIAGNOSIS — F43.10 POST TRAUMATIC STRESS DISORDER (PTSD): ICD-10-CM

## 2024-10-09 PROCEDURE — 99214 OFFICE O/P EST MOD 30 MIN: CPT | Performed by: NURSE PRACTITIONER

## 2024-10-09 PROCEDURE — 90834 PSYTX W PT 45 MINUTES: CPT

## 2024-10-09 RX ORDER — FLUOXETINE 10 MG/1
10 CAPSULE ORAL DAILY
Qty: 30 CAPSULE | Refills: 2 | Status: SHIPPED | OUTPATIENT
Start: 2024-10-09

## 2024-10-09 NOTE — PSYCH
Behavioral Health Psychotherapy Progress Note    Psychotherapy Provided: Individual Psychotherapy     1. Post traumatic stress disorder (PTSD)        2. Bipolar I, most recent episode depressed, severe (HCC)        3. Mixed obsessional thoughts and acts        4. Borderline personality disorder (HCC)            Goals addressed in session: Goal 1     DATA: In-person session. Janell continues with dark hair, and appears somewhat thinner. She was smiling. We addressed overall how she has been doing.  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  She noted being bored but does believe that she is helping them at times. She stated doing ok in keeping a routine with the shift change, but not liking 2-10pm hours. We discussed areas of the job she likes and doesn't, and the notion of how it may be helpful in honing in on a career path.  We explored the struggle for Janell in knowing what she wants to do. We addressed continued efforts in discovering what she likes and what she doesn't to help address a fit. We explored how she enjoys working with kids, and the dynamics of how she relates to them and their struggles as part of the inspiration. 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 We addressed her continued consideration of parole/probation. Janell discussed ongoing at the gym and feeling good about going; she stated eating adequately and working on meal prep; she stated maintaining a healthy weight that she is currently comfortable with. She addressed meeting a new man, named Diallo, and the course of dating thus far. We explored at length her thoughts and feelings and experiences with him with communication and spending time together. As Janell discussed being contacted by her 2 prior dating partners, we explored at length differences in how she is responding and  communicating with them, as well as the differences in how she is approaching her relationship with this new man. She noted attending to self-talk, challenging her assumptions, pacing herself. We addressed the discomfort with not having chaos, and possible automated seeking out chaos; we addressed potential post-trauma-related reasons for such behaviors. We addressed a specific communication with her ex-(36 yr old), and the empowerment and self-esteem demonstrated with her communication response. She acknowledged self-esteem and self-worth as a struggle, and we explored this notion, and continued steps toward working on this. She addressed being told by her friend that she is fixated on her diagnoses, and we began to address this notion. We addressed progress for Lisa, in her approach to self-care, work, daily routine, nutrition and physical activity, and relationships.  We addressed ways and importance of 'being in the moment' as an important part of her self-care.  We discussed and Lisa stated wanting to keep weekly appointments; also ok with in-office and later virtual schedule.     During this session, this clinician used the following therapeutic modalities: Cognitive Behavioral Therapy, Mindfulness-based Strategies, Motivational Interviewing, Solution-Focused Therapy, and Supportive Psychotherapy    Substance Abuse was not addressed during this session. If the client is diagnosed with a co-occurring substance use disorder, please indicate any changes in the frequency or amount of use: not discussed specifically today. Stage of change for addressing substance use diagnoses: Contemplation    ASSESSMENT:  lisa Solorzano presents with a Euthymic/ normal mood.     her affect is Normal range and intensity, which is congruent, with her mood and the content of the session. The client has made progress on their goals.    Lisa presented as more calm and identifying and using healthy coping strategies lisa Solorzano  "presents with a low risk of suicide, low risk of self-harm, and low risk of harm to others.    For any risk assessment that surpasses a \"low\" rating, a safety plan must be developed.    A safety plan was indicated: no  If yes, describe in detail n/a    PLAN: Between sessions, lisa Solorzano will continue routine and self-care. At the next session, the therapist will use Cognitive Behavioral Therapy and Supportive Psychotherapy to address mood stability, continued use of healthy coping strategies.    Behavioral Health Treatment Plan and Discharge Planning: lisa Solorzano is aware of and agrees to continue to work on their treatment plan. They have identified and are working toward their discharge goals. yes    Visit start and stop times:    10/09/24  Start Time: 0905  Stop Time: 0957  Total Visit Time: 52 minutes  "

## 2024-10-09 NOTE — PSYCH
Regular Visit    Problem List Items Addressed This Visit    None  Visit Diagnoses       Obsessive-compulsive disorder, unspecified type        Relevant Medications    FLUoxetine (PROzac) 10 mg capsule               Encounter provider ANGELA Vaughn    Provider located at    [facility]  [address] [facility] [address] [phone]    Recent Visits  No visits were found meeting these conditions.  Showing recent visits within past 7 days and meeting all other requirements  Today's Visits  Date Type Provider Dept   10/09/24 Office Visit ANGELA Vaughn  Psychiatric [facility]   Showing today's visits and meeting all other requirements  Future Appointments  No visits were found meeting these conditions.  Showing future appointments within next 150 days and meeting all other requirements       HPI     Current Outpatient Medications   Medication Sig Dispense Refill    divalproex sodium (Depakote ER) 250 mg 24 hr tablet Take 3 tablets (750 mg total) by mouth daily at bedtime 90 tablet 2    FLUoxetine (PROzac) 10 mg capsule Take 1 capsule (10 mg total) by mouth daily 30 capsule 2    hydrOXYzine HCL (ATARAX) 25 mg tablet Take 1 tablet (25 mg total) by mouth every 6 (six) hours as needed for anxiety 90 tablet 2    mirtazapine (REMERON) 7.5 MG tablet TAKE ONE TABLET BY MOUTH AT BEDTIME 30 tablet 2    norgestimate-ethinyl estradiol (Sprintec 28) 0.25-35 MG-MCG per tablet TAKE ONE TABLET BY MOUTH EVERY DAY 84 tablet 1    ondansetron (ZOFRAN) 4 mg tablet Take 1 tablet (4 mg total) by mouth every 8 (eight) hours as needed for nausea or vomiting 20 tablet 0    pantoprazole (PROTONIX) 40 mg tablet Take 1 tablet (40 mg total) by mouth daily before breakfast 30 tablet 1    phenazopyridine (PYRIDIUM) 200 mg tablet Take 1 tablet (200 mg total) by mouth 3 (three) times a day (Patient not taking: Reported on 10/9/2024) 6 tablet 0     No current  facility-administered medications for this visit.       Review of Systems    I spent 25 minutes directly with the patient during this visit      MEDICATION MANAGEMENT NOTE        [facility]    Name and Date of Birth:  Janell Solorzano 24 y.o. 2000 MRN: [id number]    Date of Visit: October 9, 2024    No Known Allergies    Visit Time    Visit Start Time: 1230  Visit Stop Time: 1255  Total Visit Duration:  25 minutes    SUBJECTIVE:    Janell is seen today for a follow up for Bipolar Disorder, PTSD, and OCD. She continues to improve since the last visit. Janell seen in the office today for medication management follow up. She was last seen by this provider on 9/4/24. She is calm and appropriate in conversation today. She states that she has been doing well and has been responding appropriately to situations that she would have in the past overreacted to. She states that her depression and anxiety are low, and believes that the medications along with her frame of mind have helped with the improvement.  She is working and enjoying her job. She denies any SI/HI. Denies auditory and visual hallucinations and is sleeping and eating well. She is going to the gym and participating in healthy activities.  She will continue her medications as ordered and follow up in one month.    She denies any side effects from current psychiatric medications.    PLAN:  Continue depakote 750mg PO HS  Continue hydroxyzine 25mg PO Q6 PRN  Continue mirtazapine 7.5mg PO HS  Initiate prozac 10mg PO Daily   Follow up in one month  Follow up with therapy  The patient will call this provider sooner if concerns or issues arise prior to scheduled appointment.    Aware of 24 hour and weekend coverage for urgent situations accessed by calling [facility] main practice number  Continue psychotherapy with therapist  Medication management every 1 month  Aware of need to follow up  with family physician for medical issues    Diagnoses and all orders for this visit:    Obsessive-compulsive disorder, unspecified type  -     FLUoxetine (PROzac) 10 mg capsule; Take 1 capsule (10 mg total) by mouth daily      Assessment & Plan  Obsessive-compulsive disorder, unspecified type  Stable on prozac.  Orders:    FLUoxetine (PROzac) 10 mg capsule; Take 1 capsule (10 mg total) by mouth daily    Bipolar I, most recent episode depressed, severe (HCC)  Stable on Depakote.         Post traumatic stress disorder (PTSD)  Stable on Prozac, Mirtazapine, and hydroxyzine PRN.         Current Rating Scores:     Current PHQ-9   PHQ-2/9 Depression Screening    Little interest or pleasure in doing things: 0 - not at all  Feeling down, depressed, or hopeless: 0 - not at all  Trouble falling or staying asleep, or sleeping too much: 0 - not at all  Feeling tired or having little energy: 1 - several days  Poor appetite or overeatin - not at all  Feeling bad about yourself - or that you are a failure or have let yourself or your family down: 0 - not at all  Trouble concentrating on things, such as reading the newspaper or watching television: 2 - more than half the days  Moving or speaking so slowly that other people could have noticed. Or the opposite - being so fidgety or restless that you have been moving around a lot more than usual: 2 - more than half the days  Thoughts that you would be better off dead, or of hurting yourself in some way: 0 - not at all  PHQ-9 Score: 5  PHQ-9 Interpretation: Mild depression       Current JOHANA-7 is   JOHANA-7 Flowsheet Screening      Flowsheet Row Most Recent Value   Over the last two weeks, how often have you been bothered by the following problems?     Feeling nervous, anxious, or on edge 2   Not being able to stop or control worrying 0   Worrying too much about different things 0   Trouble relaxing  1   Being so restless that it's hard to sit still 1   Becoming easily annoyed or  irritable  3   Feeling afraid as if something awful might happen 0   How difficult have these problems made it for you to do your work, take care of things at home, or get along with other people?  Not difficult at all   JOHANA Score  7        .    Current Outpatient Medications on File Prior to Visit   Medication Sig Dispense Refill    divalproex sodium (Depakote ER) 250 mg 24 hr tablet Take 3 tablets (750 mg total) by mouth daily at bedtime 90 tablet 2    hydrOXYzine HCL (ATARAX) 25 mg tablet Take 1 tablet (25 mg total) by mouth every 6 (six) hours as needed for anxiety 90 tablet 2    mirtazapine (REMERON) 7.5 MG tablet TAKE ONE TABLET BY MOUTH AT BEDTIME 30 tablet 2    norgestimate-ethinyl estradiol (Sprintec 28) 0.25-35 MG-MCG per tablet TAKE ONE TABLET BY MOUTH EVERY DAY 84 tablet 1    ondansetron (ZOFRAN) 4 mg tablet Take 1 tablet (4 mg total) by mouth every 8 (eight) hours as needed for nausea or vomiting 20 tablet 0    pantoprazole (PROTONIX) 40 mg tablet Take 1 tablet (40 mg total) by mouth daily before breakfast 30 tablet 1    [DISCONTINUED] FLUoxetine (PROzac) 10 mg capsule Take 1 capsule (10 mg total) by mouth daily 30 capsule 1    phenazopyridine (PYRIDIUM) 200 mg tablet Take 1 tablet (200 mg total) by mouth 3 (three) times a day (Patient not taking: Reported on 10/9/2024) 6 tablet 0     No current facility-administered medications on file prior to visit.       Psychotherapy Provided:     Individual psychotherapy provided: Yes  Supportive counseling provided.  Medication changes discussed with Janell.  Medication education provided to Janell.  Discussed with Janell coping with family conflict, social difficulties, and ongoing anxiety.   Coping strategies reviewed with Janell.   Importance of medication and treatment compliance reviewed with Janell.  Importance of follow up with family physician for medical issues reviewed with Janell.  Reassurance and supportive therapy provided.   Crisis/safety plan  "discussed with Janell. Patient will call prior to scheduled appointment if they have any issues or concerns.  Patient understands they can access the office by calling the main number at any time if they are in crisis.  They also understand they can call their [facility]'s crisis number or go to their nearest ED if suicidal ideation increases or if they develop a plan or intent.       HPI ROS Appetite Changes and Sleep:     She reports normal sleep, adequate appetite, adequate energy level. Denies homicidal ideation, denies suicidal ideation    Review Of Systems:      HPI ROS:               Medication Side Effects:   denies   Depression (10 worst): 1/10 \"Not bad\"   Anxiety (10 worst): 2/10 \"High\"   Safety concerns (SI, HI, etc): denies denies   Sleep: good good   Energy: good adequate   Appetite: good adequate     General normal    Personality no change in personality   Constitutional as noted in HPI   ENT negative   Cardiovascular negative   Respiratory negative   Gastrointestinal negative   Genitourinary negative   Musculoskeletal negative   Integumentary negative   Neurological negative   Endocrine negative   Other Symptoms none, all other systems are negative     Mental Status Evaluation:    Appearance Appropriately dressed and Good eye contact   Behavior calm and cooperative   Mood euthymic  Depression Scale - 1 of 10 (0 = No depression)  Anxiety Scale - 2 of 10 (0 = No anxiety)   Speech Normal rate and volume   Affect appropriate and mood-congruent   Thought Processes Goal directed and coherent   Thought Content Does not verbalize delusional material   Associations Tightly connected   Perceptual Disturbances Denies hallucinations and does not appear to be responding to internal stimuli   Risk Potential Suicidal/Homicidal Ideation - No evidence of suicidal or homicidal ideation and patient does not verbalize suicidal or homicidal ideation  Risk of Violence - No evidence of risk for violence found on " assessment  Risk of Self Mutilation - No evidence of risk for self mutilation found on assessment   Orientation oriented to person, place, time/date, and situation   Memory recent and remote memory grossly intact   Consciousness alert and awake   Attention/Concentration attention span and concentration are age appropriate   Insight intact   Judgement intact   Muscle Strength and Gait normal muscle strength and normal muscle tone, normal gait/station and normal balance   Motor Activity no abnormal movements   Language no difficulty naming common objects, no difficulty repeating a phrase, no difficulty writing a sentence   Fund of Knowledge adequate knowledge of current events  adequate fund of knowledge regarding past history  adequate fund of knowledge regarding vocabulary      Past Psychiatric History Update:     Inpatient Psychiatric Admission Since Last Encounter:   no  Changes to Outpatient Psychiatric Treatment Team:    no  Suicide Attempt Or Self Mutilation Since Last Encounter:   no  Incidence of Violent Behavior Since Last Encounter:   no    Traumatic History Update:     New Onset of Abuse Since Last Encounter:   no  Traumatic Events Since Last Encounter:   no    Past Medical History:    Past Medical History:   Diagnosis Date    Anxiety     Concussion     Depression     Head injury     concussions in high school and in college    Self-injurious behavior         Past Surgical History:   Procedure Laterality Date    WISDOM TOOTH EXTRACTION       No Known Allergies  Substance Abuse History:    Social History     Substance and Sexual Activity   Alcohol Use Yes    Alcohol/week: 3.0 - 4.0 standard drinks of alcohol    Types: 3 - 4 Shots of liquor per week    Comment: 3-4 drinks, 2 times per week     Social History     Substance and Sexual Activity   Drug Use No     Social History:    Social History     Socioeconomic History    Marital status: Single     Spouse name: Not on file    Number of children: 0    Years of  education: senior in college currently    Highest education level: High school graduate   Occupational History    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   Tobacco Use    Smoking status: Never    Smokeless tobacco: Never   Vaping Use    Vaping status: Never Used   Substance and Sexual Activity    Alcohol use: Yes     Alcohol/week: 3.0 - 4.0 standard drinks of alcohol     Types: 3 - 4 Shots of liquor per week     Comment: 3-4 drinks, 2 times per week    Drug use: No    Sexual activity: Yes     Partners: Male     Birth control/protection: Condom Male   Other Topics Concern    Not on file   Social History Narrative    Not on file     Social Determinants of Health     Financial Resource Strain: Not on file   Food Insecurity: Not on file   Transportation Needs: Not on file   Physical Activity: Not on file   Stress: Not on file   Social Connections: Unknown (6/18/2024)    Received from [employer]     How often do you feel lonely or isolated from those around you? (Adult - for ages 18 years and over): Not on file   Intimate Partner Violence: Not on file   Housing Stability: Not on file     Family Psychiatric History:     Family History   Problem Relation Age of Onset    Hypertension Mother     Mental illness Mother     Depression Mother     Mental illness Sister     Coronary artery disease Maternal Grandmother     Throat cancer Paternal Grandfather      History Review:The following portions of the patient's history were reviewed and updated as appropriate: allergies, current medications, past family history, past medical history, past social history, past surgical history, and problem list     OBJECTIVE:     Vital signs in last 24 hours:    There were no vitals filed for this visit.    Laboratory Results: Recent Labs (last 2 months):   Admission on 09/29/2024, Discharged on 09/29/2024   Component Date Value    Color, UA 09/29/2024 Yellow     Clarity, UA 09/29/2024 Cloudy     Specific Gravity, UA  2024 >=1.030     pH, UA 2024 6.0     Leukocytes, UA 2024 Small (A)     Nitrite, UA 2024 Negative     Protein, UA 2024 100 (2+) (A)     Glucose, UA 2024 Negative     Ketones, UA 2024 Negative     Urobilinogen, UA 2024 2.0 (A)     Bilirubin, UA 2024 Small (A)     Occult Blood, UA 2024 Large (A)     EXT Preg Test, Ur 2024 Negative     Control 2024 Valid     N gonorrhoeae, DNA Probe 2024 Negative     Chlamydia trachomatis, D* 2024 Negative     RBC, UA 2024 None Seen     WBC, UA 2024 0-5     Epithelial Cells 2024 Occasional     Bacteria, UA 2024 Occasional     MUCUS THREADS 2024 Occasional (A)    Office Visit on 2024   Component Date Value    TB Skin Test 2024 Negative     Induration 2024 0      I have personally reviewed all pertinent laboratory/tests results.    Suicide/Homicide Risk Assessment:    Risk of Harm to Self:  The following ratings are based on assessment at the time of the interview  Recent Specific Risk Factors include: current anxiety symptoms  Demographic risk factors include: , age: young adult (15-24)  Historical Risk Factors include: chronic depressive symptoms, chronic anxiety symptoms, chronic mood disorder, history of suicidal behaviors, history of self-abusive behavior, history of substance use, history of abuse, history of impulsive behaviors, history of traumatic experiences, a relative or close friend who  by suicide  Protective Factors: no current suicidal ideation, access to mental health treatment, compliant with medications, compliant with mental health treatment, having a desire to be alive, stable living environment, stable job, sense of determination, strong relationships, supportive family  Weapons: none. The following steps have been taken to ensure weapons are properly secured: not applicable  Based on today's assessment, Janell presents the  following risk of harm to self: none    Risk of Harm to Others:  The following ratings are based on assessment at the time of the interview  Protective Factors: no current homicidal ideation  Based on today's assessment, Janell presents the following risk of harm to others: none    The following interventions are recommended: contracts for safety at present - agrees to go to ED if feeling unsafe, return in 1 month for reassessment, therapy appointment in 1 day, contracts for safety at present - agrees to call Crisis Intervention Service if feeling unsafe    Medications Risks/Benefits:      Risks, Benefits And Possible Side Effects Of Medications:    Discussed risks and benefits of treatment with patient including risk of suicidality, serotonin syndrome, increased QTc interval and SIADH related to treatment with antidepressants; Risk of induction of manic symptoms in certain patient populations and risk of liver impairment related to treatment with Depakote     Controlled Medication Discussion:     Not applicable    Treatment Plan:    Due for update/Updated:   no  Last treatment plan done 8/15/24 by Hillary Olmedo.  Treatment Plan due on 2/15/25.    ANGELA Vaughn 10/09/24    This note was shared with patient.   IV discontinued, cath removed intact

## 2024-10-09 NOTE — ASSESSMENT & PLAN NOTE
Stable on prozac.  Orders:    FLUoxetine (PROzac) 10 mg capsule; Take 1 capsule (10 mg total) by mouth daily

## 2024-10-18 ENCOUNTER — SOCIAL WORK (OUTPATIENT)
Dept: BEHAVIORAL/MENTAL HEALTH CLINIC | Facility: CLINIC | Age: 24
End: 2024-10-18
Payer: COMMERCIAL

## 2024-10-18 DIAGNOSIS — F60.3 BORDERLINE PERSONALITY DISORDER (HCC): ICD-10-CM

## 2024-10-18 DIAGNOSIS — F42.9 OBSESSIVE-COMPULSIVE DISORDER, UNSPECIFIED TYPE: ICD-10-CM

## 2024-10-18 DIAGNOSIS — F31.4 BIPOLAR I, MOST RECENT EPISODE DEPRESSED, SEVERE (HCC): ICD-10-CM

## 2024-10-18 DIAGNOSIS — F42.2 MIXED OBSESSIONAL THOUGHTS AND ACTS: ICD-10-CM

## 2024-10-18 DIAGNOSIS — F43.10 POST TRAUMATIC STRESS DISORDER (PTSD): Primary | ICD-10-CM

## 2024-10-18 PROCEDURE — 90837 PSYTX W PT 60 MINUTES: CPT

## 2024-10-18 NOTE — PSYCH
Behavioral Health Psychotherapy Progress Note    Psychotherapy Provided: Individual Psychotherapy     1. Post traumatic stress disorder (PTSD)        2. Bipolar I, most recent episode depressed, severe (HCC)        3. Mixed obsessional thoughts and acts        4. Borderline personality disorder (HCC)        5. Obsessive-compulsive disorder, unspecified type            Goals addressed in session: Goal 1     DATA: Janell addressed job stressors and dealing with her boss. She also addressed relationship status with new man in her life, Diallo. The conversation mixed between discussing struggles with working and determining career direction as well as struggles in managing this new relationship. We addressed her automatic thoughts, how anxiety and triggering that contribute to such thinking. We addressed breathing and grounding to help in those moments. We did a brief example in the office. We addressed deciphering evidence that supports her beliefs about others versus her internal triggering. We addressed how trust/lack of trust is a factor in how she interprets the actions of others. We addressed practical goals in the workplace, and potential communications, with recognition of barriers. With the relationship, we discussed keeping steady routine, staying grounded, rethinking self-talk to help decreasing anxious, panicky, snowballing thoughts. We reviewed how Janell was feeling and doing at several points in the session, and addressed this as part of grounding tool (she highlighted being tired). We noted in session her understanding of how trust in others, belief in herself, feeling overwhelmed, snowballing thoughts all play a factor in how she thinks, feels and acts. We confirmed appointment next week.      During this session, this clinician used the following therapeutic modalities: Cognitive Behavioral Therapy and Supportive Psychotherapy    Substance Abuse was not addressed during this session. If the client is  "diagnosed with a co-occurring substance use disorder, please indicate any changes in the frequency or amount of use: not discussed today. Stage of change for addressing substance use diagnoses: No substance use/Not applicable    ASSESSMENT:  lisa Solorzano presents with a Euthymic/ normal, Anxious, and Dysthymic mood.     her affect is Normal range and intensity, which is congruent, with her mood and the content of the session. The client has made progress on their goals.    Lisa presented in stable spirits at this time lisa Solorzano presents with a low risk of suicide, low risk of self-harm, and low risk of harm to others.    For any risk assessment that surpasses a \"low\" rating, a safety plan must be developed.    A safety plan was indicated: no  If yes, describe in detail n/a    PLAN: Between sessions, lisa Solorzano will continued attention to self-talk, grounding. At the next session, the therapist will use Cognitive Behavioral Therapy and Supportive Psychotherapy to address mood stability.    Behavioral Health Treatment Plan and Discharge Planning: lisa Solorzano is aware of and agrees to continue to work on their treatment plan. They have identified and are working toward their discharge goals. yes    Visit start and stop times:    10/18/24  Start Time: 0900  Stop Time: 0955  Total Visit Time: 55 minutes  "

## 2024-10-24 ENCOUNTER — TELEPHONE (OUTPATIENT)
Age: 24
End: 2024-10-24

## 2024-10-24 NOTE — TELEPHONE ENCOUNTER
Patient is calling regarding cancelling an appointment.    Date/Time: 10/25 @9am    Reason: has to go into work     Patient was rescheduled: YES [] NO [x]  If yes, when was Patient reschedule for: patient has recurring appt.    Patient requesting call back to reschedule: YES [] NO [x]

## 2024-11-01 ENCOUNTER — TELEPHONE (OUTPATIENT)
Dept: BEHAVIORAL/MENTAL HEALTH CLINIC | Facility: CLINIC | Age: 24
End: 2024-11-01

## 2024-11-01 NOTE — TELEPHONE ENCOUNTER
Call to Janell Solorzano to discuss her preference of whether or not to have today's session.  Provider had been informed of apparent insurance lapse and wanted to see if she was okay with self-pay.  She stated she is in process of trying to clarify how to reinstate her benefits.  She meanwhile affirmed wanting to cancel today's appointment.  During this call provider was able to confirm the out-of-pocket cost for Ms. Solorzano.  She stated wanting to keep next week's session, prepared to self-pay, but hopefully insurance issue resolved as soon as possible.  We took this time to address overall how she is doing.  She mentioned recent break-up with the jer she was dating.  She discussed her overall approach and how she is handling it.  Provider noted healthier mindset, self talk, and awareness.  Ms. Solorzano was able to acknowledge progress for herself.  She presented in stable spirits at this time.  At this time, confirmed intent to keep next week's appointment.

## 2024-11-01 NOTE — TELEPHONE ENCOUNTER
Patient called back in regards to the call she received about her insurance. Writer informed her of the encounter and patient stated she never received new cards. Writer suggested she call the insurance and speak with them .

## 2024-11-01 NOTE — TELEPHONE ENCOUNTER
Called and LVM and informed that her [facility] insurance is inactive. Was asked to bring new insurance to cover the visit otherwise it will be self pay.

## 2024-11-20 ENCOUNTER — TELEPHONE (OUTPATIENT)
Age: 24
End: 2024-11-20

## 2024-11-20 ENCOUNTER — TELEPHONE (OUTPATIENT)
Dept: BEHAVIORAL/MENTAL HEALTH CLINIC | Facility: CLINIC | Age: 24
End: 2024-11-20

## 2024-11-20 NOTE — TELEPHONE ENCOUNTER
Patient called and is requesting a call back from provider, regarding patient having to get an PFA

## 2024-11-20 NOTE — TELEPHONE ENCOUNTER
Returned call to Janell Rashad, after getting message via POD. Ms. Solorzano addressed recent happenings. We addressed options and considerations and priorities at this time. Ms. Solorzano stated wanting to maintain our appointment on Friday 11/22, even though health insurance not yet in effect from her employer. We agreed to further discuss at Friday session. Provider encouraged her to look into any assistance offered for medical / mental health during this time period until receiving insurance coverage. Potential interim call before Friday.

## 2024-11-22 ENCOUNTER — SOCIAL WORK (OUTPATIENT)
Dept: BEHAVIORAL/MENTAL HEALTH CLINIC | Facility: CLINIC | Age: 24
End: 2024-11-22

## 2024-11-22 DIAGNOSIS — F42.2 MIXED OBSESSIONAL THOUGHTS AND ACTS: ICD-10-CM

## 2024-11-22 DIAGNOSIS — F60.3 BORDERLINE PERSONALITY DISORDER (HCC): ICD-10-CM

## 2024-11-22 DIAGNOSIS — F43.10 POST TRAUMATIC STRESS DISORDER (PTSD): Primary | ICD-10-CM

## 2024-11-22 DIAGNOSIS — F31.4 BIPOLAR I, MOST RECENT EPISODE DEPRESSED, SEVERE (HCC): ICD-10-CM

## 2024-11-22 PROCEDURE — 90834 PSYTX W PT 45 MINUTES: CPT

## 2024-11-22 NOTE — PSYCH
Behavioral Health Psychotherapy Progress Note    Psychotherapy Provided: Individual Psychotherapy     1. Post traumatic stress disorder (PTSD)        2. Bipolar I, most recent episode depressed, severe (HCC)        3. Borderline personality disorder (HCC)        4. Mixed obsessional thoughts and acts            Goals addressed in session: Goal 1     DATA: Janell addressed thoughts and feelings secondary to recent events with the man she was dating.  We discussed the legalities, including mutual PFA's.  We processed and explored the relationship, including unhealthy and toxic communications, and triggers experienced. We addressed indicators she ignored, and explored reasons. We addressed the purpose in understanding without judgement for understanding.  While Janell acknowledged things that she realizes she could have done differently on her end, we also addressed behaviors on his part that were emotionally and physically hurtful. We discussed and she was able to acknowledge toxicity on his end.  We addressed first line for healing, provider encouraged doctor follow-up.  We also discussed her awareness of [facility] (she had the number) and intent to follow-up for potential additional advisement and support.  We addressed pervasive patterns for Janell contributing to her difficulty in ending relationships when she has indicators to do so.  We acknowledged triggers of abandonment, being alone, and low self-worth.  We agreed to ongoing focus in these areas.   We reviewed next session to be virtual - Janell stated insurance starts in December. She declined need for sooner appointment, but we agreed to a check-in during the 2-week interim.     During this session, this clinician used the following therapeutic modalities: Cognitive Behavioral Therapy and Supportive Psychotherapy    Substance Abuse was addressed during this session. If the client is diagnosed with a co-occurring substance use disorder, please indicate  "any changes in the frequency or amount of use: drinking was discussed; Lisa stated not drinking any alcohol at this time. Stage of change for addressing substance use diagnoses: Contemplation    ASSESSMENT:  lisa Solorzano presents with a Euthymic/ normal and Dysthymic mood.     her affect is Normal range and intensity, which is congruent, with her mood and the content of the session. The client has made progress on their goals.    Lisa was appropriately reflective and explorative re: behaviors and issues with self-worth lisa Solorzano presents with a low risk of suicide, low risk of self-harm, and low risk of harm to others. No SI; Lisa was futuristic in thinking    For any risk assessment that surpasses a \"low\" rating, a safety plan must be developed.    A safety plan was indicated: no  If yes, describe in detail n/a     PLAN: Between sessions, lisa Solorzano will reach out. At the next session, the therapist will use Cognitive Behavioral Therapy and Supportive Psychotherapy to address continued daily coping, mood stability.    Behavioral Health Treatment Plan and Discharge Planning: lisa Solorzano is aware of and agrees to continue to work on their treatment plan. They have identified and are working toward their discharge goals. yes    Visit start and stop times:    11/22/24  Start Time: 0900  Stop Time: 0952  Total Visit Time: 52 minutes  "

## 2024-12-04 ENCOUNTER — TELEPHONE (OUTPATIENT)
Dept: BEHAVIORAL/MENTAL HEALTH CLINIC | Facility: CLINIC | Age: 24
End: 2024-12-04

## 2024-12-04 NOTE — TELEPHONE ENCOUNTER
Agreed upon call to Janell Solorzano as an interim check-in until next appointment. She addressed update on relationship. She discussed thoughts and feelings. We addressed options and coping strategies at this time. We agreed to f/up at next appointment on Monday 12/9. No acute distress was noted.

## 2024-12-09 ENCOUNTER — TELEMEDICINE (OUTPATIENT)
Dept: BEHAVIORAL/MENTAL HEALTH CLINIC | Facility: CLINIC | Age: 24
End: 2024-12-09
Payer: COMMERCIAL

## 2024-12-09 DIAGNOSIS — F43.10 POST TRAUMATIC STRESS DISORDER (PTSD): Primary | ICD-10-CM

## 2024-12-09 DIAGNOSIS — Z30.011 ENCOUNTER FOR INITIAL PRESCRIPTION OF CONTRACEPTIVE PILLS: ICD-10-CM

## 2024-12-09 DIAGNOSIS — F42.2 MIXED OBSESSIONAL THOUGHTS AND ACTS: ICD-10-CM

## 2024-12-09 DIAGNOSIS — F31.4 BIPOLAR I, MOST RECENT EPISODE DEPRESSED, SEVERE (HCC): ICD-10-CM

## 2024-12-09 DIAGNOSIS — F60.3 BORDERLINE PERSONALITY DISORDER (HCC): ICD-10-CM

## 2024-12-09 PROCEDURE — 90837 PSYTX W PT 60 MINUTES: CPT

## 2024-12-09 NOTE — PSYCH
Virtual Regular Visit    Verification of patient location:    Patient is located at Home in the following state in which I hold an active license [place]      Assessment/Plan:    Problem List Items Addressed This Visit       Post traumatic stress disorder (PTSD) - Primary    Bipolar I, most recent episode depressed, severe (HCC)    Mixed obsessional thoughts and acts    Borderline personality disorder (HCC)       Goals addressed in session: Goal 1          Reason for visit is   Chief Complaint   Patient presents with    Virtual Regular Visit          Encounter provider Michela Bates LCSW      Recent Visits  Date Type Provider Dept   12/04/24 Telephone Michela Bates LCSW [facility] [place]   Showing recent visits within past 7 days and meeting all other requirements  Today's Visits  Date Type Provider Dept   12/09/24 Telemedicine Michela Bates LCSW [facility] [place]   Showing today's visits and meeting all other requirements  Future Appointments  No visits were found meeting these conditions.  Showing future appointments within next 150 days and meeting all other requirements       The patient was identified by name and date of birth. Janell Solorzano was informed that this is a telemedicine visit and that the visit is being conducted throughthe Epic Embedded platform. She agrees to proceed..  My office door was closed. No one else was in the room.  She acknowledged consent and understanding of privacy and security of the video platform. The patient has agreed to participate and understands they can discontinue the visit at any time.    Patient is aware this is a billable service.     Subjective  Janell Solorzano is a 24 y.o. female . .      HPI     Past Medical History:   Diagnosis Date    Anxiety     Concussion     Depression     Head injury     concussions in high school and in college    Self-injurious behavior        Past Surgical History:   Procedure Laterality Date    WISDOM TOOTH  EXTRACTION         Current Outpatient Medications   Medication Sig Dispense Refill    divalproex sodium (Depakote ER) 250 mg 24 hr tablet Take 3 tablets (750 mg total) by mouth daily at bedtime 90 tablet 2    FLUoxetine (PROzac) 10 mg capsule Take 1 capsule (10 mg total) by mouth daily 30 capsule 2    hydrOXYzine HCL (ATARAX) 25 mg tablet Take 1 tablet (25 mg total) by mouth every 6 (six) hours as needed for anxiety 90 tablet 2    mirtazapine (REMERON) 7.5 MG tablet TAKE ONE TABLET BY MOUTH AT BEDTIME 30 tablet 2    norgestimate-ethinyl estradiol (Sprintec 28) 0.25-35 MG-MCG per tablet TAKE ONE TABLET BY MOUTH EVERY DAY 84 tablet 1    ondansetron (ZOFRAN) 4 mg tablet Take 1 tablet (4 mg total) by mouth every 8 (eight) hours as needed for nausea or vomiting 20 tablet 0    pantoprazole (PROTONIX) 40 mg tablet Take 1 tablet (40 mg total) by mouth daily before breakfast 30 tablet 1    phenazopyridine (PYRIDIUM) 200 mg tablet Take 1 tablet (200 mg total) by mouth 3 (three) times a day (Patient not taking: Reported on 10/9/2024) 6 tablet 0     No current facility-administered medications for this visit.        No Known Allergies    Review of Systems    Video Exam    There were no vitals filed for this visit.    Physical Exam     Behavioral Health Psychotherapy Progress Note    Psychotherapy Provided: Individual Psychotherapy     1. Post traumatic stress disorder (PTSD)        2. Bipolar I, most recent episode depressed, severe (HCC)        3. Mixed obsessional thoughts and acts        4. Borderline personality disorder (HCC)            Goals addressed in session: Goal 1     DATA: Today we reviewed overall feelings secondary to recent relationship breakup. We addressed ways Janell is looking at the relationship, and discussed other considerations. We addressed fit and compatibility and toxicity versus Janell taking full blame/responsibility for the relationship ending. We addressed her handling and continued ways to look  at her own behavior, but not at the expense of acknowledging how his behaviors were toxic and hurtful. We addressed and validated that she did not 'deserve' to be hurt in the way she was. We addressed paying attention to behaviors and how this gives information about a person's character, as well as elements of what is a potential match, identification of flags. We attempted to explore ways her underlying feelings and beliefs about herself allow her to continue to want to be in unhealthy relationships. We addressed patterns from this in prior relationships. We addressed further connection to family dynamics and relationships. We addressed current efforts toward healing. We discussed continuing routine (gym, work, friend connections and support). We discussed that she has insurance again and she plans to re-up with psychiatry and medications. We discussed review of [facility], which she said she is linked with but hasn't started yet. We addressed plan for grounding work and DBT-related exercises as we have discussed in the past. We addressed starting on her own with initial grounding and thought-stopping. We confirmed next appointment and avenue for outreach if needed in the interim.    During this session, this clinician used the following therapeutic modalities: Cognitive Behavioral Therapy, Mindfulness-based Strategies, Motivational Interviewing, Solution-Focused Therapy, and Supportive Psychotherapy    Substance Abuse was not addressed during this session. If the client is diagnosed with a co-occurring substance use disorder, please indicate any changes in the frequency or amount of use: Not specifically discussed today. Stage of change for addressing substance use diagnoses: Contemplation    ASSESSMENT:  lisa Solorzano presents with a Euthymic/ normal, Anxious, and Depressed mood.     her affect is Normal range and intensity, which is congruent, with her mood and the content of the session. The client has made  "progress on their goals.    Lisa continues to demonstrate insight lisa Solorzano presents with a low risk of suicide, low risk of self-harm, and low risk of harm to others.    For any risk assessment that surpasses a \"low\" rating, a safety plan must be developed.    A safety plan was indicated: no  If yes, describe in detail n/a    PLAN: Between sessions, lisa Solorzano will continue daily routine, including healthy activities, and attention to medical and mental health. At the next session, the therapist will use Cognitive Behavioral Therapy and Supportive Psychotherapy to address mood stability and functioning.    Behavioral Health Treatment Plan and Discharge Planning: lisa Solorzano is aware of and agrees to continue to work on their treatment plan. They have identified and are working toward their discharge goals. yes    Visit start and stop times:    12/09/24  Start Time: 0902  Stop Time: 0955  Total Visit Time: 53 minutes      "

## 2024-12-10 DIAGNOSIS — F31.81 BIPOLAR II DISORDER (HCC): ICD-10-CM

## 2024-12-10 RX ORDER — MIRTAZAPINE 7.5 MG/1
7.5 TABLET, FILM COATED ORAL
Qty: 30 TABLET | Refills: 0 | Status: SHIPPED | OUTPATIENT
Start: 2024-12-10 | End: 2024-12-20 | Stop reason: SDUPTHER

## 2024-12-10 NOTE — TELEPHONE ENCOUNTER
Reason for call:   [x] Refill   [] Prior Auth  [] Other:     Office:   [] PCP/Provider -   [x] Specialty/Provider -   [facility]  Authorized By: ANGELA Vaughn    Medication: mirtazapine (REMERON) 7.5 MG tablet     Dose/Frequency:  TAKE ONE TABLET BY MOUTH AT BEDTIME     Quantity: 30    Pharmacy: [address] [phone]    Does the patient have enough for 3 days?   [] Yes   [x] No - Send as HP to POD

## 2024-12-11 RX ORDER — NORGESTIMATE AND ETHINYL ESTRADIOL 0.25-0.035
1 KIT ORAL DAILY
Qty: 84 TABLET | Refills: 1 | Status: SHIPPED | OUTPATIENT
Start: 2024-12-11

## 2024-12-19 ENCOUNTER — TELEPHONE (OUTPATIENT)
Age: 24
End: 2024-12-19

## 2024-12-19 NOTE — TELEPHONE ENCOUNTER
Pt called to get her appt for 12/20 at 9:30am switched to a virtual visit due to conflicting schedule. .  Pt has [employer] and will connect with provider through [email or web address]. Writer switched appt and checked it in.

## 2024-12-20 ENCOUNTER — TELEMEDICINE (OUTPATIENT)
Dept: PSYCHIATRY | Facility: CLINIC | Age: 24
End: 2024-12-20
Payer: COMMERCIAL

## 2024-12-20 DIAGNOSIS — F31.12 BIPOLAR 1 DISORDER WITH MODERATE MANIA (HCC): ICD-10-CM

## 2024-12-20 DIAGNOSIS — F31.81 BIPOLAR II DISORDER (HCC): ICD-10-CM

## 2024-12-20 PROCEDURE — 99214 OFFICE O/P EST MOD 30 MIN: CPT | Performed by: NURSE PRACTITIONER

## 2024-12-20 RX ORDER — HYDROXYZINE HYDROCHLORIDE 25 MG/1
25 TABLET, FILM COATED ORAL EVERY 6 HOURS PRN
Qty: 90 TABLET | Refills: 2 | Status: SHIPPED | OUTPATIENT
Start: 2024-12-20

## 2024-12-20 RX ORDER — MIRTAZAPINE 7.5 MG/1
7.5 TABLET, FILM COATED ORAL
Qty: 30 TABLET | Refills: 0 | Status: SHIPPED | OUTPATIENT
Start: 2024-12-20

## 2024-12-20 RX ORDER — DIVALPROEX SODIUM 250 MG/1
750 TABLET, FILM COATED, EXTENDED RELEASE ORAL
Qty: 90 TABLET | Refills: 2 | Status: SHIPPED | OUTPATIENT
Start: 2024-12-20

## 2024-12-20 NOTE — PSYCH
Virtual Regular Visit    Verification of patient location:    Patient is located at Home in the following state in which I hold an active license [place]    The patient was identified by name and date of birth. Janell Solorzano was informed that this is a telemedicine visit and that the visit is being conducted throughthe Epic Embedded platform. She agrees to proceed..  My office door was closed. No one else was in the room.  She acknowledged consent and understanding of privacy and security of the video platform. The patient has agreed to participate and understands they can discontinue the visit at any time.    Patient is aware this is a billable service.       No Known Allergies    Review of Systems    Video Exam    There were no vitals filed for this visit.    Physical Exam     Visit Time    Visit Start Time: 0930  Visit Stop Time: 0945  Total Visit Duration: 15 minutes    Problem List Items Addressed This Visit    None  Visit Diagnoses       Bipolar 1 disorder with moderate saul (HCC)        Relevant Medications    mirtazapine (REMERON) 7.5 MG tablet    hydrOXYzine HCL (ATARAX) 25 mg tablet    divalproex sodium (Depakote ER) 250 mg 24 hr tablet      Bipolar II disorder (HCC)        Relevant Medications    mirtazapine (REMERON) 7.5 MG tablet    hydrOXYzine HCL (ATARAX) 25 mg tablet    divalproex sodium (Depakote ER) 250 mg 24 hr tablet             Encounter provider ANGELA Vaughn    Provider located at    [address] [address] [phone]    Recent Visits  No visits were found meeting these conditions.  Showing recent visits within past 7 days and meeting all other requirements  Today's Visits  Date Type Provider Dept   12/20/24 Telemedicine ANGELA Vaughn  [facility]   Showing today's visits and meeting all other requirements  Future Appointments  No visits were found meeting these  conditions.  Showing future appointments within next 150 days and meeting all other requirements       HPI     Current Outpatient Medications   Medication Sig Dispense Refill   • divalproex sodium (Depakote ER) 250 mg 24 hr tablet Take 3 tablets (750 mg total) by mouth daily at bedtime 90 tablet 2   • hydrOXYzine HCL (ATARAX) 25 mg tablet Take 1 tablet (25 mg total) by mouth every 6 (six) hours as needed for anxiety 90 tablet 2   • mirtazapine (REMERON) 7.5 MG tablet Take 1 tablet (7.5 mg total) by mouth daily at bedtime 30 tablet 0   • norgestimate-ethinyl estradiol (Sprintec 28) 0.25-35 MG-MCG per tablet Take 1 tablet by mouth daily 84 tablet 1   • ondansetron (ZOFRAN) 4 mg tablet Take 1 tablet (4 mg total) by mouth every 8 (eight) hours as needed for nausea or vomiting 20 tablet 0   • pantoprazole (PROTONIX) 40 mg tablet Take 1 tablet (40 mg total) by mouth daily before breakfast 30 tablet 1   • phenazopyridine (PYRIDIUM) 200 mg tablet Take 1 tablet (200 mg total) by mouth 3 (three) times a day (Patient not taking: Reported on 12/20/2024) 6 tablet 0     No current facility-administered medications for this visit.       Review of Systems    I spent 15 minutes directly with the patient during this visit      MEDICATION MANAGEMENT NOTE        [facility]    Name and Date of Birth:  Janell Solorzano 24 y.o. 2000 MRN: [id number]    Date of Visit: December 20, 2024    No Known Allergies    Visit Time    Visit Start Time: 0930  Visit Stop Time: 0945  Total Visit Duration:  15 minutes    SUBJECTIVE:    Janell is seen today for a follow up for Bipolar Disorder, PTSD, and OCD. She continues to improve since the last visit. Janell seen in the office today for medication management follow up. She was last seen by this provider on 10/9/24.  Janell states today that she has been off of her meds for 1-1/2 months.  She states that she was not sleeping well she did not have her  "meds.  She keeps asking for medications that will make her \"feel less human\".  She was encouraged to start taking her medication again, as she was stable while she was on it.  She states she needs Adderall because \"that ADHD testing was whack\".  This provider explained why she will not receive Adderall at this time.  She was prescribed her medications again and encouraged to take them as ordered.  She states that she did get her mirtazapine refilled and has been sleeping a little bit better since that was refilled.  She denies suicidal and homicidal ideation.  Denies auditory and visual hallucinations.  She denies depression, and rates her anxiety a 4 out of 10.  We will follow-up in 1 month.    She denies any side effects from current psychiatric medications.    PLAN:  Continue depakote 750mg PO HS  Continue hydroxyzine 25mg PO Q6 PRN  Continue mirtazapine 7.5mg PO HS  Follow up in one month  Follow up with therapy  The patient will call this provider sooner if concerns or issues arise prior to scheduled appointment.    Aware of 24 hour and weekend coverage for urgent situations accessed by calling [facility] main practice number  Continue psychotherapy with therapist  Medication management every 1 month  Aware of need to follow up with family physician for medical issues    Diagnoses and all orders for this visit:    Bipolar 1 disorder with moderate saul (HCC)  -     mirtazapine (REMERON) 7.5 MG tablet; Take 1 tablet (7.5 mg total) by mouth daily at bedtime  -     hydrOXYzine HCL (ATARAX) 25 mg tablet; Take 1 tablet (25 mg total) by mouth every 6 (six) hours as needed for anxiety  -     divalproex sodium (Depakote ER) 250 mg 24 hr tablet; Take 3 tablets (750 mg total) by mouth daily at bedtime    Bipolar II disorder (HCC)      Assessment & Plan  Bipolar 1 disorder with moderate saul (HCC)  Continue depakote 750mg PO HS  Continue hydroxyzine 25mg PO Q6 PRN  Continue mirtazapine 7.5mg PO " HS  Orders:  •  mirtazapine (REMERON) 7.5 MG tablet; Take 1 tablet (7.5 mg total) by mouth daily at bedtime  •  hydrOXYzine HCL (ATARAX) 25 mg tablet; Take 1 tablet (25 mg total) by mouth every 6 (six) hours as needed for anxiety  •  divalproex sodium (Depakote ER) 250 mg 24 hr tablet; Take 3 tablets (750 mg total) by mouth daily at bedtime    .    Current Outpatient Medications on File Prior to Visit   Medication Sig Dispense Refill   • norgestimate-ethinyl estradiol (Sprintec 28) 0.25-35 MG-MCG per tablet Take 1 tablet by mouth daily 84 tablet 1   • ondansetron (ZOFRAN) 4 mg tablet Take 1 tablet (4 mg total) by mouth every 8 (eight) hours as needed for nausea or vomiting 20 tablet 0   • pantoprazole (PROTONIX) 40 mg tablet Take 1 tablet (40 mg total) by mouth daily before breakfast 30 tablet 1   • phenazopyridine (PYRIDIUM) 200 mg tablet Take 1 tablet (200 mg total) by mouth 3 (three) times a day (Patient not taking: Reported on 12/20/2024) 6 tablet 0   • [DISCONTINUED] divalproex sodium (Depakote ER) 250 mg 24 hr tablet Take 3 tablets (750 mg total) by mouth daily at bedtime 90 tablet 2   • [DISCONTINUED] FLUoxetine (PROzac) 10 mg capsule Take 1 capsule (10 mg total) by mouth daily 30 capsule 2   • [DISCONTINUED] hydrOXYzine HCL (ATARAX) 25 mg tablet Take 1 tablet (25 mg total) by mouth every 6 (six) hours as needed for anxiety 90 tablet 2   • [DISCONTINUED] mirtazapine (REMERON) 7.5 MG tablet Take 1 tablet (7.5 mg total) by mouth daily at bedtime 30 tablet 0     No current facility-administered medications on file prior to visit.       Psychotherapy Provided:     Individual psychotherapy provided: Yes  Supportive counseling provided.  Medication changes discussed with Janell.  Medication education provided to Janell.  Discussed with Janell coping with  medication noncompliance .   Coping strategies reviewed with Janell.   Importance of medication and treatment compliance reviewed with Janell.  Importance of  follow up with family physician for medical issues reviewed with Janell.  Reassurance and supportive therapy provided.   Crisis/safety plan discussed with Janell. Patient will call prior to scheduled appointment if they have any issues or concerns.  Patient understands they can access the office by calling the main number at any time if they are in crisis.  They also understand they can call their [facility]'s crisis number or go to their nearest ED if suicidal ideation increases or if they develop a plan or intent.       HPI ROS Appetite Changes and Sleep:     She reports normal sleep, adequate appetite, increased energy. Denies homicidal ideation, denies suicidal ideation    Review Of Systems:      HPI ROS:               Medication Side Effects: denies    Depression (10 worst): 0/10 1/10   Anxiety (10 worst): 4/10 2/10   Safety concerns (SI, HI, etc): denies denies   Sleep: good good   Energy: increased good   Appetite: adequate good     General normal    Personality no change in personality   Constitutional as noted in HPI   ENT negative   Cardiovascular negative   Respiratory negative   Gastrointestinal negative   Genitourinary negative   Musculoskeletal negative   Integumentary negative   Neurological negative   Endocrine negative   Other Symptoms none, all other systems are negative     Mental Status Evaluation:    Appearance Appropriately dressed and Good eye contact   Behavior calm and cooperative   Mood irritable and euphoric/elevated  Depression Scale - 0 of 10 (0 = No depression)  Anxiety Scale - 4 of 10 (0 = No anxiety)   Speech Loud and Rapid   Affect labile   Thought Processes Goal directed and coherent   Thought Content Does not verbalize delusional material   Associations Tightly connected   Perceptual Disturbances Denies hallucinations and does not appear to be responding to internal stimuli   Risk Potential Suicidal/Homicidal Ideation - No evidence of suicidal or homicidal ideation and patient does not  verbalize suicidal or homicidal ideation  Risk of Violence - No evidence of risk for violence found on assessment  Risk of Self Mutilation - No evidence of risk for self mutilation found on assessment   Orientation oriented to person, place, time/date, and situation   Memory recent and remote memory grossly intact   Consciousness alert and awake   Attention/Concentration attention span and concentration appear shorter than expected for age   Insight limited   Judgement limited   Muscle Strength and Gait normal muscle strength and normal muscle tone, normal gait/station and normal balance   Motor Activity unable to assess today due to virtual visit   Language no difficulty naming common objects, no difficulty repeating a phrase, no difficulty writing a sentence   Fund of Knowledge adequate knowledge of current events  adequate fund of knowledge regarding past history  adequate fund of knowledge regarding vocabulary      Past Psychiatric History Update:     Inpatient Psychiatric Admission Since Last Encounter:   no  Changes to Outpatient Psychiatric Treatment Team:    no  Suicide Attempt Or Self Mutilation Since Last Encounter:   no  Incidence of Violent Behavior Since Last Encounter:   no    Traumatic History Update:     New Onset of Abuse Since Last Encounter:   no  Traumatic Events Since Last Encounter:   no    Past Medical History:    Past Medical History:   Diagnosis Date   • Anxiety    • Concussion    • Depression    • Head injury     concussions in high school and in college   • Self-injurious behavior         Past Surgical History:   Procedure Laterality Date   • WISDOM TOOTH EXTRACTION       No Known Allergies  Substance Abuse History:    Social History     Substance and Sexual Activity   Alcohol Use Yes   • Alcohol/week: 3.0 - 4.0 standard drinks of alcohol   • Types: 3 - 4 Shots of liquor per week    Comment: 3-4 drinks, 2 times per week     Social History     Substance and Sexual Activity   Drug Use No      Social History:    Social History     Socioeconomic History   • Marital status: Single     Spouse name: Not on file   • Number of children: 0   • Years of education: senior in college currently   • Highest education level: High school graduate   Occupational History   • 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   Tobacco Use   • Smoking status: Never   • Smokeless tobacco: Never   Vaping Use   • Vaping status: Never Used   Substance and Sexual Activity   • Alcohol use: Yes     Alcohol/week: 3.0 - 4.0 standard drinks of alcohol     Types: 3 - 4 Shots of liquor per week     Comment: 3-4 drinks, 2 times per week   • Drug use: No   • Sexual activity: Yes     Partners: Male     Birth control/protection: Condom Male   Other Topics Concern   • Not on file   Social History Narrative   • Not on file     Social Drivers of Health     Financial Resource Strain: Not on file   Food Insecurity: Not on file   Transportation Needs: Not on file   Physical Activity: Not on file   Stress: Not on file   Social Connections: Unknown (6/18/2024)    Received from [facility]    • How often do you feel lonely or isolated from those around you? (Adult - for ages 18 years and over): Not on file   Intimate Partner Violence: Not on file   Housing Stability: Not on file     Family Psychiatric History:     Family History   Problem Relation Age of Onset   • Hypertension Mother    • Mental illness Mother    • Depression Mother    • Mental illness Sister    • Coronary artery disease Maternal Grandmother    • Throat cancer Paternal Grandfather      History Review:The following portions of the patient's history were reviewed and updated as appropriate: allergies, current medications, past family history, past medical history, past social history, past surgical history, and problem list     OBJECTIVE:     Vital signs in last 24 hours:    There were no vitals filed for this visit.    Laboratory Results: Recent Labs (last 2  months):   No visits with results within 2 Month(s) from this visit.   Latest known visit with results is:   Admission on 2024, Discharged on 2024   Component Date Value   • Color, UA 2024 Yellow    • Clarity, UA 2024 Cloudy    • Specific Gravity, UA 2024 >=1.030    • pH, UA 2024 6.0    • Leukocytes, UA 2024 Small (A)    • Nitrite, UA 2024 Negative    • Protein, UA 2024 100 (2+) (A)    • Glucose, UA 2024 Negative    • Ketones, UA 2024 Negative    • Urobilinogen, UA 2024 2.0 (A)    • Bilirubin, UA 2024 Small (A)    • Occult Blood, UA 2024 Large (A)    • EXT Preg Test, Ur 2024 Negative    • Control 2024 Valid    • N gonorrhoeae, DNA Probe 2024 Negative    • Chlamydia trachomatis, D* 2024 Negative    • RBC, UA 2024 None Seen    • WBC, UA 2024 0-5    • Epithelial Cells 2024 Occasional    • Bacteria, UA 2024 Occasional    • MUCUS THREADS 2024 Occasional (A)      I have personally reviewed all pertinent laboratory/tests results.    Suicide/Homicide Risk Assessment:    Risk of Harm to Self:  The following ratings are based on assessment at the time of the interview  Recent Specific Risk Factors include: current anxiety symptoms  Demographic risk factors include: , age: young adult (15-24)  Historical Risk Factors include: chronic depressive symptoms, chronic anxiety symptoms, chronic mood disorder, history of suicidal behaviors, history of self-abusive behavior, history of substance use, history of abuse, history of impulsive behaviors, history of traumatic experiences, a relative or close friend who  by suicide  Protective Factors: no current suicidal ideation, access to mental health treatment, compliant with medications, compliant with mental health treatment, having a desire to be alive, stable living environment, stable job, sense of determination, strong relationships,  supportive family  Weapons: none. The following steps have been taken to ensure weapons are properly secured: not applicable  Based on today's assessment, Janell presents the following risk of harm to self: none    Risk of Harm to Others:  The following ratings are based on assessment at the time of the interview  Protective Factors: no current homicidal ideation  Based on today's assessment, Janell presents the following risk of harm to others: none    The following interventions are recommended: contracts for safety at present - agrees to go to ED if feeling unsafe, return in 1 month for reassessment, therapy appointment in 4 days, contracts for safety at present - agrees to call Crisis Intervention Service if feeling unsafe    Medications Risks/Benefits:      Risks, Benefits And Possible Side Effects Of Medications:    Discussed risks and benefits of treatment with patient including risk of suicidality, serotonin syndrome, increased QTc interval and SIADH related to treatment with antidepressants; Risk of induction of manic symptoms in certain patient populations and risk of liver impairment related to treatment with Depakote     Controlled Medication Discussion:     Not applicable    Treatment Plan:    Due for update/Updated:   no  Last treatment plan done 8/15/24 by Hillary Olmedo.  Treatment Plan due on 2/15/25.    ANGELA Vaughn 12/20/24    This note was not shared with the patient due to this is a psychotherapy note

## 2024-12-24 ENCOUNTER — TELEMEDICINE (OUTPATIENT)
Dept: BEHAVIORAL/MENTAL HEALTH CLINIC | Facility: CLINIC | Age: 24
End: 2024-12-24
Payer: COMMERCIAL

## 2024-12-24 DIAGNOSIS — F31.4 BIPOLAR I, MOST RECENT EPISODE DEPRESSED, SEVERE (HCC): ICD-10-CM

## 2024-12-24 DIAGNOSIS — F60.3 BORDERLINE PERSONALITY DISORDER (HCC): ICD-10-CM

## 2024-12-24 DIAGNOSIS — F42.9 OBSESSIVE-COMPULSIVE DISORDER, UNSPECIFIED TYPE: ICD-10-CM

## 2024-12-24 DIAGNOSIS — F42.2 MIXED OBSESSIONAL THOUGHTS AND ACTS: ICD-10-CM

## 2024-12-24 DIAGNOSIS — F43.10 POST TRAUMATIC STRESS DISORDER (PTSD): Primary | ICD-10-CM

## 2024-12-24 PROCEDURE — 90837 PSYTX W PT 60 MINUTES: CPT

## 2024-12-24 NOTE — PSYCH
Virtual Regular Visit    Verification of patient location:    Patient is located at Home in the following state in which I hold an active license [place]      Assessment/Plan:    Problem List Items Addressed This Visit       Post traumatic stress disorder (PTSD) - Primary    Bipolar I, most recent episode depressed, severe (HCC)    Mixed obsessional thoughts and acts    Borderline personality disorder (HCC)    Obsessive-compulsive disorder       Goals addressed in session: Goal 1     Depression Follow-up Plan Completed: Not applicable    Reason for visit is   Chief Complaint   Patient presents with    Virtual Regular Visit          Encounter provider Michela Bates LCSW      Recent Visits  No visits were found meeting these conditions.  Showing recent visits within past 7 days and meeting all other requirements  Today's Visits  Date Type Provider Dept   12/24/24 Telemedicine Michela Bates LCSW [facility]rap[facility] [place]   Showing today's visits and meeting all other requirements  Future Appointments  No visits were found meeting these conditions.  Showing future appointments within next 150 days and meeting all other requirements       The patient was identified by name and date of birth. Janell Solorzano was informed that this is a telemedicine visit and that the visit is being conducted throughthe Epic Embedded platform. She agrees to proceed..  My office door was closed. No one else was in the room.  She acknowledged consent and understanding of privacy and security of the video platform. The patient has agreed to participate and understands they can discontinue the visit at any time.    Patient is aware this is a billable service.     Subjective  Janell Solorzano is a 24 y.o. female . .      HPI     Past Medical History:   Diagnosis Date    Anxiety     Concussion     Depression     Head injury     concussions in high school and in college    Self-injurious behavior        Past Surgical History:   Procedure  Laterality Date    WISDOM TOOTH EXTRACTION         Current Outpatient Medications   Medication Sig Dispense Refill    divalproex sodium (Depakote ER) 250 mg 24 hr tablet Take 3 tablets (750 mg total) by mouth daily at bedtime 90 tablet 2    hydrOXYzine HCL (ATARAX) 25 mg tablet Take 1 tablet (25 mg total) by mouth every 6 (six) hours as needed for anxiety 90 tablet 2    mirtazapine (REMERON) 7.5 MG tablet Take 1 tablet (7.5 mg total) by mouth daily at bedtime 30 tablet 0    norgestimate-ethinyl estradiol (Sprintec 28) 0.25-35 MG-MCG per tablet Take 1 tablet by mouth daily 84 tablet 1    ondansetron (ZOFRAN) 4 mg tablet Take 1 tablet (4 mg total) by mouth every 8 (eight) hours as needed for nausea or vomiting 20 tablet 0    pantoprazole (PROTONIX) 40 mg tablet Take 1 tablet (40 mg total) by mouth daily before breakfast 30 tablet 1    phenazopyridine (PYRIDIUM) 200 mg tablet Take 1 tablet (200 mg total) by mouth 3 (three) times a day (Patient not taking: Reported on 12/20/2024) 6 tablet 0     No current facility-administered medications for this visit.        No Known Allergies    Review of Systems    Video Exam    There were no vitals filed for this visit.    Physical Exam     Behavioral Health Psychotherapy Progress Note    Psychotherapy Provided: Individual Psychotherapy     1. Post traumatic stress disorder (PTSD)        2. Mixed obsessional thoughts and acts        3. Bipolar I, most recent episode depressed, severe (HCC)        4. Borderline personality disorder (HCC)        5. Obsessive-compulsive disorder, unspecified type            Goals addressed in session: Goal 1     DATA: addressed continued feelings since ending recent partner relationship. Addressed feelings of sadness, loss, and anger. We addressed toxicity in this relationship and in previous ones, differences and similarities. We addressed potential group support opportunities and linkage if so. Addressed factors limiting healing process. Addressed  Lisa's self-talk and attention to self-care to help in the process (she stated she has not been going to the gym and realizes she needs to resume). We addressed medication issues due to insurance coverage. She stated recognition that medications help her (though she couldn't specify or articulate how) and glad she has insurance again and will resume today/tomorrow. She stated no changes in the work situation and that she continues to maintain her work involvement. We addressed Lisa's awareness of how she needs to make changes and how she continues to struggle in so doing.  We discussed the notion of how changes can  only happen if she makes changes. we explored. We addressed ways for Lisa to continue and maintain identified positives in her life as she continues to work through these struggles.  With questioning Lisa denied thoughts or intent of self-harm.  She discussed support from friends.  We addressed her plans to go to her sister's place for Neurodyn. We confirmed next session in one week.     During this session, this clinician used the following therapeutic modalities: Cognitive Behavioral Therapy and Supportive Psychotherapy    Substance Abuse was addressed during this session. If the client is diagnosed with a co-occurring substance use disorder, please indicate any changes in the frequency or amount of use: Lisa acknowledged going out and getting drunk to help deal with her feelings. Stage of change for addressing substance use diagnoses: Contemplation; Lisa has verbalized awareness that 'getting drunk' is not the healthiest of options    ASSESSMENT:  lisa Solorzano presents with a Euthymic/ normal and Dysthymic mood.     her affect is Normal range and intensity, which is congruent, with her mood and the content of the session. The client has made progress on their goals.    Lisa was able to verbalize thoughts and feelings ; she remains stuck but acknowledges need to take steps on her behalf  "lisa Solorzano presents with a low risk of suicide, low risk of self-harm, and low risk of harm to others.    For any risk assessment that surpasses a \"low\" rating, a safety plan must be developed.    A safety plan was indicated: no  If yes, describe in detail n/a    PLAN: Between sessions, lisa Solorzano will continue attention to self-care. At the next session, the therapist will use Cognitive Behavioral Therapy and Supportive Psychotherapy to address continued  mood and behavioral regulation.    Behavioral Health Treatment Plan and Discharge Planning: lisa Solorzano is aware of and agrees to continue to work on their treatment plan. They have identified and are working toward their discharge goals. yes    Depression Follow-up Plan Completed: Not applicable    Visit start and stop times:    12/24/24  Start Time: 0900  Stop Time: 0956  Total Visit Time: 56 minutes    "

## 2024-12-31 ENCOUNTER — TELEMEDICINE (OUTPATIENT)
Dept: BEHAVIORAL/MENTAL HEALTH CLINIC | Facility: CLINIC | Age: 24
End: 2024-12-31
Payer: COMMERCIAL

## 2024-12-31 DIAGNOSIS — F31.4 BIPOLAR I, MOST RECENT EPISODE DEPRESSED, SEVERE (HCC): ICD-10-CM

## 2024-12-31 DIAGNOSIS — F60.3 BORDERLINE PERSONALITY DISORDER (HCC): ICD-10-CM

## 2024-12-31 DIAGNOSIS — F43.10 POST TRAUMATIC STRESS DISORDER (PTSD): Primary | ICD-10-CM

## 2024-12-31 DIAGNOSIS — F42.9 OBSESSIVE-COMPULSIVE DISORDER, UNSPECIFIED TYPE: ICD-10-CM

## 2024-12-31 PROCEDURE — 90837 PSYTX W PT 60 MINUTES: CPT
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
## 2025-01-06 ENCOUNTER — OFFICE VISIT (OUTPATIENT)
Dept: FAMILY MEDICINE CLINIC | Facility: CLINIC | Age: 25
End: 2025-01-06
Payer: COMMERCIAL

## 2025-01-06 VITALS
DIASTOLIC BLOOD PRESSURE: 80 MMHG | HEART RATE: 95 BPM | BODY MASS INDEX: 22.82 KG/M2 | OXYGEN SATURATION: 99 % | HEIGHT: 63 IN | WEIGHT: 128.8 LBS | SYSTOLIC BLOOD PRESSURE: 120 MMHG

## 2025-01-06 DIAGNOSIS — R30.0 DYSURIA: ICD-10-CM

## 2025-01-06 DIAGNOSIS — N30.00 ACUTE CYSTITIS WITHOUT HEMATURIA: ICD-10-CM

## 2025-01-06 DIAGNOSIS — N89.8 VAGINAL DISCHARGE: ICD-10-CM

## 2025-01-06 DIAGNOSIS — L70.0 ACNE VULGARIS: Primary | ICD-10-CM

## 2025-01-06 DIAGNOSIS — F31.4 BIPOLAR I, MOST RECENT EPISODE DEPRESSED, SEVERE (HCC): ICD-10-CM

## 2025-01-06 LAB
SL AMB  POCT GLUCOSE, UA: ABNORMAL
SL AMB LEUKOCYTE ESTERASE,UA: 500
SL AMB POCT BILIRUBIN,UA: ABNORMAL
SL AMB POCT BLOOD,UA: 80
SL AMB POCT CLARITY,UA: ABNORMAL
SL AMB POCT COLOR,UA: YELLOW
SL AMB POCT KETONES,UA: ABNORMAL
SL AMB POCT NITRITE,UA: ABNORMAL
SL AMB POCT PH,UA: 6.5
SL AMB POCT SPECIFIC GRAVITY,UA: 1.01
SL AMB POCT URINE PROTEIN: 0.15
SL AMB POCT UROBILINOGEN: ABNORMAL

## 2025-01-06 PROCEDURE — 99214 OFFICE O/P EST MOD 30 MIN: CPT | Performed by: NURSE PRACTITIONER

## 2025-01-06 PROCEDURE — 87086 URINE CULTURE/COLONY COUNT: CPT | Performed by: NURSE PRACTITIONER

## 2025-01-06 PROCEDURE — 87491 CHLMYD TRACH DNA AMP PROBE: CPT | Performed by: NURSE PRACTITIONER

## 2025-01-06 PROCEDURE — 81002 URINALYSIS NONAUTO W/O SCOPE: CPT | Performed by: NURSE PRACTITIONER

## 2025-01-06 PROCEDURE — 87077 CULTURE AEROBIC IDENTIFY: CPT | Performed by: NURSE PRACTITIONER

## 2025-01-06 PROCEDURE — 87186 SC STD MICRODIL/AGAR DIL: CPT | Performed by: NURSE PRACTITIONER

## 2025-01-06 PROCEDURE — 81514 NFCT DS BV&VAGINITIS DNA ALG: CPT | Performed by: NURSE PRACTITIONER

## 2025-01-06 PROCEDURE — 87591 N.GONORRHOEAE DNA AMP PROB: CPT | Performed by: NURSE PRACTITIONER

## 2025-01-06 RX ORDER — CLINDAMYCIN AND BENZOYL PEROXIDE 10; 50 MG/G; MG/G
GEL TOPICAL 2 TIMES DAILY
Qty: 35 G | Refills: 1 | Status: SHIPPED | OUTPATIENT
Start: 2025-01-06 | End: 2025-01-14

## 2025-01-06 RX ORDER — NITROFURANTOIN 25; 75 MG/1; MG/1
100 CAPSULE ORAL 2 TIMES DAILY
Qty: 10 CAPSULE | Refills: 0 | Status: SHIPPED | OUTPATIENT
Start: 2025-01-06 | End: 2025-01-09

## 2025-01-06 NOTE — PROGRESS NOTES
"Name: Janell Solorzano      : 2000      MRN: [id number]  Encounter Provider: ANGELA Arthur  Encounter Date: 2025   Encounter department: [facility] [address]  :  Assessment & Plan  Acne vulgaris  Given patient BenzaClin.  Orders:    clindamycin-benzoyl peroxide (BENZACLIN) gel; Apply topically 2 (two) times a day    Bipolar I, most recent episode depressed, severe (HCC)  Continue care with psychiatry.         Acute cystitis without hematuria  Will start on Macrobid.  Send out culture.  Orders:    nitrofurantoin (MACROBID) 100 mg capsule; Take 1 capsule (100 mg total) by mouth 2 (two) times a day for 5 days    Dysuria  Send chlamydia gonorrhea testing, molecular vaginal panel.  Orders:    Molecular Vaginal Panel; Future    Chlamydia/GC amplified DNA by PCR; Future    Vaginal discharge    Orders:    Molecular Vaginal Panel; Future    Chlamydia/GC amplified DNA by PCR; Future           History of Present Illness     Patient presents with vaginal discharge, dysuria, frequency urgency.       Review of Systems   Constitutional:  Negative for chills and fever.   HENT:  Negative for ear pain and sore throat.    Eyes:  Negative for pain and visual disturbance.   Respiratory:  Negative for cough and shortness of breath.    Cardiovascular:  Negative for chest pain and palpitations.   Gastrointestinal:  Negative for abdominal pain and vomiting.   Genitourinary:  Positive for dysuria, frequency, urgency, vaginal discharge and vaginal pain. Negative for hematuria.   Musculoskeletal:  Negative for arthralgias and back pain.   Skin:  Negative for color change and rash.   Neurological:  Negative for seizures and syncope.   All other systems reviewed and are negative.      Objective   /80 (BP Location: Left arm, Patient Position: Sitting, Cuff Size: Standard)   Pulse 95   Ht 5' 3\" (1.6 m)   Wt 58.4 kg (128 lb 12.8 oz)   SpO2 99%   BMI 22.82 kg/m²      Physical " Exam  Vitals and nursing note reviewed.   Constitutional:       General: She is not in acute distress.     Appearance: She is well-developed.   HENT:      Head: Normocephalic and atraumatic.   Eyes:      Conjunctiva/sclera: Conjunctivae normal.   Cardiovascular:      Rate and Rhythm: Normal rate and regular rhythm.      Heart sounds: No murmur heard.  Pulmonary:      Effort: Pulmonary effort is normal. No respiratory distress.      Breath sounds: Normal breath sounds.   Genitourinary:     Vagina: Vaginal discharge present.   Musculoskeletal:         General: No swelling.      Cervical back: Neck supple.   Skin:     General: Skin is warm and dry.      Capillary Refill: Capillary refill takes less than 2 seconds.   Neurological:      Mental Status: She is alert.   Psychiatric:         Mood and Affect: Mood normal.

## 2025-01-08 ENCOUNTER — RESULTS FOLLOW-UP (OUTPATIENT)
Dept: FAMILY MEDICINE CLINIC | Facility: CLINIC | Age: 25
End: 2025-01-08

## 2025-01-08 LAB
BACTERIA UR CULT: ABNORMAL
BACTERIA UR CULT: ABNORMAL

## 2025-01-09 ENCOUNTER — TELEMEDICINE (OUTPATIENT)
Dept: BEHAVIORAL/MENTAL HEALTH CLINIC | Facility: CLINIC | Age: 25
End: 2025-01-09
Payer: COMMERCIAL

## 2025-01-09 DIAGNOSIS — F60.3 BORDERLINE PERSONALITY DISORDER (HCC): ICD-10-CM

## 2025-01-09 DIAGNOSIS — B37.31 YEAST VAGINITIS: ICD-10-CM

## 2025-01-09 DIAGNOSIS — N30.00 ACUTE CYSTITIS WITHOUT HEMATURIA: Primary | ICD-10-CM

## 2025-01-09 DIAGNOSIS — F31.4 BIPOLAR I, MOST RECENT EPISODE DEPRESSED, SEVERE (HCC): ICD-10-CM

## 2025-01-09 DIAGNOSIS — F43.10 POST TRAUMATIC STRESS DISORDER (PTSD): Primary | ICD-10-CM

## 2025-01-09 DIAGNOSIS — F42.2 MIXED OBSESSIONAL THOUGHTS AND ACTS: ICD-10-CM

## 2025-01-09 DIAGNOSIS — F42.9 OBSESSIVE-COMPULSIVE DISORDER, UNSPECIFIED TYPE: ICD-10-CM

## 2025-01-09 PROCEDURE — 90837 PSYTX W PT 60 MINUTES: CPT

## 2025-01-09 RX ORDER — FLUCONAZOLE 150 MG/1
TABLET ORAL
Qty: 2 TABLET | Refills: 0 | Status: SHIPPED | OUTPATIENT
Start: 2025-01-09 | End: 2025-01-12

## 2025-01-09 RX ORDER — SULFAMETHOXAZOLE AND TRIMETHOPRIM 800; 160 MG/1; MG/1
1 TABLET ORAL EVERY 12 HOURS SCHEDULED
Qty: 10 TABLET | Refills: 0 | Status: SHIPPED | OUTPATIENT
Start: 2025-01-09 | End: 2025-01-14

## 2025-01-09 NOTE — PSYCH
Virtual Regular Visit    Verification of patient location:    Patient is located at Home in the following state in which I hold an active license [place]      Assessment/Plan:    Problem List Items Addressed This Visit       Post traumatic stress disorder (PTSD) - Primary    Bipolar I, most recent episode depressed, severe (HCC)    Mixed obsessional thoughts and acts    Borderline personality disorder (HCC)    Obsessive-compulsive disorder       Goals addressed in session: Goal 1     Depression Follow-up Plan Completed: Yes    Reason for visit is   Chief Complaint   Patient presents with    Virtual Regular Visit          Encounter provider Michela Bates LCSW      Recent Visits  Date Type Provider Dept   01/06/25 Office Visit ANGELA Arthur [facility] [address]   Showing recent visits within past 7 days and meeting all other requirements  Today's Visits  Date Type Provider Dept   01/09/25 Telemedicine Michela Bates LCSW [facility]rap[facility] [place]   Showing today's visits and meeting all other requirements  Future Appointments  No visits were found meeting these conditions.  Showing future appointments within next 150 days and meeting all other requirements       The patient was identified by name and date of birth. Janell Solorzano was informed that this is a telemedicine visit and that the visit is being conducted throughthe Epic Embedded platform. She agrees to proceed..  My office door was closed. No one else was in the room.  She acknowledged consent and understanding of privacy and security of the video platform. The patient has agreed to participate and understands they can discontinue the visit at any time.    Patient is aware this is a billable service.     Subjective  Janell Solorzano is a 24 y.o. female . .      HPI     Past Medical History:   Diagnosis Date    Anxiety     Concussion     Depression     Head injury     concussions in high school and in college    Self-injurious behavior         Past Surgical History:   Procedure Laterality Date    WISDOM TOOTH EXTRACTION         Current Outpatient Medications   Medication Sig Dispense Refill    clindamycin-benzoyl peroxide (BENZACLIN) gel Apply topically 2 (two) times a day 35 g 1    divalproex sodium (Depakote ER) 250 mg 24 hr tablet Take 3 tablets (750 mg total) by mouth daily at bedtime 90 tablet 2    fluconazole (DIFLUCAN) 150 mg tablet Take 1 dose today and repeat in 3 days if still symptomatic. 2 tablet 0    hydrOXYzine HCL (ATARAX) 25 mg tablet Take 1 tablet (25 mg total) by mouth every 6 (six) hours as needed for anxiety 90 tablet 2    mirtazapine (REMERON) 7.5 MG tablet Take 1 tablet (7.5 mg total) by mouth daily at bedtime 30 tablet 0    norgestimate-ethinyl estradiol (Sprintec 28) 0.25-35 MG-MCG per tablet Take 1 tablet by mouth daily 84 tablet 1    ondansetron (ZOFRAN) 4 mg tablet Take 1 tablet (4 mg total) by mouth every 8 (eight) hours as needed for nausea or vomiting 20 tablet 0    pantoprazole (PROTONIX) 40 mg tablet Take 1 tablet (40 mg total) by mouth daily before breakfast 30 tablet 1    phenazopyridine (PYRIDIUM) 200 mg tablet Take 1 tablet (200 mg total) by mouth 3 (three) times a day (Patient not taking: Reported on 1/6/2025) 6 tablet 0    sulfamethoxazole-trimethoprim (BACTRIM DS) 800-160 mg per tablet Take 1 tablet by mouth every 12 (twelve) hours for 5 days 10 tablet 0     No current facility-administered medications for this visit.        No Known Allergies    Review of Systems    Video Exam    There were no vitals filed for this visit.    Physical Exam     Behavioral Health Psychotherapy Progress Note    Psychotherapy Provided: Individual Psychotherapy     1. Post traumatic stress disorder (PTSD)        2. Bipolar I, most recent episode depressed, severe (HCC)        3. Mixed obsessional thoughts and acts        4. Borderline personality disorder (HCC)        5. Obsessive-compulsive disorder, unspecified type             Goals addressed in session: Goal 1     DATA: Janell discussed going through the holidays and working.  She discussed her desire to get admitted for and we talked a bit about the positives and negatives and comparisons to prior work experience.  She stated not feeling depressed but realizing she is not fully attending to her self-care.  She noted less frequent showering and not going to the gym.  She stated that she is not feeling depressed or having eating habit issues, which would indicate for her a depressive episode.  She discussed goals for the new year, continuing with healthy eating and returning to the gym.  She discussed the past relationship, and through explorative and reflective discussion , we addressed ways she is moving forward slowly.  We validated her experience and acknowledged toxicity. We addressed how Janell is demonstrating ability to attend other aspects of her life, and how she is aware of the need for boundary setting for her own mental health.  We addressed ways she is able to look back and recognize red flags, and we discussed the importance of attending to them (she acknowledged difficulty listening to those flags).  We addressed the ways Janell is acknowledging choice in her life. We addressed the importance of taking time to pause and think to help with sound decision-making, and recognizing that there are options; things don't have to be reactive.   We reviewed today's discussion, noting progress for Janell in refocusing on her own needs and her healing from this relationship.  We confirmed appointment in 1 week.     During this session, this clinician used the following therapeutic modalities: Cognitive Behavioral Therapy and Supportive Psychotherapy    Substance Abuse was not addressed during this session. If the client is diagnosed with a co-occurring substance use disorder, please indicate any changes in the frequency or amount of use: n/a. Stage of change for addressing  "substance use diagnoses: No substance use/Not applicable    ASSESSMENT:  lisa Solorzano presents with a Euthymic/ normal, Anxious, and Depressed mood.     her affect is Normal range and intensity, which is congruent, with her mood and the content of the session. The client has made progress on their goals.    Lisa presented as more forward thinking, less pressured, and more thoughtful and calm lisa Solorzano presents with a low risk of suicide, low risk of self-harm, and low risk of harm to others. No SI/HI.    For any risk assessment that surpasses a \"low\" rating, a safety plan must be developed.    A safety plan was indicated: no  If yes, describe in detail n/a - updating due in upcoming visits    PLAN: Between sessions, lisa Solorzano will continue attention to self-care. At the next session, the therapist will use Cognitive Behavioral Therapy and Supportive Psychotherapy to address continued attention to self-care.    Behavioral Health Treatment Plan and Discharge Planning: lisa Solorzano is aware of and agrees to continue to work on their treatment plan. They have identified and are working toward their discharge goals. yes    Depression Follow-up Plan Completed: Not applicable    Visit start and stop times:    01/09/25  Start Time: 0904  Stop Time: 0959  Total Visit Time: 55 minutes      "

## 2025-01-14 DIAGNOSIS — L70.0 ACNE VULGARIS: Primary | ICD-10-CM

## 2025-01-14 RX ORDER — ADAPALENE AND BENZOYL PEROXIDE GEL, 0.1%/2.5% 1; 25 MG/G; MG/G
1 GEL TOPICAL
Qty: 45 G | Refills: 0 | Status: SHIPPED | OUTPATIENT
Start: 2025-01-14

## 2025-01-15 ENCOUNTER — TELEPHONE (OUTPATIENT)
Age: 25
End: 2025-01-15

## 2025-01-15 ENCOUNTER — TELEMEDICINE (OUTPATIENT)
Dept: BEHAVIORAL/MENTAL HEALTH CLINIC | Facility: CLINIC | Age: 25
End: 2025-01-15
Payer: COMMERCIAL

## 2025-01-15 DIAGNOSIS — F31.4 BIPOLAR I, MOST RECENT EPISODE DEPRESSED, SEVERE (HCC): ICD-10-CM

## 2025-01-15 DIAGNOSIS — F60.3 BORDERLINE PERSONALITY DISORDER (HCC): ICD-10-CM

## 2025-01-15 DIAGNOSIS — F42.2 MIXED OBSESSIONAL THOUGHTS AND ACTS: ICD-10-CM

## 2025-01-15 DIAGNOSIS — F42.9 OBSESSIVE-COMPULSIVE DISORDER, UNSPECIFIED TYPE: ICD-10-CM

## 2025-01-15 DIAGNOSIS — F43.10 POST TRAUMATIC STRESS DISORDER (PTSD): Primary | ICD-10-CM

## 2025-01-15 PROCEDURE — 90837 PSYTX W PT 60 MINUTES: CPT

## 2025-01-15 NOTE — BH CRISIS PLAN
Client Name: lisa Solorzano       Client YOB: 2000    JovanniEdmund Safety Plan      Creation Date: 1/23/24 Update Date: 1/15/25   Created By: ANGELA Vaughn Last Updated By: Michela Bates LCSW      Step 1: Warning Signs:   Warning Signs   Isolate, stop talking to people   update: stop doing stuff            Step 2: Internal Coping Strategies:   Internal Coping Strategies   The gym   update: journaling            Step 3: People and social settings that provide distraction:   Name Contact Information   grandparents' house in phone   update: friend Shelton Garces Tom, Abby     Places   update: [facility]           Step 4: People whom I can ask for help during a crisis:      Name Contact Information    Yudith, friend in phone    update: friend Shelton Garces Tom, Abby       Step 5: Professionals or agencies I can contact during a crisis:      Clinican/Agency Name Phone Emergency Contact    [facility]  ANGELA Yin LCSW in phone     Seema Wilhelm PCP in [facility] Emergency Department Emergency Department Phone Emergency Department Address    [facility]      [id number]          Crisis Phone Numbers:   Suicide Prevention Lifeline: Call or Text  [address] Crisis Text Line: Text HOME to 075-491   Please note: Some [facility] do not have a separate number for Child/Adolescent specific crisis. If your county is not listed under Child/Adolescent, please call the adult number for your county      Adult Crisis Numbers: Child/Adolescent Crisis Numbers   [facility]: [phone] [facility]: [phone]   [facility]: [phone] [facility]: [phone]   [facility]: [phone] [place]: [phone]   [facility]: [phone] [place] [facility]: [phone]   [place] [facility]: [phone]   [facility]: [phone]   [facility]: [phone]   [facility]: [phone]  "(daytime) [phone] (after hours, weekends, holidays)      Step 6: Making the environment safer (plan for lethal means safety):   Patient did not identify any lethal methods: Yes     Optional: What is most important to me and worth living for?   \"My cat\"     [place]von Safety Plan. Luzma Baig and Jayro Shaffer. Used with permission of the authors.           "

## 2025-01-15 NOTE — TELEPHONE ENCOUNTER
PA for Adapalene-Benzoyl Peroxide 0.1-2.5 % gel SUBMITTED to     via    []CMM-KEY:   [x]Surescripts-  []Availity-Auth ID # NDC #   []Faxed to plan   []Other website   []Phone call Case ID #     [x]PA sent as URGENT    All office notes, labs and other pertaining documents and studies sent. Clinical questions answered. Awaiting determination from insurance company.     Turnaround time for your insurance to make a decision on your Prior Authorization can take 7-21 business days.

## 2025-01-15 NOTE — PSYCH
Virtual Regular Visit    Verification of patient location:    Patient is located at Home in the following state in which I hold an active license [place]      Assessment/Plan:    Problem List Items Addressed This Visit       Post traumatic stress disorder (PTSD) - Primary    Bipolar I, most recent episode depressed, severe (HCC)    Mixed obsessional thoughts and acts    Borderline personality disorder (HCC)    Obsessive-compulsive disorder       Goals addressed in session: Goal 1     Depression Follow-up Plan Completed: Not applicable    Reason for visit is   Chief Complaint   Patient presents with    Virtual Regular Visit          Encounter provider Michela Bates LCSW      Recent Visits  Date Type Provider Dept   01/09/25 Telemedicine Michela Bates LCSW [facility] [place]   Showing recent visits within past 7 days and meeting all other requirements  Today's Visits  Date Type Provider Dept   01/15/25 Telemedicine Michela Bates LCSW [facility] [place]   Showing today's visits and meeting all other requirements  Future Appointments  No visits were found meeting these conditions.  Showing future appointments within next 150 days and meeting all other requirements       The patient was identified by name and date of birth. Janell Solorzano was informed that this is a telemedicine visit and that the visit is being conducted throughthe Epic Embedded platform. She agrees to proceed..  My office door was closed. No one else was in the room.  She acknowledged consent and understanding of privacy and security of the video platform. The patient has agreed to participate and understands they can discontinue the visit at any time.    Patient is aware this is a billable service.     Subjective  Janell Solorzano is a 24 y.o. female . .      HPI     Past Medical History:   Diagnosis Date    Anxiety     Concussion     Depression     Head injury     concussions in high school and in college    Self-injurious  behavior        Past Surgical History:   Procedure Laterality Date    WISDOM TOOTH EXTRACTION         Current Outpatient Medications   Medication Sig Dispense Refill    Adapalene-Benzoyl Peroxide 0.1-2.5 % gel Apply 1 Application topically daily at bedtime 45 g 0    divalproex sodium (Depakote ER) 250 mg 24 hr tablet Take 3 tablets (750 mg total) by mouth daily at bedtime 90 tablet 2    hydrOXYzine HCL (ATARAX) 25 mg tablet Take 1 tablet (25 mg total) by mouth every 6 (six) hours as needed for anxiety 90 tablet 2    mirtazapine (REMERON) 7.5 MG tablet Take 1 tablet (7.5 mg total) by mouth daily at bedtime 30 tablet 0    norgestimate-ethinyl estradiol (Sprintec 28) 0.25-35 MG-MCG per tablet Take 1 tablet by mouth daily 84 tablet 1    ondansetron (ZOFRAN) 4 mg tablet Take 1 tablet (4 mg total) by mouth every 8 (eight) hours as needed for nausea or vomiting 20 tablet 0    pantoprazole (PROTONIX) 40 mg tablet Take 1 tablet (40 mg total) by mouth daily before breakfast 30 tablet 1    phenazopyridine (PYRIDIUM) 200 mg tablet Take 1 tablet (200 mg total) by mouth 3 (three) times a day (Patient not taking: Reported on 1/6/2025) 6 tablet 0     No current facility-administered medications for this visit.        No Known Allergies    Review of Systems    Video Exam    There were no vitals filed for this visit.    Physical Exam     Behavioral Health Psychotherapy Progress Note    Psychotherapy Provided: Individual Psychotherapy     1. Post traumatic stress disorder (PTSD)        2. Mixed obsessional thoughts and acts        3. Borderline personality disorder (HCC)        4. Bipolar I, most recent episode depressed, severe (HCC)        5. Obsessive-compulsive disorder, unspecified type            Goals addressed in session: Goal 1     DATA: Discussed and updated crisis plan originated with Jacque in Jan 2024. Reviewed continuation and some additions, including addition of friends in the past year. Janell confirmed no active  thought of self-harm or suicidality, though acknowledged some passive thoughts at times. She denied recent thoughts.  She noted improvement in showering in the past week, and we noted this as positive. We discussed continued review of treatment goals and updating treatment plan. Lisa stated wanting to continue to work on DBT efforts, as Lisa acknowledged issues with abandonment. She stated being ok on her own, but having issues with others, relationships. She acknowledged abandonment from her mother and father, and denied feeling that anyone has really been there for her. She acknowledged how this plays out in relationships. As Lisa referenced prior advise of tolerating distress, we explored this notion and ways to do so with grounding and self-talk. We explored her feelings at this time. We agreed that this will be an active part of her attention over the next week, dealing with in-the-moment stressful feelings. We processed and provider attempted 1 minute of silence, to where Lisa was on her phone. We agreed to continued discussion, processing, and review at next session.     During this session, this clinician used the following therapeutic modalities: Cognitive Behavioral Therapy, Mindfulness-based Strategies, Motivational Interviewing, Solution-Focused Therapy, and Supportive Psychotherapy    Substance Abuse was not addressed during this session. If the client is diagnosed with a co-occurring substance use disorder, please indicate any changes in the frequency or amount of use: not discussed at this time. Stage of change for addressing substance use diagnoses: Contemplation    ASSESSMENT:  lisa Solorzano presents with a Euthymic/ normal and Dysthymic mood.     her affect is Normal range and intensity, which is congruent, with her mood and the content of the session. The client has made progress on their goals.    Lisa was able to verbalize lisa Solorzano presents with a low risk of suicide, low risk of  "self-harm, and low risk of harm to others. Lisa denied.     For any risk assessment that surpasses a \"low\" rating, a safety plan must be developed.    A safety plan was indicated: no  If yes, describe in detail  - reviewed and updated as per protocol versus due to being indicated    PLAN: Between sessions, lisa Solorzano will practice 'being in the moment'. At the next session, the therapist will use Cognitive Behavioral Therapy, Mindfulness-based Strategies, Motivational Interviewing, Solution-Focused Therapy, and Supportive Psychotherapy to address continued work on emotional regulation.    Behavioral Health Treatment Plan and Discharge Planning: lisa Solorzano is aware of and agrees to continue to work on their treatment plan. They have identified and are working toward their discharge goals. yes    Depression Follow-up Plan Completed: Not applicable    Visit start and stop times:    01/15/25  Start Time: 0900  Stop Time: 0955  Total Visit Time: 55 minutes      "

## 2025-01-16 ENCOUNTER — TELEPHONE (OUTPATIENT)
Age: 25
End: 2025-01-16

## 2025-01-16 NOTE — TELEPHONE ENCOUNTER
[facility] pharmacy called in to verify medication(Adapalene-Benzoyl Peroxide 0.1-2.5 % gel) approval and they thought it was denied but Triage nurse informed pharmacist it was actually approved on the 15th. Pharmacist rechecked this approval, with a later date of 15th and not the 14th. Pharmacist did then verify the medication is actually approved. PCP please be aware pts is approved for this medication order.  Thank you

## 2025-01-16 NOTE — TELEPHONE ENCOUNTER
[place] for Adapalene-Benzoyl Peroxide 0.1-2.5 % gel  APPROVED     Date(s) approved January 15, 2025 to January 15, 2026       Patient advised by          []DataPromhart Message  []Phone call   []LMOM  []L/M to call office as no active Communication consent on file  [x]Unable to leave detailed message as VM not approved on Communication consent       Pharmacy advised by    [x]Fax  []Phone call    Approval letter scanned into Media No Not available at decision

## 2025-01-22 ENCOUNTER — TELEPHONE (OUTPATIENT)
Age: 25
End: 2025-01-22

## 2025-01-22 NOTE — TELEPHONE ENCOUNTER
Patient is calling regarding cancelling an appointment.    Date/Time: 1/22/2025 at 1 pm virtually    Reason: has something to do for court with her kids    Patient was rescheduled: YES [] NO [x]  If yes, when was Patient reschedule for: n/a    Patient requesting call back to reschedule: YES [] NO [x]

## 2025-01-23 ENCOUNTER — TELEMEDICINE (OUTPATIENT)
Dept: PSYCHIATRY | Facility: CLINIC | Age: 25
End: 2025-01-23
Payer: COMMERCIAL

## 2025-01-23 DIAGNOSIS — F43.10 POST TRAUMATIC STRESS DISORDER (PTSD): ICD-10-CM

## 2025-01-23 DIAGNOSIS — F31.12 BIPOLAR 1 DISORDER WITH MODERATE MANIA (HCC): Primary | ICD-10-CM

## 2025-01-23 PROCEDURE — 99214 OFFICE O/P EST MOD 30 MIN: CPT | Performed by: NURSE PRACTITIONER

## 2025-01-23 RX ORDER — MIRTAZAPINE 7.5 MG/1
7.5 TABLET, FILM COATED ORAL
Qty: 90 TABLET | Refills: 0 | Status: SHIPPED | OUTPATIENT
Start: 2025-01-23

## 2025-01-27 PROBLEM — F31.12 BIPOLAR 1 DISORDER WITH MODERATE MANIA (HCC): Status: ACTIVE | Noted: 2025-01-27

## 2025-01-27 NOTE — PSYCH
Virtual Regular Visit    Verification of patient location:    Patient is located at Home in the following state in which I hold an active license [place]    The patient was identified by name and date of birth. Janell Solorzano was informed that this is a telemedicine visit and that the visit is being conducted throughthe Epic Embedded platform. She agrees to proceed..  My office door was closed. No one else was in the room.  She acknowledged consent and understanding of privacy and security of the video platform. The patient has agreed to participate and understands they can discontinue the visit at any time.    Patient is aware this is a billable service.       No Known Allergies    Review of Systems    Video Exam    There were no vitals filed for this visit.    Physical Exam     Visit Time    Visit Start Time: 0830  Visit Stop Time: 0900  Total Visit Duration: 30 minutes    Problem List Items Addressed This Visit    None  Visit Diagnoses         Bipolar 1 disorder with moderate saul (HCC)    -  Primary    Relevant Medications    mirtazapine (REMERON) 7.5 MG tablet    Other Relevant Orders    Valproic acid level, total    CBC and differential    Comprehensive metabolic panel    Hemoglobin A1C    TSH, 3rd generation with Free T4 reflex               Encounter provider ANGELA Vaughn    Provider located at    [facility]  [address] [facility] [address] [phone]    Recent Visits  Date Type Provider Dept   01/23/25 Telemedicine ANGELA Vaughn  [facility]   Showing recent visits within past 7 days and meeting all other requirements  Future Appointments  No visits were found meeting these conditions.  Showing future appointments within next 150 days and meeting all other requirements       HPI     Current Outpatient Medications   Medication Sig Dispense Refill    divalproex sodium (Depakote ER) 250 mg 24 hr tablet Take 3  tablets (750 mg total) by mouth daily at bedtime 90 tablet 2    hydrOXYzine HCL (ATARAX) 25 mg tablet Take 1 tablet (25 mg total) by mouth every 6 (six) hours as needed for anxiety 90 tablet 2    mirtazapine (REMERON) 7.5 MG tablet Take 1 tablet (7.5 mg total) by mouth daily at bedtime 90 tablet 0    norgestimate-ethinyl estradiol (Sprintec 28) 0.25-35 MG-MCG per tablet Take 1 tablet by mouth daily 84 tablet 1    ondansetron (ZOFRAN) 4 mg tablet Take 1 tablet (4 mg total) by mouth every 8 (eight) hours as needed for nausea or vomiting 20 tablet 0    pantoprazole (PROTONIX) 40 mg tablet Take 1 tablet (40 mg total) by mouth daily before breakfast 30 tablet 1    Adapalene-Benzoyl Peroxide 0.1-2.5 % gel Apply 1 Application topically daily at bedtime (Patient not taking: Reported on 1/23/2025) 45 g 0    phenazopyridine (PYRIDIUM) 200 mg tablet Take 1 tablet (200 mg total) by mouth 3 (three) times a day (Patient not taking: Reported on 12/20/2024) 6 tablet 0     No current facility-administered medications for this visit.       Review of Systems    I spent 30 minutes directly with the patient during this visit      MEDICATION MANAGEMENT NOTE        [facility]    Name and Date of Birth:  Janell Solorzano 24 y.o. 2000 MRN: [id number]    Date of Visit: January 23, 2025    No Known Allergies    SUBJECTIVE:    Janell is seen today for a follow up for Bipolar Disorder, PTSD, and OCD. She continues to improve since the last visit. Janell seen in the office today for medication management follow up. She was last seen by this provider on 12/20/24.  Janell reports that she has been taking her meds appropriately.  She states that they have been effective at controlling her saul.  She does continue to have ongoing anxiety.  She states that she has not been sleeping like she used to.  She reports that the anxiety she feels is not continuous, but sometimes she gets nauseous and she  "has trouble sleeping.  She states that she will have ruminating thoughts that will not go away.  She reports she is going to the gym, and eating well.  She has been hanging out with her friends and states that things have been going \"okay\" with her relationships.  She states that she \"hates boys\".  She is getting approximately 7 to 8 hours of sleep per night.  Rates her depression a 4 out of 10, anxiety 7 out of 10.  She denies suicidal and homicidal ideation.  Denies auditory and visual hallucinations.  She is somewhat constricted in affect, but answers questions appropriately. She will continue her medications as ordered, labs were ordered.  Any medication adjustments will be made following her lab work results.      She denies any side effects from current psychiatric medications.    PLAN:  Continue depakote 750mg PO HS  Continue hydroxyzine 25mg PO Q6 PRN  Continue mirtazapine 7.5mg PO HS  Labwork ordered: CBC, CMP, hemoglobin A1c, Depakote level, TSH  Follow up in one month  Follow up with therapy  The patient will call this provider sooner if concerns or issues arise prior to scheduled appointment.    Depakote PARQ completed including GI distress, tremor, weight gain, and hepatic risks, blood dyscrasias/bone marrow effects, SIADH, pancreatitis, andrenergic effects, PCOS, allergic reactions, worsened depression/suicidality, fetal abnormality risks, and others including need for blood testing and monitoring.     Patient education on serotonin syndrome symptoms completed which included the following:  symptoms routinely occur when starting a new drug or increasing the dose of a drug you're already taking. Signs and symptoms include: agitation,restlessness,confusion,rapid heart rate, high blood pressure, dilated pupils, loss of muscle coordination, muscle twitching or rigidity, heavy sweating, diarrhea, headache, shivering, goose bumps.  The patient was also informed that severe serotonin syndrome can be " life-threatening and needs to seek immediate medical care, these sign and symptoms include: high fever, seizures, irregular heartbeat, and unconsciousness.  The patient verbalized understanding.      Aware of 24 hour and weekend coverage for urgent situations accessed by calling [facility] main practice number  Continue psychotherapy with therapist  Medication management every 1 month  Aware of need to follow up with family physician for medical issues    Diagnoses and all orders for this visit:    Bipolar 1 disorder with moderate saul (HCC)  -     Valproic acid level, total; Future  -     CBC and differential; Future  -     Comprehensive metabolic panel; Future  -     Hemoglobin A1C; Future  -     TSH, 3rd generation with Free T4 reflex; Future  -     mirtazapine (REMERON) 7.5 MG tablet; Take 1 tablet (7.5 mg total) by mouth daily at bedtime      Assessment & Plan  Bipolar 1 disorder with moderate saul (HCC)  Patient improving and stable on her medications as ordered. Labwork ordered and any medication changes will be made following results of labwork. Continue medications:  Depakote  mg p.o. at bedtime  Hydroxyzine 25 mg p.o. every 6 as needed for anxiety  Mirtazapine 7.5 mg p.o. at bedtime  Orders:    Valproic acid level, total; Future    CBC and differential; Future    Comprehensive metabolic panel; Future    Hemoglobin A1C; Future    TSH, 3rd generation with Free T4 reflex; Future    mirtazapine (REMERON) 7.5 MG tablet; Take 1 tablet (7.5 mg total) by mouth daily at bedtime        Current Outpatient Medications on File Prior to Visit   Medication Sig Dispense Refill    divalproex sodium (Depakote ER) 250 mg 24 hr tablet Take 3 tablets (750 mg total) by mouth daily at bedtime 90 tablet 2    hydrOXYzine HCL (ATARAX) 25 mg tablet Take 1 tablet (25 mg total) by mouth every 6 (six) hours as needed for anxiety 90 tablet 2    norgestimate-ethinyl estradiol (Sprintec 28) 0.25-35 MG-MCG  per tablet Take 1 tablet by mouth daily 84 tablet 1    ondansetron (ZOFRAN) 4 mg tablet Take 1 tablet (4 mg total) by mouth every 8 (eight) hours as needed for nausea or vomiting 20 tablet 0    pantoprazole (PROTONIX) 40 mg tablet Take 1 tablet (40 mg total) by mouth daily before breakfast 30 tablet 1    Adapalene-Benzoyl Peroxide 0.1-2.5 % gel Apply 1 Application topically daily at bedtime (Patient not taking: Reported on 1/23/2025) 45 g 0    phenazopyridine (PYRIDIUM) 200 mg tablet Take 1 tablet (200 mg total) by mouth 3 (three) times a day (Patient not taking: Reported on 12/20/2024) 6 tablet 0     No current facility-administered medications on file prior to visit.       Psychotherapy Provided:     Individual psychotherapy provided: Yes  Supportive counseling provided.  Medication changes discussed with Janell.  Medication education provided to Janell.  Discussed with Janell coping with  ongoing anxiety .   Coping strategies reviewed with Janell.   Importance of medication and treatment compliance reviewed with Janell.  Importance of follow up with family physician for medical issues reviewed with Janell.  Reassurance and supportive therapy provided.   Crisis/safety plan discussed with Janell. Patient will call prior to scheduled appointment if they have any issues or concerns.  Patient understands they can access the office by calling the main number at any time if they are in crisis.  They also understand they can call their [facility]'s crisis number or go to their nearest ED if suicidal ideation increases or if they develop a plan or intent.       HPI ROS Appetite Changes and Sleep:     She reports difficulty falling asleep, adequate appetite, adequate energy level. Denies homicidal ideation, denies suicidal ideation    Review Of Systems:      HPI ROS:               Medication Side Effects:  denies   Depression (10 worst): 4/10 0/10   Anxiety (10 worst): 7/10 4/10   Safety concerns (SI, HI, etc): denies denies    Sleep: Diff falling asleep good   Energy: adequate increased   Appetite: adequate adequate     General normal    Personality no change in personality   Constitutional as noted in HPI   ENT negative   Cardiovascular negative   Respiratory negative   Gastrointestinal negative   Genitourinary negative   Musculoskeletal negative   Integumentary negative   Neurological negative   Endocrine negative   Other Symptoms none, all other systems are negative     Mental Status Evaluation:    Appearance Appropriately dressed and Good eye contact   Behavior calm and cooperative   Mood anxious and irritable  Depression Scale - 4 of 10 (0 = No depression)  Anxiety Scale - 7 of 10 (0 = No anxiety)   Speech Normal rate and volume   Affect constricted   Thought Processes Goal directed and coherent   Thought Content Does not verbalize delusional material   Associations Tightly connected   Perceptual Disturbances Denies hallucinations and does not appear to be responding to internal stimuli   Risk Potential Suicidal/Homicidal Ideation - No evidence of suicidal or homicidal ideation and patient does not verbalize suicidal or homicidal ideation  Risk of Violence - No evidence of risk for violence found on assessment  Risk of Self Mutilation - No evidence of risk for self mutilation found on assessment   Orientation oriented to person, place, time/date, and situation   Memory recent and remote memory grossly intact   Consciousness alert and awake   Attention/Concentration attention span and concentration appear shorter than expected for age   Insight limited   Judgement limited   Muscle Strength and Gait normal muscle strength and normal muscle tone, normal gait/station and normal balance   Motor Activity unable to assess today due to virtual visit   Language no difficulty naming common objects, no difficulty repeating a phrase, no difficulty writing a sentence   Fund of Knowledge adequate knowledge of current events  adequate fund of  knowledge regarding past history  adequate fund of knowledge regarding vocabulary      Past Psychiatric History Update:     Inpatient Psychiatric Admission Since Last Encounter:   no  Changes to Outpatient Psychiatric Treatment Team:    no  Suicide Attempt Or Self Mutilation Since Last Encounter:   no  Incidence of Violent Behavior Since Last Encounter:   no    Traumatic History Update:     New Onset of Abuse Since Last Encounter:   no  Traumatic Events Since Last Encounter:   no    Past Medical History:    Past Medical History:   Diagnosis Date    Anxiety     Concussion     Depression     Head injury     concussions in high school and in college    Self-injurious behavior         Past Surgical History:   Procedure Laterality Date    WISDOM TOOTH EXTRACTION       No Known Allergies  Substance Abuse History:    Social History     Substance and Sexual Activity   Alcohol Use Yes    Alcohol/week: 3.0 - 4.0 standard drinks of alcohol    Types: 3 - 4 Shots of liquor per week    Comment: 3-4 drinks, 2 times per week     Social History     Substance and Sexual Activity   Drug Use No     Social History:    Social History     Socioeconomic History    Marital status: Single     Spouse name: Not on file    Number of children: 0    Years of education: senior in college currently    Highest education level: High school graduate   Occupational History    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   Tobacco Use    Smoking status: Never    Smokeless tobacco: Never   Vaping Use    Vaping status: Never Used   Substance and Sexual Activity    Alcohol use: Yes     Alcohol/week: 3.0 - 4.0 standard drinks of alcohol     Types: 3 - 4 Shots of liquor per week     Comment: 3-4 drinks, 2 times per week    Drug use: No    Sexual activity: Yes     Partners: Male     Birth control/protection: Condom Male   Other Topics Concern    Not on file   Social History Narrative    Not on file     Social Drivers of Health     Financial Resource Strain:  Not on file   Food Insecurity: Not on file   Transportation Needs: Not on file   Physical Activity: Not on file   Stress: Not on file   Social Connections: Unknown (6/18/2024)    Received from [facility]     How often do you feel lonely or isolated from those around you? (Adult - for ages 18 years and over): Not on file   Intimate Partner Violence: Not on file   Housing Stability: Not on file     Family Psychiatric History:     Family History   Problem Relation Age of Onset    Hypertension Mother     Mental illness Mother     Depression Mother     Mental illness Sister     Coronary artery disease Maternal Grandmother     Throat cancer Paternal Grandfather      History Review:The following portions of the patient's history were reviewed and updated as appropriate: allergies, current medications, past family history, past medical history, past social history, past surgical history, and problem list     OBJECTIVE:     Vital signs in last 24 hours:    There were no vitals filed for this visit.    Laboratory Results: Recent Labs (last 2 months):   Office Visit on 01/06/2025   Component Date Value    LEUKOCYTE ESTERASE,UA 01/06/2025 500     NITRITE,UA 01/06/2025 -     SL AMB POCT UROBILINOGEN 01/06/2025 -     POCT URINE PROTEIN 01/06/2025 0.15      PH,UA 01/06/2025 6.5     BLOOD,UA 01/06/2025 80     SPECIFIC GRAVITY,UA 01/06/2025 1.015     KETONES,UA 01/06/2025 -     BILIRUBIN,UA 01/06/2025 -     GLUCOSE, UA 01/06/2025 -      COLOR,UA 01/06/2025 yellow     CLARITY,UA 01/06/2025 cloudy     Urine Culture 01/06/2025 >100,000 cfu/ml Proteus mirabilis (A)     Urine Culture 01/06/2025 <10,000 cfu/ml     N gonorrhoeae, DNA Probe 01/06/2025 Negative     Chlamydia trachomatis, D* 01/06/2025 Negative     Bacterial Vaginosis 01/06/2025 Negative     Candida species 01/06/2025 Positive (A)     Candida glabrata 01/06/2025 Negative     Ajcqui krusei 01/06/2025 Negative     Trichomonas vaginalis 01/06/2025 Negative       I have personally reviewed all pertinent laboratory/tests results.    Suicide/Homicide Risk Assessment:    Risk of Harm to Self:  The following ratings are based on assessment at the time of the interview  Recent Specific Risk Factors include: current anxiety symptoms  Demographic risk factors include: , age: young adult (15-24)  Historical Risk Factors include: chronic depressive symptoms, chronic anxiety symptoms, chronic mood disorder, history of suicidal behaviors, history of self-abusive behavior, history of substance use, history of abuse, history of impulsive behaviors, history of traumatic experiences, a relative or close friend who  by suicide  Protective Factors: no current suicidal ideation, access to mental health treatment, compliant with medications, compliant with mental health treatment, having a desire to be alive, stable living environment, stable job, sense of determination, strong relationships, supportive family  Weapons: none. The following steps have been taken to ensure weapons are properly secured: not applicable  Based on today's assessment, Janell presents the following risk of harm to self: none    Risk of Harm to Others:  The following ratings are based on assessment at the time of the interview  Protective Factors: no current homicidal ideation  Based on today's assessment, Janell presents the following risk of harm to others: none    The following interventions are recommended: contracts for safety at present - agrees to go to ED if feeling unsafe, return in 1 month for reassessment, therapy appointment in 1 week, contracts for safety at present - agrees to call Crisis Intervention Service if feeling unsafe    Medications Risks/Benefits:      Risks, Benefits And Possible Side Effects Of Medications:    Discussed risks and benefits of treatment with patient including risk of suicidality, serotonin syndrome, increased QTc interval and SIADH related to treatment with  antidepressants; Risk of induction of manic symptoms in certain patient populations and risk of liver impairment related to treatment with Depakote     Controlled Medication Discussion:     Not applicable    Treatment Plan:    Due for update/Updated:   no  Last treatment plan done 8/15/24 by Hillary Olmedo.  Treatment Plan due on 2/15/25.    ANGELA Vaughn 01/27/25    This note was not shared with the patient due to this is a psychotherapy note

## 2025-01-27 NOTE — ASSESSMENT & PLAN NOTE
Patient improving and stable on her medications as ordered. Labwork ordered and any medication changes will be made following results of labwork. Continue medications:  Depakote  mg p.o. at bedtime  Hydroxyzine 25 mg p.o. every 6 as needed for anxiety  Mirtazapine 7.5 mg p.o. at bedtime  Orders:    Valproic acid level, total; Future    CBC and differential; Future    Comprehensive metabolic panel; Future    Hemoglobin A1C; Future    TSH, 3rd generation with Free T4 reflex; Future    mirtazapine (REMERON) 7.5 MG tablet; Take 1 tablet (7.5 mg total) by mouth daily at bedtime

## 2025-01-30 ENCOUNTER — TELEPHONE (OUTPATIENT)
Dept: BEHAVIORAL/MENTAL HEALTH CLINIC | Facility: CLINIC | Age: 25
End: 2025-01-30

## 2025-01-30 ENCOUNTER — TELEMEDICINE (OUTPATIENT)
Dept: BEHAVIORAL/MENTAL HEALTH CLINIC | Facility: CLINIC | Age: 25
End: 2025-01-30

## 2025-01-30 DIAGNOSIS — F43.10 POST TRAUMATIC STRESS DISORDER (PTSD): Primary | ICD-10-CM

## 2025-01-30 DIAGNOSIS — F31.12 BIPOLAR 1 DISORDER WITH MODERATE MANIA (HCC): ICD-10-CM

## 2025-01-30 DIAGNOSIS — F60.3 BORDERLINE PERSONALITY DISORDER (HCC): ICD-10-CM

## 2025-01-30 DIAGNOSIS — F42.2 MIXED OBSESSIONAL THOUGHTS AND ACTS: ICD-10-CM

## 2025-01-30 PROCEDURE — NOSHOW

## 2025-01-30 NOTE — TELEPHONE ENCOUNTER
Call to Janell Solorzano regarding today's scheduled virtual appointment. After 2 attempted text invites, and no response, placed call. LM on VM advising of today's appointment, requesting a call back, and advising of next scheduled appointment on 2/5/25 at 1pm.  Also mentioned potential no-show letter to be sent.       Provider to keep tabs, as Ms. Solorzano is good about communication/canceling; next session in one week and will further assess if needed/no response to today, and/or if similar issue at next session.

## 2025-01-30 NOTE — PSYCH
No Call. No Show. No Charge    Janell Solorzano no showed 01/30/25 virtual appointment. Staff called after 2 text invites and no response; LM on VM regarding today's scheduled appointment; advised of next session 2/5/25 at 1pm. See also phone call notation.     Treatment Plan not completed within required time limits due to: Janell Solorzano no show appointment on 01/30/25  .

## 2025-02-17 ENCOUNTER — TELEPHONE (OUTPATIENT)
Dept: BEHAVIORAL/MENTAL HEALTH CLINIC | Facility: CLINIC | Age: 25
End: 2025-02-17

## 2025-02-17 NOTE — TELEPHONE ENCOUNTER
Patient returning call to provider regarding a letter request for court tomorrow. Writer cayden transferred patient to [place] office for further assistance.

## 2025-02-17 NOTE — TELEPHONE ENCOUNTER
Outreach phone call and LM on  re: Ms. Solorzano having to cancel last 2 appointments due to work obligations. Attempt was to discuss any updates on her needs / availability, etc regarding appointments at this time. Indicated at this time confirming next appointment scheduled this Thursday 2/27 at 9am (pending additional work issues and/or possible weather issues).

## 2025-02-17 NOTE — TELEPHONE ENCOUNTER
Patient called back in regards to VM. Patient did confirm she will be at the next two upcoming appts on 2/20 and 2/27. Patient was also requesting another call back in regards to a letter request she needs for court tomorrow. Patient wanted to specify the needs of the letter with provider via phone call.

## 2025-02-17 NOTE — TELEPHONE ENCOUNTER
After initial call, and returned call from Ms. Solorzano, tried again at her request for a call back. Only able to reach . Tried 2x to return call and speak with Ms. Solorzano. Will try again as possible when next able.

## 2025-02-18 NOTE — TELEPHONE ENCOUNTER
Patient returned call to office requesting to speak to therapist. Writer informed patient the message would be sent.

## 2025-02-20 ENCOUNTER — TELEMEDICINE (OUTPATIENT)
Dept: BEHAVIORAL/MENTAL HEALTH CLINIC | Facility: CLINIC | Age: 25
End: 2025-02-20
Payer: COMMERCIAL

## 2025-02-20 DIAGNOSIS — F31.12 BIPOLAR 1 DISORDER WITH MODERATE MANIA (HCC): ICD-10-CM

## 2025-02-20 DIAGNOSIS — F43.10 POST TRAUMATIC STRESS DISORDER (PTSD): Primary | ICD-10-CM

## 2025-02-20 PROCEDURE — 90834 PSYTX W PT 45 MINUTES: CPT

## 2025-02-20 NOTE — PSYCH
Virtual Regular VisitName: Janell Solorzano      : 2000      MRN: [id number]  Encounter Provider: Michela Bates LCSW  Encounter Date: 2025   Encounter department: [facility] [place]  :  Assessment & Plan  Post traumatic stress disorder (PTSD)         Bipolar 1 disorder with moderate saul (HCC)             Goals addressed in session: Goal 1     Depression Follow-up Plan Completed: Not applicable    Reason for visit is No chief complaint on file.     Recent Visits  Date Type Provider Dept   25 Telephone Michela Bates LCSW [facility] [place]   25 Telephone Michela Bates LCSW [facility] Therapist [place]   Showing recent visits within past 7 days and meeting all other requirements  Today's Visits  Date Type Provider Dept   25 Telemedicine Michela Bates LCSW [facility]rap[facility] [place]   Showing today's visits and meeting all other requirements  Future Appointments  No visits were found meeting these conditions.  Showing future appointments within next 150 days and meeting all other requirements     History of Present Illness     Janell Solorzano is a 24 y.o. female . .  HPI    Past Medical History:   Diagnosis Date    Anxiety     Concussion     Depression     Head injury     concussions in high school and in college    Self-injurious behavior      Past Surgical History:   Procedure Laterality Date    WISDOM TOOTH EXTRACTION       Current Outpatient Medications   Medication Instructions    Adapalene-Benzoyl Peroxide 0.1-2.5 % gel 1 Application, Topical, Daily at bedtime    divalproex sodium (DEPAKOTE ER) 750 mg, Oral, Daily at bedtime    hydrOXYzine HCL (ATARAX) 25 mg, Oral, Every 6 hours PRN    mirtazapine (REMERON) 7.5 mg, Oral, Daily at bedtime    norgestimate-ethinyl estradiol (Sprintec 28) 0.25-35 MG-MCG per tablet 1 tablet, Oral, Daily    ondansetron (ZOFRAN) 4 mg, Oral, Every 8 hours PRN    pantoprazole (PROTONIX) 40  mg, Oral, Daily before breakfast    phenazopyridine (PYRIDIUM) 200 mg, Oral, 3 times daily     No Known Allergies    Review of Systems    Objective   There were no vitals taken for this visit.    Video Exam  Physical Exam     Administrative Statements   Encounter provider Michela Bates LCSW    The Patient is located at Home and in the following state in which I hold an active license [place].    The patient was identified by name and date of birth. Janell Solorzano was informed that this is a telemedicine visit and that the visit is being conducted through the Epic Embedded platform. She agrees to proceed..  My office door was closed. No one else was in the room.  She acknowledged consent and understanding of privacy and security of the video platform. The patient has agreed to participate and understands they can discontinue the visit at any time.    Behavioral Health Psychotherapy Progress Note    Psychotherapy Provided: Individual Psychotherapy     1. Post traumatic stress disorder (PTSD)        2. Bipolar 1 disorder with moderate saul (HCC)            Goals addressed in session: Goal 1     DATA: Janell discussed aftermath of further legal court hearings with ex-boyfriend. She discussed her thoughts and feelings secondary to how things transpired. We addressed her feeling of invalidation, not feeling heard. We addressed options at this point in time, noting implications emotionally, as well as legal considerations. She reflected on the relationship and we noted importance of where she did see any positives, and how this may be helpful as she evaluates relationships in the future. We addressed however the toxic behaviors that were hurtful to Janell and her well being. We addressed regrouping and attending to her needs at this time, and making decisions at this point, with consideration of her mental and physical well being. We addressed her support from friends, and maintaining work. We confirmed appointment in one week.  "    During this session, this clinician used the following therapeutic modalities: Supportive Psychotherapy    Substance Abuse was not addressed during this session. If the client is diagnosed with a co-occurring substance use disorder, please indicate any changes in the frequency or amount of use: not discussed. Stage of change for addressing substance use diagnoses: No substance use/Not applicable not assessed at this time    ASSESSMENT:  lisa Solorzano presents with a Euthymic/ normal and Depressed mood.     her affect is Normal range and intensity, which is congruent, with her mood and the content of the session. The client has made progress on their goals.    Lisa was able to verbalize thoughts and feelings; she was perseverative and had difficulty stopping, but was able to do so and to consider the need to do so on her own.  lisa Solorzano presents with a low risk of suicide, low risk of self-harm, and low risk of harm to others. Denied/ No SI/HI. Lisa continues to verbalize, though doubtful, for future goals.     For any risk assessment that surpasses a \"low\" rating, a safety plan must be developed.    A safety plan was indicated: no  If yes, describe in detail n/a    PLAN: Between sessions, lisa Solorzano will continue self-care. At the next session, the therapist will use Cognitive Behavioral Therapy and Supportive Psychotherapy to address mood stability and daily management.    Behavioral Health Treatment Plan and Discharge Planning: lisa Solorzano is aware of and agrees to continue to work on their treatment plan. They have identified and are working toward their discharge goals. yes    Depression Follow-up Plan Completed: Not applicable    Visit start and stop times:    02/20/25  Start Time: 0904  Stop Time: 0955  Total Visit Time: 51 minutes      "

## 2025-02-25 ENCOUNTER — TELEMEDICINE (OUTPATIENT)
Dept: PSYCHIATRY | Facility: CLINIC | Age: 25
End: 2025-02-25
Payer: COMMERCIAL

## 2025-02-25 DIAGNOSIS — F31.12 BIPOLAR 1 DISORDER WITH MODERATE MANIA (HCC): ICD-10-CM

## 2025-02-25 PROCEDURE — 99214 OFFICE O/P EST MOD 30 MIN: CPT | Performed by: NURSE PRACTITIONER

## 2025-02-25 NOTE — PSYCH
Administrative Statements   Encounter provider ANGELA Vaughn    The Patient is located at Home and in the following state in which I hold an active license [place].    The patient was identified by name and date of birth. Janell Solorzano was informed that this is a telemedicine visit and that the visit is being conducted through the Epic Embedded platform. She agrees to proceed..  My office door was closed. No one else was in the room.  She acknowledged consent and understanding of privacy and security of the video platform. The patient has agreed to participate and understands they can discontinue the visit at any time.    I have spent a total time of 25 minutes in caring for this patient on the day of the visit/encounter including Risks and benefits of tx options, Instructions for management, Patient and family education, Importance of tx compliance, Risk factor reductions, Reviewing/placing orders in the medical record (including tests, medications, and/or procedures), and Obtaining or reviewing history  .    Visit Time    Visit Start Time: 1030  Visit Stop Time: 1055  Total Visit Duration: 25 minutes    Problem List Items Addressed This Visit       Bipolar 1 disorder with moderate saul (HCC)          Encounter provider ANGELA Vaughn    Provider located at    [place] [address] [facility] [address] [phone]    Recent Visits  No visits were found meeting these conditions.  Showing recent visits within past 7 days and meeting all other requirements  Today's Visits  Date Type Provider Dept   02/25/25 Telemedicine ANGELA Vaughn  [facility]   Showing today's visits and meeting all other requirements  Future Appointments  No visits were found meeting these conditions.  Showing future appointments within next 150 days and meeting all other requirements       HPI     Current Outpatient Medications   Medication  Sig Dispense Refill    Adapalene-Benzoyl Peroxide 0.1-2.5 % gel Apply 1 Application topically daily at bedtime 45 g 0    divalproex sodium (Depakote ER) 250 mg 24 hr tablet Take 3 tablets (750 mg total) by mouth daily at bedtime 90 tablet 2    hydrOXYzine HCL (ATARAX) 25 mg tablet Take 1 tablet (25 mg total) by mouth every 6 (six) hours as needed for anxiety 90 tablet 2    mirtazapine (REMERON) 7.5 MG tablet Take 1 tablet (7.5 mg total) by mouth daily at bedtime 90 tablet 0    norgestimate-ethinyl estradiol (Sprintec 28) 0.25-35 MG-MCG per tablet Take 1 tablet by mouth daily 84 tablet 1    ondansetron (ZOFRAN) 4 mg tablet Take 1 tablet (4 mg total) by mouth every 8 (eight) hours as needed for nausea or vomiting 20 tablet 0    pantoprazole (PROTONIX) 40 mg tablet Take 1 tablet (40 mg total) by mouth daily before breakfast 30 tablet 1    phenazopyridine (PYRIDIUM) 200 mg tablet Take 1 tablet (200 mg total) by mouth 3 (three) times a day (Patient not taking: Reported on 2/25/2025) 6 tablet 0     No current facility-administered medications for this visit.       Review of Systems    I spent 25 minutes directly with the patient during this visit      MEDICATION MANAGEMENT NOTE        [facility]    Name and Date of Birth:  Janell Solorzano 24 y.o. 2000 MRN: [id number]    Date of Visit: February 25, 2025    No Known Allergies    SUBJECTIVE:    Janell is seen today for a follow up for Bipolar Disorder, PTSD, and OCD. She continues to decompensate slowly since the last visit. Janell seen virtually  for medication management follow up. She was last seen by this provider on 1/23/25.  Janell reports that she has been taking her meds appropriately.  She states that she is not doing well, however, as her ex-boyfriend recently took her to court and served her with a PFA that was upheld.  Displaying limited insight in regards to the court hearing and the behaviors that led up to  "the PFA. She states that she has passive SI with no plan or intent. She denies auditory or visual hallucinations.  She believes she is sleeping way too much.  She has not been going to the gym as often.  She states she is taking her medications, but has not had her blood work completed.  She states she will go on Friday.  She rates her anxiety at 5 out of 10.  States her depression is \"still there\".  She sees Michela for therapy on Thursday.  We will continue her medications until blood work has been completed and will make changes following results review.  She verbalized understanding.  We will otherwise follow up in 1 month.      She denies any side effects from current psychiatric medications.    PLAN:  Continue depakote 750mg PO HS  Continue hydroxyzine 25mg PO Q6 PRN  Continue mirtazapine 7.5mg PO HS  Labwork ordered: CBC, CMP, hemoglobin A1c, Depakote level, TSH which she states she will have done on Friday, 2/28/2025  Follow up in one month  Follow up with therapy  The patient will call this provider sooner if concerns or issues arise prior to scheduled appointment.    Depakote PARQ completed including GI distress, tremor, weight gain, and hepatic risks, blood dyscrasias/bone marrow effects, SIADH, pancreatitis, andrenergic effects, PCOS, allergic reactions, worsened depression/suicidality, fetal abnormality risks, and others including need for blood testing and monitoring.     Patient education on serotonin syndrome symptoms completed which included the following:  symptoms routinely occur when starting a new drug or increasing the dose of a drug you're already taking. Signs and symptoms include: agitation,restlessness,confusion,rapid heart rate, high blood pressure, dilated pupils, loss of muscle coordination, muscle twitching or rigidity, heavy sweating, diarrhea, headache, shivering, goose bumps.  The patient was also informed that severe serotonin syndrome can be life-threatening and needs to seek immediate " medical care, these sign and symptoms include: high fever, seizures, irregular heartbeat, and unconsciousness.  The patient verbalized understanding.      Aware of 24 hour and weekend coverage for urgent situations accessed by calling [facility] main practice number  Continue psychotherapy with therapist  Medication management every 1 month  Aware of need to follow up with family physician for medical issues    Diagnoses and all orders for this visit:    Bipolar 1 disorder with moderate saul (HCC)      Assessment & Plan  Bipolar 1 disorder with moderate saul (HCC)  Patient continues to experience unstable mood.  Lab work has been ordered, patient will have completed on Friday.  Until then medications will continue as ordered.  Depakote 750mg PO HS  Hydroxyzine 25mg PO Q6 PRN  Mirtazapine 7.5mg PO HS           Current Outpatient Medications on File Prior to Visit   Medication Sig Dispense Refill    Adapalene-Benzoyl Peroxide 0.1-2.5 % gel Apply 1 Application topically daily at bedtime 45 g 0    divalproex sodium (Depakote ER) 250 mg 24 hr tablet Take 3 tablets (750 mg total) by mouth daily at bedtime 90 tablet 2    hydrOXYzine HCL (ATARAX) 25 mg tablet Take 1 tablet (25 mg total) by mouth every 6 (six) hours as needed for anxiety 90 tablet 2    mirtazapine (REMERON) 7.5 MG tablet Take 1 tablet (7.5 mg total) by mouth daily at bedtime 90 tablet 0    norgestimate-ethinyl estradiol (Sprintec 28) 0.25-35 MG-MCG per tablet Take 1 tablet by mouth daily 84 tablet 1    ondansetron (ZOFRAN) 4 mg tablet Take 1 tablet (4 mg total) by mouth every 8 (eight) hours as needed for nausea or vomiting 20 tablet 0    pantoprazole (PROTONIX) 40 mg tablet Take 1 tablet (40 mg total) by mouth daily before breakfast 30 tablet 1    phenazopyridine (PYRIDIUM) 200 mg tablet Take 1 tablet (200 mg total) by mouth 3 (three) times a day (Patient not taking: Reported on 2/25/2025) 6 tablet 0     No current  "facility-administered medications on file prior to visit.       Psychotherapy Provided:     Individual psychotherapy provided: Yes  Supportive counseling provided.  Medication changes discussed with Janell.  Medication education provided to Janell.  Discussed with Janell coping with  ongoing anxiety .   Coping strategies reviewed with Janell.   Importance of medication and treatment compliance reviewed with Janell.  Importance of follow up with family physician for medical issues reviewed with Janell.  Reassurance and supportive therapy provided.   Crisis/safety plan discussed with Janell. Patient will call prior to scheduled appointment if they have any issues or concerns.  Patient understands they can access the office by calling the main number at any time if they are in crisis.  They also understand they can call their [facility]'s crisis number or go to their nearest ED if suicidal ideation increases or if they develop a plan or intent.       HPI ROS Appetite Changes and Sleep:     She reports  \"too much sleep\" , adequate appetite, low energy. Denies homicidal ideation, Intermittent passive suicidal ideation without intent or plan    Review Of Systems:      HPI ROS:               Medication Side Effects: denies    Depression (10 worst): \"Still there\" 4/10   Anxiety (10 worst): 5/10 7/10   Safety concerns (SI, HI, etc): Passive SI no plan or intent denies   Sleep: \"Too much\" Diff falling asleep   Energy: low adequate   Appetite: adequate adequate     General normal    Personality no change in personality   Constitutional as noted in HPI   ENT negative   Cardiovascular negative   Respiratory negative   Gastrointestinal negative   Genitourinary negative   Musculoskeletal negative   Integumentary negative   Neurological negative   Endocrine negative   Other Symptoms none, all other systems are negative     Mental Status Evaluation:    Appearance Appropriately dressed and Good eye contact   Behavior calm and cooperative " "  Mood anxious and irritable  Depression Scale -  \"still there\"  of 10 (0 = No depression)  Anxiety Scale - 5 of 10 (0 = No anxiety)   Speech Normal rate and volume   Affect constricted   Thought Processes Goal directed and coherent   Thought Content Does not verbalize delusional material   Associations Tightly connected   Perceptual Disturbances Denies hallucinations and does not appear to be responding to internal stimuli   Risk Potential Suicidal/Homicidal Ideation - Passive suicidal ideation without intent or plan  Risk of Violence - No evidence of risk for violence found on assessment  Risk of Self Mutilation - No evidence of risk for self mutilation found on assessment   Orientation oriented to person, place, time/date, and situation   Memory recent and remote memory grossly intact   Consciousness alert and awake   Attention/Concentration attention span and concentration appear shorter than expected for age   Insight limited   Judgement limited   Muscle Strength and Gait normal muscle strength and normal muscle tone, normal gait/station and normal balance   Motor Activity unable to assess today due to virtual visit   Language no difficulty naming common objects, no difficulty repeating a phrase, no difficulty writing a sentence   Fund of Knowledge adequate knowledge of current events  adequate fund of knowledge regarding past history  adequate fund of knowledge regarding vocabulary      Past Psychiatric History Update:     Inpatient Psychiatric Admission Since Last Encounter:   no  Changes to Outpatient Psychiatric Treatment Team:    no  Suicide Attempt Or Self Mutilation Since Last Encounter:   no  Incidence of Violent Behavior Since Last Encounter:   no    Traumatic History Update:     New Onset of Abuse Since Last Encounter:   no  Traumatic Events Since Last Encounter:   no    Past Medical History:    Past Medical History:   Diagnosis Date    Anxiety     Concussion     Depression     Head injury     " concussions in high school and in college    Self-injurious behavior         Past Surgical History:   Procedure Laterality Date    WISDOM TOOTH EXTRACTION       No Known Allergies  Substance Abuse History:    Social History     Substance and Sexual Activity   Alcohol Use Yes    Alcohol/week: 3.0 - 4.0 standard drinks of alcohol    Types: 3 - 4 Shots of liquor per week    Comment: 3-4 drinks, 2 times per week     Social History     Substance and Sexual Activity   Drug Use No     Social History:    Social History     Socioeconomic History    Marital status: Single     Spouse name: Not on file    Number of children: 0    Years of education: senior in college currently    Highest education level: High school graduate   Occupational History    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   Tobacco Use    Smoking status: Never    Smokeless tobacco: Never   Vaping Use    Vaping status: Never Used   Substance and Sexual Activity    Alcohol use: Yes     Alcohol/week: 3.0 - 4.0 standard drinks of alcohol     Types: 3 - 4 Shots of liquor per week     Comment: 3-4 drinks, 2 times per week    Drug use: No    Sexual activity: Yes     Partners: Male     Birth control/protection: Condom Male   Other Topics Concern    Not on file   Social History Narrative    Not on file     Social Drivers of Health     Financial Resource Strain: Not on file   Food Insecurity: Not on file   Transportation Needs: Not on file   Physical Activity: Not on file   Stress: Not on file   Social Connections: Unknown (6/18/2024)    Received from [facility]    Social [employer]     How often do you feel lonely or isolated from those around you? (Adult - for ages 18 years and over): Not on file   Intimate Partner Violence: Not on file   Housing Stability: Not on file     Family Psychiatric History:     Family History   Problem Relation Age of Onset    Hypertension Mother     Mental illness Mother     Depression Mother     Mental illness Sister     Coronary  artery disease Maternal Grandmother     Throat cancer Paternal Grandfather      History Review:The following portions of the patient's history were reviewed and updated as appropriate: allergies, current medications, past family history, past medical history, past social history, past surgical history, and problem list     OBJECTIVE:     Vital signs in last 24 hours:    There were no vitals filed for this visit.    Laboratory Results: Recent Labs (last 2 months):   Office Visit on 01/06/2025   Component Date Value    LEUKOCYTE ESTERASE,UA 01/06/2025 500     NITRITE,UA 01/06/2025 -     SL AMB POCT UROBILINOGEN 01/06/2025 -     POCT URINE PROTEIN 01/06/2025 0.15      PH,UA 01/06/2025 6.5     BLOOD,UA 01/06/2025 80     SPECIFIC GRAVITY,UA 01/06/2025 1.015     KETONES,UA 01/06/2025 -     BILIRUBIN,UA 01/06/2025 -     GLUCOSE, UA 01/06/2025 -      COLOR,UA 01/06/2025 yellow     CLARITY,UA 01/06/2025 cloudy     Urine Culture 01/06/2025 >100,000 cfu/ml Proteus mirabilis (A)     Urine Culture 01/06/2025 <10,000 cfu/ml     N gonorrhoeae, DNA Probe 01/06/2025 Negative     Chlamydia trachomatis, D* 01/06/2025 Negative     Bacterial Vaginosis 01/06/2025 Negative     Candida species 01/06/2025 Positive (A)     Candida glabrata 01/06/2025 Negative     Jacqui krusei 01/06/2025 Negative     Trichomonas vaginalis 01/06/2025 Negative      I have personally reviewed all pertinent laboratory/tests results.    Suicide/Homicide Risk Assessment:    Risk of Harm to Self:  The following ratings are based on assessment at the time of the interview  Recent Specific Risk Factors include: current anxiety symptoms, passive death wishes, lack of support, recent rejection, legal issues  Demographic risk factors include: , age: young adult (15-24)  Historical Risk Factors include: chronic depressive symptoms, chronic anxiety symptoms, chronic mood disorder, history of suicidal behaviors, history of self-abusive behavior, history of  substance use, history of abuse, history of impulsive behaviors, history of traumatic experiences, a relative or close friend who  by suicide  Protective Factors: access to mental health treatment, compliant with medications, compliant with mental health treatment, having a desire to be alive, stable living environment, stable job, sense of determination, strong relationships, supportive family  Weapons: none. The following steps have been taken to ensure weapons are properly secured: not applicable  Based on today's assessment, Janell presents the following risk of harm to self: none    Risk of Harm to Others:  The following ratings are based on assessment at the time of the interview  Protective Factors: no current homicidal ideation  Based on today's assessment, Janell presents the following risk of harm to others: none    The following interventions are recommended: contracts for safety at present - agrees to go to ED if feeling unsafe, return in 1 month for reassessment, therapy appointment in 2 days, contracts for safety at present - agrees to call Crisis Intervention Service if feeling unsafe    Medications Risks/Benefits:      Risks, Benefits And Possible Side Effects Of Medications:    Discussed risks and benefits of treatment with patient including risk of suicidality, serotonin syndrome, increased QTc interval and SIADH related to treatment with antidepressants; Risk of induction of manic symptoms in certain patient populations and risk of liver impairment related to treatment with Depakote     Controlled Medication Discussion:     Not applicable    Treatment Plan:    Due for update/Updated:   no  Last treatment plan done 8/15/24 by Hillary Olmedo.  Treatment Plan due on 2/15/25.    ANGELA Vaughn 25    This note was not shared with the patient due to this is a psychotherapy note

## 2025-02-25 NOTE — ASSESSMENT & PLAN NOTE
Patient continues to experience unstable mood.  Lab work has been ordered, patient will have completed on Friday.  Until then medications will continue as ordered.  Depakote 750mg PO HS  Hydroxyzine 25mg PO Q6 PRN  Mirtazapine 7.5mg PO HS

## 2025-02-27 ENCOUNTER — TELEMEDICINE (OUTPATIENT)
Dept: BEHAVIORAL/MENTAL HEALTH CLINIC | Facility: CLINIC | Age: 25
End: 2025-02-27
Payer: COMMERCIAL

## 2025-02-27 ENCOUNTER — TELEPHONE (OUTPATIENT)
Age: 25
End: 2025-02-27

## 2025-02-27 DIAGNOSIS — F42.9 OBSESSIVE-COMPULSIVE DISORDER, UNSPECIFIED TYPE: ICD-10-CM

## 2025-02-27 DIAGNOSIS — F31.12 BIPOLAR 1 DISORDER WITH MODERATE MANIA (HCC): ICD-10-CM

## 2025-02-27 DIAGNOSIS — F43.10 POST TRAUMATIC STRESS DISORDER (PTSD): Primary | ICD-10-CM

## 2025-02-27 DIAGNOSIS — F42.2 MIXED OBSESSIONAL THOUGHTS AND ACTS: ICD-10-CM

## 2025-02-27 DIAGNOSIS — F60.3 BORDERLINE PERSONALITY DISORDER (HCC): ICD-10-CM

## 2025-02-27 PROCEDURE — 90834 PSYTX W PT 45 MINUTES: CPT

## 2025-02-27 NOTE — TELEPHONE ENCOUNTER
Pt called to get her appt for 2/27/2025 at 9 am switched to a virtual visit due to forgot it was for in office..  Pt has Needt and will connect with provider through Sessions. Writer switched appt and checked it in.

## 2025-02-27 NOTE — BH TREATMENT PLAN
"Outpatient Behavioral Health Psychotherapy Treatment Plan    lisa Solorzano  2000     Date of Initial Psychotherapy Assessment: 9/21/22   Date of Current Treatment Plan: 02/27/25  Treatment Plan Target Date: 8/27/25  Treatment Plan Expiration Date: 8/27/25    Diagnosis:   1. Post traumatic stress disorder (PTSD)        2. Mixed obsessional thoughts and acts        3. Borderline personality disorder (HCC)        4. Obsessive-compulsive disorder, unspecified type        5. Bipolar 1 disorder with moderate saul (HCC)          Strengths: good friend, helpful, good caretaker      Area(s) of Need: anger     Long Term Goal 1 (in the client's own words): getting through emotions     Stage of Change: Action     Target Date for completion: 7/11/24             Anticipated therapeutic modalities: psychodynamic support and cognitive behavioral strategies              People identified to complete this goal: Evans                     Objective 1: (identify the means of measuring success in meeting the objective): 1) continue therapy; 2) continue psychiatry and med compliance and management; 3) \"stop drinking so much\" ; wants to work toward weekends only; 4) \"get back to the gym\"; 5) decrease negative self-talk; 6) redirect and thought-stop perseverative thoughts         I am currently under the care of a [facility] psychiatric provider: yes     My [facility] psychiatric provider is: Jacque Alejandro MD     I am currently taking psychiatric medications: Yes, as prescribed     I feel that I will be ready for discharge from mental health care when I reach the following (measurable goal/objective): previously asked:  I don't know     For children and adults who have a legal guardian:          Has there been any change to custody orders and/or guardianship status? NA. If yes, attach updated documentation.     I have previously created my Crisis Plan and have been offered a copy of this plan     Behavioral Health Treatment " Plan [facility]: Diagnosis and Treatment Plan explained to Janell Solorzano acknowledges an understanding of their diagnosis. Janell Solorzano agrees to this treatment plan.     I have been offered a copy of this Treatment Plan. yes
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
## 2025-03-02 DIAGNOSIS — Z30.011 ENCOUNTER FOR INITIAL PRESCRIPTION OF CONTRACEPTIVE PILLS: ICD-10-CM

## 2025-03-03 ENCOUNTER — APPOINTMENT (OUTPATIENT)
Dept: LAB | Facility: HOSPITAL | Age: 25
End: 2025-03-03
Payer: COMMERCIAL

## 2025-03-03 DIAGNOSIS — F31.12 BIPOLAR 1 DISORDER WITH MODERATE MANIA (HCC): ICD-10-CM

## 2025-03-03 LAB
ALBUMIN SERPL BCG-MCNC: 3.7 G/DL (ref 3.5–5)
ALP SERPL-CCNC: 37 U/L (ref 34–104)
ALT SERPL W P-5'-P-CCNC: 13 U/L (ref 7–52)
ANION GAP SERPL CALCULATED.3IONS-SCNC: 6 MMOL/L (ref 4–13)
AST SERPL W P-5'-P-CCNC: 16 U/L (ref 13–39)
BASOPHILS # BLD AUTO: 0.04 THOUSANDS/ÂΜL (ref 0–0.1)
BASOPHILS NFR BLD AUTO: 1 % (ref 0–1)
BILIRUB SERPL-MCNC: 0.47 MG/DL (ref 0.2–1)
BUN SERPL-MCNC: 19 MG/DL (ref 5–25)
CALCIUM SERPL-MCNC: 8.5 MG/DL (ref 8.4–10.2)
CHLORIDE SERPL-SCNC: 107 MMOL/L (ref 96–108)
CO2 SERPL-SCNC: 25 MMOL/L (ref 21–32)
CREAT SERPL-MCNC: 0.78 MG/DL (ref 0.6–1.3)
EOSINOPHIL # BLD AUTO: 0.09 THOUSAND/ÂΜL (ref 0–0.61)
EOSINOPHIL NFR BLD AUTO: 1 % (ref 0–6)
ERYTHROCYTE [DISTWIDTH] IN BLOOD BY AUTOMATED COUNT: 12.8 % (ref 11.6–15.1)
EST. AVERAGE GLUCOSE BLD GHB EST-MCNC: 94 MG/DL
GFR SERPL CREATININE-BSD FRML MDRD: 106 ML/MIN/1.73SQ M
GLUCOSE P FAST SERPL-MCNC: 85 MG/DL (ref 65–99)
HBA1C MFR BLD: 4.9 %
HCT VFR BLD AUTO: 43.2 % (ref 34.8–46.1)
HGB BLD-MCNC: 14.6 G/DL (ref 11.5–15.4)
IMM GRANULOCYTES # BLD AUTO: 0.02 THOUSAND/UL (ref 0–0.2)
IMM GRANULOCYTES NFR BLD AUTO: 0 % (ref 0–2)
LYMPHOCYTES # BLD AUTO: 3.26 THOUSANDS/ÂΜL (ref 0.6–4.47)
LYMPHOCYTES NFR BLD AUTO: 49 % (ref 14–44)
MCH RBC QN AUTO: 30.5 PG (ref 26.8–34.3)
MCHC RBC AUTO-ENTMCNC: 33.8 G/DL (ref 31.4–37.4)
MCV RBC AUTO: 90 FL (ref 82–98)
MONOCYTES # BLD AUTO: 0.55 THOUSAND/ÂΜL (ref 0.17–1.22)
MONOCYTES NFR BLD AUTO: 8 % (ref 4–12)
NEUTROPHILS # BLD AUTO: 2.78 THOUSANDS/ÂΜL (ref 1.85–7.62)
NEUTS SEG NFR BLD AUTO: 41 % (ref 43–75)
NRBC BLD AUTO-RTO: 0 /100 WBCS
PLATELET # BLD AUTO: 245 THOUSANDS/UL (ref 149–390)
PMV BLD AUTO: 10 FL (ref 8.9–12.7)
POTASSIUM SERPL-SCNC: 4 MMOL/L (ref 3.5–5.3)
PROT SERPL-MCNC: 6.3 G/DL (ref 6.4–8.4)
RBC # BLD AUTO: 4.78 MILLION/UL (ref 3.81–5.12)
SODIUM SERPL-SCNC: 138 MMOL/L (ref 135–147)
TSH SERPL DL<=0.05 MIU/L-ACNC: 1.16 UIU/ML (ref 0.45–4.5)
VALPROATE SERPL-MCNC: 53 UG/ML (ref 50–100)
WBC # BLD AUTO: 6.74 THOUSAND/UL (ref 4.31–10.16)

## 2025-03-03 PROCEDURE — 84443 ASSAY THYROID STIM HORMONE: CPT

## 2025-03-03 PROCEDURE — 85025 COMPLETE CBC W/AUTO DIFF WBC: CPT

## 2025-03-03 PROCEDURE — 36415 COLL VENOUS BLD VENIPUNCTURE: CPT

## 2025-03-03 PROCEDURE — 80053 COMPREHEN METABOLIC PANEL: CPT

## 2025-03-03 PROCEDURE — 80164 ASSAY DIPROPYLACETIC ACD TOT: CPT

## 2025-03-03 PROCEDURE — 83036 HEMOGLOBIN GLYCOSYLATED A1C: CPT

## 2025-03-03 RX ORDER — NORGESTIMATE AND ETHINYL ESTRADIOL 0.25-0.035
1 KIT ORAL DAILY
Qty: 84 TABLET | Refills: 1 | Status: SHIPPED | OUTPATIENT
Start: 2025-03-03

## 2025-03-04 DIAGNOSIS — F31.12 BIPOLAR 1 DISORDER WITH MODERATE MANIA (HCC): Primary | ICD-10-CM

## 2025-03-04 RX ORDER — DIVALPROEX SODIUM 500 MG/1
1000 TABLET, FILM COATED, EXTENDED RELEASE ORAL
Qty: 30 TABLET | Refills: 1 | Status: SHIPPED | OUTPATIENT
Start: 2025-03-04

## 2025-03-04 NOTE — PROGRESS NOTES
Patient's depakote level 53.  Will increase depakote to 1000mg PO HS.  Depakote level will be input for one month. Called patient to and left voicemail outlining the plan.  Also requested that she call the office and schedule an appointment for end of March as there does not appear to be an appointment scheduled at this time.

## 2025-03-13 ENCOUNTER — TELEMEDICINE (OUTPATIENT)
Dept: BEHAVIORAL/MENTAL HEALTH CLINIC | Facility: CLINIC | Age: 25
End: 2025-03-13
Payer: COMMERCIAL

## 2025-03-13 DIAGNOSIS — F43.10 POST TRAUMATIC STRESS DISORDER (PTSD): Primary | ICD-10-CM

## 2025-03-13 DIAGNOSIS — F60.3 BORDERLINE PERSONALITY DISORDER (HCC): ICD-10-CM

## 2025-03-13 DIAGNOSIS — F31.12 BIPOLAR 1 DISORDER WITH MODERATE MANIA (HCC): ICD-10-CM

## 2025-03-13 DIAGNOSIS — F42.9 OBSESSIVE-COMPULSIVE DISORDER, UNSPECIFIED TYPE: ICD-10-CM

## 2025-03-13 DIAGNOSIS — F42.2 MIXED OBSESSIONAL THOUGHTS AND ACTS: ICD-10-CM

## 2025-03-13 PROCEDURE — 90834 PSYTX W PT 45 MINUTES: CPT
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
## 2025-03-24 ENCOUNTER — TELEMEDICINE (OUTPATIENT)
Dept: PSYCHIATRY | Facility: CLINIC | Age: 25
End: 2025-03-24
Payer: COMMERCIAL

## 2025-03-24 ENCOUNTER — TELEMEDICINE (OUTPATIENT)
Dept: BEHAVIORAL/MENTAL HEALTH CLINIC | Facility: CLINIC | Age: 25
End: 2025-03-24
Payer: COMMERCIAL

## 2025-03-24 DIAGNOSIS — F42.9 OBSESSIVE-COMPULSIVE DISORDER, UNSPECIFIED TYPE: ICD-10-CM

## 2025-03-24 DIAGNOSIS — F43.10 POST TRAUMATIC STRESS DISORDER (PTSD): Primary | ICD-10-CM

## 2025-03-24 DIAGNOSIS — F42.2 MIXED OBSESSIONAL THOUGHTS AND ACTS: ICD-10-CM

## 2025-03-24 DIAGNOSIS — F31.12 BIPOLAR 1 DISORDER WITH MODERATE MANIA (HCC): ICD-10-CM

## 2025-03-24 DIAGNOSIS — F60.3 BORDERLINE PERSONALITY DISORDER (HCC): ICD-10-CM

## 2025-03-24 PROCEDURE — 90834 PSYTX W PT 45 MINUTES: CPT

## 2025-03-24 PROCEDURE — 99214 OFFICE O/P EST MOD 30 MIN: CPT | Performed by: NURSE PRACTITIONER

## 2025-03-24 RX ORDER — DIVALPROEX SODIUM 500 MG/1
1000 TABLET, FILM COATED, EXTENDED RELEASE ORAL
Qty: 60 TABLET | Refills: 2 | Status: SHIPPED | OUTPATIENT
Start: 2025-03-24

## 2025-03-24 RX ORDER — MIRTAZAPINE 7.5 MG/1
7.5 TABLET, FILM COATED ORAL
Qty: 90 TABLET | Refills: 0 | Status: SHIPPED | OUTPATIENT
Start: 2025-03-24
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
## 2025-03-24 NOTE — PSYCH
MEDICATION MANAGEMENT NOTE    Name: Lisa Solorzano      : 2000      MRN: [id number]  Encounter Provider: ANGELA Vaughn  Encounter Date: 3/24/2025   Encounter department: [facility]    Insurance: Payor: [employer] CROSS / Plan: [facility] PLAN 361 / Product Type: Blue Fee for Service /      Reason for Visit: No chief complaint on file.  :  Assessment & Plan  Bipolar 1 disorder with moderate saul (HCC)  Continue medications as ordered at this time. Continue therapy and follow up in one month. Depakote level due by next appointment date.   Depakote ER 1000mg PO HS  Hydroxyzine 25mg PO 1-2 tabs Q6PRN    Orders:    divalproex sodium (Depakote ER) 500 mg 24 hr tablet; Take 2 tablets (1,000 mg total) by mouth daily at bedtime    Post traumatic stress disorder (PTSD)  Continue medication as ordered. Mirtazapine 7.5mg PO HS   Orders:    mirtazapine (REMERON) 7.5 MG tablet; Take 1 tablet (7.5 mg total) by mouth daily at bedtime        Treatment Recommendations:    Educated about diagnosis and treatment modalities. Verbalizes understanding and agreement with the treatment plan.  Discussed self monitoring of symptoms, and symptom monitoring tools.  Discussed medications and if treatment adjustment was needed or desired.  Medication management every 1 month  Aware of need to follow up with family physician for medical issues  Aware of 24 hour and weekend coverage for urgent situations accessed by calling [facility] main practice number  I am scheduling this patient out for greater than 3 months: No    Medications Risks/Benefits:      Risks, Benefits And Possible Side Effects Of Medications:    Risks, benefits, and possible side effects of medications explained to lisa and she (or legal representative) verbalizes understanding and agreement for treatment.  Risks of medications in pregnancy or becoming pregnant explained to lisa. She verbalizes understanding  and agrees to discuss treatment if planning to become pregnant, or if she become pregnant    Controlled Medication Discussion:     Not applicable      History of Present Illness     CC: lisa presents today for follow up on 3/24/25.  Lisa reports that she is doing well. She states that she has begun making improvements in her life again, such as going to the gym and not drinking alcohol as often. She has been hanging out with friends. Reports good sleep and good appetite. She denies any side effects to her medications. Rates her depression a 2/10, anxiety 3/10. Denies SI/HI. Denies auditory and visual hallucinations. She will continue medications as ordered and follow up in one month.  Valproic Acid level due by next appt.     lisa Verbalized understanding and is in agreement with the treatment plan as outlined today.       Med Compliance: yes    Since our last visit, overall symptoms have been stable.       HPI ROS:     Medication Side Effects: denies  Depression: 2 /10 (10 worst)  Anxiety: 3 /10 (10 worst)  Safety concerns (SI, HI, others): denies  Sleep: good  Energy: good  Appetite: good    lisa denies any side effects from medications unless noted above.    Review Of Systems: A review of systems is obtained and is negative except for the pertinent positives listed in HPI/Subjective above.      Current Rating Scores:     Current PHQ-9   PHQ-2/9 Depression Screening    Little interest or pleasure in doing things: 0 - not at all  Feeling down, depressed, or hopeless: 0 - not at all  Trouble falling or staying asleep, or sleeping too much: 1 - several days  Feeling tired or having little energy: 1 - several days  Poor appetite or overeatin - not at all  Feeling bad about yourself - or that you are a failure or have let yourself or your family down: 0 - not at all  Trouble concentrating on things, such as reading the newspaper or watching television: 1 - several days  Moving or speaking so slowly that  other people could have noticed. Or the opposite - being so fidgety or restless that you have been moving around a lot more than usual: 0 - not at all  Thoughts that you would be better off dead, or of hurting yourself in some way: 0 - not at all  PHQ-9 Score: 3  PHQ-9 Interpretation: No or Minimal depression       Current JOHANA-7   JOHANA-7 Flowsheet Screening      Flowsheet Row Most Recent Value   Over the last two weeks, how often have you been bothered by the following problems?     Feeling nervous, anxious, or on edge 1   Not being able to stop or control worrying 1   Worrying too much about different things 1   Trouble relaxing  1   Being so restless that it's hard to sit still 0   Becoming easily annoyed or irritable  3   Feeling afraid as if something awful might happen 0   How difficult have these problems made it for you to do your work, take care of things at home, or get along with other people?  Not difficult at all   JOHANA Score  7            Areas of Improvement: reviewed in HPI/Subjective Section and reviewed in Assessment and Plan Section    Past Medical History:   Diagnosis Date    Anxiety     Concussion     Depression     Head injury     concussions in high school and in college    Self-injurious behavior         Past Surgical History:   Procedure Laterality Date    WISDOM TOOTH EXTRACTION       Allergies: No Known Allergies    Current Outpatient Medications   Medication Sig Dispense Refill    divalproex sodium (Depakote ER) 500 mg 24 hr tablet Take 2 tablets (1,000 mg total) by mouth daily at bedtime 60 tablet 2    hydrOXYzine HCL (ATARAX) 25 mg tablet Take 1 tablet (25 mg total) by mouth every 6 (six) hours as needed for anxiety 90 tablet 2    mirtazapine (REMERON) 7.5 MG tablet Take 1 tablet (7.5 mg total) by mouth daily at bedtime 90 tablet 0    norgestimate-ethinyl estradiol (Sprintec 28) 0.25-35 MG-MCG per tablet Take 1 tablet by mouth daily 84 tablet 1    ondansetron (ZOFRAN) 4 mg tablet Take 1  tablet (4 mg total) by mouth every 8 (eight) hours as needed for nausea or vomiting 20 tablet 0    pantoprazole (PROTONIX) 40 mg tablet Take 1 tablet (40 mg total) by mouth daily before breakfast 30 tablet 1    Adapalene-Benzoyl Peroxide 0.1-2.5 % gel Apply 1 Application topically daily at bedtime (Patient not taking: Reported on 3/24/2025) 45 g 0    phenazopyridine (PYRIDIUM) 200 mg tablet Take 1 tablet (200 mg total) by mouth 3 (three) times a day (Patient not taking: Reported on 12/20/2024) 6 tablet 0     No current facility-administered medications for this visit.       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   Tobacco Use    Smoking status: Never    Smokeless tobacco: Never   Vaping Use    Vaping status: Never Used   Substance and Sexual Activity    Alcohol use: Yes     Alcohol/week: 3.0 - 4.0 standard drinks of alcohol     Types: 3 - 4 Shots of liquor per week     Comment: 3-4 drinks, 2 times per week    Drug use: No    Sexual activity: Yes     Partners: Male     Birth control/protection: Condom Male   Other Topics Concern    Not on file   Social History Narrative    Not on file     Social Drivers of Health     Financial Resource Strain: Not on file   Food Insecurity: Not on file   Transportation Needs: Not on file   Physical Activity: Not on file   Stress: Not on file   Social Connections: Unknown (6/18/2024)    Received from [place]     Social Connections     How often do you feel lonely or isolated from those around you? (Adult - for ages 18 years and over): Not on file   Intimate Partner Violence: Not on file   Housing Stability: Not on file       Family Psychiatric History:     Family History   Problem Relation Age of Onset    Hypertension Mother     Mental illness Mother     Depression Mother     Mental illness Sister     Coronary artery disease Maternal Grandmother     Throat cancer Paternal Grandfather        Medical History Reviewed by provider this encounter:  Allergies  Meds          Objective   There were no vitals taken for this visit.     Mental Status Evaluation:    Appearance age appropriate, casually dressed   Behavior cooperative, calm   Speech normal rate, normal volume, normal pitch, spontaneous   Mood euthymic   Affect normal range and intensity, appropriate   Thought Processes organized, goal directed   Thought Content no overt delusions   Perceptual Disturbances: no auditory hallucinations, no visual hallucinations   Abnormal Thoughts  Risk Potential Suicidal ideation - None at present  Homicidal ideation - None  Potential for aggression - No   Orientation oriented to person, place, time/date, and situation   Memory recent and remote memory grossly intact   Consciousness alert and awake   Attention Span Concentration Span attention span and concentration are age appropriate   Intellect appears to be of average intelligence   Insight intact   Judgement intact   Muscle Strength and  Gait normal muscle strength and normal muscle tone, normal gait and normal balance   Motor activity unable to assess today due to virtual visit   Language no difficulty naming common objects, no difficulty repeating a phrase, no difficulty writing a sentence   Fund of Knowledge adequate knowledge of current events  adequate fund of knowledge regarding past history  adequate fund of knowledge regarding vocabulary    Pain none   Pain Scale 0        Laboratory Results: I have personally reviewed all pertinent laboratory/tests results    Recent Labs (last 2 months):   Appointment on 03/03/2025   Component Date Value    Valproic Acid, Total 03/03/2025 53     WBC 03/03/2025 6.74     RBC 03/03/2025 4.78     Hemoglobin 03/03/2025 14.6     Hematocrit 03/03/2025 43.2     MCV 03/03/2025 90     MCH 03/03/2025 30.5     MCHC 03/03/2025 33.8     RDW 03/03/2025 12.8     MPV 03/03/2025 10.0     Platelets 03/03/2025 245     nRBC 03/03/2025 0     Segmented % 03/03/2025 41 (L)     Immature Grans % 03/03/2025 0     Lymphocytes % 03/03/2025 49 (H)     Monocytes % 03/03/2025 8     Eosinophils Relative 03/03/2025 1     Basophils Relative 03/03/2025 1     Absolute Neutrophils 03/03/2025 2.78     Absolute Immature Grans 03/03/2025 0.02     Absolute Lymphocytes 03/03/2025 3.26     Absolute Monocytes 03/03/2025 0.55     Eosinophils Absolute 03/03/2025 0.09     Basophils Absolute 03/03/2025 0.04     Sodium 03/03/2025 138     Potassium 03/03/2025 4.0     Chloride 03/03/2025 107     CO2 03/03/2025 25     ANION GAP 03/03/2025 6     BUN 03/03/2025 19     Creatinine 03/03/2025 0.78     Glucose, Fasting 03/03/2025 85     Calcium 03/03/2025 8.5     AST 03/03/2025 16     ALT 03/03/2025 13     Alkaline Phosphatase 03/03/2025 37     Total Protein 03/03/2025 6.3 (L)     Albumin 03/03/2025 3.7     Total Bilirubin 03/03/2025 0.47     eGFR 03/03/2025 106     Hemoglobin A1C 03/03/2025 4.9     EAG 03/03/2025 94     TSH 3RD GENERATON 03/03/2025 1.162        Suicide/Homicide Risk Assessment:    Risk of Harm to Self:  The following ratings are based on assessment at the time of the interview  Demographic Risk Factors include: , age: young adult (15-24)  Historical Risk Factors include: chronic anxiety symptoms, chronic mood disorder  Current Specific Risk Factors include: current anxiety symptoms, impulsivity  Protective Factors: no current suicidal ideation, access to mental health  treatment, compliant with medications, compliant with mental health treatment, effective problem solving skills, having a desire to be alive, stable job, sense of determination, strong relationships, supportive friends  Weapons/Firearms: none. The following steps have been taken to ensure weapons are properly secured: not applicable  Based on today's assessment, lisa presents the following risk of harm to self: low    Risk of Harm to Others:  The following ratings are based on assessment at the time of the interview  Protective Factors: no current homicidal ideation  Based on today's assessment, lisa presents the following risk of harm to others: none    The following interventions are recommended: Continue medication management. Continue psychotherapy. No other intervention changes indicated at this time.    Psychotherapy Provided:     Individual psychotherapy provided: Yes    Medication education provided to lisa.  Recent stressor including everyday stressors and ongoing anxiety discussed with lisa.   Coping skills including exercising, maintain healthy diet, maintain heathy sleeping hygiene, and talking to friends reviewed with lisa.   Importance of medication and treatment compliance reviewed with lisa.  Supportive therapy provided.   Crisis/safety plan discussed with lisa.    Treatment Plan:    Completed and signed during the session: Not applicable - Treatment Plan not due at this session    Goals: Progress towards Treatment Plan goals - Yes, progressing, as evidenced by subjective findings in HPI/Subjective Section and in Assessment and Plan Section    Depression Follow-up Plan Completed: Not applicable    Note Share:    This note was not shared with the patient due to this is a psychotherapy note    Administrative Statements   Administrative Statements   Encounter provider ANGELA Vaughn    The Patient is located at Other and in the following state in which I hold an active license  [place].    The patient was identified by name and date of birth. Janell Solorzano was informed that this is a telemedicine visit and that the visit is being conducted through the Epic Embedded platform. She agrees to proceed..  My office door was closed. No one else was in the room.  She acknowledged consent and understanding of privacy and security of the video platform. The patient has agreed to participate and understands they can discontinue the visit at any time.    I have spent a total time of 15 minutes in caring for this patient on the day of the visit/encounter including Risks and benefits of tx options, Instructions for management, Patient and family education, Importance of tx compliance, Risk factor reductions, Impressions, Documenting in the medical record, Reviewing/placing orders in the medical record (including tests, medications, and/or procedures), and Obtaining or reviewing history  , not including the time spent for establishing the audio/video connection.    Visit Time  Visit Start Time: 1400  Visit Stop Time: 1415  Total Visit Duration:  15 minutes    ANGELA Vaughn 03/24/25

## 2025-04-03 ENCOUNTER — RA CDI HCC (OUTPATIENT)
Dept: OTHER | Facility: HOSPITAL | Age: 25
End: 2025-04-03

## 2025-04-09 ENCOUNTER — TELEMEDICINE (OUTPATIENT)
Dept: BEHAVIORAL/MENTAL HEALTH CLINIC | Facility: CLINIC | Age: 25
End: 2025-04-09
Payer: COMMERCIAL

## 2025-04-09 ENCOUNTER — TELEPHONE (OUTPATIENT)
Age: 25
End: 2025-04-09

## 2025-04-09 DIAGNOSIS — F42.2 MIXED OBSESSIONAL THOUGHTS AND ACTS: ICD-10-CM

## 2025-04-09 DIAGNOSIS — F60.3 BORDERLINE PERSONALITY DISORDER (HCC): ICD-10-CM

## 2025-04-09 DIAGNOSIS — F31.12 BIPOLAR 1 DISORDER WITH MODERATE MANIA (HCC): ICD-10-CM

## 2025-04-09 DIAGNOSIS — F43.10 POST TRAUMATIC STRESS DISORDER (PTSD): Primary | ICD-10-CM

## 2025-04-09 DIAGNOSIS — F42.9 OBSESSIVE-COMPULSIVE DISORDER, UNSPECIFIED TYPE: ICD-10-CM

## 2025-04-09 PROCEDURE — 90834 PSYTX W PT 45 MINUTES: CPT

## 2025-04-09 NOTE — PSYCH
Virtual Regular VisitName: Janell Solorzano      : 2000      MRN: [id number]  Encounter Provider: Michela Bates LCSW  Encounter Date: 2025   Encounter department: [facility]ERAPIST [place]  :  Assessment & Plan  Post traumatic stress disorder (PTSD)         Mixed obsessional thoughts and acts         Borderline personality disorder (HCC)         Obsessive-compulsive disorder, unspecified type         Bipolar 1 disorder with moderate saul (HCC)             Goals addressed in session: Goal 1     DATA: [initial tech issues, but able to successfully have session] Janell discussed leaving last job, and onboarding for another job. 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 Discussed Janell looking forward to the new job, doing things today and tomorrow for f/up with the hiring process. Thinks it is first shift 7-3 but not sure yet. Discussed discontent with prior job, hopeful for better experience. We addressed how this new job appears to fit with her broad goals and hopeful. Addressed recent unsolicited outreach from ex boyfriend. Addressed her romanticized version of his outreach versus having evidence of his toxic, unhealthy treatment of Janell and other women. Addressed differences and healthier mindset for Janell, as she is able to pause, question what is to be gained, and whether or not she wants to move forward before she does so. Addressed her emotionally moving forward. Addressed ways the chaos of this relationship is familiar and comfortable and draws her back in. Addressed continued pause, questioning before she acts. Addressed how she does have choices. Addressed gap until next session (due to provider schedule limits).  Provider to put on a wait for an opening, at Janell's request; discussed avenue for outreach by Janell if needed in the interim and provider to return call as soon as able.     During this session,  "this clinician used the following therapeutic modalities: Cognitive Behavioral Therapy, Mindfulness-based Strategies, Motivational Interviewing, Solution-Focused Therapy, and Supportive Psychotherapy    Substance Abuse was addressed during this session. If the client is diagnosed with a co-occurring substance use disorder, please indicate any changes in the frequency or amount of use: Janell reports continued decreased drinking efforts and she was able to articulate how drinking affected communications. Stage of change for addressing substance use diagnoses: Action    ASSESSMENT:  Janell presents with a Euthymic/ normal mood. Janell's affect is Normal range and intensity, which is congruent, with their mood and the content of the session. The client has made progress on their goals as evidenced by increased thoughtfulness into her behaviors.    Janell presents with a low risk of suicide, low risk of self-harm, and low risk of harm to others. Janell presented with overall stability in mood and thought in context with her ongoing challenges with emotional regulation.    For any risk assessment that surpasses a \"low\" rating, a safety plan must be developed.    A safety plan was indicated: no  If yes, describe in detail n/a    PLAN: Between sessions, Janell will continue prioritization of self-care, asking questions, pausing and use of other strategies to manage behavioral choices. At the next session, the therapist will use Cognitive Behavioral Therapy and Supportive Psychotherapy to address continued improvement in emotional regulation.    Behavioral Health Treatment Plan [facility]: Diagnosis and Treatment Plan explained to Janell Maciel relates understanding diagnosis and is agreeable to Treatment Plan. Yes     Depression Follow-up Plan Completed: Not applicable     Reason for visit is No chief complaint on file.     Recent Visits  No visits were found meeting these conditions.  Showing recent visits within past 7 " days and meeting all other requirements  Today's Visits  Date Type Provider Dept   04/09/25 Telemedicine Michela Bates LCSW [facility] Therapist [place]   Showing today's visits and meeting all other requirements  Future Appointments  No visits were found meeting these conditions.  Showing future appointments within next 150 days and meeting all other requirements     History of Present Illness     HPI    Past Medical History   Past Medical History:   Diagnosis Date    Anxiety     Concussion     Depression     Head injury     concussions in high school and in college    Self-injurious behavior      Past Surgical History:   Procedure Laterality Date    WISDOM TOOTH EXTRACTION       Current Outpatient Medications   Medication Instructions    Adapalene-Benzoyl Peroxide 0.1-2.5 % gel 1 Application, Topical, Daily at bedtime    divalproex sodium (DEPAKOTE ER) 1,000 mg, Oral, Daily at bedtime    hydrOXYzine HCL (ATARAX) 25 mg, Oral, Every 6 hours PRN    mirtazapine (REMERON) 7.5 mg, Oral, Daily at bedtime    norgestimate-ethinyl estradiol (Sprintec 28) 0.25-35 MG-MCG per tablet 1 tablet, Oral, Daily    ondansetron (ZOFRAN) 4 mg, Oral, Every 8 hours PRN    pantoprazole (PROTONIX) 40 mg, Oral, Daily before breakfast    phenazopyridine (PYRIDIUM) 200 mg, Oral, 3 times daily     No Known Allergies    Objective   There were no vitals taken for this visit.    Video Exam  Physical Exam     Administrative Statements   Encounter provider Michela Bates LCSW    The Patient is located at Home and in the following state in which I hold an active license [place].    The patient was identified by name and date of birth. Janell Solorzano was informed that this is a telemedicine visit and that the visit is being conducted through the Epic Embedded platform. She agrees to proceed..  My office door was closed. No one else was in the room.  She acknowledged consent and understanding of privacy and security of the video platform. The patient  has agreed to participate and understands they can discontinue the visit at any time.      Visit Time  Start Time: 0910  Stop Time: 0959  Total Visit Time: 49 minutes

## 2025-04-09 NOTE — TELEPHONE ENCOUNTER
Pt contacted [facility] explaining that she is unable to connect virtually for therapy appt (today at 9AM). Provider has sent several links to pt, all unsuccessful. Writer contacted provider via secure chat to inform, as well as obtain information regarding how she would like to proceed. Provider let writer know there is a system issue. She will attempt to connect with pt via [employer]. Writer advised pt.   Provider updated writer a little later, connection successful.

## 2025-04-10 ENCOUNTER — OFFICE VISIT (OUTPATIENT)
Dept: FAMILY MEDICINE CLINIC | Facility: CLINIC | Age: 25
End: 2025-04-10
Payer: COMMERCIAL

## 2025-04-10 VITALS
OXYGEN SATURATION: 97 % | HEART RATE: 92 BPM | BODY MASS INDEX: 23.78 KG/M2 | HEIGHT: 63 IN | DIASTOLIC BLOOD PRESSURE: 88 MMHG | SYSTOLIC BLOOD PRESSURE: 124 MMHG | WEIGHT: 134.2 LBS

## 2025-04-10 DIAGNOSIS — Z72.51 UNPROTECTED SEX: ICD-10-CM

## 2025-04-10 DIAGNOSIS — N89.8 VAGINAL DISCHARGE: ICD-10-CM

## 2025-04-10 DIAGNOSIS — R30.0 DYSURIA: Primary | ICD-10-CM

## 2025-04-10 LAB
SL AMB  POCT GLUCOSE, UA: ABNORMAL
SL AMB LEUKOCYTE ESTERASE,UA: ABNORMAL
SL AMB POCT BILIRUBIN,UA: ABNORMAL
SL AMB POCT BLOOD,UA: ABNORMAL
SL AMB POCT CLARITY,UA: CLEAR
SL AMB POCT COLOR,UA: YELLOW
SL AMB POCT KETONES,UA: ABNORMAL
SL AMB POCT NITRITE,UA: ABNORMAL
SL AMB POCT PH,UA: 6
SL AMB POCT SPECIFIC GRAVITY,UA: 1
SL AMB POCT URINE HCG: NORMAL
SL AMB POCT URINE PROTEIN: ABNORMAL
SL AMB POCT UROBILINOGEN: ABNORMAL

## 2025-04-10 PROCEDURE — 87591 N.GONORRHOEAE DNA AMP PROB: CPT | Performed by: NURSE PRACTITIONER

## 2025-04-10 PROCEDURE — 87491 CHLMYD TRACH DNA AMP PROBE: CPT | Performed by: NURSE PRACTITIONER

## 2025-04-10 PROCEDURE — 81002 URINALYSIS NONAUTO W/O SCOPE: CPT | Performed by: NURSE PRACTITIONER

## 2025-04-10 PROCEDURE — 81514 NFCT DS BV&VAGINITIS DNA ALG: CPT | Performed by: NURSE PRACTITIONER

## 2025-04-10 PROCEDURE — 87077 CULTURE AEROBIC IDENTIFY: CPT | Performed by: NURSE PRACTITIONER

## 2025-04-10 PROCEDURE — 99214 OFFICE O/P EST MOD 30 MIN: CPT | Performed by: NURSE PRACTITIONER

## 2025-04-10 PROCEDURE — 81025 URINE PREGNANCY TEST: CPT | Performed by: NURSE PRACTITIONER

## 2025-04-10 PROCEDURE — 87086 URINE CULTURE/COLONY COUNT: CPT | Performed by: NURSE PRACTITIONER

## 2025-04-10 PROCEDURE — 87186 SC STD MICRODIL/AGAR DIL: CPT | Performed by: NURSE PRACTITIONER

## 2025-04-10 NOTE — PROGRESS NOTES
"Name: Janell Solorzano      : 2000      MRN: [id number]  Encounter Provider: ANGELA Arthur  Encounter Date: 4/10/2025   Encounter department: [facility] [address]  :  Assessment & Plan  Dysuria    Orders:    POCT urine dip    POCT urine HCG    Molecular Vaginal Panel; Future    Chlamydia/GC amplified DNA by PCR    Urine culture    Vaginal discharge  Sent molecular vaginal panel.  Discussed safe sex practices.       Unprotected sex  Discussed safe sex practices with patient.              History of Present Illness   Patient presents for concerns of dysuria and vaginal discharge.  Patient has had unprotected sex with several partners.  Patient is concerned for possible STD.      Review of Systems   Constitutional: Negative.    Respiratory: Negative.     Cardiovascular: Negative.    Gastrointestinal:  Positive for nausea. Negative for diarrhea and vomiting.   Genitourinary:  Positive for dysuria, frequency and urgency. Negative for hematuria.        Fdlmp- 2 weeks ago   Psychiatric/Behavioral:  Positive for dysphoric mood. The patient is nervous/anxious.        Objective   /88 (BP Location: Left arm, Patient Position: Sitting, Cuff Size: Standard)   Pulse 92   Ht 5' 3\" (1.6 m)   Wt 60.9 kg (134 lb 3.2 oz)   SpO2 97%   BMI 23.77 kg/m²      Physical Exam  Vitals and nursing note reviewed.   Constitutional:       General: She is not in acute distress.     Appearance: She is well-developed.   Cardiovascular:      Rate and Rhythm: Normal rate and regular rhythm.      Heart sounds: No murmur heard.  Pulmonary:      Effort: Pulmonary effort is normal. No respiratory distress.      Breath sounds: Normal breath sounds.   Genitourinary:     Vagina: Normal.   Musculoskeletal:         General: No swelling.      Cervical back: Neck supple.   Skin:     General: Skin is warm and dry.      Capillary Refill: Capillary refill takes less than 2 seconds.   Neurological:      " Mental Status: She is alert.   Psychiatric:         Mood and Affect: Mood normal.

## 2025-04-11 ENCOUNTER — TELEPHONE (OUTPATIENT)
Age: 25
End: 2025-04-11

## 2025-04-11 ENCOUNTER — TELEPHONE (OUTPATIENT)
Dept: PSYCHIATRY | Facility: CLINIC | Age: 25
End: 2025-04-11

## 2025-04-11 ENCOUNTER — RESULTS FOLLOW-UP (OUTPATIENT)
Dept: FAMILY MEDICINE CLINIC | Facility: CLINIC | Age: 25
End: 2025-04-11

## 2025-04-11 DIAGNOSIS — N30.00 ACUTE CYSTITIS WITHOUT HEMATURIA: ICD-10-CM

## 2025-04-11 DIAGNOSIS — B37.31 YEAST VAGINITIS: Primary | ICD-10-CM

## 2025-04-11 RX ORDER — FLUCONAZOLE 150 MG/1
TABLET ORAL
Qty: 2 TABLET | Refills: 0 | Status: SHIPPED | OUTPATIENT
Start: 2025-04-11 | End: 2025-04-14

## 2025-04-11 RX ORDER — NITROFURANTOIN 25; 75 MG/1; MG/1
100 CAPSULE ORAL 2 TIMES DAILY
Qty: 10 CAPSULE | Refills: 0 | Status: SHIPPED | OUTPATIENT
Start: 2025-04-11 | End: 2025-04-16

## 2025-04-11 NOTE — TELEPHONE ENCOUNTER
Patient was transferred by [employer] in regards to status of her Medical records. I sent out the request today to [facility]. The patient stated she came in yesterday to fill out a ROSSANA but in conversation with her she was not aware of how  worked with ROSSANA and how the records are the providers and that's why an ROSSANA was needed and demanded her records asap. I provided her the number to [facility] so she could speak with someone there because I sent in all papers today with a confirmed fax so I do not have an estimated wait time as that is something [facility] could answer.

## 2025-04-11 NOTE — TELEPHONE ENCOUNTER
Pt called to check on the paperwork that she dropped off at the office for Jacque Aeljandro to fill out. She also signed an ROSSANA form. Writer called office to inquire the status. Office staff state it is finished and they already sent it. Office staff rep asked to speak to the pt so writer warm transferred her through.

## 2025-04-11 NOTE — TELEPHONE ENCOUNTER
Patient called in to ask where their completed paper work was and why it is being sent to [facility] and places other then the office it needs to go to .  Patient states they were given the number to [facility] already and spoke with them and they do not have the paper work either.    Writer researched and found the fax and the completed paperwork and receipt.  Writer spoke with the office staff and transferred the call successfully for further assistance.

## 2025-04-11 NOTE — TELEPHONE ENCOUNTER
Called patient to let her know I emailed her forms filled out by provider because I attempted to fax papers 4 times to the number provided but it would not go through.

## 2025-04-12 LAB — BACTERIA UR CULT: ABNORMAL

## 2025-05-10 ENCOUNTER — HOSPITAL ENCOUNTER (EMERGENCY)
Facility: HOSPITAL | Age: 25
Discharge: HOME | End: 2025-05-11

## 2025-05-10 ENCOUNTER — APPOINTMENT (EMERGENCY)
Dept: CT IMAGING | Facility: HOSPITAL | Age: 25
End: 2025-05-10

## 2025-05-10 ENCOUNTER — HOSPITAL ENCOUNTER (EMERGENCY)
Facility: HOSPITAL | Age: 25
Discharge: HOME | End: 2025-05-10

## 2025-05-10 VITALS
WEIGHT: 130 LBS | TEMPERATURE: 98 F | HEART RATE: 79 BPM | BODY MASS INDEX: 23.04 KG/M2 | SYSTOLIC BLOOD PRESSURE: 122 MMHG | RESPIRATION RATE: 16 BRPM | HEIGHT: 63 IN | OXYGEN SATURATION: 100 % | DIASTOLIC BLOOD PRESSURE: 84 MMHG

## 2025-05-10 VITALS
OXYGEN SATURATION: 96 % | DIASTOLIC BLOOD PRESSURE: 79 MMHG | RESPIRATION RATE: 20 BRPM | SYSTOLIC BLOOD PRESSURE: 145 MMHG | HEART RATE: 82 BPM

## 2025-05-10 DIAGNOSIS — Z51.89 VISIT FOR WOUND CHECK: Primary | ICD-10-CM

## 2025-05-10 DIAGNOSIS — S01.311A LACERATION OF AURICLE OF RIGHT EAR, INITIAL ENCOUNTER: ICD-10-CM
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
## 2025-05-10 DIAGNOSIS — R51.9 HEADACHE: ICD-10-CM

## 2025-05-10 DIAGNOSIS — S09.90XA INJURY OF HEAD, INITIAL ENCOUNTER: ICD-10-CM

## 2025-05-10 DIAGNOSIS — S01.311A LACERATION OF RIGHT EAR, INITIAL ENCOUNTER: ICD-10-CM

## 2025-05-10 LAB — HCG SERPL QL: NEGATIVE

## 2025-05-10 PROCEDURE — 84703 CHORIONIC GONADOTROPIN ASSAY: CPT

## 2025-05-10 PROCEDURE — 36415 COLL VENOUS BLD VENIPUNCTURE: CPT

## 2025-05-10 PROCEDURE — 99284 EMERGENCY DEPT VISIT MOD MDM: CPT

## 2025-05-10 PROCEDURE — 70450 CT HEAD/BRAIN W/O DYE: CPT

## 2025-05-10 PROCEDURE — 72125 CT NECK SPINE W/O DYE: CPT

## 2025-05-10 PROCEDURE — 12011 RPR F/E/E/N/L/M 2.5 CM/<: CPT | Performed by: EMERGENCY MEDICINE

## 2025-05-10 PROCEDURE — 99284 EMERGENCY DEPT VISIT MOD MDM: CPT | Performed by: EMERGENCY MEDICINE

## 2025-05-10 PROCEDURE — 99283 EMERGENCY DEPT VISIT LOW MDM: CPT

## 2025-05-10 RX ORDER — LIDOCAINE HYDROCHLORIDE 20 MG/ML
5 INJECTION, SOLUTION EPIDURAL; INFILTRATION; INTRACAUDAL; PERINEURAL ONCE
Status: COMPLETED | OUTPATIENT
Start: 2025-05-10 | End: 2025-05-10

## 2025-05-10 RX ORDER — ACETAMINOPHEN 325 MG/1
975 TABLET ORAL ONCE
Status: COMPLETED | OUTPATIENT
Start: 2025-05-10 | End: 2025-05-10

## 2025-05-10 RX ADMIN — ACETAMINOPHEN 975 MG: 325 TABLET, FILM COATED ORAL at 05:45

## 2025-05-10 RX ADMIN — LIDOCAINE HYDROCHLORIDE 5 ML: 20 INJECTION, SOLUTION EPIDURAL; INFILTRATION; INTRACAUDAL at 06:11

## 2025-05-10 RX ADMIN — LIDOCAINE HYDROCHLORIDE 5 ML: 20 INJECTION, SOLUTION EPIDURAL; INFILTRATION; INTRACAUDAL at 07:15

## 2025-05-10 NOTE — Clinical Note
Janell Solorzano was seen and treated in our emergency department on 5/10/2025.    No restrictions            Diagnosis:     Janell  may return to work on return date.    She may return on this date: 05/12/2025         If you have any questions or concerns, please don't hesitate to call.      Shayne Ayala, DO    ______________________________           _______________          _______________  Hospital Representative                              Date                                Time

## 2025-05-10 NOTE — Clinical Note
Janell Solorzano was seen and treated in our emergency department on 5/10/2025.    No restrictions            Diagnosis:     Janell  may return to work on return date.    She may return on this date: 05/12/2025         If you have any questions or concerns, please don't hesitate to call.      Cande Zaragoza RN    ______________________________           _______________          _______________  Hospital Representative                              Date                                Time
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
## 2025-05-11 PROCEDURE — 99284 EMERGENCY DEPT VISIT MOD MDM: CPT

## 2025-05-11 RX ORDER — IBUPROFEN 600 MG/1
600 TABLET, FILM COATED ORAL EVERY 6 HOURS PRN
Qty: 30 TABLET | Refills: 0 | Status: SHIPPED | OUTPATIENT
Start: 2025-05-11

## 2025-05-11 RX ORDER — CEPHALEXIN 500 MG/1
500 CAPSULE ORAL EVERY 6 HOURS SCHEDULED
Qty: 20 CAPSULE | Refills: 0 | Status: SHIPPED | OUTPATIENT
Start: 2025-05-11 | End: 2025-05-16

## 2025-05-11 RX ORDER — IBUPROFEN 600 MG/1
600 TABLET, FILM COATED ORAL ONCE
Status: COMPLETED | OUTPATIENT
Start: 2025-05-11 | End: 2025-05-11

## 2025-05-11 RX ADMIN — CEPHALEXIN 500 MG: 250 CAPSULE ORAL at 00:28

## 2025-05-11 RX ADMIN — IBUPROFEN 600 MG: 600 TABLET ORAL at 00:28
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
## 2025-05-11 NOTE — DISCHARGE INSTRUCTIONS
Today I provided prescription for Keflex, ibuprofen. The keflex is to reduce risk of infection of right ear laceration.  The ibuprofen is for pain.    I provided referral to comprehensive concussion program.  This program will contact you and see if concussion symptoms are present in a couple days.  If they are, you can follow-up with them to work on exercises to decrease concussion symptoms.   Keep wound clean, dry. Clean daily with soap and water.

## 2025-05-13 ENCOUNTER — DOCUMENTATION (OUTPATIENT)
Dept: FAMILY MEDICINE CLINIC | Facility: CLINIC | Age: 25
End: 2025-05-13

## 2025-05-14 ENCOUNTER — OFFICE VISIT (OUTPATIENT)
Dept: OBGYN CLINIC | Facility: CLINIC | Age: 25
End: 2025-05-14

## 2025-05-14 VITALS
TEMPERATURE: 98.4 F | OXYGEN SATURATION: 98 % | HEIGHT: 63 IN | HEART RATE: 75 BPM | BODY MASS INDEX: 23.04 KG/M2 | WEIGHT: 130 LBS

## 2025-05-14 DIAGNOSIS — G44.319 ACUTE POST-TRAUMATIC HEADACHE, NOT INTRACTABLE: ICD-10-CM

## 2025-05-14 DIAGNOSIS — R42 DIZZINESS: ICD-10-CM

## 2025-05-14 DIAGNOSIS — R41.840 DIFFICULTY CONCENTRATING: ICD-10-CM

## 2025-05-14 DIAGNOSIS — S06.0X0A CONCUSSION WITHOUT LOSS OF CONSCIOUSNESS, INITIAL ENCOUNTER: Primary | ICD-10-CM

## 2025-05-14 PROCEDURE — 99204 OFFICE O/P NEW MOD 45 MIN: CPT | Performed by: FAMILY MEDICINE

## 2025-05-14 NOTE — LETTER
May 14, 2025     Patient: Janell Solorzano  YOB: 2000  Date of Visit: 5/14/2025      To Whom it May Concern:    Janell Solorzano is under my professional care. Janell was seen in my office on 5/14/2025. Janell is excused from work due to the medical condition I am treating her for from 5/13/25-5/21/25.    If you have any questions or concerns, please don't hesitate to call.         Sincerely,          Georges Gilbert MD        CC: No Recipients
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
## 2025-05-14 NOTE — PATIENT INSTRUCTIONS
F/u 1 wk  Continue screen time  as tolerated.  Tylenol/motrin as needed sparingly.  Begin exercising- walking, jogging, stationary bike for 30 min daily as tolerated.  No contact sports or weight training.

## 2025-05-21 ENCOUNTER — TELEPHONE (OUTPATIENT)
Age: 25
End: 2025-05-21

## 2025-05-21 ENCOUNTER — OFFICE VISIT (OUTPATIENT)
Dept: OBGYN CLINIC | Facility: CLINIC | Age: 25
End: 2025-05-21

## 2025-05-21 VITALS
OXYGEN SATURATION: 100 % | WEIGHT: 127.4 LBS | TEMPERATURE: 98.7 F | BODY MASS INDEX: 22.57 KG/M2 | HEIGHT: 63 IN | HEART RATE: 99 BPM

## 2025-05-21 DIAGNOSIS — R41.840 DIFFICULTY CONCENTRATING: ICD-10-CM

## 2025-05-21 DIAGNOSIS — G44.319 ACUTE POST-TRAUMATIC HEADACHE, NOT INTRACTABLE: ICD-10-CM

## 2025-05-21 DIAGNOSIS — S06.0X0D CONCUSSION WITHOUT LOSS OF CONSCIOUSNESS, SUBSEQUENT ENCOUNTER: Primary | ICD-10-CM
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
## 2025-05-21 NOTE — PATIENT INSTRUCTIONS
F/u 2 wks  Continue screen time  as tolerated.  Tylenol/motrin as needed sparingly.  Continue exercising- walking, jogging, stationary bike for 30 min daily as tolerated.  No contact sports or weight training.

## 2025-05-21 NOTE — TELEPHONE ENCOUNTER
Patient is calling regarding cancelling an appointment.    Date/Time: 5/21/2025 at 8 am    Reason: pt has something for work    Patient was rescheduled: YES [] NO [x]  If yes, when was Patient reschedule for: n/a    Patient requesting call back to reschedule: YES [] NO [x]

## 2025-05-21 NOTE — LETTER
May 21, 2025     Patient: Janell Solorzano  YOB: 2000  Date of Visit: 5/21/2025      To Whom it May Concern:    Janell Solorzano is under my professional care. Janell was seen in my office on 5/21/2025. Janell may return to work on 5/22/25 with no restrictions.    If you have any questions or concerns, please don't hesitate to call.         Sincerely,          Georges Gilbert MD        CC: No Recipients

## 2025-05-25 ENCOUNTER — OFFICE VISIT (OUTPATIENT)
Dept: URGENT CARE | Age: 25
End: 2025-05-25

## 2025-05-25 VITALS
HEIGHT: 63 IN | RESPIRATION RATE: 16 BRPM | SYSTOLIC BLOOD PRESSURE: 117 MMHG | BODY MASS INDEX: 22.32 KG/M2 | OXYGEN SATURATION: 98 % | WEIGHT: 126 LBS | TEMPERATURE: 97.9 F | DIASTOLIC BLOOD PRESSURE: 83 MMHG | HEART RATE: 103 BPM

## 2025-05-25 DIAGNOSIS — R51.9 ACUTE INTRACTABLE HEADACHE, UNSPECIFIED HEADACHE TYPE: ICD-10-CM

## 2025-05-25 DIAGNOSIS — R11.0 NAUSEA: Primary | ICD-10-CM

## 2025-05-25 DIAGNOSIS — Z87.820 HISTORY OF CONCUSSION: ICD-10-CM

## 2025-05-25 PROCEDURE — 99203 OFFICE O/P NEW LOW 30 MIN: CPT | Performed by: PHYSICIAN ASSISTANT

## 2025-05-25 RX ORDER — ONDANSETRON 4 MG/1
4 TABLET, FILM COATED ORAL EVERY 8 HOURS PRN
Qty: 10 TABLET | Refills: 0 | Status: SHIPPED | OUTPATIENT
Start: 2025-05-25

## 2025-05-25 NOTE — PATIENT INSTRUCTIONS
Patient was educated on taking Zofran for nausea.    Eat bland diet.    Educated if headache become worst go directly to ED.     Follow up with concussion specialist.

## 2025-05-25 NOTE — LETTER
May 25, 2025     Patient: Janell Solorzano   YOB: 2000   Date of Visit: 5/25/2025       To Whom it May Concern:    Janell Solorzano was seen in my clinic on 5/25/2025. She may return on 5/26/25.    If you have any questions or concerns, please don't hesitate to call.         Sincerely,          Amy Malhotra PA-C        CC: No Recipients
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
## 2025-05-29 ENCOUNTER — TELEPHONE (OUTPATIENT)
Dept: OTHER | Facility: OTHER | Age: 25
End: 2025-05-29

## 2025-05-29 ENCOUNTER — TELEPHONE (OUTPATIENT)
Dept: BEHAVIORAL/MENTAL HEALTH CLINIC | Facility: CLINIC | Age: 25
End: 2025-05-29

## 2025-05-29 NOTE — TELEPHONE ENCOUNTER
Outreach call to Janell Solorzano after several cancels. She answered the call and was heading to work. We confirmed that she missed appointments due to her new work, and having different schedule/availability. We discussed upcoming sessions, she will need to cancel 6/4, and we switched 6/12 to 6/13, due to an opening, as Fridays are a current day off. She noted anticipated change in schedule where she may be more available. She briefly discussed having a lot going on and we agreed to 'catch up' at 6/13 session. Ms. Solorzano sounded stable in mood and thought at this time.

## 2025-05-29 NOTE — TELEPHONE ENCOUNTER
Patient calling to cancel appointment with Michela Bates this morning at 8am    [facility]

## 2025-06-02 DIAGNOSIS — F31.12 BIPOLAR 1 DISORDER WITH MODERATE MANIA (HCC): ICD-10-CM

## 2025-06-02 NOTE — TELEPHONE ENCOUNTER
Medication Refill Request     Name of Medication hydrOXYzine HCL (ATARAX)   Dose/Frequency 25 mg, Oral, Every 6 hours PRN   Quantity  90 tablet   Verified pharmacy   [x]  Verified ordering Provider   [x]  Does patient have enough for the next 3 days? Yes [] No [x]  Does patient have a follow-up appointment scheduled? Yes [x] No []   If so when is appointment: Jacque Alejandro 6/3 @ 9168

## 2025-06-03 ENCOUNTER — TELEMEDICINE (OUTPATIENT)
Dept: PSYCHIATRY | Facility: CLINIC | Age: 25
End: 2025-06-03

## 2025-06-03 DIAGNOSIS — F31.12 BIPOLAR 1 DISORDER WITH MODERATE MANIA (HCC): ICD-10-CM

## 2025-06-03 DIAGNOSIS — F60.3 BORDERLINE PERSONALITY DISORDER (HCC): ICD-10-CM

## 2025-06-03 DIAGNOSIS — F43.10 POST TRAUMATIC STRESS DISORDER (PTSD): Primary | ICD-10-CM

## 2025-06-03 DIAGNOSIS — F42.2 MIXED OBSESSIONAL THOUGHTS AND ACTS: ICD-10-CM

## 2025-06-03 PROCEDURE — 99214 OFFICE O/P EST MOD 30 MIN: CPT | Performed by: NURSE PRACTITIONER

## 2025-06-03 RX ORDER — HYDROXYZINE HYDROCHLORIDE 25 MG/1
25 TABLET, FILM COATED ORAL EVERY 6 HOURS PRN
Qty: 90 TABLET | Refills: 2 | Status: SHIPPED | OUTPATIENT
Start: 2025-06-03

## 2025-06-04 ENCOUNTER — OFFICE VISIT (OUTPATIENT)
Dept: OBGYN CLINIC | Facility: CLINIC | Age: 25
End: 2025-06-04

## 2025-06-04 VITALS
BODY MASS INDEX: 22.15 KG/M2 | WEIGHT: 125 LBS | TEMPERATURE: 98.1 F | HEIGHT: 63 IN | OXYGEN SATURATION: 95 % | HEART RATE: 89 BPM

## 2025-06-04 DIAGNOSIS — R42 DIZZINESS: ICD-10-CM

## 2025-06-04 DIAGNOSIS — G44.319 ACUTE POST-TRAUMATIC HEADACHE, NOT INTRACTABLE: ICD-10-CM

## 2025-06-04 DIAGNOSIS — M54.2 NECK PAIN: ICD-10-CM

## 2025-06-04 DIAGNOSIS — S06.0X0D CONCUSSION WITHOUT LOSS OF CONSCIOUSNESS, SUBSEQUENT ENCOUNTER: Primary | ICD-10-CM

## 2025-06-04 PROCEDURE — 99214 OFFICE O/P EST MOD 30 MIN: CPT | Performed by: FAMILY MEDICINE
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
## 2025-06-04 NOTE — PATIENT INSTRUCTIONS
F/u 3 wks  Begin physical therapy  Icing/heat/OTC pain meds as needed.  Home exercises  Begin exercising 30 min daily as tolerated.    Headache Calendar  Please maintain a headache calendar  Consider using phone applications such as Migraine Buddy or Migraine Diary     Headache/migraine treatment:      Rescue medications (for immediate treatment of a headache):   It is ok to take ibuprofen, acetaminophen or naproxen (Advil, Tylenol,  Aleve, Excedrin) if they help your headaches you should limit these to No more than 3 times a week to avoid medication overuse/rebound headaches.      Over the counter preventive supplements for headaches/migraines (if you try, try for 3 months straight)  (to take every day to help prevent headaches - not to take at the time of headache):  There are combo pills online of these - none of which regulated by FDA and double check dosing - take appropriate dose only once a day- prevent a migraine, migravent, mind ease, migrelief   [] Magnesium 400mg daily (If any diarrhea or upset stomach, decrease dose  as tolerated)  [] Riboflavin (Vitamin B2) 400mg daily (may make your urine bright/neon yellow)  - All supplements can be purchased online     Lifestyle Recommendations:  [x] SLEEP - Maintain a regular sleep schedule: Adults need at least 7-8 hours of uninterrupted a night. Maintain good sleep hygiene:  Going to bed and waking up at consistent times, avoiding excessive daytime naps, avoiding caffeinated beverages in the evening, avoid excessive stimulation in the evening and generally using bed primarily for sleeping.  One hour before bedtime would recommend turning lights down lower, decreasing your activity (may read quietly, listen to music at a low volume). When you get into bed, should eliminate all technology (no texting, emailing, playing with your phone, iPad or tablet in bed).  [x] HYDRATION - Maintain good hydration.  Drink  2L of fluid a day (4 typical small water bottles)  [x]  DIET - Maintain good nutrition. In particular don't skip meals and try and eat healthy balanced meals regularly.  [x] TRIGGERS - Look for other triggers and avoid them: Limit caffeine to 1-2 cups a day or less. Avoid dietary triggers that you have noticed bring on your headaches (this could include aged cheese, peanuts, MSG, aspartame and nitrates).  [x] EXERCISE - physical exercise as we all know is good for you in many ways, and not only is good for your heart, but also is beneficial for your mental health, cognitive health and  chronic pain/headaches. I would encourage at the least 5 days of physical exercise weekly for at least 30 minutes.      Education and Follow-up  [x] Please call with any questions or concerns. Of course if any new concerning symptoms go to the emergency department.

## 2025-06-04 NOTE — LETTER
June 4, 2025     Patient: Janell Solorzano  YOB: 2000  Date of Visit: 6/4/2025      To Whom it May Concern:    Janell Solorzano is under my professional care. Janell was seen in my office on 6/4/2025.    If you have any questions or concerns, please don't hesitate to call.         Sincerely,          Georges Gilbert MD        CC: No Recipients

## 2025-06-04 NOTE — ASSESSMENT & PLAN NOTE
Patient continues to experience unstable mood.  Patient reports noncompliance with depakote. Will d/c at this time. No medications ordered, patient does not want any medication changes. Will continue hydroxyzine and mirtazapine as ordered.  Hydroxyzine 25mg PO Q6 PRN  Mirtazapine 7.5mg PO HS
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
## 2025-06-04 NOTE — ASSESSMENT & PLAN NOTE
Will continue Mirtazapine, and hydroxyzine PRN.  Hydroxyzine 25mg PO Q6 PRN  Mirtazapine 7.5mg PO HS

## 2025-06-13 ENCOUNTER — TELEMEDICINE (OUTPATIENT)
Dept: BEHAVIORAL/MENTAL HEALTH CLINIC | Facility: CLINIC | Age: 25
End: 2025-06-13

## 2025-06-13 DIAGNOSIS — F42.2 MIXED OBSESSIONAL THOUGHTS AND ACTS: ICD-10-CM

## 2025-06-13 DIAGNOSIS — F60.3 BORDERLINE PERSONALITY DISORDER (HCC): ICD-10-CM

## 2025-06-13 DIAGNOSIS — F31.12 BIPOLAR 1 DISORDER WITH MODERATE MANIA (HCC): ICD-10-CM

## 2025-06-13 DIAGNOSIS — F43.10 POST TRAUMATIC STRESS DISORDER (PTSD): Primary | ICD-10-CM

## 2025-06-13 PROCEDURE — 90834 PSYTX W PT 45 MINUTES: CPT
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
## 2025-06-26 ENCOUNTER — TELEMEDICINE (OUTPATIENT)
Dept: PSYCHIATRY | Facility: CLINIC | Age: 25
End: 2025-06-26

## 2025-06-26 DIAGNOSIS — F31.4 BIPOLAR I, MOST RECENT EPISODE DEPRESSED, SEVERE (HCC): Primary | ICD-10-CM

## 2025-06-26 NOTE — PSYCH
No Call. No Show. No Charge    Janell Solorzano no showed 06/26/25 virtual appointment.    Treatment Plan not due at this session.

## 2025-06-27 ENCOUNTER — TELEPHONE (OUTPATIENT)
Dept: BEHAVIORAL/MENTAL HEALTH CLINIC | Facility: CLINIC | Age: 25
End: 2025-06-27

## 2025-06-27 ENCOUNTER — TELEPHONE (OUTPATIENT)
Age: 25
End: 2025-06-27

## 2025-06-27 NOTE — TELEPHONE ENCOUNTER
Call to Janell Solorzano regarding today's scheduled virtual appointment. After 2 attempted text invites, and no response, placed call. LM on VM advising of today's appointment, requesting a call back, advising that provider will 'invite' her again to join session (still time to have a session), and advising of next scheduled appointment on 7/3/25 at 10am, and to please call if she cannot make this appointment (aware that her schedule availability has changed, and having switched a couple of future appointments to Fridays).

## 2025-06-28 ENCOUNTER — HOSPITAL ENCOUNTER (EMERGENCY)
Facility: HOSPITAL | Age: 25
Discharge: HOME | End: 2025-06-28
Attending: EMERGENCY MEDICINE | Admitting: EMERGENCY MEDICINE

## 2025-06-28 ENCOUNTER — APPOINTMENT (EMERGENCY)
Dept: RADIOLOGY | Facility: HOSPITAL | Age: 25
End: 2025-06-28

## 2025-06-28 VITALS
OXYGEN SATURATION: 98 % | RESPIRATION RATE: 18 BRPM | TEMPERATURE: 97.7 F | HEART RATE: 94 BPM | SYSTOLIC BLOOD PRESSURE: 132 MMHG | DIASTOLIC BLOOD PRESSURE: 75 MMHG

## 2025-06-28 DIAGNOSIS — G44.59 COMPLICATED HEADACHE SYNDROMES: Primary | ICD-10-CM

## 2025-06-28 DIAGNOSIS — E87.6 HYPOKALEMIA: ICD-10-CM

## 2025-06-28 DIAGNOSIS — R07.9 CHEST PAIN: ICD-10-CM

## 2025-06-28 DIAGNOSIS — R11.2 NAUSEA & VOMITING: ICD-10-CM

## 2025-06-28 DIAGNOSIS — F43.9 STRESS: ICD-10-CM

## 2025-06-28 DIAGNOSIS — R51.9 HEADACHE: ICD-10-CM

## 2025-06-28 LAB
ALBUMIN SERPL BCG-MCNC: 4.3 G/DL (ref 3.5–5)
ALP SERPL-CCNC: 50 U/L (ref 34–104)
ALT SERPL W P-5'-P-CCNC: 22 U/L (ref 7–52)
ANION GAP SERPL CALCULATED.3IONS-SCNC: 6 MMOL/L (ref 4–13)
AST SERPL W P-5'-P-CCNC: 21 U/L (ref 13–39)
ATRIAL RATE: 82 BPM
BASOPHILS # BLD AUTO: 0.05 THOUSANDS/ÂΜL (ref 0–0.1)
BASOPHILS NFR BLD AUTO: 1 % (ref 0–1)
BILIRUB SERPL-MCNC: 0.99 MG/DL (ref 0.2–1)
BUN SERPL-MCNC: 15 MG/DL (ref 5–25)
CALCIUM SERPL-MCNC: 9.1 MG/DL (ref 8.4–10.2)
CARDIAC TROPONIN I PNL SERPL HS: <2 NG/L (ref ?–50)
CHLORIDE SERPL-SCNC: 103 MMOL/L (ref 96–108)
CO2 SERPL-SCNC: 27 MMOL/L (ref 21–32)
CREAT SERPL-MCNC: 0.79 MG/DL (ref 0.6–1.3)
EOSINOPHIL # BLD AUTO: 0.1 THOUSAND/ÂΜL (ref 0–0.61)
EOSINOPHIL NFR BLD AUTO: 1 % (ref 0–6)
ERYTHROCYTE [DISTWIDTH] IN BLOOD BY AUTOMATED COUNT: 11.7 % (ref 11.6–15.1)
EXT PREGNANCY TEST URINE: NEGATIVE
EXT. CONTROL: NORMAL
GFR SERPL CREATININE-BSD FRML MDRD: 105 ML/MIN/1.73SQ M
GLUCOSE SERPL-MCNC: 89 MG/DL (ref 65–140)
HCT VFR BLD AUTO: 41.4 % (ref 34.8–46.1)
HGB BLD-MCNC: 14.8 G/DL (ref 11.5–15.4)
IMM GRANULOCYTES # BLD AUTO: 0.02 THOUSAND/UL (ref 0–0.2)
IMM GRANULOCYTES NFR BLD AUTO: 0 % (ref 0–2)
LYMPHOCYTES # BLD AUTO: 2.02 THOUSANDS/ÂΜL (ref 0.6–4.47)
LYMPHOCYTES NFR BLD AUTO: 20 % (ref 14–44)
MCH RBC QN AUTO: 31 PG (ref 26.8–34.3)
MCHC RBC AUTO-ENTMCNC: 35.7 G/DL (ref 31.4–37.4)
MCV RBC AUTO: 87 FL (ref 82–98)
MONOCYTES # BLD AUTO: 0.68 THOUSAND/ÂΜL (ref 0.17–1.22)
MONOCYTES NFR BLD AUTO: 7 % (ref 4–12)
NEUTROPHILS # BLD AUTO: 7.1 THOUSANDS/ÂΜL (ref 1.85–7.62)
NEUTS SEG NFR BLD AUTO: 71 % (ref 43–75)
NRBC BLD AUTO-RTO: 0 /100 WBCS
P AXIS: 66 DEGREES
PLATELET # BLD AUTO: 260 THOUSANDS/UL (ref 149–390)
PMV BLD AUTO: 10.1 FL (ref 8.9–12.7)
POTASSIUM SERPL-SCNC: 3.3 MMOL/L (ref 3.5–5.3)
PR INTERVAL: 134 MS
PROT SERPL-MCNC: 6.9 G/DL (ref 6.4–8.4)
QRS AXIS: 89 DEGREES
QRSD INTERVAL: 88 MS
QT INTERVAL: 364 MS
QTC INTERVAL: 425 MS
RBC # BLD AUTO: 4.78 MILLION/UL (ref 3.81–5.12)
SODIUM SERPL-SCNC: 136 MMOL/L (ref 135–147)
T WAVE AXIS: 67 DEGREES
VENTRICULAR RATE: 82 BPM
WBC # BLD AUTO: 9.97 THOUSAND/UL (ref 4.31–10.16)

## 2025-06-28 PROCEDURE — 36415 COLL VENOUS BLD VENIPUNCTURE: CPT | Performed by: EMERGENCY MEDICINE

## 2025-06-28 PROCEDURE — 71045 X-RAY EXAM CHEST 1 VIEW: CPT

## 2025-06-28 PROCEDURE — 96375 TX/PRO/DX INJ NEW DRUG ADDON: CPT

## 2025-06-28 PROCEDURE — 93010 ELECTROCARDIOGRAM REPORT: CPT | Performed by: STUDENT IN AN ORGANIZED HEALTH CARE EDUCATION/TRAINING PROGRAM

## 2025-06-28 PROCEDURE — 99285 EMERGENCY DEPT VISIT HI MDM: CPT

## 2025-06-28 PROCEDURE — 84484 ASSAY OF TROPONIN QUANT: CPT | Performed by: EMERGENCY MEDICINE

## 2025-06-28 PROCEDURE — 85025 COMPLETE CBC W/AUTO DIFF WBC: CPT | Performed by: EMERGENCY MEDICINE

## 2025-06-28 PROCEDURE — 81025 URINE PREGNANCY TEST: CPT | Performed by: EMERGENCY MEDICINE

## 2025-06-28 PROCEDURE — 93005 ELECTROCARDIOGRAM TRACING: CPT

## 2025-06-28 PROCEDURE — 96365 THER/PROPH/DIAG IV INF INIT: CPT

## 2025-06-28 PROCEDURE — 99285 EMERGENCY DEPT VISIT HI MDM: CPT | Performed by: EMERGENCY MEDICINE

## 2025-06-28 PROCEDURE — 80053 COMPREHEN METABOLIC PANEL: CPT | Performed by: EMERGENCY MEDICINE

## 2025-06-28 RX ORDER — DIPHENHYDRAMINE HYDROCHLORIDE 50 MG/ML
25 INJECTION, SOLUTION INTRAMUSCULAR; INTRAVENOUS ONCE
Status: CANCELLED | OUTPATIENT
Start: 2025-06-28 | End: 2025-06-28

## 2025-06-28 RX ORDER — METOCLOPRAMIDE HYDROCHLORIDE 5 MG/ML
10 INJECTION INTRAMUSCULAR; INTRAVENOUS ONCE
Status: CANCELLED | OUTPATIENT
Start: 2025-06-28 | End: 2025-06-28

## 2025-06-28 RX ORDER — LIDOCAINE HYDROCHLORIDE 20 MG/ML
15 SOLUTION OROPHARYNGEAL ONCE
Status: DISCONTINUED | OUTPATIENT
Start: 2025-06-28 | End: 2025-06-28

## 2025-06-28 RX ORDER — DEXAMETHASONE SODIUM PHOSPHATE 10 MG/ML
10 INJECTION, SOLUTION INTRAMUSCULAR; INTRAVENOUS ONCE
Status: COMPLETED | OUTPATIENT
Start: 2025-06-28 | End: 2025-06-28

## 2025-06-28 RX ORDER — DIPHENHYDRAMINE HYDROCHLORIDE 50 MG/ML
12.5 INJECTION, SOLUTION INTRAMUSCULAR; INTRAVENOUS EVERY 8 HOURS SCHEDULED
Status: DISCONTINUED | OUTPATIENT
Start: 2025-06-28 | End: 2025-06-28 | Stop reason: HOSPADM

## 2025-06-28 RX ORDER — METOCLOPRAMIDE HYDROCHLORIDE 5 MG/ML
10 INJECTION INTRAMUSCULAR; INTRAVENOUS EVERY 8 HOURS SCHEDULED
Status: DISCONTINUED | OUTPATIENT
Start: 2025-06-28 | End: 2025-06-28 | Stop reason: HOSPADM

## 2025-06-28 RX ORDER — KETOROLAC TROMETHAMINE 30 MG/ML
30 INJECTION, SOLUTION INTRAMUSCULAR; INTRAVENOUS ONCE
Status: CANCELLED | OUTPATIENT
Start: 2025-06-28 | End: 2025-06-28

## 2025-06-28 RX ORDER — ONDANSETRON 2 MG/ML
4 INJECTION INTRAMUSCULAR; INTRAVENOUS ONCE
Status: COMPLETED | OUTPATIENT
Start: 2025-06-28 | End: 2025-06-28

## 2025-06-28 RX ORDER — ACETAMINOPHEN 325 MG/1
975 TABLET ORAL ONCE
Status: COMPLETED | OUTPATIENT
Start: 2025-06-28 | End: 2025-06-28

## 2025-06-28 RX ORDER — ONDANSETRON 4 MG/1
4 TABLET, FILM COATED ORAL EVERY 6 HOURS
Qty: 12 TABLET | Refills: 0 | Status: SHIPPED | OUTPATIENT
Start: 2025-06-28

## 2025-06-28 RX ORDER — MAGNESIUM SULFATE HEPTAHYDRATE 40 MG/ML
2 INJECTION, SOLUTION INTRAVENOUS EVERY 24 HOURS
Status: DISCONTINUED | OUTPATIENT
Start: 2025-06-28 | End: 2025-06-28 | Stop reason: HOSPADM

## 2025-06-28 RX ORDER — MAGNESIUM SULFATE HEPTAHYDRATE 40 MG/ML
2 INJECTION, SOLUTION INTRAVENOUS ONCE
Status: CANCELLED | OUTPATIENT
Start: 2025-06-28 | End: 2025-06-28

## 2025-06-28 RX ORDER — SODIUM CHLORIDE 9 MG/ML
100 INJECTION, SOLUTION INTRAVENOUS ONCE
Status: COMPLETED | OUTPATIENT
Start: 2025-06-28 | End: 2025-06-28

## 2025-06-28 RX ADMIN — DEXAMETHASONE SODIUM PHOSPHATE 10 MG: 10 INJECTION INTRAMUSCULAR; INTRAVENOUS at 08:16

## 2025-06-28 RX ADMIN — METOCLOPRAMIDE 10 MG: 5 INJECTION, SOLUTION INTRAMUSCULAR; INTRAVENOUS at 08:12

## 2025-06-28 RX ADMIN — SODIUM CHLORIDE 100 ML/HR: 0.9 INJECTION, SOLUTION INTRAVENOUS at 08:12

## 2025-06-28 RX ADMIN — MAGNESIUM SULFATE HEPTAHYDRATE 2 G: 40 INJECTION, SOLUTION INTRAVENOUS at 08:13

## 2025-06-28 RX ADMIN — ONDANSETRON 4 MG: 2 INJECTION INTRAMUSCULAR; INTRAVENOUS at 08:12

## 2025-06-28 RX ADMIN — DIPHENHYDRAMINE HYDROCHLORIDE 12.5 MG: 50 INJECTION, SOLUTION INTRAMUSCULAR; INTRAVENOUS at 08:12

## 2025-06-28 RX ADMIN — ACETAMINOPHEN 975 MG: 325 TABLET ORAL at 08:13

## 2025-06-28 NOTE — DISCHARGE INSTRUCTIONS
Today Janell Solorzano was seen in the emergency department for headache and chest pain. Emergency department workup included labs, EKG, chest x-ray. I believe the symptoms to be the result of increased anxiety/stress. At this time there does not appear to be an emergent life threatening cause to explain these symptoms. Janell is stable for discharge with outpatient follow up.     Please follow up with your primary care provider this week.  Continue seeing your therapist weekly.  If a specialist referral was placed for you please call them tomorrow to be seen at the next available appointment. Please review all results discussed today with your primary care provider and/or specialist.    Please return to the emergency department as soon as possible if you develop uncontrollable fevers (Temp >100.4F), uncontrollable pain, vomiting, chest pain, trouble breathing, weakness on one side of your body, slurred speech, vision changes, thoughts of suicide, increased depression or any other concerning symptoms.

## 2025-06-28 NOTE — Clinical Note
Janell Solorzano was seen and treated in our emergency department on 6/28/2025.                Diagnosis: Chest pain/headache    Janell  is off the rest of the shift today, may return to work on return date.    She may return on this date: 06/30/2025         If you have any questions or concerns, please don't hesitate to call.      Mark Arnold, DO    ______________________________           _______________          _______________  Hospital Representative                              Date                                Time

## 2025-06-28 NOTE — ED ATTENDING ATTESTATION
6/28/2025  IOral DO, saw and evaluated the patient. I have discussed the patient with the resident/non-physician practitioner and agree with the resident's/non-physician practitioner's findings, Plan of Care, and MDM as documented in the resident's/non-physician practitioner's note, except where noted. All available labs and Radiology studies were reviewed.  I was present for key portions of any procedure(s) performed by the resident/non-physician practitioner and I was immediately available to provide assistance.       At this point I agree with the current assessment done in the Emergency Department.  I have conducted an independent evaluation of this patient a history and physical is as follows:            1. Complicated headache syndromes    2. Headache    3. Chest pain    4. Stress    5. Nausea & vomiting    6. Hypokalemia            MDM  Number of Diagnoses or Management Options  Chest pain  Headache  Hypokalemia  Nausea & vomiting  Stress  Diagnosis management comments:       Initial ED assessment:     24-year-old female, headache, long history of headaches this feels similar to previous ones but more intense, also having on and off chest pains over the past couple of weeks.    Pathology at risk for includes but is not limited to:    Headache, consider migraine consider tension headache, sleep deprivation headache as she has been sleeping less late recently, chest pain likely musculoskeletal right, reproducible on exam, consider ACS although nonischemic EKG making this very unlikely.  No associated shortness of breath that would have been suggestive of pneumothorax.,  PERC rule negative making pulmonary embolism unlikely.    Initial ED plan:    Blood work, chest x-ray, medications for migraine.         Final ED summary/disposition:   After evaluation and workup in the emergency department, patient feels improved after symptoms.  Ultimately discharged               Time reflects when diagnosis  was documented in both MDM as applicable and the Disposition within this note       Time User Action Codes Description Comment    6/28/2025  8:29 AM Ahmed, Mark Add [R51.9] Headache     6/28/2025  8:29 AM Ahmed, Mark Add [R07.9] Chest pain     6/28/2025  8:29 AM Ahmed, Mark Add [F43.9] Stress     6/28/2025  8:31 AM Ahmed, Mark Modify [R51.9] Headache     6/28/2025  8:31 AM Ahmed, Mark Add [R11.2] Nausea & vomiting     6/28/2025  8:38 AM Ahmed, Mark Add [E87.6] Hypokalemia     6/28/2025  3:18 PM AgrestiBladein J Add [G44.59] Complicated headache syndromes     6/28/2025  3:18 PM Agresti, Oral J Modify [R11.2] Nausea & vomiting     6/28/2025  3:18 PM AgrestiBladein J Modify [G44.59] Complicated headache syndromes           ED Disposition       ED Disposition   Discharge    Condition   Stable    Date/Time   Sat Jun 28, 2025  9:01 AM    Comment   Janell Solorzano discharge to home/self care.                   Follow-up Information       Follow up With Specialties Details Why Contact Info    [facility]  Please call tomorrow to schedule an appointment [address] [place] [id number] [phone]                            Chief Complaint   Patient presents with    Headache     Patient presents with headache, nausea, generalized weakness, chest pain starting 1 week ago. Reports having a couple positive pregnancy tests but some were negative.              24-year-old female, presents with multiple complaints, headache, nausea, chest pain.  States the headaches have been on and off for years worse for the past couple of months worse over the past couple of days prompting ED visit.  Also was having chest pain on and off over the past couple weeks describes as a tightness in the center of her chest.  No associated diaphoresis no radiation of the discomfort.,  Occasionally associated with some shortness of breath but also has an anxiety feeling when this is occurring.  No falls injury or  trauma.  No fevers no chills, no abdominal pain.      Headache                Visit Vitals  /75   Pulse 94   Temp 97.7 °F (36.5 °C) (Oral)   Resp 18   LMP 04/22/2025 (Exact Date)   SpO2 98%   OB Status Unknown   Smoking Status Never        Physical Exam  Vitals reviewed.   Constitutional:       General: She is not in acute distress.     Appearance: She is well-developed. She is not diaphoretic.   HENT:      Head: Normocephalic and atraumatic.      Right Ear: External ear normal.      Left Ear: External ear normal.      Nose: Nose normal.     Eyes:      General:         Right eye: No discharge.         Left eye: No discharge.      Pupils: Pupils are equal, round, and reactive to light.     Neck:      Trachea: No tracheal deviation.     Cardiovascular:      Rate and Rhythm: Normal rate and regular rhythm.      Heart sounds: Normal heart sounds. No murmur heard.  Pulmonary:      Effort: Pulmonary effort is normal. No respiratory distress.      Breath sounds: Normal breath sounds. No stridor.   Abdominal:      General: There is no distension.      Palpations: Abdomen is soft.      Tenderness: There is no abdominal tenderness. There is no guarding or rebound.     Musculoskeletal:         General: Normal range of motion.      Cervical back: Normal range of motion and neck supple.     Skin:     General: Skin is warm and dry.      Coloration: Skin is not pale.      Findings: No erythema.     Neurological:      General: No focal deficit present.      Mental Status: She is alert and oriented to person, place, and time.                       Medications   ondansetron (ZOFRAN) injection 4 mg (4 mg Intravenous Given 6/28/25 0812)   dexamethasone (PF) (DECADRON) injection 10 mg (10 mg Intravenous Given 6/28/25 0816)   sodium chloride 0.9 % infusion (0 mL/hr Intravenous Stopped 6/28/25 0910)   acetaminophen (TYLENOL) tablet 975 mg (975 mg Oral Given 6/28/25 0813)             Labs Reviewed   COMPREHENSIVE METABOLIC PANEL -  "Abnormal       Result Value Ref Range Status    Sodium 136  135 - 147 mmol/L Final    Potassium 3.3 (*) 3.5 - 5.3 mmol/L Final    Chloride 103  96 - 108 mmol/L Final    CO2 27  21 - 32 mmol/L Final    ANION GAP 6  4 - 13 mmol/L Final    BUN 15  5 - 25 mg/dL Final    Creatinine 0.79  0.60 - 1.30 mg/dL Final    Comment: Standardized to IDMS reference method    Glucose 89  65 - 140 mg/dL Final    Comment: If the patient is fasting, the ADA then defines impaired fasting glucose as > 100 mg/dL and diabetes as > or equal to 123 mg/dL.    Calcium 9.1  8.4 - 10.2 mg/dL Final    AST 21  13 - 39 U/L Final    ALT 22  7 - 52 U/L Final    Comment: Specimen collection should occur prior to Sulfasalazine administration due to the potential for falsely depressed results.     Alkaline Phosphatase 50  34 - 104 U/L Final    Total Protein 6.9  6.4 - 8.4 g/dL Final    Albumin 4.3  3.5 - 5.0 g/dL Final    Total Bilirubin 0.99  0.20 - 1.00 mg/dL Final    Comment: Use of this assay is not recommended for patients undergoing treatment with eltrombopag due to the potential for falsely elevated results.  N-acetyl-p-benzoquinone imine (metabolite of Acetaminophen) will generate erroneously low results in samples for patients that have taken an overdose of Acetaminophen.    eGFR 105  ml/min/1.73sq m Final    Narrative:     [employer] guidelines for Chronic Kidney Disease (CKD):     Stage 1 with normal or high GFR (GFR > 90 mL/min/1.73 square meters)    Stage 2 Mild CKD (GFR = 60-89 mL/min/1.73 square meters)    Stage 3A Moderate CKD (GFR = 45-59 mL/min/1.73 square meters)    Stage 3B Moderate CKD (GFR = 30-44 mL/min/1.73 square meters)    Stage 4 Severe CKD (GFR = 15-29 mL/min/1.73 square meters)    Stage 5 End Stage CKD (GFR <15 mL/min/1.73 square meters)  Note: GFR calculation is accurate only with a steady state creatinine   HS TROPONIN I 0HR - Normal    hs TnI 0hr <2  \"Refer to ACS Flowchart\"- see link ng/L Final    " Comment:                                              Initial (time 0) result  If >=50 ng/L, Myocardial injury suggested ;  Type of myocardial injury and treatment strategy  to be determined.  If 5-49 ng/L, a delta result at 2 hours will be needed to further evaluate.  If <4 ng/L, and chest pain has been >3 hours since onset, patient may qualify for discharge based on the HEART score in the ED.  If <5 ng/L and <3hours since onset of chest pain, a delta result at 2 hours will be needed to further evaluate.    HS Troponin 99th Percentile URL of a Health Population=12 ng/L with a 95% Confidence Interval of 8-18 ng/L.    Second Troponin (time 2 hours)  If calculated delta >= 20 ng/L,  Myocardial injury suggested ; Type of myocardial injury and treatment strategy to be determined.  If 5-49 ng/L and the calculated delta is 5-19 ng/L, consult medical service for evaluation.  Continue evaluation for ischemia on ecg and other possible etiology and repeat hs troponin at 4 hours.  If delta is <5 ng/L at 2 hours, consider discharge based on risk stratification via the HEART score (if in ED), or AMANDA risk score in IP/Observation.    HS Troponin 99th Percentile URL of a Health Population=12 ng/L with a 95% Confidence Interval of 8-18 ng/L.   POCT PREGNANCY, URINE - Normal    EXT Preg Test, Ur Negative  Negative Final    Control Valid  Valid Final   CBC AND DIFFERENTIAL    WBC 9.97  4.31 - 10.16 Thousand/uL Final    RBC 4.78  3.81 - 5.12 Million/uL Final    Hemoglobin 14.8  11.5 - 15.4 g/dL Final    Hematocrit 41.4  34.8 - 46.1 % Final    MCV 87  82 - 98 fL Final    MCH 31.0  26.8 - 34.3 pg Final    MCHC 35.7  31.4 - 37.4 g/dL Final    RDW 11.7  11.6 - 15.1 % Final    MPV 10.1  8.9 - 12.7 fL Final    Platelets 260  149 - 390 Thousands/uL Final    nRBC 0  /100 WBCs Final    Segmented % 71  43 - 75 % Final    Immature Grans % 0  0 - 2 % Final    Lymphocytes % 20  14 - 44 % Final    Monocytes % 7  4 - 12 % Final    Eosinophils  Relative 1  0 - 6 % Final    Basophils Relative 1  0 - 1 % Final    Absolute Neutrophils 7.10  1.85 - 7.62 Thousands/µL Final    Absolute Immature Grans 0.02  0.00 - 0.20 Thousand/uL Final    Absolute Lymphocytes 2.02  0.60 - 4.47 Thousands/µL Final    Absolute Monocytes 0.68  0.17 - 1.22 Thousand/µL Final    Eosinophils Absolute 0.10  0.00 - 0.61 Thousand/µL Final    Basophils Absolute 0.05  0.00 - 0.10 Thousands/µL Final         XR chest 1 view portable   ED Interpretation   On my independent interpretation.  No pneumothorax.  Lungs clear without focal consolidation or pneumothorax.  No pleural effusion.  No acute cardiopulmonary process seen on chest x-ray.                     Procedures
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
## 2025-06-30 ENCOUNTER — TELEPHONE (OUTPATIENT)
Dept: PSYCHIATRY | Facility: CLINIC | Age: 25
End: 2025-06-30

## 2025-06-30 NOTE — TELEPHONE ENCOUNTER
NO-SHOW LETTER MAILED TO Janell Solorzano.  ADDRESS: [address] [place] [address]  M[place]ki [place]HAL [id number]  SENT VIA [employer]

## 2025-06-30 NOTE — LETTER
25     Janell Solorzano   : 2000   [address]  [place] [address]       It is the policy of [facility] to monitor and manage appointments that have been no-showed or cancelled with less than 48-hour notice. This is necessary to ensure that we are able to provide timely access for all patients to our providers. Undue numbers of unutilized appointments delays necessary medical care for all patients.      Dear Janell Solorzano:      We are sorry that you missed your appointment with Michela Bates LCSW on 2025. It has been 2 weeks since your last appointment. Your health and follow-up care are important to us. We want to make you aware of your next appointment with Michela Bates LCSW that is scheduled for Thursday, 7/3/2025 at 10:00am.    Please be aware that our office policy states that if you 'no show' two or more Therapy appointments without prior notice of cancellation within in a calendar year, you may be discharged from Therapy treatment.  We want to bring this to your attention now to prevent an interruption of your care.  If you have any questions about this policy, please call us at the number above.         Thank you in advance for your cooperation and assistance.       Sincerely,      [facility] Support Staff

## 2025-07-01 ENCOUNTER — TELEMEDICINE (OUTPATIENT)
Dept: PSYCHIATRY | Facility: CLINIC | Age: 25
End: 2025-07-01
Payer: COMMERCIAL

## 2025-07-01 DIAGNOSIS — F43.10 POST TRAUMATIC STRESS DISORDER (PTSD): ICD-10-CM

## 2025-07-01 DIAGNOSIS — F31.12 BIPOLAR 1 DISORDER WITH MODERATE MANIA (HCC): Primary | ICD-10-CM

## 2025-07-01 PROCEDURE — 99213 OFFICE O/P EST LOW 20 MIN: CPT | Performed by: NURSE PRACTITIONER

## 2025-07-01 RX ORDER — MIRTAZAPINE 7.5 MG/1
7.5 TABLET, FILM COATED ORAL
Qty: 90 TABLET | Refills: 0 | Status: SHIPPED | OUTPATIENT
Start: 2025-07-01

## 2025-07-02 NOTE — ASSESSMENT & PLAN NOTE
Will continue Mirtazapine, and hydroxyzine PRN for sleep and anxiety.  Hydroxyzine 25mg PO Q6 PRN  Mirtazapine 7.5mg PO HS  Orders:    mirtazapine (REMERON) 7.5 MG tablet; Take 1 tablet (7.5 mg total) by mouth daily at bedtime

## 2025-07-02 NOTE — ASSESSMENT & PLAN NOTE
Patient continues to experience unstable mood.  No medications ordered, patient continues to not want any medication changes. Will continue hydroxyzine and mirtazapine as ordered.  Hydroxyzine 25mg PO Q6 PRN  Mirtazapine 7.5mg PO HS
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
## 2025-07-11 ENCOUNTER — TELEMEDICINE (OUTPATIENT)
Dept: BEHAVIORAL/MENTAL HEALTH CLINIC | Facility: CLINIC | Age: 25
End: 2025-07-11
Payer: COMMERCIAL

## 2025-07-11 DIAGNOSIS — F43.10 POST TRAUMATIC STRESS DISORDER (PTSD): Primary | ICD-10-CM

## 2025-07-11 DIAGNOSIS — F60.3 BORDERLINE PERSONALITY DISORDER (HCC): ICD-10-CM

## 2025-07-11 PROCEDURE — 90834 PSYTX W PT 45 MINUTES: CPT
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
## 2025-07-17 ENCOUNTER — TELEPHONE (OUTPATIENT)
Dept: OBGYN CLINIC | Facility: CLINIC | Age: 25
End: 2025-07-17

## 2025-07-18 ENCOUNTER — TELEMEDICINE (OUTPATIENT)
Dept: BEHAVIORAL/MENTAL HEALTH CLINIC | Facility: CLINIC | Age: 25
End: 2025-07-18
Payer: COMMERCIAL

## 2025-07-18 DIAGNOSIS — F60.3 BORDERLINE PERSONALITY DISORDER (HCC): ICD-10-CM

## 2025-07-18 DIAGNOSIS — F42.2 MIXED OBSESSIONAL THOUGHTS AND ACTS: ICD-10-CM

## 2025-07-18 DIAGNOSIS — F43.10 POST TRAUMATIC STRESS DISORDER (PTSD): Primary | ICD-10-CM

## 2025-07-18 DIAGNOSIS — F31.12 BIPOLAR 1 DISORDER WITH MODERATE MANIA (HCC): ICD-10-CM

## 2025-07-18 DIAGNOSIS — F42.9 OBSESSIVE-COMPULSIVE DISORDER, UNSPECIFIED TYPE: ICD-10-CM

## 2025-07-18 PROCEDURE — 90834 PSYTX W PT 45 MINUTES: CPT
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
## 2025-07-25 ENCOUNTER — TELEPHONE (OUTPATIENT)
Dept: BEHAVIORAL/MENTAL HEALTH CLINIC | Facility: CLINIC | Age: 25
End: 2025-07-25

## 2025-07-25 NOTE — TELEPHONE ENCOUNTER
Call to Janell Solorzano regarding today's scheduled appointment. After 2 attempted text invites, and no response, placed call at about 20 minutes into the session time. LM on  advising of today's appointment, requesting a call back, and advising of next scheduled virtual appointment on 8/1/25 at 3pm  Also mentioned potential no-show letter to be sent.

## 2025-07-28 ENCOUNTER — TELEPHONE (OUTPATIENT)
Dept: PSYCHIATRY | Facility: CLINIC | Age: 25
End: 2025-07-28

## 2025-07-31 ENCOUNTER — TELEMEDICINE (OUTPATIENT)
Dept: PSYCHIATRY | Facility: CLINIC | Age: 25
End: 2025-07-31
Payer: COMMERCIAL

## 2025-07-31 DIAGNOSIS — F31.12 BIPOLAR 1 DISORDER WITH MODERATE MANIA (HCC): ICD-10-CM

## 2025-07-31 PROCEDURE — 99213 OFFICE O/P EST LOW 20 MIN: CPT | Performed by: NURSE PRACTITIONER

## 2025-07-31 RX ORDER — HYDROXYZINE HYDROCHLORIDE 25 MG/1
25 TABLET, FILM COATED ORAL EVERY 6 HOURS PRN
Qty: 90 TABLET | Refills: 2 | Status: SHIPPED | OUTPATIENT
Start: 2025-07-31

## 2025-08-01 ENCOUNTER — TELEMEDICINE (OUTPATIENT)
Dept: BEHAVIORAL/MENTAL HEALTH CLINIC | Facility: CLINIC | Age: 25
End: 2025-08-01
Payer: COMMERCIAL

## 2025-08-01 DIAGNOSIS — F43.10 POST TRAUMATIC STRESS DISORDER (PTSD): Primary | ICD-10-CM

## 2025-08-01 DIAGNOSIS — F42.2 MIXED OBSESSIONAL THOUGHTS AND ACTS: ICD-10-CM

## 2025-08-01 DIAGNOSIS — F31.12 BIPOLAR 1 DISORDER WITH MODERATE MANIA (HCC): ICD-10-CM

## 2025-08-01 PROCEDURE — 90834 PSYTX W PT 45 MINUTES: CPT

## 2025-08-14 ENCOUNTER — TELEPHONE (OUTPATIENT)
Dept: BEHAVIORAL/MENTAL HEALTH CLINIC | Facility: CLINIC | Age: 25
End: 2025-08-14

## 2025-08-14 ENCOUNTER — TELEPHONE (OUTPATIENT)
Dept: PSYCHIATRY | Facility: CLINIC | Age: 25
End: 2025-08-14

## 2025-08-22 ENCOUNTER — TELEPHONE (OUTPATIENT)
Age: 25
End: 2025-08-22